# Patient Record
Sex: MALE | Race: WHITE | NOT HISPANIC OR LATINO | Employment: UNEMPLOYED | URBAN - METROPOLITAN AREA
[De-identification: names, ages, dates, MRNs, and addresses within clinical notes are randomized per-mention and may not be internally consistent; named-entity substitution may affect disease eponyms.]

---

## 2017-03-10 ENCOUNTER — OFFICE VISIT (OUTPATIENT)
Dept: INTERNAL MEDICINE | Facility: CLINIC | Age: 22
End: 2017-03-10
Attending: INTERNAL MEDICINE
Payer: COMMERCIAL

## 2017-03-10 VITALS
HEART RATE: 90 BPM | DIASTOLIC BLOOD PRESSURE: 70 MMHG | BODY MASS INDEX: 22.45 KG/M2 | WEIGHT: 143.06 LBS | SYSTOLIC BLOOD PRESSURE: 100 MMHG | HEIGHT: 67 IN | OXYGEN SATURATION: 95 %

## 2017-03-10 DIAGNOSIS — E55.9 VITAMIN D DEFICIENCY: ICD-10-CM

## 2017-03-10 DIAGNOSIS — R11.2 NON-INTRACTABLE VOMITING WITH NAUSEA, UNSPECIFIED VOMITING TYPE: ICD-10-CM

## 2017-03-10 DIAGNOSIS — Z00.00 ANNUAL PHYSICAL EXAM: Primary | ICD-10-CM

## 2017-03-10 DIAGNOSIS — Z87.2 H/O URTICARIA: ICD-10-CM

## 2017-03-10 PROCEDURE — 99999 PR PBB SHADOW E&M-EST. PATIENT-LVL III: CPT | Mod: PBBFAC,,, | Performed by: INTERNAL MEDICINE

## 2017-03-10 PROCEDURE — 99385 PREV VISIT NEW AGE 18-39: CPT | Mod: S$GLB,,, | Performed by: INTERNAL MEDICINE

## 2017-03-10 RX ORDER — LORATADINE 10 MG/1
10 TABLET ORAL DAILY PRN
COMMUNITY
End: 2018-02-09

## 2017-03-10 RX ORDER — VIT C/E/ZN/COPPR/LUTEIN/ZEAXAN 250MG-90MG
1000 CAPSULE ORAL DAILY
Refills: 0 | COMMUNITY
Start: 2017-03-10 | End: 2018-05-13

## 2017-03-10 NOTE — MR AVS SNAPSHOT
[facility]  [address]  [place] [address]  Phone: [phone]  Fax: [phone]                  Alejandro Neff   3/10/2017 3:20 PM   Office Visit    Description:  Male : 1995   Provider:  Nata Hernandez MD   Department:  [facility]           Reason for Visit     Annual Exam           Diagnoses this Visit        Comments    Annual physical exam    -  Primary     Vitamin D deficiency         H/O urticaria         Non-intractable vomiting with nausea, unspecified vomiting type                To Do List           Future Appointments        Provider Department Dept Phone    3/28/2017 2:00 PM NEHAL Calvillo III, MD [facility] - Allergy/ Immunology [phone]      Goals (5 Years of Data)     None      Follow-Up and Disposition     Return in about 1 year (around 3/10/2018), or if symptoms worsen or fail to improve.      PURCHASE these Medications (No prescription required)        Start End    cholecalciferol, vitamin D3, 1,000 unit capsule 3/10/2017     Sig - Route: Take 1 capsule (1,000 Units total) by mouth once daily. - Oral    Class: OTC      [facility]     [facility] Care [facility] -  Assistance  Registered nurses in the [facility] provide clinical advisement, health education, appointment booking, and other advisory services.  Call for this free service at [phone].             Medications           Message regarding Medications     Verify the changes and/or additions to your medication regime listed below are the same as discussed with your clinician today.  If any of these changes or additions are incorrect, please notify your healthcare provider.        START taking these NEW medications        Refills    cholecalciferol, vitamin D3, 1,000 unit capsule 0    Sig: Take 1 capsule (1,000 Units total) by mouth once daily.    Class: OTC    Route: Oral           Verify that the below list of medications is an  "accurate representation of the medications you are currently taking.  If none reported, the list may be blank. If incorrect, please contact your healthcare provider. Carry this list with you in case of emergency.           Current Medications     loratadine (CLARITIN) 10 mg tablet Take 10 mg by mouth daily as needed for Allergies.    MULTIVIT-MINERALS/FERROUS FUM (MULTI VITAMIN ORAL) Take by mouth once daily.    cholecalciferol, vitamin D3, 1,000 unit capsule Take 1 capsule (1,000 Units total) by mouth once daily.           Clinical Reference Information           Your Vitals Were     BP Pulse Height Weight SpO2 BMI    100/70 (BP Location: Left arm, Patient Position: Sitting, BP Method: Manual) 90 5' 7" (1.702 m) 64.9 kg (143 lb 1.3 oz) 95% 22.41 kg/m2      Blood Pressure          Most Recent Value    BP  100/70      Allergies as of 3/10/2017     No Known Allergies      Immunizations Administered on Date of Encounter - 3/10/2017     None      Orders Placed During Today's Visit     Future Labs/Procedures Expected by Expires    Lipid panel  3/10/2017 3/10/2018    TSH  3/10/2017 3/10/2018    Vitamin D  3/10/2017 6/8/2017      [facility] Sign-Up     Activating your [facility] account is as easy as 1-2-3!     1) Visit [email or web address], select Sign Up Now, enter this activation code and your date of birth, then select Next.  [email or web address]  Expires: 4/24/2017  4:40 PM      2) Create a username and password to use when you visit [facility] in the future and select a security question in case you lose your password and select Next.    3) Enter your e-mail address and click Sign Up!    Additional Information  If you have questions, please e-mail [email or web address] or call [phone] to talk to our [facility] staff. Remember, [facility] is NOT to be used for urgent needs. For medical emergencies, dial 911.         Instructions    Your test results will be communicated to you via: [facility], Telephone or Letter.  If you have " not received your test results within one week. Please contact the clinic at [phone].         Language Assistance Services     ATTENTION: Language assistance services are available, free of charge. Please call [phone].      ATENCIÓN: Si sanford bower, tiene a montgomery disposición servicios gratuitos de asistencia lingüística. Llame al [phone].     CHÚ Ý: N?u b?n nói Ti?ng Vi?t, có các d?ch v? h? tr? ngôn ng? mi?n phí dành cho b?n. G?i s? [phone].         [facility] complies with applicable Federal civil rights laws and does not discriminate on the basis of race, color, national origin, age, disability, or sex.

## 2017-03-10 NOTE — PROGRESS NOTES
Subjective:       Patient ID: Alejandro Neff is a 22 y.o. male.    Chief Complaint: Annual Exam (est care)    HPI   Pt here to est care and for annual exam.   Dx with Lyme's at 17yoa - was treated with IV abx and oral abx x 2 years. Is from [place] and attending [employer] currently.    Has hx of nausea and vomiting in AM - this is attributed to Lyme's. [facility] by [facility] - per pt had abd us and EGD and other studies- symptoms stable. Has joint pain that is chronic and unchanged in all joints attributed to lymes - eval by multiple specialists in past - symptoms stable    Pt trying to live off campus for medical reasons for hx of lyme's disease.   Has hx of food allergies and dust and urticaria - Pt reports difficulty with eating healthy and complicated with dx of urticaria, food allergies and nausea attributed to lymes  - eats primarily at [employer] cafeteria. Does have to buy meal plan since living on campus - has community kitchen currently in dorm that contains minimal amenities for cooking and next year has to move from Freshman dorms and new dorm will have only 1 kitchen. Does have car and is able to grocery shop - however limited facilities for cooking.    Pt had labs at [facility] recently when I saw him there a few weeks ago - Cbc, cmp, hiv, hepatitis, crp, esr, RA, SHIRA and HLA B27 unremarkable.     Have appt with allergy scheduled already    Past Medical History:   Diagnosis Date    Lyme arthritis     multiple joints    Lyme disease        History reviewed. No pertinent surgical history.    Family History   Problem Relation Age of Onset    Arthritis Mother     Other Mother      benign tumor of brain and spinal cord    Hyperlipidemia Father     Gout Father     No Known Problems Sister     Arthritis Sister      shoulder    Depression Sister        Social History     Social History    Marital status: Single     Spouse name: N/A    Number of children: N/A    Years of  education: N/A     Occupational History    student at [employer]      Social History Main Topics    Smoking status: Never Smoker    Smokeless tobacco: None    Alcohol use Yes      Comment: occ    Drug use: No    Sexual activity: Yes     Partners: Female     Birth control/ protection: Condom     Other Topics Concern    None     Social History Narrative    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 He appears well-developed and well-nourished.   HENT:   Head: Normocephalic and atraumatic.   Right Ear: External ear normal.   Left Ear: External ear normal.   Nose: Nose normal.   Mouth/Throat: Oropharynx is clear and moist.   Eyes: Conjunctivae and EOM are normal.   Neck: Neck supple. No thyromegaly present.   Cardiovascular: Normal rate, regular rhythm, normal heart sounds and intact distal pulses.    Pulmonary/Chest: Effort normal and breath sounds normal.   Abdominal: Soft. Bowel sounds are normal.   Musculoskeletal: He exhibits no edema or tenderness.   Lymphadenopathy:     He has no cervical adenopathy.   Neurological: He is alert and oriented to person, place, and time. Coordination normal.   Skin: Skin is warm and dry.   Psychiatric: He has a normal mood and affect. His behavior is normal. Judgment and thought content normal.   Vitals reviewed.      Assessment:       Alejandro was seen today for annual exam.    Diagnoses and all orders for this visit:  Alejandro was seen today for annual exam.    Diagnoses and all orders for this visit:    Annual physical exam  -     Lipid panel; Future  -     TSH; Future  -     Vitamin D; Future  Recommend daily sunscreen, cardiovascular exercise min 30 min 5 days per week. Seatbelts routinely.    Vitamin D deficiency: cont otc daily vit d, check level  -     Vitamin D; Future    H/O  urticaria: pt scheduled with allergist already    Non-intractable vomiting with nausea, unspecified vomiting type: per pt chronic and s/p extensive eval by gi - symptoms stable. Er and rtc prompts given.   Review old records    Other orders  -     cholecalciferol, vitamin D3, 1,000 unit capsule; Take 1 capsule (1,000 Units total) by mouth once daily.    Pt to complete LOKI to obtain old records to review dx and eval for lymes disease and chronic symptoms.   Will draft letter for medical necessity to live off campus after received old records from prior ped office as pt would benefit from being able to prepare own food in personal kitchen  Keep appt with allx

## 2017-03-10 NOTE — PATIENT INSTRUCTIONS
Your test results will be communicated to you via: [facility], Telephone or Letter.  If you have not received your test results within one week. Please contact the clinic at [phone].

## 2017-03-11 PROBLEM — R11.2 VOMITING WITH NAUSEA, NOT INTRACTABLE: Status: ACTIVE | Noted: 2017-03-11

## 2017-03-11 PROBLEM — R11.0 CHRONIC NAUSEA: Status: ACTIVE | Noted: 2017-03-11

## 2017-03-15 ENCOUNTER — TELEPHONE (OUTPATIENT)
Dept: INTERNAL MEDICINE | Facility: CLINIC | Age: 22
End: 2017-03-15

## 2017-03-15 NOTE — TELEPHONE ENCOUNTER
Please notify pt that we are still awaiting records from prior pediatrician. Please check faxes to see if old records were received. thnks!

## 2017-03-15 NOTE — TELEPHONE ENCOUNTER
Pt's requesting a letter stating that he has Lyme disease and it's okay for him to live off campus    LCV 03/10/17    Please auth request

## 2017-03-15 NOTE — TELEPHONE ENCOUNTER
----- Message from Isa Cruz sent at 3/9/2017  3:14 PM CST -----  Contact: pt  x_  1st Request  _  2nd Request  _  3rd Request      Who:MATTHEW VARGHESE [[id number]]    Why: pt would like to know if he can get a letter stating that he has Lyme disease and is it okay for him to live off campus     What Number to Call Back: [phone] LVM if possible     When to Expect a call back: (Before the end of the day)   -- if call after 3:00 call back will be tomorrow.

## 2017-03-15 NOTE — TELEPHONE ENCOUNTER
Pt has been informed that we have not yet received his medical records from his prior pediatrician. Pt is requesting to be notified if the office does not receive records by Friday 3/17/2017.      Pt has no further questions at this time.

## 2017-03-28 ENCOUNTER — LAB VISIT (OUTPATIENT)
Dept: LAB | Facility: HOSPITAL | Age: 22
End: 2017-03-28
Payer: COMMERCIAL

## 2017-03-28 ENCOUNTER — OFFICE VISIT (OUTPATIENT)
Dept: ALLERGY | Facility: CLINIC | Age: 22
End: 2017-03-28
Payer: COMMERCIAL

## 2017-03-28 VITALS
HEIGHT: 66 IN | WEIGHT: 141.13 LBS | SYSTOLIC BLOOD PRESSURE: 110 MMHG | TEMPERATURE: 99 F | BODY MASS INDEX: 22.68 KG/M2 | DIASTOLIC BLOOD PRESSURE: 55 MMHG

## 2017-03-28 DIAGNOSIS — L50.1 IDIOPATHIC URTICARIA: Primary | ICD-10-CM

## 2017-03-28 DIAGNOSIS — J31.0 CHRONIC RHINITIS: ICD-10-CM

## 2017-03-28 DIAGNOSIS — G89.4 CHRONIC PAIN DISORDER: ICD-10-CM

## 2017-03-28 DIAGNOSIS — L50.1 IDIOPATHIC URTICARIA: ICD-10-CM

## 2017-03-28 LAB
25(OH)D3+25(OH)D2 SERPL-MCNC: 19 NG/ML
ALBUMIN SERPL BCP-MCNC: 4.8 G/DL
ALP SERPL-CCNC: 56 U/L
ALT SERPL W/O P-5'-P-CCNC: 10 U/L
ANION GAP SERPL CALC-SCNC: 11 MMOL/L
AST SERPL-CCNC: 15 U/L
BASOPHILS # BLD AUTO: 0.01 K/UL
BASOPHILS NFR BLD: 0.1 %
BILIRUB SERPL-MCNC: 0.7 MG/DL
BUN SERPL-MCNC: 14 MG/DL
CALCIUM SERPL-MCNC: 10.2 MG/DL
CHLORIDE SERPL-SCNC: 103 MMOL/L
CO2 SERPL-SCNC: 26 MMOL/L
CREAT SERPL-MCNC: 1 MG/DL
DIFFERENTIAL METHOD: NORMAL
EOSINOPHIL # BLD AUTO: 0.1 K/UL
EOSINOPHIL NFR BLD: 0.7 %
ERYTHROCYTE [DISTWIDTH] IN BLOOD BY AUTOMATED COUNT: 13.8 %
EST. GFR  (AFRICAN AMERICAN): >60 ML/MIN/1.73 M^2
EST. GFR  (NON AFRICAN AMERICAN): >60 ML/MIN/1.73 M^2
GLUCOSE SERPL-MCNC: 85 MG/DL
HCT VFR BLD AUTO: 46.3 %
HGB BLD-MCNC: 15.9 G/DL
LYMPHOCYTES # BLD AUTO: 1.8 K/UL
LYMPHOCYTES NFR BLD: 23.6 %
MCH RBC QN AUTO: 29.1 PG
MCHC RBC AUTO-ENTMCNC: 34.3 %
MCV RBC AUTO: 85 FL
MONOCYTES # BLD AUTO: 0.7 K/UL
MONOCYTES NFR BLD: 9 %
NEUTROPHILS # BLD AUTO: 5.1 K/UL
NEUTROPHILS NFR BLD: 66.3 %
PLATELET # BLD AUTO: 235 K/UL
PMV BLD AUTO: 9.3 FL
POTASSIUM SERPL-SCNC: 4.1 MMOL/L
PROT SERPL-MCNC: 8.1 G/DL
RBC # BLD AUTO: 5.46 M/UL
SODIUM SERPL-SCNC: 140 MMOL/L
TSH SERPL DL<=0.005 MIU/L-ACNC: 1.08 UIU/ML
WBC # BLD AUTO: 7.68 K/UL

## 2017-03-28 PROCEDURE — 80053 COMPREHEN METABOLIC PANEL: CPT

## 2017-03-28 PROCEDURE — 84165 PROTEIN E-PHORESIS SERUM: CPT | Mod: 26,,, | Performed by: PATHOLOGY

## 2017-03-28 PROCEDURE — 36415 COLL VENOUS BLD VENIPUNCTURE: CPT

## 2017-03-28 PROCEDURE — 83520 IMMUNOASSAY QUANT NOS NONAB: CPT

## 2017-03-28 PROCEDURE — 82306 VITAMIN D 25 HYDROXY: CPT

## 2017-03-28 PROCEDURE — 85025 COMPLETE CBC W/AUTO DIFF WBC: CPT

## 2017-03-28 PROCEDURE — 86376 MICROSOMAL ANTIBODY EACH: CPT

## 2017-03-28 PROCEDURE — 86003 ALLG SPEC IGE CRUDE XTRC EA: CPT | Mod: 59

## 2017-03-28 PROCEDURE — 86003 ALLG SPEC IGE CRUDE XTRC EA: CPT

## 2017-03-28 PROCEDURE — 86800 THYROGLOBULIN ANTIBODY: CPT

## 2017-03-28 PROCEDURE — 82785 ASSAY OF IGE: CPT

## 2017-03-28 PROCEDURE — 84443 ASSAY THYROID STIM HORMONE: CPT

## 2017-03-28 PROCEDURE — 84165 PROTEIN E-PHORESIS SERUM: CPT

## 2017-03-28 PROCEDURE — 99999 PR PBB SHADOW E&M-EST. PATIENT-LVL III: CPT | Mod: PBBFAC,,, | Performed by: ALLERGY & IMMUNOLOGY

## 2017-03-28 PROCEDURE — 99245 OFF/OP CONSLTJ NEW/EST HI 55: CPT | Mod: S$GLB,,, | Performed by: ALLERGY & IMMUNOLOGY

## 2017-03-28 RX ORDER — DIPHENHYDRAMINE HCL 25 MG
25 CAPSULE ORAL EVERY 6 HOURS PRN
COMMUNITY
End: 2017-03-28 | Stop reason: CLARIF

## 2017-03-29 LAB
ALBUMIN SERPL ELPH-MCNC: 5.13 G/DL
ALPHA1 GLOB SERPL ELPH-MCNC: 0.32 G/DL
ALPHA2 GLOB SERPL ELPH-MCNC: 0.74 G/DL
B-GLOBULIN SERPL ELPH-MCNC: 0.79 G/DL
GAMMA GLOB SERPL ELPH-MCNC: 0.92 G/DL
IGE SERPL-ACNC: <35 IU/ML
PROT SERPL-MCNC: 7.9 G/DL
THYROGLOB AB SERPL IA-ACNC: <4 IU/ML
THYROPEROXIDASE IGG SERPL-ACNC: <6 IU/ML

## 2017-03-30 LAB
PATHOLOGIST INTERPRETATION SPE: NORMAL
TRYPTASE LEVEL: 3.5 NG/ML

## 2017-03-31 LAB
A ALTERNATA IGE QN: <0.35 KU/L
A FUMIGATUS IGE QN: <0.35 KU/L
ALLERGEN COMMON WASP (YELLOW JACKET) IGE: <0.35 KU/L
ALLERGEN WHEAT IGE: <0.35 KU/L
ALLERGEN WHITE FACED HORNET IGE: <0.35 KU/L
ALLERGEN YELLOW HORNET IGE: <0.35 KU/L
BAHIA GRASS IGE QN: <0.35 KU/L
CAT DANDER IGE QN: <0.35 KU/L
COMMON RAGWEED IGE QN: <0.35 KU/L
D FARINAE IGE QN: <0.35 KU/L
D PTERONYSS IGE QN: <0.35 KU/L
DEPRECATED A ALTERNATA IGE RAST QL: NORMAL
DEPRECATED A FUMIGATUS IGE RAST QL: NORMAL
DEPRECATED BAHIA GRASS IGE RAST QL: NORMAL
DEPRECATED CAT DANDER IGE RAST QL: NORMAL
DEPRECATED COMMON RAGWEED IGE RAST QL: NORMAL
DEPRECATED D FARINAE IGE RAST QL: NORMAL
DEPRECATED D PTERONYSS IGE RAST QL: NORMAL
DEPRECATED DOG DANDER IGE RAST QL: NORMAL
DEPRECATED HONEY BEE IGE RAST QL: NORMAL
DEPRECATED MARSH ELDER IGE RAST QL: NORMAL
DEPRECATED MUSHROOM IGE RAST QL: NORMAL
DEPRECATED PECAN/HICK TREE IGE RAST QL: NORMAL
DEPRECATED ROACH IGE RAST QL: NORMAL
DEPRECATED TIMOTHY IGE RAST QL: NORMAL
DEPRECATED WHITE OAK IGE RAST QL: NORMAL
DOG DANDER IGE QN: <0.35 KU/L
HONEY BEE IGE QN: <0.35 KU/L
MARSH ELDER IGE QN: <0.35 KU/L
MUSHROOM IGE QN: <0.35 KU/L
PECAN/HICK TREE IGE QN: <0.35 KU/L
ROACH IGE QN: <0.35 KU/L
TIMOTHY IGE QN: <0.35 KU/L
WASP CLASS: NORMAL
WASP VENOM IGE: <0.35 KU/L
WHEAT CLASS: NORMAL
WHITE FACED HORNET CLASS: NORMAL
WHITE OAK IGE QN: <0.35 KU/L
YEL FACED HORN. CLASS: NORMAL
YELLOW JACKET CLASS: NORMAL
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
## 2017-04-03 ENCOUNTER — TELEPHONE (OUTPATIENT)
Dept: INTERNAL MEDICINE | Facility: CLINIC | Age: 22
End: 2017-04-03

## 2017-04-03 NOTE — TELEPHONE ENCOUNTER
Please notify pt that letter for campus residence was drafted and can be picked up willy - please print letter for my signature and leave on my desk - i will sign this willy upon rtc. Thanks!

## 2017-04-04 ENCOUNTER — TELEPHONE (OUTPATIENT)
Dept: INTERNAL MEDICINE | Facility: CLINIC | Age: 22
End: 2017-04-04

## 2017-04-04 NOTE — TELEPHONE ENCOUNTER
----- Message from Socorro Garcia sent at 4/4/2017 11:12 AM CDT -----  Contact: Self  X  1st Request  _  2nd Request  _  3rd Request        Who: MATTHEW VARGHESE [[id number]]    Why: Pt is calling to find out if the letter can be faxed over to attention Haleigh Ashby at [phone]) and Ms. Ashby can be reached at [phone]) to confirm if fax was received.  Pt says that he may not be able to answer his phone but a voicemail can be left and or a message could be sent via my chart letting him know if there's any further questions for him.  Pt says that he don't get out of class until 5 that's the reason for request.  Please contact pt to further discuss and advise.    What Number to Call Back: [phone]    When to Expect a call back: (Before the end of the day)   -- if the call is after 12:00, the call back will be tomorrow.

## 2017-04-04 NOTE — TELEPHONE ENCOUNTER
Attempted to contact the pt to find out what form he's requesting to be faxed over. Pt's phone is currently unavailable at this time. Will try again at a later date.

## 2017-04-05 ENCOUNTER — TELEPHONE (OUTPATIENT)
Dept: ALLERGY | Facility: CLINIC | Age: 22
End: 2017-04-05

## 2017-04-05 NOTE — TELEPHONE ENCOUNTER
----- Message from Socorro Garcia sent at 4/4/2017 11:30 AM CDT -----  Contact: Self  X  1st Request  _  2nd Request  _  3rd Request        Who: MATTHEW VARGHESE [[id number]]    Why: Pt is calling to get the results of he recent labs.  Pt says that he will set up his My Chart and would like if they can be sent to his chart.  Please contact pt to further discuss and advise.    What Number to Call Back: [phone]    When to Expect a call back: (Before the end of the day)   -- if the call is after 12:00, the call back will be tomorrow.

## 2017-04-07 NOTE — TELEPHONE ENCOUNTER
"----- Message from Elaine Chang sent at 4/7/2017 11:01 AM CDT -----  Contact: Patient himself  _  1st Request  X  2nd Request  _  3rd Request    Who: Memo Neff Alejandro [mrn# [id number]]    Why: Patient called requesting a call. Says, "he would like to know the status of the paperwork that has to be faxed over to his school."  Patient is asking, "to please jo attention Haleigh Ashby and the fax# [phone].  Please do so at your earliest convenience.   THANKS!    What Number to Call Back: # [phone]    When to Expect a call back: (Before the end of the day)   -- if the call is after 12:00, the call back will be tomorrow.                        "

## 2017-04-07 NOTE — TELEPHONE ENCOUNTER
Pt has been informed that his form is ready for . Pt states that he would like to have the form faxed and will call back at a later time with the fax number.

## 2017-04-07 NOTE — TELEPHONE ENCOUNTER
----- Message from Noé Clark sent at 4/6/2017  4:54 PM CDT -----  Contact: MATTHEW VARGHESE [[id number]]  X_  1st Request  _  2nd Request  _  3rd Request        Who: MATTHEW VARGHESE [[id number]]    Why: Patient following up on letter to be sent to [employer] stating it would be better for patient to live off campus due to medical records. Patient would like letter to be faxed. Patient would like letter before 10 am.    What Number to Call Back: [phone]    When to Expect a call back: (Before the end of the day)   -- if the call is after 12:00, the call back will be tomorrow.

## 2017-04-10 LAB
ALLERGEN NAME: NORMAL
ALLERGEN RESULT: NORMAL

## 2017-04-10 NOTE — TELEPHONE ENCOUNTER
----- Message from Noé Clark sent at 4/7/2017  4:33 PM CDT -----  Contact: MATTHEW VARGHESE [[id number]]  _  1st Request  _  2nd Request  X_  3rd Request        Who: MATTHEW VARGHESE [[id number]]    Why: Patient is asking for his form to be faxed. Fax No.[phone]  Please call back to follow up.    What Number to Call Back: [phone]    When to Expect a call back: (Before the end of the day)   -- if the call is after 12:00, the call back will be tomorrow.

## 2017-04-18 ENCOUNTER — PATIENT MESSAGE (OUTPATIENT)
Dept: INTERNAL MEDICINE | Facility: CLINIC | Age: 22
End: 2017-04-18

## 2017-04-24 ENCOUNTER — PATIENT MESSAGE (OUTPATIENT)
Dept: INTERNAL MEDICINE | Facility: CLINIC | Age: 22
End: 2017-04-24

## 2017-04-25 ENCOUNTER — OFFICE VISIT (OUTPATIENT)
Dept: ALLERGY | Facility: CLINIC | Age: 22
End: 2017-04-25
Payer: COMMERCIAL

## 2017-04-25 VITALS
TEMPERATURE: 98 F | HEIGHT: 66 IN | RESPIRATION RATE: 20 BRPM | HEART RATE: 76 BPM | BODY MASS INDEX: 22.64 KG/M2 | DIASTOLIC BLOOD PRESSURE: 68 MMHG | WEIGHT: 140.88 LBS | SYSTOLIC BLOOD PRESSURE: 102 MMHG

## 2017-04-25 DIAGNOSIS — E55.9 VITAMIN D DEFICIENCY: ICD-10-CM

## 2017-04-25 DIAGNOSIS — L50.1 IDIOPATHIC URTICARIA: ICD-10-CM

## 2017-04-25 DIAGNOSIS — H10.403 CHRONIC CONJUNCTIVITIS OF BOTH EYES, UNSPECIFIED CHRONIC CONJUNCTIVITIS TYPE: ICD-10-CM

## 2017-04-25 DIAGNOSIS — J31.0 CHRONIC RHINITIS: Primary | ICD-10-CM

## 2017-04-25 PROCEDURE — 99999 PR PBB SHADOW E&M-EST. PATIENT-LVL III: CPT | Mod: PBBFAC,,, | Performed by: ALLERGY & IMMUNOLOGY

## 2017-04-25 PROCEDURE — 95004 PERQ TESTS W/ALRGNC XTRCS: CPT | Mod: S$GLB,,, | Performed by: ALLERGY & IMMUNOLOGY

## 2017-04-25 PROCEDURE — 99214 OFFICE O/P EST MOD 30 MIN: CPT | Mod: 25,S$GLB,, | Performed by: ALLERGY & IMMUNOLOGY

## 2017-04-25 PROCEDURE — 1160F RVW MEDS BY RX/DR IN RCRD: CPT | Mod: S$GLB,,, | Performed by: ALLERGY & IMMUNOLOGY

## 2017-04-25 NOTE — PROGRESS NOTES
"Alejandro Neff returns to clinic today for continued evaluation of chronic rhinitis, urticaria, and a possible food allergy. He was last seen March 28, 2017. He is here alone. He sees Dr. Nata Hernandez at the [facility].    Since his last visit, he has continued to feel about the same. He continues to have rhinitis with sneezing, clear rhinorrhea, and postnasal drip.  He does have increased symptoms associated with eating.    He denies any cough, wheezing, or shortness of breath.    He also describes frequent "hives" after eating numerous foods. He has been able to stop his antihistamines this week without any difficulty. He has not had any Zyrtec or Benadryl.    Over Easter he did get numerous fire ant bites on his left arm and a and one on his right arm. They developed erythematous areas around them. He did not have any systemic symptoms.    He denies any heartburn or indigestion.    Dr. Hernandez did start him on vitamin D 50,000 units a week.    He is about to go into exams at school. He plans to go to [employer] for the summer for 4 months.    [facility] PEQ ALLERGY QUESTIONNAIRE SHORT 4/24/2017   Are you taking any new medications since your last visit? Yes   Constitution: No symptoms   Head or facial pain: No symptoms   Eyes: No symptoms   Ears: No symptoms   Nose: No symptoms   Throat: No symptoms   Sinuses: No symptoms   Lungs: No symptoms   Skin: No symptoms   Cardiovascular: No symptoms   Gastrointestinal: No symptoms   Genital/ urinary No symptoms   Musculoskeletal: No symptoms   Neurologic: No symptoms   Endocrine: No symptoms   Hematologic: No symptoms     Physical Examination:  General: Well-developed, well-nourished, no acute distress.  Head: No sinus tenderness.  Eyes: Conjunctivae:  No bulbar or palpebral conjunctival injection.  Ears: EAC's clear.  TM's clear.  No pre-auricular nodes.  Nose: Nasal Mucosa:  Pink.  Septum: No apparent deviation.  Turbinates:  No significant edema.  " Polyps/Mass:  None visible.  Teeth/Gums:  No bleeding noted.  Oropharynx: No exudates.  Neck: Supple without thyromegaly. No cervical lymphadenopathy.    Respiratory/Chest: Effort: Good.  Auscultation:  Clear bilaterally.  Skin: Good turgor.  No urticaria or angioedema.  Neuro/Psych: Oriented x 3.    Laboratory 3/28/2017:  IgE level: Less than 35.  ImmunoCAP: Negative.  Mushroom, wheat, bamboo: Negative.  Home bee, white hornet, yellow hornet, wasp, yellow jacket: Negative.  CBC: Normal.  Chem-20: Normal.  TSH: 1.077.  Thyroid peroxidase antibody level: Less than 6.0.  Thyroglobulin antibody level: Less than 4.0.  Vitamin D level: 19.  Serum tryptase: 3.5.  SPEP: Normal.    Inhalant skin tests prick #60 4/25/2017:  3+ histamine control. All tests are negative.    Assessment:  1. Chronic rhinitis, not allergic.  2. History of shortness of breath after wasp sting with subsequent local reactions only.  3. Chronic urticaria, probably idiopathic, improved.  4. Doubt food allergy.  5. History of chronic Lyme disease and pain.  6. Memory difficulty of uncertain etiology.  7. Chronic nausea and vomiting of uncertain etiology.  8. Vitamin D insufficiency.    Recommendations:  1. Discussed additional food skin tests. He will try to do these before his exams and if not on his return.  2. Observe on vitamin D replacement.  3. Return to clinic after above.

## 2017-04-25 NOTE — TELEPHONE ENCOUNTER
Letter completed. Please notify pt that it was completed. He is requesting that it be emailed to him through my [facility] - please send it - I can also print for signature if he wishes to pick this up. he had requested  To fax the letter to christy, however There is no fax number listed on form that listed the documentation guidelines for the letter.

## 2017-04-26 ENCOUNTER — PATIENT MESSAGE (OUTPATIENT)
Dept: INTERNAL MEDICINE | Facility: CLINIC | Age: 22
End: 2017-04-26

## 2017-04-26 NOTE — LETTER
"  April 26, 2017      [facility]  [address]  [facility] [address]  Phone: [phone]  Fax: [phone]       Patient: Alejandro Neff   YOB: 1995  Date of Visit: 04/26/2017    To Whom It May Concern:    Alejandro Neff has been under my care at [facility] since 3/10/2017. Prior medical records were requested, received and reviewed. The information provided about his medical history and current conditions was gathered from review of these records. He has a history of treated Lyme's disease with intravenous antibiotics in 2012 after evaluation by a "Lyme's Disease Specialist" according to old records. He has a history of chronic nausea and emesis (non-intractable vomiting with nausea ICD R11.2, 787.01) and was evaluated by gastroenterologist Dr. Alexy Artis in 2015. Treatment with medications zofran and cyproheptadine was unsuccessful, and the etiology of these symptoms has not been determined to date. Mr. Memo Neff reports difficulty in maintaining his nutrition due to his symptoms and intermittent intolerance to various foods when symptoms acutely flare. It is in my medical opinion that he would benefit from being able to cook and prepare his own meals off campus due to the those chronic symptoms. If you have any questions or concerns, or if I can be of further assistance, please do not hesitate to contact me.     Sincerely,           Nata Hernandez MD     "

## 2017-04-26 NOTE — TELEPHONE ENCOUNTER
Letter updated only to include off campus - discussed old records and current data that did not show food allergies at this point. Pt agrees to  signed letter at [facility] willy during my clinic hours there. All questions were answered and pt verbalized understanding of plan.

## 2017-11-06 ENCOUNTER — HOSPITAL ENCOUNTER (EMERGENCY)
Facility: OTHER | Age: 22
Discharge: HOME | End: 2017-11-06
Attending: EMERGENCY MEDICINE
Payer: COMMERCIAL

## 2017-11-06 VITALS
WEIGHT: 130.94 LBS | OXYGEN SATURATION: 98 % | TEMPERATURE: 98 F | HEART RATE: 87 BPM | DIASTOLIC BLOOD PRESSURE: 61 MMHG | RESPIRATION RATE: 18 BRPM | SYSTOLIC BLOOD PRESSURE: 111 MMHG | HEIGHT: 66 IN | BODY MASS INDEX: 21.04 KG/M2

## 2017-11-06 DIAGNOSIS — M25.512 ACUTE PAIN OF LEFT SHOULDER: Primary | ICD-10-CM

## 2017-11-06 PROCEDURE — 99283 EMERGENCY DEPT VISIT LOW MDM: CPT

## 2017-11-06 RX ORDER — IBUPROFEN 600 MG/1
600 TABLET ORAL EVERY 6 HOURS PRN
Qty: 20 TABLET | Refills: 0 | Status: SHIPPED | OUTPATIENT
Start: 2017-11-06 | End: 2018-05-13

## 2017-11-06 RX ORDER — ORPHENADRINE CITRATE 100 MG/1
100 TABLET, EXTENDED RELEASE ORAL 2 TIMES DAILY
Qty: 20 TABLET | Refills: 0 | Status: SHIPPED | OUTPATIENT
Start: 2017-11-06 | End: 2017-11-16

## 2017-11-06 NOTE — ED PROVIDER NOTES
"Encounter Date: 11/6/2017    SCRIBE #1 NOTE: I, Mckenna Ang, am scribing for, and in the presence of, Dr. Crowley.       History     Chief Complaint   Patient presents with    Shoulder Pain     pt with left shoulder pain x 4 day. pt      Time seen by provider: 12:19 PM    This is a 22 y.o. male who presents to the ED on the referral of [facility] with complaint of left shoulder pain that has progressively worsened over the past 5 days.  The patient describes his discomfort as as "sharp, aching" sensation with an occasional "shooting" sensation that radiates proximally up the neck and distally up the arm.  He reports no associated symptoms, including fever, chills, open wounds, color changes, weakness, numbness, or tingling.  He denies any recent trauma, including falls or heavy lifting.  Although the patient attempte to alleviate his discomfort with muscle relaxants, tiger balm, ice packs, foam roller, and heating pads, he has found no lasting relief.  He notes that his discomfort is exacerbated with deep breathing and movement.  He reports no other identifying, alleviating, or exacerbating factors.  He reports no major medical problems or daily medications.           The history is provided by the patient.     Review of patient's allergies indicates:   Allergen Reactions    Mushroom Anaphylaxis    Bamboo     Bee pollens     Mushroom flavor      Past Medical History:   Diagnosis Date    Abdominal pain 08/12/2010    eval for abd and chest wall pain at [place], [place] - cxr wnl, EKG (RVH), troponin wnl, normal chemistries    Allergy     Chondromalacia patellae     Chronic nausea 2015    eval by GI Dr. Tushar Artis - given zofran and ciproheptadine     Chronic pain     Chronic pain     [facility] - no meds; followed by psych and pain clinic and unresponsive to behavioral/CBT/old records reports c/f conversion " "disorder     Foot pain 04/2010    4/10 + [facility], eval by Dr. ALLY Lion at [facility] -dx unclear ? gout and trial of nsaids and pdn, xrays normal 1/11, referred to podiatry, re-eval by rheum 2/12 and MRI and films wnl    Fracture of right radius 09/2009    Lyme arthritis     multiple joints    Lyme disease 2013    Memory loss 03/2012    eval for memory loss by neuro at [facility] - MRI, EEG wnl. Dr. Patricio suggested emotional factors & ref to Tushar Davies, PhD for  eval & neuropsych eval 3/12 Lupe Delgado, PhD - no evidence of formal thought disorder or psychosis - documented severe memory impairment; not related to to ADD or executive function issues. sugesstion that memory issues may be related to "emotional factors", ?conversion d/o    Stress fracture of tibia and fibula 08/2011    Urticaria      No past surgical history on file.  Family History   Problem Relation Age of Onset    Arthritis Mother     Other Mother      benign tumor of brain and spinal cord    Hyperlipidemia Father     Gout Father     No Known Problems Sister     Arthritis Sister      shoulder    Depression Sister      Social History   Substance Use Topics    Smoking status: Never Smoker    Smokeless tobacco: Not on file    Alcohol use Yes      Comment: occ     Review of Systems   Constitutional: Negative for chills and fever.   HENT: Negative for congestion and facial swelling.    Respiratory: Negative for chest tightness and shortness of breath.    Cardiovascular: Negative for chest pain.   Gastrointestinal: Negative for abdominal pain, nausea and vomiting.   Endocrine: Negative for polyuria.   Genitourinary: Negative for dysuria.   Musculoskeletal: Positive for arthralgias (left shoulder). Negative for myalgias.   Skin: Negative for color change, rash and wound.   Neurological: Negative for weakness, numbness and headaches.        Negative for tingling.        Physical Exam     Initial Vitals [11/06/17 1126]   BP Pulse Resp Temp SpO2 "   111/61 87 18 97.6 °F (36.4 °C) 98 %      MAP       77.67         Physical Exam    Nursing note and vitals reviewed.  Constitutional: Vital signs are normal. He appears well-developed and well-nourished. No distress.   HENT:   Head: Normocephalic and atraumatic.   Mouth/Throat: Oropharynx is clear and moist.   Eyes: Conjunctivae and EOM are normal. Pupils are equal, round, and reactive to light.   Neck: Normal range of motion. Neck supple.   Cardiovascular: Normal rate, regular rhythm and normal heart sounds. Exam reveals no gallop and no friction rub.    No murmur heard.  Pulses:       Radial pulses are 2+ on the right side, and 2+ on the left side.   Pulmonary/Chest: Breath sounds normal. No respiratory distress.   Abdominal: Soft. Bowel sounds are normal. There is no tenderness. There is no rebound and no guarding.   Musculoskeletal: Normal range of motion. He exhibits tenderness.   Tenderness to palpation of the rhomboid muscles on the left with noted spasm.  Full ROM of shoulders.  Good abduction and adduction.   Neurological: He is alert and oriented to person, place, and time. He has normal strength and normal reflexes. He displays normal reflexes. No cranial nerve deficit or sensory deficit. He displays a negative Romberg sign.   Strength 5/5 to bilateral upper extremities.  Neurovascularly intact distally.    Skin: Skin is warm and dry. Capillary refill takes less than 2 seconds. No rash noted. No pallor.         ED Course   Procedures  Labs Reviewed - No data to display   Imaging Results    None                 Medical Decision Making:   History:   Old Medical Records: I decided to obtain old medical records.  ED Management:  A 22-year-old male with shoulder pain.  No trauma.  On physical exam he's got spasm of the rhomboid muscles.  History range of motion a stroller with strength.  Do not suspect fracture dislocation.  Do not feel x-rays are indicated.  We'll discharge with recommendations of physical  therapy and a muscle relaxant to follow-up with primary care as an outpatient.            Scribe Attestation:   Scribe #1: I performed the above scribed service and the documentation accurately describes the services I performed. I attest to the accuracy of the note.    I, Dr. Leo Crowley, personally performed the services described in this documentation. All medical record entries made by the scribe were at my direction and in my presence.  I have reviewed the chart and agree that the record reflects my personal performance and is accurate and complete. Leo Crowley DO.  12:55 PM 11/06/2017         ED Course      Clinical Impression:     1. Acute pain of left shoulder                               Leo Crowley,   11/06/17 2066

## 2017-11-06 NOTE — ED TRIAGE NOTES
Pt reports sudden onset of L shoulder pain on Thursday while sitting in class. Reports pain worsened and has severe at rest and with movement. Denies any trauma/injury. Pt seen on campus clinic and was given toradol with no relief. No relief from mobic and flexeril.

## 2018-02-06 ENCOUNTER — PATIENT MESSAGE (OUTPATIENT)
Dept: INTERNAL MEDICINE | Facility: CLINIC | Age: 23
End: 2018-02-06

## 2018-02-06 ENCOUNTER — TELEPHONE (OUTPATIENT)
Dept: INTERNAL MEDICINE | Facility: CLINIC | Age: 23
End: 2018-02-06

## 2018-02-06 NOTE — TELEPHONE ENCOUNTER
"----- Message from Elaine Chang sent at 2/6/2018 12:34 PM CST -----  Contact: Patient himself  X  1st Request  _  2nd Request  _  3rd Request    Who: Alejandro Neff (mrn# [id number])    Why: Patient called requesting to be seen today in this office as this pertains to  "on going chest pain."  I did advise patient perhaps he should go to the ED.  Patient says, "Dr. Hernandez advised him to come into the office."   Please give patient a call back at your earliest convenience.        THANKS!    What Number to Call Back:  [phone]    When to Expect a call back: (Before the end of the day)   -- if the call is after 12:00, the call back will be tomorrow.                          "

## 2018-02-09 ENCOUNTER — PATIENT OUTREACH (OUTPATIENT)
Dept: INTERNAL MEDICINE | Facility: CLINIC | Age: 23
End: 2018-02-09

## 2018-02-09 ENCOUNTER — HOSPITAL ENCOUNTER (OUTPATIENT)
Dept: CARDIOLOGY | Facility: OTHER | Age: 23
Discharge: HOME | End: 2018-02-09
Attending: INTERNAL MEDICINE
Payer: COMMERCIAL

## 2018-02-09 ENCOUNTER — OFFICE VISIT (OUTPATIENT)
Dept: INTERNAL MEDICINE | Facility: CLINIC | Age: 23
End: 2018-02-09
Attending: INTERNAL MEDICINE
Payer: COMMERCIAL

## 2018-02-09 ENCOUNTER — HOSPITAL ENCOUNTER (OUTPATIENT)
Dept: RADIOLOGY | Facility: OTHER | Age: 23
Discharge: HOME | End: 2018-02-09
Attending: INTERNAL MEDICINE
Payer: COMMERCIAL

## 2018-02-09 VITALS
BODY MASS INDEX: 21.11 KG/M2 | DIASTOLIC BLOOD PRESSURE: 60 MMHG | OXYGEN SATURATION: 97 % | WEIGHT: 131.38 LBS | HEART RATE: 94 BPM | SYSTOLIC BLOOD PRESSURE: 124 MMHG | HEIGHT: 66 IN

## 2018-02-09 DIAGNOSIS — S46.812D STRAIN OF LEFT TRAPEZIUS MUSCLE, SUBSEQUENT ENCOUNTER: Primary | ICD-10-CM

## 2018-02-09 DIAGNOSIS — S29.012D STRAIN OF RHOMBOID MUSCLE, SUBSEQUENT ENCOUNTER: ICD-10-CM

## 2018-02-09 DIAGNOSIS — R07.9 CHEST PAIN, UNSPECIFIED TYPE: ICD-10-CM

## 2018-02-09 PROCEDURE — 99999 PR PBB SHADOW E&M-EST. PATIENT-LVL IV: CPT | Mod: PBBFAC,,, | Performed by: INTERNAL MEDICINE

## 2018-02-09 PROCEDURE — 71046 X-RAY EXAM CHEST 2 VIEWS: CPT | Mod: TC,FY

## 2018-02-09 PROCEDURE — 93010 ELECTROCARDIOGRAM REPORT: CPT | Mod: ,,, | Performed by: INTERNAL MEDICINE

## 2018-02-09 PROCEDURE — 99214 OFFICE O/P EST MOD 30 MIN: CPT | Mod: S$GLB,,, | Performed by: INTERNAL MEDICINE

## 2018-02-09 PROCEDURE — 3008F BODY MASS INDEX DOCD: CPT | Mod: S$GLB,,, | Performed by: INTERNAL MEDICINE

## 2018-02-09 PROCEDURE — 71046 X-RAY EXAM CHEST 2 VIEWS: CPT | Mod: 26,,, | Performed by: INTERNAL MEDICINE

## 2018-02-09 PROCEDURE — 93005 ELECTROCARDIOGRAM TRACING: CPT

## 2018-02-09 RX ORDER — MELOXICAM 7.5 MG/1
7.5 TABLET ORAL DAILY
Qty: 14 TABLET | Refills: 0 | Status: ON HOLD | OUTPATIENT
Start: 2018-02-09 | End: 2018-03-01

## 2018-02-09 NOTE — PATIENT INSTRUCTIONS
Shoulder Shrug Exercise    To start, sit in a chair with your feet flat on the floor. Shift your weight slightly forward to avoid rounding your back. Relax. Keep your ears, shoulders, and hips aligned:  · Raise both of your shoulders as high as you can, as if you were trying to touch them to your ears. Keep your head and neck still and relaxed.  · Hold for a count of 10. Release.  · Repeat 5 times.  For your safety, check with your healthcare provider before starting an exercise program.   Date Last Reviewed: 8/16/2015 © 2000-2017 [employer]. [address] [place] [address]. All rights reserved. This information is not intended as a substitute for professional medical care. Always follow your healthcare professional's instructions.        Exercises for Shoulder Flexibility: External Rotation    This stretch can help restore shoulder flexibility and relieve pain over time. When stretching, be sure to breathe deeply. Follow any special instructions from your doctor or physical therapist:  1.  a doorway. Grasp the doorjamb with the hand on the frozen side. Your arm should be bent.  2. With the other hand, hold the elbow on the frozen side firmly against your body.  3. Standing in the same spot, rotate your body away from the doorjamb. Stop when you feel the stretch in the shoulder. At first, try to hold the stretch for 5 seconds.  4. Work up to doing 3 sets of this stretch, 3 times a day. Work up to holding the stretch for 30 to 60 seconds.  Note: Keep your arms as still as you can. Over time, rotate your body a little more to enhance the stretch. But be careful not to twist your back.  Frozen shoulder  Frozen shoulder is another name for adhesive capsulitis, which causes restricted movement in the shoulder. If you have frozen shoulder, this stretch may cause discomfort, especially when you first get started. A few months may pass before you achieve the results you want. But once  your shoulder heals, it rarely becomes frozen again. So stick to your stretching program. If you have any questions, be sure to ask your doctor.   Date Last Reviewed: 8/16/2015  © 3038-3579 [employer]. [address] [place] [address]. All rights reserved. This information is not intended as a substitute for professional medical care. Always follow your healthcare professional's instructions.        Exercises for Shoulder Flexibility: Internal Rotation    This stretch can help restore shoulder flexibility and relieve pain over time. When stretching, be sure to breathe deeply. Follow any special instructions from your healthcare provider or physical therapist.  5. While seated, move the arm on the side you want to stretch toward the middle of your back. The palm of your hand should face out.  6. Cup your other hand under the hand thats behind your back. Gently push your cupped hand upward until you feel the stretch in the shoulder. Try to hold the stretch for 5 seconds.  7. Work up to doing 3 sets of this stretch, 3 times a day. Work up to holding the stretch for 30 to 60 seconds.  Note: Keep your back straight. Its OK if your hand cant reach the middle of your back. Instead, start the stretch with your hand as close as you can get it to the middle of your back.     Frozen shoulder  Frozen shoulder is another name for adhesive capsulitis. This causes restricted movement in the shoulder. If you have frozen shoulder, this stretch may cause discomfort, especially when you first get started. A few months may pass before you achieve the results you want. But once your shoulder heals, it rarely becomes frozen again. So stick to your stretching program. If you have any questions, be sure to ask your healthcare provider.   Date Last Reviewed: 10/14/2015  © 5596-4080 [employer]. [address] [place] [address]. All rights reserved. This information is not intended as a  substitute for professional medical care. Always follow your healthcare professional's instructions.        Exercises for Shoulder Flexibility: Adduction (Reaching Across)    This stretch can help restore shoulder flexibility and relieve pain over time. When stretching, be sure to breathe deeply. And follow any special instructions from your doctor or physical therapist:  8. Put the hand from the side you want to stretch on your opposite shoulder. Your elbow should point away from your body. Try to raise your elbow as close to shoulder height as you can.  9. With your other hand, push the raised elbow toward the opposite shoulder. Avoid turning your head. Stop when you feel the stretch. Try to hold the stretch for 5 seconds.  10. Work up to doing 3 sets of this stretch, 3 times a day. Work up to holding the stretch for 30 to 60 seconds.  Note: Be sure to push your elbow across your chest, not up toward your chin. Over time, try to push your elbow farther across your chest to enhance the stretch.  Frozen shoulder  Frozen shoulder is another name for adhesive capsulitis, which causes restricted movement in the shoulder. If you have frozen shoulder, this stretch may cause discomfort, especially when you first get started. A few months may pass before you achieve the results you want. Once your shoulder heals, it rarely becomes frozen again. So stick to your stretching program. If you have any questions, be sure to ask your doctor.   Date Last Reviewed: 8/16/2015  © 5771-1558 [employer]. [address] [place] [address]. All rights reserved. This information is not intended as a substitute for professional medical care. Always follow your healthcare professional's instructions.

## 2018-02-09 NOTE — PROGRESS NOTES
Subjective:   Patient ID: Alejandro Neff is a 22 y.o. male  Chief complaint: No chief complaint on file.      HPI     Pt here for UC appt. Was seen at [facility] for neck pain and shoulder pain and mm spasm - located at left post neck that radiates to left arm.   Has had 3-4 episodes over past year for flares of this.   Not getting reg exercise. Will walk or bike most places but no dedicated exercise. Not dropping objects no numbness or weakness. Right handed  Given mm relaxer ?Flexeril or norflex? And nsaid. Is amenable to PT    Also here today bc Having intermittent chest pain over pat 2 months - described as tightness and pressure that located at left chest wall - will radiate to left arm and left shoulder left chest wall. Constant when it occurs. Will last 5min to 2 hours. Occ sob with this.  No wheezing.  No orthopnea or LE edema. No palpitations. No nausea or vomiting with this. occ sweating.  No inciting event or trauma to chest wall.   Triggered by: exertion and stress  Alleviating factors: none  Does not stop right away with resting.   Active as above by walking or biking - not limited by sx above  Has episode of chest pain in 2010 and this is different than what had in past     Review of Systems   Constitutional: Positive for unexpected weight change. Negative for activity change.   HENT: Negative for hearing loss, rhinorrhea and trouble swallowing.    Eyes: Negative for discharge and visual disturbance.   Respiratory: Positive for chest tightness. Negative for wheezing.    Cardiovascular: Positive for chest pain and palpitations.   Gastrointestinal: Positive for vomiting. Negative for blood in stool, constipation and diarrhea.   Endocrine: Negative for polydipsia and polyuria.   Genitourinary: Negative for difficulty urinating, hematuria and urgency.   Musculoskeletal: Positive for arthralgias and neck pain. Negative for joint swelling.   Neurological: Negative for weakness and  "headaches.   Psychiatric/Behavioral: Negative for confusion and dysphoric mood.     Objective:  Vitals:    02/09/18 1451   BP: 124/60   Pulse: 94   SpO2: 97%   Weight: 59.6 kg (131 lb 6.3 oz)   Height: 5' 6" (1.676 m)     Body mass index is 21.21 kg/m².    Physical Exam   Constitutional: He is oriented to person, place, and time. He appears well-developed and well-nourished.   HENT:   Head: Normocephalic and atraumatic.   Right Ear: External ear normal.   Nose: Nose normal.   Mouth/Throat: Oropharynx is clear and moist.   Cerumen obstructing view of left TM   Eyes: Conjunctivae and EOM are normal.   Neck: Neck supple. No thyromegaly present.   Cardiovascular: Normal rate, regular rhythm, normal heart sounds and intact distal pulses.    No murmur heard.  Pulmonary/Chest: Effort normal and breath sounds normal. No respiratory distress. He has no wheezes. He has no rales. He exhibits no tenderness.   Abdominal: Soft. Bowel sounds are normal.   Musculoskeletal: Normal range of motion. He exhibits no edema or tenderness.   Lymphadenopathy:     He has no cervical adenopathy.   Neurological: He is alert and oriented to person, place, and time.   Skin: Skin is warm and dry. Capillary refill takes less than 2 seconds.   Psychiatric: His behavior is normal. Thought content normal.   Vitals reviewed.    Assessment:  1. Strain of left trapezius muscle, subsequent encounter    2. Strain of rhomboid muscle, subsequent encounter    3. Chest pain, unspecified type        Plan:  Diagnoses and all orders for this visit:    Strain of left trapezius muscle, subsequent encounter  -     Ambulatory consult to [facility]    Strain of rhomboid muscle, subsequent encounter  -     Ambulatory consult to [facility]    Chest pain, unspecified type  -     SCHEDULED EKG 12-LEAD (to Muse); Future  -     X-Ray Chest PA And Lateral; Future  -     Exercise stress echo; Future    Other orders  -     Cancel: HPV Vaccine (9-Valent) (3 " Dose) (IM)  -     Cancel: Tdap Vaccine  -     meloxicam (MOBIC) 7.5 MG tablet; Take 1 tablet (7.5 mg total) by mouth once daily.  -     ranitidine (ZANTAC) 75 MG tablet; Take 1 tablet (75 mg total) by mouth 2 (two) times daily.    Cont nsaids with zantac and mm relaxer prn - trial of PT   Et of cp unclear - start with ekg and cxr - if unrevealing will schedule stress echo vs cards eval - no known cardiac risk factors but concerning that occur with exertion  Reviewed ER prompts with pt    Health Maintenance   Topic Date Due    Lipid Panel  1997    HPV Vaccines (1 of 3 - Male 3 Dose Series) 02/20/2008    TETANUS VACCINE  02/20/2015    Influenza Vaccine  08/01/2017

## 2018-02-09 NOTE — PROGRESS NOTES
[facility] is committed to your overall health.  To help you get the most out of each of your visits, we will review your information to make sure you are up to date on all of your recommended tests and/or procedures.       Your PCP  Nata Hernandez MD   found that you may be due for:       Health Maintenance Due   Topic Date Due    Lipid Panel  1997    HPV Vaccines (1 of 3 - Male 3 Dose Series) 02/20/2008    TETANUS VACCINE  02/20/2015    Influenza Vaccine  08/01/2017           If you have had any of the above done at another facility, please bring the records or information with you so that your record at [facility] will be complete.  If you would like to schedule any of these, please contact me.     If you are currently taking medication, please bring it with you to your appointment for review.     Also, if you have any type of Advanced Directives, please bring them with you to your office visit so we may scan them into your chart.       Thank you for Choosing [facility] for your healthcare needs.        Additional Information  If you have questions, you can email [email or web address] or call [phone]  to talk to our [facility] staff. Remember, [facility] is NOT to be used for urgent needs. For medical emergencies, dial 911.

## 2018-02-12 ENCOUNTER — TELEPHONE (OUTPATIENT)
Dept: INTERNAL MEDICINE | Facility: CLINIC | Age: 23
End: 2018-02-12

## 2018-02-12 DIAGNOSIS — R06.09 DOE (DYSPNEA ON EXERTION): ICD-10-CM

## 2018-02-12 DIAGNOSIS — R06.02 SHORTNESS OF BREATH: ICD-10-CM

## 2018-02-12 DIAGNOSIS — R93.89 ABNORMAL CHEST X-RAY: Primary | ICD-10-CM

## 2018-02-12 DIAGNOSIS — R07.9 CHEST PAIN, UNSPECIFIED TYPE: ICD-10-CM

## 2018-02-12 NOTE — TELEPHONE ENCOUNTER
Called and spoke to pt - reviewed CXR results    rec hold off on stress echo  rec CT chest with contrast to f/u cxr findings.   All questions were answered and pt verbalized understanding of plan.     Please schedule CT chest for this week.   Please schedule pt with cardiology in 1-2 weeks after ct chest is completed. If Ct chest unremarkable then pt will f/u with cards for further eval of symptoms.

## 2018-02-12 NOTE — TELEPHONE ENCOUNTER
----- Message from Sachi Blanco sent at 2/12/2018  9:11 AM CST -----  Contact: MATTHEW VARGHESE [[id number]]  x_  1st Request  _  2nd Request  _  3rd Request        Who: MATTHEW VARGHESE [[id number]]    Why: Requesting a call back in regards to the stress test, he would like to know if at this point if he still need to have this test done. Please call to advise.    What Number to Call Back: [phone]    When to Expect a call back: (Within 24 hours)    Please return the call at earliest convenience. Thanks!

## 2018-02-14 ENCOUNTER — HOSPITAL ENCOUNTER (OUTPATIENT)
Dept: RADIOLOGY | Facility: OTHER | Age: 23
Discharge: HOME | End: 2018-02-14
Attending: INTERNAL MEDICINE
Payer: COMMERCIAL

## 2018-02-14 DIAGNOSIS — R06.09 DOE (DYSPNEA ON EXERTION): ICD-10-CM

## 2018-02-14 DIAGNOSIS — R07.9 CHEST PAIN, UNSPECIFIED TYPE: ICD-10-CM

## 2018-02-14 DIAGNOSIS — R93.89 ABNORMAL CHEST X-RAY: ICD-10-CM

## 2018-02-14 PROCEDURE — 71260 CT THORAX DX C+: CPT | Mod: TC

## 2018-02-14 PROCEDURE — 71260 CT THORAX DX C+: CPT | Mod: 26,,, | Performed by: RADIOLOGY

## 2018-02-14 PROCEDURE — 25500020 PHARM REV CODE 255: Performed by: INTERNAL MEDICINE

## 2018-02-14 RX ADMIN — IOHEXOL 75 ML: 350 INJECTION, SOLUTION INTRAVENOUS at 10:02

## 2018-02-16 ENCOUNTER — PATIENT MESSAGE (OUTPATIENT)
Dept: INTERNAL MEDICINE | Facility: CLINIC | Age: 23
End: 2018-02-16

## 2018-02-16 DIAGNOSIS — R22.2 CHEST MASS: Primary | ICD-10-CM

## 2018-02-16 NOTE — TELEPHONE ENCOUNTER
Called radiology and reviewed scan with them.   Will proceed with bx as rad did not think MRI would provide much more information on chest mass. rec IR bx     Called pt and reviewed CT chest findings. Will arrange bx through IR  All questions were answered and pt verbalized understanding of plan.     Biopsy ordered -    1. please call IR on Monday and inquire if procedure was ordered correctly and if they will schedule pt or if our clinic does this - please schedule for bx    2. Please cancel cardiology appt as this is not needed - suspect sx are due to chest mass - pt is aware that cardiology appt will be cancelled

## 2018-02-19 ENCOUNTER — TELEPHONE (OUTPATIENT)
Dept: INTERNAL MEDICINE | Facility: CLINIC | Age: 23
End: 2018-02-19

## 2018-02-19 NOTE — TELEPHONE ENCOUNTER
Spoke with pt about his biopsy. Informed him that the radiology dept would be calling in 1-2 days to schedule the procedure and they could answer all of his concerns. Ask pt to call if they dont call

## 2018-03-01 ENCOUNTER — HOSPITAL ENCOUNTER (OUTPATIENT)
Facility: OTHER | Age: 23
Discharge: HOME | End: 2018-03-01
Attending: RADIOLOGY | Admitting: RADIOLOGY
Payer: COMMERCIAL

## 2018-03-01 ENCOUNTER — SURGERY (OUTPATIENT)
Age: 23
End: 2018-03-01

## 2018-03-01 VITALS
RESPIRATION RATE: 18 BRPM | TEMPERATURE: 98 F | WEIGHT: 105 LBS | HEART RATE: 74 BPM | HEIGHT: 66 IN | BODY MASS INDEX: 16.88 KG/M2 | OXYGEN SATURATION: 99 % | DIASTOLIC BLOOD PRESSURE: 59 MMHG | SYSTOLIC BLOOD PRESSURE: 100 MMHG

## 2018-03-01 DIAGNOSIS — J98.59 MEDIASTINAL MASS: Primary | ICD-10-CM

## 2018-03-01 LAB
BASOPHILS # BLD AUTO: 0.02 K/UL
BASOPHILS NFR BLD: 0.3 %
DIFFERENTIAL METHOD: ABNORMAL
EOSINOPHIL # BLD AUTO: 0.1 K/UL
EOSINOPHIL NFR BLD: 1.5 %
ERYTHROCYTE [DISTWIDTH] IN BLOOD BY AUTOMATED COUNT: 14.4 %
HCT VFR BLD AUTO: 42.4 %
HGB BLD-MCNC: 14.4 G/DL
INR PPP: 1
LYMPHOCYTES # BLD AUTO: 2 K/UL
LYMPHOCYTES NFR BLD: 30.6 %
MCH RBC QN AUTO: 28.7 PG
MCHC RBC AUTO-ENTMCNC: 34 G/DL
MCV RBC AUTO: 85 FL
MONOCYTES # BLD AUTO: 0.8 K/UL
MONOCYTES NFR BLD: 12.8 %
NEUTROPHILS # BLD AUTO: 3.6 K/UL
NEUTROPHILS NFR BLD: 54.3 %
PLATELET # BLD AUTO: 237 K/UL
PMV BLD AUTO: 8.9 FL
PROTHROMBIN TIME: 11 SEC
RBC # BLD AUTO: 5.02 M/UL
WBC # BLD AUTO: 6.54 K/UL

## 2018-03-01 PROCEDURE — 88342 IMHCHEM/IMCYTCHM 1ST ANTB: CPT | Mod: 26,59,, | Performed by: PATHOLOGY

## 2018-03-01 PROCEDURE — 88305 TISSUE EXAM BY PATHOLOGIST: CPT | Mod: 26,,, | Performed by: PATHOLOGY

## 2018-03-01 PROCEDURE — 85610 PROTHROMBIN TIME: CPT

## 2018-03-01 PROCEDURE — 88341 IMHCHEM/IMCYTCHM EA ADD ANTB: CPT | Performed by: PATHOLOGY

## 2018-03-01 PROCEDURE — 88189 FLOWCYTOMETRY/READ 16 & >: CPT | Mod: ,,, | Performed by: PATHOLOGY

## 2018-03-01 PROCEDURE — 85025 COMPLETE CBC W/AUTO DIFF WBC: CPT

## 2018-03-01 PROCEDURE — 88305 TISSUE EXAM BY PATHOLOGIST: CPT | Performed by: PATHOLOGY

## 2018-03-01 PROCEDURE — 63600175 PHARM REV CODE 636 W HCPCS

## 2018-03-01 PROCEDURE — 25000003 PHARM REV CODE 250

## 2018-03-01 PROCEDURE — 88184 FLOWCYTOMETRY/ TC 1 MARKER: CPT | Performed by: PATHOLOGY

## 2018-03-01 PROCEDURE — 88173 CYTOPATH EVAL FNA REPORT: CPT | Mod: 26,,, | Performed by: PATHOLOGY

## 2018-03-01 PROCEDURE — 88185 FLOWCYTOMETRY/TC ADD-ON: CPT | Mod: 59 | Performed by: PATHOLOGY

## 2018-03-01 PROCEDURE — 88341 IMHCHEM/IMCYTCHM EA ADD ANTB: CPT | Mod: 26,59,, | Performed by: PATHOLOGY

## 2018-03-01 PROCEDURE — 27201068 HC S MONOPTY BIOPSY SET: Performed by: RADIOLOGY

## 2018-03-01 PROCEDURE — 25000003 PHARM REV CODE 250: Performed by: RADIOLOGY

## 2018-03-01 RX ORDER — HYDROCODONE BITARTRATE AND ACETAMINOPHEN 5; 325 MG/1; MG/1
1 TABLET ORAL EVERY 4 HOURS PRN
Status: DISCONTINUED | OUTPATIENT
Start: 2018-03-01 | End: 2018-03-01 | Stop reason: HOSPADM

## 2018-03-01 RX ORDER — FENTANYL CITRATE 50 UG/ML
INJECTION, SOLUTION INTRAMUSCULAR; INTRAVENOUS
Status: DISCONTINUED | OUTPATIENT
Start: 2018-03-01 | End: 2018-03-01 | Stop reason: HOSPADM

## 2018-03-01 RX ORDER — MIDAZOLAM HYDROCHLORIDE 1 MG/ML
INJECTION INTRAMUSCULAR; INTRAVENOUS
Status: DISCONTINUED | OUTPATIENT
Start: 2018-03-01 | End: 2018-03-01 | Stop reason: HOSPADM

## 2018-03-01 RX ADMIN — MIDAZOLAM HYDROCHLORIDE 1 MG: 1 INJECTION INTRAMUSCULAR; INTRAVENOUS at 01:03

## 2018-03-01 RX ADMIN — HYDROCODONE BITARTRATE AND ACETAMINOPHEN 1 TABLET: 5; 325 TABLET ORAL at 03:03

## 2018-03-01 RX ADMIN — FENTANYL CITRATE 50 MCG: 50 INJECTION, SOLUTION INTRAMUSCULAR; INTRAVENOUS at 01:03

## 2018-03-01 NOTE — PROCEDURES
Radiology Post-Procedure Note    Pre Op Diagnosis: mediastinal mass  Post Op Diagnosis: Same    Procedure: biopsy    Procedure performed by: Nikita Clayton MD    Written Informed Consent Obtained: Yes  Specimen Removed: YES 8 core biopsy specimens  Estimated Blood Loss: Minimal    Findings:   Successful mediastinal mass biopsy.    Patient tolerated procedure well.    @SIG@

## 2018-03-01 NOTE — H&P
"Consult/H&P Note  Interventional Radiology    Consult Requested By: David    Reason for Consult: mediastinal mass    SUBJECTIVE:     Chief Complaint: mediastinal mass    History of Present Illness: 22 yo M initially presented with R shoulder pain, CXR and CT showed incidental mediastinal mass.    Past Medical History:   Diagnosis Date    Abdominal pain 08/12/2010    eval for abd and chest wall pain at [facility], [place], [place] - cxr wnl, EKG ([facility]), troponin wnl, normal chemistries    Allergy     Chondromalacia patellae     Chronic nausea 2015    eval by GI Dr. Tushar Artis - given zofran and ciproheptadine     Chronic pain     Chronic pain     [facility] - no meds; followed by psych and pain clinic and unresponsive to behavioral/CBT/old records reports c/f conversion disorder     Foot pain 04/2010    4/10 + SHIRA, eval by Dr. ALLY Lion at [facility] Rheum -dx unclear ? gout and trial of nsaids and pdn, xrays normal 1/11, referred to podiatry, re-eval by rheum 2/12 and MRI and films wnl    Fracture of right radius 09/2009    Lyme arthritis     multiple joints    Lyme disease 2013    Memory loss 03/2012    eval for memory loss by neuro at [facility] - MRI, EEG wnl. Dr. Patricio suggested emotional factors & ref to Tushar Davies, PhD for  eval & neuropsych eval 3/12 Lupe Delgado, PhD - no evidence of formal thought disorder or psychosis - documented severe memory impairment; not related to to ADD or executive function issues. sugesstion that memory issues may be related to "emotional factors", ?conversion d/o    Stress fracture of tibia and fibula 08/2011    Urticaria      History reviewed. No pertinent surgical history.  Family History   Problem Relation Age of Onset    Arthritis Mother     Other Mother      benign tumor of brain and spinal cord    Hyperlipidemia Father     Gout Father     No Known Problems Sister     Arthritis Sister      " shoulder    Depression Sister      Social History   Substance Use Topics    Smoking status: Current Every Day Smoker     Packs/day: 0.25     Types: Vaping with nicotine, Vaping w/o nicotine, Cigarettes, Cigars    Smokeless tobacco: Never Used    Alcohol use Yes      Comment: occ       Review of Systems:  Constitutional/General:No fever, chills, change in appetite or weight loss.  Hematological/Immuno: no known coagulopathies  Respiratory: no shortness of breath  Cardiovascular: + chest pain  Gastrointestinal: no abdominal pain  Genito-Urinary: no dysuria  Neurological: no TIA or stroke symptoms  Psychiatric: normal mood/affect, good insight/judgement      OBJECTIVE:     Vital Signs Range (Last 24H):  Temp:  [97.5 °F (36.4 °C)]   Resp:  [16]   BP: (110)/(64)   SpO2:  [99 %]     Physical Exam:  General- Patient alert and oriented x3 in NAD  CV- Regular rate and rhythm  Resp-  No increased WOB  GI- Non tender/non-distended  Neuro-  No focal deficits noted.     Physical Exam  Body mass index is 16.95 kg/m².    Scheduled Meds:   Continuous Infusions:   PRN Meds:    Allergies:   Review of patient's allergies indicates:   Allergen Reactions    Mushroom Anaphylaxis    Bamboo     Bee pollens     Mushroom flavor        Labs:    Recent Labs  Lab 03/01/18  1150   INR 1.0       Recent Labs  Lab 03/01/18  1150   WBC 6.54   HGB 14.4   HCT 42.4   MCV 85       No results for input(s): GLU, NA, K, CL, CO2, BUN, CREATININE, CALCIUM, MG, ALT, AST, ALBUMIN, BILITOT, BILIDIR in the last 168 hours.    Vitals (Most Recent):  Temp: 97.5 °F (36.4 °C) (03/01/18 1209)  Resp: 16 (03/01/18 1209)  BP: 110/64 (03/01/18 1209)  SpO2: 99 % (03/01/18 1209)    ASA: 2  Mallampati: 2    Consent obtained    ASSESSMENT/PLAN:     Mediastinal mass biopsy.  Moderate sedation.    Active Hospital Problems    Diagnosis  POA    Mediastinal mass [J98.59]  Yes      Resolved Hospital Problems    Diagnosis Date Resolved POA   No resolved problems to  display.           Nikita Clayton MD

## 2018-03-01 NOTE — OR NURSING
Pt stable after Mediastinal Chest Biopsy.  No visible bleeding at site. Biopsy site covered with band aid, CDI. Pt escorted via stretcher per transportation team back to bed [facility].  Post biopsy education will be readdressed by [facility] RN before discharge. Pt verbalized instructions given by nurse. Report called to nurse  @ [phone] [facility].[email or web address]

## 2018-03-02 ENCOUNTER — TELEPHONE (OUTPATIENT)
Dept: INTERNAL MEDICINE | Facility: CLINIC | Age: 23
End: 2018-03-02

## 2018-03-02 ENCOUNTER — NURSE TRIAGE (OUTPATIENT)
Dept: ADMINISTRATIVE | Facility: CLINIC | Age: 23
End: 2018-03-02

## 2018-03-02 ENCOUNTER — PATIENT MESSAGE (OUTPATIENT)
Dept: INTERNAL MEDICINE | Facility: CLINIC | Age: 23
End: 2018-03-02

## 2018-03-02 LAB
FLOW CYTOMETRY ANTIBODIES ANALYZED - TISSUE: NORMAL
FLOW CYTOMETRY COMMENT - TISSUE: NORMAL
FLOW CYTOMETRY INTERPRETATION - TISSUE: NORMAL
SPECIMEN TYPE - TISSUE: NORMAL

## 2018-03-02 NOTE — PLAN OF CARE
Alejandro Neff has met all discharge criteria from Phase II. Vital Signs are stable, ambulating  without difficulty. Discharge instructions given, patient verbalized understanding. Discharged from facility via wheelchair in stable condition.

## 2018-03-02 NOTE — TELEPHONE ENCOUNTER
Please call pt now - if he is having new symptoms as written in his note that he cannot breath while lying flat since his procedure yesterday (chest mass biopsy) then he needs to be evaluated today urgently and rec he go to ER

## 2018-03-02 NOTE — TELEPHONE ENCOUNTER
Called pt and left a full detailed message stating to return the office call regarding his message about not being able to breath while laying down after his procedure. Left vm stating that PCP rec that he be evaluated today urgently and rec he goes to the ER  Left office number for pt to call and also sent pt message via my chart and will give pt another call

## 2018-03-03 NOTE — TELEPHONE ENCOUNTER
Pt called triage nurse requesting results as received a message via epic that results were in and he was unable to view them.   Received page - Had  connect me to pt  He can see cbc and inr - no leukemia/lymphoma screen  Gave prelim results that no abnormal lymphocytes were seen in sample but that need all pathology to return to determine significance of this prelim result and determine next step.   He would like to f/u in clinic next week to review results - will have office schedule him with me on Friday as hopeful bx results will all be back and can determine next step    All questions were answered and pt verbalized understanding of plan.     Please schedule pt with me for an appt on Friday, Feb 9

## 2018-03-03 NOTE — TELEPHONE ENCOUNTER
Pt called re test results. Pt received an email this evening regarding new results.     Reason for Disposition   [1] Follow-up call from patient regarding patient's clinical status AND [2] information urgent    Protocols used: ST PCP CALL - NO TRIAGE-A-    Paged dr sofia at 950pm

## 2018-03-04 ENCOUNTER — NURSE TRIAGE (OUTPATIENT)
Dept: ADMINISTRATIVE | Facility: CLINIC | Age: 23
End: 2018-03-04

## 2018-03-04 NOTE — TELEPHONE ENCOUNTER
"Pt called re cant find info re when to call after biopsy. Pt having pain after biopsy     Reason for Disposition   SEVERE chest pain    Answer Assessment - Initial Assessment Questions  1. LOCATION: "Where does it hurt?"       SOB worse when laid down. Sleeping on adjustable bed with head up   2. RADIATION: "Does the pain go anywhere else?" (e.g., into neck, jaw, arms, back)     No , feel sore as if pt worked out hard  3. ONSET: "When did the chest pain begin?" (Minutes, hours or days)     CP prior to biopsy. Ongoing since biopsy  4. PATTERN "Does the pain come and go, or has it been constant since it started?"    Constant at 1 or 2   Does it get worse with exertion?"      Yes   5. DURATION: "How long does it last" (e.g., seconds, minutes, hours)     Constant very mild   6. SEVERITY: "How bad is the pain?"  (e.g., Scale 1-10; mild, moderate, or severe)     - MILD (1-3): doesn't interfere with normal activities      - MODERATE (4-7): interferes with normal activities or awakens from sleep     - SEVERE (8-10): excruciating pain, unable to do any normal activities       1-2 constant , 8-9 if moving   7. CARDIAC RISK FACTORS: "Do you have any history of heart problems or risk factors for heart disease?" (e.g., prior heart attack, angina; high blood pressure, diabetes, being overweight, high cholesterol, smoking, or strong family history of heart disease)    Pt goes to [facility] - told to take tylenol -didn't help at all. hasnt been to clinic today   No hx   8. PULMONARY RISK FACTORS: "Do you have any history of lung disease?"  (e.g., blood clots in lung, asthma, emphysema, birth control pills)    No   9. CAUSE: "What do you think is causing the chest pain?"    Started when in CT machine   10. OTHER SYMPTOMS: "Do you have any other symptoms?" (e.g., dizziness, nausea, vomiting, sweating, fever, difficulty breathing, cough)    Coughing more than before biopsy.    Protocols used:  CHEST PAIN-A-    Pt wants to know if he " should still be in pain after biopsy.  rec ED due to ongoing CT. rec EMS for pain increasing in frequency, intensity or severity. Pt states that he might come into ED after working. Pt requests to have message sent to PCP re whether he is supposed to still have pain after biopsy. Message sent. Call back with questions.

## 2018-03-05 ENCOUNTER — TELEPHONE (OUTPATIENT)
Dept: INTERNAL MEDICINE | Facility: CLINIC | Age: 23
End: 2018-03-05

## 2018-03-05 NOTE — TELEPHONE ENCOUNTER
Pt should go to ER if having pain after biopsy of mediastinal mass and sob with lying flat - please call pt now  Agree with Nurse triage recs

## 2018-03-05 NOTE — TELEPHONE ENCOUNTER
Called pt and informed him of PCP rec.   Pt verbally understands and has no further questions or concerns.

## 2018-03-05 NOTE — TELEPHONE ENCOUNTER
Attempted to leave vm for pt to schedule appt from referral and also relate Dr head. Pt verbally understood and had no further questions.

## 2018-03-06 ENCOUNTER — TELEPHONE (OUTPATIENT)
Dept: INTERNAL MEDICINE | Facility: CLINIC | Age: 23
End: 2018-03-06

## 2018-03-06 NOTE — TELEPHONE ENCOUNTER
Pt called in returning your call regarding scheduling for cardi .   Pt called in b/c he states that he is confused on why he have to schedule w/ cardi after it was discuss that he does not have to f/u w/ them. Confirmed w/ pt that that phone call was an mix up

## 2018-03-06 NOTE — TELEPHONE ENCOUNTER
----- Message from Tony Orosco sent at 3/6/2018 11:20 AM CST -----  Contact: patient  x_ 1st Request   _ 2nd Request   _ 3rd Request     Who: MATTHEW VARGHESE [[id number]]    Why: Pt missed your call is requesting a call back    What Number to Call Back: [phone]    When to Expect a call back: (Before the end of the day)   -- if call after 3:00 call back will be tomorrow.

## 2018-03-07 ENCOUNTER — PATIENT OUTREACH (OUTPATIENT)
Dept: INTERNAL MEDICINE | Facility: CLINIC | Age: 23
End: 2018-03-07

## 2018-03-07 NOTE — PROGRESS NOTES
[facility] is committed to your overall health.  To help you get the most out of each of your visits, we will review your information to make sure you are up to date on all of your recommended tests and/or procedures.       Your PCP  Nata Hernandez MD   found that you may be due for:       Health Maintenance Due   Topic Date Due    Lipid Panel  1997    Pneumococcal PPSV23 (Medium Risk) (1) 02/20/2015    HPV Vaccines (3 of 3 - Male 3 Dose Series) 04/19/2017    Influenza Vaccine  08/01/2017             If you have had any of the above done at another facility, please bring the records or information with you so that your record at [facility] will be complete.  If you would like to schedule any of these, please contact me.     If you are currently taking medication, please bring it with you to your appointment for review.     Also, if you have any type of Advanced Directives, please bring them with you to your office visit so we may scan them into your chart.       Thank you for Choosing [facility] for your healthcare needs.        Additional Information  If you have questions, you can email [email or web address] or call [phone]  to talk to our [facility] staff. Remember, [facility] is NOT to be used for urgent needs. For medical emergencies, dial 911.

## 2018-03-08 ENCOUNTER — OFFICE VISIT (OUTPATIENT)
Dept: INTERNAL MEDICINE | Facility: CLINIC | Age: 23
End: 2018-03-08
Attending: FAMILY MEDICINE
Payer: COMMERCIAL

## 2018-03-08 VITALS
HEART RATE: 78 BPM | OXYGEN SATURATION: 98 % | DIASTOLIC BLOOD PRESSURE: 60 MMHG | BODY MASS INDEX: 20.3 KG/M2 | HEIGHT: 66 IN | WEIGHT: 126.31 LBS | SYSTOLIC BLOOD PRESSURE: 98 MMHG

## 2018-03-08 DIAGNOSIS — J98.59 MEDIASTINAL MASS: ICD-10-CM

## 2018-03-08 DIAGNOSIS — R22.2 CHEST MASS: ICD-10-CM

## 2018-03-08 DIAGNOSIS — Z13.6 ENCOUNTER FOR LIPID SCREENING FOR CARDIOVASCULAR DISEASE: ICD-10-CM

## 2018-03-08 DIAGNOSIS — R06.02 SHORTNESS OF BREATH: Primary | ICD-10-CM

## 2018-03-08 DIAGNOSIS — Z13.220 ENCOUNTER FOR LIPID SCREENING FOR CARDIOVASCULAR DISEASE: ICD-10-CM

## 2018-03-08 PROCEDURE — 99999 PR PBB SHADOW E&M-EST. PATIENT-LVL III: CPT | Mod: PBBFAC,,, | Performed by: FAMILY MEDICINE

## 2018-03-08 PROCEDURE — 99214 OFFICE O/P EST MOD 30 MIN: CPT | Mod: S$GLB,,, | Performed by: FAMILY MEDICINE

## 2018-03-08 RX ORDER — ALBUTEROL SULFATE 90 UG/1
2 AEROSOL, METERED RESPIRATORY (INHALATION) EVERY 6 HOURS PRN
Qty: 1 EACH | Refills: 11 | Status: SHIPPED | OUTPATIENT
Start: 2018-03-08 | End: 2018-03-08 | Stop reason: SDUPTHER

## 2018-03-08 RX ORDER — ALBUTEROL SULFATE 90 UG/1
2 AEROSOL, METERED RESPIRATORY (INHALATION) EVERY 6 HOURS PRN
Qty: 1 EACH | Refills: 11 | Status: SHIPPED | OUTPATIENT
Start: 2018-03-08 | End: 2019-04-16 | Stop reason: SDUPTHER

## 2018-03-19 ENCOUNTER — TELEPHONE (OUTPATIENT)
Dept: INTERNAL MEDICINE | Facility: CLINIC | Age: 23
End: 2018-03-19

## 2018-03-19 NOTE — TELEPHONE ENCOUNTER
Called pt today and discussed recent update to pathology records of mediastinal biopsy. 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   He reports Thursday of this week will work but reports ok to schedule first available - please let me know when this is scheduled - thanks!

## 2018-03-20 DIAGNOSIS — C80.1 GERM CELL TUMOR: ICD-10-CM

## 2018-03-21 ENCOUNTER — TELEPHONE (OUTPATIENT)
Dept: INTERNAL MEDICINE | Facility: CLINIC | Age: 23
End: 2018-03-21

## 2018-03-21 ENCOUNTER — TELEPHONE (OUTPATIENT)
Dept: HEMATOLOGY/ONCOLOGY | Facility: CLINIC | Age: 23
End: 2018-03-21

## 2018-03-21 ENCOUNTER — PATIENT MESSAGE (OUTPATIENT)
Dept: INTERNAL MEDICINE | Facility: CLINIC | Age: 23
End: 2018-03-21

## 2018-03-21 NOTE — TELEPHONE ENCOUNTER
Called pt and informed him that he has a lab appt on March 22, 2018 at 2 pm and a follow up appt with Dr. Rojas at 3 pm. Pt verbalized understanding.

## 2018-03-22 ENCOUNTER — LAB VISIT (OUTPATIENT)
Dept: LAB | Facility: OTHER | Age: 23
End: 2018-03-22
Attending: INTERNAL MEDICINE
Payer: COMMERCIAL

## 2018-03-22 ENCOUNTER — OFFICE VISIT (OUTPATIENT)
Dept: HEMATOLOGY/ONCOLOGY | Facility: CLINIC | Age: 23
End: 2018-03-22
Payer: COMMERCIAL

## 2018-03-22 VITALS
BODY MASS INDEX: 20.27 KG/M2 | SYSTOLIC BLOOD PRESSURE: 115 MMHG | HEART RATE: 82 BPM | TEMPERATURE: 98 F | RESPIRATION RATE: 16 BRPM | WEIGHT: 126.13 LBS | OXYGEN SATURATION: 100 % | HEIGHT: 66 IN | DIASTOLIC BLOOD PRESSURE: 64 MMHG

## 2018-03-22 DIAGNOSIS — Z72.0 TOBACCO ABUSE: ICD-10-CM

## 2018-03-22 DIAGNOSIS — F19.10 POLYSUBSTANCE ABUSE: ICD-10-CM

## 2018-03-22 DIAGNOSIS — C80.1 GERM CELL TUMOR: ICD-10-CM

## 2018-03-22 DIAGNOSIS — C80.1 GERM CELL TUMOR: Primary | ICD-10-CM

## 2018-03-22 LAB
ALBUMIN SERPL BCP-MCNC: 4.5 G/DL
ALP SERPL-CCNC: 56 U/L
ALT SERPL W/O P-5'-P-CCNC: 9 U/L
ANION GAP SERPL CALC-SCNC: 11 MMOL/L
AST SERPL-CCNC: 11 U/L
BASOPHILS # BLD AUTO: 0.01 K/UL
BASOPHILS NFR BLD: 0.2 %
BILIRUB SERPL-MCNC: 0.4 MG/DL
BUN SERPL-MCNC: 12 MG/DL
CALCIUM SERPL-MCNC: 10.1 MG/DL
CHLORIDE SERPL-SCNC: 102 MMOL/L
CO2 SERPL-SCNC: 27 MMOL/L
CREAT SERPL-MCNC: 1 MG/DL
DIFFERENTIAL METHOD: ABNORMAL
EOSINOPHIL # BLD AUTO: 0.1 K/UL
EOSINOPHIL NFR BLD: 2.8 %
ERYTHROCYTE [DISTWIDTH] IN BLOOD BY AUTOMATED COUNT: 14.1 %
EST. GFR  (AFRICAN AMERICAN): >60 ML/MIN/1.73 M^2
EST. GFR  (NON AFRICAN AMERICAN): >60 ML/MIN/1.73 M^2
GLUCOSE SERPL-MCNC: 113 MG/DL
HCG INTACT+B SERPL-ACNC: 4.7 MIU/ML
HCT VFR BLD AUTO: 45.8 %
HGB BLD-MCNC: 15.5 G/DL
LDH SERPL L TO P-CCNC: 119 U/L
LYMPHOCYTES # BLD AUTO: 1.3 K/UL
LYMPHOCYTES NFR BLD: 25.1 %
MCH RBC QN AUTO: 29 PG
MCHC RBC AUTO-ENTMCNC: 33.8 G/DL
MCV RBC AUTO: 86 FL
MONOCYTES # BLD AUTO: 0.3 K/UL
MONOCYTES NFR BLD: 6.7 %
NEUTROPHILS # BLD AUTO: 3.3 K/UL
NEUTROPHILS NFR BLD: 65 %
PLATELET # BLD AUTO: 236 K/UL
PMV BLD AUTO: 8.8 FL
POTASSIUM SERPL-SCNC: 4.3 MMOL/L
PROT SERPL-MCNC: 7.8 G/DL
RBC # BLD AUTO: 5.35 M/UL
SODIUM SERPL-SCNC: 140 MMOL/L
WBC # BLD AUTO: 5.05 K/UL

## 2018-03-22 PROCEDURE — 36415 COLL VENOUS BLD VENIPUNCTURE: CPT

## 2018-03-22 PROCEDURE — 82105 ALPHA-FETOPROTEIN SERUM: CPT

## 2018-03-22 PROCEDURE — 83615 LACTATE (LD) (LDH) ENZYME: CPT

## 2018-03-22 PROCEDURE — 85025 COMPLETE CBC W/AUTO DIFF WBC: CPT

## 2018-03-22 PROCEDURE — 80053 COMPREHEN METABOLIC PANEL: CPT

## 2018-03-22 PROCEDURE — 99999 PR PBB SHADOW E&M-EST. PATIENT-LVL IV: CPT | Mod: PBBFAC,,, | Performed by: INTERNAL MEDICINE

## 2018-03-22 PROCEDURE — 84702 CHORIONIC GONADOTROPIN TEST: CPT

## 2018-03-22 PROCEDURE — 99205 OFFICE O/P NEW HI 60 MIN: CPT | Mod: S$GLB,,, | Performed by: INTERNAL MEDICINE

## 2018-03-22 NOTE — Clinical Note
CT C/A/P, port placement, us testicles, pulmonary function test. All STAT See me after all the test done

## 2018-03-22 NOTE — PROGRESS NOTES
CC:  Germ cell tumor    HPI:  Mr. Hampton is a 22 yo man with newly diagnosed seminoma who presents today for evaluation. He presented with chest pain and underwent CT chest on 18, which showed a 6 x 3.5 cm mediastinal mass. It abuts the aorta and pulmonary artery. Biopsy on 3/1/18 showed germ cell tumor, most consistent with seminoma. He currently feels fine. He lost 40 lbs over the past few months, and was able to gain some weight back after he increased his food intake. He is a student at [employer]. He is a current smoker, has been smoking for 3 years, 3PPD in the past, now 1 PPD. Also abuses marijuana on a daily basis, crack cocaine, and No. He lives with his roommates. His family is in [place].     ECO       Family History   Problem Relation Age of Onset    Arthritis Mother     Other Mother      benign tumor of brain and spinal cord    Hyperlipidemia Father     Gout Father     No Known Problems Sister     Arthritis Sister      shoulder    Depression Sister      Past Medical History:   Diagnosis Date    Abdominal pain 2010    eval for abd and chest wall pain at [place], [place] - cxr wnl, EKG ([facility]), troponin wnl, normal chemistries    Allergy     Chondromalacia patellae     Chronic nausea     eval by GI Dr. Tushar Artis - given zofran and ciproheptadine     Chronic pain     Chronic pain     [facility] - [facility] meds; followed by psych and pain clinic and unresponsive to behavioral/CBT/old records reports c/f conversion disorder     Foot pain 2010    4/10 + [place]IRA, eval by Dr. ALLY Lion at [facility] Rheum -dx unclear ? gout and trial of nsaids and pdn, xrays normal , referred to podiatry, re-eval by rheum  and MRI and films wnl    Fracture of right radius 2009    Lyme arthritis     multiple joints    Lyme disease 2013    Memory loss 2012    eval for memory loss [facility] - [facility], EEG wnl.   "Kuban suggested emotional factors & ref to Tushar Davies, PhD for MH eval & neuropsych eval 3/12 Lupe Delgado, PhD - no evidence of formal thought disorder or psychosis - documented severe memory impairment; not related to to ADD or executive function issues. sugesstion that memory issues may be related to "emotional factors", ?conversion d/o    Stress fracture of tibia and fibula 08/2011    Urticaria          History reviewed. No pertinent surgical history.    Social History     Social History    Marital status: Single     Spouse name: N/A    Number of children: N/A    Years of education: N/A     Occupational History    student at [employer]      Social History Main Topics    Smoking status: Current Every Day Smoker     Packs/day: 0.25     Types: Vaping with nicotine, Vaping w/o nicotine, Cigarettes, Cigars    Smokeless tobacco: Never Used    Alcohol use Yes      Comment: occ    Drug use: Yes     Types: Marijuana, LSD, Cocaine, Other-see comments      Comment: Kratom (capsule or leaf form)    Sexual activity: Yes     Partners: Female     Birth control/ protection: Condom     Other Topics Concern    Not on file     Social History Narrative    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 Negative for activity change, appetite change, chills and fever.   HENT: Negative for mouth sores and nosebleeds.    Respiratory: Negative for shortness of breath, wheezing and stridor.    Cardiovascular: Positive for chest pain. Negative for palpitations and leg swelling.   Gastrointestinal: Negative for abdominal pain, blood in stool, constipation, diarrhea, nausea and vomiting.   Endocrine: Negative for polydipsia, polyphagia and polyuria.   Genitourinary: Negative for frequency and hematuria.   Musculoskeletal: Positive for arthralgias. Negative for gait " problem and joint swelling.   Skin: Negative for color change, pallor, rash and wound.   Neurological: Negative for dizziness, tremors, seizures, syncope, speech difficulty, weakness, light-headedness, numbness and headaches.   Hematological: Negative for adenopathy. Does not bruise/bleed easily.   Psychiatric/Behavioral: Negative for confusion, dysphoric mood and self-injury. The patient is not nervous/anxious.        Objective:  Physical Exam   Constitutional: He is oriented to person, place, and time. He appears well-developed and well-nourished. No distress.   HENT:   Head: Normocephalic and atraumatic.   Mouth/Throat: Oropharynx is clear and moist. No oropharyngeal exudate.   Eyes: Conjunctivae and EOM are normal. Pupils are equal, round, and reactive to light. No scleral icterus.   Neck: Normal range of motion. Neck supple. No JVD present. No thyromegaly present.   Cardiovascular: Normal rate, regular rhythm and normal heart sounds.  Exam reveals no gallop and no friction rub.    No murmur heard.  Pulmonary/Chest: Effort normal and breath sounds normal. No respiratory distress. He has no wheezes.   Abdominal: Soft. Bowel sounds are normal. He exhibits no distension and no mass. There is no tenderness.   Genitourinary: Testes normal. Right testis shows no mass, no swelling and no tenderness. Left testis shows no mass, no swelling and no tenderness.   Musculoskeletal: Normal range of motion. He exhibits no edema or tenderness.   Lymphadenopathy:     He has no cervical adenopathy.   Neurological: He is alert and oriented to person, place, and time. No cranial nerve deficit. He exhibits normal muscle tone.   Skin: Skin is warm and dry. No rash noted. No erythema. No pallor.   Psychiatric: He has a normal mood and affect.       Labs:  CBC, CMP unremarkable. LDH, hCG normal. AFP pending.     Assessment and Plan:  1. Germ cell tumor    2. Tobacco abuse    3. Polysubstance abuse      - 24 yo man with newly diagnosed  mediastinal seminoma.  - CT C/A/P with contrast   - testicular ultrasound  - pulmonary function test  - discuss the diagnosis and treatment with chemotherapy with [facility]. I asked him to stop smoking and stop using drugs today  - nicotine patch  - port placement  - return to clinic in 1 week after all the tests done to discuss results and treatment plan. He will need to go to [facility] for a second opinion after that    60 minutes with patient with at least 50% of the time on face-to-face counseling

## 2018-03-23 LAB — AFP SERPL-MCNC: 1.2 NG/ML

## 2018-03-24 ENCOUNTER — PATIENT MESSAGE (OUTPATIENT)
Dept: HEMATOLOGY/ONCOLOGY | Facility: CLINIC | Age: 23
End: 2018-03-24

## 2018-03-24 ENCOUNTER — PATIENT MESSAGE (OUTPATIENT)
Dept: INTERNAL MEDICINE | Facility: CLINIC | Age: 23
End: 2018-03-24

## 2018-03-26 ENCOUNTER — HOSPITAL ENCOUNTER (OUTPATIENT)
Dept: RADIOLOGY | Facility: OTHER | Age: 23
Discharge: HOME | End: 2018-03-26
Attending: INTERNAL MEDICINE
Payer: COMMERCIAL

## 2018-03-26 DIAGNOSIS — A69.20 LYME DISEASE: ICD-10-CM

## 2018-03-26 DIAGNOSIS — C80.1 GERM CELL TUMOR: ICD-10-CM

## 2018-03-26 DIAGNOSIS — C80.1 GERM CELL TUMOR: Primary | ICD-10-CM

## 2018-03-26 PROCEDURE — 74177 CT ABD & PELVIS W/CONTRAST: CPT | Mod: 26,,, | Performed by: RADIOLOGY

## 2018-03-26 PROCEDURE — 74177 CT ABD & PELVIS W/CONTRAST: CPT | Mod: TC

## 2018-03-26 PROCEDURE — 76870 US EXAM SCROTUM: CPT | Mod: 26,,, | Performed by: RADIOLOGY

## 2018-03-26 PROCEDURE — 76870 US EXAM SCROTUM: CPT | Mod: TC

## 2018-03-26 PROCEDURE — 25500020 PHARM REV CODE 255: Performed by: INTERNAL MEDICINE

## 2018-03-26 PROCEDURE — 71260 CT THORAX DX C+: CPT | Mod: 26,,, | Performed by: RADIOLOGY

## 2018-03-26 RX ADMIN — IOHEXOL 75 ML: 350 INJECTION, SOLUTION INTRAVENOUS at 03:03

## 2018-03-26 RX ADMIN — IOHEXOL 25 ML: 350 INJECTION, SOLUTION INTRAVENOUS at 03:03

## 2018-03-27 ENCOUNTER — TELEPHONE (OUTPATIENT)
Dept: HEMATOLOGY/ONCOLOGY | Facility: CLINIC | Age: 23
End: 2018-03-27

## 2018-03-27 ENCOUNTER — HOSPITAL ENCOUNTER (OUTPATIENT)
Dept: RADIOLOGY | Facility: OTHER | Age: 23
Discharge: HOME | End: 2018-03-27
Attending: INTERNAL MEDICINE
Payer: COMMERCIAL

## 2018-03-27 ENCOUNTER — DOCUMENTATION ONLY (OUTPATIENT)
Dept: HEMATOLOGY/ONCOLOGY | Facility: CLINIC | Age: 23
End: 2018-03-27

## 2018-03-27 DIAGNOSIS — C80.1 GERM CELL TUMOR: ICD-10-CM

## 2018-03-27 PROCEDURE — A9503 TC99M MEDRONATE: HCPCS

## 2018-03-27 PROCEDURE — 78306 BONE IMAGING WHOLE BODY: CPT | Mod: 26,,, | Performed by: RADIOLOGY

## 2018-03-27 NOTE — PROGRESS NOTES
"Met with patient and one of his caregivers at length yesterday. Patient expressed his biggest concern is about transportation. I discussed the programs that were available to assist ( [employer]) and then also potentially patient assistance to reimburse for Uber rides. Patient wanting to stay here in [place] to pursue treatment due to more support here than in his home in [place]. When trying to inquire about family he does not elaborate will only state he has better support here. That he is going to seek second opinion in [place] " because my grandparents will be so pissed if I don't." He expressed concern about not knowing what to expect from treatment or what his caregivers need to do. I talked to him about the chemo class that is offered. I feel it would provide him a good education on his therapy and help his caregivers to better understand side effects. Patient unfortunately will not be able to attend. He is scheduled to start chemo on April 16th. He will be out of town the week of April the 9th. Will attempt to gather information from navigators that would be given. Nurse in chemo infusion has agreed to provide one on one education to the patient and caregiver. Patient needs continued reinforcement about the importance of hygiene and a clean environment  as well as misconceptions he has about treatment. Currently he believes that he will essentially be bed bound and unable to leave his home. Attempted to address these misconceptions. He states that he and his caregivers have already started making arrangements to get their home clean and ready for when he starts treatment. He has taken a medical leave from school and currently not working. He discussed his struggles with shortness of breath. Explained that once he starts treatment it is expected to improve. If it does not improve then we could potentially look at the need for home 02. He was asking to have physical therapy at home. Discussed " home bound status and that currently he would not qualify as home bound. We will continue to monitor that as he starts treatment and he does show signs of becoming more debilitated and that it is taxing to leave them home then we could revaluate the need for home health. We completed the application for [facility] and faxed it to them yesterday. Provided him with my direct contact information. Encouraged him to reach out to me if he has any questions, needs or concerns.

## 2018-03-27 NOTE — TELEPHONE ENCOUNTER
Please notify pt that I am out of the office this week and will return next Tuesday - will address message upon return to clinic

## 2018-03-27 NOTE — TELEPHONE ENCOUNTER
----- Message from Suha Rojas MD sent at 3/26/2018  5:08 PM CDT -----  Please cancel PFT   Schedule bone scan (stat)  Please reschedule ENT visit to PRIOR TO  Audiogram (needs ear wax cleaned before audiogram)

## 2018-03-27 NOTE — TELEPHONE ENCOUNTER
Spoke with Mr. Neff and informed him that his PFTs were cancelled and that he has a Bone Scan Appt scheduled today March 27, 2018 at 1 pm and 3 pm in Radiology Dept on 1st floor. Pt verbalized understanding.

## 2018-03-28 ENCOUNTER — TELEPHONE (OUTPATIENT)
Dept: HEMATOLOGY/ONCOLOGY | Facility: CLINIC | Age: 23
End: 2018-03-28

## 2018-03-28 ENCOUNTER — OFFICE VISIT (OUTPATIENT)
Dept: HEMATOLOGY/ONCOLOGY | Facility: CLINIC | Age: 23
End: 2018-03-28
Payer: COMMERCIAL

## 2018-03-28 VITALS
OXYGEN SATURATION: 97 % | SYSTOLIC BLOOD PRESSURE: 118 MMHG | DIASTOLIC BLOOD PRESSURE: 59 MMHG | TEMPERATURE: 98 F | BODY MASS INDEX: 20.5 KG/M2 | RESPIRATION RATE: 18 BRPM | HEART RATE: 74 BPM | WEIGHT: 127 LBS

## 2018-03-28 DIAGNOSIS — Z72.0 TOBACCO ABUSE: ICD-10-CM

## 2018-03-28 DIAGNOSIS — R07.9 CHEST PAIN, UNSPECIFIED TYPE: ICD-10-CM

## 2018-03-28 DIAGNOSIS — C80.1 GERM CELL TUMOR: Primary | ICD-10-CM

## 2018-03-28 DIAGNOSIS — R11.2 NON-INTRACTABLE VOMITING WITH NAUSEA, UNSPECIFIED VOMITING TYPE: ICD-10-CM

## 2018-03-28 DIAGNOSIS — R06.02 SHORTNESS OF BREATH: ICD-10-CM

## 2018-03-28 PROCEDURE — 99999 PR PBB SHADOW E&M-EST. PATIENT-LVL III: CPT | Mod: PBBFAC,,, | Performed by: INTERNAL MEDICINE

## 2018-03-28 PROCEDURE — 99215 OFFICE O/P EST HI 40 MIN: CPT | Mod: S$GLB,,, | Performed by: INTERNAL MEDICINE

## 2018-03-28 RX ORDER — ONDANSETRON 8 MG/1
8 TABLET, ORALLY DISINTEGRATING ORAL EVERY 6 HOURS PRN
Qty: 60 TABLET | Refills: 1 | Status: SHIPPED | OUTPATIENT
Start: 2018-03-28 | End: 2018-04-23

## 2018-03-28 RX ORDER — OXYCODONE HYDROCHLORIDE 5 MG/1
5 TABLET ORAL EVERY 6 HOURS PRN
Qty: 90 TABLET | Refills: 0 | Status: SHIPPED | OUTPATIENT
Start: 2018-03-28 | End: 2018-04-02

## 2018-03-28 RX ORDER — ALBUTEROL SULFATE 0.83 MG/ML
2.5 SOLUTION RESPIRATORY (INHALATION) EVERY 6 HOURS PRN
Qty: 1 BOX | Refills: 0 | Status: SHIPPED | OUTPATIENT
Start: 2018-03-28 | End: 2018-05-13

## 2018-03-28 RX ORDER — LIDOCAINE AND PRILOCAINE 25; 25 MG/G; MG/G
CREAM TOPICAL
Qty: 5 G | Refills: 2 | Status: SHIPPED | OUTPATIENT
Start: 2018-03-28 | End: 2018-05-13

## 2018-03-28 RX ORDER — DEXAMETHASONE 4 MG/1
8 TABLET ORAL DAILY
Qty: 24 TABLET | Refills: 0 | Status: SHIPPED | OUTPATIENT
Start: 2018-03-28 | End: 2018-04-09

## 2018-03-28 RX ORDER — ALBUTEROL SULFATE 90 UG/1
AEROSOL, METERED RESPIRATORY (INHALATION)
Status: ON HOLD | COMMUNITY
Start: 2018-03-08 | End: 2018-05-16 | Stop reason: HOSPADM

## 2018-03-28 RX ORDER — PROMETHAZINE HYDROCHLORIDE 25 MG/1
25 TABLET ORAL EVERY 4 HOURS PRN
Qty: 120 TABLET | Refills: 3 | Status: ON HOLD | OUTPATIENT
Start: 2018-03-28 | End: 2018-05-16

## 2018-03-28 RX ORDER — EPINEPHRINE 0.3 MG/.3ML
0.3 INJECTION SUBCUTANEOUS
Status: ON HOLD | COMMUNITY
Start: 2014-01-14 | End: 2018-06-25 | Stop reason: HOSPADM

## 2018-03-28 NOTE — PROGRESS NOTES
"                                                         PROGRESS NOTE    Subjective:       Patient ID: Alejandro Neff is a 23 y.o. male.    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 Mr. Hampton is a 24 yo man who first presented with chest pain and underwent CT chest on 2/14/18, which showed a 6 x 3.5 cm mediastinal mass. It abuts the aorta and pulmonary artery. Biopsy was done on 3/1/18. Pathology (reviewed at [facility]) showed germ cell tumor, most consistent with seminoma.   2. HCG, LDH, AFP on 3/22/18 all normal.   3. Testicular US 3/26/18 showed no testicular masses. CT C/A/P on 3/26/18 showed "stable appearance of anterior mediastinal mass consistent with provided history of seminoma. Several punctate sclerotic foci are noted in the pelvis at the right pubic bone, right ischial tuberosity, and left ilium adjacent to the sacroiliac joint.  These are strongly favored to reflect benign bone islands although metastatic disease could have a similar appearance and close follow-up is recommended. Several benign Schmorl's nodes are noted in the thoracic spine." Bone scan on 3/28/18 was negative for bone mets.     INTERVAL HISTORY:   Mr. Hampton returns today for follow up of primary mediastinal seminoma. Since we last met in the office, he underwent a testicular US on 3/26/18 which showed no testicular masses. CT C/A/P on 3/26/18 showed "stable appearance of anterior mediastinal mass consistent with provided history of seminoma. Several punctate sclerotic foci are noted in the pelvis at the right pubic bone, right ischial tuberosity, and left ilium adjacent to the sacroiliac joint.  These are strongly favored to reflect benign bone islands although metastatic disease could have a similar appearance and close follow-up is recommended. Several benign Schmorl's nodes are noted in the thoracic spine." Bone scan on 3/28/18 " "was negative for bone mets. He has stopped smoking and using cocaine. He still needs to take CBD by mouth for his chronic pain from Lyme's disease and uses an E cigarette. He uses albuterol inhaler for SOB due to the mediastinal mass but does not feel that it helps enough. He is trying to eat more to gain his weight back. He is going back to [place] on  for a second opinion and will come back and see me on .    ECO    ROS:   A ten point system review is obtained and neg except for what was stated in the interval history    Physical Examination:   Vital signs reviewed.   Gen: well hydrated, well developed, in no acute distress.  HEENT: normocephalic, anicteric sclerae, PERRLA, EOMI, oropharynx clear  Neck: supple, no JVD, thyromegaly, cervical or supraclavicular LAD  Lungs: CTAB, no wheezes or rales  Heart: RRR, no M/R/G  Abdomen: soft, no tenderness, non-distended, no hepatosplenomegaly, mass, or hernia. BS present  Ext: no clubbing, cyanosis, or edema  Neuro: alert and oriented x 4, no focal neuro deficit  Skin: no rash, erythema, open wound or ulcers  Psych: pleasant and appropriate mood and affect    Objective:     Diagnostic Tests:  Testicular US 3/26/18 showed no testicular masses.   CT C/A/P on 3/26/18 showed "stable appearance of anterior mediastinal mass consistent with provided history of seminoma. Several punctate sclerotic foci are noted in the pelvis at the right pubic bone, right ischial tuberosity, and left ilium adjacent to the sacroiliac joint.  These are strongly favored to reflect benign bone islands although metastatic disease could have a similar appearance and close follow-up is recommended. Several benign Schmorl's nodes are noted in the thoracic spine."   Bone scan on 3/28/18 was negative for bone mets.       Laboratory Data:  CBC, CMP on 3/22/18 were normal. HCG, AFP, LDH on the same day were all normal.     Assessment/Plan:     1. Germ cell tumor    2. Non-intractable vomiting with " nausea, unspecified vomiting type    3. Shortness of breath    4. Chest pain, unspecified type    5. Tobacco abuse      - Mr. Hampton is a 24 yo man with primary mediastinal seminoma, good risk disease, who returns today to discuss chemotherapy  - Given his smoking status, I recommend 4 cycles of EP to avoid the pulmonary toxicity with bleomycin  - Discussed with him that marijuana use can spuriously increase HCG level. He would like to continue to take marijuana by mouth, but is willing to stop it and do further workup if his HCG increases in the future  - Discussed the regimen, schedule, and side effects of cisplatin/etoposide. Side effects include but are not limited to, fatigue, nausea, vomiting, alopecia, bone marrow suppression with increased risk of infection, sepsis, death, anemia that may require transfusion, low plt counts with increased risk of bleeding that may require plt transfusion, mucositis, constipation, diarrhea, ototoxicity, renal toxicity that may require transient or indefinite dialysis, neuropathy, secondary malignancy such as leukemia. Discussed long term side effects from chemo including infertility and increased risk of cardiovascular disease. Printed information provided to patient.   - Again offered sperm banking. Mr. Hampton said he is not interested at the moment.   - seeing infectious disease this week for chronic Lyme disease  - audiogram and port placement next week  - try albuterol nebulizer for SOB  - oxycodone 5 mg prn for pain  - He has stopped smoking cigarettes in anticipation of starting chemo. I also discussed with him that both chemo and smoking can increase risks of cardiovascular disease such as heart attach and stroke. I encouraged him to stop smoking completely.   - He is going to [place] on 4/9 for a second opinion. I will see him back on 4/16 with labs and chemo appointment.   - His multiple questions were answered in the office. He understands and agrees with the plan.      Electronically signed by Suha Rojas      I spent 40 minutes with patient with at least 50% of the time on face-to-face counseling.     Distress Screening Results: Psychosocial Distress screening score of Distress Score: 2 noted and reviewed. No intervention indicated.

## 2018-03-28 NOTE — Clinical Note
RTC at 8 am on 4/16/18, labs prior to appt (CBC, CMP, Mg, phos, hCG, AFP, LDH), followed by chemo (cisplatin/etoposide) Monday to Friday that week Please check with prior auth for cisplatin/etoposide

## 2018-03-28 NOTE — TELEPHONE ENCOUNTER
Spoke with pt and informed him that he can also call [employer] to get more information about support groups in the area and online. Pt verbalized understanding. Also informed pt that I spoke to ENT and told them the pt would like to switch appt times, he would like to see have his Consult appt prior to his Audiogram.  stated she will put the request in and see if it gets approved and contact the pt. Pt verbalized understanding.

## 2018-03-28 NOTE — TELEPHONE ENCOUNTER
----- Message from Xiomy Tellez LCSW sent at 3/28/2018  4:21 PM CDT -----  They should contact the [employer] who can guide them to caregiver support groups as well as online support groups   ----- Message -----  From: Aby Brown RN  Sent: 3/28/2018   3:09 PM  To: VON Arboleda,            This patient and his family members were wondering if there is a Chemo Caregiver Support Group? Please advise. Thanks,  Aby

## 2018-03-28 NOTE — TELEPHONE ENCOUNTER
Spoke with Todd ( in ENT) and informed her that the pt would like to have his Consult Appt prior to his Audiogram appt. Todd verbalized understanding and stated she will put in the request and see if it gets approved. I verbalized understanding.

## 2018-03-28 NOTE — PROGRESS NOTES
"CHIEF COMPLAINT: Shortness of breath    HISTORY OF PRESENT ILLNESS: The patient is normally seen by one of my partners.  He was recently diagnosed with a mediastinal mass.  Pathology is pending.  He continues to have some shortness of breath.  His pulse ox symmetry is very good today.  Pulse ox does not drop below 95.  I reassured him he does not need home O2.  He has lost weight and his mediastinal mass is presumed to be malignant.    REVIEW OF SYSTEMS:  GENERAL: No fever, chills.  SKIN: No rashes, itching or changes in color or texture of skin.  HEAD: No headaches or recent head trauma.  EYES: Visual acuity fine. No photophobia, ocular pain or diplopia.  EARS: Denies ear pain, discharge or vertigo.  NOSE: No loss of smell, no epistaxis or postnasal drip.  MOUTH & THROAT: No hoarseness or change in voice. No excessive gum bleeding.  NODES: Denies swollen glands.  CHEST: Denies cyanosis, wheezing, cough and sputum production.  CARDIOVASCULAR: Denies chest pain, PND, orthopnea or reduced exercise tolerance.  ABDOMEN: Appetite fine. No weight loss. Denies diarrhea, abdominal pain, hematemesis or blood in stool.  URINARY: No flank pain, dysuria or hematuria.  PERIPHERAL VASCULAR: No claudication or cyanosis.  MUSCULOSKELETAL: No joint stiffness or swelling. Denies back pain.  NEUROLOGIC: No history of seizures, paralysis, alteration of gait or coordination.    SOCIAL HISTORY: The patient does smoke.  The patient consumes alcohol socially.  The patient is single.    PHYSICAL EXAMINATION:   Blood pressure 98/60, pulse 78, height 5' 6" (1.676 m), weight 57.3 kg (126 lb 5.2 oz), SpO2 98 %.    APPEARANCE: Well nourished, well developed, in no acute distress.    HEAD: Normocephalic, atraumatic.  EYES: PERRL. EOMI.  Conjunctivae without injection and  anicteric  EARS: TM's intact. Light reflex normal. No retraction or perforation.    NOSE: Mucosa pink. Airway clear.  MOUTH & THROAT: No tonsillar enlargement. No pharyngeal " erythema or exudate. No stridor.  NECK: Supple.   NODES: No cervical, axillary or inguinal lymph node enlargement.  CHEST: Lungs clear to auscultation.  No retractions are noted.  No rales or rhonchi are present.  CARDIOVASCULAR: Normal S1, S2. No rubs, murmurs or gallops.  ABDOMEN: Bowel sounds normal. Not distended. Soft. No tenderness or masses.  No ascites is noted.  MUSCULOSKELETAL:  There is no clubbing, cyanosis, or edema of the extremities x4.  There is full range of motion of the lumbar spine.  There is full range of motion of the extremities x4.  There is no deformity noted.    NEUROLOGIC:       Normal speech development.      Hearing normal.      Normal gait.      Motor and sensory exams grossly normal.      DTR's normal.  PSYCHIATRIC: Patient is alert and oriented x3.  Thought processes are all normal.  There is no homicidality.  There is no suicidality.  There is no evidence of psychosis.    LABORATORY/RADIOLOGY:   Chart reviewed.  We will update blood work today.    ASSESSMENT:   Mediastinal mass  Shortness of breath    PLAN:  Follow up pathology.    Unfortunately the pathology did come back malignant as we suspected.  He has been diagnosed with a seminoma which will require aggressive treatment.    Answers for HPI/ROS submitted by the patient on 3/7/2018   activity change: No  unexpected weight change: Yes  neck pain: Yes  hearing loss: Yes  rhinorrhea: Yes  trouble swallowing: No  eye discharge: No  visual disturbance: Yes  chest tightness: Yes  wheezing: Yes  chest pain: Yes  palpitations: Yes  blood in stool: No  constipation: No  vomiting: No  diarrhea: No  polydipsia: Yes  polyuria: No  difficulty urinating: No  urgency: No  hematuria: No  joint swelling: No  arthralgias: Yes  headaches: Yes  weakness: Yes  confusion: Yes  dysphoric mood: No

## 2018-03-29 ENCOUNTER — OFFICE VISIT (OUTPATIENT)
Dept: INFECTIOUS DISEASES | Facility: CLINIC | Age: 23
End: 2018-03-29
Payer: COMMERCIAL

## 2018-03-29 VITALS
SYSTOLIC BLOOD PRESSURE: 117 MMHG | HEIGHT: 66 IN | DIASTOLIC BLOOD PRESSURE: 72 MMHG | BODY MASS INDEX: 20.69 KG/M2 | WEIGHT: 128.75 LBS | HEART RATE: 89 BPM | TEMPERATURE: 98 F

## 2018-03-29 DIAGNOSIS — Z86.19 HISTORY OF LYME DISEASE: Primary | ICD-10-CM

## 2018-03-29 DIAGNOSIS — C80.1 GERM CELL TUMOR: ICD-10-CM

## 2018-03-29 DIAGNOSIS — A69.20 LYME BORRELIOSIS: ICD-10-CM

## 2018-03-29 PROCEDURE — 99999 PR PBB SHADOW E&M-EST. PATIENT-LVL III: CPT | Mod: PBBFAC,,, | Performed by: INTERNAL MEDICINE

## 2018-03-29 PROCEDURE — 99205 OFFICE O/P NEW HI 60 MIN: CPT | Mod: S$GLB,,, | Performed by: INTERNAL MEDICINE

## 2018-03-29 NOTE — PROGRESS NOTES
"Subjective:      Patient ID: Alejandro Neff is a 23 y.o. male.    Chief Complaint:Lyme Disease      History of Present Illness    Pt referred by Dr. Rojas oncologist regarding "what to do about his chronic Lyme disease given that he is about to start chemotherapy for seminoma".    The pt says that he has had symptoms of Lyme disease since the second grade . I asked him what symptoms he had and he said I "should google Lyme disease and he has every symptom". Also refers to problem with "carbon monoxide poisoning" in past. Details are scarce. He is from [place] and his treatment as a child and adolescent was in that area. His PCP was Dr. Norm Shipman. There are 147 pages of scanned mostly illegible documents from [place] in the media tab. The pt describes 2 months of IV antibiotic treatment followed by 2 years of oral antibiotics for Lyme Disease in approximately 2012. He said he was looking for a cancer doctor who had experience treating cancer in Lyme disease patients and has not been able to find them here. Was going to [place] for a second opinion.     I told him that main stream ID science did not support a dx of chronic B burgdorferii infection that required months to years of rx and this topic had provided controversial among various specialists. I recommended we get some basics in lab now (Lyme EIA/WB and PCR) If he got traditional antibiotics for rx of Lyme disease for the duration that he describes, he should be cured of Lyme disease. We need to verify specifically what he got (drug and dosage) , when an and for how long.    A quick computer search for cancer chemotherapy and chronic lyme disease yielded no relevant scientifically valid articles.     Review of Systems   Constitution: Positive for decreased appetite, weakness, malaise/fatigue, night sweats and weight loss. Negative for chills, fever and weight gain.   HENT: Positive for hearing loss. Negative for congestion, ear pain, hoarse voice, sore " throat and tinnitus.    Eyes: Positive for blurred vision. Negative for redness and visual disturbance.   Cardiovascular: Positive for palpitations. Negative for chest pain and leg swelling.   Respiratory: Positive for cough and shortness of breath. Negative for hemoptysis and sputum production.    Hematologic/Lymphatic: Negative for adenopathy. Does not bruise/bleed easily.   Skin: Negative for dry skin, itching, rash and suspicious lesions.   Musculoskeletal: Positive for back pain, joint pain, myalgias and neck pain.   Gastrointestinal: Negative for abdominal pain, constipation, diarrhea, heartburn, nausea and vomiting.   Genitourinary: Negative for dysuria, flank pain, frequency, hematuria, hesitancy and urgency.   Neurological: Positive for dizziness, headaches and paresthesias. Negative for numbness.   Psychiatric/Behavioral: Positive for memory loss. Negative for depression. The patient is nervous/anxious. The patient does not have insomnia.      Objective:   Physical Exam   Constitutional: He is oriented to person, place, and time. He appears well-developed and well-nourished. No distress.   Neurological: He is alert and oriented to person, place, and time.   Vitals reviewed.    Assessment:       1. History of Lyme disease , possibly adequately treated and unlikely to have any bearing on plans for cancer chemotherapy which is planned   2. Germ cell tumor          Plan:        1. Verify prior Lyme therapy adequacy   2. Lyme EIA, western blot and PCR    3. Consider rheumatology consultation re symptomatic eval

## 2018-03-29 NOTE — LETTER
March 29, 2018      Suha Rojas MD  [address]  [address]  [facility] [address]           [facility]  [address]  [facility] [address]  Phone: [phone]  Fax: [phone]          Patient: Alejandro Neff   MR Number: [id number]   YOB: 1995   Date of Visit: 3/29/2018       Dear Dr. Suha Rojas:    Thank you for referring Alejandro Neff to me for evaluation. Attached you will find relevant portions of my assessment and plan of care.    If you have questions, please do not hesitate to call me. I look forward to following Alejandro Neff along with you.    Sincerely,    Pravin Roman MD    Enclosure  CC:  No Recipients    If you would like to receive this communication electronically, please contact [email or web address] or [phone] to request more information on [employer] access.    For providers and/or their staff who would like to refer a patient to [facility], please contact us through our one-stop-shop provider referral line, [facility], at [phone].    If you feel you have received this communication in error or would no longer like to receive these types of communications, please e-mail [email or web address]

## 2018-04-01 ENCOUNTER — PATIENT MESSAGE (OUTPATIENT)
Dept: HEMATOLOGY/ONCOLOGY | Facility: CLINIC | Age: 23
End: 2018-04-01

## 2018-04-02 ENCOUNTER — CLINICAL SUPPORT (OUTPATIENT)
Dept: HEMATOLOGY/ONCOLOGY | Facility: CLINIC | Age: 23
End: 2018-04-02
Payer: COMMERCIAL

## 2018-04-02 DIAGNOSIS — C80.1 GERM CELL TUMOR: Primary | ICD-10-CM

## 2018-04-02 RX ORDER — OXYCODONE HYDROCHLORIDE 10 MG/1
10 TABLET ORAL EVERY 6 HOURS PRN
Qty: 60 TABLET | Refills: 0 | Status: SHIPPED | OUTPATIENT
Start: 2018-04-02 | End: 2018-04-16

## 2018-04-03 ENCOUNTER — PATIENT MESSAGE (OUTPATIENT)
Dept: INFECTIOUS DISEASES | Facility: CLINIC | Age: 23
End: 2018-04-03

## 2018-04-03 ENCOUNTER — TELEPHONE (OUTPATIENT)
Dept: HEMATOLOGY/ONCOLOGY | Facility: CLINIC | Age: 23
End: 2018-04-03

## 2018-04-03 ENCOUNTER — CLINICAL SUPPORT (OUTPATIENT)
Dept: HEMATOLOGY/ONCOLOGY | Facility: CLINIC | Age: 23
End: 2018-04-03
Payer: COMMERCIAL

## 2018-04-03 VITALS — HEIGHT: 66 IN | WEIGHT: 127.88 LBS | BODY MASS INDEX: 20.55 KG/M2

## 2018-04-03 DIAGNOSIS — Z71.3 NUTRITIONAL COUNSELING: Primary | ICD-10-CM

## 2018-04-03 DIAGNOSIS — C80.1 GERM CELL TUMOR: ICD-10-CM

## 2018-04-03 PROCEDURE — 99999 PR PBB SHADOW E&M-EST. PATIENT-LVL II: CPT | Mod: PBBFAC,,,

## 2018-04-03 PROCEDURE — 97802 MEDICAL NUTRITION INDIV IN: CPT | Mod: S$GLB,,, | Performed by: DIETITIAN, REGISTERED

## 2018-04-03 NOTE — TELEPHONE ENCOUNTER
----- Message from Micki Feldman sent at 3/30/2018  4:31 PM CDT -----  Contact: Pt can be reached at [phone]  Pt is calling to speak with the nurse to get in the chemo therapy class.    Pt would like to go this Monday class please      Thank you!

## 2018-04-03 NOTE — PROGRESS NOTES
"Medical Nutrition Therapy Oncology Progress Note    Name: Alejandro Neff MRN: [id number]  : 1995    Age: 23 y.o.  Ethnicity: /White Language: English    Diagnosis: No diagnosis found.    Chemo Regimen: Cisplatin, etoposide    Referring MD: Dr. Rojas Frequency:  Radiation: No           Goal of Cancer treatment n/a         Nutrition Assessment     Chief Complaint:   Chief Complaint   Patient presents with    Nutrition Counseling        Anthropometric Measurements:  Height: 5' 6" (1.676 m)  Current Weight: 58 kg (127 lb 13.9 oz)  Ideal Body Weight: 142#  Percent Ideal Body Weight:: 89%  BMI: Body mass index is 20.64 kg/m².     Weight History:   Wt Readings from Last 8 Encounters:   18 58 kg (127 lb 13.9 oz)   18 58.4 kg (128 lb 12 oz)   18 57.6 kg (126 lb 15.8 oz)   18 57.2 kg (126 lb 1.7 oz)   18 57.3 kg (126 lb 5.2 oz)   18 47.6 kg (105 lb)   18 59.6 kg (131 lb 6.3 oz)   17 59.4 kg (130 lb 15.3 oz)     Medical Health History:  Feeding tube placed: No  Pre-treatment: Yes    History reviewed. No pertinent surgical history.     Medications:   Current Outpatient Prescriptions:     albuterol (PROAIR HFA) 90 mcg/actuation inhaler, , Disp: , Rfl:     albuterol (PROVENTIL) 2.5 mg /3 mL (0.083 %) nebulizer solution, Take 3 mLs (2.5 mg total) by nebulization every 6 (six) hours as needed for Wheezing. Rescue, Disp: 1 Box, Rfl: 0    albuterol 90 mcg/actuation inhaler, Inhale 2 puffs into the lungs every 6 (six) hours as needed for Wheezing., Disp: 1 each, Rfl: 11    cholecalciferol, vitamin D3, 1,000 unit capsule, Take 1 capsule (1,000 Units total) by mouth once daily., Disp: , Rfl: 0    dexamethasone (DECADRON) 4 MG Tab, Take 2 tablets (8 mg total) by mouth once daily. Take 8 mg once daily on days 6-8 of each cycle, Disp: 24 tablet, Rfl: 0    diphenhydramine-acetaminophen (TYLENOL PM)  mg Tab, Take 1 tablet by mouth once daily., Disp: , Rfl:     " diphenhydrAMINE-aluminum-magnesium hydroxide-simethicone-lidocaine HCl 2%, Swish and spit 15 mLs every 4 (four) hours as needed (mouth sore)., Disp: 1 Bottle, Rfl: 2    EPINEPHrine (EPIPEN) 0.3 mg/0.3 mL AtIn, Inject 0.3 mg/mL into the muscle., Disp: , Rfl:     ibuprofen (ADVIL,MOTRIN) 600 MG tablet, Take 1 tablet (600 mg total) by mouth every 6 (six) hours as needed for Pain., Disp: 20 tablet, Rfl: 0    lidocaine-prilocaine (EMLA) cream, Apply over port 30 minutes prior to chemo, Disp: 5 g, Rfl: 2    MULTIVIT-MINERALS/FERROUS FUM (MULTI VITAMIN ORAL), Take by mouth once daily., Disp: , Rfl:     ondansetron (ZOFRAN-ODT) 8 MG TbDL, Take 1 tablet (8 mg total) by mouth every 6 (six) hours as needed (nausea)., Disp: 60 tablet, Rfl: 1    oxyCODONE (ROXICODONE) 10 mg Tab immediate release tablet, Take 1 tablet (10 mg total) by mouth every 6 (six) hours as needed for Pain., Disp: 60 tablet, Rfl: 0    promethazine (PHENERGAN) 25 MG tablet, Take 1 tablet (25 mg total) by mouth every 4 (four) hours as needed for Nausea., Disp: 120 tablet, Rfl: 3    Last Labs:  Last Labs:  Glucose   Date Value Ref Range Status   03/22/2018 113 (H) 70 - 110 mg/dL Final   03/28/2017 85 70 - 110 mg/dL Final     BUN, Bld   Date Value Ref Range Status   03/22/2018 12 6 - 20 mg/dL Final   03/28/2017 14 6 - 20 mg/dL Final     Creatinine   Date Value Ref Range Status   03/22/2018 1.0 0.5 - 1.4 mg/dL Final   03/28/2017 1.0 0.5 - 1.4 mg/dL Final     Sodium   Date Value Ref Range Status   03/22/2018 140 136 - 145 mmol/L Final   03/28/2017 140 136 - 145 mmol/L Final     Potassium   Date Value Ref Range Status   03/22/2018 4.3 3.5 - 5.1 mmol/L Final   03/28/2017 4.1 3.5 - 5.1 mmol/L Final     No results found for: PHOS  Calcium   Date Value Ref Range Status   03/22/2018 10.1 8.7 - 10.5 mg/dL Final   03/28/2017 10.2 8.7 - 10.5 mg/dL Final     No results found for: PREALBUMIN  Total Protein   Date Value Ref Range Status   03/22/2018 7.8 6.0 - 8.4 g/dL  "Final   03/28/2017 8.1 6.0 - 8.4 g/dL Final     No results found for: CHOL  No results found for: HGBA1C  Hemoglobin   Date Value Ref Range Status   03/22/2018 15.5 14.0 - 18.0 g/dL Final   03/01/2018 14.4 14.0 - 18.0 g/dL Final     Hematocrit   Date Value Ref Range Status   03/22/2018 45.8 40.0 - 54.0 % Final   03/01/2018 42.4 40.0 - 54.0 % Final     No results found for: IRON  No components found for: FROLATE  Vit D, 25-Hydroxy   Date Value Ref Range Status   03/28/2017 19 (L) 30 - 96 ng/mL Final     Comment:     Vitamin D deficiency.........<10 ng/mL                              Vitamin D insufficiency......10-29 ng/mL       Vitamin D sufficiency........> or equal to 30 ng/mL  Vitamin D toxicity............>100 ng/mL       WBC   Date Value Ref Range Status   03/22/2018 5.05 3.90 - 12.70 K/uL Final   03/01/2018 6.54 3.90 - 12.70 K/uL Final         Client History/Food Access:    Living Situation: Lives with friend   Who: Shops for Groceries? Patient and Friend    Who : Prepares meals? Patient and Friend    Who: Fills prescriptions? Patient   Are there financial difficulties purchasing food? No   Personal History (occupation, physical activity level, exercise): Student at [employer] for [employer]  Food and Nutrition History: Pt here with friend for nutrition counseling. Pt with current weight of 128#. Pt reports # (last weighed in August 2017). Pt reports weight dropped to 100#, but has recently gotten his appetite back and has been eating much better and gaining weight. Pt skips breakfast because his "nausea from Lyme disease is worse in the AM". Pt states he typically picks up food from different restaurants for lunch and dinner. Once chemo starts, pt states he can't leave his house "except to come to the hospital for chemo" because of his Lyme disease. Pt's friend states she will cook food for him once that time comes or he will order delivery. Encouraged exercise during this time. Pt " states he will have PT comes to his house. Discussed high calorie, high protein foods and beverages to promote continued weight gain. Pt does not drink milk or eat yogurt. Pt tolerates cheese fine. , Xiomy, ordered Boost Breeze for pt. Pt noted that Boost Breeze has whey protein (milk) and is worried he will not tolerate it. Encouraged pt to try Boost Breeze as high calorie option. Pt states he wants to start drinking smoothie. Encouraged pt to drink a smoothie in addition to other meals and snacks. Discussed adding protein, carbohydrates, and fat to the smootie. Pt bough protein powder yesterday, but is unsure of the brand. Encouraged pt to eat frequently throughout the day. Pt asked about drinking herbal tea. Discussed antioxidant benefits of drinking tea. Pt asked about Kratom leaves. Encouraged pt to talk to MD. Encouraged pt to reach out to RD for side effect management if needed. Contact information for RD provided.  24-hour recall:  Breakfast: skips  Lunch: leftover Chinese or sandwich  Snack: vegetables and hummus  Dinner: pizza or steak    Nutritional Needs:  Estimated Needs Method Use    6411-9527 kcal/day [] Predictive Equation: [place]   [x]  30-35 kcal/kg   Protein 70 g 1.2 gm/kg/day   Fluid 1700 ml 30 ml/kg/day     Food/Nutrient Intake (oral, enteral or parenteral) and Patient Behaviors     Calorie intake: Adequate   Protein intake: Inadequate     Yes/No    No Uses medical food supplements   Yes Cooking techniques to minimize fatigue   N/A Currently modifying food textures   Yes Able to maintain usual physical actiivty     Nutrition Diagnosis     Nutrition Diagnosis Related to (Etiology) As Evidenced By (Signs/Symptoms)   Increased nutrient needs Increased demand for nutrients 40# weight loss (with recent gain); starting chemo                 Nutritional Intervention and Prescription        Nutrition Intervention Schedule of food/fluids   Goals/Expected Outcomes Pt to eat 4-6  small meals throughout the day for adequate calories and protein.   Progress Initial     Nutrition Intervention Medical food supplement: Commercial beverage   Goals/Expected Outcomes Pt to drink 1 Boost Breeze daily for additional 250 calories, 9 g protein.   Progress Initial     Nutrition Intervention Composition of meals/snacks   Goals/Expected Outcomes Pt to choose high calorie, high protein options like nut butter, avocado, olive oil, cheese, etc.   Progress Initial     Pt needs education? yes (see intervention)    Coordination of Nutrition Care: Comments:   Collaboration with other providers MD         Monitoring and Evaluation     Monitor: diet education needs    Next Visit: PRN    Materials Provided:  1. RD contact information 2. High calorie, high protein nutrition therapy               Total time: 30 minutes    Nutrition Score:      ©2010 [employer] Oncology Toolkit   Answers for HPI/ROS submitted by the patient on 4/1/2018   appetite change : Yes  unexpected weight change: Yes  visual disturbance: Yes  cough: Yes  shortness of breath: Yes  chest pain: Yes  abdominal pain: Yes  diarrhea: No  frequency: No  back pain: Yes  rash: No  headaches: Yes  adenopathy: No  nervous/ anxious: No

## 2018-04-03 NOTE — TELEPHONE ENCOUNTER
Alejandro   Your blood tests which we sammy recently were negative for antibodies to Lyme Disease (IgG and IgM) by EIA and Western blot testing. The PCR test for the bacteria which causes Lyme is also negative. I would interpret this as evidence that you do not have active Lyme disease at present and can proceed with your chemotherapy as planned. I am unable to find prior Lyme disease tests done by your doctors out of [place] or the treatment you received (antibiotic names, doses and duration of treatment). If you want to get these for me I am happy to review, but do not think it is relevant to your current clinical situation  HAWA Roman MD

## 2018-04-04 ENCOUNTER — SURGERY (OUTPATIENT)
Age: 23
End: 2018-04-04

## 2018-04-04 ENCOUNTER — HOSPITAL ENCOUNTER (OUTPATIENT)
Facility: HOSPITAL | Age: 23
Discharge: HOME | End: 2018-04-04
Attending: INTERNAL MEDICINE | Admitting: INTERNAL MEDICINE
Payer: COMMERCIAL

## 2018-04-04 VITALS
HEIGHT: 66 IN | WEIGHT: 130 LBS | HEART RATE: 80 BPM | BODY MASS INDEX: 20.89 KG/M2 | RESPIRATION RATE: 16 BRPM | TEMPERATURE: 98 F | SYSTOLIC BLOOD PRESSURE: 105 MMHG | OXYGEN SATURATION: 100 % | DIASTOLIC BLOOD PRESSURE: 56 MMHG

## 2018-04-04 DIAGNOSIS — C80.1 GERM CELL TUMOR: ICD-10-CM

## 2018-04-04 LAB
INR PPP: 1
PROTHROMBIN TIME: 10.9 SEC

## 2018-04-04 PROCEDURE — 25000003 PHARM REV CODE 250: Performed by: FAMILY MEDICINE

## 2018-04-04 PROCEDURE — 63600175 PHARM REV CODE 636 W HCPCS: Performed by: RADIOLOGY

## 2018-04-04 PROCEDURE — 63600175 PHARM REV CODE 636 W HCPCS: Performed by: FAMILY MEDICINE

## 2018-04-04 PROCEDURE — 85610 PROTHROMBIN TIME: CPT

## 2018-04-04 RX ORDER — FENTANYL CITRATE 50 UG/ML
50 INJECTION, SOLUTION INTRAMUSCULAR; INTRAVENOUS
Status: DISCONTINUED | OUTPATIENT
Start: 2018-04-04 | End: 2018-04-04 | Stop reason: HOSPADM

## 2018-04-04 RX ORDER — MIDAZOLAM HYDROCHLORIDE 1 MG/ML
INJECTION INTRAMUSCULAR; INTRAVENOUS CODE/TRAUMA/SEDATION MEDICATION
Status: COMPLETED | OUTPATIENT
Start: 2018-04-04 | End: 2018-04-04

## 2018-04-04 RX ORDER — HEPARIN SODIUM 200 [USP'U]/100ML
500 INJECTION, SOLUTION INTRAVENOUS CONTINUOUS
Status: DISCONTINUED | OUTPATIENT
Start: 2018-04-04 | End: 2018-04-04 | Stop reason: HOSPADM

## 2018-04-04 RX ORDER — FENTANYL CITRATE 50 UG/ML
INJECTION, SOLUTION INTRAMUSCULAR; INTRAVENOUS CODE/TRAUMA/SEDATION MEDICATION
Status: COMPLETED | OUTPATIENT
Start: 2018-04-04 | End: 2018-04-04

## 2018-04-04 RX ORDER — MIDAZOLAM HYDROCHLORIDE 1 MG/ML
1 INJECTION INTRAMUSCULAR; INTRAVENOUS
Status: DISCONTINUED | OUTPATIENT
Start: 2018-04-04 | End: 2018-04-04 | Stop reason: HOSPADM

## 2018-04-04 RX ORDER — SODIUM CHLORIDE 9 MG/ML
500 INJECTION, SOLUTION INTRAVENOUS ONCE
Status: COMPLETED | OUTPATIENT
Start: 2018-04-04 | End: 2018-04-04

## 2018-04-04 RX ORDER — CEFAZOLIN SODIUM 1 G/3ML
1 INJECTION, POWDER, FOR SOLUTION INTRAMUSCULAR; INTRAVENOUS
Status: COMPLETED | OUTPATIENT
Start: 2018-04-04 | End: 2018-04-04

## 2018-04-04 RX ADMIN — MIDAZOLAM HYDROCHLORIDE 0.5 MG: 1 INJECTION, SOLUTION INTRAMUSCULAR; INTRAVENOUS at 08:04

## 2018-04-04 RX ADMIN — CEFAZOLIN 1 G: 330 INJECTION, POWDER, FOR SOLUTION INTRAMUSCULAR; INTRAVENOUS at 08:04

## 2018-04-04 RX ADMIN — FENTANYL CITRATE 25 MCG: 50 INJECTION, SOLUTION INTRAMUSCULAR; INTRAVENOUS at 08:04

## 2018-04-04 RX ADMIN — MIDAZOLAM HYDROCHLORIDE 1 MG: 1 INJECTION, SOLUTION INTRAMUSCULAR; INTRAVENOUS at 08:04

## 2018-04-04 RX ADMIN — MIDAZOLAM HYDROCHLORIDE 0.5 MG: 1 INJECTION, SOLUTION INTRAMUSCULAR; INTRAVENOUS at 09:04

## 2018-04-04 RX ADMIN — SODIUM CHLORIDE 500 ML: 9 INJECTION, SOLUTION INTRAVENOUS at 07:04

## 2018-04-04 RX ADMIN — FENTANYL CITRATE 50 MCG: 50 INJECTION, SOLUTION INTRAMUSCULAR; INTRAVENOUS at 09:04

## 2018-04-04 NOTE — H&P
"Radiology History & Physical      SUBJECTIVE:     Chief Complaint: seminoma    History of Present Illness:  Alejandro Neff is a 23 y.o. male who presents for right chest port placement. Has seminoma.    Past Medical History:   Diagnosis Date    Abdominal pain 08/12/2010    eval for abd and chest wall pain at [facility], [place], [place] - cxr wnl, EKG (RVH), troponin wnl, normal chemistries    Allergy     Cancer     Chondromalacia patellae     Chronic nausea 2015    eval by GI Dr. Tushar Artis - given zofran and ciproheptadine     Chronic pain     Chronic pain     [facility]; followed by psych and pain clinic and unresponsive to behavioral/CBT/old records reports c/f conversion disorder     Foot pain 04/2010    4/10 + SHIRA, eval by Dr. ALLY Lion at [facility] -dx unclear ? gout and trial of nsaids and pdn, xrays normal 1/11, referred to podiatry, re-eval by rheum 2/12 and MRI and films wnl    Fracture of right radius 09/2009    Lyme arthritis     multiple joints    Lyme disease 2013    Memory loss 03/2012    eval for memory loss by neuro at [facility] - MRI, EEG wnl. Dr. Patricio suggested emotional factors & ref to Tushar Davies, PhD for  eval & neuropsych eval 3/12 Lupe Delgado, PhD - no evidence of formal thought disorder or psychosis - documented severe memory impairment; not related to to ADD or executive function issues. sugesstion that memory issues may be related to "emotional factors", ?conversion d/o    Stress fracture of tibia and fibula 08/2011    Urticaria      No past surgical history on file.    Home Meds:   Prior to Admission medications    Medication Sig Start Date End Date Taking? Authorizing Provider   albuterol (PROAIR HFA) 90 mcg/actuation inhaler  3/8/18   Historical Provider, MD   albuterol (PROVENTIL) 2.5 mg /3 mL (0.083 %) nebulizer solution Take 3 mLs (2.5 mg total) by nebulization every 6 (six) hours as " needed for Wheezing. Rescue 3/28/18 3/28/19  Suha Rojas MD   albuterol 90 mcg/actuation inhaler Inhale 2 puffs into the lungs every 6 (six) hours as needed for Wheezing. 3/8/18   Cassius Lora MD   cholecalciferol, vitamin D3, 1,000 unit capsule Take 1 capsule (1,000 Units total) by mouth once daily. 3/10/17   Nata Hernandez MD   dexamethasone (DECADRON) 4 MG Tab Take 2 tablets (8 mg total) by mouth once daily. Take 8 mg once daily on days 6-8 of each cycle 3/28/18 4/9/18  Suha Rojas MD   diphenhydramine-acetaminophen (TYLENOL PM)  mg Tab Take 1 tablet by mouth once daily.    Historical Provider, MD   diphenhydrAMINE-aluminum-magnesium hydroxide-simethicone-lidocaine HCl 2% Swish and spit 15 mLs every 4 (four) hours as needed (mouth sore). 3/28/18   Suha Rojas MD   EPINEPHrine (EPIPEN) 0.3 mg/0.3 mL AtIn Inject 0.3 mg/mL into the muscle. 1/14/14   Historical Provider, MD   ibuprofen (ADVIL,MOTRIN) 600 MG tablet Take 1 tablet (600 mg total) by mouth every 6 (six) hours as needed for Pain. 11/6/17   Leo Crowley,    lidocaine-prilocaine (EMLA) cream Apply over port 30 minutes prior to chemo 3/28/18   Suha Rojas MD   MULTIVIT-MINERALS/FERROUS FUM (MULTI VITAMIN ORAL) Take by mouth once daily.    Historical Provider, MD   ondansetron (ZOFRAN-ODT) 8 MG TbDL Take 1 tablet (8 mg total) by mouth every 6 (six) hours as needed (nausea). 3/28/18 3/28/19  Suha Rojas MD   oxyCODONE (ROXICODONE) 10 mg Tab immediate release tablet Take 1 tablet (10 mg total) by mouth every 6 (six) hours as needed for Pain. 4/2/18   Suha Rojas MD   promethazine (PHENERGAN) 25 MG tablet Take 1 tablet (25 mg total) by mouth every 4 (four) hours as needed for Nausea. 3/28/18   Suha Rojas MD     Anticoagulants/Antiplatelets: no anticoagulation    Allergies:   Review of patient's allergies indicates:   Allergen Reactions    Mushroom Anaphylaxis    Bamboo     Bee pollens     Mushroom flavor      Venom-honey bee      Sedation History:  no adverse reactions    Review of Systems:   As documented in primary provider H&P.      OBJECTIVE:     Vital Signs (Most Recent)       Physical Exam:  ASA: 3  Mallampati: 1      Laboratory  Lab Results   Component Value Date    INR 1.0 03/01/2018       Lab Results   Component Value Date    WBC 5.05 03/22/2018    HGB 15.5 03/22/2018    HCT 45.8 03/22/2018    MCV 86 03/22/2018     03/22/2018      Lab Results   Component Value Date     (H) 03/22/2018     03/22/2018    K 4.3 03/22/2018     03/22/2018    CO2 27 03/22/2018    BUN 12 03/22/2018    CREATININE 1.0 03/22/2018    CALCIUM 10.1 03/22/2018    ALT 9 (L) 03/22/2018    AST 11 03/22/2018    ALBUMIN 4.5 03/22/2018    BILITOT 0.4 03/22/2018       ASSESSMENT/PLAN:     Sedation Plan: moderate  Patient will undergo right chest port placement.    Kenan Harris MD  Department of Radiology  Pager: [phone]

## 2018-04-04 NOTE — PROGRESS NOTES
Pt arrived to ROCU bed 1 for 1 hour post Right chest port placement  recovery. Report received from ANA Huang RN. Pt denies pain/discomfort. Dressing CDI. VSS. No acute events. Friends to bedside. See flow sheets for post procedure monitoring.

## 2018-04-04 NOTE — DISCHARGE SUMMARY
Radiology Discharge Summary      Hospital Course: No complications    Admit Date: 4/4/2018  Discharge Date: 04/04/2018     Instructions Given to Patient: Yes  Diet: Resume prior diet  Activity: activity as tolerated    Description of Condition on Discharge: Stable  Vital Signs (Most Recent): Temp: 98 °F (36.7 °C) (04/04/18 0731)  Pulse: 60 (04/04/18 0914)  Resp: 20 (04/04/18 0914)  BP: 111/61 (04/04/18 0914)  SpO2: 100 % (04/04/18 0914)    Discharge Disposition: Home    Discharge Diagnosis: seminoma     Follow-up: per ordering provider    Kenan Harris MD  Department of Radiology  Pager: [phone]

## 2018-04-04 NOTE — PROGRESS NOTES
Pt given discharge instructions/handouts. Questions answered. Pt and friends all verbalize understanding. Pt resting comfortably. PIV dc'd. Pt given all personal items. No acute events. Pt to [address] entrance, via wheelchair by transporter. Friends to provide transport home.

## 2018-04-04 NOTE — DISCHARGE INSTRUCTIONS
For scheduling: Call Dia at [phone]    For questions or concerns call: SHANNAN MON-FRI 8 AM- 5PM [phone]. Radiology resident on call [phone].    For immediate concerns that are not emergent, you may call our radiology clinic at: [phone]

## 2018-04-04 NOTE — SEDATION DOCUMENTATION
Pt to Interventional Radiology room 188 via stretcher for port placement. Pt transferred to procedure table in the supine position. No complaints of pain or discomfort at this time. Procedure site (right chest) prepped by TYRESE Alarcon. Will continue to monitor.

## 2018-04-04 NOTE — PROCEDURES
Radiology Post-Procedure Note    Pre Op Diagnosis: seminoma  Post Op Diagnosis: Same    Procedure: right chest port placement    Procedure performed by: Harshal Lala MD; Kenan Harris MD (res)    Written Informed Consent Obtained: Yes  Specimen Removed: NO  Estimated Blood Loss: Minimal    Findings:   Under ultrasound and fluoroscopic guidance, a bard power port was placed in the right chest connected to an 8F RIJ catheter with tip at SVC atrial junction. Port ready for use. See imaging report for details.    Patient tolerated procedure well.    Kenan Harris MD  Department of Radiology  Pager: [phone]

## 2018-04-05 ENCOUNTER — TELEPHONE (OUTPATIENT)
Dept: HEMATOLOGY/ONCOLOGY | Facility: CLINIC | Age: 23
End: 2018-04-05

## 2018-04-05 NOTE — TELEPHONE ENCOUNTER
Informed pt that he can request his Pathology slides from the Radiology Dept located on the 1st floor. Pt verbalized understanding. I gave the pt the number to the Radiology Dept which is [phone]. Pt verbalized understanding. Pt asked if he can receive the slides on the same day. I informed pt that if he called Radiology Dept in advance they probably can have the slides ready later on in the day, pt verbalized understanding.

## 2018-04-05 NOTE — TELEPHONE ENCOUNTER
Spoke with Mary and gave her the phone number to Medical Records Dept [phone] and fax number [phone]. Mary verbalized understanding.

## 2018-04-05 NOTE — TELEPHONE ENCOUNTER
----- Message from Tony Orosco sent at 4/5/2018  4:22 PM CDT -----  Contact: patient            Name of Who is Calling: Alejandro      What is the request in detail: patient is requesting a call back in reference to getting his pathology slides to take with him to an out of town appointment.      Can the clinic reply by [facility]: no      What Number to Call Back if not in [facility]: [phone]

## 2018-04-05 NOTE — TELEPHONE ENCOUNTER
----- Message from Tony Orosoc sent at 4/5/2018  4:11 PM CDT -----  Contact: Debbie with Dr Norm Shipman's Office            Name of Who is Calling: Debbie      What is the request in detail: Debbie is requesting a call back in reference to the possibility of the patient bringing his pathology slides with him to for his appointment at [facility]      Can the clinic reply by [facility]: no      What Number to Call Back if not in [facility]: [phone]

## 2018-04-06 ENCOUNTER — OFFICE VISIT (OUTPATIENT)
Dept: OTOLARYNGOLOGY | Facility: CLINIC | Age: 23
End: 2018-04-06
Payer: COMMERCIAL

## 2018-04-06 ENCOUNTER — CLINICAL SUPPORT (OUTPATIENT)
Dept: AUDIOLOGY | Facility: CLINIC | Age: 23
End: 2018-04-06
Payer: COMMERCIAL

## 2018-04-06 VITALS — WEIGHT: 127.88 LBS | BODY MASS INDEX: 20.64 KG/M2

## 2018-04-06 DIAGNOSIS — Z86.19 HISTORY OF LYME DISEASE: ICD-10-CM

## 2018-04-06 DIAGNOSIS — H69.90 DYSFUNCTION OF EUSTACHIAN TUBE, UNSPECIFIED LATERALITY: Primary | ICD-10-CM

## 2018-04-06 DIAGNOSIS — H61.22 IMPACTED CERUMEN OF LEFT EAR: Primary | ICD-10-CM

## 2018-04-06 DIAGNOSIS — C80.1 GERM CELL TUMOR: ICD-10-CM

## 2018-04-06 PROCEDURE — 69210 REMOVE IMPACTED EAR WAX UNI: CPT | Mod: S$GLB,,, | Performed by: OTOLARYNGOLOGY

## 2018-04-06 PROCEDURE — 92552 PURE TONE AUDIOMETRY AIR: CPT | Mod: S$GLB,,, | Performed by: AUDIOLOGIST

## 2018-04-06 PROCEDURE — 99999 PR PBB SHADOW E&M-EST. PATIENT-LVL III: CPT | Mod: PBBFAC,,, | Performed by: OTOLARYNGOLOGY

## 2018-04-06 PROCEDURE — 92556 SPEECH AUDIOMETRY COMPLETE: CPT | Mod: S$GLB,,, | Performed by: AUDIOLOGIST

## 2018-04-06 PROCEDURE — 99999 PR PBB SHADOW E&M-EST. PATIENT-LVL I: CPT | Mod: PBBFAC,,,

## 2018-04-06 PROCEDURE — 99243 OFF/OP CNSLTJ NEW/EST LOW 30: CPT | Mod: 25,S$GLB,, | Performed by: OTOLARYNGOLOGY

## 2018-04-06 NOTE — LETTER
April 9, 2018      Suha Rojas MD  [address]  [address]  [place] [address]           [facility] - Otorhinolaryngology  [address]  [place] [address]  Phone: [phone]  Fax: [phone]          Patient: Alejandro Neff   MR Number: [id number]   YOB: 1995   Date of Visit: 4/6/2018       Dear Dr. Suah Rjoas:    Thank you for referring Alejandro Neff to me for evaluation. Attached you will find relevant portions of my assessment and plan of care.    If you have questions, please do not hesitate to call me. I look forward to following Alejandro Neff along with you.    Sincerely,    Jarek Campos MD    Enclosure  CC:  No Recipients    If you would like to receive this communication electronically, please contact [email or web address] or [phone] to request more information on [employer] access.    For providers and/or their staff who would like to refer a patient to [facility], please contact us through our one-stop-shop provider referral line, [facility], at [phone].    If you feel you have received this communication in error or would no longer like to receive these types of communications, please e-mail [email or web address]

## 2018-04-06 NOTE — PROGRESS NOTES
Alejandro Hampton was referred today by Dr. Campos for a baseline audiological evaluation prior to beginning chemotherapy.      Audiological testing revealed normal hearing sensitivity with excellent discrimination, bilaterally.      Recommendations:  1. Follow-up audiological evaluation, as needed  2. Hearing protection when in noise

## 2018-04-08 ENCOUNTER — NURSE TRIAGE (OUTPATIENT)
Dept: ADMINISTRATIVE | Facility: CLINIC | Age: 23
End: 2018-04-08

## 2018-04-08 ENCOUNTER — HOSPITAL ENCOUNTER (EMERGENCY)
Facility: OTHER | Age: 23
Discharge: HOME | End: 2018-04-08
Attending: EMERGENCY MEDICINE
Payer: COMMERCIAL

## 2018-04-08 VITALS
SYSTOLIC BLOOD PRESSURE: 122 MMHG | BODY MASS INDEX: 20.07 KG/M2 | RESPIRATION RATE: 18 BRPM | DIASTOLIC BLOOD PRESSURE: 72 MMHG | HEART RATE: 85 BPM | HEIGHT: 67 IN | TEMPERATURE: 98 F | OXYGEN SATURATION: 100 % | WEIGHT: 127.88 LBS

## 2018-04-08 DIAGNOSIS — R07.9 CHEST PAIN: Primary | ICD-10-CM

## 2018-04-08 PROCEDURE — 99283 EMERGENCY DEPT VISIT LOW MDM: CPT

## 2018-04-08 NOTE — ED TRIAGE NOTES
Pt has a recently placed R subclavian power port; here for tenderness to right side and pinching on left side of chest.

## 2018-04-08 NOTE — TELEPHONE ENCOUNTER
"  Reason for Disposition   Caller has URGENT question and triager unable to answer question    Answer Assessment - Initial Assessment Questions  1. SYMPTOM: "What's the main symptom you're concerned about?" (e.g., pain, fever, vomiting)      pain  2. ONSET: "When did ________  start?"      hasn't gone away  3. SURGERY: "What surgery was performed?"      port  4. DATE of SURGERY: "When was surgery performed?"       4/6  5. ANESTHESIA: " What type of anesthesia did you have?" (e.g., general, spinal, epidural, local)      general  6. PAIN: "Is there any pain?" If so, ask: "How bad is it?"  (Scale 1-10; or mild, moderate, severe)      6  7. FEVER: "Do you have a fever?" If so, ask: "What is your temperature, how was it measured, and when did it start?"      no  8. VOMITING: "Is there any vomiting?" If yes, ask: "How many times?"      no  9. BLEEDING: "Is there any bleeding?" If so, ask: "How much?" and "Where?"      no  10. OTHER SYMPTOMS: "Do you have any other symptoms?" (e.g., drainage from wound, painful urination, constipation)        no    Protocols used: ST POST-OP SYMPTOMS AND [id number]    "

## 2018-04-09 ENCOUNTER — DOCUMENTATION ONLY (OUTPATIENT)
Dept: HEMATOLOGY/ONCOLOGY | Facility: CLINIC | Age: 23
End: 2018-04-09

## 2018-04-09 ENCOUNTER — PATIENT MESSAGE (OUTPATIENT)
Dept: HEMATOLOGY/ONCOLOGY | Facility: CLINIC | Age: 23
End: 2018-04-09

## 2018-04-09 ENCOUNTER — TELEPHONE (OUTPATIENT)
Dept: HEMATOLOGY/ONCOLOGY | Facility: CLINIC | Age: 23
End: 2018-04-09

## 2018-04-09 NOTE — TELEPHONE ENCOUNTER
Spoke with Ariadne ( in Pathology Dept) at [facility] who stated the Pathology Request has been sent to Dr. Elmer Lama Office. Ariadne stated she dropped the Pathology Request off on Friday to [employer].

## 2018-04-09 NOTE — PROGRESS NOTES
Pediatric Otolaryngology- Head & Neck Surgery   New Patient Visit    Consult from Dr. Rojas      Chief Complaint: hearing concerns prior to starting chemotherapy    HPI  Alejandro Neff is a 23 y.o. old male referred to the pediatric otolaryngology clinic for hearing concerns prior to starting chemotherapy. 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 About to start chemo. There  have been concerns for hearing over the last  years. There have  Have not been otologic symptoms, including otorrhea, tinnitus, recurrent otitis media, or vertigo. Did have hx of lymes disease with no neuro deficits during episode    Balance has not been an issue.     Passed  hearing screen     There is no history of hearing loss at an early age in the family.     Medical History  Past Medical History:   Diagnosis Date    Abdominal pain 2010    eval for abd and chest wall pain at [place], [place] - cxr wnl, EKG ([facility]), troponin wnl, normal chemistries    Allergy     Cancer     testicular    Chondromalacia patellae     Chronic nausea     eval by  Dr. Tushar Artis - given zofran and ciproheptadine     Chronic pain     Chronic pain     [facility] - no meds; followed by psych and pain clinic and unresponsive to behavioral/CBT/old records reports c/f conversion disorder     Foot pain 2010    4/10 + SHIRA, eval by Dr. ALLY Lion at [facility] Rheum -dx unclear ? gout and trial of nsaids and pdn, xrays normal , referred to podiatry, re-eval by rheum  and MRI and films wnl    Fracture of right radius 2009    Lyme arthritis     multiple joints    Lyme disease 2013    Memory loss 2012    [place] [facility] [place] EEG wnl. Dr. Patricio suggested emotional factors & ref to Tushar Davies, PhD for  eval & neuropsych eval 3/12 Lupe Delgado, PhD - no evidence of formal thought disorder or psychosis - documented  "severe memory impairment; not related to to ADD or executive function issues. sugesstion that memory issues may be related to "emotional factors", ?conversion d/o    Stress fracture of tibia and fibula 08/2011    Urticaria        Patient Active Problem List   Diagnosis    Vitamin D deficiency    Vomiting with nausea, not intractable    Chest pain    Mediastinal mass    Germ cell tumor    History of Lyme disease       Surgical History  No past surgical history on file.    Medications  Current Outpatient Prescriptions on File Prior to Visit   Medication Sig Dispense Refill    albuterol (PROAIR HFA) 90 mcg/actuation inhaler       albuterol (PROVENTIL) 2.5 mg /3 mL (0.083 %) nebulizer solution Take 3 mLs (2.5 mg total) by nebulization every 6 (six) hours as needed for Wheezing. Rescue 1 Box 0    albuterol 90 mcg/actuation inhaler Inhale 2 puffs into the lungs every 6 (six) hours as needed for Wheezing. 1 each 11    cholecalciferol, vitamin D3, 1,000 unit capsule Take 1 capsule (1,000 Units total) by mouth once daily.  0    dexamethasone (DECADRON) 4 MG Tab Take 2 tablets (8 mg total) by mouth once daily. Take 8 mg once daily on days 6-8 of each cycle 24 tablet 0    diphenhydramine-acetaminophen (TYLENOL PM)  mg Tab Take 1 tablet by mouth once daily.      diphenhydrAMINE-aluminum-magnesium hydroxide-simethicone-lidocaine HCl 2% Swish and spit 15 mLs every 4 (four) hours as needed (mouth sore). 1 Bottle 2    EPINEPHrine (EPIPEN) 0.3 mg/0.3 mL AtIn Inject 0.3 mg/mL into the muscle.      ibuprofen (ADVIL,MOTRIN) 600 MG tablet Take 1 tablet (600 mg total) by mouth every 6 (six) hours as needed for Pain. 20 tablet 0    lidocaine-prilocaine (EMLA) cream Apply over port 30 minutes prior to chemo 5 g 2    MULTIVIT-MINERALS/FERROUS FUM (MULTI VITAMIN ORAL) Take by mouth once daily.      ondansetron (ZOFRAN-ODT) 8 MG TbDL Take 1 tablet (8 mg total) by mouth every 6 (six) hours as needed (nausea). 60 " tablet 1    oxyCODONE (ROXICODONE) 10 mg Tab immediate release tablet Take 1 tablet (10 mg total) by mouth every 6 (six) hours as needed for Pain. 60 tablet 0    promethazine (PHENERGAN) 25 MG tablet Take 1 tablet (25 mg total) by mouth every 4 (four) hours as needed for Nausea. 120 tablet 3     No current facility-administered medications on file prior to visit.        Allergies  Review of patient's allergies indicates:   Allergen Reactions    Mushroom Anaphylaxis    Bamboo     Bee pollens     Mushroom flavor     Venom-honey bee        Social History  There are smokers in the home    Family History  The family history is noncontributory to the current problem     Review of Systems  General: no fever, no recent weight change  Eyes: no vision changes  Pulm: no asthma  Heme: no bleeding or anemia  GI:  No GERD  Endo: No DM or thyroid problems  Musculoskeletal: no arthritis  Neuro: no seizures, speech or developmental delay  Skin: no rash  Psych: no psych history  Allergery/Immune: no allergy history or history of immunologic deficiency  Cardiac: no congenital cardiac abnormality  Neuro: CN II-XII grossly intact, moves all extremities spontaneously  Skin: no rashes      Physical Exam  General:  Alert, well developed, comfortable  Voice:  Regular for age, good volume  Respiratory:  Symmetric breathing, no stridor, no distress  Head:  Normocephalic, no lesions  Face: Symmetric, HB 1/6 bilat, no lesions, no obvious sinus tenderness, salivary glands nontender  Eyes:  Sclera white, extraocular movements intact  Nose: Dorsum straight, septum midline, normal turbinate size, normal mucosa  Right Ear: Pinna and external ear appears normal, EAC patent, TM intact, mobile, without middle ear effusion  Left Ear: see below  Hearing:  Grossly intact  Oral cavity: Healthy mucosa, no masses or lesions including lips, teeth, gums, floor of mouth, palate, or tongue.  Oropharynx: Tonsils 1+, palate intact, normal pharyngeal wall  movement  Neck: Supple, no palpable nodes, no masses, trachea midline, no thyroid masses  Cardiovascular system:  Pulses regular in both upper extremities, good skin turgor     Studies Reviewed  Audiogram today:  Normal hearing bilaterally with normal audiograms        Procedures  Microscopy:     Left Ear: Pinna and external ear appears normal, EAC occluded with cerumen, removed with binocular microscopy, TM intact, mobile, without middle ear effusion      Impression  1. Impacted cerumen of left ear     2. Germ cell tumor     3. History of Lyme disease         Normal audiogram with normal ear exam. Left cerumen impaction removed    Treatment Plan  Follow up post chemo therapy    Jarek Campos MD  Pediatric Otolaryngology Attending

## 2018-04-09 NOTE — TELEPHONE ENCOUNTER
Spoke with pt regarding his pain medication. Informed pt Dr. Rojas ordered Oxycodone 10 mg oral q 6 hours prn pain. I asked pt if he has started using this medication yet and if it's effective. Mr. Neff stated yes he has started taking the pain medication and it is helping to relieve a lot of his back pain. Pt states he suffers from Chronic pain which he usually rates as an 8 or 9/10  and causes him to sleep a couple hours every 3 days. Pt states the Oxycodone brings his pain to 6/10, it's really helping his back pain and it is allowing him to sleep for several hours at night. I verbalized understanding and informed pt that if the Oxycodone stops becoming effective and pain gets worse, Dr. Rojas stated to go to the ER. Pt verbalized understanding.

## 2018-04-16 ENCOUNTER — OFFICE VISIT (OUTPATIENT)
Dept: HEMATOLOGY/ONCOLOGY | Facility: CLINIC | Age: 23
End: 2018-04-16
Payer: COMMERCIAL

## 2018-04-16 ENCOUNTER — LAB VISIT (OUTPATIENT)
Dept: LAB | Facility: OTHER | Age: 23
End: 2018-04-16
Attending: INTERNAL MEDICINE
Payer: COMMERCIAL

## 2018-04-16 ENCOUNTER — INFUSION (OUTPATIENT)
Dept: INFUSION THERAPY | Facility: OTHER | Age: 23
End: 2018-04-16
Attending: INTERNAL MEDICINE
Payer: COMMERCIAL

## 2018-04-16 VITALS
WEIGHT: 130.5 LBS | TEMPERATURE: 99 F | HEIGHT: 67 IN | BODY MASS INDEX: 20.48 KG/M2 | HEART RATE: 75 BPM | RESPIRATION RATE: 16 BRPM | DIASTOLIC BLOOD PRESSURE: 57 MMHG | SYSTOLIC BLOOD PRESSURE: 100 MMHG | OXYGEN SATURATION: 97 %

## 2018-04-16 VITALS
TEMPERATURE: 99 F | SYSTOLIC BLOOD PRESSURE: 125 MMHG | HEART RATE: 80 BPM | OXYGEN SATURATION: 98 % | RESPIRATION RATE: 16 BRPM | DIASTOLIC BLOOD PRESSURE: 68 MMHG

## 2018-04-16 DIAGNOSIS — C80.1 GERM CELL TUMOR: ICD-10-CM

## 2018-04-16 DIAGNOSIS — Z86.19 HISTORY OF LYME DISEASE: ICD-10-CM

## 2018-04-16 DIAGNOSIS — G89.29 OTHER CHRONIC PAIN: ICD-10-CM

## 2018-04-16 DIAGNOSIS — C80.1 GERM CELL TUMOR: Primary | ICD-10-CM

## 2018-04-16 DIAGNOSIS — Z51.11 ENCOUNTER FOR ANTINEOPLASTIC CHEMOTHERAPY: ICD-10-CM

## 2018-04-16 DIAGNOSIS — R11.2 NON-INTRACTABLE VOMITING WITH NAUSEA, UNSPECIFIED VOMITING TYPE: ICD-10-CM

## 2018-04-16 LAB
AFP SERPL-MCNC: 1.2 NG/ML
ALBUMIN SERPL BCP-MCNC: 4.3 G/DL
ALP SERPL-CCNC: 51 U/L
ALT SERPL W/O P-5'-P-CCNC: 18 U/L
ANION GAP SERPL CALC-SCNC: 9 MMOL/L
AST SERPL-CCNC: 14 U/L
BILIRUB SERPL-MCNC: 0.5 MG/DL
BUN SERPL-MCNC: 16 MG/DL
CALCIUM SERPL-MCNC: 10.2 MG/DL
CHLORIDE SERPL-SCNC: 101 MMOL/L
CO2 SERPL-SCNC: 31 MMOL/L
CREAT SERPL-MCNC: 1 MG/DL
ERYTHROCYTE [DISTWIDTH] IN BLOOD BY AUTOMATED COUNT: 13.5 %
EST. GFR  (AFRICAN AMERICAN): >60 ML/MIN/1.73 M^2
EST. GFR  (NON AFRICAN AMERICAN): >60 ML/MIN/1.73 M^2
GLUCOSE SERPL-MCNC: 93 MG/DL
HCG INTACT+B SERPL-ACNC: 1.4 MIU/ML
HCT VFR BLD AUTO: 43.7 %
HGB BLD-MCNC: 14.9 G/DL
LDH SERPL L TO P-CCNC: 123 U/L
MAGNESIUM SERPL-MCNC: 2.1 MG/DL
MCH RBC QN AUTO: 28.8 PG
MCHC RBC AUTO-ENTMCNC: 34.1 G/DL
MCV RBC AUTO: 85 FL
NEUTROPHILS # BLD AUTO: 4.2 K/UL
PHOSPHATE SERPL-MCNC: 5.1 MG/DL
PLATELET # BLD AUTO: 187 K/UL
PMV BLD AUTO: 8.5 FL
POTASSIUM SERPL-SCNC: 4.2 MMOL/L
PROT SERPL-MCNC: 7 G/DL
RBC # BLD AUTO: 5.17 M/UL
SODIUM SERPL-SCNC: 141 MMOL/L
WBC # BLD AUTO: 7.36 K/UL

## 2018-04-16 PROCEDURE — 83735 ASSAY OF MAGNESIUM: CPT

## 2018-04-16 PROCEDURE — 99999 PR PBB SHADOW E&M-EST. PATIENT-LVL III: CPT | Mod: PBBFAC,,, | Performed by: INTERNAL MEDICINE

## 2018-04-16 PROCEDURE — 83615 LACTATE (LD) (LDH) ENZYME: CPT

## 2018-04-16 PROCEDURE — 96360 HYDRATION IV INFUSION INIT: CPT

## 2018-04-16 PROCEDURE — 36415 COLL VENOUS BLD VENIPUNCTURE: CPT

## 2018-04-16 PROCEDURE — 96361 HYDRATE IV INFUSION ADD-ON: CPT

## 2018-04-16 PROCEDURE — 25000003 PHARM REV CODE 250: Performed by: INTERNAL MEDICINE

## 2018-04-16 PROCEDURE — 96413 CHEMO IV INFUSION 1 HR: CPT

## 2018-04-16 PROCEDURE — 80053 COMPREHEN METABOLIC PANEL: CPT

## 2018-04-16 PROCEDURE — 96367 TX/PROPH/DG ADDL SEQ IV INF: CPT

## 2018-04-16 PROCEDURE — 63600175 PHARM REV CODE 636 W HCPCS: Mod: JG | Performed by: INTERNAL MEDICINE

## 2018-04-16 PROCEDURE — 85027 COMPLETE CBC AUTOMATED: CPT

## 2018-04-16 PROCEDURE — 82105 ALPHA-FETOPROTEIN SERUM: CPT

## 2018-04-16 PROCEDURE — 84702 CHORIONIC GONADOTROPIN TEST: CPT

## 2018-04-16 PROCEDURE — 96417 CHEMO IV INFUS EACH ADDL SEQ: CPT

## 2018-04-16 PROCEDURE — 99215 OFFICE O/P EST HI 40 MIN: CPT | Mod: S$GLB,,, | Performed by: INTERNAL MEDICINE

## 2018-04-16 PROCEDURE — 84100 ASSAY OF PHOSPHORUS: CPT

## 2018-04-16 RX ORDER — HEPARIN 100 UNIT/ML
500 SYRINGE INTRAVENOUS
Status: CANCELLED | OUTPATIENT
Start: 2018-04-18

## 2018-04-16 RX ORDER — ONDANSETRON 4 MG/1
8 TABLET, FILM COATED ORAL ONCE AS NEEDED
Status: CANCELLED | OUTPATIENT
Start: 2018-04-18 | End: 2018-04-16

## 2018-04-16 RX ORDER — MORPHINE SULFATE 15 MG/1
15 TABLET ORAL EVERY 6 HOURS PRN
Qty: 60 TABLET | Refills: 0 | Status: SHIPPED | OUTPATIENT
Start: 2018-04-16 | End: 2018-04-16 | Stop reason: SDUPTHER

## 2018-04-16 RX ORDER — HEPARIN 100 UNIT/ML
500 SYRINGE INTRAVENOUS
Status: CANCELLED | OUTPATIENT
Start: 2018-05-05

## 2018-04-16 RX ORDER — HEPARIN 100 UNIT/ML
500 SYRINGE INTRAVENOUS
Status: DISCONTINUED | OUTPATIENT
Start: 2018-04-16 | End: 2018-04-16 | Stop reason: HOSPADM

## 2018-04-16 RX ORDER — MORPHINE SULFATE 15 MG/1
15 TABLET ORAL EVERY 6 HOURS PRN
Qty: 60 TABLET | Refills: 0 | Status: SHIPPED | OUTPATIENT
Start: 2018-04-16 | End: 2018-05-07 | Stop reason: SDUPTHER

## 2018-04-16 RX ORDER — HEPARIN 100 UNIT/ML
500 SYRINGE INTRAVENOUS
Status: CANCELLED | OUTPATIENT
Start: 2018-04-17

## 2018-04-16 RX ORDER — HEPARIN 100 UNIT/ML
500 SYRINGE INTRAVENOUS
Status: CANCELLED | OUTPATIENT
Start: 2018-04-19

## 2018-04-16 RX ORDER — HEPARIN 100 UNIT/ML
500 SYRINGE INTRAVENOUS
Status: CANCELLED | OUTPATIENT
Start: 2018-04-20

## 2018-04-16 RX ORDER — SODIUM CHLORIDE 0.9 % (FLUSH) 0.9 %
10 SYRINGE (ML) INJECTION
Status: CANCELLED | OUTPATIENT
Start: 2018-04-18

## 2018-04-16 RX ORDER — HEPARIN 100 UNIT/ML
500 SYRINGE INTRAVENOUS
Status: CANCELLED | OUTPATIENT
Start: 2018-04-16

## 2018-04-16 RX ORDER — ONDANSETRON 4 MG/1
8 TABLET, FILM COATED ORAL ONCE AS NEEDED
Status: CANCELLED | OUTPATIENT
Start: 2018-04-17 | End: 2018-04-16

## 2018-04-16 RX ORDER — ONDANSETRON 4 MG/1
8 TABLET, FILM COATED ORAL ONCE AS NEEDED
Status: CANCELLED | OUTPATIENT
Start: 2018-04-20 | End: 2018-04-16

## 2018-04-16 RX ORDER — SODIUM CHLORIDE 0.9 % (FLUSH) 0.9 %
10 SYRINGE (ML) INJECTION
Status: CANCELLED | OUTPATIENT
Start: 2018-04-17

## 2018-04-16 RX ORDER — PROMETHAZINE HYDROCHLORIDE 25 MG/1
25 TABLET ORAL ONCE AS NEEDED
Status: CANCELLED | OUTPATIENT
Start: 2018-04-20 | End: 2018-04-16

## 2018-04-16 RX ORDER — PROMETHAZINE HYDROCHLORIDE 25 MG/1
25 TABLET ORAL ONCE AS NEEDED
Status: CANCELLED | OUTPATIENT
Start: 2018-04-17 | End: 2018-04-16

## 2018-04-16 RX ORDER — SODIUM CHLORIDE 0.9 % (FLUSH) 0.9 %
10 SYRINGE (ML) INJECTION
Status: CANCELLED | OUTPATIENT
Start: 2018-04-20

## 2018-04-16 RX ORDER — SODIUM CHLORIDE 0.9 % (FLUSH) 0.9 %
10 SYRINGE (ML) INJECTION
Status: CANCELLED | OUTPATIENT
Start: 2018-05-05

## 2018-04-16 RX ORDER — SODIUM CHLORIDE 0.9 % (FLUSH) 0.9 %
10 SYRINGE (ML) INJECTION
Status: CANCELLED | OUTPATIENT
Start: 2018-04-16

## 2018-04-16 RX ORDER — SODIUM CHLORIDE 0.9 % (FLUSH) 0.9 %
10 SYRINGE (ML) INJECTION
Status: DISCONTINUED | OUTPATIENT
Start: 2018-04-16 | End: 2018-04-16 | Stop reason: HOSPADM

## 2018-04-16 RX ORDER — SODIUM CHLORIDE 9 MG/ML
INJECTION, SOLUTION INTRAVENOUS CONTINUOUS
Status: CANCELLED
Start: 2018-05-05

## 2018-04-16 RX ORDER — SODIUM CHLORIDE 0.9 % (FLUSH) 0.9 %
10 SYRINGE (ML) INJECTION
Status: CANCELLED | OUTPATIENT
Start: 2018-04-19

## 2018-04-16 RX ORDER — PROMETHAZINE HYDROCHLORIDE 25 MG/1
25 TABLET ORAL ONCE AS NEEDED
Status: CANCELLED | OUTPATIENT
Start: 2018-04-18 | End: 2018-04-16

## 2018-04-16 RX ADMIN — HEPARIN 500 UNITS: 100 SYRINGE at 02:04

## 2018-04-16 RX ADMIN — PALONOSETRON HYDROCHLORIDE: 0.25 INJECTION INTRAVENOUS at 10:04

## 2018-04-16 RX ADMIN — SODIUM CHLORIDE 150 MG: 9 INJECTION, SOLUTION INTRAVENOUS at 10:04

## 2018-04-16 RX ADMIN — CISPLATIN 33 MG: 1 INJECTION, SOLUTION INTRAVENOUS at 10:04

## 2018-04-16 RX ADMIN — SODIUM CHLORIDE 1000 ML: 0.9 INJECTION, SOLUTION INTRAVENOUS at 09:04

## 2018-04-16 RX ADMIN — ETOPOSIDE 164 MG: 20 INJECTION INTRAVENOUS at 12:04

## 2018-04-16 RX ADMIN — SODIUM CHLORIDE 1000 ML: 0.9 INJECTION, SOLUTION INTRAVENOUS at 01:04

## 2018-04-16 NOTE — PROGRESS NOTES
"                                                         PROGRESS NOTE    Subjective:      Patient ID: Alejandro Neff is a 23 y.o. male.     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 Mr. Hampton is a 22 yo man who first presented with chest pain and underwent CT chest on 18, which showed a 6 x 3.5 cm mediastinal mass. It abuts the aorta and pulmonary artery. Biopsy was done on 3/1/18. Pathology (reviewed at [facility]) showed germ cell tumor, most consistent with seminoma.   2. HCG, LDH, AFP on 3/22/18 all normal.   3. Testicular US 3/26/18 showed no testicular masses. CT C/A/P on 3/26/18 showed "stable appearance of anterior mediastinal mass consistent with provided history of seminoma. Several punctate sclerotic foci are noted in the pelvis at the right pubic bone, right ischial tuberosity, and left ilium adjacent to the sacroiliac joint.  These are strongly favored to reflect benign bone islands although metastatic disease could have a similar appearance and close follow-up is recommended. Several benign Schmorl's nodes are noted in the thoracic spine." Bone scan on 3/28/18 was negative for bone mets.   4. Audiogram on 18 normal.   5. EP cycle 1 day 1 on 18     INTERVAL HISTORY:   Mr. Hampton returns today for follow up of primary mediastinal seminoma. He was in [place] last week for a second opinion. The doctors initially recommended BEP, but agreed with EP after he told them about the smoking history. He feels good. Oxycodone helps with chronic pain but induces worsening nausea. Would like to try something else.      ECO     ROS:   A ten point system review is obtained and neg except for what was stated in the interval history     Physical Examination:   Vital signs reviewed.   Gen: well hydrated, well developed, in no acute distress.  HEENT: normocephalic, anicteric sclerae, PERRLA, EOMI, " oropharynx clear  Neck: supple, no JVD, thyromegaly, cervical or supraclavicular LAD  Lungs: CTAB, no wheezes or rales. Port site on chest clean.   Heart: RRR, no M/R/G  Abdomen: soft, no tenderness, non-distended, no hepatosplenomegaly, mass, or hernia. BS present  Ext: no clubbing, cyanosis, or edema  Neuro: alert and oriented x 4, no focal neuro deficit  Skin: no rash, erythema, open wound or ulcers  Psych: pleasant and appropriate mood and affect    Objective:       Laboratory Data:  Labs from today reviewed. Unremarkable.     Assessment/Plan:     1. Germ cell tumor    2. Encounter for antineoplastic chemotherapy    3. Non-intractable vomiting with nausea, unspecified vomiting type    4. History of Lyme disease    5. Other chronic pain      1.2  - Mr. Hampton is a 22 yo man with primary mediastinal seminoma, good risk disease, here to start EP  - Chemo consent signed  - proceed with cycle 1 of Ep. Given his concern of Lyme disease and increased risk of infection, will add neulasta after day 5.  - add IVF on day 6    3. - Has baseline chronic nausea, which will be worsened by chemo. Try sancuso    4. - seen by Id. Blood test negative for current infection    5. - has worsening nausea from oxy. Will switch to morphine    RTC next Monday with CBC, BMP    Electronically signed by Suha Rojas

## 2018-04-17 ENCOUNTER — INFUSION (OUTPATIENT)
Dept: INFUSION THERAPY | Facility: OTHER | Age: 23
End: 2018-04-17
Attending: INTERNAL MEDICINE
Payer: COMMERCIAL

## 2018-04-17 VITALS
WEIGHT: 128.5 LBS | TEMPERATURE: 98 F | HEART RATE: 70 BPM | HEIGHT: 67 IN | BODY MASS INDEX: 20.17 KG/M2 | SYSTOLIC BLOOD PRESSURE: 112 MMHG | OXYGEN SATURATION: 99 % | RESPIRATION RATE: 16 BRPM | DIASTOLIC BLOOD PRESSURE: 60 MMHG

## 2018-04-17 DIAGNOSIS — C80.1 GERM CELL TUMOR: Primary | ICD-10-CM

## 2018-04-17 PROCEDURE — 63600175 PHARM REV CODE 636 W HCPCS: Performed by: INTERNAL MEDICINE

## 2018-04-17 PROCEDURE — 96417 CHEMO IV INFUS EACH ADDL SEQ: CPT

## 2018-04-17 PROCEDURE — 96360 HYDRATION IV INFUSION INIT: CPT

## 2018-04-17 PROCEDURE — 96367 TX/PROPH/DG ADDL SEQ IV INF: CPT

## 2018-04-17 PROCEDURE — 96413 CHEMO IV INFUSION 1 HR: CPT

## 2018-04-17 PROCEDURE — 96361 HYDRATE IV INFUSION ADD-ON: CPT

## 2018-04-17 PROCEDURE — 25000003 PHARM REV CODE 250: Performed by: INTERNAL MEDICINE

## 2018-04-17 RX ORDER — SODIUM CHLORIDE 0.9 % (FLUSH) 0.9 %
10 SYRINGE (ML) INJECTION
Status: DISCONTINUED | OUTPATIENT
Start: 2018-04-17 | End: 2018-04-17 | Stop reason: HOSPADM

## 2018-04-17 RX ORDER — HEPARIN 100 UNIT/ML
500 SYRINGE INTRAVENOUS
Status: DISCONTINUED | OUTPATIENT
Start: 2018-04-17 | End: 2018-04-17 | Stop reason: HOSPADM

## 2018-04-17 RX ORDER — ONDANSETRON 4 MG/1
8 TABLET, FILM COATED ORAL ONCE AS NEEDED
Status: COMPLETED | OUTPATIENT
Start: 2018-04-17 | End: 2018-04-17

## 2018-04-17 RX ORDER — PROMETHAZINE HYDROCHLORIDE 25 MG/1
25 TABLET ORAL ONCE AS NEEDED
Status: COMPLETED | OUTPATIENT
Start: 2018-04-17 | End: 2018-04-17

## 2018-04-17 RX ADMIN — CISPLATIN 33 MG: 1 INJECTION, SOLUTION INTRAVENOUS at 10:04

## 2018-04-17 RX ADMIN — SODIUM CHLORIDE 1000 ML: 0.9 INJECTION, SOLUTION INTRAVENOUS at 09:04

## 2018-04-17 RX ADMIN — PROMETHAZINE HYDROCHLORIDE 25 MG: 25 TABLET ORAL at 10:04

## 2018-04-17 RX ADMIN — ONDANSETRON 8 MG: 4 TABLET, FILM COATED ORAL at 10:04

## 2018-04-17 RX ADMIN — HEPARIN 500 UNITS: 100 SYRINGE at 02:04

## 2018-04-17 RX ADMIN — SODIUM CHLORIDE 1000 ML: 0.9 INJECTION, SOLUTION INTRAVENOUS at 01:04

## 2018-04-17 RX ADMIN — Medication 10 MG: at 10:04

## 2018-04-17 RX ADMIN — ETOPOSIDE 164 MG: 20 INJECTION INTRAVENOUS at 12:04

## 2018-04-17 NOTE — PLAN OF CARE
Problem: Patient Care Overview (Adult)  Goal: Plan of Care Review  Outcome: Ongoing (interventions implemented as appropriate)  Pt completed Day #2 of Cycle #1 Cisplatin/Etoposide, tolerated well. VS stable. Plan of care reviewed and Pt instructed to contact MD with any further concerns or questions. Continued education on side effects and self care. Pt verbalized understanding. Pt discharge , ambulated unassisted, accompanied by friend.

## 2018-04-17 NOTE — PLAN OF CARE
Problem: Chemotherapy Effects (Adult)  Goal: Signs and Symptoms of Listed Potential Problems Will be Absent, Minimized or Managed (Chemotherapy Effects)  Signs and symptoms of listed potential problems will be absent, minimized or managed by discharge/transition of care (reference Chemotherapy Effects (Adult) CPG).   Outcome: Ongoing (interventions implemented as appropriate)  Pt admitted Day #2 of Cycle #1 of Cisplantin and Etoposide. Ambulated unassisted, accompanied by friend. Side effects of chemo reviewed, and continue to educate Patient on self care tips. He verbalized understanding. Precious lim offered.

## 2018-04-18 ENCOUNTER — INFUSION (OUTPATIENT)
Dept: INFUSION THERAPY | Facility: OTHER | Age: 23
End: 2018-04-18
Attending: INTERNAL MEDICINE
Payer: COMMERCIAL

## 2018-04-18 VITALS
BODY MASS INDEX: 20.87 KG/M2 | RESPIRATION RATE: 16 BRPM | OXYGEN SATURATION: 100 % | DIASTOLIC BLOOD PRESSURE: 60 MMHG | WEIGHT: 132.94 LBS | SYSTOLIC BLOOD PRESSURE: 115 MMHG | TEMPERATURE: 99 F | HEIGHT: 67 IN | HEART RATE: 66 BPM

## 2018-04-18 DIAGNOSIS — R06.6 HICCUP: Primary | ICD-10-CM

## 2018-04-18 DIAGNOSIS — C80.1 GERM CELL TUMOR: Primary | ICD-10-CM

## 2018-04-18 PROCEDURE — 96361 HYDRATE IV INFUSION ADD-ON: CPT

## 2018-04-18 PROCEDURE — 96413 CHEMO IV INFUSION 1 HR: CPT

## 2018-04-18 PROCEDURE — 96376 TX/PRO/DX INJ SAME DRUG ADON: CPT

## 2018-04-18 PROCEDURE — 96360 HYDRATION IV INFUSION INIT: CPT

## 2018-04-18 PROCEDURE — 63600175 PHARM REV CODE 636 W HCPCS: Performed by: INTERNAL MEDICINE

## 2018-04-18 PROCEDURE — 96417 CHEMO IV INFUS EACH ADDL SEQ: CPT

## 2018-04-18 PROCEDURE — 25000003 PHARM REV CODE 250: Performed by: INTERNAL MEDICINE

## 2018-04-18 PROCEDURE — 96367 TX/PROPH/DG ADDL SEQ IV INF: CPT

## 2018-04-18 RX ORDER — BACLOFEN 10 MG/1
10 TABLET ORAL 2 TIMES DAILY PRN
Qty: 60 TABLET | Refills: 1 | Status: SHIPPED | OUTPATIENT
Start: 2018-04-18 | End: 2018-06-13 | Stop reason: SDUPTHER

## 2018-04-18 RX ORDER — PROMETHAZINE HYDROCHLORIDE 25 MG/ML
25 INJECTION, SOLUTION INTRAMUSCULAR; INTRAVENOUS
Status: CANCELLED
Start: 2018-04-19 | End: 2018-04-19

## 2018-04-18 RX ORDER — ONDANSETRON 2 MG/ML
8 INJECTION INTRAMUSCULAR; INTRAVENOUS ONCE
Status: CANCELLED
Start: 2018-04-19 | End: 2018-04-19

## 2018-04-18 RX ORDER — ONDANSETRON 2 MG/ML
8 INJECTION INTRAMUSCULAR; INTRAVENOUS ONCE
Status: CANCELLED
Start: 2018-04-18 | End: 2018-04-18

## 2018-04-18 RX ORDER — PROMETHAZINE HYDROCHLORIDE 25 MG/ML
25 INJECTION, SOLUTION INTRAMUSCULAR; INTRAVENOUS
Status: CANCELLED
Start: 2018-05-05 | End: 2018-05-05

## 2018-04-18 RX ORDER — HEPARIN 100 UNIT/ML
500 SYRINGE INTRAVENOUS
Status: DISCONTINUED | OUTPATIENT
Start: 2018-04-18 | End: 2018-04-18 | Stop reason: HOSPADM

## 2018-04-18 RX ORDER — PROMETHAZINE HYDROCHLORIDE 25 MG/ML
25 INJECTION, SOLUTION INTRAMUSCULAR; INTRAVENOUS
Status: CANCELLED
Start: 2018-04-20 | End: 2018-04-20

## 2018-04-18 RX ORDER — ONDANSETRON 2 MG/ML
8 INJECTION INTRAMUSCULAR; INTRAVENOUS
Status: CANCELLED
Start: 2018-04-18 | End: 2018-04-18

## 2018-04-18 RX ORDER — PROMETHAZINE HYDROCHLORIDE 25 MG/ML
25 INJECTION, SOLUTION INTRAMUSCULAR; INTRAVENOUS
Status: CANCELLED
Start: 2018-04-18 | End: 2018-04-18

## 2018-04-18 RX ORDER — SODIUM CHLORIDE 0.9 % (FLUSH) 0.9 %
10 SYRINGE (ML) INJECTION
Status: DISCONTINUED | OUTPATIENT
Start: 2018-04-18 | End: 2018-04-18 | Stop reason: HOSPADM

## 2018-04-18 RX ORDER — PROMETHAZINE HYDROCHLORIDE 25 MG/ML
25 INJECTION, SOLUTION INTRAMUSCULAR; INTRAVENOUS
Status: COMPLETED | OUTPATIENT
Start: 2018-04-18 | End: 2018-04-18

## 2018-04-18 RX ORDER — ONDANSETRON 2 MG/ML
8 INJECTION INTRAMUSCULAR; INTRAVENOUS ONCE
Status: CANCELLED
Start: 2018-04-20 | End: 2018-04-20

## 2018-04-18 RX ORDER — ONDANSETRON 2 MG/ML
8 INJECTION INTRAMUSCULAR; INTRAVENOUS ONCE
Status: CANCELLED
Start: 2018-05-05 | End: 2018-05-05

## 2018-04-18 RX ORDER — ONDANSETRON 2 MG/ML
8 INJECTION INTRAMUSCULAR; INTRAVENOUS ONCE
Status: COMPLETED | OUTPATIENT
Start: 2018-04-18 | End: 2018-04-18

## 2018-04-18 RX ADMIN — PROMETHAZINE HYDROCHLORIDE 25 MG: 25 INJECTION INTRAMUSCULAR; INTRAVENOUS at 11:04

## 2018-04-18 RX ADMIN — CISPLATIN 33 MG: 1 INJECTION, SOLUTION INTRAVENOUS at 11:04

## 2018-04-18 RX ADMIN — ONDANSETRON 8 MG: 2 INJECTION INTRAMUSCULAR; INTRAVENOUS at 11:04

## 2018-04-18 RX ADMIN — SODIUM CHLORIDE 1000 ML: 0.9 INJECTION, SOLUTION INTRAVENOUS at 01:04

## 2018-04-18 RX ADMIN — Medication 10 MG: at 10:04

## 2018-04-18 RX ADMIN — HEPARIN 500 UNITS: 100 SYRINGE at 02:04

## 2018-04-18 RX ADMIN — ETOPOSIDE 164 MG: 20 INJECTION INTRAVENOUS at 12:04

## 2018-04-18 RX ADMIN — SODIUM CHLORIDE 1000 ML: 0.9 INJECTION, SOLUTION INTRAVENOUS at 09:04

## 2018-04-18 NOTE — PLAN OF CARE
Problem: Chemotherapy Effects (Adult)  Goal: Signs and Symptoms of Listed Potential Problems Will be Absent, Minimized or Managed (Chemotherapy Effects)  Signs and symptoms of listed potential problems will be absent, minimized or managed by discharge/transition of care (reference Chemotherapy Effects (Adult) CPG).   Outcome: Ongoing (interventions implemented as appropriate)  Pt arrived to unit, ambulated unassisted, accompanied by friend, for Day #3 of Cycle 1. Side effects of chemo  and self care tips reviewed, Pt verbalized understanding. Pt is complaining of hiccups and mild constipation.  Will inform Dr. Rojas and follow up. [place] offered and refreshments.

## 2018-04-18 NOTE — PLAN OF CARE
Problem: Patient Care Overview (Adult)  Goal: Plan of Care Review  Outcome: Ongoing (interventions implemented as appropriate)  Patient was given prescription of Baclofen for hiccups to be taken orally  prn twice a day,  and Colace to be taken orally twice a day for constipation by Dr. Rojas. Plan of care reviewed with Pt, and pt instructed to contact  MD with any further questions or concerns, he verbalized understanding. Ambulated off unit unassisted, accompanied by a friend, given AVS

## 2018-04-19 ENCOUNTER — INFUSION (OUTPATIENT)
Dept: INFUSION THERAPY | Facility: OTHER | Age: 23
End: 2018-04-19
Attending: INTERNAL MEDICINE
Payer: COMMERCIAL

## 2018-04-19 VITALS
RESPIRATION RATE: 16 BRPM | TEMPERATURE: 98 F | OXYGEN SATURATION: 97 % | HEIGHT: 67 IN | HEART RATE: 75 BPM | BODY MASS INDEX: 20.87 KG/M2 | SYSTOLIC BLOOD PRESSURE: 137 MMHG | WEIGHT: 132.94 LBS | DIASTOLIC BLOOD PRESSURE: 59 MMHG

## 2018-04-19 DIAGNOSIS — C80.1 GERM CELL TUMOR: Primary | ICD-10-CM

## 2018-04-19 PROCEDURE — 96375 TX/PRO/DX INJ NEW DRUG ADDON: CPT

## 2018-04-19 PROCEDURE — 96361 HYDRATE IV INFUSION ADD-ON: CPT

## 2018-04-19 PROCEDURE — 25000003 PHARM REV CODE 250: Performed by: INTERNAL MEDICINE

## 2018-04-19 PROCEDURE — 63600175 PHARM REV CODE 636 W HCPCS

## 2018-04-19 PROCEDURE — 96360 HYDRATION IV INFUSION INIT: CPT

## 2018-04-19 PROCEDURE — 96413 CHEMO IV INFUSION 1 HR: CPT

## 2018-04-19 PROCEDURE — 96367 TX/PROPH/DG ADDL SEQ IV INF: CPT

## 2018-04-19 PROCEDURE — 63600175 PHARM REV CODE 636 W HCPCS: Performed by: INTERNAL MEDICINE

## 2018-04-19 PROCEDURE — 96417 CHEMO IV INFUS EACH ADDL SEQ: CPT

## 2018-04-19 RX ORDER — ONDANSETRON 2 MG/ML
8 INJECTION INTRAMUSCULAR; INTRAVENOUS ONCE
Status: COMPLETED | OUTPATIENT
Start: 2018-04-19 | End: 2018-04-19

## 2018-04-19 RX ORDER — SODIUM CHLORIDE 0.9 % (FLUSH) 0.9 %
10 SYRINGE (ML) INJECTION
Status: DISCONTINUED | OUTPATIENT
Start: 2018-04-19 | End: 2018-04-19 | Stop reason: HOSPADM

## 2018-04-19 RX ORDER — ONDANSETRON 2 MG/ML
8 INJECTION INTRAMUSCULAR; INTRAVENOUS ONCE
Status: DISCONTINUED | OUTPATIENT
Start: 2018-04-19 | End: 2018-04-19

## 2018-04-19 RX ORDER — ONDANSETRON 2 MG/ML
INJECTION INTRAMUSCULAR; INTRAVENOUS
Status: COMPLETED
Start: 2018-04-19 | End: 2018-04-19

## 2018-04-19 RX ORDER — PROMETHAZINE HYDROCHLORIDE 25 MG/ML
25 INJECTION, SOLUTION INTRAMUSCULAR; INTRAVENOUS
Status: COMPLETED | OUTPATIENT
Start: 2018-04-19 | End: 2018-04-19

## 2018-04-19 RX ORDER — HEPARIN 100 UNIT/ML
500 SYRINGE INTRAVENOUS
Status: DISCONTINUED | OUTPATIENT
Start: 2018-04-19 | End: 2018-04-19 | Stop reason: HOSPADM

## 2018-04-19 RX ADMIN — ONDANSETRON 8 MG: 2 INJECTION INTRAMUSCULAR; INTRAVENOUS at 11:04

## 2018-04-19 RX ADMIN — CISPLATIN 33 MG: 1 INJECTION, SOLUTION INTRAVENOUS at 11:04

## 2018-04-19 RX ADMIN — SODIUM CHLORIDE 1000 ML: 0.9 INJECTION, SOLUTION INTRAVENOUS at 10:04

## 2018-04-19 RX ADMIN — ETOPOSIDE 164 MG: 20 INJECTION INTRAVENOUS at 12:04

## 2018-04-19 RX ADMIN — PROMETHAZINE HYDROCHLORIDE 25 MG: 25 INJECTION INTRAMUSCULAR; INTRAVENOUS at 11:04

## 2018-04-19 RX ADMIN — PALONOSETRON HYDROCHLORIDE: 0.25 INJECTION INTRAVENOUS at 11:04

## 2018-04-19 RX ADMIN — HEPARIN 500 UNITS: 100 SYRINGE at 02:04

## 2018-04-19 NOTE — PLAN OF CARE
Problem: Patient Care Overview (Adult)  Goal: Plan of Care Review  Patient tolerated treatment well. Provided patient with AVS. Patient threw AVS in the trash can. I removed AVS from trash can and placed in shredder.

## 2018-04-20 ENCOUNTER — INFUSION (OUTPATIENT)
Dept: INFUSION THERAPY | Facility: OTHER | Age: 23
End: 2018-04-20
Attending: INTERNAL MEDICINE
Payer: COMMERCIAL

## 2018-04-20 VITALS
HEART RATE: 84 BPM | BODY MASS INDEX: 21.04 KG/M2 | WEIGHT: 134.06 LBS | DIASTOLIC BLOOD PRESSURE: 58 MMHG | RESPIRATION RATE: 16 BRPM | SYSTOLIC BLOOD PRESSURE: 125 MMHG | HEIGHT: 67 IN | OXYGEN SATURATION: 99 % | TEMPERATURE: 97 F

## 2018-04-20 DIAGNOSIS — C80.1 GERM CELL TUMOR: Primary | ICD-10-CM

## 2018-04-20 PROCEDURE — 63600175 PHARM REV CODE 636 W HCPCS

## 2018-04-20 PROCEDURE — 96361 HYDRATE IV INFUSION ADD-ON: CPT

## 2018-04-20 PROCEDURE — 25000003 PHARM REV CODE 250: Performed by: INTERNAL MEDICINE

## 2018-04-20 PROCEDURE — 96376 TX/PRO/DX INJ SAME DRUG ADON: CPT

## 2018-04-20 PROCEDURE — 96377 APPLICATON ON-BODY INJECTOR: CPT

## 2018-04-20 PROCEDURE — 96360 HYDRATION IV INFUSION INIT: CPT

## 2018-04-20 PROCEDURE — 63600175 PHARM REV CODE 636 W HCPCS: Performed by: INTERNAL MEDICINE

## 2018-04-20 PROCEDURE — 96367 TX/PROPH/DG ADDL SEQ IV INF: CPT

## 2018-04-20 PROCEDURE — 96413 CHEMO IV INFUSION 1 HR: CPT

## 2018-04-20 PROCEDURE — 96417 CHEMO IV INFUS EACH ADDL SEQ: CPT

## 2018-04-20 RX ORDER — PROMETHAZINE HYDROCHLORIDE 25 MG/ML
25 INJECTION, SOLUTION INTRAMUSCULAR; INTRAVENOUS
Status: COMPLETED | OUTPATIENT
Start: 2018-04-20 | End: 2018-04-20

## 2018-04-20 RX ORDER — ONDANSETRON 2 MG/ML
8 INJECTION INTRAMUSCULAR; INTRAVENOUS ONCE
Status: CANCELLED
Start: 2018-04-20 | End: 2018-04-20

## 2018-04-20 RX ORDER — SODIUM CHLORIDE 0.9 % (FLUSH) 0.9 %
10 SYRINGE (ML) INJECTION
Status: DISCONTINUED | OUTPATIENT
Start: 2018-04-20 | End: 2018-04-20 | Stop reason: HOSPADM

## 2018-04-20 RX ORDER — ONDANSETRON 2 MG/ML
INJECTION INTRAMUSCULAR; INTRAVENOUS
Status: COMPLETED
Start: 2018-04-20 | End: 2018-04-20

## 2018-04-20 RX ORDER — SODIUM CHLORIDE 0.9 % (FLUSH) 0.9 %
10 SYRINGE (ML) INJECTION
Status: CANCELLED | OUTPATIENT
Start: 2018-04-20

## 2018-04-20 RX ORDER — HEPARIN 100 UNIT/ML
500 SYRINGE INTRAVENOUS
Status: CANCELLED | OUTPATIENT
Start: 2018-04-20

## 2018-04-20 RX ORDER — ONDANSETRON 2 MG/ML
8 INJECTION INTRAMUSCULAR; INTRAVENOUS ONCE
Status: COMPLETED | OUTPATIENT
Start: 2018-04-20 | End: 2018-04-20

## 2018-04-20 RX ORDER — HEPARIN 100 UNIT/ML
500 SYRINGE INTRAVENOUS
Status: DISCONTINUED | OUTPATIENT
Start: 2018-04-20 | End: 2018-04-20 | Stop reason: HOSPADM

## 2018-04-20 RX ORDER — PROMETHAZINE HYDROCHLORIDE 25 MG/ML
25 INJECTION, SOLUTION INTRAMUSCULAR; INTRAVENOUS
Status: CANCELLED
Start: 2018-04-20 | End: 2018-04-20

## 2018-04-20 RX ADMIN — SODIUM CHLORIDE 1000 ML: 0.9 INJECTION, SOLUTION INTRAVENOUS at 10:04

## 2018-04-20 RX ADMIN — DEXAMETHASONE SODIUM PHOSPHATE 10 MG: 4 INJECTION, SOLUTION INTRA-ARTICULAR; INTRALESIONAL; INTRAMUSCULAR; INTRAVENOUS; SOFT TISSUE at 11:04

## 2018-04-20 RX ADMIN — ETOPOSIDE 164 MG: 20 INJECTION INTRAVENOUS at 12:04

## 2018-04-20 RX ADMIN — ONDANSETRON 8 MG: 2 INJECTION INTRAMUSCULAR; INTRAVENOUS at 10:04

## 2018-04-20 RX ADMIN — PROMETHAZINE HYDROCHLORIDE 25 MG: 25 INJECTION INTRAMUSCULAR; INTRAVENOUS at 10:04

## 2018-04-20 RX ADMIN — PEGFILGRASTIM 6 MG: KIT SUBCUTANEOUS at 02:04

## 2018-04-20 RX ADMIN — HEPARIN 500 UNITS: 100 SYRINGE at 02:04

## 2018-04-20 RX ADMIN — CISPLATIN 33 MG: 1 INJECTION, SOLUTION INTRAVENOUS at 11:04

## 2018-04-20 NOTE — PLAN OF CARE
Problem: Patient Care Overview (Adult)  Goal: Plan of Care Review  Outcome: Ongoing (interventions implemented as appropriate)  Day 5 cisplatin/etoposide administered, patient tolerated well. VSS, NAD. D/C'd home with friend.

## 2018-04-21 ENCOUNTER — INFUSION (OUTPATIENT)
Dept: INFUSION THERAPY | Facility: HOSPITAL | Age: 23
End: 2018-04-21
Attending: INTERNAL MEDICINE
Payer: COMMERCIAL

## 2018-04-21 VITALS
HEART RATE: 65 BPM | RESPIRATION RATE: 18 BRPM | TEMPERATURE: 98 F | SYSTOLIC BLOOD PRESSURE: 115 MMHG | DIASTOLIC BLOOD PRESSURE: 65 MMHG

## 2018-04-21 DIAGNOSIS — C80.1 GERM CELL TUMOR: Primary | ICD-10-CM

## 2018-04-21 PROCEDURE — 96367 TX/PROPH/DG ADDL SEQ IV INF: CPT

## 2018-04-21 PROCEDURE — 63600175 PHARM REV CODE 636 W HCPCS: Performed by: INTERNAL MEDICINE

## 2018-04-21 PROCEDURE — 96365 THER/PROPH/DIAG IV INF INIT: CPT

## 2018-04-21 PROCEDURE — 96361 HYDRATE IV INFUSION ADD-ON: CPT

## 2018-04-21 PROCEDURE — 25000003 PHARM REV CODE 250: Performed by: INTERNAL MEDICINE

## 2018-04-21 PROCEDURE — A4216 STERILE WATER/SALINE, 10 ML: HCPCS | Performed by: INTERNAL MEDICINE

## 2018-04-21 RX ORDER — ONDANSETRON 2 MG/ML
8 INJECTION INTRAMUSCULAR; INTRAVENOUS ONCE
Status: DISCONTINUED | OUTPATIENT
Start: 2018-04-21 | End: 2018-04-21

## 2018-04-21 RX ORDER — HEPARIN 100 UNIT/ML
500 SYRINGE INTRAVENOUS
Status: DISCONTINUED | OUTPATIENT
Start: 2018-04-21 | End: 2018-04-21 | Stop reason: HOSPADM

## 2018-04-21 RX ORDER — ONDANSETRON HCL IN 0.9 % NACL 8 MG/50 ML
8 INTRAVENOUS SOLUTION, PIGGYBACK (ML) INTRAVENOUS
Status: COMPLETED | OUTPATIENT
Start: 2018-04-21 | End: 2018-04-21

## 2018-04-21 RX ORDER — PROMETHAZINE HYDROCHLORIDE 25 MG/ML
25 INJECTION, SOLUTION INTRAMUSCULAR; INTRAVENOUS
Status: DISCONTINUED | OUTPATIENT
Start: 2018-04-21 | End: 2018-04-21

## 2018-04-21 RX ORDER — SODIUM CHLORIDE 9 MG/ML
INJECTION, SOLUTION INTRAVENOUS CONTINUOUS
Status: ACTIVE | OUTPATIENT
Start: 2018-04-21 | End: 2018-04-21

## 2018-04-21 RX ORDER — SODIUM CHLORIDE 0.9 % (FLUSH) 0.9 %
10 SYRINGE (ML) INJECTION
Status: DISCONTINUED | OUTPATIENT
Start: 2018-04-21 | End: 2018-04-21 | Stop reason: HOSPADM

## 2018-04-21 RX ADMIN — HEPARIN 500 UNITS: 100 SYRINGE at 11:04

## 2018-04-21 RX ADMIN — SODIUM CHLORIDE, PRESERVATIVE FREE 10 ML: 5 INJECTION INTRAVENOUS at 11:04

## 2018-04-21 RX ADMIN — ONDANSETRON 8 MG: 2 INJECTION, SOLUTION INTRAMUSCULAR; INTRAVENOUS at 09:04

## 2018-04-21 RX ADMIN — PROMETHAZINE HYDROCHLORIDE 25 MG: 25 INJECTION INTRAMUSCULAR; INTRAVENOUS at 09:04

## 2018-04-21 RX ADMIN — SODIUM CHLORIDE: 0.9 INJECTION, SOLUTION INTRAVENOUS at 08:04

## 2018-04-21 NOTE — PLAN OF CARE
Problem: Chemotherapy Effects (Adult)  Goal: Signs and Symptoms of Listed Potential Problems Will be Absent, Minimized or Managed (Chemotherapy Effects)  Signs and symptoms of listed potential problems will be absent, minimized or managed by discharge/transition of care (reference Chemotherapy Effects (Adult) CPG).   Outcome: Ongoing (interventions implemented as appropriate)  Pt here for 2 liters NS and anti-nausea meds., labs, hx, meds, allergies reviewed, pt with no complaints at this time, reclined in chair, continue to monitor

## 2018-04-22 ENCOUNTER — HOSPITAL ENCOUNTER (EMERGENCY)
Facility: OTHER | Age: 23
Discharge: HOME | End: 2018-04-22
Attending: EMERGENCY MEDICINE
Payer: COMMERCIAL

## 2018-04-22 VITALS
WEIGHT: 130 LBS | DIASTOLIC BLOOD PRESSURE: 63 MMHG | BODY MASS INDEX: 20.4 KG/M2 | SYSTOLIC BLOOD PRESSURE: 115 MMHG | HEIGHT: 67 IN | TEMPERATURE: 99 F | HEART RATE: 68 BPM | RESPIRATION RATE: 14 BRPM | OXYGEN SATURATION: 97 %

## 2018-04-22 DIAGNOSIS — R11.2 NON-INTRACTABLE VOMITING WITH NAUSEA, UNSPECIFIED VOMITING TYPE: Primary | ICD-10-CM

## 2018-04-22 LAB
ALBUMIN SERPL BCP-MCNC: 4.5 G/DL
ALP SERPL-CCNC: 60 U/L
ALT SERPL W/O P-5'-P-CCNC: 20 U/L
ANION GAP SERPL CALC-SCNC: 12 MMOL/L
AST SERPL-CCNC: 11 U/L
BASOPHILS # BLD AUTO: ABNORMAL K/UL
BASOPHILS NFR BLD: 0 %
BILIRUB SERPL-MCNC: 1.4 MG/DL
BILIRUB UR QL STRIP: NEGATIVE
BUN SERPL-MCNC: 15 MG/DL
CALCIUM SERPL-MCNC: 10.2 MG/DL
CHLORIDE SERPL-SCNC: 98 MMOL/L
CLARITY UR: CLEAR
CO2 SERPL-SCNC: 27 MMOL/L
COLOR UR: YELLOW
CREAT SERPL-MCNC: 0.8 MG/DL
DIFFERENTIAL METHOD: ABNORMAL
EOSINOPHIL # BLD AUTO: ABNORMAL K/UL
EOSINOPHIL NFR BLD: 0 %
ERYTHROCYTE [DISTWIDTH] IN BLOOD BY AUTOMATED COUNT: 12.9 %
EST. GFR  (AFRICAN AMERICAN): >60 ML/MIN/1.73 M^2
EST. GFR  (NON AFRICAN AMERICAN): >60 ML/MIN/1.73 M^2
GLUCOSE SERPL-MCNC: 90 MG/DL
GLUCOSE UR QL STRIP: NEGATIVE
HCT VFR BLD AUTO: 45.3 %
HGB BLD-MCNC: 16 G/DL
HGB UR QL STRIP: NEGATIVE
KETONES UR QL STRIP: NEGATIVE
LEUKOCYTE ESTERASE UR QL STRIP: NEGATIVE
LIPASE SERPL-CCNC: 8 U/L
LYMPHOCYTES # BLD AUTO: ABNORMAL K/UL
LYMPHOCYTES NFR BLD: 2 %
MCH RBC QN AUTO: 29 PG
MCHC RBC AUTO-ENTMCNC: 35.3 G/DL
MCV RBC AUTO: 82 FL
METAMYELOCYTES NFR BLD MANUAL: 1 %
MONOCYTES # BLD AUTO: ABNORMAL K/UL
MONOCYTES NFR BLD: 1 %
NEUTROPHILS NFR BLD: 93 %
NEUTS BAND NFR BLD MANUAL: 3 %
NITRITE UR QL STRIP: NEGATIVE
PH UR STRIP: 8 [PH] (ref 5–8)
PLATELET # BLD AUTO: 222 K/UL
PLATELET BLD QL SMEAR: ABNORMAL
PMV BLD AUTO: 8.9 FL
POTASSIUM SERPL-SCNC: 3.8 MMOL/L
PROT SERPL-MCNC: 7.6 G/DL
PROT UR QL STRIP: NEGATIVE
RBC # BLD AUTO: 5.52 M/UL
SODIUM SERPL-SCNC: 137 MMOL/L
SP GR UR STRIP: 1.01 (ref 1–1.03)
URN SPEC COLLECT METH UR: NORMAL
UROBILINOGEN UR STRIP-ACNC: NEGATIVE EU/DL
WBC # BLD AUTO: 39.95 K/UL

## 2018-04-22 PROCEDURE — 96365 THER/PROPH/DIAG IV INF INIT: CPT

## 2018-04-22 PROCEDURE — C9113 INJ PANTOPRAZOLE SODIUM, VIA: HCPCS | Performed by: EMERGENCY MEDICINE

## 2018-04-22 PROCEDURE — 63600175 PHARM REV CODE 636 W HCPCS: Performed by: EMERGENCY MEDICINE

## 2018-04-22 PROCEDURE — 96361 HYDRATE IV INFUSION ADD-ON: CPT

## 2018-04-22 PROCEDURE — 85007 BL SMEAR W/DIFF WBC COUNT: CPT

## 2018-04-22 PROCEDURE — 85027 COMPLETE CBC AUTOMATED: CPT

## 2018-04-22 PROCEDURE — 99284 EMERGENCY DEPT VISIT MOD MDM: CPT | Mod: 25

## 2018-04-22 PROCEDURE — 25000003 PHARM REV CODE 250: Performed by: EMERGENCY MEDICINE

## 2018-04-22 PROCEDURE — 83690 ASSAY OF LIPASE: CPT

## 2018-04-22 PROCEDURE — 96367 TX/PROPH/DG ADDL SEQ IV INF: CPT

## 2018-04-22 PROCEDURE — 80053 COMPREHEN METABOLIC PANEL: CPT

## 2018-04-22 PROCEDURE — 96375 TX/PRO/DX INJ NEW DRUG ADDON: CPT

## 2018-04-22 PROCEDURE — 81003 URINALYSIS AUTO W/O SCOPE: CPT

## 2018-04-22 RX ORDER — DEXTROSE MONOHYDRATE, SODIUM CHLORIDE, AND POTASSIUM CHLORIDE 50; 2.98; 4.5 G/1000ML; G/1000ML; G/1000ML
1000 INJECTION, SOLUTION INTRAVENOUS
Status: COMPLETED | OUTPATIENT
Start: 2018-04-22 | End: 2018-04-22

## 2018-04-22 RX ORDER — ONDANSETRON 2 MG/ML
4 INJECTION INTRAMUSCULAR; INTRAVENOUS ONCE
Status: COMPLETED | OUTPATIENT
Start: 2018-04-22 | End: 2018-04-22

## 2018-04-22 RX ORDER — PANTOPRAZOLE SODIUM 40 MG/10ML
40 INJECTION, POWDER, LYOPHILIZED, FOR SOLUTION INTRAVENOUS
Status: DISCONTINUED | OUTPATIENT
Start: 2018-04-22 | End: 2018-04-22

## 2018-04-22 RX ORDER — ONDANSETRON 2 MG/ML
4 INJECTION INTRAMUSCULAR; INTRAVENOUS
Status: DISCONTINUED | OUTPATIENT
Start: 2018-04-22 | End: 2018-04-22 | Stop reason: HOSPADM

## 2018-04-22 RX ADMIN — PROMETHAZINE HYDROCHLORIDE 12.5 MG: 25 INJECTION INTRAMUSCULAR; INTRAVENOUS at 01:04

## 2018-04-22 RX ADMIN — SODIUM CHLORIDE 1000 ML: 0.9 INJECTION, SOLUTION INTRAVENOUS at 11:04

## 2018-04-22 RX ADMIN — ONDANSETRON 4 MG: 2 INJECTION INTRAMUSCULAR; INTRAVENOUS at 12:04

## 2018-04-22 RX ADMIN — DEXTROSE 40 MG: 50 INJECTION, SOLUTION INTRAVENOUS at 11:04

## 2018-04-22 RX ADMIN — DEXTROSE MONOHYDRATE, SODIUM CHLORIDE, AND POTASSIUM CHLORIDE 1000 ML: 50; 4.5; 2.98 INJECTION, SOLUTION INTRAVENOUS at 01:04

## 2018-04-22 NOTE — ED PROVIDER NOTES
"Encounter Date: 4/22/2018    SCRIBE #1 NOTE: I, Maryjo Charles, am scribing for, and in the presence of, Dr. Land.       History     Chief Complaint   Patient presents with    Emesis     "complications from chemo tx", last tx x 3 days ago. Currently beign treated for stage III testicular cx. Symptoms unrelieved from patch, IV xofran given yesterday. Unable to tolerate water.      Time seen by provider: 11:04 AM    This is a 23 y.o. Male, with history of CA, who presents with complaint of vomiting that has been constant for approximately five hours. The patient reports associated nausea and pain to the chest, abdomen, and back.  He notes that he cannot keep down any of his PO nausea medications. The patient denies SOB, fever, chills, diarrhea, blood in vomitus, or urinary symptoms. Patient recently started chemo for a mediastinal seminoma, received chemo M-F this week and then IVFs and nausea medication at the infusion center yesterday. He notes that his oncologist is Dr. Rojas.      The history is provided by the patient.     Review of patient's allergies indicates:   Allergen Reactions    Mushroom Anaphylaxis    Bamboo     Bee pollens     Mushroom flavor     Venom-honey bee      Past Medical History:   Diagnosis Date    Abdominal pain 08/12/2010    eval for abd and chest wall pain at [place], [place] - cxr wnl, EKG ([facility]), troponin wnl, normal chemistries    Allergy     Cancer     testicular    Chondromalacia patellae     Chronic nausea 2015    eval by GI Dr. Tushar Artis - given zofran and ciproheptadine     Chronic pain     Chronic pain     [facility] - [facility] meds; followed by psych and pain clinic and unresponsive to behavioral/CBT/old records reports c/f conversion disorder     Foot pain 04/2010    4/10 + SHIRA, eval by Dr. ALLY Lion at [facility] Rheum -dx unclear ? gout and trial of nsaids and pdn, xrays normal 1/11, referred to " "podiatry, re-eval by rheum 2/12 and MRI and films wnl    Fracture of right radius 09/2009    Lyme arthritis     multiple joints    Lyme disease 2013    Memory loss 03/2012    [place] for [place] [facility] [place], EEG wnl. Dr. Patricio suggested emotional factors & ref to Tushar Davies, PhD for MH eval & neuropsych eval 3/12 Lupe Delgado, PhD - no evidence of formal thought disorder or psychosis - documented severe memory impairment; not related to to ADD or executive function issues. sugesstion that memory issues may be related to "emotional factors", ?conversion d/o    Stress fracture of tibia and fibula 08/2011    Urticaria      Past Surgical History:   Procedure Laterality Date    PORTACATH PLACEMENT Right      Family History   Problem Relation Age of Onset    Arthritis Mother     Other Mother      benign tumor of brain and spinal cord    Hyperlipidemia Father     Gout Father     No Known Problems Sister     Arthritis Sister      shoulder    Depression Sister      Social History   Substance Use Topics    Smoking status: Current Every Day Smoker     Packs/day: 0.25     Types: Vaping with nicotine, Vaping w/o nicotine, Cigarettes, Cigars    Smokeless tobacco: Never Used    Alcohol use Yes      Comment: occ     Review of Systems   Constitutional: Negative for chills and fever.   HENT: Negative for sore throat.    Respiratory: Negative for shortness of breath.    Cardiovascular: Positive for chest pain.   Gastrointestinal: Positive for abdominal pain, nausea and vomiting.   Genitourinary: Negative for dysuria, frequency and urgency.   Musculoskeletal: Positive for back pain and neck pain.   Skin: Negative for rash.   Neurological: Negative for dizziness, weakness and headaches.   Hematological: Does not bruise/bleed easily.       Physical Exam     Initial Vitals [04/22/18 1045]   BP Pulse Resp Temp SpO2   115/63 (!) 131 14 97.8 °F (36.6 °C) 99 %      MAP       80.33         Physical Exam    Nursing " note and vitals reviewed.  Constitutional: He appears well-developed and well-nourished. He is not diaphoretic. No distress.   HENT:   Head: Normocephalic and atraumatic.   Dry mucous membranes.   Eyes: Conjunctivae and EOM are normal.   Neck: Normal range of motion. Neck supple.   Cardiovascular: Regular rhythm and normal heart sounds. Exam reveals no gallop and no friction rub.    No murmur heard.  Tachycardic.   Pulmonary/Chest: Breath sounds normal. He has no wheezes. He has no rhonchi. He has no rales.   Port on chest wall with no overlying erythema.   Abdominal: Soft. Bowel sounds are normal. He exhibits no distension. There is tenderness.   Mild discomfort to palpation diffusely.   Musculoskeletal: Normal range of motion.   Neurological: He is alert and oriented to person, place, and time. He has normal strength.   Skin: Skin is warm and dry. Capillary refill takes less than 2 seconds.         ED Course   Procedures  Labs Reviewed   COMPREHENSIVE METABOLIC PANEL - Abnormal; Notable for the following:        Result Value    Total Bilirubin 1.4 (*)     All other components within normal limits   CBC W/ AUTO DIFFERENTIAL - Abnormal; Notable for the following:     WBC 39.95 (*)     MPV 8.9 (*)     Gran% 93.0 (*)     Lymph% 2.0 (*)     Mono% 1.0 (*)     All other components within normal limits   LIPASE   URINALYSIS             Medical Decision Making:   History:   Old Medical Records: I decided to obtain old medical records.  Old Records Summarized: other records and records from clinic visits.  Initial Assessment:   11:04AM:  Patient is a 22 y/o M who presents to ED with N/V. Pt just recently started chemo.  Pt is tachycardic and dry.  His abdomen is soft, though mildly uncomfortable with palpation. Will plan for labs, IVFs, will continue to follow and reassess.    Clinical Tests:   Lab Tests: Ordered and Reviewed    12:47 PM: Pt feeling better, would like to attempt to PO, will continue to follow.    1:16PM:   Pt did not do well with the PO challenge, started feeling nauseated with abdominal pain again, will continue to follow.      5:20 PM:  Pt feeling better now, able to PO challenge with water and crackers with no issues.  He does have an elevated WBC count though per notes, pt was on neulasta which would be appropriate.  I updated pt regarding results and I counseled pt regarding supportive care measures. Will have him f/u with his oncologist.  I have discussed with the pt ED return warnings and need for close  f/u.  Pt agreeable to plan and all questions answered.  I feel that pt is stable for discharge and management as an outpatient and no further intervention is needed at this time.  Pt is comfortable returning to the ED if needed.  Will DC home in stable condition.              Scribe Attestation:   Scribe #1: I performed the above scribed service and the documentation accurately describes the services I performed. I attest to the accuracy of the note.    Attending Attestation:           Physician Attestation for Scribe:  Physician Attestation Statement for Scribe #1: I, Dr. Land, reviewed documentation, as scribed by Maryjo Charles in my presence, and it is both accurate and complete.                    Clinical Impression:     1. Non-intractable vomiting with nausea, unspecified vomiting type                               Xiomy Land MD  04/22/18 7209

## 2018-04-22 NOTE — ED TRIAGE NOTES
Patient reports emesis since 0600 today. States that nausea is unable to be controlled with CBD oil or vape and that he cannot keep any of his nausea medicine in his stomach. Patient reports that his abdomen has been fluctuating between firm and soft.     Patient is in current treatment for testicular cancer with last chemo on Friday. Patient reports bilateral posterior flank pain, and neck pain.

## 2018-04-22 NOTE — DISCHARGE INSTRUCTIONS
Please return to the ER if you have chest pain, difficulty breathing, fevers, altered mental status, dizziness, weakness, or any other concerns.      Follow up with your primary care physician and oncologist.

## 2018-04-23 ENCOUNTER — OFFICE VISIT (OUTPATIENT)
Dept: HEMATOLOGY/ONCOLOGY | Facility: CLINIC | Age: 23
End: 2018-04-23
Payer: COMMERCIAL

## 2018-04-23 ENCOUNTER — LAB VISIT (OUTPATIENT)
Dept: LAB | Facility: OTHER | Age: 23
End: 2018-04-23
Attending: INTERNAL MEDICINE
Payer: COMMERCIAL

## 2018-04-23 VITALS
DIASTOLIC BLOOD PRESSURE: 55 MMHG | TEMPERATURE: 98 F | BODY MASS INDEX: 20.83 KG/M2 | RESPIRATION RATE: 18 BRPM | WEIGHT: 132.69 LBS | SYSTOLIC BLOOD PRESSURE: 106 MMHG | OXYGEN SATURATION: 98 % | HEART RATE: 80 BPM | HEIGHT: 67 IN

## 2018-04-23 DIAGNOSIS — G89.29 OTHER CHRONIC PAIN: ICD-10-CM

## 2018-04-23 DIAGNOSIS — R11.2 INTRACTABLE VOMITING WITH NAUSEA, UNSPECIFIED VOMITING TYPE: ICD-10-CM

## 2018-04-23 DIAGNOSIS — M54.9 MUSCULOSKELETAL BACK PAIN: ICD-10-CM

## 2018-04-23 DIAGNOSIS — C80.1 GERM CELL TUMOR: Primary | ICD-10-CM

## 2018-04-23 DIAGNOSIS — F41.9 ANXIETY: ICD-10-CM

## 2018-04-23 DIAGNOSIS — C80.1 GERM CELL TUMOR: ICD-10-CM

## 2018-04-23 DIAGNOSIS — D69.59 CHEMOTHERAPY-INDUCED THROMBOCYTOPENIA: ICD-10-CM

## 2018-04-23 DIAGNOSIS — T45.1X5A CHEMOTHERAPY-INDUCED THROMBOCYTOPENIA: ICD-10-CM

## 2018-04-23 LAB
ANION GAP SERPL CALC-SCNC: 9 MMOL/L
BUN SERPL-MCNC: 17 MG/DL
CALCIUM SERPL-MCNC: 9.2 MG/DL
CHLORIDE SERPL-SCNC: 100 MMOL/L
CO2 SERPL-SCNC: 29 MMOL/L
CREAT SERPL-MCNC: 0.8 MG/DL
ERYTHROCYTE [DISTWIDTH] IN BLOOD BY AUTOMATED COUNT: 13.1 %
EST. GFR  (AFRICAN AMERICAN): >60 ML/MIN/1.73 M^2
EST. GFR  (NON AFRICAN AMERICAN): >60 ML/MIN/1.73 M^2
GLUCOSE SERPL-MCNC: 99 MG/DL
HCT VFR BLD AUTO: 41 %
HGB BLD-MCNC: 14.1 G/DL
MAGNESIUM SERPL-MCNC: 2.2 MG/DL
MCH RBC QN AUTO: 28.7 PG
MCHC RBC AUTO-ENTMCNC: 34.4 G/DL
MCV RBC AUTO: 84 FL
NEUTROPHILS # BLD AUTO: 16.4 K/UL
PLATELET # BLD AUTO: 141 K/UL
PMV BLD AUTO: 8.6 FL
POTASSIUM SERPL-SCNC: 3.4 MMOL/L
RBC # BLD AUTO: 4.91 M/UL
SODIUM SERPL-SCNC: 138 MMOL/L
WBC # BLD AUTO: 19.81 K/UL

## 2018-04-23 PROCEDURE — 36415 COLL VENOUS BLD VENIPUNCTURE: CPT

## 2018-04-23 PROCEDURE — 85027 COMPLETE CBC AUTOMATED: CPT

## 2018-04-23 PROCEDURE — 80048 BASIC METABOLIC PNL TOTAL CA: CPT

## 2018-04-23 PROCEDURE — 99214 OFFICE O/P EST MOD 30 MIN: CPT | Mod: S$GLB,,, | Performed by: INTERNAL MEDICINE

## 2018-04-23 PROCEDURE — 83735 ASSAY OF MAGNESIUM: CPT

## 2018-04-23 PROCEDURE — 99999 PR PBB SHADOW E&M-EST. PATIENT-LVL III: CPT | Mod: PBBFAC,,, | Performed by: INTERNAL MEDICINE

## 2018-04-23 RX ORDER — ONDANSETRON 4 MG/1
4 TABLET, FILM COATED ORAL EVERY 6 HOURS PRN
Qty: 120 TABLET | Refills: 3 | Status: ON HOLD | OUTPATIENT
Start: 2018-04-23 | End: 2018-05-16

## 2018-04-23 RX ORDER — PROMETHAZINE HYDROCHLORIDE 25 MG/1
25 SUPPOSITORY RECTAL EVERY 6 HOURS PRN
Qty: 84 SUPPOSITORY | Refills: 2 | Status: ON HOLD | OUTPATIENT
Start: 2018-04-23 | End: 2018-05-16

## 2018-04-23 RX ORDER — LORAZEPAM 0.5 MG/1
0.5 TABLET ORAL EVERY 8 HOURS PRN
Qty: 60 TABLET | Refills: 0 | Status: SHIPPED | OUTPATIENT
Start: 2018-04-23 | End: 2018-06-08

## 2018-04-23 NOTE — PROGRESS NOTES
"                                                         PROGRESS NOTE    Subjective:      Patient ID: Alejandro Neff is a 23 y.o. male.     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 Mr. Hampton is a 22 yo man who first presented with chest pain and underwent CT chest on 18, which showed a 6 x 3.5 cm mediastinal mass. It abuts the aorta and pulmonary artery. Biopsy was done on 3/1/18. Pathology (reviewed at [facility]) showed germ cell tumor, most consistent with seminoma.   2. HCG, LDH, AFP on 3/22/18 all normal.   3. Testicular US 3/26/18 showed no testicular masses. CT C/A/P on 3/26/18 showed "stable appearance of anterior mediastinal mass consistent with provided history of seminoma. Several punctate sclerotic foci are noted in the pelvis at the right pubic bone, right ischial tuberosity, and left ilium adjacent to the sacroiliac joint.  These are strongly favored to reflect benign bone islands although metastatic disease could have a similar appearance and close follow-up is recommended. Several benign Schmorl's nodes are noted in the thoracic spine." Bone scan on 3/28/18 was negative for bone mets.   4. Audiogram on 18 normal.   5. Given his smoking history, I recommended 4 cycles of EP. EP cycle 1 day 1 on 18     INTERVAL HISTORY:   Mr. Hampton returns today for follow up of primary mediastinal seminoma. He received the first cycle of EP last week. Tolerated it well Monday-Friday. But went to ER last night with intractable nausea. Was given IV antiemetics with improvement of his symptoms. Is on dex po already. Wants to try suppository antiemetics as he cannot tolerate po antiemetics when nausea is bad. Has musculoskeletal back pain. Massage helps.     ECO     ROS:   A ten point system review is obtained and neg except for what was stated in the interval history     Physical Examination: " "  Vital signs reviewed.   Gen: well hydrated, well developed, in no acute distress.  HEENT: normocephalic, anicteric sclerae, PERRLA, EOMI, oropharynx clear  Neck: supple, no JVD, thyromegaly, cervical or supraclavicular LAD  Lungs: CTAB, no wheezes or rales. Port site on chest clean.   Heart: RRR, no M/R/G  Abdomen: soft, no tenderness, non-distended, no hepatosplenomegaly, mass, or hernia. BS present  Ext: no clubbing, cyanosis, or edema  Neuro: alert and oriented x 4, no focal neuro deficit  Skin: no rash, erythema, open wound or ulcers  Psych: pleasant and appropriate mood and affect       Objective:     Diagnostic Tests:   Testicular US 3/26/18 showed no testicular masses. CT C/A/P on 3/26/18 showed "stable appearance of anterior mediastinal mass consistent with provided history of seminoma. Several punctate sclerotic foci are noted in the pelvis at the right pubic bone, right ischial tuberosity, and left ilium adjacent to the sacroiliac joint.  These are strongly favored to reflect benign bone islands although metastatic disease could have a similar appearance and close follow-up is recommended. Several benign Schmorl's nodes are noted in the thoracic spine." Bone scan on 3/28/18 was negative for bone mets.     Laboratory Data:  Labs from today reviewed. Leukocytosis from neulasta and dex.    Assessment/Plan:     1. Germ cell tumor    2. Intractable vomiting with nausea, unspecified vomiting type    3. Other chronic pain    4. Anxiety    5. Musculoskeletal back pain    6. Chemotherapy-induced thrombocytopenia      1.  - 24 yo M with primary mediastinal seminoma, good risk disease, s/p 1 cycle of EP. Doing well  - RTC next Monday with labs    2.4  - Change zofran sublingual tab to regular tab as he cannot tolerate sublingual  - add phenergan suppository when he cannot tolerate po antiemetics  - add Ativan prn for nausea/anxiety    3.5.  - refer to physical therapy for musculoskeletal back pain    6. Stable. " Monitor.    Knows to call in the interval if any problems arise.    Electronically signed by Suha Rojas

## 2018-04-24 ENCOUNTER — PATIENT MESSAGE (OUTPATIENT)
Dept: HEMATOLOGY/ONCOLOGY | Facility: CLINIC | Age: 23
End: 2018-04-24

## 2018-04-26 ENCOUNTER — PATIENT MESSAGE (OUTPATIENT)
Dept: HEMATOLOGY/ONCOLOGY | Facility: CLINIC | Age: 23
End: 2018-04-26

## 2018-04-26 ENCOUNTER — HOSPITAL ENCOUNTER (INPATIENT)
Facility: OTHER | Age: 23
LOS: 2 days | Discharge: HOME | DRG: 809 | End: 2018-04-29
Attending: EMERGENCY MEDICINE | Admitting: EMERGENCY MEDICINE
Payer: COMMERCIAL

## 2018-04-26 DIAGNOSIS — D64.81 ANEMIA ASSOCIATED WITH CHEMOTHERAPY: ICD-10-CM

## 2018-04-26 DIAGNOSIS — T45.1X5A ANEMIA ASSOCIATED WITH CHEMOTHERAPY: ICD-10-CM

## 2018-04-26 DIAGNOSIS — R50.9 FEVER, UNSPECIFIED FEVER CAUSE: ICD-10-CM

## 2018-04-26 DIAGNOSIS — J98.59 MEDIASTINAL MASS: ICD-10-CM

## 2018-04-26 DIAGNOSIS — R50.81 NEUTROPENIC FEVER: Primary | ICD-10-CM

## 2018-04-26 DIAGNOSIS — C80.1 GERM CELL TUMOR: ICD-10-CM

## 2018-04-26 DIAGNOSIS — R00.0 TACHYCARDIA: ICD-10-CM

## 2018-04-26 DIAGNOSIS — D70.9 NEUTROPENIC FEVER: Primary | ICD-10-CM

## 2018-04-26 PROCEDURE — 83605 ASSAY OF LACTIC ACID: CPT

## 2018-04-26 PROCEDURE — 85007 BL SMEAR W/DIFF WBC COUNT: CPT

## 2018-04-26 PROCEDURE — 87040 BLOOD CULTURE FOR BACTERIA: CPT

## 2018-04-26 PROCEDURE — 99291 CRITICAL CARE FIRST HOUR: CPT | Mod: 25

## 2018-04-26 PROCEDURE — 80053 COMPREHEN METABOLIC PANEL: CPT

## 2018-04-26 PROCEDURE — 85027 COMPLETE CBC AUTOMATED: CPT

## 2018-04-26 RX ORDER — IBUPROFEN 400 MG/1
400 TABLET ORAL
Status: COMPLETED | OUTPATIENT
Start: 2018-04-26 | End: 2018-04-27

## 2018-04-26 RX ORDER — ACETAMINOPHEN 500 MG
1000 TABLET ORAL
Status: COMPLETED | OUTPATIENT
Start: 2018-04-26 | End: 2018-04-27

## 2018-04-27 PROBLEM — D64.81 ANEMIA ASSOCIATED WITH CHEMOTHERAPY: Status: ACTIVE | Noted: 2018-04-27

## 2018-04-27 PROBLEM — T45.1X5A ANEMIA ASSOCIATED WITH CHEMOTHERAPY: Status: ACTIVE | Noted: 2018-04-27

## 2018-04-27 PROBLEM — R65.10 SIRS (SYSTEMIC INFLAMMATORY RESPONSE SYNDROME): Status: ACTIVE | Noted: 2018-04-27

## 2018-04-27 PROBLEM — D70.9 NEUTROPENIC FEVER: Status: ACTIVE | Noted: 2018-04-27

## 2018-04-27 PROBLEM — D69.6 THROMBOCYTOPENIA: Status: ACTIVE | Noted: 2018-04-27

## 2018-04-27 PROBLEM — R50.9 FEVER: Status: ACTIVE | Noted: 2018-04-27

## 2018-04-27 PROBLEM — R50.81 NEUTROPENIC FEVER: Status: ACTIVE | Noted: 2018-04-27

## 2018-04-27 LAB
ALBUMIN SERPL BCP-MCNC: 3.9 G/DL
ALBUMIN SERPL BCP-MCNC: 3.9 G/DL
ALP SERPL-CCNC: 52 U/L
ALP SERPL-CCNC: 53 U/L
ALT SERPL W/O P-5'-P-CCNC: 26 U/L
ALT SERPL W/O P-5'-P-CCNC: 26 U/L
ANION GAP SERPL CALC-SCNC: 11 MMOL/L
ANION GAP SERPL CALC-SCNC: 11 MMOL/L
ANISOCYTOSIS BLD QL SMEAR: SLIGHT
ANISOCYTOSIS BLD QL SMEAR: SLIGHT
AST SERPL-CCNC: 10 U/L
AST SERPL-CCNC: 9 U/L
BASOPHILS # BLD AUTO: ABNORMAL K/UL
BASOPHILS NFR BLD: 0 %
BASOPHILS NFR BLD: 1 %
BILIRUB SERPL-MCNC: 0.6 MG/DL
BILIRUB SERPL-MCNC: 0.6 MG/DL
BILIRUB UR QL STRIP: NEGATIVE
BUN SERPL-MCNC: 12 MG/DL
BUN SERPL-MCNC: 12 MG/DL
CALCIUM SERPL-MCNC: 9.3 MG/DL
CALCIUM SERPL-MCNC: 9.4 MG/DL
CHLORIDE SERPL-SCNC: 100 MMOL/L
CHLORIDE SERPL-SCNC: 100 MMOL/L
CLARITY UR: CLEAR
CO2 SERPL-SCNC: 25 MMOL/L
CO2 SERPL-SCNC: 25 MMOL/L
COLOR UR: YELLOW
CREAT SERPL-MCNC: 0.8 MG/DL
CREAT SERPL-MCNC: 0.8 MG/DL
DIFFERENTIAL METHOD: ABNORMAL
DIFFERENTIAL METHOD: ABNORMAL
DOHLE BOD BLD QL SMEAR: PRESENT
DOHLE BOD BLD QL SMEAR: PRESENT
EOSINOPHIL # BLD AUTO: ABNORMAL K/UL
EOSINOPHIL NFR BLD: 0 %
EOSINOPHIL NFR BLD: 0 %
ERYTHROCYTE [DISTWIDTH] IN BLOOD BY AUTOMATED COUNT: 12.8 %
ERYTHROCYTE [DISTWIDTH] IN BLOOD BY AUTOMATED COUNT: 12.8 %
EST. GFR  (AFRICAN AMERICAN): >60 ML/MIN/1.73 M^2
EST. GFR  (AFRICAN AMERICAN): >60 ML/MIN/1.73 M^2
EST. GFR  (NON AFRICAN AMERICAN): >60 ML/MIN/1.73 M^2
EST. GFR  (NON AFRICAN AMERICAN): >60 ML/MIN/1.73 M^2
GIANT PLATELETS BLD QL SMEAR: PRESENT
GIANT PLATELETS BLD QL SMEAR: PRESENT
GLUCOSE SERPL-MCNC: 115 MG/DL
GLUCOSE SERPL-MCNC: 118 MG/DL
GLUCOSE UR QL STRIP: NEGATIVE
HCT VFR BLD AUTO: 36.9 %
HCT VFR BLD AUTO: 37 %
HGB BLD-MCNC: 12.7 G/DL
HGB BLD-MCNC: 12.8 G/DL
HGB UR QL STRIP: NEGATIVE
KETONES UR QL STRIP: NEGATIVE
LACTATE SERPL-SCNC: 2.2 MMOL/L
LACTATE SERPL-SCNC: 2.3 MMOL/L
LACTATE SERPL-SCNC: 3.3 MMOL/L
LEUKOCYTE ESTERASE UR QL STRIP: NEGATIVE
LYMPHOCYTES # BLD AUTO: ABNORMAL K/UL
LYMPHOCYTES NFR BLD: 63 %
LYMPHOCYTES NFR BLD: 66.7 %
MAGNESIUM SERPL-MCNC: 2.2 MG/DL
MCH RBC QN AUTO: 28.3 PG
MCH RBC QN AUTO: 28.5 PG
MCHC RBC AUTO-ENTMCNC: 34.3 G/DL
MCHC RBC AUTO-ENTMCNC: 34.7 G/DL
MCV RBC AUTO: 82 FL
MCV RBC AUTO: 83 FL
MONOCYTES # BLD AUTO: ABNORMAL K/UL
MONOCYTES NFR BLD: 11.7 %
MONOCYTES NFR BLD: 17 %
NEUTROPHILS NFR BLD: 18 %
NEUTROPHILS NFR BLD: 21.6 %
NEUTS BAND NFR BLD MANUAL: 1 %
NITRITE UR QL STRIP: NEGATIVE
PH UR STRIP: 6 [PH] (ref 5–8)
PHOSPHATE SERPL-MCNC: 3.7 MG/DL
PLATELET # BLD AUTO: 90 K/UL
PLATELET # BLD AUTO: 91 K/UL
PLATELET BLD QL SMEAR: ABNORMAL
PLATELET BLD QL SMEAR: ABNORMAL
PMV BLD AUTO: 9.3 FL
PMV BLD AUTO: 9.4 FL
POTASSIUM SERPL-SCNC: 4.1 MMOL/L
POTASSIUM SERPL-SCNC: 4.1 MMOL/L
PROT SERPL-MCNC: 6.4 G/DL
PROT SERPL-MCNC: 6.4 G/DL
PROT UR QL STRIP: NEGATIVE
RBC # BLD AUTO: 4.48 M/UL
RBC # BLD AUTO: 4.49 M/UL
SODIUM SERPL-SCNC: 136 MMOL/L
SODIUM SERPL-SCNC: 136 MMOL/L
SP GR UR STRIP: <=1.005 (ref 1–1.03)
URN SPEC COLLECT METH UR: ABNORMAL
UROBILINOGEN UR STRIP-ACNC: NEGATIVE EU/DL
WBC # BLD AUTO: 1.54 K/UL
WBC # BLD AUTO: 1.57 K/UL

## 2018-04-27 PROCEDURE — 63600175 PHARM REV CODE 636 W HCPCS: Performed by: EMERGENCY MEDICINE

## 2018-04-27 PROCEDURE — 96374 THER/PROPH/DIAG INJ IV PUSH: CPT

## 2018-04-27 PROCEDURE — 80053 COMPREHEN METABOLIC PANEL: CPT

## 2018-04-27 PROCEDURE — 25000003 PHARM REV CODE 250: Performed by: NURSE PRACTITIONER

## 2018-04-27 PROCEDURE — 85007 BL SMEAR W/DIFF WBC COUNT: CPT

## 2018-04-27 PROCEDURE — 87086 URINE CULTURE/COLONY COUNT: CPT

## 2018-04-27 PROCEDURE — 99223 1ST HOSP IP/OBS HIGH 75: CPT | Mod: ,,, | Performed by: INTERNAL MEDICINE

## 2018-04-27 PROCEDURE — 96361 HYDRATE IV INFUSION ADD-ON: CPT

## 2018-04-27 PROCEDURE — 96375 TX/PRO/DX INJ NEW DRUG ADDON: CPT

## 2018-04-27 PROCEDURE — 83735 ASSAY OF MAGNESIUM: CPT

## 2018-04-27 PROCEDURE — 83605 ASSAY OF LACTIC ACID: CPT

## 2018-04-27 PROCEDURE — 81003 URINALYSIS AUTO W/O SCOPE: CPT

## 2018-04-27 PROCEDURE — 63600175 PHARM REV CODE 636 W HCPCS: Performed by: NURSE PRACTITIONER

## 2018-04-27 PROCEDURE — 36415 COLL VENOUS BLD VENIPUNCTURE: CPT

## 2018-04-27 PROCEDURE — 99223 1ST HOSP IP/OBS HIGH 75: CPT | Mod: ,,, | Performed by: HOSPITALIST

## 2018-04-27 PROCEDURE — 85027 COMPLETE CBC AUTOMATED: CPT

## 2018-04-27 PROCEDURE — 83605 ASSAY OF LACTIC ACID: CPT | Mod: 91

## 2018-04-27 PROCEDURE — 25000003 PHARM REV CODE 250: Performed by: EMERGENCY MEDICINE

## 2018-04-27 PROCEDURE — 11000001 HC ACUTE MED/SURG PRIVATE ROOM

## 2018-04-27 PROCEDURE — 84100 ASSAY OF PHOSPHORUS: CPT

## 2018-04-27 PROCEDURE — 94761 N-INVAS EAR/PLS OXIMETRY MLT: CPT

## 2018-04-27 PROCEDURE — 93010 ELECTROCARDIOGRAM REPORT: CPT | Mod: ,,, | Performed by: INTERNAL MEDICINE

## 2018-04-27 PROCEDURE — 87040 BLOOD CULTURE FOR BACTERIA: CPT

## 2018-04-27 PROCEDURE — 93005 ELECTROCARDIOGRAM TRACING: CPT

## 2018-04-27 RX ORDER — ONDANSETRON 4 MG/1
4 TABLET, FILM COATED ORAL EVERY 6 HOURS PRN
Status: DISCONTINUED | OUTPATIENT
Start: 2018-04-27 | End: 2018-04-29 | Stop reason: HOSPADM

## 2018-04-27 RX ORDER — CEFEPIME HYDROCHLORIDE 1 G/1
1 INJECTION, POWDER, FOR SOLUTION INTRAMUSCULAR; INTRAVENOUS
Status: DISCONTINUED | OUTPATIENT
Start: 2018-04-27 | End: 2018-04-27

## 2018-04-27 RX ORDER — PROMETHAZINE HYDROCHLORIDE 25 MG/1
25 TABLET ORAL EVERY 4 HOURS PRN
Status: DISCONTINUED | OUTPATIENT
Start: 2018-04-27 | End: 2018-04-29 | Stop reason: HOSPADM

## 2018-04-27 RX ORDER — HEPARIN SODIUM (PORCINE) LOCK FLUSH IV SOLN 100 UNIT/ML 100 UNIT/ML
250 SOLUTION INTRAVENOUS
Status: DISCONTINUED | OUTPATIENT
Start: 2018-04-27 | End: 2018-04-27

## 2018-04-27 RX ORDER — MORPHINE SULFATE ORAL SOLUTION 10 MG/5ML
7.5 SOLUTION ORAL EVERY 6 HOURS PRN
Status: DISCONTINUED | OUTPATIENT
Start: 2018-04-27 | End: 2018-04-28

## 2018-04-27 RX ORDER — CIPROFLOXACIN 2 MG/ML
400 INJECTION, SOLUTION INTRAVENOUS
Status: DISCONTINUED | OUTPATIENT
Start: 2018-04-27 | End: 2018-04-27

## 2018-04-27 RX ORDER — CEFEPIME HYDROCHLORIDE 1 G/50ML
1 INJECTION, SOLUTION INTRAVENOUS
Status: DISCONTINUED | OUTPATIENT
Start: 2018-04-28 | End: 2018-04-29 | Stop reason: HOSPADM

## 2018-04-27 RX ORDER — IBUPROFEN 600 MG/1
600 TABLET ORAL EVERY 6 HOURS PRN
Status: DISCONTINUED | OUTPATIENT
Start: 2018-04-27 | End: 2018-04-28

## 2018-04-27 RX ORDER — CHOLECALCIFEROL (VITAMIN D3) 25 MCG
1000 TABLET ORAL DAILY
Status: DISCONTINUED | OUTPATIENT
Start: 2018-04-27 | End: 2018-04-29 | Stop reason: HOSPADM

## 2018-04-27 RX ORDER — SODIUM CHLORIDE 9 MG/ML
INJECTION, SOLUTION INTRAVENOUS CONTINUOUS
Status: DISCONTINUED | OUTPATIENT
Start: 2018-04-27 | End: 2018-04-27

## 2018-04-27 RX ORDER — VANCOMYCIN HCL IN 5 % DEXTROSE 1G/250ML
15 PLASTIC BAG, INJECTION (ML) INTRAVENOUS
Status: DISCONTINUED | OUTPATIENT
Start: 2018-04-27 | End: 2018-04-29

## 2018-04-27 RX ORDER — CEFEPIME HYDROCHLORIDE 2 G/1
2 INJECTION, POWDER, FOR SOLUTION INTRAVENOUS
Status: COMPLETED | OUTPATIENT
Start: 2018-04-27 | End: 2018-04-27

## 2018-04-27 RX ORDER — SODIUM CHLORIDE, SODIUM LACTATE, POTASSIUM CHLORIDE, CALCIUM CHLORIDE 600; 310; 30; 20 MG/100ML; MG/100ML; MG/100ML; MG/100ML
INJECTION, SOLUTION INTRAVENOUS CONTINUOUS
Status: DISCONTINUED | OUTPATIENT
Start: 2018-04-27 | End: 2018-04-28

## 2018-04-27 RX ORDER — LORAZEPAM 0.5 MG/1
0.5 TABLET ORAL EVERY 8 HOURS PRN
Status: DISCONTINUED | OUTPATIENT
Start: 2018-04-27 | End: 2018-04-29 | Stop reason: HOSPADM

## 2018-04-27 RX ORDER — PROMETHAZINE HYDROCHLORIDE 12.5 MG/1
25 SUPPOSITORY RECTAL EVERY 6 HOURS PRN
Status: DISCONTINUED | OUTPATIENT
Start: 2018-04-27 | End: 2018-04-29 | Stop reason: HOSPADM

## 2018-04-27 RX ORDER — HEPARIN SODIUM (PORCINE) LOCK FLUSH IV SOLN 100 UNIT/ML 100 UNIT/ML
500 SOLUTION INTRAVENOUS
Status: COMPLETED | OUTPATIENT
Start: 2018-04-27 | End: 2018-04-27

## 2018-04-27 RX ORDER — BACLOFEN 10 MG/1
10 TABLET ORAL 2 TIMES DAILY PRN
Status: DISCONTINUED | OUTPATIENT
Start: 2018-04-27 | End: 2018-04-29 | Stop reason: HOSPADM

## 2018-04-27 RX ORDER — MORPHINE SULFATE 15 MG/1
15 TABLET ORAL EVERY 6 HOURS PRN
Status: DISCONTINUED | OUTPATIENT
Start: 2018-04-27 | End: 2018-04-27

## 2018-04-27 RX ORDER — SODIUM CHLORIDE 0.9 % (FLUSH) 0.9 %
5 SYRINGE (ML) INJECTION
Status: DISCONTINUED | OUTPATIENT
Start: 2018-04-27 | End: 2018-04-28

## 2018-04-27 RX ADMIN — VANCOMYCIN HYDROCHLORIDE 1250 MG: 1 INJECTION, POWDER, LYOPHILIZED, FOR SOLUTION INTRAVENOUS at 01:04

## 2018-04-27 RX ADMIN — MORPHINE SULFATE 7.5 MG: 10 SOLUTION ORAL at 03:04

## 2018-04-27 RX ADMIN — SODIUM CHLORIDE 1803 ML: 0.9 INJECTION, SOLUTION INTRAVENOUS at 12:04

## 2018-04-27 RX ADMIN — VANCOMYCIN HYDROCHLORIDE 1000 MG: 1 INJECTION, POWDER, LYOPHILIZED, FOR SOLUTION INTRAVENOUS at 12:04

## 2018-04-27 RX ADMIN — ACETAMINOPHEN 1000 MG: 500 TABLET ORAL at 12:04

## 2018-04-27 RX ADMIN — IBUPROFEN 600 MG: 600 TABLET, FILM COATED ORAL at 11:04

## 2018-04-27 RX ADMIN — SODIUM CHLORIDE, SODIUM LACTATE, POTASSIUM CHLORIDE, AND CALCIUM CHLORIDE: .6; .31; .03; .02 INJECTION, SOLUTION INTRAVENOUS at 04:04

## 2018-04-27 RX ADMIN — IBUPROFEN 600 MG: 600 TABLET, FILM COATED ORAL at 02:04

## 2018-04-27 RX ADMIN — CEFEPIME 2 G: 2 INJECTION, POWDER, FOR SOLUTION INTRAVENOUS at 01:04

## 2018-04-27 RX ADMIN — IBUPROFEN 400 MG: 400 TABLET, FILM COATED ORAL at 12:04

## 2018-04-27 RX ADMIN — MORPHINE SULFATE 7.5 MG: 10 SOLUTION ORAL at 09:04

## 2018-04-27 RX ADMIN — ONDANSETRON 4 MG: 4 TABLET, FILM COATED ORAL at 09:04

## 2018-04-27 RX ADMIN — SODIUM CHLORIDE, SODIUM LACTATE, POTASSIUM CHLORIDE, AND CALCIUM CHLORIDE: .6; .31; .03; .02 INJECTION, SOLUTION INTRAVENOUS at 06:04

## 2018-04-27 RX ADMIN — CEFEPIME 1 G: 1 INJECTION, POWDER, FOR SOLUTION INTRAMUSCULAR; INTRAVENOUS at 02:04

## 2018-04-27 RX ADMIN — CIPROFLOXACIN 400 MG: 2 INJECTION, SOLUTION INTRAVENOUS at 06:04

## 2018-04-27 RX ADMIN — SODIUM CHLORIDE, SODIUM LACTATE, POTASSIUM CHLORIDE, AND CALCIUM CHLORIDE: .6; .31; .03; .02 INJECTION, SOLUTION INTRAVENOUS at 11:04

## 2018-04-27 RX ADMIN — VITAMIN D, TAB 1000IU (100/BT) 1000 UNITS: 25 TAB at 09:04

## 2018-04-27 RX ADMIN — HEPARIN SODIUM (PORCINE) LOCK FLUSH IV SOLN 100 UNIT/ML 500 UNITS: 100 SOLUTION at 01:04

## 2018-04-27 NOTE — SUBJECTIVE & OBJECTIVE
"Past Medical History:   Diagnosis Date    Abdominal pain 08/12/2010    eval for abd and chest wall pain at [facility], [place], [place] - cxr wnl, EKG ([facility]), troponin wnl, normal chemistries    Allergy     Cancer     testicular    Chondromalacia patellae     Chronic nausea 2015    eval by GI Dr. Tushar Artis - given zofran and ciproheptadine     Chronic pain     Chronic pain     [facility] - [facility] meds; followed by psych and pain clinic and unresponsive to behavioral/CBT/old records reports c/f conversion disorder     Foot pain 04/2010    4/10 + SHIRA, eval by Dr. ALLY Lion at [facility] Rheum -dx unclear ? gout and trial of nsaids and pdn, xrays normal 1/11, referred to podiatry, re-eval by rheum 2/12 and MRI and films wnl    Fracture of right radius 09/2009    Lyme arthritis     multiple joints    Lyme disease 2013    Memory loss 03/2012    eval for memory loss [place] neuro [place] [facility] [place], EEG wnl. Dr. Patricio suggested emotional factors & ref to Tushar Davies, PhD for  eval & neuropsych eval 3/12 Lupe Delgado, PhD - no evidence of formal thought disorder or psychosis - documented severe memory impairment; not related to to ADD or executive function issues. sugesstion that memory issues may be related to "emotional factors", ?conversion d/o    Stress fracture of tibia and fibula 08/2011    Urticaria        Past Surgical History:   Procedure Laterality Date    PORTACATH PLACEMENT Right        Review of patient's allergies indicates:   Allergen Reactions    Mushroom Anaphylaxis    Bamboo     Bee pollens     Mushroom flavor     Venom-honey bee        No current facility-administered medications on file prior to encounter.      Current Outpatient Prescriptions on File Prior to Encounter   Medication Sig    baclofen (LIORESAL) 10 MG tablet Take 1 tablet (10 mg total) by mouth 2 (two) times daily as needed (hiccups).    morphine (MSIR) 15 MG tablet " Take 1 tablet (15 mg total) by mouth every 6 (six) hours as needed for Pain.    ondansetron (ZOFRAN) 4 MG tablet Take 1 tablet (4 mg total) by mouth every 6 (six) hours as needed for Nausea.    albuterol (PROAIR HFA) 90 mcg/actuation inhaler     albuterol (PROVENTIL) 2.5 mg /3 mL (0.083 %) nebulizer solution Take 3 mLs (2.5 mg total) by nebulization every 6 (six) hours as needed for Wheezing. Rescue    albuterol 90 mcg/actuation inhaler Inhale 2 puffs into the lungs every 6 (six) hours as needed for Wheezing.    cholecalciferol, vitamin D3, 1,000 unit capsule Take 1 capsule (1,000 Units total) by mouth once daily.    diphenhydramine-acetaminophen (TYLENOL PM)  mg Tab Take 1 tablet by mouth once daily.    diphenhydrAMINE-aluminum-magnesium hydroxide-simethicone-lidocaine HCl 2% Swish and spit 15 mLs every 4 (four) hours as needed (mouth sore).    EPINEPHrine (EPIPEN) 0.3 mg/0.3 mL AtIn Inject 0.3 mg/mL into the muscle.    ibuprofen (ADVIL,MOTRIN) 600 MG tablet Take 1 tablet (600 mg total) by mouth every 6 (six) hours as needed for Pain.    lidocaine-prilocaine (EMLA) cream Apply over port 30 minutes prior to chemo    LORazepam (ATIVAN) 0.5 MG tablet Take 1 tablet (0.5 mg total) by mouth every 8 (eight) hours as needed for Anxiety.    MULTIVIT-MINERALS/FERROUS FUM (MULTI VITAMIN ORAL) Take by mouth once daily.    promethazine (PHENERGAN) 25 MG suppository Place 1 suppository (25 mg total) rectally every 6 (six) hours as needed for Nausea.    promethazine (PHENERGAN) 25 MG tablet Take 1 tablet (25 mg total) by mouth every 4 (four) hours as needed for Nausea.     Family History     Problem Relation (Age of Onset)    Arthritis Mother, Sister    Depression Sister    Gout Father    Hyperlipidemia Father    No Known Problems Sister    Other Mother        Social History Main Topics    Smoking status: Former Smoker     Packs/day: 0.25     Types: Vaping with nicotine, Vaping w/o nicotine, Cigarettes,  Cigars     Quit date: 3/15/2018    Smokeless tobacco: Never Used    Alcohol use Yes      Comment: occ    Drug use: Yes     Types: Marijuana, LSD, Cocaine, Other-see comments      Comment: Kratom (capsule or leaf form)    Sexual activity: Yes     Partners: Female     Birth control/ protection: Condom     Review of Systems   Constitutional: Positive for fever. Negative for activity change, appetite change and fatigue.   HENT: Negative for congestion, ear pain and postnasal drip.    Eyes: Negative for discharge.   Respiratory: Negative for apnea, shortness of breath and wheezing.    Cardiovascular: Negative for chest pain and leg swelling.   Gastrointestinal: Negative for abdominal distention, abdominal pain, nausea and vomiting.   Endocrine: Negative for polydipsia, polyphagia and polyuria.   Genitourinary: Negative for difficulty urinating, flank pain, frequency, hematuria and urgency.   Musculoskeletal: Positive for myalgias. Negative for arthralgias and joint swelling.   Skin: Negative for pallor and rash.   Allergic/Immunologic: Negative for environmental allergies and food allergies.   Neurological: Negative for dizziness, speech difficulty, weakness, light-headedness and headaches.   Hematological: Does not bruise/bleed easily.   Psychiatric/Behavioral: Negative for agitation.     Objective:     Vital Signs (Most Recent):  Temp: (!) 101.1 °F (38.4 °C) (04/27/18 0022)  Pulse: (!) 120 (04/26/18 2155)  Resp: 20 (04/26/18 2155)  BP: (!) 118/57 (04/26/18 2155)  SpO2: 98 % (04/26/18 2155) Vital Signs (24h Range):  Temp:  [98.8 °F (37.1 °C)-101.1 °F (38.4 °C)] 101.1 °F (38.4 °C)  Pulse:  [120] 120  Resp:  [20] 20  SpO2:  [98 %] 98 %  BP: (118)/(57) 118/57     Weight: 60.1 kg (132 lb 7.9 oz)  Body mass index is 20.75 kg/m².    Physical Exam   Constitutional: He is oriented to person, place, and time. He appears well-developed and well-nourished.   HENT:   Head: Normocephalic.   Eyes: Conjunctivae are normal.   Neck:  Normal range of motion. Neck supple.   Cardiovascular: Regular rhythm, normal heart sounds and intact distal pulses.  Tachycardia present.    Pulses:       Radial pulses are 2+ on the right side, and 2+ on the left side.        Dorsalis pedis pulses are 1+ on the right side, and 1+ on the left side.   Pulmonary/Chest: Effort normal and breath sounds normal. Tachypnea noted.   Abdominal: Soft. Bowel sounds are normal. He exhibits no distension. There is no tenderness.   Musculoskeletal: Normal range of motion.   Neurological: He is alert and oriented to person, place, and time. He has normal strength. GCS eye subscore is 4. GCS verbal subscore is 5. GCS motor subscore is 6.   Skin: Skin is warm and dry. There is pallor.   Psychiatric: He has a normal mood and affect. His speech is normal and behavior is normal.           Significant Labs:   CBC:   Recent Labs  Lab 04/26/18  2335   WBC 1.57*   HGB 12.8*   HCT 36.9*   PLT 91*     CMP:   Recent Labs  Lab 04/26/18  2335      K 4.1      CO2 25   *   BUN 12   CREATININE 0.8   CALCIUM 9.3   PROT 6.4   ALBUMIN 3.9   BILITOT 0.6   ALKPHOS 52*   AST 9*   ALT 26   ANIONGAP 11   EGFRNONAA >60       Significant Imaging: I have reviewed all pertinent imaging results/findings within the past 24 hours.

## 2018-04-27 NOTE — PLAN OF CARE
SW attempted to met with patient at the bedside. Patient is asleep and unarousable.     SW contacted patients father [phone]. No answer. No option for voicemail.     SW contacted patients grand mother Radha . Per Radha patients address is [address] [place] [address] in [place]. And he lives with room mates. All other information must be verified with patient.     SW will continue to follow patient.

## 2018-04-27 NOTE — H&P
"[facility] Hospital Medicine  History & Physical    Patient Name: Alejandro Neff  MRN: [id number]  Admission Date: 4/26/2018  Attending Physician: Иван Ferreira MD   Primary Care Provider: Nata Hernandez MD         Patient information was obtained from patient, past medical records and ER records.     Subjective:     Principal Problem:SIRS (systemic inflammatory response syndrome)    Chief Complaint:   Chief Complaint   Patient presents with    Fever     of 102 in the past hr, been going up since 1900, has body ache, has StageIII testicular CA        HPI: This is a 23 y.o. male, with history of stage III testicular CA and Lyme disease, who presents with complaint of fever which started about one hour ago. Patient reports gradual onset of fever with associated myalgias and nausea. He additionally reports onset of bilateral tinnitus and "occasional pressure" which started last night. Patient reports that he last underwent chemotherapy six days ago. He says he was informed by hemo/onc Dr. Rojas that this is a side effect of chemo and has a f/u appointment with ENT specialist. He reports history of frequent childhood ear infections which were "super painful," but denies ear pain currently. In review of systems, patient also notes pain with urination at baseline attributed to chemo. He denies vomiting, cough, sore throat, change in urinary frequency/urgency, or hematuria. He denies recent sick contacts. Patient has no further complaints at this time.    Past Medical History:   Diagnosis Date    Abdominal pain 08/12/2010    eval for abd and chest wall pain at [facility], [place], [place] - cxr wnl, EKG ([facility]), troponin wnl, normal chemistries    Allergy     Cancer     testicular    Chondromalacia patellae     Chronic nausea 2015    eval by GI Dr. Tushar Artis - given zofran and ciproheptadine     Chronic pain     Chronic pain     [facility] [place] - no meds; followed by psych and pain clinic and unresponsive to behavioral/CBT/old records reports c/f conversion disorder     Foot pain 04/2010    4/10 + SHIRA, eval by Dr. ALLY Lion at [facility] Rheum -dx unclear ? gout and trial of nsaids and pdn, xrays normal 1/11, referred to podiatry, re-eval by rheum 2/12 and MRI and films wnl    Fracture of right radius 09/2009    Lyme arthritis     multiple joints    Lyme disease 2013    Memory loss 03/2012    [place] [facility] [place] wnl. Dr. Patricio suggested emotional factors & ref to Tushar Davies, PhD for  eval & neuropsych eval 3/12 Lupe Delgado, PhD - no evidence of formal thought disorder or psychosis - documented severe memory impairment; not related to to ADD or executive function issues. sugesstion that memory issues may be related to "emotional factors", ?conversion d/o    Stress fracture of tibia and fibula 08/2011    Urticaria        Past Surgical History:   Procedure Laterality Date    PORTACATH PLACEMENT Right        Review of patient's allergies indicates:   Allergen Reactions    Mushroom Anaphylaxis    Bamboo     Bee pollens     Mushroom flavor     Venom-honey bee        No current facility-administered medications on file prior to encounter.      Current Outpatient Prescriptions on File Prior to Encounter   Medication Sig    baclofen (LIORESAL) 10 MG tablet Take 1 tablet (10 mg total) by mouth 2 (two) times daily as needed (hiccups).    morphine (MSIR) 15 MG tablet Take 1 tablet (15 mg total) by mouth every 6 (six) hours as needed for Pain.    ondansetron (ZOFRAN) 4 MG tablet Take 1 tablet (4 mg total) by mouth every 6 (six) hours as needed for Nausea.    albuterol (PROAIR HFA) 90 mcg/actuation inhaler     albuterol (PROVENTIL) 2.5 mg /3 mL (0.083 %) nebulizer solution Take 3 mLs (2.5 mg total) by nebulization every 6 (six) hours as needed for Wheezing. Rescue    albuterol 90 mcg/actuation inhaler " Inhale 2 puffs into the lungs every 6 (six) hours as needed for Wheezing.    cholecalciferol, vitamin D3, 1,000 unit capsule Take 1 capsule (1,000 Units total) by mouth once daily.    diphenhydramine-acetaminophen (TYLENOL PM)  mg Tab Take 1 tablet by mouth once daily.    diphenhydrAMINE-aluminum-magnesium hydroxide-simethicone-lidocaine HCl 2% Swish and spit 15 mLs every 4 (four) hours as needed (mouth sore).    EPINEPHrine (EPIPEN) 0.3 mg/0.3 mL AtIn Inject 0.3 mg/mL into the muscle.    ibuprofen (ADVIL,MOTRIN) 600 MG tablet Take 1 tablet (600 mg total) by mouth every 6 (six) hours as needed for Pain.    lidocaine-prilocaine (EMLA) cream Apply over port 30 minutes prior to chemo    LORazepam (ATIVAN) 0.5 MG tablet Take 1 tablet (0.5 mg total) by mouth every 8 (eight) hours as needed for Anxiety.    MULTIVIT-MINERALS/FERROUS FUM (MULTI VITAMIN ORAL) Take by mouth once daily.    promethazine (PHENERGAN) 25 MG suppository Place 1 suppository (25 mg total) rectally every 6 (six) hours as needed for Nausea.    promethazine (PHENERGAN) 25 MG tablet Take 1 tablet (25 mg total) by mouth every 4 (four) hours as needed for Nausea.     Family History     Problem Relation (Age of Onset)    Arthritis Mother, Sister    Depression Sister    Gout Father    Hyperlipidemia Father    No Known Problems Sister    Other Mother        Social History Main Topics    Smoking status: Former Smoker     Packs/day: 0.25     Types: Vaping with nicotine, Vaping w/o nicotine, Cigarettes, Cigars     Quit date: 3/15/2018    Smokeless tobacco: Never Used    Alcohol use Yes      Comment: occ    Drug use: Yes     Types: Marijuana, LSD, Cocaine, Other-see comments      Comment: Kratom (capsule or leaf form)    Sexual activity: Yes     Partners: Female     Birth control/ protection: Condom     Review of Systems   Constitutional: Positive for fever. Negative for activity change, appetite change and fatigue.   HENT: Negative for  congestion, ear pain and postnasal drip.    Eyes: Negative for discharge.   Respiratory: Negative for apnea, shortness of breath and wheezing.    Cardiovascular: Negative for chest pain and leg swelling.   Gastrointestinal: Negative for abdominal distention, abdominal pain, nausea and vomiting.   Endocrine: Negative for polydipsia, polyphagia and polyuria.   Genitourinary: Negative for difficulty urinating, flank pain, frequency, hematuria and urgency.   Musculoskeletal: Positive for myalgias. Negative for arthralgias and joint swelling.   Skin: Negative for pallor and rash.   Allergic/Immunologic: Negative for environmental allergies and food allergies.   Neurological: Negative for dizziness, speech difficulty, weakness, light-headedness and headaches.   Hematological: Does not bruise/bleed easily.   Psychiatric/Behavioral: Negative for agitation.     Objective:     Vital Signs (Most Recent):  Temp: (!) 101.1 °F (38.4 °C) (04/27/18 0022)  Pulse: (!) 120 (04/26/18 2155)  Resp: 20 (04/26/18 2155)  BP: (!) 118/57 (04/26/18 2155)  SpO2: 98 % (04/26/18 2155) Vital Signs (24h Range):  Temp:  [98.8 °F (37.1 °C)-101.1 °F (38.4 °C)] 101.1 °F (38.4 °C)  Pulse:  [120] 120  Resp:  [20] 20  SpO2:  [98 %] 98 %  BP: (118)/(57) 118/57     Weight: 60.1 kg (132 lb 7.9 oz)  Body mass index is 20.75 kg/m².    Physical Exam   Constitutional: He is oriented to person, place, and time. He appears well-developed and well-nourished.   HENT:   Head: Normocephalic.   Eyes: Conjunctivae are normal.   Neck: Normal range of motion. Neck supple.   Cardiovascular: Regular rhythm, normal heart sounds and intact distal pulses.  Tachycardia present.    Pulses:       Radial pulses are 2+ on the right side, and 2+ on the left side.        Dorsalis pedis pulses are 1+ on the right side, and 1+ on the left side.   Pulmonary/Chest: Effort normal and breath sounds normal. Tachypnea noted.   Abdominal: Soft. Bowel sounds are normal. He exhibits no  distension. There is no tenderness.   Musculoskeletal: Normal range of motion.   Neurological: He is alert and oriented to person, place, and time. He has normal strength. GCS eye subscore is 4. GCS verbal subscore is 5. GCS motor subscore is 6.   Skin: Skin is warm and dry. There is pallor.   Psychiatric: He has a normal mood and affect. His speech is normal and behavior is normal.           Significant Labs:   CBC:   Recent Labs  Lab 04/26/18  2335   WBC 1.57*   HGB 12.8*   HCT 36.9*   PLT 91*     CMP:   Recent Labs  Lab 04/26/18  2335      K 4.1      CO2 25   *   BUN 12   CREATININE 0.8   CALCIUM 9.3   PROT 6.4   ALBUMIN 3.9   BILITOT 0.6   ALKPHOS 52*   AST 9*   ALT 26   ANIONGAP 11   EGFRNONAA >60       Significant Imaging: I have reviewed all pertinent imaging results/findings within the past 24 hours.    Assessment/Plan:     * SIRS (systemic inflammatory response syndrome)    Febrile to 102, tachycardic to 120, lactic acid high WNL(2.2), leukopenia (1.57)    Vancomycin/Cefepime per Dr. Rojas's recommendations  IVF at 125  Lactic acid in 4 hours pending          Mediastinal mass    Consult Hem/Onc for continued management            VTE Risk Mitigation     None             Jonathan Luong NP  Department of Hospital Medicine   [facility]

## 2018-04-27 NOTE — ED TRIAGE NOTES
Pt presents with fever, onset approx 1900 today. Pt had chemo treatment Friday, port noted to right neck. Pt expierenced tinnitus and a non painful pressure yesterday. Pt denies and cough or cold symptoms, denies sore throat, denies contact with anyone ill. Pt denies burning with urination. Pt reports increased urinary frequency due to increased oral intake as directed by his oncologist and reports discomfort with urination, pt denies any change from baseline noticed recently. Pt did not take any tylenol or ibuprofen PTA. pt reports taking morphine 4 mg, anti emetic today. Female caretaker at bedside. Pt in NAD. Will continue to monitor.

## 2018-04-27 NOTE — PROGRESS NOTES
VSS. Afebrile. Aaox4. Comfortable and denies need for pain medication at this time. Hem/onc consult attempted to call in, but no answer- will pass on to oncoming RN. Neutropenic precautions initiated. POC reviewed and ongoing. Safety maintained. Will continue to monitor.

## 2018-04-27 NOTE — HPI
24 yo man with primary mediastinal seminoma, 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 Started to feel bad yesterday afternoon. Had a fever of 101 in the ED last night. . Was started on vanc, cefepime, cipro. De-escalated to cefepime this morning. He currently feels fine. Continues to have chronic pain. Nausea not bad, phenergan suppository helps.

## 2018-04-27 NOTE — CONSULTS
[facility]  Hematology/Oncology  Consult Note    Patient Name: Alejandro Neff  MRN: [id number]  Admission Date: 4/26/2018  Hospital Length of Stay: 0 days  Code Status: Full Code   Attending Provider: Itz Hebert MD  Consulting Provider: Suha Cantor MD  Primary Care Physician: Nata Hernandez MD  Principal Problem:Neutropenic fever    Inpatient consult to Hematology/Oncology  Consult performed by: SUHA CANTOR  Consult ordered by: COOKIE BOGGS  Reason for consult: neutropenic fever  Assessment/Recommendations: See consult note        Subjective:     HPI:  24 yo man with primary mediastinal seminoma, 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 Started to feel bad yesterday afternoon. Had a fever of 101 in the ED last night. . Was started on vanc, cefepime, cipro. De-escalated to cefepime this morning. He currently feels fine. Continues to have chronic pain. Nausea not bad, phenergan suppository helps.     Oncology Treatment Plan:   OP TESTICULAR EP    Medications:  Continuous Infusions:   lactated ringers 125 mL/hr at 04/27/18 0400     Scheduled Meds:   ceFEPime (MAXIPIME) IVPB  1 g Intravenous Q12H    vancomycin (VANCOCIN) IVPB  15 mg/kg Intravenous Q12H    vitamin D  1,000 Units Oral Daily     PRN Meds:baclofen, ibuprofen, LORazepam, magic mouthwash (diphenhydrAMINE 12.5 mg/5 mL 20 mL, aluminum & magnesium hydroxide-simethicone (MYLANTA) 20 mL, lidocaine HCl 2% (XYLOCAINE) 20 mL) solution, morphine, ondansetron, promethazine, promethazine, sodium chloride 0.9%     Review of patient's allergies indicates:   Allergen Reactions    Mushroom Anaphylaxis    Bamboo     Bee pollens     Mushroom flavor     Venom-honey bee         Past Medical History:   Diagnosis Date    Abdominal pain 08/12/2010    eval for abd and chest wall pain at [place] - cxr wnl, EKG (RVH), troponin  "wnl, normal chemistries    Allergy     Cancer     testicular    Chondromalacia patellae     Chronic nausea 2015    eval by GI Dr. Tushar Artis - given zofran and ciproheptadine     Chronic pain     Chronic pain     [facility] meds; followed by psych and pain clinic and unresponsive to behavioral/CBT/old records reports c/f conversion disorder     Foot pain 04/2010    4/10 + SHIRA, eval by Dr. ALLY Lion at [facility] -dx unclear ? gout and trial of nsaids and pdn, xrays normal 1/11, referred to podiatry, re-eval by rheum 2/12 and MRI and films wnl    Fracture of right radius 09/2009    Lyme arthritis     multiple joints    Lyme disease 2013    Memory loss 03/2012    eval for memory loss by neuro [place] [facility] [place], EEG wnl. Dr. Patricio suggested emotional factors & ref to Tushar Davies, PhD for  eval & neuropsych eval 3/12 Lupe Delgado, PhD - no evidence of formal thought disorder or psychosis - documented severe memory impairment; not related to to ADD or executive function issues. sugesstion that memory issues may be related to "emotional factors", ?conversion d/o    Stress fracture of tibia and fibula 08/2011    Urticaria      Past Surgical History:   Procedure Laterality Date    PORTACATH PLACEMENT Right      Family History     Problem Relation (Age of Onset)    Arthritis Mother, Sister    Depression Sister    Gout Father    Hyperlipidemia Father    No Known Problems Sister    Other Mother        Social History Main Topics    Smoking status: Former Smoker     Packs/day: 0.25     Types: Vaping with nicotine, Vaping w/o nicotine, Cigarettes, Cigars     Quit date: 3/15/2018    Smokeless tobacco: Never Used    Alcohol use Yes      Comment: occ    Drug use: Yes     Types: Marijuana, LSD, Cocaine, Other-see comments      Comment: Kratom (capsule or leaf form)    Sexual activity: Yes     Partners: Female     Birth control/ protection: Condom "       Review of Systems   Constitutional: Positive for fatigue and fever. Negative for appetite change and unexpected weight change.   HENT: Positive for tinnitus. Negative for mouth sores, nosebleeds, trouble swallowing and voice change.    Eyes: Negative for visual disturbance.   Respiratory: Negative for cough, shortness of breath and wheezing.    Cardiovascular: Negative for chest pain, palpitations and leg swelling.   Gastrointestinal: Negative for abdominal pain, blood in stool, diarrhea, nausea and vomiting.   Genitourinary: Negative for frequency and hematuria.   Musculoskeletal: Positive for arthralgias and myalgias. Negative for back pain, neck pain and neck stiffness.   Skin: Negative for rash.   Neurological: Negative for dizziness, tremors, seizures, syncope, speech difficulty, weakness, numbness and headaches.   Hematological: Negative for adenopathy. Does not bruise/bleed easily.   Psychiatric/Behavioral: Negative for confusion, dysphoric mood and self-injury. The patient is not nervous/anxious.      Objective:     Vital Signs (Most Recent):  Temp: 98 °F (36.7 °C) (04/27/18 1100)  Pulse: 94 (04/27/18 1200)  Resp: (!) 35 (04/27/18 1100)  BP: 119/61 (04/27/18 1200)  SpO2: 98 % (04/27/18 1200) Vital Signs (24h Range):  Temp:  [97.7 °F (36.5 °C)-101.1 °F (38.4 °C)] 98 °F (36.7 °C)  Pulse:  [] 94  Resp:  [14-35] 35  SpO2:  [95 %-98 %] 98 %  BP: ()/(54-71) 119/61     Weight: 60.2 kg (132 lb 11.5 oz)  Body mass index is 20.79 kg/m².  Body surface area is 1.69 meters squared.      Intake/Output Summary (Last 24 hours) at 04/27/18 1348  Last data filed at 04/27/18 1255   Gross per 24 hour   Intake             2653 ml   Output              800 ml   Net             1853 ml       Physical Exam   Constitutional: He is oriented to person, place, and time. He appears well-developed and well-nourished. He is cooperative. No distress.   HENT:   Head: Normocephalic and atraumatic.   Eyes: Conjunctivae and  EOM are normal. Pupils are equal, round, and reactive to light. No scleral icterus.   Neck: Normal range of motion. Neck supple.   Cardiovascular: Normal rate and regular rhythm.  Exam reveals no gallop and no friction rub.    No murmur heard.  Pulmonary/Chest: Breath sounds normal. He has no wheezes. He has no rales.   Abdominal: Soft. Bowel sounds are normal. He exhibits no distension and no mass. There is no hepatosplenomegaly. There is no tenderness.   Musculoskeletal: Normal range of motion. He exhibits no edema.   Lymphadenopathy:     He has no cervical adenopathy.   Neurological: He is alert and oriented to person, place, and time. He has normal strength. No cranial nerve deficit.   Skin: Skin is warm and dry. No rash noted. No erythema.   Vitals reviewed.      Significant Labs:   CBC:   Recent Labs  Lab 04/26/18  2335 04/27/18  0450   WBC 1.57* 1.54*   HGB 12.8* 12.7*   HCT 36.9* 37.0*   PLT 91* 90*    and CMP:   Recent Labs  Lab 04/26/18 2335 04/27/18  0450    136   K 4.1 4.1    100   CO2 25 25   * 115*   BUN 12 12   CREATININE 0.8 0.8   CALCIUM 9.3 9.4   PROT 6.4 6.4   ALBUMIN 3.9 3.9   BILITOT 0.6 0.6   ALKPHOS 52* 53*   AST 9* 10   ALT 26 26   ANIONGAP 11 11   EGFRNONAA >60 >60       Diagnostic Results:  I have reviewed all pertinent imaging results/findings within the past 24 hours.    Assessment/Plan:     * Neutropenic fever    - CXR clear, UA neg for UTI  - c/w cefepime  - follow up on cultures  - received neulasta on 4/21, no need for further G-CSF          Anemia associated with chemotherapy    Mild. monitor        Thrombocytopenia    2/2 chemo  monitor        Germ cell tumor    - s/p cycle 1 of EP 4/16-4/20  - next cycle due on 5/7  - follow up with me as outpatient            Thank you for your consult. I will follow-up with patient. Please contact us if you have any additional questions.    Suha Rojas MD  Hematology/Oncology  [facility]

## 2018-04-27 NOTE — PLAN OF CARE
LMSW met with patient at the bedside.     Patient is alert and oriented.     Patient's PCP is Dr. Nata Hernandez.     Prior to admission patient was independent with self help ADLs but reports being weak at times from treatment so he does not drive, cook, or clean. Patient has two friends whom are his paid caregivers Yovana Guzman and Nata Fritz.     Patients billing address is the address on the facesheet, Patients home addres is [address]     Patient reports no CM needs.     Patient will be transported home by caregivers pending discharge.        04/27/18 1205   Discharge Assessment   Assessment Type Discharge Planning Assessment   Confirmed/corrected address and phone number on facesheet? Yes   Assessment information obtained from? Patient   Communicated expected length of stay with patient/caregiver yes   Prior to hospitilization cognitive status: Alert/Oriented   Prior to hospitalization functional status: Partially Dependent;Independent   Current cognitive status: Alert/Oriented   Current Functional Status: Partially Dependent;Independent   Lives With friend(s)   Able to Return to Prior Arrangements yes   Is patient able to care for self after discharge? Yes   Patient's perception of discharge disposition home or selfcare   Readmission Within The Last 30 Days no previous admission in last 30 days   Patient currently being followed by outpatient case management? No   Patient currently receives any other outside agency services? No   Equipment Currently Used at Home none   Do you have any problems affording any of your prescribed medications? No   Is the patient taking medications as prescribed? yes   Does the patient have transportation home? Yes   Transportation Available family or friend will provide   Does the patient receive services at the Coumadin Clinic? No   Discharge Plan A Home;Home with family   Patient/Family In Agreement With Plan yes

## 2018-04-27 NOTE — ASSESSMENT & PLAN NOTE
- CXR clear, UA neg for UTI  - c/w cefepime  - follow up on cultures  - received neulasta on 4/21, no need for further G-CSF

## 2018-04-27 NOTE — ASSESSMENT & PLAN NOTE
Febrile to 102, tachycardic to 120, lactic acid high WNL(2.2), leukopenia (1.57)    Vancomycin/Cefepime per Dr. Rojas's recommendations  IVF at 125  Lactic acid in 4 hours pending

## 2018-04-27 NOTE — ED PROVIDER NOTES
"Encounter Date: 4/26/2018    SCRIBE #1 NOTE: I, Yeimi Rucker, am scribing for, and in the presence of, Dr. Ferreira.       History     Chief Complaint   Patient presents with    Fever     of 102 in the past hr, been going up since 1900, has body ache, has StageIII testicular CA     Time seen by provider: 11:02 PM    This is a 23 y.o. male, with history of stage III testicular CA and Lyme disease, who presents with complaint of fever which started about one hour ago. Patient reports gradual onset of fever with associated myalgias and nausea. He additionally reports onset of bilateral tinnitus and "occasional pressure" which started last night. Patient reports that he last underwent chemotherapy six days ago. He says he was informed by hemo/onc Dr. Rojas that this is a side effect of chemo and has a f/u appointment with ENT specialist. He reports history of frequent childhood ear infections which were "super painful," but denies ear pain currently. In review of systems, patient also notes pain with urination at baseline attributed to chemo. He denies vomiting, cough, sore throat, change in urinary frequency/urgency, or hematuria. He denies recent sick contacts. Patient has no further complaints at this time.      The history is provided by the patient.     Review of patient's allergies indicates:   Allergen Reactions    Mushroom Anaphylaxis    Bamboo     Bee pollens     Mushroom flavor     Venom-honey bee      Past Medical History:   Diagnosis Date    Abdominal pain 08/12/2010    eval for abd and chest wall pain at [place] - cxr wnl, EKG (RVH), troponin wnl, normal chemistries    Allergy     Cancer     testicular    Chondromalacia patellae     Chronic nausea 2015    eval by GI Dr. Tushar Artis - given zofran and ciproheptadine     Chronic pain     Chronic pain     [facility] - [facility] meds; followed by psych and pain clinic and " "unresponsive to behavioral/CBT/old records reports c/f conversion disorder     Foot pain 04/2010    4/10 + [facility]IRA, eval by Dr. ALLY Lion at [facility] -dx unclear ? gout and trial of nsaids and pdn, xrays normal 1/11, referred to podiatry, re-eval by rheum 2/12 and MRI and films wnl    Fracture of right radius 09/2009    Lyme arthritis     multiple joints    Lyme disease 2013    Memory loss 03/2012    eval for memory loss by neuro at [facility] - MRI, EEG wnl. Dr. Ptaricio suggested emotional factors & ref to Tushar Davies, PhD for  eval & neuropsych eval 3/12 Lupe Delgado, PhD - no evidence of formal thought disorder or psychosis - documented severe memory impairment; not related to to ADD or executive function issues. sugesstion that memory issues may be related to "emotional factors", ?conversion d/o    Stress fracture of tibia and fibula 08/2011    Urticaria      Past Surgical History:   Procedure Laterality Date    PORTACATH PLACEMENT Right      Family History   Problem Relation Age of Onset    Arthritis Mother     Other Mother      benign tumor of brain and spinal cord    Hyperlipidemia Father     Gout Father     No Known Problems Sister     Arthritis Sister      shoulder    Depression Sister      Social History   Substance Use Topics    Smoking status: Former Smoker     Packs/day: 0.25     Types: Vaping with nicotine, Vaping w/o nicotine, Cigarettes, Cigars     Quit date: 3/15/2018    Smokeless tobacco: Never Used    Alcohol use Yes      Comment: occ     Review of Systems   Constitutional: Positive for fever.   HENT: Negative for ear pain and sore throat.         Positive for bilateral tinnitus.   Respiratory: Negative for cough and shortness of breath.    Cardiovascular: Negative for chest pain.   Gastrointestinal: Positive for nausea. Negative for abdominal pain and vomiting.   Genitourinary: Positive for dysuria (chronic, attributed to chemo). Negative for frequency, hematuria and urgency. "   Musculoskeletal: Positive for myalgias.   Skin: Negative for rash.   Neurological: Negative for weakness.   Hematological: Does not bruise/bleed easily.       Physical Exam     Initial Vitals [04/26/18 2155]   BP Pulse Resp Temp SpO2   (!) 118/57 (!) 120 20 98.8 °F (37.1 °C) 98 %      MAP       77.33         Physical Exam    Nursing note and vitals reviewed.  Constitutional: He appears well-developed and well-nourished. He is not diaphoretic. No distress.   HENT:   Head: Normocephalic and atraumatic.   Mouth/Throat: Oropharynx is clear and moist.   Left TM full without erythema or bulging. No pain on manipulation of external ears. No tonsillar edema, erythema, or exudate.   Eyes: EOM are normal. Pupils are equal, round, and reactive to light. No scleral icterus.   Neck: Normal range of motion. Neck supple.   Bilateral cervical lymphadenopathy.   Cardiovascular: Regular rhythm and normal heart sounds. Tachycardia present.  Exam reveals no gallop and no friction rub.    No murmur heard.  Pulmonary/Chest: Breath sounds normal. No respiratory distress. He has no wheezes. He has no rhonchi. He has no rales.   Abdominal: Soft. He exhibits no distension. There is no tenderness. There is no rebound and no guarding.   Musculoskeletal: Normal range of motion. He exhibits no edema or tenderness.   Lymphadenopathy:     He has cervical adenopathy.   Neurological: He is alert and oriented to person, place, and time.   Skin: Skin is warm and dry. Capillary refill takes less than 2 seconds. No rash and no abscess noted. No erythema. No pallor.   No skin rashes or lesions.   Psychiatric: He has a normal mood and affect. His behavior is normal. Judgment and thought content normal.         ED Course   Critical Care  Date/Time: 4/26/2018 11:00 PM  Performed by: JUSTIN FIGUEROA  Authorized by: JUSTIN FIGUEROA   Direct patient critical care time: 20 minutes  Additional history critical care time: 5 minutes  Ordering / reviewing  critical care time: 5 minutes  Documentation critical care time: 5 minutes  Consulting other physicians critical care time: 5 minutes  Other critical care time: 5 minutes  Total critical care time (exclusive of procedural time) : 45 minutes  Critical care time was exclusive of separately billable procedures and treating other patients and teaching time.  Critical care was necessary to treat or prevent imminent or life-threatening deterioration of the following conditions: sepsis.        Labs Reviewed   CBC W/ AUTO DIFFERENTIAL - Abnormal; Notable for the following:        Result Value    WBC 1.57 (*)     RBC 4.49 (*)     Hemoglobin 12.8 (*)     Hematocrit 36.9 (*)     Platelets 91 (*)     Gran% 18.0 (*)     Lymph% 63.0 (*)     Mono% 17.0 (*)     Platelet Estimate Decreased (*)     All other components within normal limits    Narrative:     WBC critical result(s) called and verbal readback obtained from Moises Arzola RN ED, 04/27/2018 00:15   COMPREHENSIVE METABOLIC PANEL - Abnormal; Notable for the following:     Glucose 118 (*)     Alkaline Phosphatase 52 (*)     AST 9 (*)     All other components within normal limits   URINALYSIS - Abnormal; Notable for the following:     Specific Gravity, UA <=1.005 (*)     All other components within normal limits   CULTURE, BLOOD   CULTURE, BLOOD   CULTURE, URINE   CULTURE, BLOOD   LACTIC ACID, PLASMA   LACTIC ACID, PLASMA     X-Ray Chest AP Portable   Final Result      No acute cardiopulmonary process identified.         Electronically signed by: Lizzy Harding MD   Date:    04/26/2018   Time:    23:07        Imaging Results          X-Ray Chest AP Portable (Final result)  Result time 04/26/18 23:07:04    Final result by Lizzy Harding MD (04/26/18 23:07:04)                 Impression:      No acute cardiopulmonary process identified.      Electronically signed by: Lizzy Harding MD  Date:    04/26/2018  Time:    23:07             Narrative:    EXAMINATION:  XR CHEST AP  PORTABLE    CLINICAL HISTORY:  Sepsis;    TECHNIQUE:  Single frontal view of the chest was performed.    COMPARISON:  04/08/2018.    FINDINGS:  Right-sided chest port is visualized in stable position.  Heart is normal in size.  Lungs are clear and symmetrically expanded.  No evidence of consolidation, pneumothorax, or significant effusion.                                EKG Readings: (Independently Interpreted)   Initial Reading: No STEMI.   Sinus tachycardia at a rate of 101 bpm. No STEMI. No ischemic changes.       X-Rays:   Independently Interpreted Readings:   Chest X-Ray: No effusion. No opacities.     Medical Decision Making:   Independently Interpreted Test(s):   I have ordered and independently interpreted X-rays - see prior notes.  I have ordered and independently interpreted EKG Reading(s) - see prior notes  Clinical Tests:   Lab Tests: Ordered and Reviewed  Radiological Study: Ordered and Reviewed  Medical Tests: Ordered and Reviewed  ED Management:  23-year-old man with history of testicular cancer and mediastinal seminoma currently on chemotherapy presents with fever and tachycardia and body aches.  Obvious concern for neutropenic fever.  Given septic vital signs, full septic workup with fluid bolus initiated once roomed.  No source identified, though he does have indwelling Port-A-Cath.  This is nontender without any surrounding erythema.  He does report tinnitus bilaterally, reports this is a side effect of chemotherapy.  His left tympanic membrane is dull, but no other signs of infection.  As he has no ear pain in either ear, I would think this is an unlikely source.  Heart rate improved with fluid bolus.  White blood cell count returns at 1500, concerning for neutropenia.  Patient be placed on neutropenic precautions.  Discussed with his oncologist who agrees with plan for antibiotics and hospitalization.  Recommends vancomycin and cefepime.  I have had nursing drawn additional culture through the  Port-A-Cath to evaluate for this as a possible source as well. Initial lactate negative.  Patient was placed in the ICU for concern of his vital signs, potential neutropenia.    I did have an extensive talk regarding signs to return for and need for follow up. Patient expressed understanding and will monitor symptoms closely and follow-up as needed.    SUNNY Ferreira M.D.  04/27/2018  3:05 AM    12:58AM- Discussed case with Dr. Rojas. Recommends antibiotics and admission.     1:21AM- Discussed case with Harry ([facility]). Will admit the patient to Dr. Hebert.            Scribe Attestation:   Scribe #1: I performed the above scribed service and the documentation accurately describes the services I performed. I attest to the accuracy of the note.    Attending Attestation:           Physician Attestation for Scribe:  Physician Attestation Statement for Scribe #1: I, Dr. Ferreira, reviewed documentation, as scribed by Yeimi Rucker in my presence, and it is both accurate and complete.                    Clinical Impression:     1. Fever, unspecified fever cause    2. Tachycardia                                 Иван Ferreira MD  04/27/18 2840

## 2018-04-27 NOTE — SUBJECTIVE & OBJECTIVE
Oncology Treatment Plan:   OP TESTICULAR EP    Medications:  Continuous Infusions:   lactated ringers 125 mL/hr at 04/27/18 0400     Scheduled Meds:   ceFEPime (MAXIPIME) IVPB  1 g Intravenous Q12H    vancomycin (VANCOCIN) IVPB  15 mg/kg Intravenous Q12H    vitamin D  1,000 Units Oral Daily     PRN Meds:baclofen, ibuprofen, LORazepam, magic mouthwash (diphenhydrAMINE 12.5 mg/5 mL 20 mL, aluminum & magnesium hydroxide-simethicone (MYLANTA) 20 mL, lidocaine HCl 2% (XYLOCAINE) 20 mL) solution, morphine, ondansetron, promethazine, promethazine, sodium chloride 0.9%     Review of patient's allergies indicates:   Allergen Reactions    Mushroom Anaphylaxis    Bamboo     Bee pollens     Mushroom flavor     Venom-honey bee         Past Medical History:   Diagnosis Date    Abdominal pain 08/12/2010    eval for abd and chest wall pain at [place], [place] - cxr wnl, EKG (RV), troponin wnl, normal chemistries    Allergy     Cancer     testicular    Chondromalacia patellae     Chronic nausea 2015    eval by GI Dr. Tushar Artis - given zofran and ciproheptadine     Chronic pain     Chronic pain     [facility] - no meds; followed by psych and pain clinic and unresponsive to behavioral/CBT/old records reports c/f conversion disorder     Foot pain 04/2010    4/10 + SHIRA, eval by Dr. ALLY Lion at [facility] -dx unclear ? gout and trial of nsaids and pdn, xrays normal 1/11, referred to podiatry, re-eval by rheum 2/12 and MRI and films wnl    Fracture of right radius 09/2009    Lyme arthritis     multiple joints    Lyme disease 2013    Memory loss 03/2012    eval for memory loss by neuro [place] [facility] [place], EEG wnl. Dr. Patricio suggested emotional factors & ref to Tushar Davies, PhD for  eval & neuropsych eval 3/12 Lupe Delgado, PhD - no evidence of formal thought disorder or psychosis - documented severe memory impairment; not related to to ADD  "or executive function issues. sugesstion that memory issues may be related to "emotional factors", ?conversion d/o    Stress fracture of tibia and fibula 08/2011    Urticaria      Past Surgical History:   Procedure Laterality Date    PORTACATH PLACEMENT Right      Family History     Problem Relation (Age of Onset)    Arthritis Mother, Sister    Depression Sister    Gout Father    Hyperlipidemia Father    No Known Problems Sister    Other Mother        Social History Main Topics    Smoking status: Former Smoker     Packs/day: 0.25     Types: Vaping with nicotine, Vaping w/o nicotine, Cigarettes, Cigars     Quit date: 3/15/2018    Smokeless tobacco: Never Used    Alcohol use Yes      Comment: occ    Drug use: Yes     Types: Marijuana, LSD, Cocaine, Other-see comments      Comment: Kratom (capsule or leaf form)    Sexual activity: Yes     Partners: Female     Birth control/ protection: Condom       Review of Systems   Constitutional: Positive for fatigue and fever. Negative for appetite change and unexpected weight change.   HENT: Positive for tinnitus. Negative for mouth sores, nosebleeds, trouble swallowing and voice change.    Eyes: Negative for visual disturbance.   Respiratory: Negative for cough, shortness of breath and wheezing.    Cardiovascular: Negative for chest pain, palpitations and leg swelling.   Gastrointestinal: Negative for abdominal pain, blood in stool, diarrhea, nausea and vomiting.   Genitourinary: Negative for frequency and hematuria.   Musculoskeletal: Positive for arthralgias and myalgias. Negative for back pain, neck pain and neck stiffness.   Skin: Negative for rash.   Neurological: Negative for dizziness, tremors, seizures, syncope, speech difficulty, weakness, numbness and headaches.   Hematological: Negative for adenopathy. Does not bruise/bleed easily.   Psychiatric/Behavioral: Negative for confusion, dysphoric mood and self-injury. The patient is not nervous/anxious.  "     Objective:     Vital Signs (Most Recent):  Temp: 98 °F (36.7 °C) (04/27/18 1100)  Pulse: 94 (04/27/18 1200)  Resp: (!) 35 (04/27/18 1100)  BP: 119/61 (04/27/18 1200)  SpO2: 98 % (04/27/18 1200) Vital Signs (24h Range):  Temp:  [97.7 °F (36.5 °C)-101.1 °F (38.4 °C)] 98 °F (36.7 °C)  Pulse:  [] 94  Resp:  [14-35] 35  SpO2:  [95 %-98 %] 98 %  BP: ()/(54-71) 119/61     Weight: 60.2 kg (132 lb 11.5 oz)  Body mass index is 20.79 kg/m².  Body surface area is 1.69 meters squared.      Intake/Output Summary (Last 24 hours) at 04/27/18 1348  Last data filed at 04/27/18 1255   Gross per 24 hour   Intake             2653 ml   Output              800 ml   Net             1853 ml       Physical Exam   Constitutional: He is oriented to person, place, and time. He appears well-developed and well-nourished. He is cooperative. No distress.   HENT:   Head: Normocephalic and atraumatic.   Eyes: Conjunctivae and EOM are normal. Pupils are equal, round, and reactive to light. No scleral icterus.   Neck: Normal range of motion. Neck supple.   Cardiovascular: Normal rate and regular rhythm.  Exam reveals no gallop and no friction rub.    No murmur heard.  Pulmonary/Chest: Breath sounds normal. He has no wheezes. He has no rales.   Abdominal: Soft. Bowel sounds are normal. He exhibits no distension and no mass. There is no hepatosplenomegaly. There is no tenderness.   Musculoskeletal: Normal range of motion. He exhibits no edema.   Lymphadenopathy:     He has no cervical adenopathy.   Neurological: He is alert and oriented to person, place, and time. He has normal strength. No cranial nerve deficit.   Skin: Skin is warm and dry. No rash noted. No erythema.   Vitals reviewed.      Significant Labs:   CBC:   Recent Labs  Lab 04/26/18  2335 04/27/18  0450   WBC 1.57* 1.54*   HGB 12.8* 12.7*   HCT 36.9* 37.0*   PLT 91* 90*    and CMP:   Recent Labs  Lab 04/26/18  2335 04/27/18  0450    136   K 4.1 4.1    100   CO2 25  25   * 115*   BUN 12 12   CREATININE 0.8 0.8   CALCIUM 9.3 9.4   PROT 6.4 6.4   ALBUMIN 3.9 3.9   BILITOT 0.6 0.6   ALKPHOS 52* 53*   AST 9* 10   ALT 26 26   ANIONGAP 11 11   EGFRNONAA >60 >60       Diagnostic Results:  I have reviewed all pertinent imaging results/findings within the past 24 hours.

## 2018-04-27 NOTE — PLAN OF CARE
Problem: Patient Care Overview  Goal: Plan of Care Review  Outcome: Ongoing (interventions implemented as appropriate)  Patient on RA. Sats 98%. Pt. in no distress, will continue to monitor.

## 2018-04-27 NOTE — ED NOTES
Pt requesting port access. Pt actively in chemo, last chemo admin 4/20/18. Discussed with MD, attempt peripheral IV.

## 2018-04-27 NOTE — HPI
"Per Jonathan Luong, "This is a 23 y.o. male, with history of stage III testicular CA and Lyme disease, who presents with complaint of fever which started about one hour ago. Patient reports gradual onset of fever with associated myalgias and nausea. He additionally reports onset of bilateral tinnitus and "occasional pressure" which started last night. Patient reports that he last underwent chemotherapy six days ago. He says he was informed by hemo/onc Dr. Rojas that this is a side effect of chemo and has a f/u appointment with ENT specialist. He reports history of frequent childhood ear infections which were "super painful," but denies ear pain currently. In review of systems, patient also notes pain with urination at baseline attributed to chemo. He denies vomiting, cough, sore throat, change in urinary frequency/urgency, or hematuria. He denies recent sick contacts. Patient has no further complaints at this time."  "

## 2018-04-28 PROBLEM — C38.3: Status: ACTIVE | Noted: 2018-03-01

## 2018-04-28 LAB
ANION GAP SERPL CALC-SCNC: 9 MMOL/L
ANISOCYTOSIS BLD QL SMEAR: SLIGHT
BACTERIA UR CULT: NORMAL
BASOPHILS NFR BLD: 0 %
BUN SERPL-MCNC: 9 MG/DL
CALCIUM SERPL-MCNC: 9.2 MG/DL
CHLORIDE SERPL-SCNC: 104 MMOL/L
CO2 SERPL-SCNC: 25 MMOL/L
CREAT SERPL-MCNC: 0.7 MG/DL
DIFFERENTIAL METHOD: ABNORMAL
EOSINOPHIL NFR BLD: 0 %
ERYTHROCYTE [DISTWIDTH] IN BLOOD BY AUTOMATED COUNT: 13.2 %
EST. GFR  (AFRICAN AMERICAN): >60 ML/MIN/1.73 M^2
EST. GFR  (NON AFRICAN AMERICAN): >60 ML/MIN/1.73 M^2
GIANT PLATELETS BLD QL SMEAR: PRESENT
GLUCOSE SERPL-MCNC: 89 MG/DL
HCT VFR BLD AUTO: 34.4 %
HGB BLD-MCNC: 11.9 G/DL
LACTATE SERPL-SCNC: 1.2 MMOL/L
LYMPHOCYTES NFR BLD: 41 %
MAGNESIUM SERPL-MCNC: 1.8 MG/DL
MCH RBC QN AUTO: 28.7 PG
MCHC RBC AUTO-ENTMCNC: 34.6 G/DL
MCV RBC AUTO: 83 FL
METAMYELOCYTES NFR BLD MANUAL: 2 %
MONOCYTES NFR BLD: 18 %
MYELOCYTES NFR BLD MANUAL: 2 %
NEUTROPHILS NFR BLD: 30 %
NEUTS BAND NFR BLD MANUAL: 7 %
PHOSPHATE SERPL-MCNC: 5.7 MG/DL
PLATELET # BLD AUTO: 77 K/UL
PLATELET BLD QL SMEAR: ABNORMAL
PMV BLD AUTO: 9.1 FL
POLYCHROMASIA BLD QL SMEAR: ABNORMAL
POTASSIUM SERPL-SCNC: 4.3 MMOL/L
RBC # BLD AUTO: 4.14 M/UL
SODIUM SERPL-SCNC: 138 MMOL/L
VANCOMYCIN TROUGH SERPL-MCNC: 5.5 UG/ML
WBC # BLD AUTO: 4.51 K/UL

## 2018-04-28 PROCEDURE — 99233 SBSQ HOSP IP/OBS HIGH 50: CPT | Mod: ,,, | Performed by: HOSPITALIST

## 2018-04-28 PROCEDURE — 63600175 PHARM REV CODE 636 W HCPCS: Performed by: NURSE PRACTITIONER

## 2018-04-28 PROCEDURE — 11000001 HC ACUTE MED/SURG PRIVATE ROOM

## 2018-04-28 PROCEDURE — 85027 COMPLETE CBC AUTOMATED: CPT

## 2018-04-28 PROCEDURE — 25000003 PHARM REV CODE 250: Performed by: EMERGENCY MEDICINE

## 2018-04-28 PROCEDURE — 83735 ASSAY OF MAGNESIUM: CPT

## 2018-04-28 PROCEDURE — 94761 N-INVAS EAR/PLS OXIMETRY MLT: CPT

## 2018-04-28 PROCEDURE — 84100 ASSAY OF PHOSPHORUS: CPT

## 2018-04-28 PROCEDURE — 25000003 PHARM REV CODE 250: Performed by: NURSE PRACTITIONER

## 2018-04-28 PROCEDURE — 80048 BASIC METABOLIC PNL TOTAL CA: CPT

## 2018-04-28 PROCEDURE — 80202 ASSAY OF VANCOMYCIN: CPT

## 2018-04-28 PROCEDURE — 36415 COLL VENOUS BLD VENIPUNCTURE: CPT

## 2018-04-28 PROCEDURE — 85007 BL SMEAR W/DIFF WBC COUNT: CPT

## 2018-04-28 PROCEDURE — 25000003 PHARM REV CODE 250: Performed by: HOSPITALIST

## 2018-04-28 PROCEDURE — 83605 ASSAY OF LACTIC ACID: CPT

## 2018-04-28 RX ORDER — MORPHINE SULFATE 2 MG/ML
2 INJECTION, SOLUTION INTRAMUSCULAR; INTRAVENOUS EVERY 6 HOURS PRN
Status: DISCONTINUED | OUTPATIENT
Start: 2018-04-28 | End: 2018-04-28

## 2018-04-28 RX ORDER — MORPHINE SULFATE 15 MG/1
15 TABLET ORAL ONCE
Status: DISCONTINUED | OUTPATIENT
Start: 2018-04-28 | End: 2018-04-29 | Stop reason: HOSPADM

## 2018-04-28 RX ORDER — FAMOTIDINE 20 MG/1
20 TABLET, FILM COATED ORAL 2 TIMES DAILY
Status: DISCONTINUED | OUTPATIENT
Start: 2018-04-28 | End: 2018-04-29 | Stop reason: HOSPADM

## 2018-04-28 RX ORDER — MORPHINE SULFATE 15 MG/1
15 TABLET ORAL EVERY 6 HOURS PRN
Status: DISCONTINUED | OUTPATIENT
Start: 2018-04-28 | End: 2018-04-29 | Stop reason: HOSPADM

## 2018-04-28 RX ADMIN — CEFEPIME HYDROCHLORIDE 1 G: 1 INJECTION, POWDER, FOR SOLUTION INTRAMUSCULAR; INTRAVENOUS at 02:04

## 2018-04-28 RX ADMIN — FAMOTIDINE 20 MG: 20 TABLET ORAL at 09:04

## 2018-04-28 RX ADMIN — MORPHINE SULFATE 15 MG: 15 TABLET ORAL at 12:04

## 2018-04-28 RX ADMIN — FAMOTIDINE 20 MG: 20 TABLET ORAL at 08:04

## 2018-04-28 RX ADMIN — ONDANSETRON 4 MG: 4 TABLET, FILM COATED ORAL at 07:04

## 2018-04-28 RX ADMIN — MORPHINE SULFATE 15 MG: 15 TABLET ORAL at 09:04

## 2018-04-28 RX ADMIN — VANCOMYCIN HYDROCHLORIDE 1000 MG: 1 INJECTION, POWDER, LYOPHILIZED, FOR SOLUTION INTRAVENOUS at 01:04

## 2018-04-28 RX ADMIN — LORAZEPAM 0.5 MG: 0.5 TABLET ORAL at 12:04

## 2018-04-28 RX ADMIN — VITAMIN D, TAB 1000IU (100/BT) 1000 UNITS: 25 TAB at 08:04

## 2018-04-28 RX ADMIN — VANCOMYCIN HYDROCHLORIDE 1000 MG: 1 INJECTION, POWDER, LYOPHILIZED, FOR SOLUTION INTRAVENOUS at 02:04

## 2018-04-28 RX ADMIN — SODIUM CHLORIDE, SODIUM LACTATE, POTASSIUM CHLORIDE, AND CALCIUM CHLORIDE: .6; .31; .03; .02 INJECTION, SOLUTION INTRAVENOUS at 07:04

## 2018-04-28 RX ADMIN — CEFEPIME HYDROCHLORIDE 1 G: 1 INJECTION, POWDER, FOR SOLUTION INTRAMUSCULAR; INTRAVENOUS at 01:04

## 2018-04-28 NOTE — SUBJECTIVE & OBJECTIVE
Interval History: Less nausea and tolerating oral intake well.  Patient reports chronic lower back pain not well controlled.    Review of Systems   Constitutional: Negative for chills and fever.   Respiratory: Negative for shortness of breath.    Cardiovascular: Negative for chest pain.   Gastrointestinal: Negative for abdominal pain, constipation, diarrhea, nausea and vomiting.   Musculoskeletal: Positive for back pain.     Objective:     Vital Signs (Most Recent):  Temp: 97.7 °F (36.5 °C) (04/28/18 0747)  Pulse: 91 (04/28/18 0747)  Resp: 18 (04/28/18 0747)  BP: (!) 116/58 (04/28/18 0747)  SpO2: 99 % (04/28/18 0747) Vital Signs (24h Range):  Temp:  [97 °F (36.1 °C)-99.1 °F (37.3 °C)] 97.7 °F (36.5 °C)  Pulse:  [] 91  Resp:  [15-35] 18  SpO2:  [95 %-100 %] 99 %  BP: (102-132)/(58-80) 116/58     Weight: 60.2 kg (132 lb 11.5 oz)  Body mass index is 20.79 kg/m².    Intake/Output Summary (Last 24 hours) at 04/28/18 0931  Last data filed at 04/28/18 0500   Gross per 24 hour   Intake          2665.12 ml   Output             4025 ml   Net         -1359.88 ml      Physical Exam   Constitutional: He is oriented to person, place, and time. He appears well-developed. No distress.   Cardiovascular: Normal rate, regular rhythm, normal heart sounds and intact distal pulses.  Exam reveals no gallop and no friction rub.    No murmur heard.  Pulmonary/Chest: Effort normal and breath sounds normal. No respiratory distress. He has no wheezes. He has no rales.   Abdominal: Soft. Bowel sounds are normal. He exhibits no distension. There is no tenderness.   Musculoskeletal: Normal range of motion. He exhibits no edema or tenderness.   Neurological: He is alert and oriented to person, place, and time.   Skin: Skin is warm and dry. No rash noted. No erythema. No pallor.   PORT on right chest without evidence of infection.       Significant Labs: All pertinent labs within the past 24 hours have been reviewed.    Significant Imaging: I  have reviewed all pertinent imaging results/findings within the past 24 hours.

## 2018-04-28 NOTE — PROGRESS NOTES
"[facility] Hospital Medicine  Progress Note    Patient Name: Alejandro Neff  MRN: [id number]  Patient Class: IP- Inpatient   Admission Date: 4/26/2018  Length of Stay: 1 days  Attending Physician: Itz Hebert MD  Primary Care Provider: Nata Hernandez MD        Subjective:     Principal Problem:Neutropenic fever    HPI:  Per Jonathan Luong, "This is a 23 y.o. male, with history of stage III testicular CA and Lyme disease, who presents with complaint of fever which started about one hour ago. Patient reports gradual onset of fever with associated myalgias and nausea. He additionally reports onset of bilateral tinnitus and "occasional pressure" which started last night. Patient reports that he last underwent chemotherapy six days ago. He says he was informed by hemo/onc Dr. Rojas that this is a side effect of chemo and has a f/u appointment with ENT specialist. He reports history of frequent childhood ear infections which were "super painful," but denies ear pain currently. In review of systems, patient also notes pain with urination at baseline attributed to chemo. He denies vomiting, cough, sore throat, change in urinary frequency/urgency, or hematuria. He denies recent sick contacts. Patient has no further complaints at this time."    Hospital Course:  Patient is a 23 year-old man with mediastinal seminoma undergoing chemotherapy by Dr. Suha Rojas via PORT on right chest wall who received pegfilgrastim who presents with neutropenic fevers and lactic acidosis.  Clinically improving with intravenous fluid resuscitation and empiric broad-spectrum antibiotics.     Interval History: Less nausea and tolerating oral intake well.  Patient reports chronic lower back pain not well controlled.    Review of Systems   Constitutional: Negative for chills and fever.   Respiratory: Negative for shortness of breath.    Cardiovascular: Negative for chest pain.   Gastrointestinal: Negative for " abdominal pain, constipation, diarrhea, nausea and vomiting.   Musculoskeletal: Positive for back pain.     Objective:     Vital Signs (Most Recent):  Temp: 97.7 °F (36.5 °C) (04/28/18 0747)  Pulse: 91 (04/28/18 0747)  Resp: 18 (04/28/18 0747)  BP: (!) 116/58 (04/28/18 0747)  SpO2: 99 % (04/28/18 0747) Vital Signs (24h Range):  Temp:  [97 °F (36.1 °C)-99.1 °F (37.3 °C)] 97.7 °F (36.5 °C)  Pulse:  [] 91  Resp:  [15-35] 18  SpO2:  [95 %-100 %] 99 %  BP: (102-132)/(58-80) 116/58     Weight: 60.2 kg (132 lb 11.5 oz)  Body mass index is 20.79 kg/m².    Intake/Output Summary (Last 24 hours) at 04/28/18 0931  Last data filed at 04/28/18 0500   Gross per 24 hour   Intake          2665.12 ml   Output             4025 ml   Net         -1359.88 ml      Physical Exam   Constitutional: He is oriented to person, place, and time. He appears well-developed. No distress.   Cardiovascular: Normal rate, regular rhythm, normal heart sounds and intact distal pulses.  Exam reveals no gallop and no friction rub.    No murmur heard.  Pulmonary/Chest: Effort normal and breath sounds normal. No respiratory distress. He has no wheezes. He has no rales.   Abdominal: Soft. Bowel sounds are normal. He exhibits no distension. There is no tenderness.   Musculoskeletal: Normal range of motion. He exhibits no edema or tenderness.   Neurological: He is alert and oriented to person, place, and time.   Skin: Skin is warm and dry. No rash noted. No erythema. No pallor.   PORT on right chest without evidence of infection.       Significant Labs: All pertinent labs within the past 24 hours have been reviewed.    Significant Imaging: I have reviewed all pertinent imaging results/findings within the past 24 hours.    Assessment/Plan:      * Neutropenic fever    Improving in terms of fevers, neutropenia, and now repeat lactic acid normal.  No clear source of infection.  Discontinue intravenous fluids.  Continue with empiric antibiotics pending cultures  results.        Primary mediastinal seminoma    Pain control.  Antiemetics as needed.  Plan for outpatient follow-up with his oncologist Dr. Suha Rojas for further treatment.        Anemia associated with chemotherapy    Stable.  Continue to monitor.        Thrombocytopenia    Mild but trending down.  Continue to monitor.        Germ cell tumor                VTE Risk Mitigation         Ordered     IP VTE HIGH RISK PATIENT  Once      04/27/18 0332     Reason for No Pharmacological VTE Prophylaxis  Once      04/27/18 0332     Place sequential compression device  Until discontinued      04/27/18 0332     Place JAUN hose  Until discontinued      04/27/18 0332              Itz Hebert MD  Department of Hospital Medicine   [facility]

## 2018-04-28 NOTE — PLAN OF CARE
Problem: Neutropenia (Adult)  Intervention: Monitor/Manage Early Signs of Sepsis   04/28/18 0405   Prevent/Manage Colorectal Surgical Infection   Fever Reduction/Comfort Measures fluid intake increased   Safety Interventions   Infection Prevention personal protective equipment utilized;rest/sleep promoted         Problem: Pain, Chronic (Adult)  Intervention: Manage Persistent Pain Analgesia   04/28/18 0405   Safety Interventions   Medication Review/Management medications reviewed     Intervention: Support Psychosocial Response to Persistent Pain   04/28/18 0405   Psychosocial Support   Diversional Activities television   Coping/Psychosocial Interventions   Supportive Measures relaxation techniques promoted;verbalization of feelings encouraged       Goal: Identify Related Risk Factors and Signs and Symptoms  Related risk factors and signs and symptoms are identified upon initiation of Human Response Clinical Practice Guideline (CPG)  Outcome: Ongoing (interventions implemented as appropriate)   04/28/18 0405   Pain, Chronic   Related Risk Factors (Chronic Pain) disease process   Signs and Symptoms (Chronic Pain) verbalization of pain/discomfort for a prolonged time period     Goal: Acceptable Pain Control/Comfort Level  Patient will demonstrate the desired outcomes by discharge/transition of care.  Outcome: Ongoing (interventions implemented as appropriate)   04/28/18 0405   Pain, Chronic (Adult)   Acceptable Pain Control/Comfort Level making progress toward outcome       Comments: Patient free from fall and injuries. Sleeping comfortably in bed. Neutropenic precautions initiated.

## 2018-04-28 NOTE — HOSPITAL COURSE
Patient is a 23 year-old man with mediastinal seminoma undergoing chemotherapy by Dr. Suha Rojas via PORT on right chest wall who received pegfilgrastim who presents with neutropenic fevers and lactic acidosis.  Clinically improving with intravenous fluid resuscitation and empiric broad-spectrum antibiotics.  Infectious work-up including blood cultures and urine culture did not reveal specific causative organism.  Patient's neutropenia resolved and has been afebrile for 48 hours and otherwise clinically doing well.  Patient stable to be discharged home to complete a course of empiric oral antibiotics and advised to follow-up with Dr. Suha Rojas in clinic for further cancer care this upcoming week.

## 2018-04-28 NOTE — PLAN OF CARE
Problem: Patient Care Overview  Goal: Plan of Care Review  Outcome: Ongoing (interventions implemented as appropriate)  Patient on room air in no distress saturations 99%.

## 2018-04-28 NOTE — ASSESSMENT & PLAN NOTE
Improving in terms of fevers, neutropenia, and now repeat lactic acid normal.  No clear source of infection.  Discontinue intravenous fluids.  Continue with empiric antibiotics pending cultures results.

## 2018-04-28 NOTE — PLAN OF CARE
Problem: Patient Care Overview  Goal: Individualization & Mutuality  Outcome: Ongoing (interventions implemented as appropriate)  Bed in low and locked position and able to reposition and amb per self.  Remains free of injury during shift.  Verbalizes adequate pain control during shift.

## 2018-04-28 NOTE — ASSESSMENT & PLAN NOTE
Pain control.  Antiemetics as needed.  Plan for outpatient follow-up with his oncologist Dr. Suha Rojas for further treatment.

## 2018-04-29 VITALS
HEIGHT: 67 IN | TEMPERATURE: 98 F | BODY MASS INDEX: 20.83 KG/M2 | DIASTOLIC BLOOD PRESSURE: 56 MMHG | WEIGHT: 132.69 LBS | HEART RATE: 75 BPM | RESPIRATION RATE: 18 BRPM | SYSTOLIC BLOOD PRESSURE: 107 MMHG | OXYGEN SATURATION: 99 %

## 2018-04-29 LAB
ANION GAP SERPL CALC-SCNC: 11 MMOL/L
ANISOCYTOSIS BLD QL SMEAR: SLIGHT
BASOPHILS NFR BLD: 0 %
BUN SERPL-MCNC: 9 MG/DL
CALCIUM SERPL-MCNC: 9.6 MG/DL
CHLORIDE SERPL-SCNC: 101 MMOL/L
CO2 SERPL-SCNC: 26 MMOL/L
CREAT SERPL-MCNC: 0.8 MG/DL
DIFFERENTIAL METHOD: ABNORMAL
DOHLE BOD BLD QL SMEAR: PRESENT
EOSINOPHIL NFR BLD: 0 %
ERYTHROCYTE [DISTWIDTH] IN BLOOD BY AUTOMATED COUNT: 13.3 %
EST. GFR  (AFRICAN AMERICAN): >60 ML/MIN/1.73 M^2
EST. GFR  (NON AFRICAN AMERICAN): >60 ML/MIN/1.73 M^2
GIANT PLATELETS BLD QL SMEAR: PRESENT
GLUCOSE SERPL-MCNC: 121 MG/DL
HCT VFR BLD AUTO: 36.8 %
HGB BLD-MCNC: 12.5 G/DL
LYMPHOCYTES NFR BLD: 39 %
MAGNESIUM SERPL-MCNC: 2.1 MG/DL
MCH RBC QN AUTO: 28.4 PG
MCHC RBC AUTO-ENTMCNC: 34 G/DL
MCV RBC AUTO: 84 FL
METAMYELOCYTES NFR BLD MANUAL: 2 %
MONOCYTES NFR BLD: 21 %
MYELOCYTES NFR BLD MANUAL: 1 %
NEUTROPHILS NFR BLD: 31 %
NEUTS BAND NFR BLD MANUAL: 6 %
PHOSPHATE SERPL-MCNC: 5.3 MG/DL
PLATELET # BLD AUTO: 85 K/UL
PLATELET BLD QL SMEAR: ABNORMAL
PMV BLD AUTO: 9.2 FL
POLYCHROMASIA BLD QL SMEAR: ABNORMAL
POTASSIUM SERPL-SCNC: 4.4 MMOL/L
RBC # BLD AUTO: 4.4 M/UL
SODIUM SERPL-SCNC: 138 MMOL/L
WBC # BLD AUTO: 8.56 K/UL

## 2018-04-29 PROCEDURE — 63600175 PHARM REV CODE 636 W HCPCS: Performed by: NURSE PRACTITIONER

## 2018-04-29 PROCEDURE — 85027 COMPLETE CBC AUTOMATED: CPT

## 2018-04-29 PROCEDURE — 36415 COLL VENOUS BLD VENIPUNCTURE: CPT

## 2018-04-29 PROCEDURE — 84100 ASSAY OF PHOSPHORUS: CPT

## 2018-04-29 PROCEDURE — 80048 BASIC METABOLIC PNL TOTAL CA: CPT

## 2018-04-29 PROCEDURE — 94761 N-INVAS EAR/PLS OXIMETRY MLT: CPT

## 2018-04-29 PROCEDURE — 25000003 PHARM REV CODE 250: Performed by: HOSPITALIST

## 2018-04-29 PROCEDURE — 63600175 PHARM REV CODE 636 W HCPCS: Performed by: HOSPITALIST

## 2018-04-29 PROCEDURE — 85007 BL SMEAR W/DIFF WBC COUNT: CPT

## 2018-04-29 PROCEDURE — 25000003 PHARM REV CODE 250: Performed by: NURSE PRACTITIONER

## 2018-04-29 PROCEDURE — 99239 HOSP IP/OBS DSCHRG MGMT >30: CPT | Mod: ,,, | Performed by: HOSPITALIST

## 2018-04-29 PROCEDURE — 83735 ASSAY OF MAGNESIUM: CPT

## 2018-04-29 RX ORDER — CIPROFLOXACIN 750 MG/1
750 TABLET, FILM COATED ORAL EVERY 12 HOURS
Qty: 8 TABLET | Refills: 0 | Status: SHIPPED | OUTPATIENT
Start: 2018-04-29 | End: 2018-05-03

## 2018-04-29 RX ADMIN — FAMOTIDINE 20 MG: 20 TABLET ORAL at 10:04

## 2018-04-29 RX ADMIN — LORAZEPAM 0.5 MG: 0.5 TABLET ORAL at 01:04

## 2018-04-29 RX ADMIN — VITAMIN D, TAB 1000IU (100/BT) 1000 UNITS: 25 TAB at 10:04

## 2018-04-29 RX ADMIN — VANCOMYCIN HYDROCHLORIDE 1000 MG: 1 INJECTION, POWDER, LYOPHILIZED, FOR SOLUTION INTRAVENOUS at 02:04

## 2018-04-29 RX ADMIN — ONDANSETRON 4 MG: 4 TABLET, FILM COATED ORAL at 01:04

## 2018-04-29 RX ADMIN — CEFEPIME HYDROCHLORIDE 1 G: 1 INJECTION, POWDER, FOR SOLUTION INTRAMUSCULAR; INTRAVENOUS at 01:04

## 2018-04-29 NOTE — NURSING
Discharge instructions and px given and verbalizes understanding.  Verbalizes understanding of follow up appts.  No iv present.  No distress noted and ready for discharge.

## 2018-04-29 NOTE — PLAN OF CARE
Problem: Infection, Risk/Actual (Adult)  Goal: Identify Related Risk Factors and Signs and Symptoms  Related risk factors and signs and symptoms are identified upon initiation of Human Response Clinical Practice Guideline (CPG)   Outcome: Ongoing (interventions implemented as appropriate)   04/29/18 0054   Infection, Risk/Actual   Related Risk Factors (Infection, Risk/Actual) chronic illness/condition;immunosuppressed   Signs and Symptoms (Infection, Risk/Actual) lab value changes     Goal: Infection Prevention/Resolution  Patient will demonstrate the desired outcomes by discharge/transition of care.   Outcome: Ongoing (interventions implemented as appropriate)   04/29/18 0054   Infection, Risk/Actual (Adult)   Infection Prevention/Resolution making progress toward outcome       Problem: Pain, Chronic (Adult)  Intervention: Manage Persistent Pain Analgesia   04/29/18 0054   Safety Interventions   Medication Review/Management medications reviewed         Comments: Patient free from fall and injuries. Slept comfortably.

## 2018-04-29 NOTE — NURSING
IV access flushing well but redness and pain noted. IV access removed, attempted to insert new one, patient requested to re-insert later in the morning near the time of his next IV dose. Will inform incoming nurse.

## 2018-04-29 NOTE — PLAN OF CARE
Pt discharging home today. His private caretakers to transport home.  Pt states no CM needs for discharge.     04/29/18 1338   Final Note   Assessment Type Final Discharge Note   Discharge Disposition Home   What phone number can be called within the next 1-3 days to see how you are doing after discharge? [phone]   Hospital Follow Up  Appt(s) scheduled? Yes   Discharge plans and expectations educations in teach back method with documentation complete? Yes   Right Care Referral Info   Post Acute Recommendation No Care

## 2018-04-30 ENCOUNTER — LAB VISIT (OUTPATIENT)
Dept: LAB | Facility: OTHER | Age: 23
End: 2018-04-30
Attending: INTERNAL MEDICINE
Payer: COMMERCIAL

## 2018-04-30 ENCOUNTER — OFFICE VISIT (OUTPATIENT)
Dept: HEMATOLOGY/ONCOLOGY | Facility: CLINIC | Age: 23
End: 2018-04-30
Payer: COMMERCIAL

## 2018-04-30 ENCOUNTER — TELEPHONE (OUTPATIENT)
Dept: OTOLARYNGOLOGY | Facility: CLINIC | Age: 23
End: 2018-04-30

## 2018-04-30 VITALS
RESPIRATION RATE: 18 BRPM | SYSTOLIC BLOOD PRESSURE: 100 MMHG | DIASTOLIC BLOOD PRESSURE: 59 MMHG | WEIGHT: 134.69 LBS | TEMPERATURE: 99 F | HEART RATE: 101 BPM | HEIGHT: 67 IN | OXYGEN SATURATION: 97 % | BODY MASS INDEX: 21.14 KG/M2

## 2018-04-30 DIAGNOSIS — R11.2 NON-INTRACTABLE VOMITING WITH NAUSEA, UNSPECIFIED VOMITING TYPE: ICD-10-CM

## 2018-04-30 DIAGNOSIS — T45.1X5A ANEMIA ASSOCIATED WITH CHEMOTHERAPY: ICD-10-CM

## 2018-04-30 DIAGNOSIS — C38.3 PRIMARY MEDIASTINAL SEMINOMA: Primary | ICD-10-CM

## 2018-04-30 DIAGNOSIS — C80.1 GERM CELL TUMOR: ICD-10-CM

## 2018-04-30 DIAGNOSIS — D64.81 ANEMIA ASSOCIATED WITH CHEMOTHERAPY: ICD-10-CM

## 2018-04-30 DIAGNOSIS — D69.6 THROMBOCYTOPENIA: ICD-10-CM

## 2018-04-30 DIAGNOSIS — R50.81 NEUTROPENIC FEVER: ICD-10-CM

## 2018-04-30 DIAGNOSIS — G89.29 OTHER CHRONIC PAIN: ICD-10-CM

## 2018-04-30 DIAGNOSIS — D70.9 NEUTROPENIC FEVER: ICD-10-CM

## 2018-04-30 LAB
ANION GAP SERPL CALC-SCNC: 11 MMOL/L
BUN SERPL-MCNC: 11 MG/DL
CALCIUM SERPL-MCNC: 9.6 MG/DL
CHLORIDE SERPL-SCNC: 100 MMOL/L
CO2 SERPL-SCNC: 29 MMOL/L
CREAT SERPL-MCNC: 0.8 MG/DL
ERYTHROCYTE [DISTWIDTH] IN BLOOD BY AUTOMATED COUNT: 13.6 %
EST. GFR  (AFRICAN AMERICAN): >60 ML/MIN/1.73 M^2
EST. GFR  (NON AFRICAN AMERICAN): >60 ML/MIN/1.73 M^2
GLUCOSE SERPL-MCNC: 90 MG/DL
HCT VFR BLD AUTO: 38.2 %
HGB BLD-MCNC: 13.1 G/DL
MAGNESIUM SERPL-MCNC: 2.2 MG/DL
MCH RBC QN AUTO: 28.7 PG
MCHC RBC AUTO-ENTMCNC: 34.3 G/DL
MCV RBC AUTO: 84 FL
NEUTROPHILS # BLD AUTO: 4 K/UL
PLATELET # BLD AUTO: 83 K/UL
PMV BLD AUTO: 8.6 FL
POTASSIUM SERPL-SCNC: 3.7 MMOL/L
RBC # BLD AUTO: 4.57 M/UL
SODIUM SERPL-SCNC: 140 MMOL/L
WBC # BLD AUTO: 7.72 K/UL

## 2018-04-30 PROCEDURE — 80048 BASIC METABOLIC PNL TOTAL CA: CPT

## 2018-04-30 PROCEDURE — 99999 PR PBB SHADOW E&M-EST. PATIENT-LVL III: CPT | Mod: PBBFAC,,, | Performed by: INTERNAL MEDICINE

## 2018-04-30 PROCEDURE — 99214 OFFICE O/P EST MOD 30 MIN: CPT | Mod: S$GLB,,, | Performed by: INTERNAL MEDICINE

## 2018-04-30 PROCEDURE — 83735 ASSAY OF MAGNESIUM: CPT

## 2018-04-30 PROCEDURE — 36415 COLL VENOUS BLD VENIPUNCTURE: CPT

## 2018-04-30 PROCEDURE — 85027 COMPLETE CBC AUTOMATED: CPT

## 2018-04-30 NOTE — Clinical Note
RTC next Monday, CBC, CMP, Mg, HCG, AFP, LDH, see me, chemo Chemo next Monday-Friday, IVF at main campus on Saturday

## 2018-04-30 NOTE — TELEPHONE ENCOUNTER
Left message on voicemail for pt to call back when received message in regards to scheduling an appointment.

## 2018-04-30 NOTE — DISCHARGE SUMMARY
"[facility] Hospital Medicine  Discharge Summary      Patient Name: Alejandro Neff  MRN: [id number]  Admission Date: 4/26/2018  Hospital Length of Stay: 2 days  Discharge Date and Time:  04/29/2018  Attending Physician: Itz Hebert MD   Discharging Provider: Itz Hebert MD  Primary Care Provider: Nata Hernandez MD      HPI:   Per Jonathan Luong, "This is a 23 y.o. male, with history of stage III testicular CA and Lyme disease, who presents with complaint of fever which started about one hour ago. Patient reports gradual onset of fever with associated myalgias and nausea. He additionally reports onset of bilateral tinnitus and "occasional pressure" which started last night. Patient reports that he last underwent chemotherapy six days ago. He says he was informed by hemo/onc Dr. Rojas that this is a side effect of chemo and has a f/u appointment with ENT specialist. He reports history of frequent childhood ear infections which were "super painful," but denies ear pain currently. In review of systems, patient also notes pain with urination at baseline attributed to chemo. He denies vomiting, cough, sore throat, change in urinary frequency/urgency, or hematuria. He denies recent sick contacts. 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 Suha Rojas via PORT on right chest wall who received pegfilgrastim who presents with neutropenic fevers and lactic acidosis.  Clinically improving with intravenous fluid resuscitation and empiric broad-spectrum antibiotics.  Infectious work-up including blood cultures and urine culture did not reveal specific causative organism.  Patient's neutropenia resolved and has been afebrile for 48 hours and otherwise clinically doing well.  Patient stable to be discharged home to complete a course of empiric oral antibiotics and advised to " follow-up with Dr. Suha Rojas in clinic for further cancer care this upcoming week.     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 Schedule an appointment as soon as possible for a visit in 1 week.    Specialty:  Hematology and Oncology  Why:  Resume outpatient cancer care  Contact information:  [address] [address]  [phone]                 Patient Instructions:     Diet Adult Regular     Activity as tolerated     Notify your health care provider if you experience any of the following:  temperature >100.4     Notify your health care provider if you experience any of the following:  persistent nausea and vomiting or diarrhea     Notify your health care provider if you experience any of the following:  severe uncontrolled pain     Notify your health care provider if you experience any of the following:  difficulty breathing or increased cough     Notify your health care provider if you experience any of the following:  severe persistent headache     Notify your health care provider if you experience any of the following:  worsening rash     Notify your health care provider if you experience any of the following:  persistent dizziness, light-headedness, or visual disturbances     Notify your health care  provider if you experience any of the following:  redness, tenderness, or signs of infection (pain, swelling, redness, odor or green/yellow discharge around incision site)       Medications:  Reconciled Home Medications:      Medication List      START taking these medications    ciprofloxacin HCl 750 MG tablet  Commonly known as:  CIPRO  Take 1 tablet (750 mg total) by mouth every 12 (twelve) hours.        CONTINUE taking these medications    * albuterol 90 mcg/actuation inhaler  Inhale 2 puffs into the lungs every 6 (six) hours as needed for Wheezing.     * PROAIR HFA 90 mcg/actuation inhaler  Generic drug:  albuterol     * albuterol 2.5 mg /3 mL (0.083 %) nebulizer solution  Commonly known as:  PROVENTIL  Take 3 mLs (2.5 mg total) by nebulization every 6 (six) hours as needed for Wheezing. Rescue     baclofen 10 MG tablet  Commonly known as:  LIORESAL  Take 1 tablet (10 mg total) by mouth 2 (two) times daily as needed (hiccups).     cholecalciferol (vitamin D3) 1,000 unit capsule  Take 1 capsule (1,000 Units total) by mouth once daily.     diphenhydramine-acetaminophen  mg Tab  Commonly known as:  TYLENOL PM  Take 1 tablet by mouth once daily.     diphenhydrAMINE-aluminum-magnesium hydroxide-simethicone-lidocaine HCl 2%  Swish and spit 15 mLs every 4 (four) hours as needed (mouth sore).     EPINEPHrine 0.3 mg/0.3 mL Atin  Commonly known as:  EPIPEN  Inject 0.3 mg/mL into the muscle.     ibuprofen 600 MG tablet  Commonly known as:  ADVIL,MOTRIN  Take 1 tablet (600 mg total) by mouth every 6 (six) hours as needed for Pain.     lidocaine-prilocaine cream  Commonly known as:  EMLA  Apply over port 30 minutes prior to chemo     LORazepam 0.5 MG tablet  Commonly known as:  ATIVAN  Take 1 tablet (0.5 mg total) by mouth every 8 (eight) hours as needed for Anxiety.     morphine 15 MG tablet  Commonly known as:  MSIR  Take 1 tablet (15 mg total) by mouth every 6 (six) hours as needed for Pain.     MULTI VITAMIN  ORAL  Take by mouth once daily.     ondansetron 4 MG tablet  Commonly known as:  ZOFRAN  Take 1 tablet (4 mg total) by mouth every 6 (six) hours as needed for Nausea.     * promethazine 25 MG tablet  Commonly known as:  PHENERGAN  Take 1 tablet (25 mg total) by mouth every 4 (four) hours as needed for Nausea.     * promethazine 25 MG suppository  Commonly known as:  PHENERGAN  Place 1 suppository (25 mg total) rectally every 6 (six) hours as needed for Nausea.        * This list has 5 medication(s) that are the same as other medications prescribed for you. Read the directions carefully, and ask your doctor or other care provider to review them with you.                Indwelling Lines/Drains at time of discharge:   Lines/Drains/Airways     Central Venous Catheter Line                 Port A Cath Single Lumen 04/04/18 0911 right internal jugular 25 days                Time spent on the discharge of patient: 35 minutes  Patient was seen and examined on the date of discharge and determined to be suitable for discharge.         Itz Hebert MD  Department of Hospital Medicine  [facility]

## 2018-04-30 NOTE — PROGRESS NOTES
"                                                          PROGRESS NOTE    Subjective:      Patient ID: Alejandro Neff is a 23 y.o. male.     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 Mr. Hampton is a 24 yo man who first presented with chest pain and underwent CT chest on 18, which showed a 6 x 3.5 cm mediastinal mass. It abuts the aorta and pulmonary artery. Biopsy was done on 3/1/18. Pathology (reviewed at [facility]) showed germ cell tumor, most consistent with seminoma.   2. HCG, LDH, AFP on 3/22/18 all normal.   3. Testicular US 3/26/18 showed no testicular masses. CT C/A/P on 3/26/18 showed "stable appearance of anterior mediastinal mass consistent with provided history of seminoma. Several punctate sclerotic foci are noted in the pelvis at the right pubic bone, right ischial tuberosity, and left ilium adjacent to the sacroiliac joint.  These are strongly favored to reflect benign bone islands although metastatic disease could have a similar appearance and close follow-up is recommended. Several benign Schmorl's nodes are noted in the thoracic spine." Bone scan on 3/28/18 was negative for bone mets.   4. Audiogram on 18 normal. Followed by ENT.   5. Given his smoking history, I recommended 4 cycles of EP. EP cycle 1 day 1 on 18. Complicated by hospitalization for neutropenic fever -. No source of infections identified. Treated with antibiotics empirically and improved.      INTERVAL HISTORY:   Mr. Hampton returns today for follow up of primary mediastinal seminoma. He was hospitalized from - for neutropenic fever. Was treated with broad spectrum antibiotics empirically. Fever resolved. Cultures negative to date. No source of infection identified. He feels good today. No fever.      ECO     ROS:   A ten point system review is obtained and neg except for what was stated in the " interval history     Physical Examination:   Vital signs reviewed.   Gen: well hydrated, well developed, in no acute distress.  HEENT: normocephalic, anicteric sclerae, PERRLA, EOMI, oropharynx clear  Neck: supple, no JVD, thyromegaly, cervical or supraclavicular LAD  Lungs: CTAB, no wheezes or rales. Port site on chest clean.   Heart: RRR, no M/R/G  Abdomen: soft, no tenderness, non-distended, no hepatosplenomegaly, mass, or hernia. BS present  Ext: no clubbing, cyanosis, or edema  Neuro: alert and oriented x 4, no focal neuro deficit  Skin: no rash, erythema, open wound or ulcers  Psych: pleasant and appropriate mood and affect       Objective:     Laboratory Data:  Labs from today reviewed. TCP stable. Neutropenia resolved.     Assessment/Plan:     1. Primary mediastinal seminoma    2. Germ cell tumor    3. Thrombocytopenia    4. Neutropenic fever    5. Anemia associated with chemotherapy    6. Non-intractable vomiting with nausea, unspecified vomiting type    7. Other chronic pain      1.2  - doing well. Cycle 2 of EP next Monday    3.   - from chemotherapy  - stable. No bleeding. Monitor    4.   - resolved  - c/w cipro  - already getting neulasta with chemo    5.   Stable. Monitor    6.  - c/w zofran and phenergan suppository prn    7.  - c/w morphine prn    RTC next Monday with CBC, CMP, Mg, LDH, AFP, HCG    Electronically signed by Suha Rojas

## 2018-04-30 NOTE — TELEPHONE ENCOUNTER
----- Message from Jarek Campos MD sent at 4/30/2018  8:57 AM CDT -----  Regarding: FW:  Apryl - can we get into clinic this week with melva - primarily for an audiogram    ----- Message -----  From: Suha Rojas MD  Sent: 4/30/2018   8:06 AM  To: Jarek Bernal MD  Subject: RE:                                              Thank you very much.   ----- Message -----  From: Jarek Campos MD  Sent: 4/28/2018   9:42 AM  To: Suha Rojas MD  Subject: RE:                                              Will do  My nurses will call Monday  ----- Message -----  From: Suha Rojas MD  Sent: 4/27/2018   4:36 PM  To: Jarek Bernal MD  Subject: RE:                                              Devaughn Campos,     Looks like the first available appointment you have is on 5/15. Can your office help us schedule the appointment with you next week? Thanks.     Suha  ----- Message -----  From: Alessia Morgan  Sent: 4/27/2018   4:04 PM  To: Suha Rojas MD  Subject: RE:                                              The first available appointment with Dr. Campos is on 5/15.     ----- Message -----  From: Suha Rojas MD  Sent: 4/27/2018   3:29 PM  To: Lance Bernal-West MA, #  Subject: RE:                                              Thank you very much.     Alessia, can you schedule Mr Hampton to see Dr. Campos later next week? Thanks.     Suha  ----- Message -----  From: Jarek Campos MD  Sent: 4/27/2018   3:23 PM  To: Suha Rojas MD  Subject: RE:                                              Happy to see him back in clinic. We can see him next week with a hearing test  Best  Jarek Campos  ----- Message -----  From: Suha Rojas MD  Sent: 4/27/2018   8:43 AM  To: MD Devaughn Mcfarland Dr.,     Thank you very much for seeing Mr. Hampton as he is getting cisplatin for mediastinal seminoma. He just had one cycle of cisplatin/etoposide, is currently in the hospital  for neutropenic fever. He told me he developed a transient high-pitched ringing in his ears yesterday. Usually is in one ear and lasts for about a minute. Has not noticed decreased hearing yet. He has 3 more cycles of cisplatin/etoposide. Unfortunately we have to use cisplatin in this curative setting regardless what regimen I give. I know half of the patients treated for testicular cancer with have partial hearing loss that may or may not be reversible. I asked him to avoid loud music or loud anything. Do you have any other suggestions? Will you be able to see him back in the clinic? He is understandably very anxious about this. Thanks.     Best regards,  Lingling

## 2018-05-02 ENCOUNTER — PATIENT MESSAGE (OUTPATIENT)
Dept: HEMATOLOGY/ONCOLOGY | Facility: CLINIC | Age: 23
End: 2018-05-02

## 2018-05-02 LAB
BACTERIA BLD CULT: NORMAL

## 2018-05-07 ENCOUNTER — LAB VISIT (OUTPATIENT)
Dept: LAB | Facility: OTHER | Age: 23
End: 2018-05-07
Attending: INTERNAL MEDICINE
Payer: COMMERCIAL

## 2018-05-07 ENCOUNTER — INFUSION (OUTPATIENT)
Dept: INFUSION THERAPY | Facility: HOSPITAL | Age: 23
End: 2018-05-07
Attending: INTERNAL MEDICINE
Payer: COMMERCIAL

## 2018-05-07 ENCOUNTER — OFFICE VISIT (OUTPATIENT)
Dept: HEMATOLOGY/ONCOLOGY | Facility: CLINIC | Age: 23
End: 2018-05-07
Payer: COMMERCIAL

## 2018-05-07 ENCOUNTER — PATIENT MESSAGE (OUTPATIENT)
Dept: HEMATOLOGY/ONCOLOGY | Facility: CLINIC | Age: 23
End: 2018-05-07

## 2018-05-07 VITALS
BODY MASS INDEX: 21.03 KG/M2 | WEIGHT: 134.25 LBS | OXYGEN SATURATION: 99 % | TEMPERATURE: 98 F | SYSTOLIC BLOOD PRESSURE: 123 MMHG | HEART RATE: 115 BPM | DIASTOLIC BLOOD PRESSURE: 60 MMHG | RESPIRATION RATE: 18 BRPM

## 2018-05-07 VITALS
BODY MASS INDEX: 21.07 KG/M2 | DIASTOLIC BLOOD PRESSURE: 55 MMHG | HEART RATE: 85 BPM | SYSTOLIC BLOOD PRESSURE: 114 MMHG | TEMPERATURE: 98 F | RESPIRATION RATE: 18 BRPM | WEIGHT: 134.25 LBS | HEIGHT: 67 IN

## 2018-05-07 DIAGNOSIS — C80.1 GERM CELL TUMOR: Primary | ICD-10-CM

## 2018-05-07 DIAGNOSIS — Z51.11 ENCOUNTER FOR ANTINEOPLASTIC CHEMOTHERAPY: ICD-10-CM

## 2018-05-07 DIAGNOSIS — D69.6 THROMBOCYTOPENIA: ICD-10-CM

## 2018-05-07 DIAGNOSIS — R50.81 NEUTROPENIC FEVER: ICD-10-CM

## 2018-05-07 DIAGNOSIS — C38.3 PRIMARY MEDIASTINAL SEMINOMA: ICD-10-CM

## 2018-05-07 DIAGNOSIS — R74.8 ELEVATED LIVER ENZYMES: ICD-10-CM

## 2018-05-07 DIAGNOSIS — D64.81 ANEMIA ASSOCIATED WITH CHEMOTHERAPY: ICD-10-CM

## 2018-05-07 DIAGNOSIS — D70.9 NEUTROPENIC FEVER: ICD-10-CM

## 2018-05-07 DIAGNOSIS — T45.1X5A ANEMIA ASSOCIATED WITH CHEMOTHERAPY: ICD-10-CM

## 2018-05-07 DIAGNOSIS — R11.2 NON-INTRACTABLE VOMITING WITH NAUSEA, UNSPECIFIED VOMITING TYPE: ICD-10-CM

## 2018-05-07 DIAGNOSIS — G89.3 CANCER RELATED PAIN: ICD-10-CM

## 2018-05-07 LAB
AFP SERPL-MCNC: 2.9 NG/ML
ALBUMIN SERPL BCP-MCNC: 4.4 G/DL
ALP SERPL-CCNC: 55 U/L
ALT SERPL W/O P-5'-P-CCNC: 183 U/L
ANION GAP SERPL CALC-SCNC: 10 MMOL/L
AST SERPL-CCNC: 80 U/L
BILIRUB SERPL-MCNC: 0.4 MG/DL
BUN SERPL-MCNC: 12 MG/DL
CALCIUM SERPL-MCNC: 10.4 MG/DL
CHLORIDE SERPL-SCNC: 102 MMOL/L
CO2 SERPL-SCNC: 28 MMOL/L
CREAT SERPL-MCNC: 1 MG/DL
ERYTHROCYTE [DISTWIDTH] IN BLOOD BY AUTOMATED COUNT: 13.9 %
EST. GFR  (AFRICAN AMERICAN): >60 ML/MIN/1.73 M^2
EST. GFR  (NON AFRICAN AMERICAN): >60 ML/MIN/1.73 M^2
GLUCOSE SERPL-MCNC: 101 MG/DL
HCG INTACT+B SERPL-ACNC: <1.2 MIU/ML
HCT VFR BLD AUTO: 40.4 %
HGB BLD-MCNC: 14 G/DL
LDH SERPL L TO P-CCNC: 323 U/L
MAGNESIUM SERPL-MCNC: 2.3 MG/DL
MCH RBC QN AUTO: 28.6 PG
MCHC RBC AUTO-ENTMCNC: 34.7 G/DL
MCV RBC AUTO: 83 FL
NEUTROPHILS # BLD AUTO: 6.1 K/UL
PLATELET # BLD AUTO: 205 K/UL
PMV BLD AUTO: 7.9 FL
POTASSIUM SERPL-SCNC: 4.4 MMOL/L
PROT SERPL-MCNC: 7.5 G/DL
RBC # BLD AUTO: 4.89 M/UL
SODIUM SERPL-SCNC: 140 MMOL/L
WBC # BLD AUTO: 10.11 K/UL

## 2018-05-07 PROCEDURE — 83735 ASSAY OF MAGNESIUM: CPT

## 2018-05-07 PROCEDURE — A4216 STERILE WATER/SALINE, 10 ML: HCPCS | Performed by: INTERNAL MEDICINE

## 2018-05-07 PROCEDURE — 85027 COMPLETE CBC AUTOMATED: CPT

## 2018-05-07 PROCEDURE — 96367 TX/PROPH/DG ADDL SEQ IV INF: CPT

## 2018-05-07 PROCEDURE — 99999 PR PBB SHADOW E&M-EST. PATIENT-LVL III: CPT | Mod: PBBFAC,,, | Performed by: INTERNAL MEDICINE

## 2018-05-07 PROCEDURE — 63600175 PHARM REV CODE 636 W HCPCS: Performed by: INTERNAL MEDICINE

## 2018-05-07 PROCEDURE — 25000003 PHARM REV CODE 250: Performed by: INTERNAL MEDICINE

## 2018-05-07 PROCEDURE — 96417 CHEMO IV INFUS EACH ADDL SEQ: CPT

## 2018-05-07 PROCEDURE — 80053 COMPREHEN METABOLIC PANEL: CPT

## 2018-05-07 PROCEDURE — 82105 ALPHA-FETOPROTEIN SERUM: CPT

## 2018-05-07 PROCEDURE — 36415 COLL VENOUS BLD VENIPUNCTURE: CPT

## 2018-05-07 PROCEDURE — 84702 CHORIONIC GONADOTROPIN TEST: CPT

## 2018-05-07 PROCEDURE — 96413 CHEMO IV INFUSION 1 HR: CPT

## 2018-05-07 PROCEDURE — 83615 LACTATE (LD) (LDH) ENZYME: CPT

## 2018-05-07 PROCEDURE — 3008F BODY MASS INDEX DOCD: CPT | Mod: CPTII,S$GLB,, | Performed by: INTERNAL MEDICINE

## 2018-05-07 PROCEDURE — 99215 OFFICE O/P EST HI 40 MIN: CPT | Mod: S$GLB,,, | Performed by: INTERNAL MEDICINE

## 2018-05-07 RX ORDER — PROMETHAZINE HYDROCHLORIDE 25 MG/ML
25 INJECTION, SOLUTION INTRAMUSCULAR; INTRAVENOUS
Status: DISCONTINUED | OUTPATIENT
Start: 2018-05-07 | End: 2018-05-07

## 2018-05-07 RX ORDER — HEPARIN 100 UNIT/ML
500 SYRINGE INTRAVENOUS
Status: CANCELLED | OUTPATIENT
Start: 2018-05-09

## 2018-05-07 RX ORDER — ONDANSETRON 2 MG/ML
8 INJECTION INTRAMUSCULAR; INTRAVENOUS ONCE
Status: CANCELLED
Start: 2018-05-07 | End: 2018-05-07

## 2018-05-07 RX ORDER — SODIUM CHLORIDE 0.9 % (FLUSH) 0.9 %
10 SYRINGE (ML) INJECTION
Status: CANCELLED | OUTPATIENT
Start: 2018-05-12

## 2018-05-07 RX ORDER — ONDANSETRON 2 MG/ML
8 INJECTION INTRAMUSCULAR; INTRAVENOUS ONCE
Status: CANCELLED
Start: 2018-05-08 | End: 2018-05-08

## 2018-05-07 RX ORDER — ONDANSETRON 2 MG/ML
8 INJECTION INTRAMUSCULAR; INTRAVENOUS ONCE
Status: CANCELLED
Start: 2018-05-09 | End: 2018-05-09

## 2018-05-07 RX ORDER — SODIUM CHLORIDE 9 MG/ML
INJECTION, SOLUTION INTRAVENOUS CONTINUOUS
Status: CANCELLED
Start: 2018-05-12

## 2018-05-07 RX ORDER — PROMETHAZINE HYDROCHLORIDE 25 MG/ML
25 INJECTION, SOLUTION INTRAMUSCULAR; INTRAVENOUS
Status: CANCELLED
Start: 2018-05-09 | End: 2018-05-09

## 2018-05-07 RX ORDER — HEPARIN 100 UNIT/ML
500 SYRINGE INTRAVENOUS
Status: CANCELLED | OUTPATIENT
Start: 2018-05-10

## 2018-05-07 RX ORDER — SODIUM CHLORIDE 0.9 % (FLUSH) 0.9 %
10 SYRINGE (ML) INJECTION
Status: DISCONTINUED | OUTPATIENT
Start: 2018-05-07 | End: 2018-05-07 | Stop reason: HOSPADM

## 2018-05-07 RX ORDER — PROMETHAZINE HYDROCHLORIDE 25 MG/ML
25 INJECTION, SOLUTION INTRAMUSCULAR; INTRAVENOUS
Status: CANCELLED
Start: 2018-05-08 | End: 2018-05-08

## 2018-05-07 RX ORDER — HEPARIN 100 UNIT/ML
500 SYRINGE INTRAVENOUS
Status: DISCONTINUED | OUTPATIENT
Start: 2018-05-07 | End: 2018-05-07 | Stop reason: HOSPADM

## 2018-05-07 RX ORDER — HEPARIN 100 UNIT/ML
500 SYRINGE INTRAVENOUS
Status: CANCELLED | OUTPATIENT
Start: 2018-05-12

## 2018-05-07 RX ORDER — SODIUM CHLORIDE 0.9 % (FLUSH) 0.9 %
10 SYRINGE (ML) INJECTION
Status: CANCELLED | OUTPATIENT
Start: 2018-05-11

## 2018-05-07 RX ORDER — ONDANSETRON 2 MG/ML
8 INJECTION INTRAMUSCULAR; INTRAVENOUS ONCE
Status: DISCONTINUED | OUTPATIENT
Start: 2018-05-07 | End: 2018-05-07 | Stop reason: SDUPTHER

## 2018-05-07 RX ORDER — HEPARIN 100 UNIT/ML
500 SYRINGE INTRAVENOUS
Status: CANCELLED | OUTPATIENT
Start: 2018-05-08

## 2018-05-07 RX ORDER — SODIUM CHLORIDE 0.9 % (FLUSH) 0.9 %
10 SYRINGE (ML) INJECTION
Status: CANCELLED | OUTPATIENT
Start: 2018-05-10

## 2018-05-07 RX ORDER — PROMETHAZINE HYDROCHLORIDE 25 MG/ML
25 INJECTION, SOLUTION INTRAMUSCULAR; INTRAVENOUS
Status: CANCELLED
Start: 2018-05-07 | End: 2018-05-07

## 2018-05-07 RX ORDER — ONDANSETRON 2 MG/ML
8 INJECTION INTRAMUSCULAR; INTRAVENOUS ONCE
Status: CANCELLED
Start: 2018-05-12 | End: 2018-05-12

## 2018-05-07 RX ORDER — SODIUM CHLORIDE 0.9 % (FLUSH) 0.9 %
10 SYRINGE (ML) INJECTION
Status: CANCELLED | OUTPATIENT
Start: 2018-05-08

## 2018-05-07 RX ORDER — ONDANSETRON 2 MG/ML
8 INJECTION INTRAMUSCULAR; INTRAVENOUS ONCE
Status: CANCELLED
Start: 2018-05-10 | End: 2018-05-10

## 2018-05-07 RX ORDER — MORPHINE SULFATE 15 MG/1
15 TABLET ORAL EVERY 6 HOURS PRN
Qty: 60 TABLET | Refills: 0 | Status: SHIPPED | OUTPATIENT
Start: 2018-05-07 | End: 2018-06-08

## 2018-05-07 RX ORDER — SODIUM CHLORIDE 0.9 % (FLUSH) 0.9 %
10 SYRINGE (ML) INJECTION
Status: CANCELLED | OUTPATIENT
Start: 2018-05-07

## 2018-05-07 RX ORDER — HEPARIN 100 UNIT/ML
500 SYRINGE INTRAVENOUS
Status: CANCELLED | OUTPATIENT
Start: 2018-05-11

## 2018-05-07 RX ORDER — ONDANSETRON 2 MG/ML
8 INJECTION INTRAMUSCULAR; INTRAVENOUS ONCE
Status: CANCELLED
Start: 2018-05-11 | End: 2018-05-11

## 2018-05-07 RX ORDER — PROMETHAZINE HYDROCHLORIDE 25 MG/ML
25 INJECTION, SOLUTION INTRAMUSCULAR; INTRAVENOUS
Status: CANCELLED
Start: 2018-05-11 | End: 2018-05-11

## 2018-05-07 RX ORDER — SODIUM CHLORIDE 0.9 % (FLUSH) 0.9 %
10 SYRINGE (ML) INJECTION
Status: CANCELLED | OUTPATIENT
Start: 2018-05-09

## 2018-05-07 RX ORDER — PROMETHAZINE HYDROCHLORIDE 25 MG/ML
25 INJECTION, SOLUTION INTRAMUSCULAR; INTRAVENOUS
Status: CANCELLED
Start: 2018-05-12 | End: 2018-05-12

## 2018-05-07 RX ORDER — HEPARIN 100 UNIT/ML
500 SYRINGE INTRAVENOUS
Status: CANCELLED | OUTPATIENT
Start: 2018-05-07

## 2018-05-07 RX ORDER — ONDANSETRON HCL IN 0.9 % NACL 8 MG/50 ML
8 INTRAVENOUS SOLUTION, PIGGYBACK (ML) INTRAVENOUS
Status: COMPLETED | OUTPATIENT
Start: 2018-05-07 | End: 2018-05-07

## 2018-05-07 RX ORDER — PROMETHAZINE HYDROCHLORIDE 25 MG/ML
25 INJECTION, SOLUTION INTRAMUSCULAR; INTRAVENOUS
Status: CANCELLED
Start: 2018-05-10 | End: 2018-05-10

## 2018-05-07 RX ADMIN — SODIUM CHLORIDE 150 MG: 0.9 INJECTION, SOLUTION INTRAVENOUS at 02:05

## 2018-05-07 RX ADMIN — SODIUM CHLORIDE 8 MG: 9 INJECTION, SOLUTION INTRAVENOUS at 02:05

## 2018-05-07 RX ADMIN — SODIUM CHLORIDE 1000 ML: 0.9 INJECTION, SOLUTION INTRAVENOUS at 04:05

## 2018-05-07 RX ADMIN — SODIUM CHLORIDE, PRESERVATIVE FREE 10 ML: 5 INJECTION INTRAVENOUS at 05:05

## 2018-05-07 RX ADMIN — HEPARIN 500 UNITS: 100 SYRINGE at 05:05

## 2018-05-07 RX ADMIN — ETOPOSIDE 164 MG: 20 INJECTION INTRAVENOUS at 04:05

## 2018-05-07 RX ADMIN — CISPLATIN 33 MG: 100 INJECTION, SOLUTION INTRAVENOUS at 03:05

## 2018-05-07 RX ADMIN — PALONOSETRON: 0.25 INJECTION, SOLUTION INTRAVENOUS at 01:05

## 2018-05-07 RX ADMIN — PROMETHAZINE HYDROCHLORIDE 25 MG: 25 INJECTION INTRAMUSCULAR; INTRAVENOUS at 02:05

## 2018-05-07 RX ADMIN — SODIUM CHLORIDE 1000 ML: 0.9 INJECTION, SOLUTION INTRAVENOUS at 01:05

## 2018-05-07 NOTE — PROGRESS NOTES
"                                                         PROGRESS NOTE    Subjective:     Patient ID: Alejandro Neff is a 23 y.o. male.     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 Mr. Hampton is a 22 yo man who first presented with chest pain and underwent CT chest on 18, which showed a 6 x 3.5 cm mediastinal mass. It abuts the aorta and pulmonary artery. Biopsy was done on 3/1/18. Pathology (reviewed at [facility]) showed germ cell tumor, most consistent with seminoma.   2. HCG, LDH, AFP on 3/22/18 all normal.   3. Testicular US 3/26/18 showed no testicular masses. CT C/A/P on 3/26/18 showed "stable appearance of anterior mediastinal mass consistent with provided history of seminoma. Several punctate sclerotic foci are noted in the pelvis at the right pubic bone, right ischial tuberosity, and left ilium adjacent to the sacroiliac joint.  These are strongly favored to reflect benign bone islands although metastatic disease could have a similar appearance and close follow-up is recommended. Several benign Schmorl's nodes are noted in the thoracic spine." Bone scan on 3/28/18 was negative for bone mets.   4. Audiogram on 18 normal. Followed by ENT.   5. Given his smoking history, I recommended 4 cycles of EP. EP cycle 1 day 1 on 18. Complicated by hospitalization for neutropenic fever -. No source of infections identified. Treated with antibiotics empirically and improved.   6. Cycle 2 of EP to be started on 18.      INTERVAL HISTORY:   Mr. Hampton returns today for follow up of primary mediastinal seminoma. He is to start cycle 2 of EP today. No fever, chills. Has nausea today. Can only get 9 zofran tablets each time due to insurance coverage. Phenergan suppository helps.     ECO     ROS:   A ten point system review is obtained and neg except for what was stated in the interval " history     Physical Examination:   Vital signs reviewed.   Gen: well hydrated, well developed, in no acute distress.  HEENT: normocephalic, anicteric sclerae, PERRLA, EOMI, oropharynx clear  Neck: supple, no JVD, thyromegaly, cervical or supraclavicular LAD  Lungs: CTAB, no wheezes or rales. Port site on chest clean.   Heart: RRR, no M/R/G  Abdomen: soft, no tenderness, non-distended, no hepatosplenomegaly, mass, or hernia. BS present  Ext: no clubbing, cyanosis, or edema  Neuro: alert and oriented x 4, no focal neuro deficit  Skin: no rash, erythema, open wound or ulcers  Psych: pleasant and appropriate mood and affect       Objective:       Laboratory Data:  Labs from today reviewed. Adequate for chemo.     Assessment/Plan:     1. Germ cell tumor    2. Primary mediastinal seminoma    3. Encounter for antineoplastic chemotherapy    4. Cancer related pain    5. Non-intractable vomiting with nausea, unspecified vomiting type    6. Elevated liver enzymes    7. Neutropenic fever    8. Thrombocytopenia    9. Anemia associated with chemotherapy      1.2.3  - Primary mediastinal seminoma, good risk disease. S/p 1 cycle of EP. Had neutropenic fever despite neulasta.   - Has fully recovered. Afebrile.   - cycle 2 of EP today.   - c/w neulasta each cycle    4. Refilled morphine    5. C/w zofran and phenergan    6. Likely 2/2 chemo. Monitor    7. Resolved. Neulasta with each cycle    8.9.  2/2 chemo. Resolved. Monitor    RTC next Monday with CBC, CMP, Mg.        Electronically signed by Suha Rojas

## 2018-05-07 NOTE — PLAN OF CARE
Problem: Patient Care Overview  Goal: Plan of Care Review  4404-Patient tolerated treatment well. Discharged without complaints or S/S of adverse event.  Instructed to call provider for any questions or concerns.  Patient to return tomorrow for next treatment.

## 2018-05-07 NOTE — PLAN OF CARE
Problem: Patient Care Overview  Goal: Individualization & Mutuality  Outcome: Ongoing (interventions implemented as appropriate)  1315-Labs , hx, and medications reviewed, patient was seen by MD prior to arrival. Assessment completed. Discussed plan of care with patient. Patient in agreement. Chair reclined and warm blanket and snack offered.

## 2018-05-08 ENCOUNTER — INFUSION (OUTPATIENT)
Dept: INFUSION THERAPY | Facility: HOSPITAL | Age: 23
End: 2018-05-08
Attending: INTERNAL MEDICINE
Payer: COMMERCIAL

## 2018-05-08 VITALS
SYSTOLIC BLOOD PRESSURE: 116 MMHG | TEMPERATURE: 98 F | RESPIRATION RATE: 18 BRPM | HEART RATE: 108 BPM | DIASTOLIC BLOOD PRESSURE: 58 MMHG

## 2018-05-08 DIAGNOSIS — C80.1 GERM CELL TUMOR: Primary | ICD-10-CM

## 2018-05-08 PROCEDURE — 96417 CHEMO IV INFUS EACH ADDL SEQ: CPT

## 2018-05-08 PROCEDURE — 96367 TX/PROPH/DG ADDL SEQ IV INF: CPT

## 2018-05-08 PROCEDURE — 63600175 PHARM REV CODE 636 W HCPCS: Performed by: INTERNAL MEDICINE

## 2018-05-08 PROCEDURE — 96361 HYDRATE IV INFUSION ADD-ON: CPT

## 2018-05-08 PROCEDURE — 25000003 PHARM REV CODE 250: Performed by: INTERNAL MEDICINE

## 2018-05-08 PROCEDURE — 96413 CHEMO IV INFUSION 1 HR: CPT

## 2018-05-08 RX ORDER — HEPARIN 100 UNIT/ML
500 SYRINGE INTRAVENOUS
Status: DISCONTINUED | OUTPATIENT
Start: 2018-05-08 | End: 2018-05-08 | Stop reason: HOSPADM

## 2018-05-08 RX ORDER — SODIUM CHLORIDE 0.9 % (FLUSH) 0.9 %
10 SYRINGE (ML) INJECTION
Status: DISCONTINUED | OUTPATIENT
Start: 2018-05-08 | End: 2018-05-08 | Stop reason: HOSPADM

## 2018-05-08 RX ORDER — PROMETHAZINE HYDROCHLORIDE 25 MG/ML
25 INJECTION, SOLUTION INTRAMUSCULAR; INTRAVENOUS
Status: DISCONTINUED | OUTPATIENT
Start: 2018-05-08 | End: 2018-05-08

## 2018-05-08 RX ORDER — ONDANSETRON HCL IN 0.9 % NACL 8 MG/50 ML
8 INTRAVENOUS SOLUTION, PIGGYBACK (ML) INTRAVENOUS ONCE
Status: COMPLETED | OUTPATIENT
Start: 2018-05-08 | End: 2018-05-08

## 2018-05-08 RX ADMIN — PROMETHAZINE HYDROCHLORIDE 25 MG: 25 INJECTION INTRAMUSCULAR; INTRAVENOUS at 03:05

## 2018-05-08 RX ADMIN — SODIUM CHLORIDE 1000 ML: 9 INJECTION, SOLUTION INTRAVENOUS at 05:05

## 2018-05-08 RX ADMIN — CISPLATIN 33 MG: 100 INJECTION, SOLUTION INTRAVENOUS at 04:05

## 2018-05-08 RX ADMIN — ETOPOSIDE 164 MG: 20 INJECTION INTRAVENOUS at 05:05

## 2018-05-08 RX ADMIN — SODIUM CHLORIDE 1000 ML: 9 INJECTION, SOLUTION INTRAVENOUS at 03:05

## 2018-05-08 RX ADMIN — SODIUM CHLORIDE 8 MG: 9 INJECTION, SOLUTION INTRAVENOUS at 03:05

## 2018-05-08 RX ADMIN — HEPARIN 500 UNITS: 100 SYRINGE at 06:05

## 2018-05-08 RX ADMIN — DEXAMETHASONE SODIUM PHOSPHATE 10 MG: 4 INJECTION, SOLUTION INTRAMUSCULAR; INTRAVENOUS at 03:05

## 2018-05-08 NOTE — PLAN OF CARE
Problem: Patient Care Overview  Goal: Discharge Needs Assessment  Outcome: Ongoing (interventions implemented as appropriate)  Pt tolerated IVF and treatment well no infusion reaction noted vss leaves clinic ambulatory, PAC flushed hep locked, no questions or concerns at this time instructed to contact MD office with any future concerns, avs given future appt's reviewed.

## 2018-05-09 ENCOUNTER — INFUSION (OUTPATIENT)
Dept: INFUSION THERAPY | Facility: HOSPITAL | Age: 23
End: 2018-05-09
Attending: INTERNAL MEDICINE
Payer: COMMERCIAL

## 2018-05-09 VITALS
SYSTOLIC BLOOD PRESSURE: 109 MMHG | TEMPERATURE: 98 F | DIASTOLIC BLOOD PRESSURE: 53 MMHG | HEART RATE: 90 BPM | RESPIRATION RATE: 16 BRPM

## 2018-05-09 DIAGNOSIS — C80.1 GERM CELL TUMOR: Primary | ICD-10-CM

## 2018-05-09 PROCEDURE — 96417 CHEMO IV INFUS EACH ADDL SEQ: CPT

## 2018-05-09 PROCEDURE — 96367 TX/PROPH/DG ADDL SEQ IV INF: CPT

## 2018-05-09 PROCEDURE — 96413 CHEMO IV INFUSION 1 HR: CPT

## 2018-05-09 PROCEDURE — 25000003 PHARM REV CODE 250: Performed by: INTERNAL MEDICINE

## 2018-05-09 PROCEDURE — 96361 HYDRATE IV INFUSION ADD-ON: CPT

## 2018-05-09 PROCEDURE — 63600175 PHARM REV CODE 636 W HCPCS: Performed by: INTERNAL MEDICINE

## 2018-05-09 RX ORDER — ONDANSETRON HCL IN 0.9 % NACL 8 MG/50 ML
8 INTRAVENOUS SOLUTION, PIGGYBACK (ML) INTRAVENOUS ONCE
Status: COMPLETED | OUTPATIENT
Start: 2018-05-09 | End: 2018-05-09

## 2018-05-09 RX ORDER — HEPARIN 100 UNIT/ML
500 SYRINGE INTRAVENOUS
Status: DISCONTINUED | OUTPATIENT
Start: 2018-05-09 | End: 2018-05-09 | Stop reason: HOSPADM

## 2018-05-09 RX ORDER — PROMETHAZINE HYDROCHLORIDE 25 MG/ML
25 INJECTION, SOLUTION INTRAMUSCULAR; INTRAVENOUS
Status: DISCONTINUED | OUTPATIENT
Start: 2018-05-09 | End: 2018-05-09

## 2018-05-09 RX ORDER — SODIUM CHLORIDE 0.9 % (FLUSH) 0.9 %
10 SYRINGE (ML) INJECTION
Status: DISCONTINUED | OUTPATIENT
Start: 2018-05-09 | End: 2018-05-09 | Stop reason: HOSPADM

## 2018-05-09 RX ADMIN — PROMETHAZINE HYDROCHLORIDE 25 MG: 25 INJECTION INTRAMUSCULAR; INTRAVENOUS at 02:05

## 2018-05-09 RX ADMIN — CISPLATIN 33 MG: 100 INJECTION, SOLUTION INTRAVENOUS at 04:05

## 2018-05-09 RX ADMIN — ETOPOSIDE 164 MG: 20 INJECTION INTRAVENOUS at 03:05

## 2018-05-09 RX ADMIN — HEPARIN 500 UNITS: 100 SYRINGE at 06:05

## 2018-05-09 RX ADMIN — SODIUM CHLORIDE 8 MG: 9 INJECTION, SOLUTION INTRAVENOUS at 02:05

## 2018-05-09 RX ADMIN — DEXAMETHASONE SODIUM PHOSPHATE 10 MG: 4 INJECTION, SOLUTION INTRAMUSCULAR; INTRAVENOUS at 03:05

## 2018-05-09 RX ADMIN — SODIUM CHLORIDE 1000 ML: 0.9 INJECTION, SOLUTION INTRAVENOUS at 02:05

## 2018-05-09 RX ADMIN — SODIUM CHLORIDE 1000 ML: 0.9 INJECTION, SOLUTION INTRAVENOUS at 04:05

## 2018-05-09 NOTE — NURSING
1415:  [facility], VSS, assessment completed.  RCW PAC accessed, sterile procedure. Flushes easily, brisk blood return noted.  Pre-hydration initiated.

## 2018-05-09 NOTE — PLAN OF CARE
Problem: Patient Care Overview  Goal: Plan of Care Review  Outcome: Ongoing (interventions implemented as appropriate)  Pt tolerated treatment well, VSS, NAD noted.  Portacath flushed per protocol and de-accessed.  AVS declined.  Pt released via wheelchair, due to sleepiness, but stable condition, accompanied by a friend.

## 2018-05-10 ENCOUNTER — INFUSION (OUTPATIENT)
Dept: INFUSION THERAPY | Facility: HOSPITAL | Age: 23
End: 2018-05-10
Attending: INTERNAL MEDICINE
Payer: COMMERCIAL

## 2018-05-10 ENCOUNTER — PATIENT MESSAGE (OUTPATIENT)
Dept: HEMATOLOGY/ONCOLOGY | Facility: CLINIC | Age: 23
End: 2018-05-10

## 2018-05-10 VITALS
SYSTOLIC BLOOD PRESSURE: 122 MMHG | RESPIRATION RATE: 16 BRPM | DIASTOLIC BLOOD PRESSURE: 59 MMHG | TEMPERATURE: 99 F | HEART RATE: 97 BPM

## 2018-05-10 DIAGNOSIS — C80.1 GERM CELL TUMOR: Primary | ICD-10-CM

## 2018-05-10 PROCEDURE — 96367 TX/PROPH/DG ADDL SEQ IV INF: CPT

## 2018-05-10 PROCEDURE — 25000003 PHARM REV CODE 250: Performed by: INTERNAL MEDICINE

## 2018-05-10 PROCEDURE — 96413 CHEMO IV INFUSION 1 HR: CPT

## 2018-05-10 PROCEDURE — 96417 CHEMO IV INFUS EACH ADDL SEQ: CPT

## 2018-05-10 PROCEDURE — 96375 TX/PRO/DX INJ NEW DRUG ADDON: CPT

## 2018-05-10 PROCEDURE — 96361 HYDRATE IV INFUSION ADD-ON: CPT

## 2018-05-10 PROCEDURE — 63600175 PHARM REV CODE 636 W HCPCS: Mod: JG | Performed by: INTERNAL MEDICINE

## 2018-05-10 RX ORDER — HEPARIN 100 UNIT/ML
500 SYRINGE INTRAVENOUS
Status: DISCONTINUED | OUTPATIENT
Start: 2018-05-10 | End: 2018-05-10 | Stop reason: HOSPADM

## 2018-05-10 RX ORDER — ONDANSETRON HCL IN 0.9 % NACL 8 MG/50 ML
8 INTRAVENOUS SOLUTION, PIGGYBACK (ML) INTRAVENOUS
Status: COMPLETED | OUTPATIENT
Start: 2018-05-10 | End: 2018-05-10

## 2018-05-10 RX ORDER — ONDANSETRON 2 MG/ML
8 INJECTION INTRAMUSCULAR; INTRAVENOUS ONCE
Status: DISCONTINUED | OUTPATIENT
Start: 2018-05-10 | End: 2018-05-10 | Stop reason: SDUPTHER

## 2018-05-10 RX ORDER — SODIUM CHLORIDE 0.9 % (FLUSH) 0.9 %
10 SYRINGE (ML) INJECTION
Status: DISCONTINUED | OUTPATIENT
Start: 2018-05-10 | End: 2018-05-10 | Stop reason: HOSPADM

## 2018-05-10 RX ORDER — PROMETHAZINE HYDROCHLORIDE 25 MG/ML
25 INJECTION, SOLUTION INTRAMUSCULAR; INTRAVENOUS
Status: DISCONTINUED | OUTPATIENT
Start: 2018-05-10 | End: 2018-05-10 | Stop reason: SDUPTHER

## 2018-05-10 RX ADMIN — CISPLATIN 33 MG: 1 INJECTION, SOLUTION INTRAVENOUS at 03:05

## 2018-05-10 RX ADMIN — PALONOSETRON: 0.25 INJECTION, SOLUTION INTRAVENOUS at 02:05

## 2018-05-10 RX ADMIN — ETOPOSIDE 164 MG: 20 INJECTION INTRAVENOUS at 04:05

## 2018-05-10 RX ADMIN — SODIUM CHLORIDE 1000 ML: 0.9 INJECTION, SOLUTION INTRAVENOUS at 05:05

## 2018-05-10 RX ADMIN — PROMETHAZINE HYDROCHLORIDE 25 MG: 25 INJECTION INTRAMUSCULAR; INTRAVENOUS at 02:05

## 2018-05-10 RX ADMIN — HEPARIN 500 UNITS: 100 SYRINGE at 06:05

## 2018-05-10 RX ADMIN — SODIUM CHLORIDE 8 MG: 9 INJECTION, SOLUTION INTRAVENOUS at 02:05

## 2018-05-10 RX ADMIN — SODIUM CHLORIDE 1000 ML: 0.9 INJECTION, SOLUTION INTRAVENOUS at 02:05

## 2018-05-10 NOTE — NURSING
1350:  atient arrived at [facility], VSS, assessment completed.  NAD noted.  RCW PAC accessed, flushes easily, brisk blood return noted.  IVF's initiated.  Snacks offered, chair reclined.

## 2018-05-10 NOTE — PLAN OF CARE
Problem: Patient Care Overview  Goal: Plan of Care Review  Outcome: Ongoing (interventions implemented as appropriate)  1825:  Patient tolerated treatment well, NAD noted, no c/o voiced.  Portacath flushed per protocol and de-accessed.  Released to home in stable condition, accompanied by a friend.

## 2018-05-11 ENCOUNTER — INFUSION (OUTPATIENT)
Dept: INFUSION THERAPY | Facility: HOSPITAL | Age: 23
End: 2018-05-11
Attending: INTERNAL MEDICINE
Payer: COMMERCIAL

## 2018-05-11 VITALS
HEART RATE: 106 BPM | DIASTOLIC BLOOD PRESSURE: 56 MMHG | TEMPERATURE: 98 F | SYSTOLIC BLOOD PRESSURE: 112 MMHG | RESPIRATION RATE: 16 BRPM

## 2018-05-11 DIAGNOSIS — C80.1 GERM CELL TUMOR: Primary | ICD-10-CM

## 2018-05-11 PROCEDURE — 96367 TX/PROPH/DG ADDL SEQ IV INF: CPT

## 2018-05-11 PROCEDURE — A4216 STERILE WATER/SALINE, 10 ML: HCPCS | Performed by: INTERNAL MEDICINE

## 2018-05-11 PROCEDURE — 96417 CHEMO IV INFUS EACH ADDL SEQ: CPT

## 2018-05-11 PROCEDURE — 96413 CHEMO IV INFUSION 1 HR: CPT

## 2018-05-11 PROCEDURE — 25000003 PHARM REV CODE 250: Performed by: INTERNAL MEDICINE

## 2018-05-11 PROCEDURE — 63600175 PHARM REV CODE 636 W HCPCS: Performed by: INTERNAL MEDICINE

## 2018-05-11 RX ORDER — HEPARIN 100 UNIT/ML
500 SYRINGE INTRAVENOUS
Status: DISCONTINUED | OUTPATIENT
Start: 2018-05-11 | End: 2018-05-11 | Stop reason: HOSPADM

## 2018-05-11 RX ORDER — SODIUM CHLORIDE 0.9 % (FLUSH) 0.9 %
10 SYRINGE (ML) INJECTION
Status: DISCONTINUED | OUTPATIENT
Start: 2018-05-11 | End: 2018-05-11 | Stop reason: HOSPADM

## 2018-05-11 RX ORDER — ONDANSETRON 2 MG/ML
8 INJECTION INTRAMUSCULAR; INTRAVENOUS ONCE
Status: DISCONTINUED | OUTPATIENT
Start: 2018-05-11 | End: 2018-05-11 | Stop reason: SDUPTHER

## 2018-05-11 RX ORDER — ONDANSETRON HCL IN 0.9 % NACL 8 MG/50 ML
8 INTRAVENOUS SOLUTION, PIGGYBACK (ML) INTRAVENOUS
Status: COMPLETED | OUTPATIENT
Start: 2018-05-11 | End: 2018-05-11

## 2018-05-11 RX ORDER — PROMETHAZINE HYDROCHLORIDE 25 MG/ML
25 INJECTION, SOLUTION INTRAMUSCULAR; INTRAVENOUS
Status: DISCONTINUED | OUTPATIENT
Start: 2018-05-11 | End: 2018-05-11 | Stop reason: SDUPTHER

## 2018-05-11 RX ADMIN — SODIUM CHLORIDE 1000 ML: 0.9 INJECTION, SOLUTION INTRAVENOUS at 03:05

## 2018-05-11 RX ADMIN — CISPLATIN 33 MG: 1 INJECTION, SOLUTION INTRAVENOUS at 02:05

## 2018-05-11 RX ADMIN — SODIUM CHLORIDE 8 MG: 9 INJECTION, SOLUTION INTRAVENOUS at 01:05

## 2018-05-11 RX ADMIN — SODIUM CHLORIDE 1000 ML: 0.9 INJECTION, SOLUTION INTRAVENOUS at 01:05

## 2018-05-11 RX ADMIN — PROMETHAZINE HYDROCHLORIDE 25 MG: 25 INJECTION INTRAMUSCULAR; INTRAVENOUS at 02:05

## 2018-05-11 RX ADMIN — PEGFILGRASTIM 6 MG: KIT SUBCUTANEOUS at 05:05

## 2018-05-11 RX ADMIN — DEXAMETHASONE SODIUM PHOSPHATE 10 MG: 4 INJECTION, SOLUTION INTRA-ARTICULAR; INTRALESIONAL; INTRAMUSCULAR; INTRAVENOUS; SOFT TISSUE at 01:05

## 2018-05-11 RX ADMIN — SODIUM CHLORIDE, PRESERVATIVE FREE 10 ML: 5 INJECTION INTRAVENOUS at 05:05

## 2018-05-11 RX ADMIN — ETOPOSIDE 164 MG: 20 INJECTION INTRAVENOUS at 03:05

## 2018-05-11 RX ADMIN — HEPARIN 500 UNITS: 100 SYRINGE at 05:05

## 2018-05-11 NOTE — PLAN OF CARE
Problem: Patient Care Overview  Goal: Individualization & Mutuality  Outcome: Ongoing (interventions implemented as appropriate)  1348 -- Patient's labs, history, allergies, and medication reviewed.  Assessment complete.  Vital signs stable.  Patient to receive Cisplatin/Etoposide/IVF.  Discussed plan of care with patient.  Patient in agreement. Chair reclined.  Warm blanket and snack offered.  Will monitor.

## 2018-05-11 NOTE — DISCHARGE SUMMARY
Radiology Discharge Summary      Hospital Course: No complications    Admit Date: 3/1/2018  Discharge Date: 05/11/2018     Instructions Given to Patient: Yes  Diet: Resume prior diet  Activity: activity as tolerated    Description of Condition on Discharge: Stable  Vital Signs (Most Recent): Temp: 97.5 °F (36.4 °C) (03/01/18 1209)  Pulse: 74 (03/01/18 1740)  Resp: 18 (03/01/18 1740)  BP: (!) 100/59 (03/01/18 1740)  SpO2: 99 % (03/01/18 1740)    Discharge Disposition: Home    Discharge Diagnosis: mediastinal mass     Follow-up: per oncology    @SIG@

## 2018-05-12 ENCOUNTER — INFUSION (OUTPATIENT)
Dept: INFUSION THERAPY | Facility: HOSPITAL | Age: 23
End: 2018-05-12
Attending: INTERNAL MEDICINE
Payer: COMMERCIAL

## 2018-05-12 VITALS
TEMPERATURE: 99 F | SYSTOLIC BLOOD PRESSURE: 125 MMHG | HEART RATE: 104 BPM | DIASTOLIC BLOOD PRESSURE: 77 MMHG | RESPIRATION RATE: 18 BRPM

## 2018-05-12 DIAGNOSIS — C80.1 GERM CELL TUMOR: Primary | ICD-10-CM

## 2018-05-12 PROCEDURE — 63600175 PHARM REV CODE 636 W HCPCS: Performed by: INTERNAL MEDICINE

## 2018-05-12 PROCEDURE — 96367 TX/PROPH/DG ADDL SEQ IV INF: CPT

## 2018-05-12 PROCEDURE — 96361 HYDRATE IV INFUSION ADD-ON: CPT

## 2018-05-12 PROCEDURE — 96365 THER/PROPH/DIAG IV INF INIT: CPT

## 2018-05-12 PROCEDURE — 25000003 PHARM REV CODE 250: Performed by: INTERNAL MEDICINE

## 2018-05-12 RX ORDER — ONDANSETRON HCL IN 0.9 % NACL 8 MG/50 ML
8 INTRAVENOUS SOLUTION, PIGGYBACK (ML) INTRAVENOUS
Status: COMPLETED | OUTPATIENT
Start: 2018-05-12 | End: 2018-05-12

## 2018-05-12 RX ORDER — SODIUM CHLORIDE 0.9 % (FLUSH) 0.9 %
10 SYRINGE (ML) INJECTION
Status: DISCONTINUED | OUTPATIENT
Start: 2018-05-12 | End: 2018-05-12 | Stop reason: HOSPADM

## 2018-05-12 RX ORDER — ONDANSETRON 2 MG/ML
8 INJECTION INTRAMUSCULAR; INTRAVENOUS ONCE
Status: DISCONTINUED | OUTPATIENT
Start: 2018-05-12 | End: 2018-05-12 | Stop reason: SDUPTHER

## 2018-05-12 RX ORDER — PROMETHAZINE HYDROCHLORIDE 25 MG/ML
25 INJECTION, SOLUTION INTRAMUSCULAR; INTRAVENOUS
Status: DISCONTINUED | OUTPATIENT
Start: 2018-05-12 | End: 2018-05-12 | Stop reason: SDUPTHER

## 2018-05-12 RX ORDER — SODIUM CHLORIDE 9 MG/ML
INJECTION, SOLUTION INTRAVENOUS CONTINUOUS
Status: ACTIVE | OUTPATIENT
Start: 2018-05-12 | End: 2018-05-12

## 2018-05-12 RX ORDER — HEPARIN 100 UNIT/ML
500 SYRINGE INTRAVENOUS
Status: DISCONTINUED | OUTPATIENT
Start: 2018-05-12 | End: 2018-05-12 | Stop reason: HOSPADM

## 2018-05-12 RX ADMIN — SODIUM CHLORIDE 8 MG: 9 INJECTION, SOLUTION INTRAVENOUS at 10:05

## 2018-05-12 RX ADMIN — PROMETHAZINE HYDROCHLORIDE 25 MG: 25 INJECTION INTRAMUSCULAR; INTRAVENOUS at 10:05

## 2018-05-12 RX ADMIN — HEPARIN 500 UNITS: 100 SYRINGE at 01:05

## 2018-05-12 RX ADMIN — SODIUM CHLORIDE: 0.9 INJECTION, SOLUTION INTRAVENOUS at 10:05

## 2018-05-12 NOTE — PLAN OF CARE
Problem: Chemotherapy Effects (Adult)  Goal: Signs and Symptoms of Listed Potential Problems Will be Absent, Minimized or Managed (Chemotherapy Effects)  Signs and symptoms of listed potential problems will be absent, minimized or managed by discharge/transition of care (reference Chemotherapy Effects (Adult) CPG).   Outcome: Ongoing (interventions implemented as appropriate)  Here for antiemetics and ivf's.

## 2018-05-13 ENCOUNTER — HOSPITAL ENCOUNTER (OUTPATIENT)
Facility: OTHER | Age: 23
Discharge: HOME | End: 2018-05-16
Attending: EMERGENCY MEDICINE | Admitting: EMERGENCY MEDICINE
Payer: COMMERCIAL

## 2018-05-13 DIAGNOSIS — R11.2 NON-INTRACTABLE VOMITING WITH NAUSEA, UNSPECIFIED VOMITING TYPE: Primary | ICD-10-CM

## 2018-05-13 DIAGNOSIS — C38.3 PRIMARY MEDIASTINAL SEMINOMA: ICD-10-CM

## 2018-05-13 DIAGNOSIS — R11.2 VOMITING WITH NAUSEA, NOT INTRACTABLE: ICD-10-CM

## 2018-05-13 DIAGNOSIS — R11.2 INTRACTABLE VOMITING WITH NAUSEA, UNSPECIFIED VOMITING TYPE: ICD-10-CM

## 2018-05-13 DIAGNOSIS — C80.1 GERM CELL TUMOR: ICD-10-CM

## 2018-05-13 LAB
ALBUMIN SERPL BCP-MCNC: 4.1 G/DL
ALP SERPL-CCNC: 66 U/L
ALT SERPL W/O P-5'-P-CCNC: 49 U/L
ANION GAP SERPL CALC-SCNC: 12 MMOL/L
ANISOCYTOSIS BLD QL SMEAR: SLIGHT
AST SERPL-CCNC: 11 U/L
BASOPHILS # BLD AUTO: ABNORMAL K/UL
BASOPHILS NFR BLD: 0 %
BILIRUB SERPL-MCNC: 1.1 MG/DL
BILIRUB UR QL STRIP: NEGATIVE
BUN SERPL-MCNC: 17 MG/DL
CALCIUM SERPL-MCNC: 10 MG/DL
CHLORIDE SERPL-SCNC: 99 MMOL/L
CLARITY UR: CLEAR
CO2 SERPL-SCNC: 25 MMOL/L
COLOR UR: YELLOW
CREAT SERPL-MCNC: 0.8 MG/DL
DIFFERENTIAL METHOD: ABNORMAL
EOSINOPHIL # BLD AUTO: ABNORMAL K/UL
EOSINOPHIL NFR BLD: 0 %
ERYTHROCYTE [DISTWIDTH] IN BLOOD BY AUTOMATED COUNT: 13.4 %
EST. GFR  (AFRICAN AMERICAN): >60 ML/MIN/1.73 M^2
EST. GFR  (NON AFRICAN AMERICAN): >60 ML/MIN/1.73 M^2
GLUCOSE SERPL-MCNC: 83 MG/DL
GLUCOSE UR QL STRIP: NEGATIVE
HCT VFR BLD AUTO: 35 %
HGB BLD-MCNC: 12.5 G/DL
HGB UR QL STRIP: NEGATIVE
KETONES UR QL STRIP: NEGATIVE
LEUKOCYTE ESTERASE UR QL STRIP: NEGATIVE
LYMPHOCYTES # BLD AUTO: ABNORMAL K/UL
LYMPHOCYTES NFR BLD: 2 %
MAGNESIUM SERPL-MCNC: 2.1 MG/DL
MCH RBC QN AUTO: 29 PG
MCHC RBC AUTO-ENTMCNC: 35.7 G/DL
MCV RBC AUTO: 81 FL
MONOCYTES # BLD AUTO: ABNORMAL K/UL
MONOCYTES NFR BLD: 0 %
NEUTROPHILS NFR BLD: 92 %
NEUTS BAND NFR BLD MANUAL: 6 %
NITRITE UR QL STRIP: NEGATIVE
PH UR STRIP: 7 [PH] (ref 5–8)
PLATELET # BLD AUTO: 258 K/UL
PLATELET BLD QL SMEAR: ABNORMAL
PMV BLD AUTO: 8.1 FL
POTASSIUM SERPL-SCNC: 3.8 MMOL/L
PROT SERPL-MCNC: 6.9 G/DL
PROT UR QL STRIP: NEGATIVE
RBC # BLD AUTO: 4.31 M/UL
SODIUM SERPL-SCNC: 136 MMOL/L
SP GR UR STRIP: 1.01 (ref 1–1.03)
URN SPEC COLLECT METH UR: NORMAL
UROBILINOGEN UR STRIP-ACNC: NEGATIVE EU/DL
WBC # BLD AUTO: 65.32 K/UL

## 2018-05-13 PROCEDURE — 25000003 PHARM REV CODE 250: Performed by: NURSE PRACTITIONER

## 2018-05-13 PROCEDURE — 81003 URINALYSIS AUTO W/O SCOPE: CPT

## 2018-05-13 PROCEDURE — 80053 COMPREHEN METABOLIC PANEL: CPT

## 2018-05-13 PROCEDURE — 25000003 PHARM REV CODE 250: Performed by: EMERGENCY MEDICINE

## 2018-05-13 PROCEDURE — 85007 BL SMEAR W/DIFF WBC COUNT: CPT

## 2018-05-13 PROCEDURE — G0378 HOSPITAL OBSERVATION PER HR: HCPCS

## 2018-05-13 PROCEDURE — 96361 HYDRATE IV INFUSION ADD-ON: CPT

## 2018-05-13 PROCEDURE — 99285 EMERGENCY DEPT VISIT HI MDM: CPT | Mod: 25

## 2018-05-13 PROCEDURE — 99220 PR INITIAL OBSERVATION CARE,LEVL III: CPT | Mod: ,,, | Performed by: NURSE PRACTITIONER

## 2018-05-13 PROCEDURE — 96375 TX/PRO/DX INJ NEW DRUG ADDON: CPT

## 2018-05-13 PROCEDURE — 83735 ASSAY OF MAGNESIUM: CPT

## 2018-05-13 PROCEDURE — 94761 N-INVAS EAR/PLS OXIMETRY MLT: CPT

## 2018-05-13 PROCEDURE — 63600175 PHARM REV CODE 636 W HCPCS: Performed by: EMERGENCY MEDICINE

## 2018-05-13 PROCEDURE — 85027 COMPLETE CBC AUTOMATED: CPT

## 2018-05-13 PROCEDURE — 96365 THER/PROPH/DIAG IV INF INIT: CPT

## 2018-05-13 RX ORDER — LORAZEPAM 0.5 MG/1
0.5 TABLET ORAL EVERY 8 HOURS PRN
Status: DISCONTINUED | OUTPATIENT
Start: 2018-05-13 | End: 2018-05-16 | Stop reason: HOSPADM

## 2018-05-13 RX ORDER — DIPHENHYDRAMINE HYDROCHLORIDE 50 MG/ML
25 INJECTION INTRAMUSCULAR; INTRAVENOUS
Status: COMPLETED | OUTPATIENT
Start: 2018-05-13 | End: 2018-05-13

## 2018-05-13 RX ORDER — DEXAMETHASONE SODIUM PHOSPHATE 4 MG/ML
4 INJECTION, SOLUTION INTRA-ARTICULAR; INTRALESIONAL; INTRAMUSCULAR; INTRAVENOUS; SOFT TISSUE
Status: COMPLETED | OUTPATIENT
Start: 2018-05-13 | End: 2018-05-13

## 2018-05-13 RX ORDER — DEXTROSE MONOHYDRATE, SODIUM CHLORIDE, AND POTASSIUM CHLORIDE 50; 1.49; 9 G/1000ML; G/1000ML; G/1000ML
INJECTION, SOLUTION INTRAVENOUS CONTINUOUS
Status: CANCELLED | OUTPATIENT
Start: 2018-05-13

## 2018-05-13 RX ORDER — MORPHINE SULFATE 15 MG/1
15 TABLET ORAL EVERY 6 HOURS PRN
Status: DISCONTINUED | OUTPATIENT
Start: 2018-05-13 | End: 2018-05-16 | Stop reason: HOSPADM

## 2018-05-13 RX ORDER — SODIUM CHLORIDE 9 MG/ML
INJECTION, SOLUTION INTRAVENOUS CONTINUOUS
Status: DISCONTINUED | OUTPATIENT
Start: 2018-05-13 | End: 2018-05-16 | Stop reason: HOSPADM

## 2018-05-13 RX ORDER — PROCHLORPERAZINE EDISYLATE 5 MG/ML
5 INJECTION INTRAMUSCULAR; INTRAVENOUS
Status: COMPLETED | OUTPATIENT
Start: 2018-05-13 | End: 2018-05-13

## 2018-05-13 RX ORDER — ONDANSETRON 2 MG/ML
8 INJECTION INTRAMUSCULAR; INTRAVENOUS
Status: COMPLETED | OUTPATIENT
Start: 2018-05-13 | End: 2018-05-13

## 2018-05-13 RX ORDER — ACETAMINOPHEN 325 MG/1
650 TABLET ORAL EVERY 4 HOURS PRN
Status: DISCONTINUED | OUTPATIENT
Start: 2018-05-13 | End: 2018-05-16 | Stop reason: HOSPADM

## 2018-05-13 RX ORDER — ONDANSETRON 8 MG/1
8 TABLET, ORALLY DISINTEGRATING ORAL EVERY 8 HOURS PRN
Status: DISCONTINUED | OUTPATIENT
Start: 2018-05-13 | End: 2018-05-16 | Stop reason: HOSPADM

## 2018-05-13 RX ORDER — BACLOFEN 10 MG/1
10 TABLET ORAL 2 TIMES DAILY PRN
Status: DISCONTINUED | OUTPATIENT
Start: 2018-05-13 | End: 2018-05-16 | Stop reason: HOSPADM

## 2018-05-13 RX ORDER — ENOXAPARIN SODIUM 100 MG/ML
40 INJECTION SUBCUTANEOUS EVERY 24 HOURS
Status: DISCONTINUED | OUTPATIENT
Start: 2018-05-14 | End: 2018-05-16 | Stop reason: HOSPADM

## 2018-05-13 RX ORDER — SODIUM CHLORIDE 0.9 % (FLUSH) 0.9 %
5 SYRINGE (ML) INJECTION
Status: DISCONTINUED | OUTPATIENT
Start: 2018-05-13 | End: 2018-05-16 | Stop reason: HOSPADM

## 2018-05-13 RX ORDER — MORPHINE SULFATE 2 MG/ML
6 INJECTION, SOLUTION INTRAMUSCULAR; INTRAVENOUS
Status: COMPLETED | OUTPATIENT
Start: 2018-05-13 | End: 2018-05-13

## 2018-05-13 RX ADMIN — MORPHINE SULFATE 15 MG: 15 TABLET ORAL at 10:05

## 2018-05-13 RX ADMIN — LORAZEPAM 0.5 MG: 0.5 TABLET ORAL at 10:05

## 2018-05-13 RX ADMIN — BACLOFEN 10 MG: 10 TABLET ORAL at 10:05

## 2018-05-13 RX ADMIN — MORPHINE SULFATE 6 MG: 2 INJECTION, SOLUTION INTRAMUSCULAR; INTRAVENOUS at 05:05

## 2018-05-13 RX ADMIN — SODIUM CHLORIDE 1000 ML: 0.9 INJECTION, SOLUTION INTRAVENOUS at 03:05

## 2018-05-13 RX ADMIN — SODIUM CHLORIDE: 0.9 INJECTION, SOLUTION INTRAVENOUS at 08:05

## 2018-05-13 RX ADMIN — ONDANSETRON HYDROCHLORIDE 8 MG: 2 INJECTION, SOLUTION INTRAMUSCULAR; INTRAVENOUS at 02:05

## 2018-05-13 RX ADMIN — PROCHLORPERAZINE EDISYLATE 5 MG: 5 INJECTION INTRAMUSCULAR; INTRAVENOUS at 03:05

## 2018-05-13 RX ADMIN — SODIUM CHLORIDE 1000 ML: 0.9 INJECTION, SOLUTION INTRAVENOUS at 01:05

## 2018-05-13 RX ADMIN — PROMETHAZINE HYDROCHLORIDE 12.5 MG: 25 INJECTION INTRAMUSCULAR; INTRAVENOUS at 05:05

## 2018-05-13 RX ADMIN — PROMETHAZINE HYDROCHLORIDE 12.5 MG: 25 INJECTION INTRAMUSCULAR; INTRAVENOUS at 01:05

## 2018-05-13 RX ADMIN — DEXAMETHASONE SODIUM PHOSPHATE 4 MG: 4 INJECTION, SOLUTION INTRAMUSCULAR; INTRAVENOUS at 05:05

## 2018-05-13 RX ADMIN — DIPHENHYDRAMINE HYDROCHLORIDE 25 MG: 50 INJECTION, SOLUTION INTRAMUSCULAR; INTRAVENOUS at 05:05

## 2018-05-13 NOTE — ED TRIAGE NOTES
"Pt Presents to ED with C/O nausea with dry heaving and weakness starting 5/12/18 . Pt states " I also always have pain in my kidneys which is a 9/10". Pt denies SOB, diarrhea, vommiting. Pt stated "I was diagnosed with stage 3 testicular cancer that spread to my chest", " my last chemo treatment was Friday the 11 th".Pt AAO x 4, Pt appears calm. Allergy band placed on PT. Pt hooked up to monitor, BP cycling. Pt left with bed in lowest position, Side rails x 2, and call bell in reach. . Pt educated on plan of care and asked to call if any questions or with any change in status.   "

## 2018-05-13 NOTE — ED NOTES
Patient sitting up in bed AAOx3, in no acute distress. Patient states no relief in nausea after zofran. MD notified awaiting orders.

## 2018-05-13 NOTE — HPI
This is a 23 y.o. 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 Patient reports that gradual onset occurred after he underwent his second round of chemotherapy on Friday. He reports that he typically receives five days of chemo then one day (Friday) of fluids and anti-emetics. He reports associated dry heaving but denies vomiting. Patient also reports generalized weakness, mild cough, one episode of diarrhea yesterday, and diaphoresis while sleeping last night. He notes abdominal pain at baseline and states he typically takes morphine but was unable to retain medication PO due to nausea. He denies fever, rash, ecchymosis, chest pain, or shortness of breath. He states that symptoms are consistent with undergoing his first round of chemo after which he experienced nausea and vomiting. He reports that he has two remaining rounds of chemo and has f/u appt with oncologist Dr. Rojas tomorrow. He has no further complaints at this time.

## 2018-05-13 NOTE — ED PROVIDER NOTES
Encounter Date: 5/13/2018    SCRIBE #1 NOTE: I, Yeimi Rucker, am scribing for, and in the presence of, Dr. Correa.       History     Chief Complaint   Patient presents with    Nausea     pt states he has been dry heaving and had nausea since staurday 5/12/18, denies vomiting    Weakness     pt currently being tx for stage III testicular cx. Last chemo tx friday 5/11/18     Time seen by provider: 12:45 PM    This is a 23 y.o. 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 Patient reports that gradual onset of nausea occurred after he started his second round of chemotherapy 6d ago. He was treated at outpatient infusion center yesterday for IVF and anti-emetics with transient improvement, symptoms worsened last night again. He reports associated dry heaving but denies vomiting. Patient also reports generalized weakness, mild cough, one episode of diarrhea yesterday, and diaphoresis while sleeping last night. He notes abdominal pain at baseline and states he typically takes morphine but was unable to retain medication PO due to nausea. No improvement with Zofran or Phenergan at home. He denies fever, rash, ecchymosis, chest pain, or shortness of breath. He states that symptoms are consistent with undergoing his first round of chemo after which he experienced nausea and vomiting after he finished 5 days of chemo like today. He reports that he has two remaining rounds of chemo and has f/u appt with oncologist Dr. Rojas tomorrow. He has no further complaints at this time.      The history is provided by the patient.     Review of patient's allergies indicates:   Allergen Reactions    Mushroom Anaphylaxis    Bamboo     Bee pollens     Mushroom flavor     Venom-honey bee      Past Medical History:   Diagnosis Date    Abdominal pain 08/12/2010    eval for abd and chest wall pain at [facility]  "[facility], [place], [place] - cxr wnl, EKG ([facility]), troponin wnl, normal chemistries    Allergy     Cancer     testicular    Chondromalacia patellae     Chronic nausea 2015    eval by GI Dr. Tushar Artis - given zofran and ciproheptadine     Chronic pain     Chronic pain     [facility] - no meds; followed by psych and pain clinic and unresponsive to behavioral/CBT/old records reports c/f conversion disorder     Foot pain 04/2010    4/10 + SHIRA, eval by Dr. ALLY Lion at [facility] Rheum -dx unclear ? gout and trial of nsaids and pdn, xrays normal 1/11, referred to podiatry, re-eval by rheum 2/12 and MRI and films wnl    Fracture of right radius 09/2009    Lyme arthritis     multiple joints    Lyme disease 2013    Memory loss 03/2012    eval for memory loss by neuro at [facility] - MRI, EEG wnl. Dr. Patricio suggested emotional factors & ref to Tushar Davies, PhD for  eval & neuropsych eval 3/12 Lupe Delgado, PhD - no evidence of formal thought disorder or psychosis - documented severe memory impairment; not related to to ADD or executive function issues. sugesstion that memory issues may be related to "emotional factors", ?conversion d/o    Stress fracture of tibia and fibula 08/2011    Urticaria      Past Surgical History:   Procedure Laterality Date    CHEST WALL BIOPSY      PORTACATH PLACEMENT Right      Family History   Problem Relation Age of Onset    Arthritis Mother     Other Mother         benign tumor of brain and spinal cord    Hyperlipidemia Father     Gout Father     No Known Problems Sister     Arthritis Sister         shoulder    Depression Sister      Social History   Substance Use Topics    Smoking status: Former Smoker     Packs/day: 0.25     Types: Vaping with nicotine, Vaping w/o nicotine, Cigarettes, Cigars     Quit date: 3/15/2018    Smokeless tobacco: Former User    Alcohol use Yes      Comment: occ     Review of Systems "   Constitutional: Positive for diaphoresis and fatigue. Negative for fever.   HENT: Negative for congestion.    Eyes: Negative for redness.   Respiratory: Positive for cough. Negative for shortness of breath.    Cardiovascular: Negative for chest pain.   Gastrointestinal: Positive for abdominal pain, diarrhea and nausea (and dry heaving). Negative for vomiting.   Genitourinary: Negative for dysuria.   Skin: Negative for rash and wound.   Neurological: Negative for headaches.   Psychiatric/Behavioral: Negative for confusion.       Physical Exam     Initial Vitals [05/13/18 1202]   BP Pulse Resp Temp SpO2   121/70 (!) 116 20 97.5 °F (36.4 °C) 100 %      MAP       87         Physical Exam    Constitutional: He appears well-developed and well-nourished.   HENT:   Head: Normocephalic and atraumatic.   Mouth/Throat: Mucous membranes are dry.   Dry mucous membranes.   Eyes: Conjunctivae and EOM are normal. Pupils are equal, round, and reactive to light.   Neck: Normal range of motion. Neck supple.   Cardiovascular: Regular rhythm, normal heart sounds and normal pulses. Tachycardia present.    No murmur heard.  Pulmonary/Chest: Breath sounds normal. No respiratory distress. He has no wheezes. He has no rhonchi. He has no rales.   Abdominal: Soft. There is no tenderness. There is no rebound and no guarding.   No focal abdominal tenderness.   Neurological: He is alert and oriented to person, place, and time. He has normal strength. No cranial nerve deficit.   Skin: Skin is warm and dry.   Psychiatric: He has a normal mood and affect. His behavior is normal. Thought content normal.         ED Course   Procedures  Labs Reviewed   CBC W/ AUTO DIFFERENTIAL - Abnormal; Notable for the following:        Result Value    WBC 65.32 (*)     RBC 4.31 (*)     Hemoglobin 12.5 (*)     Hematocrit 35.0 (*)     MCV 81 (*)     MPV 8.1 (*)     Gran% 92.0 (*)     Lymph% 2.0 (*)     Mono% 0.0 (*)     All other components within normal limits     Narrative:     wbc   critical result(s) called and verbal readback obtained from   bertin franksrn er @1341, 05/13/2018 14:01   COMPREHENSIVE METABOLIC PANEL - Abnormal; Notable for the following:     Total Bilirubin 1.1 (*)     ALT 49 (*)     All other components within normal limits   MAGNESIUM   URINALYSIS             Medical Decision Making:   Initial Assessment:       23M with primary mediastinal seminoma on chemo, presents with persistent nausea/emesis, after he finished 2nd 5 day chemo session and had outpatient IVF and anti-emetics at infusion center yesterday. Appears dehydrated on exam with mild tachycardia, afebrile on arrival. No abdominal tenderness or other sign of infectious cause, likely side effect of aggressive chemo regiment.      Workup with WBC 65 (likely due to Neulasta yesterday), with no sign UTI, and no ABIGAIL. He was treated with IV Phenergan and then Zofran, with no improvement in nausea, still dry heaving. Tachycardia resolved with IVF. He was given Compazine with persistent nausea, then additional Phenergan/Bendaryl along with steroids and pain control. He still c/o nausea and was able to eat a few crackers, but nothing else and no liquids. He has Oncology f/u tomorrow but is concerned about vomiting all night. Discussed with Oncology on call, Dr. Lawrence, who states that WBC 65 can be from getting Neulasta injection yesterday and agrees with admission. CXR with no PNA. Discussed with hospitalist, will admit for IVF and further anti-emetics, with Hem/Onc consult tomorrow.      Clinical Tests:   Lab Tests: Reviewed and Ordered            Scribe Attestation:   Scribe #1: I performed the above scribed service and the documentation accurately describes the services I performed. I attest to the accuracy of the note.    Attending Attestation:           Physician Attestation for Scribe:  Physician Attestation Statement for Scribe #1: I, Dr. Correa, reviewed documentation, as scribed by Yeimi  Alka in my presence, and it is both accurate and complete.                    Clinical Impression:     1. Non-intractable vomiting with nausea, unspecified vomiting type    2. Primary mediastinal seminoma                                 Pierce Correa MD  05/13/18 7342

## 2018-05-14 DIAGNOSIS — C38.3 PRIMARY MEDIASTINAL SEMINOMA: Primary | ICD-10-CM

## 2018-05-14 PROBLEM — H93.13 TINNITUS OF BOTH EARS: Status: ACTIVE | Noted: 2018-05-14

## 2018-05-14 PROBLEM — D72.829 LEUKOCYTOSIS: Status: ACTIVE | Noted: 2018-05-14

## 2018-05-14 LAB
ALBUMIN SERPL BCP-MCNC: 3.7 G/DL
ALP SERPL-CCNC: 80 U/L
ALT SERPL W/O P-5'-P-CCNC: 34 U/L
ANION GAP SERPL CALC-SCNC: 13 MMOL/L
AST SERPL-CCNC: 8 U/L
BASOPHILS NFR BLD: 0 %
BILIRUB SERPL-MCNC: 0.9 MG/DL
BUN SERPL-MCNC: 16 MG/DL
CALCIUM SERPL-MCNC: 9.2 MG/DL
CHLORIDE SERPL-SCNC: 103 MMOL/L
CO2 SERPL-SCNC: 23 MMOL/L
CREAT SERPL-MCNC: 0.7 MG/DL
DIFFERENTIAL METHOD: ABNORMAL
EOSINOPHIL NFR BLD: 0 %
ERYTHROCYTE [DISTWIDTH] IN BLOOD BY AUTOMATED COUNT: 13.8 %
EST. GFR  (AFRICAN AMERICAN): >60 ML/MIN/1.73 M^2
EST. GFR  (NON AFRICAN AMERICAN): >60 ML/MIN/1.73 M^2
GIANT PLATELETS BLD QL SMEAR: PRESENT
GLUCOSE SERPL-MCNC: 127 MG/DL
HCT VFR BLD AUTO: 31.5 %
HGB BLD-MCNC: 11 G/DL
LYMPHOCYTES NFR BLD: 5 %
MAGNESIUM SERPL-MCNC: 2.1 MG/DL
MCH RBC QN AUTO: 28.7 PG
MCHC RBC AUTO-ENTMCNC: 34.9 G/DL
MCV RBC AUTO: 82 FL
MONOCYTES NFR BLD: 2 %
NEUTROPHILS NFR BLD: 85 %
NEUTS BAND NFR BLD MANUAL: 8 %
PHOSPHATE SERPL-MCNC: 4.2 MG/DL
PLATELET # BLD AUTO: 225 K/UL
PLATELET BLD QL SMEAR: ABNORMAL
PMV BLD AUTO: 8.3 FL
POTASSIUM SERPL-SCNC: 3.7 MMOL/L
PROT SERPL-MCNC: 6 G/DL
RBC # BLD AUTO: 3.83 M/UL
SODIUM SERPL-SCNC: 139 MMOL/L
WBC # BLD AUTO: 45.61 K/UL

## 2018-05-14 PROCEDURE — 25000003 PHARM REV CODE 250: Performed by: NURSE PRACTITIONER

## 2018-05-14 PROCEDURE — 63600175 PHARM REV CODE 636 W HCPCS: Performed by: EMERGENCY MEDICINE

## 2018-05-14 PROCEDURE — 25000003 PHARM REV CODE 250: Performed by: EMERGENCY MEDICINE

## 2018-05-14 PROCEDURE — 85007 BL SMEAR W/DIFF WBC COUNT: CPT

## 2018-05-14 PROCEDURE — G0378 HOSPITAL OBSERVATION PER HR: HCPCS

## 2018-05-14 PROCEDURE — 99225 PR SUBSEQUENT OBSERVATION CARE,LEVEL II: CPT | Mod: ,,, | Performed by: HOSPITALIST

## 2018-05-14 PROCEDURE — 25000003 PHARM REV CODE 250: Performed by: HOSPITALIST

## 2018-05-14 PROCEDURE — 36415 COLL VENOUS BLD VENIPUNCTURE: CPT

## 2018-05-14 PROCEDURE — 99214 OFFICE O/P EST MOD 30 MIN: CPT | Mod: ,,, | Performed by: INTERNAL MEDICINE

## 2018-05-14 PROCEDURE — 84100 ASSAY OF PHOSPHORUS: CPT

## 2018-05-14 PROCEDURE — 63600175 PHARM REV CODE 636 W HCPCS: Performed by: NURSE PRACTITIONER

## 2018-05-14 PROCEDURE — 99900035 HC TECH TIME PER 15 MIN (STAT)

## 2018-05-14 PROCEDURE — 83735 ASSAY OF MAGNESIUM: CPT

## 2018-05-14 PROCEDURE — 85027 COMPLETE CBC AUTOMATED: CPT

## 2018-05-14 PROCEDURE — 80053 COMPREHEN METABOLIC PANEL: CPT

## 2018-05-14 RX ORDER — PANTOPRAZOLE SODIUM 40 MG/1
40 TABLET, DELAYED RELEASE ORAL DAILY
Status: DISCONTINUED | OUTPATIENT
Start: 2018-05-14 | End: 2018-05-16 | Stop reason: HOSPADM

## 2018-05-14 RX ORDER — MAG HYDROX/ALUMINUM HYD/SIMETH 200-200-20
15 SUSPENSION, ORAL (FINAL DOSE FORM) ORAL ONCE
Status: DISCONTINUED | OUTPATIENT
Start: 2018-05-14 | End: 2018-05-16 | Stop reason: HOSPADM

## 2018-05-14 RX ADMIN — SODIUM CHLORIDE: 0.9 INJECTION, SOLUTION INTRAVENOUS at 04:05

## 2018-05-14 RX ADMIN — PANTOPRAZOLE SODIUM 40 MG: 40 TABLET, DELAYED RELEASE ORAL at 12:05

## 2018-05-14 RX ADMIN — MORPHINE SULFATE 15 MG: 15 TABLET ORAL at 10:05

## 2018-05-14 RX ADMIN — SODIUM CHLORIDE: 0.9 INJECTION, SOLUTION INTRAVENOUS at 08:05

## 2018-05-14 RX ADMIN — PROMETHAZINE HYDROCHLORIDE 6.25 MG: 25 INJECTION INTRAMUSCULAR; INTRAVENOUS at 10:05

## 2018-05-14 RX ADMIN — LORAZEPAM 0.5 MG: 0.5 TABLET ORAL at 10:05

## 2018-05-14 RX ADMIN — MORPHINE SULFATE 15 MG: 15 TABLET ORAL at 12:05

## 2018-05-14 RX ADMIN — PROMETHAZINE HYDROCHLORIDE 6.25 MG: 25 INJECTION INTRAMUSCULAR; INTRAVENOUS at 01:05

## 2018-05-14 RX ADMIN — MORPHINE SULFATE 15 MG: 15 TABLET ORAL at 04:05

## 2018-05-14 RX ADMIN — BACLOFEN 10 MG: 10 TABLET ORAL at 10:05

## 2018-05-14 RX ADMIN — ENOXAPARIN SODIUM 40 MG: 100 INJECTION SUBCUTANEOUS at 04:05

## 2018-05-14 RX ADMIN — SODIUM CHLORIDE: 0.9 INJECTION, SOLUTION INTRAVENOUS at 01:05

## 2018-05-14 NOTE — ASSESSMENT & PLAN NOTE
Patient having post chemo N/V  Continue hydration with NS at 125 ml/hr  Zofran and Phenergan for N/V and specialty nausea patch  Advance diet as tolerated

## 2018-05-14 NOTE — PLAN OF CARE
SW met with pt at bedside to complete discharge assessment, verified PCP and uses [facility] when doing chemo has meds delivered to infusion dept or any pharmacy in the area.  Pt lives at [address] [place] [address] and student at [employer].  Pt has caregivers who are his roommates/friends provide care and will provide transportation home.     05/14/18 1045   Discharge Assessment   Assessment Type Discharge Planning Assessment   Confirmed/corrected address and phone number on facesheet? Yes   Assessment information obtained from? Patient   Communicated expected length of stay with patient/caregiver no   Prior to hospitilization cognitive status: Alert/Oriented   Prior to hospitalization functional status: Needs Assistance   Current cognitive status: Alert/Oriented   Current Functional Status: Needs Assistance   Lives With friend(s)   Able to Return to Prior Arrangements yes   Is patient able to care for self after discharge? Unable to determine at this time (comments)   Who are your caregiver(s) and their phone number(s)? (Zaldivar, friend, [phone] and Yovana, friend)   Patient's perception of discharge disposition home or selfcare   Readmission Within The Last 30 Days no previous admission in last 30 days   Patient currently being followed by outpatient case management? No   Patient currently receives any other outside agency services? No   Equipment Currently Used at Home none   Do you have any problems affording any of your prescribed medications? No   Is the patient taking medications as prescribed? yes   Does the patient have transportation home? Yes   Transportation Available ([employer])   Does the patient receive services at the Coumadin Clinic? No   Discharge Plan A Home   Patient/Family In Agreement With Plan yes

## 2018-05-14 NOTE — CONSULTS
[facility]  Hematology/Oncology  Consult Note    Patient Name: Alejandro Neff  MRN: [id number]  Admission Date: 5/13/2018  Hospital Length of Stay: 0 days  Code Status: Full Code   Attending Provider: Sima Talley MD  Consulting Provider: Suha Cantor MD  Primary Care Physician: Nata Hernandez MD  Principal Problem:Vomiting with nausea, not intractable    Inpatient consult to Hematology/Oncology  Consult performed by: SUHA CANTOR  Consult ordered by: JULIO CESAR SALTER  Reason for consult: N/V  Assessment/Recommendations: See consult note        Subjective:     HPI:  24 yo man with primary mediastinal seminoma, s/p 2 cycles of EP, who was admitted for intractable N/V. Patient well know to me. He feels that his nausea is better compared to yesterday. Received sancuso patch today. Also got IV phenergan.     Oncology Treatment Plan:   OP TESTICULAR EP    Medications:  Continuous Infusions:   sodium chloride 0.9% 125 mL/hr at 05/14/18 1303     Scheduled Meds:   aluminum-magnesium hydroxide-simethicone  15 mL Oral Once    enoxaparin  40 mg Subcutaneous Daily    granisetron  1 patch Transdermal Once    pantoprazole  40 mg Oral Daily     PRN Meds:acetaminophen, baclofen, LORazepam, magic mouthwash (diphenhydrAMINE 12.5 mg/5 mL 20 mL, aluminum & magnesium hydroxide-simethicone (MYLANTA) 20 mL, lidocaine HCl 2% (XYLOCAINE) 20 mL) solution, morphine, ondansetron, promethazine (PHENERGAN) IVPB, sodium chloride 0.9%     Review of patient's allergies indicates:   Allergen Reactions    Mushroom Anaphylaxis    Bamboo     Bee pollens     Mushroom flavor     Venom-honey bee         Past Medical History:   Diagnosis Date    Abdominal pain 08/12/2010    eval for abd and chest wall pain at [facility], [place], [place] - cxr wnl, EKG (RV), troponin wnl, normal chemistries    Allergy     Cancer     testicular    Chondromalacia patellae     Chronic nausea 2015    eval by GI  "Dr. Tushar Artis - given zofran and ciproheptadine     Chronic pain     Chronic pain     [facility] - no meds; followed by psych and pain clinic and unresponsive to behavioral/CBT/old records reports c/f conversion disorder     Foot pain 04/2010    4/10 + SHIRA, eval by Dr. ALLY Lion at [facility] Rheum -dx unclear ? gout and trial of nsaids and pdn, xrays normal 1/11, referred to podiatry, re-eval by rheum 2/12 and MRI and films wnl    Fracture of right radius 09/2009    Lyme arthritis     multiple joints    Lyme disease 2013    Memory loss 03/2012    eval for memory loss by neuro [place] [facility] [place], EEG wnl. Dr. Patricio suggested emotional factors & ref to Tushar Davies, PhD for  eval & neuropsych eval 3/12 Lupe Delgado, PhD - no evidence of formal thought disorder or psychosis - documented severe memory impairment; not related to to ADD or executive function issues. sugesstion that memory issues may be related to "emotional factors", ?conversion d/o    Stress fracture of tibia and fibula 08/2011    Urticaria      Past Surgical History:   Procedure Laterality Date    CHEST WALL BIOPSY      PORTACATH PLACEMENT Right      Family History     Problem Relation (Age of Onset)    Arthritis Mother, Sister    Depression Sister    Gout Father    Hyperlipidemia Father    No Known Problems Sister    Other Mother        Social History Main Topics    Smoking status: Former Smoker     Packs/day: 0.25     Types: Vaping with nicotine, Vaping w/o nicotine, Cigarettes, Cigars     Quit date: 3/15/2018    Smokeless tobacco: Former User    Alcohol use Yes      Comment: occ    Drug use: Yes     Types: Marijuana, LSD, Cocaine, Other-see comments      Comment: Kratom (capsule or leaf form)    Sexual activity: Yes     Partners: Female     Birth control/ protection: Condom       Review of Systems   Constitutional: Positive for activity change and fatigue. Negative for appetite change, " chills, fever and unexpected weight change.   HENT: Positive for tinnitus. Negative for mouth sores, nosebleeds, trouble swallowing and voice change.    Eyes: Negative for pain, redness and visual disturbance.   Respiratory: Negative for cough, shortness of breath and wheezing.    Cardiovascular: Negative for chest pain, palpitations and leg swelling.   Gastrointestinal: Positive for nausea and vomiting. Negative for abdominal distention, abdominal pain, blood in stool, constipation and diarrhea.   Genitourinary: Negative for frequency and hematuria.   Musculoskeletal: Positive for arthralgias. Negative for back pain, neck pain and neck stiffness.   Skin: Negative for rash.   Neurological: Positive for weakness and light-headedness. Negative for tremors, seizures, syncope, speech difficulty, numbness and headaches.   Hematological: Negative for adenopathy. Does not bruise/bleed easily.   Psychiatric/Behavioral: Positive for decreased concentration. Negative for confusion and self-injury. The patient is not nervous/anxious.      Objective:     Vital Signs (Most Recent):  Temp: 98 °F (36.7 °C) (05/14/18 1600)  Pulse: 85 (05/14/18 1600)  Resp: 16 (05/14/18 1600)  BP: (!) 96/54 (05/14/18 1600)  SpO2: 98 % (05/14/18 1600) Vital Signs (24h Range):  Temp:  [97.9 °F (36.6 °C)-98.4 °F (36.9 °C)] 98 °F (36.7 °C)  Pulse:  [] 85  Resp:  [16-20] 16  SpO2:  [96 %-100 %] 98 %  BP: ()/(54-74) 96/54     Weight: 60.8 kg (134 lb 0.6 oz)  Body mass index is 20.99 kg/m².  Body surface area is 1.7 meters squared.      Intake/Output Summary (Last 24 hours) at 05/14/18 1820  Last data filed at 05/14/18 0421   Gross per 24 hour   Intake             1080 ml   Output              100 ml   Net              980 ml       Physical Exam   Constitutional: He is oriented to person, place, and time. He appears well-developed and well-nourished. He is cooperative. No distress.   HENT:   Head: Normocephalic and atraumatic.   Eyes:  Conjunctivae and EOM are normal. Pupils are equal, round, and reactive to light. No scleral icterus.   Neck: Normal range of motion. Neck supple.   Cardiovascular: Normal rate and regular rhythm.  Exam reveals no gallop and no friction rub.    No murmur heard.  Pulmonary/Chest: Breath sounds normal. He has no wheezes. He has no rales.   Abdominal: Soft. Bowel sounds are normal. He exhibits no distension and no mass. There is no hepatosplenomegaly. There is no tenderness.   Musculoskeletal: Normal range of motion. He exhibits no edema.   Lymphadenopathy:     He has no cervical adenopathy.   Neurological: He is alert and oriented to person, place, and time. He has normal strength. No cranial nerve deficit.   Skin: Skin is warm and dry. No rash noted. No erythema.   Vitals reviewed.      Significant Labs:   BMP:   Recent Labs  Lab 05/13/18  1312 05/14/18  0525   GLU 83 127*    139   K 3.8 3.7   CL 99 103   CO2 25 23   BUN 17 16   CREATININE 0.8 0.7   CALCIUM 10.0 9.2   MG 2.1 2.1    and CBC:   Recent Labs  Lab 05/13/18  1312 05/14/18  0525   WBC 65.32* 45.61*   HGB 12.5* 11.0*   HCT 35.0* 31.5*    225       Diagnostic Results:  I have reviewed all pertinent imaging results/findings within the past 24 hours.    Assessment/Plan:     * Vomiting with nausea, not intractable    - c/w sancuso  - can do phenergan po or suppository prn   - ativan at night  - may add zyprexa for next cycle        Primary mediastinal seminoma    S/p 2 cycles of EP  See me in clinic next Monday        Leukocytosis    - from dex and Neulasta  - monitor        Tinnitus of both ears    - has appt with Dr. Campos (ENT) tomorrow  - will message Dr. Campos for rescheduling the appointment            Thank you for your consult. I will follow-up with patient. Please contact us if you have any additional questions.    Suha Rojas MD  Hematology/Oncology  [facility]

## 2018-05-14 NOTE — SUBJECTIVE & OBJECTIVE
Interval History: The patient still complains of severe nausea    Review of Systems   Gastrointestinal: Positive for nausea. Negative for vomiting.     Objective:     Vital Signs (Most Recent):  Temp: 98 °F (36.7 °C) (05/14/18 1600)  Pulse: 85 (05/14/18 1600)  Resp: 16 (05/14/18 1600)  BP: (!) 96/54 (05/14/18 1600)  SpO2: 98 % (05/14/18 1600) Vital Signs (24h Range):  Temp:  [97.9 °F (36.6 °C)-98.4 °F (36.9 °C)] 98 °F (36.7 °C)  Pulse:  [] 85  Resp:  [8-20] 16  SpO2:  [96 %-100 %] 98 %  BP: ()/(54-74) 96/54     Weight: 60.8 kg (134 lb 0.6 oz)  Body mass index is 20.99 kg/m².    Intake/Output Summary (Last 24 hours) at 05/14/18 1628  Last data filed at 05/14/18 0421   Gross per 24 hour   Intake             1080 ml   Output              100 ml   Net              980 ml      Physical Exam   Constitutional: He is oriented to person, place, and time. He appears well-developed and well-nourished.   HENT:   Head: Normocephalic.   Eyes: Conjunctivae are normal.   Neck: Normal range of motion. Neck supple.   Cardiovascular: Normal rate, regular rhythm, normal heart sounds and intact distal pulses.    Pulmonary/Chest: Effort normal and breath sounds normal.   Abdominal: Soft. Bowel sounds are normal. He exhibits no distension. There is generalized tenderness.   Musculoskeletal: Normal range of motion.   Neurological: He is alert and oriented to person, place, and time. GCS eye subscore is 4. GCS verbal subscore is 5. GCS motor subscore is 6.   Skin: Skin is warm and dry.   Psychiatric: He has a normal mood and affect. His speech is normal and behavior is normal.       Significant Labs:   CBC:   Recent Labs  Lab 05/13/18  1312 05/14/18  0525   WBC 65.32* 45.61*   HGB 12.5* 11.0*   HCT 35.0* 31.5*    225     CMP:   Recent Labs  Lab 05/13/18  1312 05/14/18  0525    139   K 3.8 3.7   CL 99 103   CO2 25 23   GLU 83 127*   BUN 17 16   CREATININE 0.8 0.7   CALCIUM 10.0 9.2   PROT 6.9 6.0   ALBUMIN 4.1 3.7    BILITOT 1.1* 0.9   ALKPHOS 66 80   AST 11 8*   ALT 49* 34   ANIONGAP 12 13   EGFRNONAA >60 >60     Magnesium:   Recent Labs  Lab 05/13/18  1312 05/14/18  0525   MG 2.1 2.1       Significant Imaging: I have reviewed and interpreted all pertinent imaging results/findings within the past 24 hours.

## 2018-05-14 NOTE — PROGRESS NOTES
[facility] Hospital Medicine  Progress Note    Patient Name: Alejandro Neff  MRN: [id number]  Patient Class: OP- Observation   Admission Date: 5/13/2018  Length of Stay: 0 days  Attending Physician: Sima Talley MD  Primary Care Provider: Nata Hernandez MD        Subjective:     Principal Problem:Vomiting with nausea, not intractable    HPI:  This is a 23 y.o. 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 Patient reports that gradual onset occurred after he underwent his second round of chemotherapy on Friday. He reports that he typically receives five days of chemo then one day (Friday) of fluids and anti-emetics. He reports associated dry heaving but denies vomiting. Patient also reports generalized weakness, mild cough, one episode of diarrhea yesterday, and diaphoresis while sleeping last night. He notes abdominal pain at baseline and states he typically takes morphine but was unable to retain medication PO due to nausea. He denies fever, rash, ecchymosis, chest pain, or shortness of breath. He states that symptoms are consistent with undergoing his first round of chemo after which he experienced nausea and vomiting. He reports that he has two remaining rounds of chemo and has f/u appt with oncologist Dr. Rojas tomorrow. He has no further complaints at this time.    Hospital Course:  The patient requires anti-emetic medications to tolerate po intake.  Maintenance IV fluids in progress.  Heme/Onc consulted as they gave the patient his second round of chemo 5/11/2018.      Interval History: The patient still complains of severe nausea    Review of Systems   Gastrointestinal: Positive for nausea. Negative for vomiting.     Objective:     Vital Signs (Most Recent):  Temp: 98 °F (36.7 °C) (05/14/18 1600)  Pulse: 85 (05/14/18 1600)  Resp: 16 (05/14/18 1600)  BP: (!) 96/54  (05/14/18 1600)  SpO2: 98 % (05/14/18 1600) Vital Signs (24h Range):  Temp:  [97.9 °F (36.6 °C)-98.4 °F (36.9 °C)] 98 °F (36.7 °C)  Pulse:  [] 85  Resp:  [8-20] 16  SpO2:  [96 %-100 %] 98 %  BP: ()/(54-74) 96/54     Weight: 60.8 kg (134 lb 0.6 oz)  Body mass index is 20.99 kg/m².    Intake/Output Summary (Last 24 hours) at 05/14/18 1628  Last data filed at 05/14/18 0421   Gross per 24 hour   Intake             1080 ml   Output              100 ml   Net              980 ml      Physical Exam   Constitutional: He is oriented to person, place, and time. He appears well-developed and well-nourished.   HENT:   Head: Normocephalic.   Eyes: Conjunctivae are normal.   Neck: Normal range of motion. Neck supple.   Cardiovascular: Normal rate, regular rhythm, normal heart sounds and intact distal pulses.    Pulmonary/Chest: Effort normal and breath sounds normal.   Abdominal: Soft. Bowel sounds are normal. He exhibits no distension. There is generalized tenderness.   Musculoskeletal: Normal range of motion.   Neurological: He is alert and oriented to person, place, and time. GCS eye subscore is 4. GCS verbal subscore is 5. GCS motor subscore is 6.   Skin: Skin is warm and dry.   Psychiatric: He has a normal mood and affect. His speech is normal and behavior is normal.       Significant Labs:   CBC:   Recent Labs  Lab 05/13/18  1312 05/14/18  0525   WBC 65.32* 45.61*   HGB 12.5* 11.0*   HCT 35.0* 31.5*    225     CMP:   Recent Labs  Lab 05/13/18  1312 05/14/18  0525    139   K 3.8 3.7   CL 99 103   CO2 25 23   GLU 83 127*   BUN 17 16   CREATININE 0.8 0.7   CALCIUM 10.0 9.2   PROT 6.9 6.0   ALBUMIN 4.1 3.7   BILITOT 1.1* 0.9   ALKPHOS 66 80   AST 11 8*   ALT 49* 34   ANIONGAP 12 13   EGFRNONAA >60 >60     Magnesium:   Recent Labs  Lab 05/13/18  1312 05/14/18  0525   MG 2.1 2.1       Significant Imaging: I have reviewed and interpreted all pertinent imaging results/findings within the past 24  hours.    Assessment/Plan:      * Vomiting with nausea, not intractable    Patient having post chemo N/V  Continue hydration with NS at 125 ml/hr  Zofran and Phenergan for N/V and specialty nausea patch  Advance diet as tolerated          Primary mediastinal seminoma    Consult Hem/Onc            VTE Risk Mitigation         Ordered     enoxaparin injection 40 mg  Daily      05/13/18 2013     Place sequential compression device  Until discontinued      05/13/18 2013     IP VTE HIGH RISK PATIENT  Once      05/13/18 2013              Sima Talley MD  Department of Hospital Medicine   [facility]

## 2018-05-14 NOTE — HOSPITAL COURSE
The patient requires anti-emetic medications to tolerate po intake.  Maintenance IV fluids in progress.  Heme/Onc consulted as they gave the patient his second round of chemo 5/11/2018.

## 2018-05-14 NOTE — PLAN OF CARE
Problem: Patient Care Overview  Goal: Plan of Care Review  Outcome: Ongoing (interventions implemented as appropriate)  Patient free from fall and injuries. Ambulates freely to the bathroom. Diet advanced as tolerated. Able to consume food without vomiting. PRN meds given. Vitals WNL. Assessment done, R port-a-cath unaccessed POA, site with bruising, no redness or drainage observed. Hair loss also noted. IV access patency mainatained. Seen from time to time. No complaints.

## 2018-05-14 NOTE — SUBJECTIVE & OBJECTIVE
"Past Medical History:   Diagnosis Date    Abdominal pain 08/12/2010    eval for abd and chest wall pain at [facility], [place], [place] - cxr wnl, EKG ([facility]), troponin wnl, normal chemistries    Allergy     Cancer     testicular    Chondromalacia patellae     Chronic nausea 2015    eval by GI Dr. Tushar Artis - given zofran and ciproheptadine     Chronic pain     Chronic pain     [facility] meds; followed by psych and pain clinic and unresponsive to behavioral/CBT/old records reports c/f conversion disorder     Foot pain 04/2010    4/10 + [facility]IRA, eval by Dr. ALLY Lion at [facility] -dx unclear ? gout and trial of nsaids and pdn, xrays normal 1/11, referred to podiatry, re-eval by rheum 2/12 and MRI and films wnl    Fracture of right radius 09/2009    Lyme arthritis     multiple joints    Lyme disease 2013    Memory loss 03/2012    [place] [facility] [place] wnl. Dr. Patricio suggested emotional factors & ref to Tushar Davies, PhD for  eval & neuropsych eval 3/12 Lupe Delgado, PhD - no evidence of formal thought disorder or psychosis - documented severe memory impairment; not related to to ADD or executive function issues. sugesstion that memory issues may be related to "emotional factors", ?conversion d/o    Stress fracture of tibia and fibula 08/2011    Urticaria        Past Surgical History:   Procedure Laterality Date    CHEST WALL BIOPSY      PORTACATH PLACEMENT Right        Review of patient's allergies indicates:   Allergen Reactions    Mushroom Anaphylaxis    Bamboo     Bee pollens     Mushroom flavor     Venom-honey bee        No current facility-administered medications on file prior to encounter.      Current Outpatient Prescriptions on File Prior to Encounter   Medication Sig    albuterol (PROAIR HFA) 90 mcg/actuation inhaler     albuterol 90 mcg/actuation inhaler Inhale 2 puffs into the lungs every " 6 (six) hours as needed for Wheezing.    baclofen (LIORESAL) 10 MG tablet Take 1 tablet (10 mg total) by mouth 2 (two) times daily as needed (hiccups).    diphenhydrAMINE-aluminum-magnesium hydroxide-simethicone-lidocaine HCl 2% Swish and spit 15 mLs every 4 (four) hours as needed (mouth sore).    EPINEPHrine (EPIPEN) 0.3 mg/0.3 mL AtIn Inject 0.3 mg/mL into the muscle.    granisetron (SANCUSO) 3.1 mg/24 hour place 1 patch onto the skin 24 hours before chemo, each patch can be worn up to 7 days    LORazepam (ATIVAN) 0.5 MG tablet Take 1 tablet (0.5 mg total) by mouth every 8 (eight) hours as needed for Anxiety.    morphine (MSIR) 15 MG tablet Take 1 tablet (15 mg total) by mouth every 6 (six) hours as needed for Pain.    ondansetron (ZOFRAN) 4 MG tablet Take 1 tablet (4 mg total) by mouth every 6 (six) hours as needed for Nausea.    promethazine (PHENERGAN) 25 MG suppository Place 1 suppository (25 mg total) rectally every 6 (six) hours as needed for Nausea.    MULTIVIT-MINERALS/FERROUS FUM (MULTI VITAMIN ORAL) Take by mouth once daily.    promethazine (PHENERGAN) 25 MG tablet Take 1 tablet (25 mg total) by mouth every 4 (four) hours as needed for Nausea.    [DISCONTINUED] albuterol (PROVENTIL) 2.5 mg /3 mL (0.083 %) nebulizer solution Take 3 mLs (2.5 mg total) by nebulization every 6 (six) hours as needed for Wheezing. Rescue    [DISCONTINUED] cholecalciferol, vitamin D3, 1,000 unit capsule Take 1 capsule (1,000 Units total) by mouth once daily.    [DISCONTINUED] diphenhydramine-acetaminophen (TYLENOL PM)  mg Tab Take 1 tablet by mouth once daily.    [DISCONTINUED] ibuprofen (ADVIL,MOTRIN) 600 MG tablet Take 1 tablet (600 mg total) by mouth every 6 (six) hours as needed for Pain.    [DISCONTINUED] lidocaine-prilocaine (EMLA) cream Apply over port 30 minutes prior to chemo     Family History     Problem Relation (Age of Onset)    Arthritis Mother, Sister    Depression Sister    Gout Father     Hyperlipidemia Father    No Known Problems Sister    Other Mother        Social History Main Topics    Smoking status: Former Smoker     Packs/day: 0.25     Types: Vaping with nicotine, Vaping w/o nicotine, Cigarettes, Cigars     Quit date: 3/15/2018    Smokeless tobacco: Former User    Alcohol use Yes      Comment: occ    Drug use: Yes     Types: Marijuana, LSD, Cocaine, Other-see comments      Comment: Kratom (capsule or leaf form)    Sexual activity: Yes     Partners: Female     Birth control/ protection: Condom     Review of Systems   Constitutional: Positive for activity change and fatigue. Negative for appetite change and fever.   HENT: Negative for congestion, ear pain and postnasal drip.    Eyes: Negative for discharge.   Respiratory: Negative for apnea, cough, shortness of breath and wheezing.    Cardiovascular: Negative for chest pain and leg swelling.   Gastrointestinal: Positive for nausea and vomiting. Negative for abdominal distention and abdominal pain.   Endocrine: Negative for polydipsia, polyphagia and polyuria.   Genitourinary: Negative for difficulty urinating, flank pain, frequency, hematuria and urgency.   Musculoskeletal: Negative for arthralgias and joint swelling.   Skin: Positive for pallor. Negative for rash.   Allergic/Immunologic: Negative for environmental allergies and food allergies.   Neurological: Negative for dizziness, speech difficulty, weakness, light-headedness and headaches.   Hematological: Does not bruise/bleed easily.   Psychiatric/Behavioral: Negative for agitation.     Objective:     Vital Signs (Most Recent):  Temp: 98.1 °F (36.7 °C) (05/13/18 2036)  Pulse: 96 (05/13/18 2036)  Resp: 16 (05/13/18 2036)  BP: (!) 119/59 (05/13/18 2036)  SpO2: 97 % (05/13/18 2036) Vital Signs (24h Range):  Temp:  [97.5 °F (36.4 °C)-98.1 °F (36.7 °C)] 98.1 °F (36.7 °C)  Pulse:  [] 96  Resp:  [8-20] 16  SpO2:  [97 %-100 %] 97 %  BP: (104-129)/(55-71) 119/59     Weight: 60.8 kg (134  lb 0.6 oz)  Body mass index is 20.99 kg/m².    Physical Exam   Constitutional: He is oriented to person, place, and time. He appears well-developed and well-nourished.   HENT:   Head: Normocephalic.   Eyes: Conjunctivae are normal.   Neck: Normal range of motion. Neck supple.   Cardiovascular: Normal rate, regular rhythm, normal heart sounds and intact distal pulses.    Pulmonary/Chest: Effort normal and breath sounds normal.   Abdominal: Soft. Bowel sounds are normal. He exhibits no distension. There is generalized tenderness.   Musculoskeletal: Normal range of motion.   Neurological: He is alert and oriented to person, place, and time. GCS eye subscore is 4. GCS verbal subscore is 5. GCS motor subscore is 6.   Skin: Skin is warm and dry.   Psychiatric: He has a normal mood and affect. His speech is normal and behavior is normal.           Significant Labs:   CBC:   Recent Labs  Lab 05/13/18  1312   WBC 65.32*   HGB 12.5*   HCT 35.0*        CMP:   Recent Labs  Lab 05/13/18  1312      K 3.8   CL 99   CO2 25   GLU 83   BUN 17   CREATININE 0.8   CALCIUM 10.0   PROT 6.9   ALBUMIN 4.1   BILITOT 1.1*   ALKPHOS 66   AST 11   ALT 49*   ANIONGAP 12   EGFRNONAA >60       Significant Imaging: I have reviewed all pertinent imaging results/findings within the past 24 hours.

## 2018-05-14 NOTE — HPI
24 yo man with primary mediastinal seminoma, s/p 2 cycles of EP, who was admitted for intractable N/V. Patient well know to me. He feels that his nausea is better compared to yesterday. Received sancuso patch today. Also got IV phenergan.

## 2018-05-14 NOTE — SUBJECTIVE & OBJECTIVE
Oncology Treatment Plan:   OP TESTICULAR EP    Medications:  Continuous Infusions:   sodium chloride 0.9% 125 mL/hr at 05/14/18 1303     Scheduled Meds:   aluminum-magnesium hydroxide-simethicone  15 mL Oral Once    enoxaparin  40 mg Subcutaneous Daily    granisetron  1 patch Transdermal Once    pantoprazole  40 mg Oral Daily     PRN Meds:acetaminophen, baclofen, LORazepam, magic mouthwash (diphenhydrAMINE 12.5 mg/5 mL 20 mL, aluminum & magnesium hydroxide-simethicone (MYLANTA) 20 mL, lidocaine HCl 2% (XYLOCAINE) 20 mL) solution, morphine, ondansetron, promethazine (PHENERGAN) IVPB, sodium chloride 0.9%     Review of patient's allergies indicates:   Allergen Reactions    Mushroom Anaphylaxis    Bamboo     Bee pollens     Mushroom flavor     Venom-honey bee         Past Medical History:   Diagnosis Date    Abdominal pain 08/12/2010    eval for abd and chest wall pain at [place], [place] - cxr wnl, EKG ([facility]), troponin wnl, normal chemistries    Allergy     Cancer     testicular    Chondromalacia patellae     Chronic nausea 2015    eval by GI Dr. Tushar Artis - given zofran and ciproheptadine     Chronic pain     Chronic pain     [facility] meds; followed by psych and pain clinic and unresponsive to behavioral/CBT/old records reports c/f conversion disorder     Foot pain 04/2010    4/10 + [facility]IR[facility], eval by Dr. ALLY Lion at [facility] -dx unclear ? gout and trial of nsaids and pdn, xrays normal 1/11, referred to podiatry, re-eval by rheum 2/12 and MRI and films wnl    Fracture of right radius 09/2009    Lyme arthritis     multiple joints    Lyme disease 2013    Memory loss 03/2012    eval for memory loss by neuro at [facility] [place] - MRI, EEG wnl. Dr. Patricio suggested emotional factors & ref to Tushar Davies, PhD for  eval & neuropsych eval 3/12 Lupe Delgado, PhD - no evidence of formal thought disorder or psychosis - documented  "severe memory impairment; not related to to ADD or executive function issues. sugesstion that memory issues may be related to "emotional factors", ?conversion d/o    Stress fracture of tibia and fibula 08/2011    Urticaria      Past Surgical History:   Procedure Laterality Date    CHEST WALL BIOPSY      PORTACATH PLACEMENT Right      Family History     Problem Relation (Age of Onset)    Arthritis Mother, Sister    Depression Sister    Gout Father    Hyperlipidemia Father    No Known Problems Sister    Other Mother        Social History Main Topics    Smoking status: Former Smoker     Packs/day: 0.25     Types: Vaping with nicotine, Vaping w/o nicotine, Cigarettes, Cigars     Quit date: 3/15/2018    Smokeless tobacco: Former User    Alcohol use Yes      Comment: occ    Drug use: Yes     Types: Marijuana, LSD, Cocaine, Other-see comments      Comment: Kratom (capsule or leaf form)    Sexual activity: Yes     Partners: Female     Birth control/ protection: Condom       Review of Systems   Constitutional: Positive for activity change and fatigue. Negative for appetite change, chills, fever and unexpected weight change.   HENT: Positive for tinnitus. Negative for mouth sores, nosebleeds, trouble swallowing and voice change.    Eyes: Negative for pain, redness and visual disturbance.   Respiratory: Negative for cough, shortness of breath and wheezing.    Cardiovascular: Negative for chest pain, palpitations and leg swelling.   Gastrointestinal: Positive for nausea and vomiting. Negative for abdominal distention, abdominal pain, blood in stool, constipation and diarrhea.   Genitourinary: Negative for frequency and hematuria.   Musculoskeletal: Positive for arthralgias. Negative for back pain, neck pain and neck stiffness.   Skin: Negative for rash.   Neurological: Positive for weakness and light-headedness. Negative for tremors, seizures, syncope, speech difficulty, numbness and headaches.   Hematological: " Negative for adenopathy. Does not bruise/bleed easily.   Psychiatric/Behavioral: Positive for decreased concentration. Negative for confusion and self-injury. The patient is not nervous/anxious.      Objective:     Vital Signs (Most Recent):  Temp: 98 °F (36.7 °C) (05/14/18 1600)  Pulse: 85 (05/14/18 1600)  Resp: 16 (05/14/18 1600)  BP: (!) 96/54 (05/14/18 1600)  SpO2: 98 % (05/14/18 1600) Vital Signs (24h Range):  Temp:  [97.9 °F (36.6 °C)-98.4 °F (36.9 °C)] 98 °F (36.7 °C)  Pulse:  [] 85  Resp:  [16-20] 16  SpO2:  [96 %-100 %] 98 %  BP: ()/(54-74) 96/54     Weight: 60.8 kg (134 lb 0.6 oz)  Body mass index is 20.99 kg/m².  Body surface area is 1.7 meters squared.      Intake/Output Summary (Last 24 hours) at 05/14/18 1820  Last data filed at 05/14/18 0421   Gross per 24 hour   Intake             1080 ml   Output              100 ml   Net              980 ml       Physical Exam   Constitutional: He is oriented to person, place, and time. He appears well-developed and well-nourished. He is cooperative. No distress.   HENT:   Head: Normocephalic and atraumatic.   Eyes: Conjunctivae and EOM are normal. Pupils are equal, round, and reactive to light. No scleral icterus.   Neck: Normal range of motion. Neck supple.   Cardiovascular: Normal rate and regular rhythm.  Exam reveals no gallop and no friction rub.    No murmur heard.  Pulmonary/Chest: Breath sounds normal. He has no wheezes. He has no rales.   Abdominal: Soft. Bowel sounds are normal. He exhibits no distension and no mass. There is no hepatosplenomegaly. There is no tenderness.   Musculoskeletal: Normal range of motion. He exhibits no edema.   Lymphadenopathy:     He has no cervical adenopathy.   Neurological: He is alert and oriented to person, place, and time. He has normal strength. No cranial nerve deficit.   Skin: Skin is warm and dry. No rash noted. No erythema.   Vitals reviewed.      Significant Labs:   BMP:   Recent Labs  Lab  05/13/18  1312 05/14/18  0525   GLU 83 127*    139   K 3.8 3.7   CL 99 103   CO2 25 23   BUN 17 16   CREATININE 0.8 0.7   CALCIUM 10.0 9.2   MG 2.1 2.1    and CBC:   Recent Labs  Lab 05/13/18  1312 05/14/18  0525   WBC 65.32* 45.61*   HGB 12.5* 11.0*   HCT 35.0* 31.5*    225       Diagnostic Results:  I have reviewed all pertinent imaging results/findings within the past 24 hours.

## 2018-05-14 NOTE — H&P
[facility] Hospital Medicine  History & Physical    Patient Name: Alejandro Neff  MRN: [id number]  Admission Date: 5/13/2018  Attending Physician: Sima Talley MD   Primary Care Provider: Nata Hernandez MD         Patient information was obtained from patient, past medical records and ER records.     Subjective:     Principal Problem:Vomiting with nausea, not intractable    Chief Complaint:   Chief Complaint   Patient presents with    Nausea     pt states he has been dry heaving and had nausea since staurday 5/12/18, denies vomiting    Weakness     pt currently being tx for stage III testicular cx. Last chemo tx friday 5/11/18        HPI: This is a 23 y.o. 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 Patient reports that gradual onset occurred after he underwent his second round of chemotherapy on Friday. He reports that he typically receives five days of chemo then one day (Friday) of fluids and anti-emetics. He reports associated dry heaving but denies vomiting. Patient also reports generalized weakness, mild cough, one episode of diarrhea yesterday, and diaphoresis while sleeping last night. He notes abdominal pain at baseline and states he typically takes morphine but was unable to retain medication PO due to nausea. He denies fever, rash, ecchymosis, chest pain, or shortness of breath. He states that symptoms are consistent with undergoing his first round of chemo after which he experienced nausea and vomiting. He reports that he has two remaining rounds of chemo and has f/u appt with oncologist Dr. Rojas tomorrow. He has no further complaints at this time.    Past Medical History:   Diagnosis Date    Abdominal pain 08/12/2010    eval for abd and chest wall pain at [facility], [place], [place] - cxr wnl, EKG (RV), troponin wnl, normal chemistries    Allergy   "   Cancer     testicular    Chondromalacia patellae     Chronic nausea 2015    eval by GI Dr. Tushar Artis - given zofran and ciproheptadine     Chronic pain     Chronic pain     [facility] - [facility] meds; followed by psych and pain clinic and unresponsive to behavioral/CBT/old records reports c/f conversion disorder     Foot pain 04/2010    4/10 + [facility]IRA, eval by Dr. ALLY Lion at [facility] -dx unclear ? gout and trial of nsaids and pdn, xrays normal 1/11, referred to podiatry, re-eval by rheum 2/12 and MRI and films wnl    Fracture of right radius 09/2009    Lyme arthritis     multiple joints    Lyme disease 2013    Memory loss 03/2012    eval for memory loss by neuro at [facility] - MRI, EEG wnl. Dr. Patricio suggested emotional factors & ref to Tushar Davies, PhD for  eval & neuropsych eval 3/12 Lupe Delgado, PhD - no evidence of formal thought disorder or psychosis - documented severe memory impairment; not related to to ADD or executive function issues. sugesstion that memory issues may be related to "emotional factors", ?conversion d/o    Stress fracture of tibia and fibula 08/2011    Urticaria        Past Surgical History:   Procedure Laterality Date    CHEST WALL BIOPSY      PORTACATH PLACEMENT Right        Review of patient's allergies indicates:   Allergen Reactions    Mushroom Anaphylaxis    Bamboo     Bee pollens     Mushroom flavor     Venom-honey bee        No current facility-administered medications on file prior to encounter.      Current Outpatient Prescriptions on File Prior to Encounter   Medication Sig    albuterol (PROAIR HFA) 90 mcg/actuation inhaler     albuterol 90 mcg/actuation inhaler Inhale 2 puffs into the lungs every 6 (six) hours as needed for Wheezing.    baclofen (LIORESAL) 10 MG tablet Take 1 tablet (10 mg total) by mouth 2 (two) times daily as needed (hiccups).    diphenhydrAMINE-aluminum-magnesium " hydroxide-simethicone-lidocaine HCl 2% Swish and spit 15 mLs every 4 (four) hours as needed (mouth sore).    EPINEPHrine (EPIPEN) 0.3 mg/0.3 mL AtIn Inject 0.3 mg/mL into the muscle.    granisetron (SANCUSO) 3.1 mg/24 hour place 1 patch onto the skin 24 hours before chemo, each patch can be worn up to 7 days    LORazepam (ATIVAN) 0.5 MG tablet Take 1 tablet (0.5 mg total) by mouth every 8 (eight) hours as needed for Anxiety.    morphine (MSIR) 15 MG tablet Take 1 tablet (15 mg total) by mouth every 6 (six) hours as needed for Pain.    ondansetron (ZOFRAN) 4 MG tablet Take 1 tablet (4 mg total) by mouth every 6 (six) hours as needed for Nausea.    promethazine (PHENERGAN) 25 MG suppository Place 1 suppository (25 mg total) rectally every 6 (six) hours as needed for Nausea.    MULTIVIT-MINERALS/FERROUS FUM (MULTI VITAMIN ORAL) Take by mouth once daily.    promethazine (PHENERGAN) 25 MG tablet Take 1 tablet (25 mg total) by mouth every 4 (four) hours as needed for Nausea.    [DISCONTINUED] albuterol (PROVENTIL) 2.5 mg /3 mL (0.083 %) nebulizer solution Take 3 mLs (2.5 mg total) by nebulization every 6 (six) hours as needed for Wheezing. Rescue    [DISCONTINUED] cholecalciferol, vitamin D3, 1,000 unit capsule Take 1 capsule (1,000 Units total) by mouth once daily.    [DISCONTINUED] diphenhydramine-acetaminophen (TYLENOL PM)  mg Tab Take 1 tablet by mouth once daily.    [DISCONTINUED] ibuprofen (ADVIL,MOTRIN) 600 MG tablet Take 1 tablet (600 mg total) by mouth every 6 (six) hours as needed for Pain.    [DISCONTINUED] lidocaine-prilocaine (EMLA) cream Apply over port 30 minutes prior to chemo     Family History     Problem Relation (Age of Onset)    Arthritis Mother, Sister    Depression Sister    Gout Father    Hyperlipidemia Father    No Known Problems Sister    Other Mother        Social History Main Topics    Smoking status: Former Smoker     Packs/day: 0.25     Types: Vaping with nicotine, Vaping  w/o nicotine, Cigarettes, Cigars     Quit date: 3/15/2018    Smokeless tobacco: Former User    Alcohol use Yes      Comment: occ    Drug use: Yes     Types: Marijuana, LSD, Cocaine, Other-see comments      Comment: Kratom (capsule or leaf form)    Sexual activity: Yes     Partners: Female     Birth control/ protection: Condom     Review of Systems   Constitutional: Positive for activity change and fatigue. Negative for appetite change and fever.   HENT: Negative for congestion, ear pain and postnasal drip.    Eyes: Negative for discharge.   Respiratory: Negative for apnea, cough, shortness of breath and wheezing.    Cardiovascular: Negative for chest pain and leg swelling.   Gastrointestinal: Positive for nausea and vomiting. Negative for abdominal distention and abdominal pain.   Endocrine: Negative for polydipsia, polyphagia and polyuria.   Genitourinary: Negative for difficulty urinating, flank pain, frequency, hematuria and urgency.   Musculoskeletal: Negative for arthralgias and joint swelling.   Skin: Positive for pallor. Negative for rash.   Allergic/Immunologic: Negative for environmental allergies and food allergies.   Neurological: Negative for dizziness, speech difficulty, weakness, light-headedness and headaches.   Hematological: Does not bruise/bleed easily.   Psychiatric/Behavioral: Negative for agitation.     Objective:     Vital Signs (Most Recent):  Temp: 98.1 °F (36.7 °C) (05/13/18 2036)  Pulse: 96 (05/13/18 2036)  Resp: 16 (05/13/18 2036)  BP: (!) 119/59 (05/13/18 2036)  SpO2: 97 % (05/13/18 2036) Vital Signs (24h Range):  Temp:  [97.5 °F (36.4 °C)-98.1 °F (36.7 °C)] 98.1 °F (36.7 °C)  Pulse:  [] 96  Resp:  [8-20] 16  SpO2:  [97 %-100 %] 97 %  BP: (104-129)/(55-71) 119/59     Weight: 60.8 kg (134 lb 0.6 oz)  Body mass index is 20.99 kg/m².    Physical Exam   Constitutional: He is oriented to person, place, and time. He appears well-developed and well-nourished.   HENT:   Head:  Normocephalic.   Eyes: Conjunctivae are normal.   Neck: Normal range of motion. Neck supple.   Cardiovascular: Normal rate, regular rhythm, normal heart sounds and intact distal pulses.    Pulmonary/Chest: Effort normal and breath sounds normal.   Abdominal: Soft. Bowel sounds are normal. He exhibits no distension. There is generalized tenderness.   Musculoskeletal: Normal range of motion.   Neurological: He is alert and oriented to person, place, and time. GCS eye subscore is 4. GCS verbal subscore is 5. GCS motor subscore is 6.   Skin: Skin is warm and dry.   Psychiatric: He has a normal mood and affect. His speech is normal and behavior is normal.           Significant Labs:   CBC:   Recent Labs  Lab 05/13/18  1312   WBC 65.32*   HGB 12.5*   HCT 35.0*        CMP:   Recent Labs  Lab 05/13/18  1312      K 3.8   CL 99   CO2 25   GLU 83   BUN 17   CREATININE 0.8   CALCIUM 10.0   PROT 6.9   ALBUMIN 4.1   BILITOT 1.1*   ALKPHOS 66   AST 11   ALT 49*   ANIONGAP 12   EGFRNONAA >60       Significant Imaging: I have reviewed all pertinent imaging results/findings within the past 24 hours.    Assessment/Plan:     * Vomiting with nausea, not intractable    Patient having post chemo N/V    NS at 125 ml/hr  Zofran and Phenergan for N/V          Primary mediastinal seminoma    Consult Hem/Onc            VTE Risk Mitigation         Ordered     enoxaparin injection 40 mg  Daily      05/13/18 2013     Place sequential compression device  Until discontinued      05/13/18 2013     IP VTE HIGH RISK PATIENT  Once      05/13/18 2013             Jonathan Luong NP  Department of Hospital Medicine   [facility]

## 2018-05-15 PROBLEM — D72.829 LEUKOCYTOSIS: Status: ACTIVE | Noted: 2018-05-15

## 2018-05-15 PROBLEM — H93.13 TINNITUS OF BOTH EARS: Status: ACTIVE | Noted: 2018-05-15

## 2018-05-15 LAB
ALBUMIN SERPL BCP-MCNC: 3.1 G/DL
ALP SERPL-CCNC: 77 U/L
ALT SERPL W/O P-5'-P-CCNC: 23 U/L
ANION GAP SERPL CALC-SCNC: 8 MMOL/L
AST SERPL-CCNC: 6 U/L
BASOPHILS NFR BLD: 1 %
BILIRUB SERPL-MCNC: 0.3 MG/DL
BUN SERPL-MCNC: 18 MG/DL
CALCIUM SERPL-MCNC: 8.5 MG/DL
CHLORIDE SERPL-SCNC: 106 MMOL/L
CO2 SERPL-SCNC: 26 MMOL/L
CREAT SERPL-MCNC: 0.8 MG/DL
DIFFERENTIAL METHOD: ABNORMAL
EOSINOPHIL NFR BLD: 0 %
ERYTHROCYTE [DISTWIDTH] IN BLOOD BY AUTOMATED COUNT: 14 %
EST. GFR  (AFRICAN AMERICAN): >60 ML/MIN/1.73 M^2
EST. GFR  (NON AFRICAN AMERICAN): >60 ML/MIN/1.73 M^2
GIANT PLATELETS BLD QL SMEAR: PRESENT
GLUCOSE SERPL-MCNC: 101 MG/DL
HCT VFR BLD AUTO: 30.6 %
HGB BLD-MCNC: 10.3 G/DL
LYMPHOCYTES NFR BLD: 22 %
MAGNESIUM SERPL-MCNC: 1.9 MG/DL
MCH RBC QN AUTO: 28.3 PG
MCHC RBC AUTO-ENTMCNC: 33.7 G/DL
MCV RBC AUTO: 84 FL
METAMYELOCYTES NFR BLD MANUAL: 1 %
MONOCYTES NFR BLD: 2 %
NEUTROPHILS NFR BLD: 68 %
NEUTS BAND NFR BLD MANUAL: 6 %
PHOSPHATE SERPL-MCNC: 5.3 MG/DL
PLATELET # BLD AUTO: 179 K/UL
PLATELET BLD QL SMEAR: ABNORMAL
PMV BLD AUTO: 8.4 FL
POLYCHROMASIA BLD QL SMEAR: ABNORMAL
POTASSIUM SERPL-SCNC: 4.2 MMOL/L
PROT SERPL-MCNC: 5.1 G/DL
RBC # BLD AUTO: 3.64 M/UL
SODIUM SERPL-SCNC: 140 MMOL/L
WBC # BLD AUTO: 21.83 K/UL

## 2018-05-15 PROCEDURE — 63600175 PHARM REV CODE 636 W HCPCS: Performed by: NURSE PRACTITIONER

## 2018-05-15 PROCEDURE — 80053 COMPREHEN METABOLIC PANEL: CPT

## 2018-05-15 PROCEDURE — 84100 ASSAY OF PHOSPHORUS: CPT

## 2018-05-15 PROCEDURE — 94761 N-INVAS EAR/PLS OXIMETRY MLT: CPT

## 2018-05-15 PROCEDURE — 25000003 PHARM REV CODE 250: Performed by: HOSPITALIST

## 2018-05-15 PROCEDURE — 25000003 PHARM REV CODE 250: Performed by: NURSE PRACTITIONER

## 2018-05-15 PROCEDURE — 85007 BL SMEAR W/DIFF WBC COUNT: CPT

## 2018-05-15 PROCEDURE — 36415 COLL VENOUS BLD VENIPUNCTURE: CPT

## 2018-05-15 PROCEDURE — 83735 ASSAY OF MAGNESIUM: CPT

## 2018-05-15 PROCEDURE — 25000003 PHARM REV CODE 250: Performed by: EMERGENCY MEDICINE

## 2018-05-15 PROCEDURE — 99225 PR SUBSEQUENT OBSERVATION CARE,LEVEL II: CPT | Mod: ,,, | Performed by: HOSPITALIST

## 2018-05-15 PROCEDURE — 63600175 PHARM REV CODE 636 W HCPCS: Performed by: EMERGENCY MEDICINE

## 2018-05-15 PROCEDURE — 85027 COMPLETE CBC AUTOMATED: CPT

## 2018-05-15 PROCEDURE — G0378 HOSPITAL OBSERVATION PER HR: HCPCS

## 2018-05-15 RX ADMIN — BACLOFEN 10 MG: 10 TABLET ORAL at 10:05

## 2018-05-15 RX ADMIN — LORAZEPAM 0.5 MG: 0.5 TABLET ORAL at 10:05

## 2018-05-15 RX ADMIN — SODIUM CHLORIDE: 0.9 INJECTION, SOLUTION INTRAVENOUS at 10:05

## 2018-05-15 RX ADMIN — ENOXAPARIN SODIUM 40 MG: 100 INJECTION SUBCUTANEOUS at 05:05

## 2018-05-15 RX ADMIN — MORPHINE SULFATE 15 MG: 15 TABLET ORAL at 10:05

## 2018-05-15 RX ADMIN — SODIUM CHLORIDE: 0.9 INJECTION, SOLUTION INTRAVENOUS at 06:05

## 2018-05-15 RX ADMIN — PANTOPRAZOLE SODIUM 40 MG: 40 TABLET, DELAYED RELEASE ORAL at 08:05

## 2018-05-15 RX ADMIN — PROMETHAZINE HYDROCHLORIDE 6.25 MG: 25 INJECTION INTRAMUSCULAR; INTRAVENOUS at 10:05

## 2018-05-15 NOTE — PROGRESS NOTES
[facility] Medicine  Progress Note    Patient Name: Alejandro Neff  MRN: [id number]  Patient Class: OP- Observation   Admission Date: 5/13/2018  Length of Stay: 0 days  Attending Physician: Aliya Fuller*  Primary Care Provider: Nata Hernandez MD        Subjective:     Principal Problem:Vomiting with nausea, not intractable    HPI:  This is a 23 y.o. 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 Patient reports that gradual onset occurred after he underwent his second round of chemotherapy on Friday. He reports that he typically receives five days of chemo then one day (Friday) of fluids and anti-emetics. He reports associated dry heaving but denies vomiting. Patient also reports generalized weakness, mild cough, one episode of diarrhea yesterday, and diaphoresis while sleeping last night. He notes abdominal pain at baseline and states he typically takes morphine but was unable to retain medication PO due to nausea. He denies fever, rash, ecchymosis, chest pain, or shortness of breath. He states that symptoms are consistent with undergoing his first round of chemo after which he experienced nausea and vomiting. He reports that he has two remaining rounds of chemo and has f/u appt with oncologist Dr. Rojas tomorrow. He has no further complaints at this time.    Hospital Course:  The patient requires anti-emetic medications to tolerate po intake.  Maintenance IV fluids in progress.  Heme/Onc consulted as they gave the patient his second round of chemo 5/11/2018.     Interval History: Patient states his nausea is better and he is now tolerating PO. He is having worsening abdominal pain, which he describes as sharp, in the lower L and R quadrants, constant, and acutely worsening over the past 3 days. He requests more pain medicine.     Review of Systems    Constitutional: Negative for chills and fever.   HENT: Negative for sore throat and trouble swallowing.    Respiratory: Negative for shortness of breath.    Cardiovascular: Negative for chest pain and palpitations.   Gastrointestinal: Positive for abdominal pain and nausea (improving). Negative for blood in stool, constipation, diarrhea and vomiting.     Objective:     Vital Signs (Most Recent):  Temp: 98.5 °F (36.9 °C) (05/15/18 1219)  Pulse: 104 (05/15/18 1219)  Resp: 18 (05/15/18 0738)  BP: (!) 115/56 (05/15/18 1219)  SpO2: 100 % (05/15/18 1219) Vital Signs (24h Range):  Temp:  [98 °F (36.7 °C)-98.8 °F (37.1 °C)] 98.5 °F (36.9 °C)  Pulse:  [] 104  Resp:  [16-20] 18  SpO2:  [97 %-100 %] 100 %  BP: ()/(52-59) 115/56     Weight: 60.8 kg (134 lb 0.6 oz)  Body mass index is 20.99 kg/m².    Intake/Output Summary (Last 24 hours) at 05/15/18 1408  Last data filed at 05/15/18 0500   Gross per 24 hour   Intake             1100 ml   Output                0 ml   Net             1100 ml      Physical Exam   Constitutional: He is oriented to person, place, and time. He appears well-developed and well-nourished.   HENT:   Head: Normocephalic.   Eyes: Conjunctivae are normal.   Neck: Normal range of motion. Neck supple.   Cardiovascular: Normal rate, regular rhythm, normal heart sounds and intact distal pulses.    Pulmonary/Chest: Effort normal and breath sounds normal.   Abdominal: Soft. Bowel sounds are normal. He exhibits no distension. There is generalized tenderness (RLQ, LLQ). There is no rebound.   Musculoskeletal: Normal range of motion.   Neurological: He is alert and oriented to person, place, and time. GCS eye subscore is 4. GCS verbal subscore is 5. GCS motor subscore is 6.   Skin: Skin is warm and dry.   Psychiatric: He has a normal mood and affect. His speech is normal and behavior is normal.       Significant Labs:   CBC:     Recent Labs  Lab 05/14/18  0525 05/15/18  0522   WBC 45.61* 21.83*   HGB  11.0* 10.3*   HCT 31.5* 30.6*    179     CMP:     Recent Labs  Lab 05/14/18  0525 05/15/18  0522    140   K 3.7 4.2    106   CO2 23 26   * 101   BUN 16 18   CREATININE 0.7 0.8   CALCIUM 9.2 8.5*   PROT 6.0 5.1*   ALBUMIN 3.7 3.1*   BILITOT 0.9 0.3   ALKPHOS 80 77   AST 8* 6*   ALT 34 23   ANIONGAP 13 8   EGFRNONAA >60 >60     Magnesium:     Recent Labs  Lab 05/14/18  0525 05/15/18  0522   MG 2.1 1.9       Significant Imaging: I have reviewed and interpreted all pertinent imaging results/findings within the past 24 hours.    Assessment/Plan:      * Vomiting with nausea, not intractable    Patient having post chemo N/V  Continue hydration with NS at 125 ml/hr  Improved with addition of Granisetron patch. IV phenergan also helping. Will transition to PO if remains well tomorrow.   Advance diet as tolerated          Tinnitus of both ears    F/u appt w/ ENT 5/22.           Leukocytosis    2/2 Neulasta, steroids. Improving; trending down 65-->21.   Do not suspect infectious process but will remain cautious given immunocompromised status and new onset abdominal pain. Check CT.           Primary mediastinal seminoma    Consult Hem/Onc.   S/p 2 cycles of EP  Sees Dr Rojas in clinic next Monday          VTE Risk Mitigation         Ordered     enoxaparin injection 40 mg  Daily      05/13/18 2013     Place sequential compression device  Until discontinued      05/13/18 2013     IP VTE HIGH RISK PATIENT  Once      05/13/18 2013              Aliya Fuller MD  Department of Hospital Medicine   [facility]

## 2018-05-15 NOTE — ASSESSMENT & PLAN NOTE
2/2 Neulasta, steroids. Improving; trending down 65-->21.   Do not suspect infectious process but will remain cautious given immunocompromised status and new onset abdominal pain. Check CT.

## 2018-05-15 NOTE — SUBJECTIVE & OBJECTIVE
Interval History: Patient states his nausea is better and he is now tolerating PO. He is having worsening abdominal pain, which he describes as sharp, in the lower L and R quadrants, constant, and acutely worsening over the past 3 days. He requests more pain medicine.     Review of Systems   Constitutional: Negative for chills and fever.   HENT: Negative for sore throat and trouble swallowing.    Respiratory: Negative for shortness of breath.    Cardiovascular: Negative for chest pain and palpitations.   Gastrointestinal: Positive for abdominal pain and nausea (improving). Negative for blood in stool, constipation, diarrhea and vomiting.     Objective:     Vital Signs (Most Recent):  Temp: 98.5 °F (36.9 °C) (05/15/18 1219)  Pulse: 104 (05/15/18 1219)  Resp: 18 (05/15/18 0738)  BP: (!) 115/56 (05/15/18 1219)  SpO2: 100 % (05/15/18 1219) Vital Signs (24h Range):  Temp:  [98 °F (36.7 °C)-98.8 °F (37.1 °C)] 98.5 °F (36.9 °C)  Pulse:  [] 104  Resp:  [16-20] 18  SpO2:  [97 %-100 %] 100 %  BP: ()/(52-59) 115/56     Weight: 60.8 kg (134 lb 0.6 oz)  Body mass index is 20.99 kg/m².    Intake/Output Summary (Last 24 hours) at 05/15/18 1408  Last data filed at 05/15/18 0500   Gross per 24 hour   Intake             1100 ml   Output                0 ml   Net             1100 ml      Physical Exam   Constitutional: He is oriented to person, place, and time. He appears well-developed and well-nourished.   HENT:   Head: Normocephalic.   Eyes: Conjunctivae are normal.   Neck: Normal range of motion. Neck supple.   Cardiovascular: Normal rate, regular rhythm, normal heart sounds and intact distal pulses.    Pulmonary/Chest: Effort normal and breath sounds normal.   Abdominal: Soft. Bowel sounds are normal. He exhibits no distension. There is generalized tenderness (RLQ, LLQ). There is no rebound.   Musculoskeletal: Normal range of motion.   Neurological: He is alert and oriented to person, place, and time. GCS eye subscore  is 4. GCS verbal subscore is 5. GCS motor subscore is 6.   Skin: Skin is warm and dry.   Psychiatric: He has a normal mood and affect. His speech is normal and behavior is normal.       Significant Labs:   CBC:     Recent Labs  Lab 05/14/18  0525 05/15/18  0522   WBC 45.61* 21.83*   HGB 11.0* 10.3*   HCT 31.5* 30.6*    179     CMP:     Recent Labs  Lab 05/14/18  0525 05/15/18  0522    140   K 3.7 4.2    106   CO2 23 26   * 101   BUN 16 18   CREATININE 0.7 0.8   CALCIUM 9.2 8.5*   PROT 6.0 5.1*   ALBUMIN 3.7 3.1*   BILITOT 0.9 0.3   ALKPHOS 80 77   AST 8* 6*   ALT 34 23   ANIONGAP 13 8   EGFRNONAA >60 >60     Magnesium:     Recent Labs  Lab 05/14/18  0525 05/15/18  0522   MG 2.1 1.9       Significant Imaging: I have reviewed and interpreted all pertinent imaging results/findings within the past 24 hours.

## 2018-05-15 NOTE — ASSESSMENT & PLAN NOTE
Patient having post chemo N/V  Continue hydration with NS at 125 ml/hr  Improved with addition of Granisetron patch. IV phenergan also helping. Will transition to PO if remains well tomorrow.   Advance diet as tolerated

## 2018-05-15 NOTE — PLAN OF CARE
Problem: Patient Care Overview  Goal: Plan of Care Review  Outcome: Ongoing (interventions implemented as appropriate)  Patient resting in bed at this time, all important items and call light within reach, instructed to call as needed, no injures reported or seen, bed in lowest and locked position, night light on, nonskid footwear applied, walkway free of clutter, purposeful rounding noted, plan of care reviewed and verbalized understanding, bed alarmed used as needed.    NS at 125, pt tolerating   prn meds given with moderate for pain/anxiety/nausea relief  No vomiting this shift   Amb   RA, tolerating  Reg diet tolerating per patient   Right arm nausea patch in place    No further complaints or concerns at this time  Will cont to monitor

## 2018-05-16 ENCOUNTER — TELEPHONE (OUTPATIENT)
Dept: PHARMACY | Facility: CLINIC | Age: 23
End: 2018-05-16

## 2018-05-16 VITALS
RESPIRATION RATE: 18 BRPM | TEMPERATURE: 98 F | DIASTOLIC BLOOD PRESSURE: 62 MMHG | HEART RATE: 102 BPM | SYSTOLIC BLOOD PRESSURE: 114 MMHG | BODY MASS INDEX: 21.04 KG/M2 | WEIGHT: 134.06 LBS | OXYGEN SATURATION: 99 % | HEIGHT: 67 IN

## 2018-05-16 LAB
ALBUMIN SERPL BCP-MCNC: 3.2 G/DL
ALP SERPL-CCNC: 76 U/L
ALT SERPL W/O P-5'-P-CCNC: 20 U/L
ANION GAP SERPL CALC-SCNC: 8 MMOL/L
AST SERPL-CCNC: 9 U/L
BASOPHILS NFR BLD: 0 %
BILIRUB SERPL-MCNC: 0.5 MG/DL
BUN SERPL-MCNC: 12 MG/DL
CALCIUM SERPL-MCNC: 8.8 MG/DL
CHLORIDE SERPL-SCNC: 104 MMOL/L
CO2 SERPL-SCNC: 28 MMOL/L
CREAT SERPL-MCNC: 0.7 MG/DL
DIFFERENTIAL METHOD: ABNORMAL
EOSINOPHIL NFR BLD: 0 %
ERYTHROCYTE [DISTWIDTH] IN BLOOD BY AUTOMATED COUNT: 13.9 %
EST. GFR  (AFRICAN AMERICAN): >60 ML/MIN/1.73 M^2
EST. GFR  (NON AFRICAN AMERICAN): >60 ML/MIN/1.73 M^2
GIANT PLATELETS BLD QL SMEAR: PRESENT
GLUCOSE SERPL-MCNC: 96 MG/DL
HCT VFR BLD AUTO: 30.2 %
HGB BLD-MCNC: 10.2 G/DL
LYMPHOCYTES NFR BLD: 24 %
MAGNESIUM SERPL-MCNC: 2 MG/DL
MCH RBC QN AUTO: 28 PG
MCHC RBC AUTO-ENTMCNC: 33.8 G/DL
MCV RBC AUTO: 83 FL
METAMYELOCYTES NFR BLD MANUAL: 1 %
MONOCYTES NFR BLD: 5 %
NEUTROPHILS NFR BLD: 64 %
NEUTS BAND NFR BLD MANUAL: 6 %
PHOSPHATE SERPL-MCNC: 4.3 MG/DL
PLATELET # BLD AUTO: 140 K/UL
PLATELET BLD QL SMEAR: ABNORMAL
PMV BLD AUTO: 8.5 FL
POTASSIUM SERPL-SCNC: 4 MMOL/L
PROT SERPL-MCNC: 5.3 G/DL
RBC # BLD AUTO: 3.64 M/UL
SODIUM SERPL-SCNC: 140 MMOL/L
WBC # BLD AUTO: 8.89 K/UL

## 2018-05-16 PROCEDURE — 84100 ASSAY OF PHOSPHORUS: CPT

## 2018-05-16 PROCEDURE — 99217 PR OBSERVATION CARE DISCHARGE: CPT | Mod: ,,, | Performed by: HOSPITALIST

## 2018-05-16 PROCEDURE — 85027 COMPLETE CBC AUTOMATED: CPT

## 2018-05-16 PROCEDURE — G0378 HOSPITAL OBSERVATION PER HR: HCPCS

## 2018-05-16 PROCEDURE — 83735 ASSAY OF MAGNESIUM: CPT

## 2018-05-16 PROCEDURE — 80053 COMPREHEN METABOLIC PANEL: CPT

## 2018-05-16 PROCEDURE — 36415 COLL VENOUS BLD VENIPUNCTURE: CPT

## 2018-05-16 PROCEDURE — 25000003 PHARM REV CODE 250: Performed by: HOSPITALIST

## 2018-05-16 PROCEDURE — 85007 BL SMEAR W/DIFF WBC COUNT: CPT

## 2018-05-16 PROCEDURE — 63600175 PHARM REV CODE 636 W HCPCS: Performed by: HOSPITALIST

## 2018-05-16 RX ORDER — ONDANSETRON 4 MG/1
4 TABLET, FILM COATED ORAL EVERY 6 HOURS PRN
Qty: 120 TABLET | Refills: 3 | Status: ON HOLD | OUTPATIENT
Start: 2018-05-16 | End: 2018-07-25 | Stop reason: HOSPADM

## 2018-05-16 RX ORDER — PROMETHAZINE HYDROCHLORIDE 25 MG/1
25 TABLET ORAL EVERY 4 HOURS PRN
Qty: 120 TABLET | Refills: 3 | Status: ON HOLD | OUTPATIENT
Start: 2018-05-16 | End: 2018-07-25 | Stop reason: HOSPADM

## 2018-05-16 RX ORDER — MORPHINE SULFATE 15 MG/1
15 TABLET ORAL EVERY 6 HOURS PRN
Qty: 30 TABLET | Refills: 0 | Status: SHIPPED | OUTPATIENT
Start: 2018-05-16 | End: 2018-06-08 | Stop reason: SDUPTHER

## 2018-05-16 RX ORDER — PANTOPRAZOLE SODIUM 40 MG/1
40 TABLET, DELAYED RELEASE ORAL DAILY
Qty: 30 TABLET | Refills: 11 | Status: SHIPPED | OUTPATIENT
Start: 2018-05-16 | End: 2018-06-18 | Stop reason: SDUPTHER

## 2018-05-16 RX ORDER — MAG HYDROX/ALUMINUM HYD/SIMETH 200-200-20
15 SUSPENSION, ORAL (FINAL DOSE FORM) ORAL ONCE
Status: ON HOLD | COMMUNITY
Start: 2018-05-16 | End: 2018-06-25 | Stop reason: HOSPADM

## 2018-05-16 RX ORDER — PROMETHAZINE HYDROCHLORIDE 25 MG/1
25 SUPPOSITORY RECTAL EVERY 6 HOURS PRN
Qty: 84 SUPPOSITORY | Refills: 2 | Status: ON HOLD | OUTPATIENT
Start: 2018-05-16 | End: 2018-07-09 | Stop reason: HOSPADM

## 2018-05-16 RX ADMIN — PROMETHAZINE HYDROCHLORIDE 6.25 MG: 25 INJECTION INTRAMUSCULAR; INTRAVENOUS at 01:05

## 2018-05-16 RX ADMIN — PANTOPRAZOLE SODIUM 40 MG: 40 TABLET, DELAYED RELEASE ORAL at 08:05

## 2018-05-16 NOTE — PLAN OF CARE
Patient plans to utilize [employer] for transportation home      05/16/18 1207   Final Note   Assessment Type Final Discharge Note   Discharge Disposition Home   What phone number can be called within the next 1-3 days to see how you are doing after discharge? [phone]   Discharge plans and expectations educations in teach back method with documentation complete? Yes   Right Care Referral Info   Post Acute Recommendation No Care

## 2018-05-16 NOTE — TELEPHONE ENCOUNTER
Good Afternoon.    The prior authorization for Mr. Memo Neff's Ondansetron prescription has been approved through 11/12/2018.    The pharmacy has been notified and will proceed with bedside delivering the medication to the patient.    Thanks.    Aurora Rosales CpT.  Patient Care Advocate  [facility]  [email or web address]

## 2018-05-16 NOTE — PLAN OF CARE
Problem: Patient Care Overview  Goal: Plan of Care Review  Outcome: Ongoing (interventions implemented as appropriate)  Patient resting in bed at this time, all important items and call light within reach, instructed to call as needed, no injures reported or seen, bed in lowest and locked position, night light on, nonskid footwear applied, walkway free of clutter, purposeful rounding noted, plan of care reviewed and verbalized understanding, bed alarmed used as needed.     NS at 125, pt tolerating   prn meds given with moderate relief for pain/anxiety/nausea   No vomiting this shift   Amb   RA, tolerating  Reg diet tolerating per patient   Right arm nausea patch in place  BP on low end, last 99-54, pt asymptomatic   No further complaints or concerns at this time  Will cont to monitor

## 2018-05-16 NOTE — DISCHARGE SUMMARY
[facility] Hospital Medicine  Discharge Summary      Patient Name: Alejandro Neff  MRN: [id number]  Admission Date: 5/13/2018  Hospital Length of Stay: 0 days  Discharge Date and Time:  05/16/2018 11:44 AM  Attending Physician: Aliya Fuller*   Discharging Provider: Aliya Fuller MD  Primary Care Provider: Nata Hernandez MD      HPI:   This is a 23 y.o. 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 Patient reports that gradual onset occurred after he underwent his second round of chemotherapy on Friday. He reports that he typically receives five days of chemo then one day (Friday) of fluids and anti-emetics. He reports associated dry heaving but denies vomiting. Patient also reports generalized weakness, mild cough, one episode of diarrhea yesterday, and diaphoresis while sleeping last night. He notes abdominal pain at baseline and states he typically takes morphine but was unable to retain medication PO due to nausea. He denies fever, rash, ecchymosis, chest pain, or shortness of breath. He states that symptoms are consistent with undergoing his first round of chemo after which he experienced nausea and vomiting. He reports that he has two remaining rounds of chemo and has f/u appt with oncologist Dr. Rojas tomorrow. He has no further complaints at this time.    * No surgery found *      Hospital Course:   The patient requires anti-emetic medications to tolerate po intake.  Maintenance IV fluids in progress.  Heme/Onc consulted as they gave the patient his second round of chemo 5/11/2018.      Consults:   Consults         Status Ordering Provider     Inpatient consult to Hematology/Oncology  Once     Provider:  Suha Rojas MD    Completed JULIO CESAR SALTER          * Vomiting with nausea, not intractable    Patient having post chemo N/V  Continue hydration  with NS at 125 ml/hr  Improved with addition of Granisetron patch. IV phenergan also helping. Will transition to PO.  Advanced diet tolerated  Discharge home today.        Tinnitus of both ears    F/u appt w/ ENT 5/22.           Leukocytosis    2/2 Neulasta, steroids. Improving; trending down 65-->21. Normal today.  Do not suspect infectious process but will remain cautious given immunocompromised status and new onset abdominal pain.  Better 5/16, tolerating diet, no vomiting.           Primary mediastinal seminoma    Consult Hem/Onc.   S/p 2 cycles of EP  Sees Dr Rojas in clinic next Monday          Final Active Diagnoses:    Diagnosis Date Noted POA    PRINCIPAL PROBLEM:  Vomiting with nausea, not intractable [R11.2] 03/11/2017 Yes    Leukocytosis [D72.829] 05/15/2018 Yes    Tinnitus of both ears [H93.13] 05/15/2018 Yes    Primary mediastinal seminoma [C38.3] 03/01/2018 Yes      Problems Resolved During this Admission:    Diagnosis Date Noted Date Resolved POA       Discharged Condition: good    Disposition: Home or Self Care    Follow Up:  Follow-up Information     Nata Hernandez MD In 1 week.    Specialty:  Internal Medicine  Why:  hospital stay f/u  Contact information:  [address] [place] [id number] [phone]                 Patient Instructions:   No discharge procedures on file.    Significant Diagnostic Studies: Labs:   CMP   Recent Labs  Lab 05/15/18  0522 05/16/18  0526    140   K 4.2 4.0    104   CO2 26 28    96   BUN 18 12   CREATININE 0.8 0.7   CALCIUM 8.5* 8.8   PROT 5.1* 5.3*   ALBUMIN 3.1* 3.2*   BILITOT 0.3 0.5   ALKPHOS 77 76   AST 6* 9*   ALT 23 20   ANIONGAP 8 8   ESTGFRAFRICA >60 >60   EGFRNONAA >60 >60    and CBC   Recent Labs  Lab 05/15/18  0522 05/16/18  0526   WBC 21.83* 8.89   HGB 10.3* 10.2*   HCT 30.6* 30.2*    140*       Pending Diagnostic Studies:     None         Medications:  Reconciled Home Medications:      Medication List      START  taking these medications    aluminum-magnesium hydroxide-simethicone 200-200-20 mg/5 mL Susp  Commonly known as:  MAALOX  Take 15 mLs by mouth once.     pantoprazole 40 MG tablet  Commonly known as:  PROTONIX  Take 1 tablet (40 mg total) by mouth once daily.        CHANGE how you take these medications    albuterol 90 mcg/actuation inhaler  Inhale 2 puffs into the lungs every 6 (six) hours as needed for Wheezing.  What changed:  Another medication with the same name was removed. Continue taking this medication, and follow the directions you see here.     * morphine 15 MG tablet  Commonly known as:  MSIR  Take 1 tablet (15 mg total) by mouth every 6 (six) hours as needed for Pain.  What changed:  Another medication with the same name was added. Make sure you understand how and when to take each.     * morphine 15 MG tablet  Commonly known as:  MSIR  Take 1 tablet (15 mg total) by mouth every 6 (six) hours as needed.  What changed:  You were already taking a medication with the same name, and this prescription was added. Make sure you understand how and when to take each.        * This list has 2 medication(s) that are the same as other medications prescribed for you. Read the directions carefully, and ask your doctor or other care provider to review them with you.            CONTINUE taking these medications    baclofen 10 MG tablet  Commonly known as:  LIORESAL  Take 1 tablet (10 mg total) by mouth 2 (two) times daily as needed (hiccups).     diphenhydrAMINE-aluminum-magnesium hydroxide-simethicone-lidocaine HCl 2%  Swish and spit 15 mLs every 4 (four) hours as needed (mouth sore).     EPINEPHrine 0.3 mg/0.3 mL Atin  Commonly known as:  EPIPEN  Inject 0.3 mg/mL into the muscle.     LORazepam 0.5 MG tablet  Commonly known as:  ATIVAN  Take 1 tablet (0.5 mg total) by mouth every 8 (eight) hours as needed for Anxiety.     MULTI VITAMIN ORAL  Take by mouth once daily.     ondansetron 4 MG tablet  Commonly known as:   ZOFRAN  Take 1 tablet (4 mg total) by mouth every 6 (six) hours as needed for Nausea.     * promethazine 25 MG suppository  Commonly known as:  PHENERGAN  Place 1 suppository (25 mg total) rectally every 6 (six) hours as needed for Nausea.     * promethazine 25 MG tablet  Commonly known as:  PHENERGAN  Take 1 tablet (25 mg total) by mouth every 4 (four) hours as needed for Nausea.     SANCUSO 3.1 mg/24 hour  Generic drug:  granisetron  place 1 patch onto the skin 24 hours before chemo, each patch can be worn up to 7 days        * This list has 2 medication(s) that are the same as other medications prescribed for you. Read the directions carefully, and ask your doctor or other care provider to review them with you.                Indwelling Lines/Drains at time of discharge:   Lines/Drains/Airways          No matching active lines, drains, or airways          Time spent on the discharge of patient: 35 minutes  Patient was seen and examined on the date of discharge and determined to be suitable for discharge.         Aliya Fuller MD  Department of Hospital Medicine  [facility]

## 2018-05-16 NOTE — PLAN OF CARE
Patient reports trouble in the past obtaining more than 2 days of nausea med at a time. Spoke with Charlotte at [facility] who reports med requires prior authorization & she will have one of the pharmacy techs work on obtaining auth - Patient updated

## 2018-05-16 NOTE — ASSESSMENT & PLAN NOTE
Patient having post chemo N/V  Continue hydration with NS at 125 ml/hr  Improved with addition of Granisetron patch. IV phenergan also helping. Will transition to PO.  Advanced diet tolerated  Discharge home today.

## 2018-05-16 NOTE — ASSESSMENT & PLAN NOTE
2/2 Neulasta, steroids. Improving; trending down 65-->21. Normal today.  Do not suspect infectious process but will remain cautious given immunocompromised status and new onset abdominal pain.  Better 5/16, tolerating diet, no vomiting.

## 2018-05-17 DIAGNOSIS — C38.3 PRIMARY MEDIASTINAL SEMINOMA: Primary | ICD-10-CM

## 2018-05-18 ENCOUNTER — OFFICE VISIT (OUTPATIENT)
Dept: HEMATOLOGY/ONCOLOGY | Facility: CLINIC | Age: 23
End: 2018-05-18
Payer: COMMERCIAL

## 2018-05-18 ENCOUNTER — LAB VISIT (OUTPATIENT)
Dept: LAB | Facility: OTHER | Age: 23
End: 2018-05-18
Attending: INTERNAL MEDICINE
Payer: COMMERCIAL

## 2018-05-18 VITALS
DIASTOLIC BLOOD PRESSURE: 79 MMHG | HEIGHT: 67 IN | RESPIRATION RATE: 18 BRPM | BODY MASS INDEX: 21.14 KG/M2 | TEMPERATURE: 96 F | OXYGEN SATURATION: 98 % | SYSTOLIC BLOOD PRESSURE: 126 MMHG | HEART RATE: 115 BPM | WEIGHT: 134.69 LBS

## 2018-05-18 DIAGNOSIS — T45.1X5A ANEMIA ASSOCIATED WITH CHEMOTHERAPY: ICD-10-CM

## 2018-05-18 DIAGNOSIS — C38.3 PRIMARY MEDIASTINAL SEMINOMA: ICD-10-CM

## 2018-05-18 DIAGNOSIS — T45.1X5A CHEMOTHERAPY-INDUCED NEUTROPENIA: ICD-10-CM

## 2018-05-18 DIAGNOSIS — R05.9 COUGH: ICD-10-CM

## 2018-05-18 DIAGNOSIS — C38.3 PRIMARY MEDIASTINAL SEMINOMA: Primary | ICD-10-CM

## 2018-05-18 DIAGNOSIS — T45.1X5A CHEMOTHERAPY-INDUCED THROMBOCYTOPENIA: ICD-10-CM

## 2018-05-18 DIAGNOSIS — D64.81 ANEMIA ASSOCIATED WITH CHEMOTHERAPY: ICD-10-CM

## 2018-05-18 DIAGNOSIS — G47.00 INSOMNIA, UNSPECIFIED TYPE: ICD-10-CM

## 2018-05-18 DIAGNOSIS — D69.59 CHEMOTHERAPY-INDUCED THROMBOCYTOPENIA: ICD-10-CM

## 2018-05-18 DIAGNOSIS — D70.1 CHEMOTHERAPY-INDUCED NEUTROPENIA: ICD-10-CM

## 2018-05-18 LAB
ALBUMIN SERPL BCP-MCNC: 4 G/DL
ALP SERPL-CCNC: 77 U/L
ALT SERPL W/O P-5'-P-CCNC: 18 U/L
ANION GAP SERPL CALC-SCNC: 9 MMOL/L
AST SERPL-CCNC: 10 U/L
BILIRUB SERPL-MCNC: 0.5 MG/DL
BUN SERPL-MCNC: 14 MG/DL
CALCIUM SERPL-MCNC: 9.7 MG/DL
CHLORIDE SERPL-SCNC: 102 MMOL/L
CO2 SERPL-SCNC: 27 MMOL/L
CREAT SERPL-MCNC: 0.8 MG/DL
ERYTHROCYTE [DISTWIDTH] IN BLOOD BY AUTOMATED COUNT: 13.8 %
EST. GFR  (AFRICAN AMERICAN): >60 ML/MIN/1.73 M^2
EST. GFR  (NON AFRICAN AMERICAN): >60 ML/MIN/1.73 M^2
GLUCOSE SERPL-MCNC: 103 MG/DL
HCT VFR BLD AUTO: 35.1 %
HGB BLD-MCNC: 12.1 G/DL
MCH RBC QN AUTO: 28.4 PG
MCHC RBC AUTO-ENTMCNC: 34.5 G/DL
MCV RBC AUTO: 82 FL
NEUTROPHILS # BLD AUTO: 1.3 K/UL
PLATELET # BLD AUTO: 121 K/UL
PMV BLD AUTO: 8.9 FL
POTASSIUM SERPL-SCNC: 4.3 MMOL/L
PROT SERPL-MCNC: 6.9 G/DL
RBC # BLD AUTO: 4.26 M/UL
SODIUM SERPL-SCNC: 138 MMOL/L
WBC # BLD AUTO: 3.51 K/UL

## 2018-05-18 PROCEDURE — 36415 COLL VENOUS BLD VENIPUNCTURE: CPT

## 2018-05-18 PROCEDURE — 99214 OFFICE O/P EST MOD 30 MIN: CPT | Mod: S$GLB,,, | Performed by: INTERNAL MEDICINE

## 2018-05-18 PROCEDURE — 85027 COMPLETE CBC AUTOMATED: CPT

## 2018-05-18 PROCEDURE — 3008F BODY MASS INDEX DOCD: CPT | Mod: CPTII,S$GLB,, | Performed by: INTERNAL MEDICINE

## 2018-05-18 PROCEDURE — 99999 PR PBB SHADOW E&M-EST. PATIENT-LVL III: CPT | Mod: PBBFAC,,, | Performed by: INTERNAL MEDICINE

## 2018-05-18 PROCEDURE — 80053 COMPREHEN METABOLIC PANEL: CPT

## 2018-05-18 RX ORDER — AMOXICILLIN AND CLAVULANATE POTASSIUM 875; 125 MG/1; MG/1
1 TABLET, FILM COATED ORAL 2 TIMES DAILY
Qty: 14 TABLET | Refills: 0 | Status: SHIPPED | OUTPATIENT
Start: 2018-05-18 | End: 2018-05-18 | Stop reason: SDUPTHER

## 2018-05-18 RX ORDER — ZOLPIDEM TARTRATE 5 MG/1
5 TABLET ORAL NIGHTLY PRN
Qty: 30 TABLET | Refills: 0 | Status: SHIPPED | OUTPATIENT
Start: 2018-05-18 | End: 2018-07-03

## 2018-05-18 RX ORDER — AMOXICILLIN AND CLAVULANATE POTASSIUM 875; 125 MG/1; MG/1
1 TABLET, FILM COATED ORAL 2 TIMES DAILY
Qty: 14 TABLET | Refills: 0 | Status: ON HOLD | OUTPATIENT
Start: 2018-05-18 | End: 2018-05-29

## 2018-05-18 NOTE — Clinical Note
CT C/A/P next Thursday, CBC, CMP, then see me RTC again on 5/28 with CBC, CMP, Magnesium, see me, then chemo Needs chemo appointment (cis/etop) daily 5/28-6/1, IVF on 6/2. He wants early chemo appointment every day

## 2018-05-18 NOTE — PROGRESS NOTES
"    PROGRESS NOTE     Subjective:      Patient ID: Alejandro Neff is a 23 y.o. male.     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 Mr. Hampton is a 24 yo man who first presented with chest pain and underwent CT chest on 18, which showed a 6 x 3.5 cm mediastinal mass. It abuts the aorta and pulmonary artery. Biopsy was done on 3/1/18. Pathology (reviewed at [facility]) showed germ cell tumor, most consistent with seminoma.   2. HCG, LDH, AFP on 3/22/18 all normal.   3. Testicular US 3/26/18 showed no testicular masses. CT C/A/P on 3/26/18 showed "stable appearance of anterior mediastinal mass consistent with provided history of seminoma. Several punctate sclerotic foci are noted in the pelvis at the right pubic bone, right ischial tuberosity, and left ilium adjacent to the sacroiliac joint.  These are strongly favored to reflect benign bone islands although metastatic disease could have a similar appearance and close follow-up is recommended. Several benign Schmorl's nodes are noted in the thoracic spine." Bone scan on 3/28/18 was negative for bone mets.   4. Audiogram on 18 normal. Followed by ENT.   5. Given his smoking history, I recommended 4 cycles of EP. EP cycle 1 day 1 on 18. Complicated by hospitalization for neutropenic fever -. No source of infections identified. Treated with antibiotics empirically and improved.   6. Cycle 2 of EP on 18. Hospitalized from - for intractable nausea.      INTERVAL HISTORY:   Mr. Hampton returns today for after-discharge follow up. Was hospitalized from  to  for intractable nausea. Feeling better now. Feels tired and bored. No fever. +cough and insomnia.      ECO     ROS:   A ten point system review is obtained and neg except for what was stated in the interval history     Physical Examination:   Vital signs reviewed.   Gen: well " hydrated, well developed, in no acute distress.  HEENT: normocephalic, anicteric sclerae, PERRLA, EOMI, oropharynx clear  Neck: supple, no JVD, thyromegaly, cervical or supraclavicular LAD  Lungs: CTAB, no wheezes or rales. Port site on chest clean.   Heart: RRR, no M/R/G  Abdomen: soft, no tenderness, non-distended, no hepatosplenomegaly, mass, or hernia. BS present  Ext: no clubbing, cyanosis, or edema  Neuro: alert and oriented x 4, no focal neuro deficit  Skin: no rash, erythema, open wound or ulcers  Psych: pleasant and appropriate mood and affect        Objective:         Laboratory Data:  ANC 1.3     Assessment/Plan:     1. Primary mediastinal seminoma    2. Chemotherapy-induced neutropenia    3. Anemia associated with chemotherapy    4. Chemotherapy-induced thrombocytopenia    5. Cough    6. Insomnia, unspecified type        1.  - due for cycle 3 on 5/28  - will do restaging CT scan next Thursday    2. 5  - ANC 1.3 today. +cough  - will give a course of Augmentin  - knows to go to ER if he has fever    3.   - stable.  - monitor    4.   - mild  - monitor    6.  - ambien 5 mg prn    RTC next Thursday with restaging CT scan, labs, then see me.            Electronically signed by Suha Rojas

## 2018-05-19 ENCOUNTER — PATIENT MESSAGE (OUTPATIENT)
Dept: HEMATOLOGY/ONCOLOGY | Facility: CLINIC | Age: 23
End: 2018-05-19

## 2018-05-22 ENCOUNTER — OFFICE VISIT (OUTPATIENT)
Dept: OTOLARYNGOLOGY | Facility: CLINIC | Age: 23
DRG: 343 | End: 2018-05-22
Payer: COMMERCIAL

## 2018-05-22 ENCOUNTER — CLINICAL SUPPORT (OUTPATIENT)
Dept: AUDIOLOGY | Facility: CLINIC | Age: 23
DRG: 343 | End: 2018-05-22
Payer: COMMERCIAL

## 2018-05-22 VITALS — BODY MASS INDEX: 21.3 KG/M2 | WEIGHT: 136 LBS

## 2018-05-22 DIAGNOSIS — H93.13 TINNITUS OF BOTH EARS: Primary | ICD-10-CM

## 2018-05-22 DIAGNOSIS — H93.13 TINNITUS OF BOTH EARS: ICD-10-CM

## 2018-05-22 DIAGNOSIS — H69.93 DYSFUNCTION OF BOTH EUSTACHIAN TUBES: Primary | ICD-10-CM

## 2018-05-22 DIAGNOSIS — C80.1 GERM CELL TUMOR: ICD-10-CM

## 2018-05-22 PROCEDURE — 99213 OFFICE O/P EST LOW 20 MIN: CPT | Mod: S$GLB,,, | Performed by: OTOLARYNGOLOGY

## 2018-05-22 PROCEDURE — 99999 PR PBB SHADOW E&M-EST. PATIENT-LVL I: CPT | Mod: PBBFAC,,,

## 2018-05-22 PROCEDURE — 92550 TYMPANOMETRY & REFLEX THRESH: CPT | Mod: S$GLB,,, | Performed by: AUDIOLOGIST-HEARING AID FITTER

## 2018-05-22 PROCEDURE — 99999 PR PBB SHADOW E&M-EST. PATIENT-LVL III: CPT | Mod: PBBFAC,,, | Performed by: OTOLARYNGOLOGY

## 2018-05-22 PROCEDURE — 92557 COMPREHENSIVE HEARING TEST: CPT | Mod: S$GLB,,, | Performed by: AUDIOLOGIST-HEARING AID FITTER

## 2018-05-22 PROCEDURE — 3008F BODY MASS INDEX DOCD: CPT | Mod: CPTII,S$GLB,, | Performed by: OTOLARYNGOLOGY

## 2018-05-22 NOTE — PROGRESS NOTES
Alejandro Neff was seen in the clinic today for a hearing evaluation. Mr. Memo Neff reported bilateral tinnitus over the past month. He stated the tinnitus began shortly after his first round of chemotherapy.     Audiological testing revealed hearing within normal limits, bilaterally. A speech reception threshold was obtained at 5 dBHL in both ears. Speech discrimination was 100%, bilaterally.    Tympanometry revealed hyper-compliant Type A tympanograms in both ears.  Ipsilateral acoustic reflexed were present in the right ear and present-elevated in the left ear    Recommendations:  1. Otologic evaluation  2. Annual hearing evaluation-sooner if any sudden decrease in hearing occurs

## 2018-05-23 NOTE — PROGRESS NOTES
Pediatric Otolaryngology- Head & Neck Surgery   Established Patient Visi      Chief Complaint: continued aural fullness and tinnitus during chemotherapy    HPI  Alejandro Neff is a 23 y.o. old male  Here for continued aural fullness and tinnitus during chemotherapy. 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 Has continued intermittent 30 second episodes of bilateral tinnitus and aural fullness that was present prior to starting chemo. No new symptoms.      There  have been concerns for hearing over the last  years. There have  Have not been otologic symptoms, including otorrhea, recurrent otitis media, or vertigo. Did have hx of lymes disease with no neuro deficits during episode    Balance has not been an issue.     Passed  hearing screen     There is no history of hearing loss at an early age in the family.     Medical History  Past Medical History:   Diagnosis Date    Abdominal pain 2010    eval for abd and chest wall pain at [place], [place] - cxr wnl, EKG (RV), troponin wnl, normal chemistries    Allergy     Cancer     testicular    Chondromalacia patellae     Chronic nausea     eval by GI Dr. Tushar Artis - given zofran and ciproheptadine     Chronic pain     Chronic pain     [facility] - [facility] meds; followed by psych and pain clinic and unresponsive to behavioral/CBT/old records reports c/f conversion disorder     Foot pain 2010    4/10 + SHIRA, eval by Dr. ALLY Lion at [facility] -dx unclear ? gout and trial of nsaids and pdn, xrays normal , referred to podiatry, re-eval by rheum  and MRI and films wnl    Fracture of right radius 2009    Lyme arthritis     multiple joints    Lyme disease 2013    Memory loss 2012    eval for memory loss by neuro at [facility] - MRI, EEG wnl. Dr. Patricio suggested emotional factors & ref to Tushar Davies, PhD for  eval & neuropsych eval 3/12 [place]  "Danny, PhD - no evidence of formal thought disorder or psychosis - documented severe memory impairment; not related to to ADD or executive function issues. sugesstion that memory issues may be related to "emotional factors", ?conversion d/o    Stress fracture of tibia and fibula 08/2011    Urticaria        Patient Active Problem List   Diagnosis    Vitamin D deficiency    Vomiting with nausea, not intractable    Chest pain    Primary mediastinal seminoma    Germ cell tumor    History of Lyme disease    Neutropenic fever    Anemia associated with chemotherapy    Thrombocytopenia    Leukocytosis    Tinnitus of both ears       Surgical History  Past Surgical History:   Procedure Laterality Date    CHEST WALL BIOPSY      PORTACATH PLACEMENT Right        Medications  Current Outpatient Prescriptions on File Prior to Visit   Medication Sig Dispense Refill    albuterol 90 mcg/actuation inhaler Inhale 2 puffs into the lungs every 6 (six) hours as needed for Wheezing. 1 each 11    aluminum-magnesium hydroxide-simethicone (MAALOX) 200-200-20 mg/5 mL Susp Take 15 mLs by mouth once.      amoxicillin-clavulanate 875-125mg (AUGMENTIN) 875-125 mg per tablet Take 1 tablet by mouth 2 (two) times daily. 14 tablet 0    baclofen (LIORESAL) 10 MG tablet Take 1 tablet (10 mg total) by mouth 2 (two) times daily as needed (hiccups). 60 tablet 1    diphenhydrAMINE-aluminum-magnesium hydroxide-simethicone-lidocaine HCl 2% Swish and spit 15 mLs every 4 (four) hours as needed (mouth sore). 1 Bottle 2    EPINEPHrine (EPIPEN) 0.3 mg/0.3 mL AtIn Inject 0.3 mg/mL into the muscle.      granisetron (SANCUSO) 3.1 mg/24 hour place 1 patch onto the skin 24 hours before chemo, each patch can be worn up to 7 days 1 patch 0    LORazepam (ATIVAN) 0.5 MG tablet Take 1 tablet (0.5 mg total) by mouth every 8 (eight) hours as needed for Anxiety. 60 tablet 0    morphine (MSIR) 15 MG tablet Take 1 tablet (15 mg total) by mouth every 6 " (six) hours as needed for Pain. 60 tablet 0    morphine (MSIR) 15 MG tablet Take 1 tablet (15 mg total) by mouth every 6 (six) hours as needed. 30 tablet 0    MULTIVIT-MINERALS/FERROUS FUM (MULTI VITAMIN ORAL) Take by mouth once daily.      ondansetron (ZOFRAN) 4 MG tablet Take 1 tablet (4 mg total) by mouth every 6 (six) hours as needed for Nausea. 120 tablet 3    pantoprazole (PROTONIX) 40 MG tablet Take 1 tablet (40 mg total) by mouth once daily. 30 tablet 11    promethazine (PHENERGAN) 25 MG suppository Place 1 suppository (25 mg total) rectally every 6 (six) hours as needed for Nausea. 84 suppository 2    promethazine (PHENERGAN) 25 MG tablet Take 1 tablet (25 mg total) by mouth every 4 (four) hours as needed for Nausea. 120 tablet 3    zolpidem (AMBIEN) 5 MG Tab Take 1 tablet (5 mg total) by mouth nightly as needed (insomnia). 30 tablet 0     No current facility-administered medications on file prior to visit.        Allergies  Review of patient's allergies indicates:   Allergen Reactions    Mushroom Anaphylaxis    Bamboo     Bee pollens     Mushroom flavor     Venom-honey bee        Social History  There are smokers in the home    Family History  The family history is noncontributory to the current problem     Review of Systems  General: no fever, no recent weight change  Eyes: no vision changes  Pulm: no asthma  Heme: no bleeding or anemia  GI:  No GERD  Endo: No DM or thyroid problems  Musculoskeletal: no arthritis  Neuro: no seizures, speech or developmental delay  Skin: no rash  Psych: no psych history  Allergery/Immune: no allergy history or history of immunologic deficiency  Cardiac: no congenital cardiac abnormality  Neuro: CN II-XII grossly intact, moves all extremities spontaneously  Skin: no rashes      Physical Exam  General:  Alert, well developed, comfortable  Voice:  Regular for age, good volume  Respiratory:  Symmetric breathing, no stridor, no distress  Head:  Normocephalic, no  lesions  Face: Symmetric, HB 1/6 bilat, no lesions, no obvious sinus tenderness, salivary glands nontender  Eyes:  Sclera white, extraocular movements intact  Nose: Dorsum straight, septum midline, normal turbinate size, normal mucosa  Right Ear: Pinna and external ear appears normal, EAC patent, TM intact, mobile, without middle ear effusion  Left Ear: Pinna and external ear appears normal, EAC patent, TM intact, mobile, without middle ear effusion  Hearing:  Grossly intact  Oral cavity: Healthy mucosa, no masses or lesions including lips, teeth, gums, floor of mouth, palate, or tongue.  Oropharynx: Tonsils 1+, palate intact, normal pharyngeal wall movement  Neck: Supple, no palpable nodes, no masses, trachea midline, no thyroid masses  Cardiovascular system:  Pulses regular in both upper extremities, good skin turgor     Studies Reviewed  Audiogram today:  Normal hearing bilaterally with normal audiograms            Procedures  NA      Impression  1. Dysfunction of both eustachian tubes     2. Germ cell tumor     3. Tinnitus of both ears         Normal audiogram with normal ear exam, no significant change from preop. Tinnitus that is very short lived and fluctuates with aural fullness may be related to eustachian tube dysfunction as this problem happened pre-chemo    Treatment Plan  Follow up post chemo therapy  Recommend autoinsufflation and decongestants during aural fullness periods    Jarek Campos MD  Pediatric Otolaryngology Attending

## 2018-05-24 ENCOUNTER — ANESTHESIA (OUTPATIENT)
Dept: SURGERY | Facility: HOSPITAL | Age: 23
DRG: 343 | End: 2018-05-24
Payer: COMMERCIAL

## 2018-05-24 ENCOUNTER — LAB VISIT (OUTPATIENT)
Dept: LAB | Facility: OTHER | Age: 23
End: 2018-05-24
Attending: INTERNAL MEDICINE
Payer: COMMERCIAL

## 2018-05-24 ENCOUNTER — HOSPITAL ENCOUNTER (INPATIENT)
Facility: HOSPITAL | Age: 23
LOS: 5 days | Discharge: HOME | DRG: 343 | End: 2018-05-29
Attending: EMERGENCY MEDICINE | Admitting: SURGERY
Payer: COMMERCIAL

## 2018-05-24 ENCOUNTER — TELEPHONE (OUTPATIENT)
Dept: HEMATOLOGY/ONCOLOGY | Facility: CLINIC | Age: 23
End: 2018-05-24

## 2018-05-24 ENCOUNTER — SURGERY (OUTPATIENT)
Age: 23
End: 2018-05-24

## 2018-05-24 ENCOUNTER — ANESTHESIA EVENT (OUTPATIENT)
Dept: SURGERY | Facility: HOSPITAL | Age: 23
DRG: 343 | End: 2018-05-24
Payer: COMMERCIAL

## 2018-05-24 ENCOUNTER — OFFICE VISIT (OUTPATIENT)
Dept: HEMATOLOGY/ONCOLOGY | Facility: CLINIC | Age: 23
DRG: 343 | End: 2018-05-24
Payer: COMMERCIAL

## 2018-05-24 ENCOUNTER — HOSPITAL ENCOUNTER (OUTPATIENT)
Dept: RADIOLOGY | Facility: OTHER | Age: 23
Discharge: HOME | DRG: 343 | End: 2018-05-24
Attending: INTERNAL MEDICINE
Payer: COMMERCIAL

## 2018-05-24 VITALS
SYSTOLIC BLOOD PRESSURE: 101 MMHG | TEMPERATURE: 99 F | HEART RATE: 102 BPM | RESPIRATION RATE: 18 BRPM | WEIGHT: 139.75 LBS | DIASTOLIC BLOOD PRESSURE: 57 MMHG | OXYGEN SATURATION: 98 % | BODY MASS INDEX: 21.89 KG/M2

## 2018-05-24 DIAGNOSIS — D72.829 LEUKOCYTOSIS, UNSPECIFIED TYPE: ICD-10-CM

## 2018-05-24 DIAGNOSIS — C38.3 PRIMARY MEDIASTINAL SEMINOMA: ICD-10-CM

## 2018-05-24 DIAGNOSIS — D70.1 CHEMOTHERAPY-INDUCED NEUTROPENIA: ICD-10-CM

## 2018-05-24 DIAGNOSIS — R05.9 COUGH: ICD-10-CM

## 2018-05-24 DIAGNOSIS — K35.30 ACUTE APPENDICITIS WITH LOCALIZED PERITONITIS: ICD-10-CM

## 2018-05-24 DIAGNOSIS — T45.1X5A CHEMOTHERAPY-INDUCED NEUTROPENIA: ICD-10-CM

## 2018-05-24 DIAGNOSIS — K37 APPENDICITIS, UNSPECIFIED APPENDICITIS TYPE: ICD-10-CM

## 2018-05-24 DIAGNOSIS — C80.1 GERM CELL TUMOR: ICD-10-CM

## 2018-05-24 DIAGNOSIS — Z90.49 S/P LAPAROSCOPIC APPENDECTOMY: Primary | ICD-10-CM

## 2018-05-24 DIAGNOSIS — D64.9 ANEMIA, UNSPECIFIED TYPE: ICD-10-CM

## 2018-05-24 DIAGNOSIS — C38.3 PRIMARY MEDIASTINAL SEMINOMA: Primary | ICD-10-CM

## 2018-05-24 DIAGNOSIS — D69.6 THROMBOCYTOPENIA: ICD-10-CM

## 2018-05-24 LAB
ABO + RH BLD: NORMAL
ALBUMIN SERPL BCP-MCNC: 3.7 G/DL
ALBUMIN SERPL BCP-MCNC: 3.9 G/DL
ALP SERPL-CCNC: 75 U/L
ALP SERPL-CCNC: 77 U/L
ALT SERPL W/O P-5'-P-CCNC: 16 U/L
ALT SERPL W/O P-5'-P-CCNC: 17 U/L
ANION GAP SERPL CALC-SCNC: 11 MMOL/L
ANION GAP SERPL CALC-SCNC: 8 MMOL/L
ANISOCYTOSIS BLD QL SMEAR: SLIGHT
AST SERPL-CCNC: 15 U/L
AST SERPL-CCNC: 18 U/L
BASOPHILS # BLD AUTO: ABNORMAL K/UL
BASOPHILS NFR BLD: 0 %
BILIRUB SERPL-MCNC: 0.3 MG/DL
BILIRUB SERPL-MCNC: 0.3 MG/DL
BLD GP AB SCN CELLS X3 SERPL QL: NORMAL
BUN SERPL-MCNC: 10 MG/DL
BUN SERPL-MCNC: 11 MG/DL
CALCIUM SERPL-MCNC: 9.4 MG/DL
CALCIUM SERPL-MCNC: 9.5 MG/DL
CHLORIDE SERPL-SCNC: 102 MMOL/L
CHLORIDE SERPL-SCNC: 105 MMOL/L
CO2 SERPL-SCNC: 25 MMOL/L
CO2 SERPL-SCNC: 28 MMOL/L
CREAT SERPL-MCNC: 0.8 MG/DL
CREAT SERPL-MCNC: 0.8 MG/DL
DIFFERENTIAL METHOD: ABNORMAL
EOSINOPHIL # BLD AUTO: ABNORMAL K/UL
EOSINOPHIL NFR BLD: 0 %
ERYTHROCYTE [DISTWIDTH] IN BLOOD BY AUTOMATED COUNT: 14.8 %
ERYTHROCYTE [DISTWIDTH] IN BLOOD BY AUTOMATED COUNT: 15.1 %
EST. GFR  (AFRICAN AMERICAN): >60 ML/MIN/1.73 M^2
EST. GFR  (AFRICAN AMERICAN): >60 ML/MIN/1.73 M^2
EST. GFR  (NON AFRICAN AMERICAN): >60 ML/MIN/1.73 M^2
EST. GFR  (NON AFRICAN AMERICAN): >60 ML/MIN/1.73 M^2
GLUCOSE SERPL-MCNC: 88 MG/DL
GLUCOSE SERPL-MCNC: 93 MG/DL
HCT VFR BLD AUTO: 31.7 %
HCT VFR BLD AUTO: 34.2 %
HGB BLD-MCNC: 10.8 G/DL
HGB BLD-MCNC: 11.5 G/DL
HYPOCHROMIA BLD QL SMEAR: ABNORMAL
IMM GRANULOCYTES # BLD AUTO: ABNORMAL K/UL
IMM GRANULOCYTES NFR BLD AUTO: ABNORMAL %
LYMPHOCYTES # BLD AUTO: ABNORMAL K/UL
LYMPHOCYTES NFR BLD: 15 %
MCH RBC QN AUTO: 28.5 PG
MCH RBC QN AUTO: 28.7 PG
MCHC RBC AUTO-ENTMCNC: 33.6 G/DL
MCHC RBC AUTO-ENTMCNC: 34.1 G/DL
MCV RBC AUTO: 84 FL
MCV RBC AUTO: 85 FL
METAMYELOCYTES NFR BLD MANUAL: 1 %
MONOCYTES # BLD AUTO: ABNORMAL K/UL
MONOCYTES NFR BLD: 4 %
MYELOCYTES NFR BLD MANUAL: 1 %
NEUTROPHILS # BLD AUTO: 12.8 K/UL
NEUTROPHILS NFR BLD: 75 %
NEUTS BAND NFR BLD MANUAL: 4 %
NRBC BLD-RTO: 1 /100 WBC
PLATELET # BLD AUTO: 151 K/UL
PLATELET # BLD AUTO: 174 K/UL
PLATELET BLD QL SMEAR: ABNORMAL
PMV BLD AUTO: 8.4 FL
PMV BLD AUTO: 8.7 FL
POLYCHROMASIA BLD QL SMEAR: ABNORMAL
POTASSIUM SERPL-SCNC: 4.2 MMOL/L
POTASSIUM SERPL-SCNC: 4.3 MMOL/L
PROT SERPL-MCNC: 6.4 G/DL
PROT SERPL-MCNC: 6.7 G/DL
RBC # BLD AUTO: 3.76 M/UL
RBC # BLD AUTO: 4.03 M/UL
SODIUM SERPL-SCNC: 138 MMOL/L
SODIUM SERPL-SCNC: 141 MMOL/L
WBC # BLD AUTO: 19.54 K/UL
WBC # BLD AUTO: 19.75 K/UL

## 2018-05-24 PROCEDURE — 99215 OFFICE O/P EST HI 40 MIN: CPT | Mod: S$GLB,,, | Performed by: INTERNAL MEDICINE

## 2018-05-24 PROCEDURE — 36000708 HC OR TIME LEV III 1ST 15 MIN: Performed by: SURGERY

## 2018-05-24 PROCEDURE — 99284 EMERGENCY DEPT VISIT MOD MDM: CPT | Mod: ,,, | Performed by: EMERGENCY MEDICINE

## 2018-05-24 PROCEDURE — 85027 COMPLETE CBC AUTOMATED: CPT | Mod: 91

## 2018-05-24 PROCEDURE — 74177 CT ABD & PELVIS W/CONTRAST: CPT | Mod: TC

## 2018-05-24 PROCEDURE — 37000009 HC ANESTHESIA EA ADD 15 MINS: Performed by: SURGERY

## 2018-05-24 PROCEDURE — S0028 INJECTION, FAMOTIDINE, 20 MG: HCPCS | Performed by: NURSE ANESTHETIST, CERTIFIED REGISTERED

## 2018-05-24 PROCEDURE — 96374 THER/PROPH/DIAG INJ IV PUSH: CPT

## 2018-05-24 PROCEDURE — 80053 COMPREHEN METABOLIC PANEL: CPT

## 2018-05-24 PROCEDURE — 71260 CT THORAX DX C+: CPT | Mod: 26,,, | Performed by: RADIOLOGY

## 2018-05-24 PROCEDURE — 63600175 PHARM REV CODE 636 W HCPCS: Performed by: EMERGENCY MEDICINE

## 2018-05-24 PROCEDURE — 63600175 PHARM REV CODE 636 W HCPCS

## 2018-05-24 PROCEDURE — 99285 EMERGENCY DEPT VISIT HI MDM: CPT | Mod: 25

## 2018-05-24 PROCEDURE — 25000003 PHARM REV CODE 250: Performed by: EMERGENCY MEDICINE

## 2018-05-24 PROCEDURE — 3008F BODY MASS INDEX DOCD: CPT | Mod: CPTII,S$GLB,, | Performed by: INTERNAL MEDICINE

## 2018-05-24 PROCEDURE — 25500020 PHARM REV CODE 255: Performed by: INTERNAL MEDICINE

## 2018-05-24 PROCEDURE — 86901 BLOOD TYPING SEROLOGIC RH(D): CPT

## 2018-05-24 PROCEDURE — 74177 CT ABD & PELVIS W/CONTRAST: CPT | Mod: 26,,, | Performed by: RADIOLOGY

## 2018-05-24 PROCEDURE — 36000709 HC OR TIME LEV III EA ADD 15 MIN: Performed by: SURGERY

## 2018-05-24 PROCEDURE — 99999 PR PBB SHADOW E&M-EST. PATIENT-LVL III: CPT | Mod: PBBFAC,,, | Performed by: INTERNAL MEDICINE

## 2018-05-24 PROCEDURE — 37000008 HC ANESTHESIA 1ST 15 MINUTES: Performed by: SURGERY

## 2018-05-24 PROCEDURE — 25000003 PHARM REV CODE 250: Performed by: NURSE ANESTHETIST, CERTIFIED REGISTERED

## 2018-05-24 PROCEDURE — 0DTJ4ZZ RESECTION OF APPENDIX, PERCUTANEOUS ENDOSCOPIC APPROACH: ICD-10-PCS | Performed by: SURGERY

## 2018-05-24 PROCEDURE — D9220A PRA ANESTHESIA: Mod: ANES,,, | Performed by: ANESTHESIOLOGY

## 2018-05-24 PROCEDURE — 36415 COLL VENOUS BLD VENIPUNCTURE: CPT

## 2018-05-24 PROCEDURE — 63600175 PHARM REV CODE 636 W HCPCS: Performed by: NURSE ANESTHETIST, CERTIFIED REGISTERED

## 2018-05-24 PROCEDURE — 88304 TISSUE EXAM BY PATHOLOGIST: CPT | Performed by: PATHOLOGY

## 2018-05-24 PROCEDURE — 88304 TISSUE EXAM BY PATHOLOGIST: CPT | Mod: 26,,, | Performed by: PATHOLOGY

## 2018-05-24 PROCEDURE — 25000003 PHARM REV CODE 250: Performed by: STUDENT IN AN ORGANIZED HEALTH CARE EDUCATION/TRAINING PROGRAM

## 2018-05-24 PROCEDURE — 12000002 HC ACUTE/MED SURGE SEMI-PRIVATE ROOM

## 2018-05-24 PROCEDURE — 63600175 PHARM REV CODE 636 W HCPCS: Performed by: STUDENT IN AN ORGANIZED HEALTH CARE EDUCATION/TRAINING PROGRAM

## 2018-05-24 PROCEDURE — 71000033 HC RECOVERY, INTIAL HOUR: Performed by: SURGERY

## 2018-05-24 PROCEDURE — 63600175 PHARM REV CODE 636 W HCPCS: Performed by: ANESTHESIOLOGY

## 2018-05-24 PROCEDURE — D9220A PRA ANESTHESIA: Mod: CRNA,,, | Performed by: NURSE ANESTHETIST, CERTIFIED REGISTERED

## 2018-05-24 PROCEDURE — 85007 BL SMEAR W/DIFF WBC COUNT: CPT

## 2018-05-24 PROCEDURE — 27201423 OPTIME MED/SURG SUP & DEVICES STERILE SUPPLY: Performed by: SURGERY

## 2018-05-24 PROCEDURE — 85027 COMPLETE CBC AUTOMATED: CPT

## 2018-05-24 PROCEDURE — 44970 LAPAROSCOPY APPENDECTOMY: CPT | Mod: ,,, | Performed by: SURGERY

## 2018-05-24 PROCEDURE — 80053 COMPREHEN METABOLIC PANEL: CPT | Mod: 91

## 2018-05-24 PROCEDURE — 71000039 HC RECOVERY, EACH ADD'L HOUR: Performed by: SURGERY

## 2018-05-24 PROCEDURE — 99223 1ST HOSP IP/OBS HIGH 75: CPT | Mod: 25,,, | Performed by: SURGERY

## 2018-05-24 RX ORDER — KETOROLAC TROMETHAMINE 30 MG/ML
30 INJECTION, SOLUTION INTRAMUSCULAR; INTRAVENOUS ONCE
Status: COMPLETED | OUTPATIENT
Start: 2018-05-24 | End: 2018-05-24

## 2018-05-24 RX ORDER — PROPOFOL 10 MG/ML
VIAL (ML) INTRAVENOUS
Status: DISCONTINUED | OUTPATIENT
Start: 2018-05-24 | End: 2018-05-24

## 2018-05-24 RX ORDER — SODIUM CHLORIDE 0.9 % (FLUSH) 0.9 %
3 SYRINGE (ML) INJECTION
Status: DISCONTINUED | OUTPATIENT
Start: 2018-05-24 | End: 2018-05-25 | Stop reason: HOSPADM

## 2018-05-24 RX ORDER — KETOROLAC TROMETHAMINE 30 MG/ML
INJECTION, SOLUTION INTRAMUSCULAR; INTRAVENOUS
Status: COMPLETED
Start: 2018-05-24 | End: 2018-05-24

## 2018-05-24 RX ORDER — HYDROMORPHONE HYDROCHLORIDE 1 MG/ML
0.2 INJECTION, SOLUTION INTRAMUSCULAR; INTRAVENOUS; SUBCUTANEOUS EVERY 5 MIN PRN
Status: COMPLETED | OUTPATIENT
Start: 2018-05-24 | End: 2018-05-25

## 2018-05-24 RX ORDER — HYDROMORPHONE HYDROCHLORIDE 1 MG/ML
INJECTION, SOLUTION INTRAMUSCULAR; INTRAVENOUS; SUBCUTANEOUS
Status: COMPLETED
Start: 2018-05-24 | End: 2018-05-24

## 2018-05-24 RX ORDER — ROCURONIUM BROMIDE 10 MG/ML
INJECTION, SOLUTION INTRAVENOUS
Status: DISCONTINUED | OUTPATIENT
Start: 2018-05-24 | End: 2018-05-24

## 2018-05-24 RX ORDER — SODIUM CHLORIDE 0.9 % (FLUSH) 0.9 %
3 SYRINGE (ML) INJECTION
Status: DISCONTINUED | OUTPATIENT
Start: 2018-05-24 | End: 2018-05-29 | Stop reason: HOSPADM

## 2018-05-24 RX ORDER — SODIUM CHLORIDE 9 MG/ML
INJECTION, SOLUTION INTRAVENOUS CONTINUOUS
Status: DISCONTINUED | OUTPATIENT
Start: 2018-05-24 | End: 2018-05-25

## 2018-05-24 RX ORDER — SODIUM CHLORIDE 9 MG/ML
1000 INJECTION, SOLUTION INTRAVENOUS
Status: COMPLETED | OUTPATIENT
Start: 2018-05-24 | End: 2018-05-24

## 2018-05-24 RX ORDER — FAMOTIDINE 10 MG/ML
INJECTION INTRAVENOUS
Status: DISCONTINUED | OUTPATIENT
Start: 2018-05-24 | End: 2018-05-24

## 2018-05-24 RX ORDER — ONDANSETRON 2 MG/ML
INJECTION INTRAMUSCULAR; INTRAVENOUS
Status: DISCONTINUED | OUTPATIENT
Start: 2018-05-24 | End: 2018-05-24

## 2018-05-24 RX ORDER — DIPHENHYDRAMINE HYDROCHLORIDE 50 MG/ML
25 INJECTION INTRAMUSCULAR; INTRAVENOUS EVERY 4 HOURS PRN
Status: DISCONTINUED | OUTPATIENT
Start: 2018-05-24 | End: 2018-05-29 | Stop reason: HOSPADM

## 2018-05-24 RX ORDER — PANTOPRAZOLE SODIUM 40 MG/1
40 TABLET, DELAYED RELEASE ORAL DAILY
Status: DISCONTINUED | OUTPATIENT
Start: 2018-05-25 | End: 2018-05-29 | Stop reason: HOSPADM

## 2018-05-24 RX ORDER — MORPHINE SULFATE 15 MG/1
30 TABLET ORAL EVERY 4 HOURS PRN
Status: DISCONTINUED | OUTPATIENT
Start: 2018-05-25 | End: 2018-05-26

## 2018-05-24 RX ORDER — MORPHINE SULFATE 15 MG/1
15 TABLET ORAL EVERY 4 HOURS PRN
Status: DISCONTINUED | OUTPATIENT
Start: 2018-05-25 | End: 2018-05-26

## 2018-05-24 RX ORDER — ACETAMINOPHEN 10 MG/ML
1000 INJECTION, SOLUTION INTRAVENOUS EVERY 8 HOURS
Status: COMPLETED | OUTPATIENT
Start: 2018-05-25 | End: 2018-05-25

## 2018-05-24 RX ORDER — GLYCOPYRROLATE 0.2 MG/ML
INJECTION INTRAMUSCULAR; INTRAVENOUS
Status: DISCONTINUED | OUTPATIENT
Start: 2018-05-24 | End: 2018-05-24

## 2018-05-24 RX ORDER — ENOXAPARIN SODIUM 100 MG/ML
40 INJECTION SUBCUTANEOUS EVERY 24 HOURS
Status: DISCONTINUED | OUTPATIENT
Start: 2018-05-24 | End: 2018-05-24

## 2018-05-24 RX ORDER — FENTANYL CITRATE 50 UG/ML
25 INJECTION, SOLUTION INTRAMUSCULAR; INTRAVENOUS EVERY 5 MIN PRN
Status: COMPLETED | OUTPATIENT
Start: 2018-05-24 | End: 2018-05-24

## 2018-05-24 RX ORDER — FENTANYL CITRATE 50 UG/ML
INJECTION, SOLUTION INTRAMUSCULAR; INTRAVENOUS
Status: DISCONTINUED | OUTPATIENT
Start: 2018-05-24 | End: 2018-05-24

## 2018-05-24 RX ORDER — FENTANYL CITRATE 50 UG/ML
INJECTION, SOLUTION INTRAMUSCULAR; INTRAVENOUS
Status: COMPLETED
Start: 2018-05-24 | End: 2018-05-24

## 2018-05-24 RX ORDER — LIDOCAINE HCL/PF 100 MG/5ML
SYRINGE (ML) INTRAVENOUS
Status: DISCONTINUED | OUTPATIENT
Start: 2018-05-24 | End: 2018-05-24

## 2018-05-24 RX ORDER — SUCCINYLCHOLINE CHLORIDE 20 MG/ML
INJECTION INTRAMUSCULAR; INTRAVENOUS
Status: DISCONTINUED | OUTPATIENT
Start: 2018-05-24 | End: 2018-05-24

## 2018-05-24 RX ORDER — DEXAMETHASONE SODIUM PHOSPHATE 4 MG/ML
INJECTION, SOLUTION INTRA-ARTICULAR; INTRALESIONAL; INTRAMUSCULAR; INTRAVENOUS; SOFT TISSUE
Status: DISCONTINUED | OUTPATIENT
Start: 2018-05-24 | End: 2018-05-24

## 2018-05-24 RX ORDER — ACETAMINOPHEN 10 MG/ML
INJECTION, SOLUTION INTRAVENOUS
Status: DISCONTINUED | OUTPATIENT
Start: 2018-05-24 | End: 2018-05-24

## 2018-05-24 RX ORDER — NEOSTIGMINE METHYLSULFATE 1 MG/ML
INJECTION, SOLUTION INTRAVENOUS
Status: DISCONTINUED | OUTPATIENT
Start: 2018-05-24 | End: 2018-05-24

## 2018-05-24 RX ORDER — MIDAZOLAM HYDROCHLORIDE 1 MG/ML
INJECTION, SOLUTION INTRAMUSCULAR; INTRAVENOUS
Status: DISCONTINUED | OUTPATIENT
Start: 2018-05-24 | End: 2018-05-24

## 2018-05-24 RX ADMIN — HYDROMORPHONE HYDROCHLORIDE 0.2 MG: 1 INJECTION, SOLUTION INTRAMUSCULAR; INTRAVENOUS; SUBCUTANEOUS at 11:05

## 2018-05-24 RX ADMIN — IOHEXOL 30 ML: 350 INJECTION, SOLUTION INTRAVENOUS at 11:05

## 2018-05-24 RX ADMIN — HYDROMORPHONE HYDROCHLORIDE 0.2 MG: 1 INJECTION, SOLUTION INTRAMUSCULAR; INTRAVENOUS; SUBCUTANEOUS at 10:05

## 2018-05-24 RX ADMIN — FENTANYL CITRATE 25 MCG: 50 INJECTION INTRAMUSCULAR; INTRAVENOUS at 10:05

## 2018-05-24 RX ADMIN — SODIUM CHLORIDE 1000 ML: 0.9 INJECTION, SOLUTION INTRAVENOUS at 06:05

## 2018-05-24 RX ADMIN — GLYCOPYRROLATE 0.4 MG: 0.2 INJECTION, SOLUTION INTRAMUSCULAR; INTRAVENOUS at 09:05

## 2018-05-24 RX ADMIN — KETOROLAC TROMETHAMINE 30 MG: 30 INJECTION, SOLUTION INTRAMUSCULAR at 10:05

## 2018-05-24 RX ADMIN — ONDANSETRON 4 MG: 2 INJECTION INTRAMUSCULAR; INTRAVENOUS at 09:05

## 2018-05-24 RX ADMIN — PROPOFOL 150 MG: 10 INJECTION, EMULSION INTRAVENOUS at 08:05

## 2018-05-24 RX ADMIN — LIDOCAINE HYDROCHLORIDE 100 MG: 20 INJECTION, SOLUTION INTRAVENOUS at 08:05

## 2018-05-24 RX ADMIN — NEOSTIGMINE METHYLSULFATE 4 MG: 1 INJECTION INTRAVENOUS at 09:05

## 2018-05-24 RX ADMIN — PROMETHAZINE HYDROCHLORIDE 6.25 MG: 25 INJECTION INTRAMUSCULAR; INTRAVENOUS at 10:05

## 2018-05-24 RX ADMIN — MIDAZOLAM HYDROCHLORIDE 2 MG: 1 INJECTION, SOLUTION INTRAMUSCULAR; INTRAVENOUS at 08:05

## 2018-05-24 RX ADMIN — FENTANYL CITRATE 50 MCG: 50 INJECTION, SOLUTION INTRAMUSCULAR; INTRAVENOUS at 08:05

## 2018-05-24 RX ADMIN — ACETAMINOPHEN 1000 MG: 10 INJECTION, SOLUTION INTRAVENOUS at 09:05

## 2018-05-24 RX ADMIN — SUCCINYLCHOLINE CHLORIDE 120 MG: 20 INJECTION, SOLUTION INTRAMUSCULAR; INTRAVENOUS at 08:05

## 2018-05-24 RX ADMIN — DEXAMETHASONE SODIUM PHOSPHATE 8 MG: 4 INJECTION, SOLUTION INTRAMUSCULAR; INTRAVENOUS at 08:05

## 2018-05-24 RX ADMIN — IOHEXOL 75 ML: 350 INJECTION, SOLUTION INTRAVENOUS at 11:05

## 2018-05-24 RX ADMIN — SODIUM CHLORIDE, SODIUM GLUCONATE, SODIUM ACETATE, POTASSIUM CHLORIDE, MAGNESIUM CHLORIDE, SODIUM PHOSPHATE, DIBASIC, AND POTASSIUM PHOSPHATE: .53; .5; .37; .037; .03; .012; .00082 INJECTION, SOLUTION INTRAVENOUS at 08:05

## 2018-05-24 RX ADMIN — ROCURONIUM BROMIDE 5 MG: 10 INJECTION, SOLUTION INTRAVENOUS at 08:05

## 2018-05-24 RX ADMIN — PIPERACILLIN AND TAZOBACTAM 4.5 G: 4; .5 INJECTION, POWDER, LYOPHILIZED, FOR SOLUTION INTRAVENOUS; PARENTERAL at 08:05

## 2018-05-24 RX ADMIN — ROCURONIUM BROMIDE 45 MG: 10 INJECTION, SOLUTION INTRAVENOUS at 08:05

## 2018-05-24 RX ADMIN — FENTANYL CITRATE 50 MCG: 50 INJECTION, SOLUTION INTRAMUSCULAR; INTRAVENOUS at 09:05

## 2018-05-24 RX ADMIN — KETOROLAC TROMETHAMINE 30 MG: 30 INJECTION, SOLUTION INTRAMUSCULAR; INTRAVENOUS at 10:05

## 2018-05-24 RX ADMIN — SODIUM CHLORIDE: 0.9 INJECTION, SOLUTION INTRAVENOUS at 10:05

## 2018-05-24 RX ADMIN — FAMOTIDINE 20 MG: 10 INJECTION, SOLUTION INTRAVENOUS at 08:05

## 2018-05-24 NOTE — ED TRIAGE NOTES
Patient received with complaint of abnormal ct scan that pt. Reports was confirmed to be appendicitis at [facility].      No LDA's in place on arrival to department.    No present.    Pain:  Rated 8/10.    Psychosocial:  Patient is calm and cooperative.  Patients insight and judgement are appropriate to situation.  Appears clean, well maintained, with clothing appropriate to environment.  No evidence of hallucinations, delusions, or psychosis.    Neuro:  Eyes open spontaneously.  Awake, alert.  Oriented x 4.  Speech clear and appropriate.  Tolerating saliva secretions well.  Able to follow commands, demonstrating ability to actively and appropriately communicate within context of current conversation.  Symmetrical facial muscles.     Airway:  Bilateral chest rise and fall.  RR regular and non labored.  Air entry patent and lung sounds clear.    Circulatory:  Skin warm, dry, and pink.  Apical and radial pulses strong and regular.      Abdomen:  RLQ pain.

## 2018-05-24 NOTE — Clinical Note
Cancel his appt next Monday.  See Alma or Aravind or Bianca at [facility] on 6/4/18, labs first (CBC, CMP, Mg, LDH, AFP, HCG), see Dr. Alvarenga chemo (EP) that day and every day till that Friday, fluids that Saturday

## 2018-05-24 NOTE — Clinical Note
Please move his f/u appt to 6/6 to see me at [facility] instead. Labs before Schedule chemo (5 days) after he sees me.

## 2018-05-24 NOTE — ED PROVIDER NOTES
Encounter Date: 5/24/2018    SCRIBE #1 NOTE: I, Rebekah Alejandro, am scribing for, and in the presence of, Dr. Davis .       History     Chief Complaint   Patient presents with    Abnormal Ct Scan     appendicitis- suppose to be direct admit but no bed ready. Has testicular ca, last chemo 2 weeks ago.      This is a 23 y.o. male with co-morbidities including testicular cancer and Lyme disease who presents to the ED with a chief complaint of appendicitis shown on CT scan today. Patient is on chemotherapy, states his last dose was 2 weeks ago on Friday, 5/11/18. He was seen on Sunday, 5/13/19, for abdominal pain, got blood work done and was discharged. Pain has been continuous since then. He states that pain is stronger on the right side, but occasional radiates to entire abdomen. He states he got a CT scan done today as a precaution because he has a rare form of cancer. Additionally endorses chills secondary to chemotherapy. He last ate at lunch today.      The history is provided by the patient and medical records.     Review of patient's allergies indicates:   Allergen Reactions    Mushroom Anaphylaxis    Bamboo     Bee pollens     Mushroom flavor     Venom-honey bee      Past Medical History:   Diagnosis Date    Abdominal pain 08/12/2010    eval for abd and chest wall pain at [facility], [place], [place] - cxr wnl, EKG ([facility]), troponin wnl, normal chemistries    Allergy     Cancer     testicular    Chondromalacia patellae     Chronic nausea 2015    eval by GI Dr. Tushar Artis - given zofran and ciproheptadine     Chronic pain     Chronic pain     [facility] - no meds; followed by psych and pain clinic and unresponsive to behavioral/CBT/old records reports c/f conversion disorder     Foot pain 04/2010    4/10 + [facility], eval by Dr. ALLY Lion at [facility] -dx unclear ? gout and trial of nsaids and pdn, xrays normal 1/11, referred to  "podiatry, re-eval by rheum 2/12 and MRI and films wnl    Fracture of right radius 09/2009    Lyme arthritis     multiple joints    Lyme disease 2013    Memory loss 03/2012    [place] [facility] [place] wnl. Dr. Patricio suggested emotional factors & ref to Tushar Davies, PhD for MH eval & neuropsych eval 3/12 Lupe Delgado, PhD - no evidence of formal thought disorder or psychosis - documented severe memory impairment; not related to to ADD or executive function issues. sugesstion that memory issues may be related to "emotional factors", ?conversion d/o    Stress fracture of tibia and fibula 08/2011    Urticaria      Past Surgical History:   Procedure Laterality Date    CHEST WALL BIOPSY      PORTACATH PLACEMENT Right      Family History   Problem Relation Age of Onset    Arthritis Mother     Other Mother         benign tumor of brain and spinal cord    Hyperlipidemia Father     Gout Father     No Known Problems Sister     Arthritis Sister         shoulder    Depression Sister      Social History   Substance Use Topics    Smoking status: Former Smoker     Packs/day: 0.25     Types: Vaping with nicotine, Vaping w/o nicotine, Cigarettes, Cigars     Quit date: 3/15/2018    Smokeless tobacco: Former User    Alcohol use Yes      Comment: occ     Review of Systems   Constitutional: Positive for chills (secondary to chemo). Negative for diaphoresis and fever.   HENT: Negative for congestion.    Eyes: Negative for photophobia and visual disturbance.   Respiratory: Negative for cough and shortness of breath.    Cardiovascular: Negative for chest pain.   Gastrointestinal: Positive for abdominal pain. Negative for nausea and vomiting.   Musculoskeletal: Negative for arthralgias and myalgias.   Skin: Negative for rash and wound.   Neurological: Negative for headaches.   Psychiatric/Behavioral: Negative for behavioral problems.       Physical Exam     Initial Vitals [05/24/18 1701]   BP Pulse Resp " Temp SpO2   (!) 154/73 102 18 98.8 °F (37.1 °C) 99 %      MAP       100         Physical Exam    Nursing note and vitals reviewed.  Constitutional: He appears well-developed and well-nourished. No distress.   HENT:   Head: Normocephalic and atraumatic.   Mouth/Throat: Oropharynx is clear and moist.   Eyes: Conjunctivae are normal.   Neck: Normal range of motion.   Cardiovascular: Normal rate, regular rhythm and normal heart sounds.   Pulmonary/Chest: Breath sounds normal. No respiratory distress.   Port in the right chest wall.    Abdominal: Soft. He exhibits no distension. There is tenderness in the right lower quadrant. There is guarding. There is no rebound.   Musculoskeletal: Normal range of motion.   Neurological: He is alert and oriented to person, place, and time.   Skin: Skin is warm and dry.         ED Course   Procedures  Labs Reviewed   CBC W/ AUTO DIFFERENTIAL   COMPREHENSIVE METABOLIC PANEL   TYPE & SCREEN             Medical Decision Making:   History:   Old Medical Records: I decided to obtain old medical records.  Initial Assessment:   Emergent evaluation of acute appendicitis found on outpatient scans earlier today. Patient had white blood cell count of 19. Unknown if this is infection or if he was given Neupogen after his last therapy.   Clinical Tests:   Lab Tests: Ordered and Reviewed  ED Management:  General Surgery consulted. Will keep patient NPO and give antibiotics. Patient will be admitted.   Other:   I have discussed this case with another health care provider.       <> Summary of the Discussion: General Surgery            Scribe Attestation:   Scribe #1: I performed the above scribed service and the documentation accurately describes the services I performed. I attest to the accuracy of the note.               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 A diagnosis of Appendicitis, unspecified appendicitis type was also pertinent to this  visit.    Disposition:   Disposition: Admitted  Condition: Fair                        Francisco Davis MD  05/24/18 7773

## 2018-05-24 NOTE — TELEPHONE ENCOUNTER
Spoke with Dr. Kumar from Radiology who stated looks like pt has smoldering appendicitis. Pt was recently admitted for abdominal pain, pt's WBCs have been all over the place. Pt's appendix looks abnormal and his scan looks different than his scan from 2 months ago. Dr. Kumar also stated pt's report is available in the chart. Informed Dr. Kumar that I will let Dr. Rojas know, Dr. Kumar voiced understanding.

## 2018-05-25 ENCOUNTER — PATIENT MESSAGE (OUTPATIENT)
Dept: HEMATOLOGY/ONCOLOGY | Facility: CLINIC | Age: 23
End: 2018-05-25

## 2018-05-25 LAB
ANION GAP SERPL CALC-SCNC: 6 MMOL/L
ANISOCYTOSIS BLD QL SMEAR: SLIGHT
BASOPHILS # BLD AUTO: ABNORMAL K/UL
BASOPHILS NFR BLD: 0 %
BUN SERPL-MCNC: 12 MG/DL
CALCIUM SERPL-MCNC: 9.5 MG/DL
CHLORIDE SERPL-SCNC: 105 MMOL/L
CO2 SERPL-SCNC: 27 MMOL/L
CREAT SERPL-MCNC: 0.8 MG/DL
DIFFERENTIAL METHOD: ABNORMAL
EOSINOPHIL # BLD AUTO: ABNORMAL K/UL
EOSINOPHIL NFR BLD: 0 %
ERYTHROCYTE [DISTWIDTH] IN BLOOD BY AUTOMATED COUNT: 15.2 %
EST. GFR  (AFRICAN AMERICAN): >60 ML/MIN/1.73 M^2
EST. GFR  (NON AFRICAN AMERICAN): >60 ML/MIN/1.73 M^2
GLUCOSE SERPL-MCNC: 140 MG/DL
HCT VFR BLD AUTO: 30.7 %
HGB BLD-MCNC: 10.5 G/DL
HYPOCHROMIA BLD QL SMEAR: ABNORMAL
IMM GRANULOCYTES # BLD AUTO: ABNORMAL K/UL
IMM GRANULOCYTES NFR BLD AUTO: ABNORMAL %
LYMPHOCYTES # BLD AUTO: ABNORMAL K/UL
LYMPHOCYTES NFR BLD: 6 %
MAGNESIUM SERPL-MCNC: 2.4 MG/DL
MCH RBC QN AUTO: 28.8 PG
MCHC RBC AUTO-ENTMCNC: 34.2 G/DL
MCV RBC AUTO: 84 FL
METAMYELOCYTES NFR BLD MANUAL: 1 %
MONOCYTES # BLD AUTO: ABNORMAL K/UL
MONOCYTES NFR BLD: 0 %
MYELOCYTES NFR BLD MANUAL: 0 %
NEUTROPHILS NFR BLD: 93 %
NRBC BLD-RTO: 0 /100 WBC
OVALOCYTES BLD QL SMEAR: ABNORMAL
PHOSPHATE SERPL-MCNC: 5 MG/DL
PLATELET # BLD AUTO: 162 K/UL
PLATELET BLD QL SMEAR: ABNORMAL
PMV BLD AUTO: 8.6 FL
POIKILOCYTOSIS BLD QL SMEAR: SLIGHT
POLYCHROMASIA BLD QL SMEAR: ABNORMAL
POTASSIUM SERPL-SCNC: 5 MMOL/L
RBC # BLD AUTO: 3.65 M/UL
SODIUM SERPL-SCNC: 138 MMOL/L
WBC # BLD AUTO: 24.49 K/UL

## 2018-05-25 PROCEDURE — 83735 ASSAY OF MAGNESIUM: CPT

## 2018-05-25 PROCEDURE — 84100 ASSAY OF PHOSPHORUS: CPT

## 2018-05-25 PROCEDURE — 25000003 PHARM REV CODE 250: Performed by: STUDENT IN AN ORGANIZED HEALTH CARE EDUCATION/TRAINING PROGRAM

## 2018-05-25 PROCEDURE — 20600001 HC STEP DOWN PRIVATE ROOM

## 2018-05-25 PROCEDURE — 80048 BASIC METABOLIC PNL TOTAL CA: CPT

## 2018-05-25 PROCEDURE — 85007 BL SMEAR W/DIFF WBC COUNT: CPT

## 2018-05-25 PROCEDURE — 63600175 PHARM REV CODE 636 W HCPCS: Performed by: STUDENT IN AN ORGANIZED HEALTH CARE EDUCATION/TRAINING PROGRAM

## 2018-05-25 PROCEDURE — 63600175 PHARM REV CODE 636 W HCPCS: Performed by: ANESTHESIOLOGY

## 2018-05-25 PROCEDURE — 63600175 PHARM REV CODE 636 W HCPCS: Performed by: SURGERY

## 2018-05-25 PROCEDURE — 85027 COMPLETE CBC AUTOMATED: CPT

## 2018-05-25 PROCEDURE — 25000003 PHARM REV CODE 250: Performed by: SURGERY

## 2018-05-25 RX ORDER — KETOROLAC TROMETHAMINE 30 MG/ML
30 INJECTION, SOLUTION INTRAMUSCULAR; INTRAVENOUS EVERY 8 HOURS
Status: DISCONTINUED | OUTPATIENT
Start: 2018-05-25 | End: 2018-05-26

## 2018-05-25 RX ORDER — LORAZEPAM 0.5 MG/1
0.5 TABLET ORAL EVERY 8 HOURS PRN
Status: DISCONTINUED | OUTPATIENT
Start: 2018-05-25 | End: 2018-05-29 | Stop reason: HOSPADM

## 2018-05-25 RX ORDER — RAMELTEON 8 MG/1
8 TABLET ORAL NIGHTLY PRN
Status: DISCONTINUED | OUTPATIENT
Start: 2018-05-25 | End: 2018-05-25

## 2018-05-25 RX ORDER — MORPHINE SULFATE 2 MG/ML
5 INJECTION, SOLUTION INTRAMUSCULAR; INTRAVENOUS EVERY 4 HOURS PRN
Status: DISCONTINUED | OUTPATIENT
Start: 2018-05-25 | End: 2018-05-26

## 2018-05-25 RX ORDER — ZOLPIDEM TARTRATE 5 MG/1
5 TABLET ORAL NIGHTLY PRN
Status: DISCONTINUED | OUTPATIENT
Start: 2018-05-25 | End: 2018-05-29 | Stop reason: HOSPADM

## 2018-05-25 RX ORDER — LIDOCAINE 50 MG/G
1 PATCH TOPICAL
Status: DISCONTINUED | OUTPATIENT
Start: 2018-05-25 | End: 2018-05-29 | Stop reason: HOSPADM

## 2018-05-25 RX ADMIN — PIPERACILLIN AND TAZOBACTAM 4.5 G: 4; .5 INJECTION, POWDER, LYOPHILIZED, FOR SOLUTION INTRAVENOUS; PARENTERAL at 09:05

## 2018-05-25 RX ADMIN — ACETAMINOPHEN 1000 MG: 10 INJECTION, SOLUTION INTRAVENOUS at 09:05

## 2018-05-25 RX ADMIN — LIDOCAINE 1 PATCH: 50 PATCH CUTANEOUS at 08:05

## 2018-05-25 RX ADMIN — DIPHENHYDRAMINE HYDROCHLORIDE 25 MG: 50 INJECTION INTRAMUSCULAR; INTRAVENOUS at 06:05

## 2018-05-25 RX ADMIN — PROMETHAZINE HYDROCHLORIDE 6.25 MG: 25 INJECTION INTRAMUSCULAR; INTRAVENOUS at 11:05

## 2018-05-25 RX ADMIN — ACETAMINOPHEN 1000 MG: 10 INJECTION, SOLUTION INTRAVENOUS at 02:05

## 2018-05-25 RX ADMIN — SODIUM CHLORIDE: 0.9 INJECTION, SOLUTION INTRAVENOUS at 06:05

## 2018-05-25 RX ADMIN — PIPERACILLIN AND TAZOBACTAM 4.5 G: 4; .5 INJECTION, POWDER, LYOPHILIZED, FOR SOLUTION INTRAVENOUS; PARENTERAL at 06:05

## 2018-05-25 RX ADMIN — RAMELTEON 8 MG: 8 TABLET, FILM COATED ORAL at 10:05

## 2018-05-25 RX ADMIN — LORAZEPAM 0.5 MG: 0.5 TABLET ORAL at 10:05

## 2018-05-25 RX ADMIN — PIPERACILLIN AND TAZOBACTAM 4.5 G: 4; .5 INJECTION, POWDER, LYOPHILIZED, FOR SOLUTION INTRAVENOUS; PARENTERAL at 02:05

## 2018-05-25 RX ADMIN — BACLOFEN 5 MG: 10 TABLET ORAL at 09:05

## 2018-05-25 RX ADMIN — MORPHINE SULFATE 5 MG: 2 INJECTION, SOLUTION INTRAMUSCULAR; INTRAVENOUS at 12:05

## 2018-05-25 RX ADMIN — BACLOFEN 5 MG: 10 TABLET ORAL at 02:05

## 2018-05-25 RX ADMIN — KETOROLAC TROMETHAMINE 30 MG: 30 INJECTION, SOLUTION INTRAMUSCULAR at 02:05

## 2018-05-25 RX ADMIN — MORPHINE SULFATE 30 MG: 15 TABLET ORAL at 01:05

## 2018-05-25 RX ADMIN — HYDROMORPHONE HYDROCHLORIDE 0.2 MG: 1 INJECTION, SOLUTION INTRAMUSCULAR; INTRAVENOUS; SUBCUTANEOUS at 12:05

## 2018-05-25 RX ADMIN — MORPHINE SULFATE 30 MG: 15 TABLET ORAL at 10:05

## 2018-05-25 RX ADMIN — KETOROLAC TROMETHAMINE 30 MG: 30 INJECTION, SOLUTION INTRAMUSCULAR at 09:05

## 2018-05-25 RX ADMIN — MORPHINE SULFATE 30 MG: 15 TABLET ORAL at 05:05

## 2018-05-25 RX ADMIN — ACETAMINOPHEN 1000 MG: 10 INJECTION, SOLUTION INTRAVENOUS at 04:05

## 2018-05-25 RX ADMIN — MORPHINE SULFATE 30 MG: 15 TABLET ORAL at 06:05

## 2018-05-25 RX ADMIN — PROMETHAZINE HYDROCHLORIDE 6.25 MG: 25 INJECTION INTRAMUSCULAR; INTRAVENOUS at 04:05

## 2018-05-25 RX ADMIN — MORPHINE SULFATE 30 MG: 15 TABLET ORAL at 02:05

## 2018-05-25 RX ADMIN — PANTOPRAZOLE SODIUM 40 MG: 40 TABLET, DELAYED RELEASE ORAL at 09:05

## 2018-05-25 NOTE — TRANSFER OF CARE
"Anesthesia Transfer of Care Note    Patient: Alejandro Neff    Procedure(s) Performed: Procedure(s) (LRB):  APPENDECTOMY, LAPAROSCOPIC (N/A)    Patient location: PACU    Anesthesia Type: general    Transport from OR: Transported from OR on 6-10 L/min O2 by face mask with adequate spontaneous ventilation    Post pain: adequate analgesia    Post assessment: no apparent anesthetic complications    Post vital signs: stable    Level of consciousness: awake    Nausea/Vomiting: no nausea/vomiting    Complications: none    Transfer of care protocol was followed      Last vitals:   Visit Vitals  /72   Pulse 96   Temp 37.1 °C (98.8 °F) (Oral)   Resp 18   Ht 5' 6.93" (1.7 m)   Wt 62.6 kg (138 lb)   SpO2 99%   BMI 21.66 kg/m²     "

## 2018-05-25 NOTE — PROGRESS NOTES
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     Patient states he has been having RLQ abdominal pain for the past 2 weeks, associated with increased baseline nausea, no emesis and no fevers. For the past week he has been on prophylactic antibiotics for neutropenia. Otherwise has been having some loose bowel movements but denies any blood.     He is currently on chemotherapy and has had at least 3 cycles, he has 5 days of chemotherapy and 2 weeks without it, he is due on Monday. He had Neutropenic fever on 4/26-29 requiring hospitalization and treatment with antibiotics. Last treatment about 2 weeks ago.         Post-Op Info:  Procedure(s) (LRB):  APPENDECTOMY, LAPAROSCOPIC (N/A)   1 Day Post-Op     Interval History:   S/p lap appendectomy yesterday  Discomfort at umbilical port site this morning but feeling better, very hungry  No nausea or emesis    Medications:  Continuous Infusions:   sodium chloride 0.9% 125 mL/hr at 05/25/18 0625     Scheduled Meds:   acetaminophen  1,000 mg Intravenous Q8H    pantoprazole  40 mg Oral Daily    piperacillin-tazobactam (ZOSYN) IVPB  4.5 g Intravenous Q8H     PRN Meds:diphenhydrAMINE, morphine, morphine, promethazine (PHENERGAN) IVPB, sodium chloride 0.9%     Review of patient's allergies indicates:   Allergen Reactions    Mushroom Anaphylaxis    Bamboo     Bee pollens     Mushroom flavor     Venom-honey bee      Objective:     Vital Signs (Most Recent):  Temp: 98 °F (36.7 °C) (05/25/18 0425)  Pulse: 67 (05/25/18 0425)  Resp: 16 (05/25/18 0425)  BP: (!) 104/58 (05/25/18 0425)  SpO2: 100 % (05/25/18 0425) Vital Signs (24h Range):  Temp:  [98 °F (36.7 °C)-98.8 °F (37.1 °C)] 98 °F (36.7 °C)  Pulse:  [] 67  Resp:  [6-52] 16  SpO2:  [75 %-100 %] 100 %  BP:  ()/() 104/58     Weight: 62.6 kg (138 lb)  Body mass index is 21.66 kg/m².    Intake/Output - Last 3 Shifts       05/23 0700 - 05/24 0659 05/24 0700 - 05/25 0659 05/25 0700 - 05/26 0659    I.V. (mL/kg)  800 (12.8)     Total Intake(mL/kg)  800 (12.8)     Urine (mL/kg/hr)  600     Total Output   600      Net   +200             Urine Occurrence  1 x           Physical Exam   Constitutional: He appears well-developed and well-nourished. No distress.   Cardiovascular: Normal rate, regular rhythm and intact distal pulses.    Pulmonary/Chest: Effort normal. No respiratory distress.   Abdominal: Soft. He exhibits no distension. There is tenderness (appropriate incisional ttp). There is no guarding.   steristrips intact, c/d/i.   Musculoskeletal: Normal range of motion. He exhibits no edema.       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 1 Day Post-Op s/p lap appendectomy.  - Diet as tolerated  - HLIV  - Given WBC 24 (likely post op) in setting of chemotherapy and some element of cecal inflammation, will continue on iv Zosyn until WBC trends down and then transition to augmentin for a total duration of 7d  - Consult Hem/Onc to discuss the above and likely delay in resuming chemotherapy planned for next week.   - OOB, ambulate  - DVT ppx                Jordyn Goodman MD  General Surgery  [facility]

## 2018-05-25 NOTE — BRIEF OP NOTE
[facility]  Brief Operative Note    SUMMARY     Surgery Date: 5/24/2018     Surgeon(s) and Role:     * Kenan Huang Jr., MD - Primary    Assisting Surgeon: None    Pre-op Diagnosis:  Appendicitis, unspecified appendicitis type [K37]    Post-op Diagnosis:  Post-Op Diagnosis Codes:     * Appendicitis, unspecified appendicitis type [K37]    Procedure(s) (LRB):  APPENDECTOMY, LAPAROSCOPIC (N/A)    Anesthesia: Choice    Description of Procedure: Laparoscopic appendectomy. Appendix slightly inflamed and hyperemic.     Description of the findings of the procedure: See Op note    Estimated Blood Loss: * No values recorded between 5/24/2018  8:47 PM and 5/24/2018  9:53 PM *         Specimens:   Specimen (12h ago through future)    Start     Ordered    05/24/18 2127  Specimen to Pathology - Surgery  Once     Comments:  1. Appendix - PERMANENT, No formalin, to fridge.      05/24/18 2127

## 2018-05-25 NOTE — ASSESSMENT & PLAN NOTE
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     - Admit to General Surgery under Dr. Huang.   - Plan to the operating room for laparoscopic possible open appendectomy.   - No prior abdominal surgeries.   - There is concern of possible cecitis as the cause of appendiceal inflammation, and this was discussed with the patient. It is very unlikely as the patient has not been neutropenic for the past week and Neutropenic Typhlitis usually happens in patients with hematologic malignancies or on immunotherapy, which is not his case. There is a small chance that this is not appendicitis and patient verbalized understanding.   - Consent was obtained.   - Patient is DNR and DNI he has no documentation of his advanced directives at this time, but he discussed with me and in front of Dr. Davis his wishes for no compressions, no defibrillation or cardioversion, no internal or external pacemaker and no drug protocol during arrest. He does not want to be intubated either but this has been waived for the procedure.   - Zosyn for antibiotic treatment.   - Morning consult with Hem/Onc to discuss likely delay in Chemotherapy and best date to restart it after recovery from surgery. Date likely depend on OR findings.   - Please call if any questions, thank you.

## 2018-05-25 NOTE — CODE DOCUMENTATION
Advance Directives Discussion    After discussion with patient, he is DNR and DNI, but during surgery DNI will be waived for intubation.   Dr. Davis was also witness of the advance directives Discussion.   States he has no documentation available at this time and that is in [place].     Shirley Ramsey MD  General Surgery PGY IV  Beeper: [phone]

## 2018-05-25 NOTE — NURSING TRANSFER
Nursing Transfer Note      5/25/2018     Transfer To: 656    Transfer via stretcher    Transfer with IV fluids on dial flow    Transported by transport    Medicines sent: none    Chart send with patient: Yes    Notified: receiving nurse    Patient reassessed at: 5/25/2018 see flowsheet (date, time)

## 2018-05-25 NOTE — ANESTHESIA PREPROCEDURE EVALUATION
05/24/2018  Alejandro Neff is a 23 y.o., male with co-morbidities including testicular cancer and Lyme disease who presents to the ED with a chief complaint of appendicitis shown on CT scan today. Patient is on chemotherapy, states his last dose was 2 weeks ago on Friday, 5/11/18. who presents for:    Pre-operative evaluation for Procedure(s) (LRB):  APPENDECTOMY, LAPAROSCOPIC (N/A)     NPO at noon    Diagnostic Studies:  5/24/18 CT Chest Ab  Decreasing size of the anterior mediastinal mass in this patient with history of seminoma.    Interval development of dilated appearance of the appendix with stranding of the periappendiceal fat.  Findings are worrisome for appendicitis and correlation with clinical findings is recommended.    5/13/18 CXR  Cardiac silhouette is normal in size.  Right-sided chest port is visualized in stable position..  Lungs are symmetrically expanded.  No evidence of focal consolidative process, pneumothorax, or significant effusion.  No acute osseous abnormality identified.    4/27/18 EKG:  Sinus tachycardia with short WA    Access:        Peripheral IV - Single Lumen 05/13/18 1312 Left Antecubital (Active)   Number of days: 11       Patient Active Problem List   Diagnosis    Vitamin D deficiency    Vomiting with nausea, not intractable    Chest pain    Primary mediastinal seminoma    Germ cell tumor    History of Lyme disease    Neutropenic fever    Anemia associated with chemotherapy    Thrombocytopenia    Leukocytosis    Tinnitus of both ears       Review of patient's allergies indicates:   Allergen Reactions    Mushroom Anaphylaxis    Bamboo     Bee pollens     Mushroom flavor     Venom-honey bee         No current facility-administered medications on file prior to encounter.      Current Outpatient Prescriptions on File Prior to Encounter   Medication Sig  Dispense Refill    amoxicillin-clavulanate 875-125mg (AUGMENTIN) 875-125 mg per tablet Take 1 tablet by mouth 2 (two) times daily. 14 tablet 0    baclofen (LIORESAL) 10 MG tablet Take 1 tablet (10 mg total) by mouth 2 (two) times daily as needed (hiccups). 60 tablet 1    granisetron (SANCUSO) 3.1 mg/24 hour place 1 patch onto the skin 24 hours before chemo, each patch can be worn up to 7 days 3 patch 0    LORazepam (ATIVAN) 0.5 MG tablet Take 1 tablet (0.5 mg total) by mouth every 8 (eight) hours as needed for Anxiety. 60 tablet 0    morphine (MSIR) 15 MG tablet Take 1 tablet (15 mg total) by mouth every 6 (six) hours as needed for Pain. 60 tablet 0    ondansetron (ZOFRAN) 4 MG tablet Take 1 tablet (4 mg total) by mouth every 6 (six) hours as needed for Nausea. 120 tablet 3    pantoprazole (PROTONIX) 40 MG tablet Take 1 tablet (40 mg total) by mouth once daily. 30 tablet 11    promethazine (PHENERGAN) 25 MG tablet Take 1 tablet (25 mg total) by mouth every 4 (four) hours as needed for Nausea. 120 tablet 3    zolpidem (AMBIEN) 5 MG Tab Take 1 tablet (5 mg total) by mouth nightly as needed (insomnia). 30 tablet 0    albuterol 90 mcg/actuation inhaler Inhale 2 puffs into the lungs every 6 (six) hours as needed for Wheezing. 1 each 11    aluminum-magnesium hydroxide-simethicone (MAALOX) 200-200-20 mg/5 mL Susp Take 15 mLs by mouth once.      diphenhydrAMINE-aluminum-magnesium hydroxide-simethicone-lidocaine HCl 2% Swish and spit 15 mLs every 4 (four) hours as needed (mouth sore). 1 Bottle 2    EPINEPHrine (EPIPEN) 0.3 mg/0.3 mL AtIn Inject 0.3 mg/mL into the muscle.      morphine (MSIR) 15 MG tablet Take 1 tablet (15 mg total) by mouth every 6 (six) hours as needed. 30 tablet 0    MULTIVIT-MINERALS/FERROUS FUM (MULTI VITAMIN ORAL) Take by mouth once daily.      promethazine (PHENERGAN) 25 MG suppository Place 1 suppository (25 mg total) rectally every 6 (six) hours as needed for Nausea. 84 suppository 2        Past Surgical History:   Procedure Laterality Date    CHEST WALL BIOPSY      PORTACATH PLACEMENT Right        Social History     Social History    Marital status: Single     Spouse name: N/A    Number of children: N/A    Years of education: N/A     Occupational History    student at [employer]      Social History Main Topics    Smoking status: Former Smoker     Packs/day: 0.25     Types: Vaping with nicotine, Vaping w/o nicotine, Cigarettes, Cigars     Quit date: 3/15/2018    Smokeless tobacco: Former User    Alcohol use Yes      Comment: occ    Drug use: Yes     Types: Marijuana, LSD, Cocaine, Other-see comments      Comment: Kratom (capsule or leaf form)    Sexual activity: Yes     Partners: Female     Birth control/ protection: Condom     Other Topics Concern    Not on file     Social History Narrative    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     From [place] - lived 1 year ago and in high school x 1 semester                     Vital Signs Range (Last 24H):  Temp:  [36.9 °C (98.5 °F)-37.1 °C (98.8 °F)]   Pulse:  [102]   Resp:  [18]   BP: (101-154)/(57-73)   SpO2:  [98 %-99 %]       CBC:   Recent Labs      05/24/18   1145   WBC  19.54*   RBC  4.03*   HGB  11.5*   HCT  34.2*   PLT  151   MCV  85   MCH  28.5   MCHC  33.6       CMP:   Recent Labs      05/24/18   1145   NA  138   K  4.2   CL  102   CO2  25   BUN  11   CREATININE  0.8   GLU  93   CALCIUM  9.5   ALBUMIN  3.9   PROT  6.7   ALKPHOS  75   ALT  16   AST  15   BILITOT  0.3       INR  No results for input(s): PT, INR, PROTIME, APTT in the last 72 hours.          Anesthesia Evaluation    I have reviewed the Patient Summary Reports.    I have reviewed the Nursing Notes.   I have reviewed the Medications.     Review of Systems  Anesthesia Hx:  No problems with previous Anesthesia Denies Hx of Anesthetic complications  History of prior surgery of interest to airway management or planning: Personal Hx of Anesthesia complications Slow To  Awaken/Delayed Emergence   Social:  Social Alcohol Use, Former Smoker    Hematology/Oncology:  Hematology Normal   Oncology Normal    -- Anemia:  Oncology Comments: Testicular cancer     EENT/Dental:EENT/Dental Normal   Cardiovascular:  Cardiovascular Normal Exercise tolerance: good  ECG has been reviewed.    Pulmonary:  Pulmonary Normal    Renal/:  Renal/ Normal     Hepatic/GI:  Hepatic/GI Normal Appendicitis    Musculoskeletal:  Musculoskeletal Normal    Neurological:  Neurology Normal Lyme disease   Endocrine:  Endocrine Normal    Psych:  Psychiatric Normal           Physical Exam  General:  Well nourished    Airway/Jaw/Neck:  Airway Findings: Mouth Opening: Normal Tongue: Normal  General Airway Assessment: Adult  Oropharynx Findings: Normal Mallampati: I  TM Distance: Normal, at least 6 cm  Jaw/Neck Findings:  Neck ROM: Normal ROM  Neck Findings: Normal    Eyes/Ears/Nose:  EYES/EARS/NOSE FINDINGS: Normal   Dental:  Dental Findings: In tact   Chest/Lungs:  Chest/Lungs Findings: Normal Respiratory Rate     Heart/Vascular:  Heart Findings: Rate: Normal        Mental Status:  Mental Status Findings:  Cooperative, Alert and Oriented         Anesthesia Plan  Type of Anesthesia, risks & benefits discussed:  Anesthesia Type:  general  Patient's Preference:   Intra-op Monitoring Plan: standard ASA monitors  Intra-op Monitoring Plan Comments:   Post Op Pain Control Plan: per primary service following discharge from PACU, multimodal analgesia and IV/PO Opioids PRN  Post Op Pain Control Plan Comments:   Induction:   IV  Beta Blocker:         Informed Consent: Patient understands risks and agrees with Anesthesia plan.  Questions answered. Anesthesia consent signed with patient.  ASA Score: 3  emergent   Day of Surgery Review of History & Physical:    H&P update referred to the surgeon.     Anesthesia Plan Notes: DNI/DNR waived for surgical procedure after discussion with patient.     Patient can lay flat without  desaturation but feels chest pressure and cannot take a deep breath.         Ready For Surgery From Anesthesia Perspective.

## 2018-05-25 NOTE — OP NOTE
OPERATIVE NOTE    Preoperative Diagnosis: Acute appendicitis     Procedure: Laparoscopic Appendectomy    Postoperative Diagnosis: See Op note    Surgery Date: 5/24/2018    Surgeon(s) and Role:     * Kenan Huang Jr., MD - Primary  Shirley Skaggs    Anesthesia: General    EBL: 10 cc    Specimen: Appendectomy    Findings: Appendix slightly inflamed and hyperemic. Also some mild inflammation of the cecum.     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 After discussion of treatment options, risks and benefits of the current procedure, the patient elected to proceed for laparoscopic appendectomy and he signed informed consent.    Description of Procedure:    The patient was identified by name and date of birth and marked in the preoperative holding area following which he was brought to the operating room and placed on the OR table in supine position. After a time out, anesthesia was induced without incident. The patient's abdomen was prepped with chlorhexidine and dressed in standard sterile fashion. A borrego catheter was placed given lower abdominal laparoscopic surgery; this was removed at the end of the case.    Following a time out procedure, local anesthesia was injected infraumbilically where a 2cm verticle incision was made in the skin with a scalpel and carried through the dermis with bovie electrocautery. Using S-retractors, subcutaneous tissues were bluntly dissected down to fascia. The umbilical stalk was grasped and elevated with penetrating towel clamps. Fascia was sharply incised caudally starting from the base of the umbilical stump and grasped with kocher forceps. Using caty forceps, the peritoneum was entered bluntly. An 0-vicryl figure of 8 stitch was placed in the fascia and left untied.     A 12mm Opti view trocar was placed  and pneumoperitoneum established without difficulty. A laparoscope was then placed and the abdomen explored; no signs of trauma were seen. Following this, two 5mm ports were placed in the left lower quadrant and suprapubic region under direct vision in standard fashion. Blunt bowel graspers were inserted and the patient positioned in trendelenburg. Small bowel loops were swept cephalad exposing the cecum. We then found the appendix inflamed and hyperemic but no perforation or purulence, there was also mild inflammation of the cecum.     The mesoappendix was grasped and elevated. Using blunt dissection with a combination of maryland dissectors, fat and peritoneum was stripped from the base of the appendix and mesoappendix and these were freed from adhesions to the pelvic side wall. Using forceps a window was created between the base of the appendix at the cecum and the mesoappendix. Using a white load on the EndoGIA stapler, the mesoappendix was then divided. Following this, a blue load was used to amputate the appendix from the cecum.     Suture lines were inspected and was noted to have some bleeding at the suture line, this was bovied and bleeding stopped. The right lower quadrant and pelvis were then copiously irrigated and suctioned dry. Following this our 5mm ports were removed under direct visualization followed by our 12mm balloon port. The previously placed fascial suture was used to close fascia in the midline. The closure was inspected and noted to be satisfactory. Following this all three incisions were closed with 4-0 monocryl in a subcuticular fashion. Incisions were covered with steri strips.    Dr. Huang was present for the entire procedure.    Disposition:     Extubated. To PACU then to floor.    Shirley Ramsey MD  General Surgery PGY IV  Beeper: [phone]

## 2018-05-25 NOTE — SUBJECTIVE & OBJECTIVE
Interval History:   S/p lap appendectomy yesterday  Discomfort at umbilical port site this morning but feeling better, very hungry  No nausea or emesis    Medications:  Continuous Infusions:   sodium chloride 0.9% 125 mL/hr at 05/25/18 0625     Scheduled Meds:   acetaminophen  1,000 mg Intravenous Q8H    pantoprazole  40 mg Oral Daily    piperacillin-tazobactam (ZOSYN) IVPB  4.5 g Intravenous Q8H     PRN Meds:diphenhydrAMINE, morphine, morphine, promethazine (PHENERGAN) IVPB, sodium chloride 0.9%     Review of patient's allergies indicates:   Allergen Reactions    Mushroom Anaphylaxis    Bamboo     Bee pollens     Mushroom flavor     Venom-honey bee      Objective:     Vital Signs (Most Recent):  Temp: 98 °F (36.7 °C) (05/25/18 0425)  Pulse: 67 (05/25/18 0425)  Resp: 16 (05/25/18 0425)  BP: (!) 104/58 (05/25/18 0425)  SpO2: 100 % (05/25/18 0425) Vital Signs (24h Range):  Temp:  [98 °F (36.7 °C)-98.8 °F (37.1 °C)] 98 °F (36.7 °C)  Pulse:  [] 67  Resp:  [6-52] 16  SpO2:  [75 %-100 %] 100 %  BP: ()/() 104/58     Weight: 62.6 kg (138 lb)  Body mass index is 21.66 kg/m².    Intake/Output - Last 3 Shifts       05/23 0700 - 05/24 0659 05/24 0700 - 05/25 0659 05/25 0700 - 05/26 0659    I.V. (mL/kg)  800 (12.8)     Total Intake(mL/kg)  800 (12.8)     Urine (mL/kg/hr)  600     Total Output   600      Net   +200             Urine Occurrence  1 x           Physical Exam   Constitutional: He appears well-developed and well-nourished. No distress.   Cardiovascular: Normal rate, regular rhythm and intact distal pulses.    Pulmonary/Chest: Effort normal. No respiratory distress.   Abdominal: Soft. He exhibits no distension. There is tenderness (appropriate incisional ttp). There is no guarding.   steristrips intact, c/d/i.   Musculoskeletal: Normal range of motion. He exhibits no edema.       Significant Labs:  CBC:   Recent Labs  Lab 05/25/18  0515   WBC 24.49*   RBC 3.65*   HGB 10.5*   HCT 30.7*   PLT  162   MCV 84   MCH 28.8   MCHC 34.2     BMP:   Recent Labs  Lab 05/25/18  0515   *      K 5.0      CO2 27   BUN 12   CREATININE 0.8   CALCIUM 9.5   MG 2.4     LFTs:   Recent Labs  Lab 05/24/18  1920   ALT 17   AST 18   ALKPHOS 77   BILITOT 0.3   PROT 6.4   ALBUMIN 3.7

## 2018-05-25 NOTE — SUBJECTIVE & OBJECTIVE
"Oncology Treatment Plan:   OP TESTICULAR EP    Medications:  Continuous Infusions:  Scheduled Meds:   sodium chloride 0.9%  1,000 mL Intravenous ED 1 Time    piperacillin-tazobactam (ZOSYN) IVPB  4.5 g Intravenous ED 1 Time     PRN Meds:     Review of patient's allergies indicates:   Allergen Reactions    Mushroom Anaphylaxis    Bamboo     Bee pollens     Mushroom flavor     Venom-honey bee         Past Medical History:   Diagnosis Date    Abdominal pain 08/12/2010    eval for abd and chest wall pain at [place], [place] - cxr wnl, EKG ([facility]), troponin wnl, normal chemistries    Allergy     Cancer     testicular    Chondromalacia patellae     Chronic nausea 2015    eval by GI Dr. Tushar Artis - given zofran and ciproheptadine     Chronic pain     Chronic pain     [facility] - no meds; followed by psych and pain clinic and unresponsive to behavioral/CBT/old records reports c/f conversion disorder     Foot pain 04/2010    4/10 + SHIRA, eval by Dr. ALLY Lion at [facility] -dx unclear ? gout and trial of nsaids and pdn, xrays normal 1/11, referred to podiatry, re-eval by rheum 2/12 and MRI and films wnl    Fracture of right radius 09/2009    Lyme arthritis     multiple joints    Lyme disease 2013    Memory loss 03/2012    eval for memory loss by neuro at [facility] - MRI, EEG wnl. Dr. Patricio suggested emotional factors & ref to Tushar Davies, PhD for  eval & neuropsych eval 3/12 Lupe Delgado, PhD - no evidence of formal thought disorder or psychosis - documented severe memory impairment; not related to to ADD or executive function issues. sugesstion that memory issues may be related to "emotional factors", ?conversion d/o    Stress fracture of tibia and fibula 08/2011    Urticaria      Past Surgical History:   Procedure Laterality Date    CHEST WALL BIOPSY      PORTACATH PLACEMENT Right      Family History     Problem Relation (Age " of Onset)    Arthritis Mother, Sister    Depression Sister    Gout Father    Hyperlipidemia Father    No Known Problems Sister    Other Mother        Social History Main Topics    Smoking status: Former Smoker     Packs/day: 0.25     Types: Vaping with nicotine, Vaping w/o nicotine, Cigarettes, Cigars     Quit date: 3/15/2018    Smokeless tobacco: Former User    Alcohol use Yes      Comment: occ    Drug use: Yes     Types: Marijuana, LSD, Cocaine, Other-see comments      Comment: Kratom (capsule or leaf form)    Sexual activity: Yes     Partners: Female     Birth control/ protection: Condom       Review of Systems   Constitutional: Positive for chills (secondary to chemo). Negative for diaphoresis and fever.   HENT: Negative for congestion.    Eyes: Negative for photophobia and visual disturbance.   Respiratory: Negative for cough and shortness of breath.    Cardiovascular: Negative for chest pain.   Gastrointestinal: Positive for abdominal pain. Negative for nausea and vomiting.   Musculoskeletal: Negative for arthralgias and myalgias.   Skin: Negative for rash and wound.   Neurological: Negative for headaches.   Psychiatric/Behavioral: Negative for behavioral problems.     Objective:     Vital Signs (Most Recent):  Temp: 98.8 °F (37.1 °C) (05/24/18 1701)  Pulse: 96 (05/24/18 1901)  Resp: 18 (05/24/18 1701)  BP: 115/61 (05/24/18 1901)  SpO2: 99 % (05/24/18 1901) Vital Signs (24h Range):  Temp:  [98.5 °F (36.9 °C)-98.8 °F (37.1 °C)] 98.8 °F (37.1 °C)  Pulse:  [] 96  Resp:  [18] 18  SpO2:  [98 %-100 %] 99 %  BP: (101-154)/(57-74) 115/61     Weight: 62.6 kg (138 lb)  Body mass index is 21.61 kg/m².  Body surface area is 1.72 meters squared.    No intake or output data in the 24 hours ending 05/24/18 1943    Physical Exam  Constitutional: He appears well-developed and well-nourished. No distress.   HENT:   Head: Normocephalic and atraumatic.   Mouth/Throat: Oropharynx is clear and moist.   Eyes: Conjunctivae  are normal.   Neck: Normal range of motion.   Cardiovascular: Normal rate, regular rhythm and normal heart sounds.   Pulmonary/Chest: Breath sounds normal. No respiratory distress.   Port in the right chest wall.    Abdominal: Soft. He exhibits no distension. There is tenderness in the right lower quadrant.   Musculoskeletal: Normal range of motion.   Neurological: He is alert and oriented to person, place, and time.   Skin: Skin is warm and dry.     Significant Labs:   CBC:   Recent Labs  Lab 05/24/18  1145 05/24/18  1920   WBC 19.54* 19.75*   HGB 11.5* 10.8*   HCT 34.2* 31.7*    174    and CMP:   Recent Labs  Lab 05/24/18  1145      K 4.2      CO2 25   GLU 93   BUN 11   CREATININE 0.8   CALCIUM 9.5   PROT 6.7   ALBUMIN 3.9   BILITOT 0.3   ALKPHOS 75   AST 15   ALT 16   ANIONGAP 11   EGFRNONAA >60       Diagnostic Results:  5/24/18 CT Chest Ab  Decreasing size of the anterior mediastinal mass in this patient with history of seminoma.    Interval development of dilated appearance of the appendix with stranding of the periappendiceal fat.  Findings are worrisome for appendicitis and correlation with clinical findings is recommended.

## 2018-05-25 NOTE — H&P
Please see Consult note for full H&P    Shirley Ramsey MD  General Surgery PGY IV  Beeper: [phone]

## 2018-05-25 NOTE — PROGRESS NOTES
Patient arrived to room 656 from the PACU. VSS, AAOx4. NPO. Spoke with Dr. Turner who allows the patient to take sips with medications. Abdominal lap sites x 3 are covered with steri strips. Up ad ernst and is free from fall/injuries. No acute distress noted. Patient is up in bed with call light within reach. Will continue to monitor.

## 2018-05-25 NOTE — PLAN OF CARE
Patient lives on the 1st floor of a 2 story house w/2 roommates. Not medically stable for discharge;POD # 1: monitoring of WBC. No needs determined.     Patient declined [facility] Packet.       05/25/18 1232   Discharge Assessment   Assessment Type Discharge Planning Assessment   Confirmed/corrected address and phone number on facesheet? Yes   Assessment information obtained from? Patient;Medical Record   Expected Length of Stay (days) (2-4)   Communicated expected length of stay with patient/caregiver no  (Per MD)   Prior to hospitilization cognitive status: Alert/Oriented;No Deficits   Prior to hospitalization functional status: Independent   Current cognitive status: Alert/Oriented;No Deficits   Current Functional Status: Independent;Needs Assistance   Facility Arrived From: (N/A)   Lives With other (see comments)  (2 roommates that are his caretakers-Brigitte Ortiz. )   Able to Return to Prior Arrangements yes   Is patient able to care for self after discharge? Yes   Who are your caregiver(s) and their phone number(s)? (2 roommates that are his caretakers-Brigitte Ortiz. No #'s given. Emergency contacts: Alfred Saldana Father [phone]. TawandaRadha Grandparent     [phone]. )   Patient's perception of discharge disposition home or selfcare   Readmission Within The Last 30 Days unable to assess   Patient currently being followed by outpatient case management? No   Patient currently receives any other outside agency services? No   Equipment Currently Used at Home other (see comments)  (PAC (Portacatheter), adjustable bed.)   Do you have any problems affording any of your prescribed medications? No   Is the patient taking medications as prescribed? yes   Does the patient have transportation home? Yes   Transportation Available other (see comments)  ([employer] or if CM dept can give him a taxi voucher. )   Dialysis Name and Scheduled days (N/A)   Does the patient receive services at the  [facility]? No   Discharge Plan A Home  (w/roomates who will care for him. )   Discharge Plan B Home  (as above. )   Patient/Family In Agreement With Plan yes

## 2018-05-25 NOTE — PROGRESS NOTES
Patient's dinner contained mushrooms, which patient is allergic to (Reaction: GI distress; cramps, N/V). Per patient request, patient given Benadryl. MD notified. ANGIE

## 2018-05-25 NOTE — ASSESSMENT & PLAN NOTE
- CT chest on 2/14/18, which showed a 6 x 3.5 cm mediastinal mass. It abuts the aorta and pulmonary artery. Biopsy was done on 3/1/18. Pathology (reviewed at [facility]) showed germ cell tumor, most consistent with seminoma.   -s/p Cycle #2 of Cisplatin/Etoposide  - anticipate to continue chemotherapy as outpatient

## 2018-05-25 NOTE — ASSESSMENT & PLAN NOTE
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 1 Day Post-Op s/p lap appendectomy.  - Diet as tolerated  - HLIV  - Given WBC 24 (likely post op) in setting of chemotherapy and some element of cecal inflammation, will continue on iv Zosyn until WBC trends down and then transition to augmentin for a total duration of 7d  - Consult Hem/Onc to discuss the above and likely delay in resuming chemotherapy planned for next week.   - OOB, ambulate  - DVT ppx

## 2018-05-25 NOTE — SUBJECTIVE & OBJECTIVE
Current Facility-Administered Medications on File Prior to Encounter   Medication    [COMPLETED] omnipaque 350 iohexol 75 mL    [COMPLETED] omnipaque oral solution 30 mL     Current Outpatient Prescriptions on File Prior to Encounter   Medication Sig    amoxicillin-clavulanate 875-125mg (AUGMENTIN) 875-125 mg per tablet Take 1 tablet by mouth 2 (two) times daily.    baclofen (LIORESAL) 10 MG tablet Take 1 tablet (10 mg total) by mouth 2 (two) times daily as needed (hiccups).    granisetron (SANCUSO) 3.1 mg/24 hour place 1 patch onto the skin 24 hours before chemo, each patch can be worn up to 7 days    LORazepam (ATIVAN) 0.5 MG tablet Take 1 tablet (0.5 mg total) by mouth every 8 (eight) hours as needed for Anxiety.    morphine (MSIR) 15 MG tablet Take 1 tablet (15 mg total) by mouth every 6 (six) hours as needed for Pain.    ondansetron (ZOFRAN) 4 MG tablet Take 1 tablet (4 mg total) by mouth every 6 (six) hours as needed for Nausea.    pantoprazole (PROTONIX) 40 MG tablet Take 1 tablet (40 mg total) by mouth once daily.    promethazine (PHENERGAN) 25 MG tablet Take 1 tablet (25 mg total) by mouth every 4 (four) hours as needed for Nausea.    zolpidem (AMBIEN) 5 MG Tab Take 1 tablet (5 mg total) by mouth nightly as needed (insomnia).    albuterol 90 mcg/actuation inhaler Inhale 2 puffs into the lungs every 6 (six) hours as needed for Wheezing.    aluminum-magnesium hydroxide-simethicone (MAALOX) 200-200-20 mg/5 mL Susp Take 15 mLs by mouth once.    diphenhydrAMINE-aluminum-magnesium hydroxide-simethicone-lidocaine HCl 2% Swish and spit 15 mLs every 4 (four) hours as needed (mouth sore).    EPINEPHrine (EPIPEN) 0.3 mg/0.3 mL AtIn Inject 0.3 mg/mL into the muscle.    morphine (MSIR) 15 MG tablet Take 1 tablet (15 mg total) by mouth every 6 (six) hours as needed.    MULTIVIT-MINERALS/FERROUS FUM (MULTI VITAMIN ORAL) Take by mouth once daily.    promethazine (PHENERGAN) 25 MG suppository Place 1  "suppository (25 mg total) rectally every 6 (six) hours as needed for Nausea.    [DISCONTINUED] granisetron (SANCUSO) 3.1 mg/24 hour place 1 patch onto the skin 24 hours before chemo, each patch can be worn up to 7 days       Review of patient's allergies indicates:   Allergen Reactions    Mushroom Anaphylaxis    Bamboo     Bee pollens     Mushroom flavor     Venom-honey bee        Past Medical History:   Diagnosis Date    Abdominal pain 08/12/2010    eval for abd and chest wall pain at [place], [place] - cxr wnl, EKG ([facility]), troponin wnl, normal chemistries    Allergy     Cancer     testicular    Chondromalacia patellae     Chronic nausea 2015    eval by GI Dr. Tushar Artis - given zofran and ciproheptadine     Chronic pain     Chronic pain     [facility] - no meds; followed by psych and pain clinic and unresponsive to behavioral/CBT/old records reports c/f conversion disorder     Foot pain 04/2010    4/10 + SHIRA, eval by Dr. ALLY Lion at [facility] -dx unclear ? gout and trial of nsaids and pdn, xrays normal 1/11, referred to podiatry, re-eval by rheum 2/12 and MRI and films wnl    Fracture of right radius 09/2009    Lyme arthritis     multiple joints    Lyme disease 2013    Memory loss 03/2012    eval for memory loss by neuro at [facility] - MRI, EEG wnl. Dr. Patricio suggested emotional factors & ref to Tushar Davies, PhD for  eval & neuropsych eval 3/12 Lupe Delgado, PhD - no evidence of formal thought disorder or psychosis - documented severe memory impairment; not related to to ADD or executive function issues. sugesstion that memory issues may be related to "emotional factors", ?conversion d/o    Stress fracture of tibia and fibula 08/2011    Urticaria      Past Surgical History:   Procedure Laterality Date    CHEST WALL BIOPSY      PORTACATH PLACEMENT Right      Family History     Problem Relation (Age of Onset)    " Arthritis Mother, Sister    Depression Sister    Gout Father    Hyperlipidemia Father    No Known Problems Sister    Other Mother        Social History Main Topics    Smoking status: Former Smoker     Packs/day: 0.25     Types: Vaping with nicotine, Vaping w/o nicotine, Cigarettes, Cigars     Quit date: 3/15/2018    Smokeless tobacco: Former User    Alcohol use Yes      Comment: occ    Drug use: Yes     Types: Marijuana, LSD, Cocaine, Other-see comments      Comment: Kratom (capsule or leaf form)    Sexual activity: Yes     Partners: Female     Birth control/ protection: Condom     Review of Systems   Constitutional: Positive for appetite change and fatigue. Negative for fever.   HENT: Negative.    Respiratory: Negative.  Negative for chest tightness, shortness of breath, wheezing and stridor.    Cardiovascular: Negative.  Negative for chest pain, palpitations and leg swelling.   Gastrointestinal: Positive for abdominal pain, diarrhea and nausea. Negative for abdominal distention, blood in stool, constipation and vomiting.   Endocrine: Negative.    Genitourinary: Negative.    Musculoskeletal: Negative.    Neurological: Negative.    Psychiatric/Behavioral: Negative.      Objective:     Vital Signs (Most Recent):  Temp: 98.8 °F (37.1 °C) (05/24/18 1701)  Pulse: 96 (05/24/18 1931)  Resp: 18 (05/24/18 1701)  BP: 118/72 (05/24/18 1931)  SpO2: 99 % (05/24/18 1931) Vital Signs (24h Range):  Temp:  [98.5 °F (36.9 °C)-98.8 °F (37.1 °C)] 98.8 °F (37.1 °C)  Pulse:  [] 96  Resp:  [18] 18  SpO2:  [98 %-100 %] 99 %  BP: (101-154)/(57-74) 118/72     Weight: 62.6 kg (138 lb)  Body mass index is 21.66 kg/m².    Physical Exam     General: In no acute distress, well appearance.   CV: RRR, S1, S2 no murmur or gallops   Pulm: non labored breathing, b/l clear breath sounds.   Abd: soft, non distended, RLQ tenderness. + Rovsing's sign.   Ext: wwp      Significant Labs:  CBC:   Recent Labs  Lab 05/24/18 1920   WBC 19.75*   RBC  3.76*   HGB 10.8*   HCT 31.7*      MCV 84   MCH 28.7   MCHC 34.1     CMP:   Recent Labs  Lab 05/24/18  1920   GLU 88   CALCIUM 9.4   ALBUMIN 3.7   PROT 6.4      K 4.3   CO2 28      BUN 10   CREATININE 0.8   ALKPHOS 77   ALT 17   AST 18   BILITOT 0.3       Significant Diagnostics:    CT scan reviewed here with radiology attending, likely appendicitis, cecum is also slightly inflamed raising possibility of cecitis.

## 2018-05-25 NOTE — HPI
"Alejandro Neff is a 23 y.o., male with co-morbidities including  primary mediastinal seminoma currently on chemo (s/p cycle 2 of EP) who presents to the ED with a chief complaint of appendicitis shown on CT scan today 05/24/18. Patient is on chemotherapy, states his last dose was 2 weeks ago on Friday, 5/11/18. He was seen on Sunday, 5/13/19, for abdominal pain, got blood work done and was discharged. Pain has been continuous since then. He states that pain is stronger on the right side, but occasional radiates to entire abdomen.Pt. Will be admitted to General surgery service for possible appendectomy.       Oncologic History:  1. Mr. Hampton is a 24 yo man who first presented with chest pain and underwent CT chest on 2/14/18, which showed a 6 x 3.5 cm mediastinal mass. It abuts the aorta and pulmonary artery. Biopsy was done on 3/1/18. Pathology (reviewed at [facility]) showed germ cell tumor, most consistent with seminoma.   2. HCG, LDH, AFP on 3/22/18 all normal.   3. Testicular US 3/26/18 showed no testicular masses. CT C/A/P on 3/26/18 showed "stable appearance of anterior mediastinal mass consistent with provided history of seminoma. Several punctate sclerotic foci are noted in the pelvis at the right pubic bone, right ischial tuberosity, and left ilium adjacent to the sacroiliac joint.  These are strongly favored to reflect benign bone islands although metastatic disease could have a similar appearance and close follow-up is recommended. Several benign Schmorl's nodes are noted in the thoracic spine." Bone scan on 3/28/18 was negative for bone mets.   4. Audiogram on 4/6/18 normal. Followed by ENT.   5. Given his smoking history, I recommended 4 cycles of EP. EP cycle 1 day 1 on 4/16/18. Complicated by hospitalization for neutropenic fever 4/26-4/29. No source of infections identified. Treated with antibiotics empirically and improved.   6. Cycle 2 of EP started on on 5/7/18. Hospitalized during the " first weekend for intractable nausea.

## 2018-05-25 NOTE — CONSULTS
"[facility]  Hematology/Oncology  Consult Note    Patient Name: Alejandro Neff  MRN: [id number]  Admission Date: 5/24/2018  Hospital Length of Stay: 1 days  Code Status: Partial Code   Attending Provider: Kenan Huang Jr.,*  Consulting Provider: Sean Haimlton MD  Primary Care Physician: Nata Hernandez MD  Principal Problem:Primary mediastinal seminoma    Consults  Subjective:     HPI:  Alejandro Neff is a 23 y.o., male with co-morbidities including  primary mediastinal seminoma currently on chemo (s/p cycle 2 of EP) who presents to the ED with a chief complaint of appendicitis shown on CT scan today 05/24/18. Patient is on chemotherapy, states his last dose was 2 weeks ago on Friday, 5/11/18. He was seen on Sunday, 5/13/19, for abdominal pain, got blood work done and was discharged. Pain has been continuous since then. He states that pain is stronger on the right side, but occasional radiates to entire abdomen.Pt. Will be admitted to General surgery service for possible appendectomy.       Oncologic History:  1. Mr. Hampton is a 24 yo man who first presented with chest pain and underwent CT chest on 2/14/18, which showed a 6 x 3.5 cm mediastinal mass. It abuts the aorta and pulmonary artery. Biopsy was done on 3/1/18. Pathology (reviewed at [facility]) showed germ cell tumor, most consistent with seminoma.   2. HCG, LDH, AFP on 3/22/18 all normal.   3. Testicular US 3/26/18 showed no testicular masses. CT C/A/P on 3/26/18 showed "stable appearance of anterior mediastinal mass consistent with provided history of seminoma. Several punctate sclerotic foci are noted in the pelvis at the right pubic bone, right ischial tuberosity, and left ilium adjacent to the sacroiliac joint.  These are strongly favored to reflect benign bone islands although metastatic disease could have a similar appearance and close follow-up is recommended. Several benign Schmorl's nodes are noted in " "the thoracic spine." Bone scan on 3/28/18 was negative for bone mets.   4. Audiogram on 4/6/18 normal. Followed by ENT.   5. Given his smoking history, I recommended 4 cycles of EP. EP cycle 1 day 1 on 4/16/18. Complicated by hospitalization for neutropenic fever 4/26-4/29. No source of infections identified. Treated with antibiotics empirically and improved.   6. Cycle 2 of EP started on on 5/7/18. Hospitalized during the first weekend for intractable nausea.     Oncology Treatment Plan:   OP TESTICULAR EP    Medications:  Continuous Infusions:  Scheduled Meds:   sodium chloride 0.9%  1,000 mL Intravenous ED 1 Time    piperacillin-tazobactam (ZOSYN) IVPB  4.5 g Intravenous ED 1 Time     PRN Meds:     Review of patient's allergies indicates:   Allergen Reactions    Mushroom Anaphylaxis    Bamboo     Bee pollens     Mushroom flavor     Venom-honey bee         Past Medical History:   Diagnosis Date    Abdominal pain 08/12/2010    eval for abd and chest wall pain at [place], [place] - cxr wnl, EKG ([facility]), troponin wnl, normal chemistries    Allergy     Cancer     testicular    Chondromalacia patellae     Chronic nausea 2015    eval by GI Dr. Tushar Artis - given zofran and ciproheptadine     Chronic pain     Chronic pain     [facility] - no meds; followed by psych and pain clinic and unresponsive to behavioral/CBT/old records reports c/f conversion disorder     Foot pain 04/2010    4/10 + SHIRA, eval by Dr. ALLY Lion at [facility] Rheum -dx unclear ? gout and trial of nsaids and pdn, xrays normal 1/11, referred to podiatry, re-eval by rheum 2/12 and MRI and films wnl    Fracture of right radius 09/2009    Lyme arthritis     multiple joints    Lyme disease 2013    Memory loss 03/2012    eval for memory loss by neuro at [facility] - MRI, EEG wnl. Dr. Patricio suggested emotional factors & ref to Tushar Davies, PhD for  eval & neuropsych eval 3/12 " "Lupe Delgado, PhD - no evidence of formal thought disorder or psychosis - documented severe memory impairment; not related to to ADD or executive function issues. sugesstion that memory issues may be related to "emotional factors", ?conversion d/o    Stress fracture of tibia and fibula 08/2011    Urticaria      Past Surgical History:   Procedure Laterality Date    CHEST WALL BIOPSY      PORTACATH PLACEMENT Right      Family History     Problem Relation (Age of Onset)    Arthritis Mother, Sister    Depression Sister    Gout Father    Hyperlipidemia Father    No Known Problems Sister    Other Mother        Social History Main Topics    Smoking status: Former Smoker     Packs/day: 0.25     Types: Vaping with nicotine, Vaping w/o nicotine, Cigarettes, Cigars     Quit date: 3/15/2018    Smokeless tobacco: Former User    Alcohol use Yes      Comment: occ    Drug use: Yes     Types: Marijuana, LSD, Cocaine, Other-see comments      Comment: Kratom (capsule or leaf form)    Sexual activity: Yes     Partners: Female     Birth control/ protection: Condom       Review of Systems   Constitutional: Positive for chills (secondary to chemo). Negative for diaphoresis and fever.   HENT: Negative for congestion.    Eyes: Negative for photophobia and visual disturbance.   Respiratory: Negative for cough and shortness of breath.    Cardiovascular: Negative for chest pain.   Gastrointestinal: Positive for abdominal pain. Negative for nausea and vomiting.   Musculoskeletal: Negative for arthralgias and myalgias.   Skin: Negative for rash and wound.   Neurological: Negative for headaches.   Psychiatric/Behavioral: Negative for behavioral problems.     Objective:     Vital Signs (Most Recent):  Temp: 98.8 °F (37.1 °C) (05/24/18 1701)  Pulse: 96 (05/24/18 1901)  Resp: 18 (05/24/18 1701)  BP: 115/61 (05/24/18 1901)  SpO2: 99 % (05/24/18 1901) Vital Signs (24h Range):  Temp:  [98.5 °F (36.9 °C)-98.8 °F (37.1 °C)] 98.8 °F (37.1 " °C)  Pulse:  [] 96  Resp:  [18] 18  SpO2:  [98 %-100 %] 99 %  BP: (101-154)/(57-74) 115/61     Weight: 62.6 kg (138 lb)  Body mass index is 21.61 kg/m².  Body surface area is 1.72 meters squared.    No intake or output data in the 24 hours ending 05/24/18 1943    Physical Exam  Constitutional: He appears well-developed and well-nourished. No distress.   HENT:   Head: Normocephalic and atraumatic.   Mouth/Throat: Oropharynx is clear and moist.   Eyes: Conjunctivae are normal.   Neck: Normal range of motion.   Cardiovascular: Normal rate, regular rhythm and normal heart sounds.   Pulmonary/Chest: Breath sounds normal. No respiratory distress.   Port in the right chest wall.    Abdominal: Soft. He exhibits no distension. There is tenderness in the right lower quadrant.   Musculoskeletal: Normal range of motion.   Neurological: He is alert and oriented to person, place, and time.   Skin: Skin is warm and dry.     Significant Labs:   CBC:   Recent Labs  Lab 05/24/18  1145 05/24/18  1920   WBC 19.54* 19.75*   HGB 11.5* 10.8*   HCT 34.2* 31.7*    174    and CMP:   Recent Labs  Lab 05/24/18  1145      K 4.2      CO2 25   GLU 93   BUN 11   CREATININE 0.8   CALCIUM 9.5   PROT 6.7   ALBUMIN 3.9   BILITOT 0.3   ALKPHOS 75   AST 15   ALT 16   ANIONGAP 11   EGFRNONAA >60       Diagnostic Results:  5/24/18 CT Chest Ab  Decreasing size of the anterior mediastinal mass in this patient with history of seminoma.    Interval development of dilated appearance of the appendix with stranding of the periappendiceal fat.  Findings are worrisome for appendicitis and correlation with clinical findings is recommended.    Assessment/Plan:     * Primary mediastinal seminoma    - CT chest on 2/14/18, which showed a 6 x 3.5 cm mediastinal mass. It abuts the aorta and pulmonary artery. Biopsy was done on 3/1/18. Pathology (reviewed at [facility]) showed germ cell tumor, most consistent with seminoma.   -s/p Cycle #2 of  Cisplatin/Etoposide  - anticipate to continue chemotherapy as outpatient        Appendicitis    - CT abdomen 05/24/18 showed appendicitis   - treatment per General Surgery             Case discussed with Med/Onc Fellow On-call Dr. Chetan Hamilton MD  Hematology/Oncology  [facility]

## 2018-05-25 NOTE — CARE UPDATE
Patient scheduled for follow up visit to see Dr. Rojas 6/7/18 Expect chemo then. Recommend surgery follow up prior to this visit for clearance. Patient has a potentially curable cancer. His chances of cure decrease with significant treatment delays. ANC has persisted above 1000 while on chemo. Nadirs typically ~ 10 days after chemo which will be delayed till 2 weeks after surgery.   Please call with questions    Antonina Davila MD   Pager [phone]

## 2018-05-25 NOTE — PLAN OF CARE
Problem: Patient Care Overview  Goal: Plan of Care Review  Outcome: Ongoing (interventions implemented as appropriate)  Patient A/Ox4. Care plan reviewed with patient-verbalizes understanding. VSS. Tolerating diet well. Pain not well managed with medication-MD aware. Lap sites clean, dry, and intact. Patient on Tele-Sinus tach throughout shift. Patient ambulates independently-up to bathroom throughout shift.Adequate urine output.  Patient remains free of falls. Spouse at bedside. Patient states no other needs at this time. WCTM.

## 2018-05-25 NOTE — CONSULTS
[facility]  General Surgery  Consult Note    Patient Name: Alejandro Neff  MRN: [id number]  Code Status: Partial Code  Admission Date: 5/24/2018  Hospital Length of Stay: 0 days  Attending Physician: No att. providers found  Primary Care Provider: Nata Hernandez MD    Patient information was obtained from patient, past medical records and ER records.     Inpatient consult to General surgery  Consult performed by: DEMARIO ZHOU  Consult ordered by: DEMARIO ZHOU        Subjective:     Principal Problem: <principal problem not specified>    History of Present Illness: Mr Hampton is a 22 yo M with PMH of Primary mediastinal seminoma at the chest currently on chemotherapy who presents with likely acute appendicitis.     Patient states he has been having RLQ abdominal pain for the past 2 weeks, associated with increased baseline nausea, no emesis and no fevers. For the past week he has been on prophylactic antibiotics for neutropenia. Otherwise has been having some loose bowel movements but denies any blood.     He is currently on chemotherapy and has had at least 3 cycles, he has 5 days of chemotherapy and 2 weeks without it, he is due on Monday. He had Neutropenic fever on 4/26-29 requiring hospitalization and treatment with antibiotics. Last treatment about 2 weeks ago.         Current Facility-Administered Medications on File Prior to Encounter   Medication    [COMPLETED] omnipaque 350 iohexol 75 mL    [COMPLETED] omnipaque oral solution 30 mL     Current Outpatient Prescriptions on File Prior to Encounter   Medication Sig    amoxicillin-clavulanate 875-125mg (AUGMENTIN) 875-125 mg per tablet Take 1 tablet by mouth 2 (two) times daily.    baclofen (LIORESAL) 10 MG tablet Take 1 tablet (10 mg total) by mouth 2 (two) times daily as needed (hiccups).    granisetron (SANCUSO) 3.1 mg/24 hour place 1 patch onto the skin 24 hours before chemo, each patch can be worn up  to 7 days    LORazepam (ATIVAN) 0.5 MG tablet Take 1 tablet (0.5 mg total) by mouth every 8 (eight) hours as needed for Anxiety.    morphine (MSIR) 15 MG tablet Take 1 tablet (15 mg total) by mouth every 6 (six) hours as needed for Pain.    ondansetron (ZOFRAN) 4 MG tablet Take 1 tablet (4 mg total) by mouth every 6 (six) hours as needed for Nausea.    pantoprazole (PROTONIX) 40 MG tablet Take 1 tablet (40 mg total) by mouth once daily.    promethazine (PHENERGAN) 25 MG tablet Take 1 tablet (25 mg total) by mouth every 4 (four) hours as needed for Nausea.    zolpidem (AMBIEN) 5 MG Tab Take 1 tablet (5 mg total) by mouth nightly as needed (insomnia).    albuterol 90 mcg/actuation inhaler Inhale 2 puffs into the lungs every 6 (six) hours as needed for Wheezing.    aluminum-magnesium hydroxide-simethicone (MAALOX) 200-200-20 mg/5 mL Susp Take 15 mLs by mouth once.    diphenhydrAMINE-aluminum-magnesium hydroxide-simethicone-lidocaine HCl 2% Swish and spit 15 mLs every 4 (four) hours as needed (mouth sore).    EPINEPHrine (EPIPEN) 0.3 mg/0.3 mL AtIn Inject 0.3 mg/mL into the muscle.    morphine (MSIR) 15 MG tablet Take 1 tablet (15 mg total) by mouth every 6 (six) hours as needed.    MULTIVIT-MINERALS/FERROUS FUM (MULTI VITAMIN ORAL) Take by mouth once daily.    promethazine (PHENERGAN) 25 MG suppository Place 1 suppository (25 mg total) rectally every 6 (six) hours as needed for Nausea.    [DISCONTINUED] granisetron (SANCUSO) 3.1 mg/24 hour place 1 patch onto the skin 24 hours before chemo, each patch can be worn up to 7 days       Review of patient's allergies indicates:   Allergen Reactions    Mushroom Anaphylaxis    Bamboo     Bee pollens     Mushroom flavor     Venom-honey bee        Past Medical History:   Diagnosis Date    Abdominal pain 08/12/2010    eval for abd and chest wall pain at [facility], [place], [place] - cxr wnl, EKG ([facility]), troponin wnl, normal chemistries    Allergy   "   Cancer     testicular    Chondromalacia patellae     Chronic nausea 2015    eval by GI Dr. Tushar Artis - given zofran and ciproheptadine     Chronic pain     Chronic pain     inpatient Rx [facility] - no meds; followed by psych and pain clinic and unresponsive to behavioral/CBT/old records reports c/f conversion disorder     Foot pain 04/2010    4/10 + SHIRA, eval by Dr. ALLY Lion at [facility] Rheum -dx unclear ? gout and trial of nsaids and pdn, xrays normal 1/11, referred to podiatry, re-eval by rheum 2/12 and MRI and films wnl    Fracture of right radius 09/2009    Lyme arthritis     multiple joints    Lyme disease 2013    Memory loss 03/2012    eval for memory loss by neuro at [facility] - MRI, EEG wnl. Dr. Patricio suggested emotional factors & ref to Tushar Davies, PhD for  eval & neuropsych eval 3/12 Lupe Delgado, PhD - no evidence of formal thought disorder or psychosis - documented severe memory impairment; not related to to ADD or executive function issues. sugesstion that memory issues may be related to "emotional factors", ?conversion d/o    Stress fracture of tibia and fibula 08/2011    Urticaria      Past Surgical History:   Procedure Laterality Date    CHEST WALL BIOPSY      PORTACATH PLACEMENT Right      Family History     Problem Relation (Age of Onset)    Arthritis Mother, Sister    Depression Sister    Gout Father    Hyperlipidemia Father    No Known Problems Sister    Other Mother        Social History Main Topics    Smoking status: Former Smoker     Packs/day: 0.25     Types: Vaping with nicotine, Vaping w/o nicotine, Cigarettes, Cigars     Quit date: 3/15/2018    Smokeless tobacco: Former User    Alcohol use Yes      Comment: occ    Drug use: Yes     Types: Marijuana, LSD, Cocaine, Other-see comments      Comment: Kratom (capsule or leaf form)    Sexual activity: Yes     Partners: Female     Birth control/ protection: Condom     Review of Systems "   Constitutional: Positive for appetite change and fatigue. Negative for fever.   HENT: Negative.    Respiratory: Negative.  Negative for chest tightness, shortness of breath, wheezing and stridor.    Cardiovascular: Negative.  Negative for chest pain, palpitations and leg swelling.   Gastrointestinal: Positive for abdominal pain, diarrhea and nausea. Negative for abdominal distention, blood in stool, constipation and vomiting.   Endocrine: Negative.    Genitourinary: Negative.    Musculoskeletal: Negative.    Neurological: Negative.    Psychiatric/Behavioral: Negative.      Objective:     Vital Signs (Most Recent):  Temp: 98.8 °F (37.1 °C) (05/24/18 1701)  Pulse: 96 (05/24/18 1931)  Resp: 18 (05/24/18 1701)  BP: 118/72 (05/24/18 1931)  SpO2: 99 % (05/24/18 1931) Vital Signs (24h Range):  Temp:  [98.5 °F (36.9 °C)-98.8 °F (37.1 °C)] 98.8 °F (37.1 °C)  Pulse:  [] 96  Resp:  [18] 18  SpO2:  [98 %-100 %] 99 %  BP: (101-154)/(57-74) 118/72     Weight: 62.6 kg (138 lb)  Body mass index is 21.66 kg/m².    Physical Exam     General: In no acute distress, well appearance.   CV: RRR, S1, S2 no murmur or gallops   Pulm: non labored breathing, b/l clear breath sounds.   Abd: soft, non distended, RLQ tenderness. + Rovsing's sign.   Ext: wwp      Significant Labs:  CBC:   Recent Labs  Lab 05/24/18 1920   WBC 19.75*   RBC 3.76*   HGB 10.8*   HCT 31.7*      MCV 84   MCH 28.7   MCHC 34.1     CMP:   Recent Labs  Lab 05/24/18 1920   GLU 88   CALCIUM 9.4   ALBUMIN 3.7   PROT 6.4      K 4.3   CO2 28      BUN 10   CREATININE 0.8   ALKPHOS 77   ALT 17   AST 18   BILITOT 0.3       Significant Diagnostics:    CT scan reviewed here with radiology attending, likely appendicitis, cecum is also slightly inflamed raising possibility of cecitis.     Assessment/Plan:     Appendicitis    Mr Hampton is a 24 yo M with PMH of Primary mediastinal seminoma at the chest currently on chemotherapy who presents with likely acute  appendicitis.     - Admit to General Surgery under Dr. Huang.   - Plan to the operating room for laparoscopic possible open appendectomy.   - No prior abdominal surgeries.   - There is concern of possible cecitis as the cause of appendiceal inflammation, and this was discussed with the patient. It is very unlikely as the patient has not been neutropenic for the past week and Neutropenic Typhlitis usually happens in patients with hematologic malignancies or on immunotherapy, which is not his case. There is a small chance that this is not appendicitis and patient verbalized understanding.   - Consent was obtained.   - Patient is DNR and DNI he has no documentation of his advanced directives at this time, but he discussed with me and in front of Dr. Davis his wishes for no compressions, no defibrillation or cardioversion, no internal or external pacemaker and no drug protocol during arrest. He does not want to be intubated either but this has been waived for the procedure.   - Zosyn for antibiotic treatment.   - Morning consult with Hem/Onc to discuss likely delay in Chemotherapy and best date to restart it after recovery from surgery. Date likely depend on OR findings.   - Please call if any questions, thank you.               Shirley Ramsey MD  General Surgery PGY IV  Beeper: [phone]

## 2018-05-25 NOTE — ANESTHESIA POSTPROCEDURE EVALUATION
"Anesthesia Post Evaluation    Patient: Alejandro Neff    Procedure(s) Performed: Procedure(s) (LRB):  APPENDECTOMY, LAPAROSCOPIC (N/A)    Final Anesthesia Type: general  Patient location during evaluation: PACU  Patient participation: Yes- Able to Participate  Level of consciousness: sedated and responds to stimulation  Post-procedure vital signs: reviewed and stable  Pain management: adequate  Airway patency: patent  PONV status at discharge: nausea (controlled)  Anesthetic complications: no      Cardiovascular status: blood pressure returned to baseline  Respiratory status: unassisted  Hydration status: euvolemic  Follow-up not needed.        Visit Vitals  BP (!) 103/52   Pulse 75   Temp 37.1 °C (98.7 °F) (Oral)   Resp 10   Ht 5' 6.93" (1.7 m)   Wt 62.6 kg (138 lb)   SpO2 99%   BMI 21.66 kg/m²       Pain/Bea Score: Pain Assessment Performed: Yes (5/24/2018 11:00 PM)  Presence of Pain: complains of pain/discomfort (5/24/2018 11:00 PM)  Pain Rating Prior to Med Admin: 8 (5/24/2018 11:09 PM)  Bea Score: 9 (5/24/2018 11:00 PM)      "

## 2018-05-26 PROBLEM — Z90.49 S/P LAPAROSCOPIC APPENDECTOMY: Status: ACTIVE | Noted: 2018-05-26

## 2018-05-26 PROBLEM — K37 APPENDICITIS: Status: RESOLVED | Noted: 2018-05-24 | Resolved: 2018-05-26

## 2018-05-26 LAB
ALBUMIN SERPL BCP-MCNC: 3 G/DL
ALP SERPL-CCNC: 58 U/L
ALT SERPL W/O P-5'-P-CCNC: 12 U/L
ANION GAP SERPL CALC-SCNC: 8 MMOL/L
ANISOCYTOSIS BLD QL SMEAR: SLIGHT
AST SERPL-CCNC: 10 U/L
BASOPHILS # BLD AUTO: ABNORMAL K/UL
BASOPHILS NFR BLD: 0 %
BILIRUB SERPL-MCNC: 0.2 MG/DL
BUN SERPL-MCNC: 19 MG/DL
CALCIUM SERPL-MCNC: 9 MG/DL
CHLORIDE SERPL-SCNC: 104 MMOL/L
CO2 SERPL-SCNC: 29 MMOL/L
CREAT SERPL-MCNC: 0.8 MG/DL
DIFFERENTIAL METHOD: ABNORMAL
EOSINOPHIL # BLD AUTO: ABNORMAL K/UL
EOSINOPHIL NFR BLD: 0 %
ERYTHROCYTE [DISTWIDTH] IN BLOOD BY AUTOMATED COUNT: 15.6 %
EST. GFR  (AFRICAN AMERICAN): >60 ML/MIN/1.73 M^2
EST. GFR  (NON AFRICAN AMERICAN): >60 ML/MIN/1.73 M^2
GIANT PLATELETS BLD QL SMEAR: PRESENT
GLUCOSE SERPL-MCNC: 95 MG/DL
HCT VFR BLD AUTO: 28 %
HGB BLD-MCNC: 9.2 G/DL
HYPOCHROMIA BLD QL SMEAR: ABNORMAL
IMM GRANULOCYTES # BLD AUTO: ABNORMAL K/UL
IMM GRANULOCYTES NFR BLD AUTO: ABNORMAL %
LYMPHOCYTES # BLD AUTO: ABNORMAL K/UL
LYMPHOCYTES NFR BLD: 19 %
MCH RBC QN AUTO: 28.1 PG
MCHC RBC AUTO-ENTMCNC: 32.9 G/DL
MCV RBC AUTO: 86 FL
METAMYELOCYTES NFR BLD MANUAL: 4 %
MONOCYTES # BLD AUTO: ABNORMAL K/UL
MONOCYTES NFR BLD: 9 %
MYELOCYTES NFR BLD MANUAL: 4 %
NEUTROPHILS NFR BLD: 60 %
NEUTS BAND NFR BLD MANUAL: 4 %
NRBC BLD-RTO: 1 /100 WBC
OVALOCYTES BLD QL SMEAR: ABNORMAL
PLATELET # BLD AUTO: 173 K/UL
PLATELET BLD QL SMEAR: ABNORMAL
PMV BLD AUTO: 8.6 FL
POIKILOCYTOSIS BLD QL SMEAR: SLIGHT
POLYCHROMASIA BLD QL SMEAR: ABNORMAL
POTASSIUM SERPL-SCNC: 4.2 MMOL/L
PROT SERPL-MCNC: 5.3 G/DL
RBC # BLD AUTO: 3.27 M/UL
SODIUM SERPL-SCNC: 141 MMOL/L
TOXIC GRANULES BLD QL SMEAR: PRESENT
WBC # BLD AUTO: 10.78 K/UL

## 2018-05-26 PROCEDURE — 80053 COMPREHEN METABOLIC PANEL: CPT

## 2018-05-26 PROCEDURE — 25000003 PHARM REV CODE 250: Performed by: SURGERY

## 2018-05-26 PROCEDURE — 63600175 PHARM REV CODE 636 W HCPCS: Performed by: STUDENT IN AN ORGANIZED HEALTH CARE EDUCATION/TRAINING PROGRAM

## 2018-05-26 PROCEDURE — 20600001 HC STEP DOWN PRIVATE ROOM

## 2018-05-26 PROCEDURE — 85027 COMPLETE CBC AUTOMATED: CPT

## 2018-05-26 PROCEDURE — 25000003 PHARM REV CODE 250: Performed by: STUDENT IN AN ORGANIZED HEALTH CARE EDUCATION/TRAINING PROGRAM

## 2018-05-26 PROCEDURE — 85007 BL SMEAR W/DIFF WBC COUNT: CPT

## 2018-05-26 PROCEDURE — 63600175 PHARM REV CODE 636 W HCPCS: Performed by: SURGERY

## 2018-05-26 RX ORDER — ACETAMINOPHEN 500 MG
1000 TABLET ORAL EVERY 8 HOURS
Status: DISCONTINUED | OUTPATIENT
Start: 2018-05-26 | End: 2018-05-28

## 2018-05-26 RX ORDER — CELECOXIB 100 MG/1
100 CAPSULE ORAL 2 TIMES DAILY
Status: DISCONTINUED | OUTPATIENT
Start: 2018-05-26 | End: 2018-05-28

## 2018-05-26 RX ORDER — MORPHINE SULFATE 1 MG/ML
INJECTION INTRAVENOUS CONTINUOUS
Status: DISCONTINUED | OUTPATIENT
Start: 2018-05-26 | End: 2018-05-27

## 2018-05-26 RX ORDER — ONDANSETRON 4 MG/1
4 TABLET, ORALLY DISINTEGRATING ORAL ONCE
Status: DISCONTINUED | OUTPATIENT
Start: 2018-05-26 | End: 2018-05-28

## 2018-05-26 RX ORDER — GABAPENTIN 100 MG/1
100 CAPSULE ORAL 3 TIMES DAILY
Status: DISCONTINUED | OUTPATIENT
Start: 2018-05-26 | End: 2018-05-28

## 2018-05-26 RX ORDER — NALOXONE HCL 0.4 MG/ML
0.02 VIAL (ML) INJECTION
Status: DISCONTINUED | OUTPATIENT
Start: 2018-05-26 | End: 2018-05-27

## 2018-05-26 RX ORDER — ONDANSETRON 4 MG/1
4 TABLET, FILM COATED ORAL EVERY 6 HOURS PRN
Status: DISCONTINUED | OUTPATIENT
Start: 2018-05-26 | End: 2018-05-29 | Stop reason: HOSPADM

## 2018-05-26 RX ORDER — ONDANSETRON 2 MG/ML
4 INJECTION INTRAMUSCULAR; INTRAVENOUS ONCE
Status: DISCONTINUED | OUTPATIENT
Start: 2018-05-26 | End: 2018-05-26

## 2018-05-26 RX ORDER — POLYETHYLENE GLYCOL 3350 17 G/17G
17 POWDER, FOR SOLUTION ORAL DAILY
Status: DISCONTINUED | OUTPATIENT
Start: 2018-05-26 | End: 2018-05-29 | Stop reason: HOSPADM

## 2018-05-26 RX ORDER — CYCLOBENZAPRINE HCL 5 MG
5 TABLET ORAL 3 TIMES DAILY PRN
Status: DISCONTINUED | OUTPATIENT
Start: 2018-05-26 | End: 2018-05-29 | Stop reason: HOSPADM

## 2018-05-26 RX ORDER — ACETAMINOPHEN 10 MG/ML
1000 INJECTION, SOLUTION INTRAVENOUS EVERY 8 HOURS
Status: DISCONTINUED | OUTPATIENT
Start: 2018-05-26 | End: 2018-05-26

## 2018-05-26 RX ADMIN — PIPERACILLIN AND TAZOBACTAM 4.5 G: 4; .5 INJECTION, POWDER, LYOPHILIZED, FOR SOLUTION INTRAVENOUS; PARENTERAL at 11:05

## 2018-05-26 RX ADMIN — ACETAMINOPHEN 1000 MG: 500 TABLET, FILM COATED ORAL at 09:05

## 2018-05-26 RX ADMIN — ONDANSETRON 4 MG: 4 TABLET, FILM COATED ORAL at 01:05

## 2018-05-26 RX ADMIN — GABAPENTIN 100 MG: 100 CAPSULE ORAL at 04:05

## 2018-05-26 RX ADMIN — PIPERACILLIN AND TAZOBACTAM 4.5 G: 4; .5 INJECTION, POWDER, LYOPHILIZED, FOR SOLUTION INTRAVENOUS; PARENTERAL at 01:05

## 2018-05-26 RX ADMIN — BACLOFEN 5 MG: 10 TABLET ORAL at 10:05

## 2018-05-26 RX ADMIN — POLYETHYLENE GLYCOL 3350 17 G: 17 POWDER, FOR SOLUTION ORAL at 10:05

## 2018-05-26 RX ADMIN — KETOROLAC TROMETHAMINE 30 MG: 30 INJECTION, SOLUTION INTRAMUSCULAR at 05:05

## 2018-05-26 RX ADMIN — PROMETHAZINE HYDROCHLORIDE 6.25 MG: 25 INJECTION INTRAMUSCULAR; INTRAVENOUS at 04:05

## 2018-05-26 RX ADMIN — PANTOPRAZOLE SODIUM 40 MG: 40 TABLET, DELAYED RELEASE ORAL at 10:05

## 2018-05-26 RX ADMIN — PIPERACILLIN AND TAZOBACTAM 4.5 G: 4; .5 INJECTION, POWDER, LYOPHILIZED, FOR SOLUTION INTRAVENOUS; PARENTERAL at 05:05

## 2018-05-26 RX ADMIN — GABAPENTIN 100 MG: 100 CAPSULE ORAL at 09:05

## 2018-05-26 RX ADMIN — ACETAMINOPHEN 1000 MG: 500 TABLET, FILM COATED ORAL at 10:05

## 2018-05-26 RX ADMIN — SODIUM CHLORIDE, SODIUM LACTATE, POTASSIUM CHLORIDE, AND CALCIUM CHLORIDE 1000 ML: .6; .31; .03; .02 INJECTION, SOLUTION INTRAVENOUS at 11:05

## 2018-05-26 RX ADMIN — BACLOFEN 5 MG: 10 TABLET ORAL at 04:05

## 2018-05-26 RX ADMIN — CELECOXIB 100 MG: 100 CAPSULE ORAL at 10:05

## 2018-05-26 RX ADMIN — GABAPENTIN 100 MG: 100 CAPSULE ORAL at 10:05

## 2018-05-26 RX ADMIN — Medication: at 11:05

## 2018-05-26 RX ADMIN — CELECOXIB 100 MG: 100 CAPSULE ORAL at 09:05

## 2018-05-26 RX ADMIN — BACLOFEN 5 MG: 10 TABLET ORAL at 09:05

## 2018-05-26 NOTE — PLAN OF CARE
Problem: Patient Care Overview  Goal: Plan of Care Review  Outcome: Ongoing (interventions implemented as appropriate)  Plan of Care reviewed w/ pt. AVSS on RA. x3 lap sites w/ dermabond MARK. R chest port accessed w/ +blood return. Tolerating regular diet, some nausea reported, PRN PO Zofran and IV Phenergan given w/ some effect. Voiding CYU in bathroom, x1 BM this shift. Ambulating in halls w/ standby assist and RW. Morphine PCA started this AM, settings reduced to 1mg/6/10mg for c/o nausea/drowsiness.

## 2018-05-26 NOTE — PLAN OF CARE
Problem: Patient Care Overview  Goal: Plan of Care Review  Outcome: Ongoing (interventions implemented as appropriate)  PT is AAOX3, no acute changes noted during shift, pt is up ad ernst and repositions self in bed q2, no skin breakdown noted, VSS. No falls noted. Fall precautions remain Pain assessed. Pain controlled with meds. Pt resting comfortable in bed. Call light in reach. Will continue to monitor.

## 2018-05-26 NOTE — PROGRESS NOTES
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     Patient states he has been having RLQ abdominal pain for the past 2 weeks, associated with increased baseline nausea, no emesis and no fevers. For the past week he has been on prophylactic antibiotics for neutropenia. Otherwise has been having some loose bowel movements but denies any blood.     He is currently on chemotherapy and has had at least 3 cycles, he has 5 days of chemotherapy and 2 weeks without it, he is due on Monday. He had Neutropenic fever on 4/26-29 requiring hospitalization and treatment with antibiotics. Last treatment about 2 weeks ago.         Post-Op Info:  Procedure(s) (LRB):  APPENDECTOMY, LAPAROSCOPIC (N/A)   2 Days Post-Op     Interval History: No acute events overnight. Pt seen and examined at the bedside. Vital signs stable. Having trouble with pain control. No nausea/vomiting. Tolerating PO. No other complaints or concerns. On rounds pt sleeping and hard to arouse, once awake pt immediately began talking about how much pain he was in.     Medications:  Continuous Infusions:   morphine       Scheduled Meds:   acetaminophen  1,000 mg Oral Q8H    baclofen  5 mg Oral TID    celecoxib  100 mg Oral BID    gabapentin  100 mg Oral TID    lactated ringers  1,000 mL Intravenous Once    lidocaine  1 patch Transdermal Q24H    pantoprazole  40 mg Oral Daily    piperacillin-tazobactam (ZOSYN) IVPB  4.5 g Intravenous Q8H    polyethylene glycol  17 g Oral Daily     PRN Meds:cyclobenzaprine, diphenhydrAMINE, LORazepam, naloxone, promethazine (PHENERGAN) IVPB, sodium chloride 0.9%, zolpidem     Review of patient's allergies indicates:   Allergen Reactions    Mushroom Anaphylaxis    Bamboo     Bee pollens     Mushroom flavor      Venom-honey bee      Objective:     Vital Signs (Most Recent):  Temp: 96.3 °F (35.7 °C) (05/26/18 0901)  Pulse: 86 (05/26/18 0901)  Resp: 18 (05/26/18 0901)  BP: 110/61 (05/26/18 0901)  SpO2: 97 % (05/26/18 0901) Vital Signs (24h Range):  Temp:  [96.3 °F (35.7 °C)-99 °F (37.2 °C)] 96.3 °F (35.7 °C)  Pulse:  [] 86  Resp:  [16-18] 18  SpO2:  [97 %-100 %] 97 %  BP: ()/(51-61) 110/61     Weight: 62.6 kg (137 lb 15.8 oz)  Body mass index is 22.27 kg/m².    Intake/Output - Last 3 Shifts       05/24 0700 - 05/25 0659 05/25 0700 - 05/26 0659 05/26 0700 - 05/27 0659    P.O.  1400 300    I.V. (mL/kg) 800 (12.8)      IV Piggyback  300     Total Intake(mL/kg) 800 (12.8) 1700 (27.2) 300 (4.8)    Urine (mL/kg/hr) 600      Total Output 600        Net +200 +1700 +300           Urine Occurrence 1 x 2 x 1 x    Stool Occurrence   0 x          Physical Exam   Constitutional: He is oriented to person, place, and time. He appears well-developed and well-nourished. He is sleeping. No distress.   HENT:   Head: Normocephalic and atraumatic.   Eyes: EOM are normal. No scleral icterus.   Neck: No tracheal deviation present.   Cardiovascular: Normal rate.    Pulmonary/Chest: Effort normal.   Abdominal: Soft. He exhibits no distension. There is tenderness (attp at incision sites).   Neurological: He is alert and oriented to person, place, and time. No cranial nerve deficit.   Psychiatric: He has a normal mood and affect.       Significant Labs:  CBC:   Recent Labs  Lab 05/25/18  0515   WBC 24.49*   RBC 3.65*   HGB 10.5*   HCT 30.7*      MCV 84   MCH 28.8   MCHC 34.2     CMP:   Recent Labs  Lab 05/24/18  1920 05/25/18  0515   GLU 88 140*   CALCIUM 9.4 9.5   ALBUMIN 3.7  --    PROT 6.4  --     138   K 4.3 5.0   CO2 28 27    105   BUN 10 12   CREATININE 0.8 0.8   ALKPHOS 77  --    ALT 17  --    AST 18  --    BILITOT 0.3  --        Significant Diagnostics:  I have reviewed all pertinent imaging results/findings within  the past 24 hours.    Assessment/Plan:     S/P laparoscopic appendectomy    Alejandro Neff is a 23 y.o. male s/p lap appendectomy, of note pt on chemo for seminoma in mediastinum    Regular diet  Prn pain with PCA  Scheduled tylenol, celebrex, gabapentin  Prn flexeril  Incentive spirometry and acapella  Ambulate in halls tid  Up and out of bed to chair  DVT and GI ppx  Low threshold to re-scan if clinical picture deteriorates  Continue IV abx  Once WBC down trending transition to PO abx and possible discharge            Alfred Menard MD  General Surgery  [facility]

## 2018-05-26 NOTE — ASSESSMENT & PLAN NOTE
Alejandro Geronimosuraj Bossjoshuabyron is a 23 y.o. male s/p lap appendectomy, of note pt on chemo for seminoma in mediastinum    Regular diet  Prn pain with PCA  Scheduled tylenol, celebrex, gabapentin  Prn flexeril  Incentive spirometry and acapella  Ambulate in halls tid  Up and out of bed to chair  DVT and GI ppx  Low threshold to re-scan if clinical picture deteriorates  Continue IV abx  Once WBC down trending transition to PO abx and possible discharge

## 2018-05-26 NOTE — SUBJECTIVE & OBJECTIVE
Interval History: No acute events overnight. Pt seen and examined at the bedside. Vital signs stable. Having trouble with pain control. No nausea/vomiting. Tolerating PO. No other complaints or concerns. On rounds pt sleeping and hard to arouse, once awake pt immediately began talking about how much pain he was in.     Medications:  Continuous Infusions:   morphine       Scheduled Meds:   acetaminophen  1,000 mg Oral Q8H    baclofen  5 mg Oral TID    celecoxib  100 mg Oral BID    gabapentin  100 mg Oral TID    lactated ringers  1,000 mL Intravenous Once    lidocaine  1 patch Transdermal Q24H    pantoprazole  40 mg Oral Daily    piperacillin-tazobactam (ZOSYN) IVPB  4.5 g Intravenous Q8H    polyethylene glycol  17 g Oral Daily     PRN Meds:cyclobenzaprine, diphenhydrAMINE, LORazepam, naloxone, promethazine (PHENERGAN) IVPB, sodium chloride 0.9%, zolpidem     Review of patient's allergies indicates:   Allergen Reactions    Mushroom Anaphylaxis    Bamboo     Bee pollens     Mushroom flavor     Venom-honey bee      Objective:     Vital Signs (Most Recent):  Temp: 96.3 °F (35.7 °C) (05/26/18 0901)  Pulse: 86 (05/26/18 0901)  Resp: 18 (05/26/18 0901)  BP: 110/61 (05/26/18 0901)  SpO2: 97 % (05/26/18 0901) Vital Signs (24h Range):  Temp:  [96.3 °F (35.7 °C)-99 °F (37.2 °C)] 96.3 °F (35.7 °C)  Pulse:  [] 86  Resp:  [16-18] 18  SpO2:  [97 %-100 %] 97 %  BP: ()/(51-61) 110/61     Weight: 62.6 kg (137 lb 15.8 oz)  Body mass index is 22.27 kg/m².    Intake/Output - Last 3 Shifts       05/24 0700 - 05/25 0659 05/25 0700 - 05/26 0659 05/26 0700 - 05/27 0659    P.O.  1400 300    I.V. (mL/kg) 800 (12.8)      IV Piggyback  300     Total Intake(mL/kg) 800 (12.8) 1700 (27.2) 300 (4.8)    Urine (mL/kg/hr) 600      Total Output 600        Net +200 +1700 +300           Urine Occurrence 1 x 2 x 1 x    Stool Occurrence   0 x          Physical Exam   Constitutional: He is oriented to person, place, and time. He  appears well-developed and well-nourished. He is sleeping. No distress.   HENT:   Head: Normocephalic and atraumatic.   Eyes: EOM are normal. No scleral icterus.   Neck: No tracheal deviation present.   Cardiovascular: Normal rate.    Pulmonary/Chest: Effort normal.   Abdominal: Soft. He exhibits no distension. There is tenderness (attp at incision sites).   Neurological: He is alert and oriented to person, place, and time. No cranial nerve deficit.   Psychiatric: He has a normal mood and affect.       Significant Labs:  CBC:   Recent Labs  Lab 05/25/18  0515   WBC 24.49*   RBC 3.65*   HGB 10.5*   HCT 30.7*      MCV 84   MCH 28.8   MCHC 34.2     CMP:   Recent Labs  Lab 05/24/18  1920 05/25/18  0515   GLU 88 140*   CALCIUM 9.4 9.5   ALBUMIN 3.7  --    PROT 6.4  --     138   K 4.3 5.0   CO2 28 27    105   BUN 10 12   CREATININE 0.8 0.8   ALKPHOS 77  --    ALT 17  --    AST 18  --    BILITOT 0.3  --        Significant Diagnostics:  I have reviewed all pertinent imaging results/findings within the past 24 hours.

## 2018-05-27 LAB
ALBUMIN SERPL BCP-MCNC: 3.1 G/DL
ALP SERPL-CCNC: 53 U/L
ALT SERPL W/O P-5'-P-CCNC: 15 U/L
ANION GAP SERPL CALC-SCNC: 7 MMOL/L
ANISOCYTOSIS BLD QL SMEAR: SLIGHT
AST SERPL-CCNC: 12 U/L
BASOPHILS # BLD AUTO: ABNORMAL K/UL
BASOPHILS NFR BLD: 1 %
BILIRUB SERPL-MCNC: 0.2 MG/DL
BUN SERPL-MCNC: 13 MG/DL
CALCIUM SERPL-MCNC: 9.4 MG/DL
CHLORIDE SERPL-SCNC: 104 MMOL/L
CO2 SERPL-SCNC: 29 MMOL/L
CREAT SERPL-MCNC: 0.7 MG/DL
DIFFERENTIAL METHOD: ABNORMAL
EOSINOPHIL # BLD AUTO: ABNORMAL K/UL
EOSINOPHIL NFR BLD: 0 %
ERYTHROCYTE [DISTWIDTH] IN BLOOD BY AUTOMATED COUNT: 15.4 %
EST. GFR  (AFRICAN AMERICAN): >60 ML/MIN/1.73 M^2
EST. GFR  (NON AFRICAN AMERICAN): >60 ML/MIN/1.73 M^2
GLUCOSE SERPL-MCNC: 88 MG/DL
HCT VFR BLD AUTO: 29.2 %
HGB BLD-MCNC: 9.7 G/DL
HYPOCHROMIA BLD QL SMEAR: ABNORMAL
IMM GRANULOCYTES # BLD AUTO: ABNORMAL K/UL
IMM GRANULOCYTES NFR BLD AUTO: ABNORMAL %
LYMPHOCYTES # BLD AUTO: ABNORMAL K/UL
LYMPHOCYTES NFR BLD: 13 %
MCH RBC QN AUTO: 28.4 PG
MCHC RBC AUTO-ENTMCNC: 33.2 G/DL
MCV RBC AUTO: 85 FL
METAMYELOCYTES NFR BLD MANUAL: 1 %
MONOCYTES # BLD AUTO: ABNORMAL K/UL
MONOCYTES NFR BLD: 5 %
MYELOCYTES NFR BLD MANUAL: 1 %
NEUTROPHILS NFR BLD: 76 %
NEUTS BAND NFR BLD MANUAL: 3 %
NRBC BLD-RTO: 0 /100 WBC
PLATELET # BLD AUTO: 198 K/UL
PLATELET BLD QL SMEAR: ABNORMAL
PMV BLD AUTO: 8.4 FL
POTASSIUM SERPL-SCNC: 4.2 MMOL/L
PROT SERPL-MCNC: 5.5 G/DL
RBC # BLD AUTO: 3.42 M/UL
SODIUM SERPL-SCNC: 140 MMOL/L
WBC # BLD AUTO: 11.7 K/UL

## 2018-05-27 PROCEDURE — 25000003 PHARM REV CODE 250: Performed by: SURGERY

## 2018-05-27 PROCEDURE — 27000646 HC AEROBIKA DEVICE

## 2018-05-27 PROCEDURE — 94664 DEMO&/EVAL PT USE INHALER: CPT

## 2018-05-27 PROCEDURE — 99900035 HC TECH TIME PER 15 MIN (STAT)

## 2018-05-27 PROCEDURE — 25000003 PHARM REV CODE 250: Performed by: STUDENT IN AN ORGANIZED HEALTH CARE EDUCATION/TRAINING PROGRAM

## 2018-05-27 PROCEDURE — 85007 BL SMEAR W/DIFF WBC COUNT: CPT

## 2018-05-27 PROCEDURE — 80053 COMPREHEN METABOLIC PANEL: CPT

## 2018-05-27 PROCEDURE — 94761 N-INVAS EAR/PLS OXIMETRY MLT: CPT

## 2018-05-27 PROCEDURE — 20600001 HC STEP DOWN PRIVATE ROOM

## 2018-05-27 PROCEDURE — 94799 UNLISTED PULMONARY SVC/PX: CPT

## 2018-05-27 PROCEDURE — 85027 COMPLETE CBC AUTOMATED: CPT

## 2018-05-27 PROCEDURE — 63600175 PHARM REV CODE 636 W HCPCS: Performed by: STUDENT IN AN ORGANIZED HEALTH CARE EDUCATION/TRAINING PROGRAM

## 2018-05-27 RX ORDER — MORPHINE SULFATE 15 MG/1
15 TABLET ORAL EVERY 4 HOURS PRN
Status: DISCONTINUED | OUTPATIENT
Start: 2018-05-27 | End: 2018-05-28

## 2018-05-27 RX ADMIN — PROMETHAZINE HYDROCHLORIDE 12.5 MG: 25 INJECTION INTRAMUSCULAR; INTRAVENOUS at 10:05

## 2018-05-27 RX ADMIN — MORPHINE SULFATE 15 MG: 15 TABLET ORAL at 10:05

## 2018-05-27 RX ADMIN — ACETAMINOPHEN 1000 MG: 500 TABLET, FILM COATED ORAL at 01:05

## 2018-05-27 RX ADMIN — LORAZEPAM 0.5 MG: 0.5 TABLET ORAL at 10:05

## 2018-05-27 RX ADMIN — MORPHINE SULFATE 15 MG: 15 TABLET ORAL at 06:05

## 2018-05-27 RX ADMIN — CYCLOBENZAPRINE HYDROCHLORIDE 5 MG: 5 TABLET, FILM COATED ORAL at 04:05

## 2018-05-27 RX ADMIN — PIPERACILLIN AND TAZOBACTAM 4.5 G: 4; .5 INJECTION, POWDER, LYOPHILIZED, FOR SOLUTION INTRAVENOUS; PARENTERAL at 01:05

## 2018-05-27 RX ADMIN — BACLOFEN 5 MG: 10 TABLET ORAL at 03:05

## 2018-05-27 RX ADMIN — PIPERACILLIN AND TAZOBACTAM 4.5 G: 4; .5 INJECTION, POWDER, LYOPHILIZED, FOR SOLUTION INTRAVENOUS; PARENTERAL at 10:05

## 2018-05-27 RX ADMIN — GABAPENTIN 100 MG: 100 CAPSULE ORAL at 08:05

## 2018-05-27 RX ADMIN — CELECOXIB 100 MG: 100 CAPSULE ORAL at 08:05

## 2018-05-27 RX ADMIN — PANTOPRAZOLE SODIUM 40 MG: 40 TABLET, DELAYED RELEASE ORAL at 08:05

## 2018-05-27 RX ADMIN — BACLOFEN 5 MG: 10 TABLET ORAL at 08:05

## 2018-05-27 RX ADMIN — ACETAMINOPHEN 1000 MG: 500 TABLET, FILM COATED ORAL at 05:05

## 2018-05-27 RX ADMIN — PIPERACILLIN AND TAZOBACTAM 4.5 G: 4; .5 INJECTION, POWDER, LYOPHILIZED, FOR SOLUTION INTRAVENOUS; PARENTERAL at 05:05

## 2018-05-27 RX ADMIN — ACETAMINOPHEN 1000 MG: 500 TABLET, FILM COATED ORAL at 10:05

## 2018-05-27 RX ADMIN — MORPHINE SULFATE 15 MG: 15 TABLET ORAL at 01:05

## 2018-05-27 RX ADMIN — GABAPENTIN 100 MG: 100 CAPSULE ORAL at 03:05

## 2018-05-27 RX ADMIN — ZOLPIDEM TARTRATE 5 MG: 5 TABLET ORAL at 10:05

## 2018-05-27 NOTE — PROGRESS NOTES
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     Patient states he has been having RLQ abdominal pain for the past 2 weeks, associated with increased baseline nausea, no emesis and no fevers. For the past week he has been on prophylactic antibiotics for neutropenia. Otherwise has been having some loose bowel movements but denies any blood.     He is currently on chemotherapy and has had at least 3 cycles, he has 5 days of chemotherapy and 2 weeks without it, he is due on Monday. He had Neutropenic fever on 4/26-29 requiring hospitalization and treatment with antibiotics. Last treatment about 2 weeks ago.         Post-Op Info:  Procedure(s) (LRB):  APPENDECTOMY, LAPAROSCOPIC (N/A)   3 Days Post-Op     Interval History: No acute events overnight. Pt seen and examined at the bedside. Vital signs stable. Reporting issues with pain control. Not using pca but says he needs pain medications. Much more awake this morning without high doses of prn pain meds by mouth. Appears comfortable. No new complaints or concerns.     Medications:  Continuous Infusions:    Scheduled Meds:   acetaminophen  1,000 mg Oral Q8H    baclofen  5 mg Oral TID    celecoxib  100 mg Oral BID    gabapentin  100 mg Oral TID    lidocaine  1 patch Transdermal Q24H    ondansetron  4 mg Oral Once    pantoprazole  40 mg Oral Daily    piperacillin-tazobactam (ZOSYN) IVPB  4.5 g Intravenous Q8H    polyethylene glycol  17 g Oral Daily    promethazine (PHENERGAN) IVPB  12.5 mg Intravenous Once     PRN Meds:cyclobenzaprine, diphenhydrAMINE, LORazepam, morphine, ondansetron, promethazine (PHENERGAN) IVPB, sodium chloride 0.9%, zolpidem     Review of patient's allergies indicates:   Allergen Reactions    Mushroom Anaphylaxis    Bamboo     Bee  pollens     Mushroom flavor     Venom-honey bee      Objective:     Vital Signs (Most Recent):  Temp: 97.3 °F (36.3 °C) (05/27/18 0748)  Pulse: 83 (05/27/18 0748)  Resp: 16 (05/27/18 0748)  BP: 116/72 (05/27/18 0748)  SpO2: 99 % (05/27/18 0748) Vital Signs (24h Range):  Temp:  [96.9 °F (36.1 °C)-98.1 °F (36.7 °C)] 97.3 °F (36.3 °C)  Pulse:  [58-93] 83  Resp:  [12-18] 16  SpO2:  [96 %-99 %] 99 %  BP: ()/(52-72) 116/72     Weight: 62.6 kg (137 lb 15.8 oz)  Body mass index is 22.27 kg/m².    Intake/Output - Last 3 Shifts       05/25 0700 - 05/26 0659 05/26 0700 - 05/27 0659 05/27 0700 - 05/28 0659    P.O. 1400 1300     IV Piggyback 300 650     Total Intake(mL/kg) 1700 (27.2) 1950 (31.2)     Net +1700 +1950             Urine Occurrence 2 x 3 x     Stool Occurrence  2 x           Physical Exam   Constitutional: He is oriented to person, place, and time. He appears well-developed and well-nourished. He is sleeping. No distress.   HENT:   Head: Normocephalic and atraumatic.   Eyes: EOM are normal. No scleral icterus.   Neck: No tracheal deviation present.   Cardiovascular: Normal rate.    Pulmonary/Chest: Effort normal.   Abdominal: Soft. He exhibits no distension. There is tenderness (attp at incision sites).   Neurological: He is alert and oriented to person, place, and time. No cranial nerve deficit.   Psychiatric: He has a normal mood and affect.       Significant Labs:  CBC:     Recent Labs  Lab 05/27/18  0909   WBC 11.70   RBC 3.42*   HGB 9.7*   HCT 29.2*      MCV 85   MCH 28.4   MCHC 33.2     CMP:     Recent Labs  Lab 05/27/18 0909   GLU 88   CALCIUM 9.4   ALBUMIN 3.1*   PROT 5.5*      K 4.2   CO2 29      BUN 13   CREATININE 0.7   ALKPHOS 53*   ALT 15   AST 12   BILITOT 0.2       Significant Diagnostics:  I have reviewed all pertinent imaging results/findings within the past 24 hours.    Assessment/Plan:     S/P laparoscopic appendectomy    Alejandro Neff is a 23 y.o. male s/p lap  appendectomy, of note pt on chemo for seminoma in mediastinum    Regular diet  Prn pain with PO meds  Scheduled tylenol, celebrex, gabapentin  Prn flexeril  Incentive spirometry and acapella  Ambulate in halls tid  Up and out of bed to chair  DVT and GI ppx  Low threshold to re-scan if clinical picture deteriorates  Continue IV abx until tonight, then DC and start PO abx  Then likely discharge tomorrow            Alfred Menard MD  General Surgery  [facility]

## 2018-05-27 NOTE — SUBJECTIVE & OBJECTIVE
Interval History: No acute events overnight. Pt seen and examined at the bedside. Vital signs stable. Reporting issues with pain control. Not using pca but says he needs pain medications. Much more awake this morning without high doses of prn pain meds by mouth. Appears comfortable. No new complaints or concerns.     Medications:  Continuous Infusions:    Scheduled Meds:   acetaminophen  1,000 mg Oral Q8H    baclofen  5 mg Oral TID    celecoxib  100 mg Oral BID    gabapentin  100 mg Oral TID    lidocaine  1 patch Transdermal Q24H    ondansetron  4 mg Oral Once    pantoprazole  40 mg Oral Daily    piperacillin-tazobactam (ZOSYN) IVPB  4.5 g Intravenous Q8H    polyethylene glycol  17 g Oral Daily    promethazine (PHENERGAN) IVPB  12.5 mg Intravenous Once     PRN Meds:cyclobenzaprine, diphenhydrAMINE, LORazepam, morphine, ondansetron, promethazine (PHENERGAN) IVPB, sodium chloride 0.9%, zolpidem     Review of patient's allergies indicates:   Allergen Reactions    Mushroom Anaphylaxis    Bamboo     Bee pollens     Mushroom flavor     Venom-honey bee      Objective:     Vital Signs (Most Recent):  Temp: 97.3 °F (36.3 °C) (05/27/18 0748)  Pulse: 83 (05/27/18 0748)  Resp: 16 (05/27/18 0748)  BP: 116/72 (05/27/18 0748)  SpO2: 99 % (05/27/18 0748) Vital Signs (24h Range):  Temp:  [96.9 °F (36.1 °C)-98.1 °F (36.7 °C)] 97.3 °F (36.3 °C)  Pulse:  [58-93] 83  Resp:  [12-18] 16  SpO2:  [96 %-99 %] 99 %  BP: ()/(52-72) 116/72     Weight: 62.6 kg (137 lb 15.8 oz)  Body mass index is 22.27 kg/m².    Intake/Output - Last 3 Shifts       05/25 0700 - 05/26 0659 05/26 0700 - 05/27 0659 05/27 0700 - 05/28 0659    P.O. 1400 1300     IV Piggyback 300 650     Total Intake(mL/kg) 1700 (27.2) 1950 (31.2)     Net +1700 +1950             Urine Occurrence 2 x 3 x     Stool Occurrence  2 x           Physical Exam   Constitutional: He is oriented to person, place, and time. He appears well-developed and well-nourished. He is  sleeping. No distress.   HENT:   Head: Normocephalic and atraumatic.   Eyes: EOM are normal. No scleral icterus.   Neck: No tracheal deviation present.   Cardiovascular: Normal rate.    Pulmonary/Chest: Effort normal.   Abdominal: Soft. He exhibits no distension. There is tenderness (attp at incision sites).   Neurological: He is alert and oriented to person, place, and time. No cranial nerve deficit.   Psychiatric: He has a normal mood and affect.       Significant Labs:  CBC:     Recent Labs  Lab 05/27/18  0909   WBC 11.70   RBC 3.42*   HGB 9.7*   HCT 29.2*      MCV 85   MCH 28.4   MCHC 33.2     CMP:     Recent Labs  Lab 05/27/18  0909   GLU 88   CALCIUM 9.4   ALBUMIN 3.1*   PROT 5.5*      K 4.2   CO2 29      BUN 13   CREATININE 0.7   ALKPHOS 53*   ALT 15   AST 12   BILITOT 0.2       Significant Diagnostics:  I have reviewed all pertinent imaging results/findings within the past 24 hours.

## 2018-05-27 NOTE — PROGRESS NOTES
Pt. States that Morphine PCA and Morphine PO do not relieve his pain and would like to switch pain medications. Pt. Also concerned about having 3 loose BMs since beginning of shift. Notified Dr. Agarwal. [facility].

## 2018-05-27 NOTE — PLAN OF CARE
Problem: Patient Care Overview  Goal: Plan of Care Review  Outcome: Ongoing (interventions implemented as appropriate)  POC reviewed with patient. VSS. Pain treated with PRN medications. Ambulating to the bathroom with walker. Tolerating diet, denied nausea. Port accessed, CDI, blood return noted. Patient remained free from falls and trauma. Call light in reach. TM.

## 2018-05-27 NOTE — ASSESSMENT & PLAN NOTE
Alejandro Geronimosuraj Neff is a 23 y.o. male s/p lap appendectomy, of note pt on chemo for seminoma in mediastinum    Regular diet  Prn pain with PO meds  Scheduled tylenol, celebrex, gabapentin  Prn flexeril  Incentive spirometry and acapella  Ambulate in halls tid  Up and out of bed to chair  DVT and GI ppx  Low threshold to re-scan if clinical picture deteriorates  Continue IV abx until tonight, then DC and start PO abx  Then likely discharge tomorrow

## 2018-05-28 ENCOUNTER — PATIENT MESSAGE (OUTPATIENT)
Dept: HEMATOLOGY/ONCOLOGY | Facility: CLINIC | Age: 23
End: 2018-05-28

## 2018-05-28 PROBLEM — K35.80 ACUTE APPENDICITIS: Status: ACTIVE | Noted: 2018-05-28

## 2018-05-28 LAB
ALBUMIN SERPL BCP-MCNC: 3.3 G/DL
ALP SERPL-CCNC: 47 U/L
ALT SERPL W/O P-5'-P-CCNC: 37 U/L
ANION GAP SERPL CALC-SCNC: 7 MMOL/L
ANISOCYTOSIS BLD QL SMEAR: SLIGHT
AST SERPL-CCNC: 34 U/L
BASOPHILS # BLD AUTO: ABNORMAL K/UL
BASOPHILS NFR BLD: 0 %
BILIRUB SERPL-MCNC: 0.2 MG/DL
BUN SERPL-MCNC: 11 MG/DL
CALCIUM SERPL-MCNC: 8.6 MG/DL
CHLORIDE SERPL-SCNC: 105 MMOL/L
CO2 SERPL-SCNC: 29 MMOL/L
CREAT SERPL-MCNC: 0.8 MG/DL
DIFFERENTIAL METHOD: ABNORMAL
EOSINOPHIL # BLD AUTO: ABNORMAL K/UL
EOSINOPHIL NFR BLD: 0 %
ERYTHROCYTE [DISTWIDTH] IN BLOOD BY AUTOMATED COUNT: 15.7 %
EST. GFR  (AFRICAN AMERICAN): >60 ML/MIN/1.73 M^2
EST. GFR  (NON AFRICAN AMERICAN): >60 ML/MIN/1.73 M^2
GLUCOSE SERPL-MCNC: 91 MG/DL
HCT VFR BLD AUTO: 30 %
HGB BLD-MCNC: 9.9 G/DL
IMM GRANULOCYTES # BLD AUTO: ABNORMAL K/UL
IMM GRANULOCYTES NFR BLD AUTO: ABNORMAL %
LYMPHOCYTES # BLD AUTO: ABNORMAL K/UL
LYMPHOCYTES NFR BLD: 19 %
MCH RBC QN AUTO: 28.3 PG
MCHC RBC AUTO-ENTMCNC: 33 G/DL
MCV RBC AUTO: 86 FL
METAMYELOCYTES NFR BLD MANUAL: 2 %
MONOCYTES # BLD AUTO: ABNORMAL K/UL
MONOCYTES NFR BLD: 2 %
NEUTROPHILS NFR BLD: 75 %
NEUTS BAND NFR BLD MANUAL: 2 %
NRBC BLD-RTO: 0 /100 WBC
OVALOCYTES BLD QL SMEAR: ABNORMAL
PLATELET # BLD AUTO: 239 K/UL
PMV BLD AUTO: 8 FL
POIKILOCYTOSIS BLD QL SMEAR: SLIGHT
POLYCHROMASIA BLD QL SMEAR: ABNORMAL
POTASSIUM SERPL-SCNC: 4.2 MMOL/L
PROT SERPL-MCNC: 6 G/DL
RBC # BLD AUTO: 3.5 M/UL
SODIUM SERPL-SCNC: 141 MMOL/L
WBC # BLD AUTO: 10.45 K/UL

## 2018-05-28 PROCEDURE — 25000003 PHARM REV CODE 250: Performed by: STUDENT IN AN ORGANIZED HEALTH CARE EDUCATION/TRAINING PROGRAM

## 2018-05-28 PROCEDURE — 63600175 PHARM REV CODE 636 W HCPCS: Performed by: STUDENT IN AN ORGANIZED HEALTH CARE EDUCATION/TRAINING PROGRAM

## 2018-05-28 PROCEDURE — 25000003 PHARM REV CODE 250: Performed by: SURGERY

## 2018-05-28 PROCEDURE — 80053 COMPREHEN METABOLIC PANEL: CPT

## 2018-05-28 PROCEDURE — 99900035 HC TECH TIME PER 15 MIN (STAT)

## 2018-05-28 PROCEDURE — 25500020 PHARM REV CODE 255: Performed by: SURGERY

## 2018-05-28 PROCEDURE — 20600001 HC STEP DOWN PRIVATE ROOM

## 2018-05-28 PROCEDURE — 85007 BL SMEAR W/DIFF WBC COUNT: CPT

## 2018-05-28 PROCEDURE — 99231 SBSQ HOSP IP/OBS SF/LOW 25: CPT | Mod: ,,, | Performed by: ANESTHESIOLOGY

## 2018-05-28 PROCEDURE — 94664 DEMO&/EVAL PT USE INHALER: CPT

## 2018-05-28 PROCEDURE — 25500020 PHARM REV CODE 255: Performed by: STUDENT IN AN ORGANIZED HEALTH CARE EDUCATION/TRAINING PROGRAM

## 2018-05-28 PROCEDURE — 85027 COMPLETE CBC AUTOMATED: CPT

## 2018-05-28 PROCEDURE — 25000003 PHARM REV CODE 250: Performed by: ANESTHESIOLOGY

## 2018-05-28 PROCEDURE — 99233 SBSQ HOSP IP/OBS HIGH 50: CPT | Mod: ,,, | Performed by: INTERNAL MEDICINE

## 2018-05-28 RX ORDER — HYDROMORPHONE HYDROCHLORIDE 2 MG/1
2 TABLET ORAL EVERY 4 HOURS PRN
Status: DISCONTINUED | OUTPATIENT
Start: 2018-05-28 | End: 2018-05-28

## 2018-05-28 RX ORDER — HYDROMORPHONE HYDROCHLORIDE 2 MG/1
6 TABLET ORAL EVERY 4 HOURS PRN
Status: DISCONTINUED | OUTPATIENT
Start: 2018-05-28 | End: 2018-05-29

## 2018-05-28 RX ORDER — HYDROMORPHONE HYDROCHLORIDE 2 MG/1
2 TABLET ORAL EVERY 4 HOURS PRN
Status: DISCONTINUED | OUTPATIENT
Start: 2018-05-28 | End: 2018-05-29

## 2018-05-28 RX ORDER — HYDROMORPHONE HYDROCHLORIDE 2 MG/1
4 TABLET ORAL EVERY 4 HOURS PRN
Status: DISCONTINUED | OUTPATIENT
Start: 2018-05-28 | End: 2018-05-29

## 2018-05-28 RX ORDER — CIPROFLOXACIN 500 MG/1
500 TABLET ORAL EVERY 12 HOURS
Status: DISCONTINUED | OUTPATIENT
Start: 2018-05-28 | End: 2018-05-29

## 2018-05-28 RX ORDER — ACETAMINOPHEN 500 MG
1000 TABLET ORAL EVERY 6 HOURS
Status: DISCONTINUED | OUTPATIENT
Start: 2018-05-28 | End: 2018-05-29 | Stop reason: HOSPADM

## 2018-05-28 RX ORDER — METRONIDAZOLE 500 MG/1
500 TABLET ORAL EVERY 8 HOURS
Status: DISCONTINUED | OUTPATIENT
Start: 2018-05-28 | End: 2018-05-29

## 2018-05-28 RX ORDER — GABAPENTIN 300 MG/1
300 CAPSULE ORAL 3 TIMES DAILY
Status: DISCONTINUED | OUTPATIENT
Start: 2018-05-28 | End: 2018-05-29 | Stop reason: HOSPADM

## 2018-05-28 RX ADMIN — BACLOFEN 5 MG: 10 TABLET ORAL at 03:05

## 2018-05-28 RX ADMIN — LORAZEPAM 0.5 MG: 0.5 TABLET ORAL at 09:05

## 2018-05-28 RX ADMIN — ONDANSETRON 4 MG: 4 TABLET, FILM COATED ORAL at 06:05

## 2018-05-28 RX ADMIN — ACETAMINOPHEN 1000 MG: 500 TABLET, FILM COATED ORAL at 05:05

## 2018-05-28 RX ADMIN — BACLOFEN 5 MG: 10 TABLET ORAL at 09:05

## 2018-05-28 RX ADMIN — CIPROFLOXACIN HYDROCHLORIDE 500 MG: 500 TABLET, FILM COATED ORAL at 09:05

## 2018-05-28 RX ADMIN — PANTOPRAZOLE SODIUM 40 MG: 40 TABLET, DELAYED RELEASE ORAL at 09:05

## 2018-05-28 RX ADMIN — METRONIDAZOLE 500 MG: 500 TABLET ORAL at 09:05

## 2018-05-28 RX ADMIN — METRONIDAZOLE 500 MG: 500 TABLET ORAL at 01:05

## 2018-05-28 RX ADMIN — IOHEXOL 15 ML: 350 INJECTION, SOLUTION INTRAVENOUS at 03:05

## 2018-05-28 RX ADMIN — HYDROMORPHONE HYDROCHLORIDE 6 MG: 2 TABLET ORAL at 01:05

## 2018-05-28 RX ADMIN — GABAPENTIN 300 MG: 300 CAPSULE ORAL at 09:05

## 2018-05-28 RX ADMIN — PROMETHAZINE HYDROCHLORIDE 12.5 MG: 25 INJECTION INTRAMUSCULAR; INTRAVENOUS at 11:05

## 2018-05-28 RX ADMIN — HYDROMORPHONE HYDROCHLORIDE 2 MG: 2 TABLET ORAL at 09:05

## 2018-05-28 RX ADMIN — GABAPENTIN 300 MG: 300 CAPSULE ORAL at 03:05

## 2018-05-28 RX ADMIN — GABAPENTIN 100 MG: 100 CAPSULE ORAL at 09:05

## 2018-05-28 RX ADMIN — CELECOXIB 100 MG: 100 CAPSULE ORAL at 09:05

## 2018-05-28 RX ADMIN — HYDROMORPHONE HYDROCHLORIDE 6 MG: 2 TABLET ORAL at 06:05

## 2018-05-28 RX ADMIN — HYDROMORPHONE HYDROCHLORIDE 6 MG: 2 TABLET ORAL at 09:05

## 2018-05-28 RX ADMIN — ACETAMINOPHEN 1000 MG: 500 TABLET, FILM COATED ORAL at 11:05

## 2018-05-28 RX ADMIN — ZOLPIDEM TARTRATE 5 MG: 5 TABLET ORAL at 09:05

## 2018-05-28 RX ADMIN — IOHEXOL 75 ML: 350 INJECTION, SOLUTION INTRAVENOUS at 05:05

## 2018-05-28 RX ADMIN — PIPERACILLIN AND TAZOBACTAM 4.5 G: 4; .5 INJECTION, POWDER, LYOPHILIZED, FOR SOLUTION INTRAVENOUS; PARENTERAL at 06:05

## 2018-05-28 NOTE — CONSULTS
Consult received. Full note to follow.    Please call for questions.    Thanks,  Armen Velasco MD  Infectious Disease Fellow, PGY-4  Pager: [phone]  [facility]

## 2018-05-28 NOTE — ASSESSMENT & PLAN NOTE
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 S/p lap appendectomy on 5/24, appendix was intact. ID consulted for duration of abx therapy. Zosyn was de escalated to PO cipro and flagyl on 5/28. Blood cx ngtd.   - f/u CT scan of the abdomen  - if no acute process found on repeat CT abd, will likely recommend stopping abx therapy, as pt is s/p appendectomy and appendix as intact  - will follow

## 2018-05-28 NOTE — PROGRESS NOTES
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     Patient states he has been having RLQ abdominal pain for the past 2 weeks, associated with increased baseline nausea, no emesis and no fevers. For the past week he has been on prophylactic antibiotics for neutropenia. Otherwise has been having some loose bowel movements but denies any blood.     He is currently on chemotherapy and has had at least 3 cycles, he has 5 days of chemotherapy and 2 weeks without it, he is due on Monday. He had Neutropenic fever on 4/26-29 requiring hospitalization and treatment with antibiotics. Last treatment about 2 weeks ago.         Post-Op Info:  Procedure(s) (LRB):  APPENDECTOMY, LAPAROSCOPIC (N/A)   4 Days Post-Op     Interval History: VSS. Continues to complain of uncontrolled pain - sleeping in med this morning, very difficult to arouse. Having BMs. Tolerating a diet. No nausea/vomiting.     Medications:  Continuous Infusions:  Scheduled Meds:   acetaminophen  1,000 mg Oral Q8H    baclofen  5 mg Oral TID    celecoxib  100 mg Oral BID    gabapentin  100 mg Oral TID    lidocaine  1 patch Transdermal Q24H    pantoprazole  40 mg Oral Daily    piperacillin-tazobactam (ZOSYN) IVPB  4.5 g Intravenous Q8H    polyethylene glycol  17 g Oral Daily     PRN Meds:cyclobenzaprine, diphenhydrAMINE, HYDROmorphone, LORazepam, ondansetron, promethazine (PHENERGAN) IVPB, sodium chloride 0.9%, zolpidem     Review of patient's allergies indicates:   Allergen Reactions    Mushroom Anaphylaxis    Bamboo     Bee pollens     Mushroom flavor     Venom-honey bee      Objective:     Vital Signs (Most Recent):  Temp: 97.5 °F (36.4 °C) (05/28/18 0503)  Pulse: 65 (05/28/18 0503)  Resp: 16 (05/28/18 0503)  BP: (!) 94/51 (05/28/18 0503)  SpO2:  97 % (05/28/18 0503) Vital Signs (24h Range):  Temp:  [97.2 °F (36.2 °C)-98.2 °F (36.8 °C)] 97.5 °F (36.4 °C)  Pulse:  [64-96] 65  Resp:  [16-18] 16  SpO2:  [96 %-99 %] 97 %  BP: ()/(51-72) 94/51     Weight: 62.6 kg (137 lb 15.8 oz)  Body mass index is 22.27 kg/m².    Intake/Output - Last 3 Shifts       05/26 0700 - 05/27 0659 05/27 0700 - 05/28 0659    P.O. 1300 550    IV Piggyback 650     Total Intake(mL/kg) 1950 (31.2) 550 (8.8)    Net +1950 +550          Urine Occurrence 3 x 3 x    Stool Occurrence 2 x 4 x          Physical Exam   Constitutional: He is oriented to person, place, and time. He appears well-developed and well-nourished. He is sleeping. No distress.   HENT:   Head: Normocephalic and atraumatic.   Eyes: EOM are normal. No scleral icterus.   Neck: No tracheal deviation present.   Cardiovascular: Normal rate.    Pulmonary/Chest: Effort normal.   Abdominal: Soft. He exhibits no distension. There is tenderness (attp at incision sites).   Neurological: He is alert and oriented to person, place, and time. No cranial nerve deficit.   Psychiatric: He has a normal mood and affect.          Significant Labs:  CBC:   Recent Labs  Lab 05/27/18  0909   WBC 11.70   RBC 3.42*   HGB 9.7*   HCT 29.2*      MCV 85   MCH 28.4   MCHC 33.2     CMP:   Recent Labs  Lab 05/27/18  0909   GLU 88   CALCIUM 9.4   ALBUMIN 3.1*   PROT 5.5*      K 4.2   CO2 29      BUN 13   CREATININE 0.7   ALKPHOS 53*   ALT 15   AST 12   BILITOT 0.2       Significant Diagnostics:  None    Assessment/Plan:     S/P laparoscopic appendectomy    Alejandro Neff is a 23 y.o. male s/p lap appendectomy, of note pt on chemo for seminoma in mediastinum    Regular diet  Prn pain with PO meds - switch PO morphine to dilaudid. Pain management consult   Scheduled tylenol, celebrex, gabapentin  Prn flexeril  Incentive spirometry and acapella  Ambulate in halls tid  Up and out of bed to chair  DVT and GI ppx  Will dc IV zosyn. And  start PO medications. ID consult for length of abx  Dispo: CT abdomen/pelvis with oral and IV contrast today            Spenser Whitehead MD  General Surgery  [facility]

## 2018-05-28 NOTE — ADDENDUM NOTE
Addendum  created 05/28/18 1023 by Elaina Gibson MD    Anesthesia Event edited, Pend clinical note

## 2018-05-28 NOTE — ASSESSMENT & PLAN NOTE
Alejandro GALICIA Memo Neff is a 23 y.o. male s/p lap appendectomy, of note pt on chemo for seminoma in mediastinum    Regular diet  Prn pain with PO meds - switch PO morphine to dilaudid   Scheduled tylenol, celebrex, gabapentin  Prn flexeril  Incentive spirometry and acapella  Ambulate in halls tid  Up and out of bed to chair  DVT and GI ppx  Will dc IV zosyn. And start PO medications   Dispo: possible d/c home today

## 2018-05-28 NOTE — HPI
"23 y.o M with hx of primary mediastinal seminoma currently on chemo (s/p cycle 2 of cisplatin and etoposide) who is admitted with acute appendicitis. Patient as admitted on 5/24, CT abd showed " Interval development of dilated appearance of the appendix with stranding of the periappendiceal fat,  Findings are worrisome for appendicitis ". Underwent laparoscopic appendectomy on 5/24, appendix was intact. Started on zosyn 5/24. Continues to have abdominal pain, although not febrile, improved leukocytosis with WBC of 10, tolerating PO. CT abd pending per surgery team. Notes some diarrhea that was watery on 5/27 but is resolving on 5/28, now more of loose stools    ID consulted for duration of abx therapy  "

## 2018-05-28 NOTE — PLAN OF CARE
Problem: Patient Care Overview  Goal: Plan of Care Review  Outcome: Ongoing (interventions implemented as appropriate)  Pt is AOx4 and injury free during night shift.  The pt only received pain medication once during night shift and slept during the night with no complaints. Breathing is regular equal and unlabored bilaterally on room air.

## 2018-05-28 NOTE — ANESTHESIA POST-OP PAIN MANAGEMENT
"Consult  Anesthesiology Pain Service      SUBJECTIVE:     Chief Complaint/Reason for Consult: Pain control POD 4 s/p lap appy in setting of chronic pain     Surgical Procedure: Procedure(s) (LRB):  APPENDECTOMY, LAPAROSCOPIC (N/A)    Post Op Day: 4 Days Post-Op    History of Present Illness:   23 y.o. 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     Acute pain service has been consulted for increased pain post operatively.     On interview, patient states his baseline pain rating is a 8/10, but he classifies this pain as a dull, different type of pain he is currently feeling. He states since surgery he has had 9-10/10 sharp pain in his RLQ and umbilicus primarily, with some radiation to his LLQ. He says the pain feels "like he is being stabbed with a serrated knife." Touch and pressure on abdomen make the pain worse- no relieving factors. States his pain has not been relieved since surgery (PCA did not help) and that this pain is very different from his chronic pain.    Current pain regiment includes:  Acetaminophen 1 g q 8  celebrex 100 mg BID  Lidocaine patch  Gabapentin 100 TID     PRNs:  Hydromorphone 2mg PO q 4    cyclobenzaprine, diphenhydrAMINE, HYDROmorphone, LORazepam, ondansetron, promethazine (PHENERGAN) IVPB, sodium chloride 0.9%, zolpidem      Review of patient's allergies indicates:   Allergen Reactions    Mushroom Anaphylaxis    Bamboo     Bee pollens     Mushroom flavor     Venom-honey bee         Past Medical History:   Diagnosis Date    Abdominal pain 08/12/2010    eval for abd and chest wall pain at [facility], [place], [place] - cxr wnl, EKG ([facility]), troponin wnl, normal chemistries    Allergy     Cancer     testicular    Chondromalacia patellae  " "   Chronic nausea 2015    eval by GI Dr. Tushar Artis - given zofran and ciproheptadine     Chronic pain     Chronic pain     [facility] - no meds; followed by psych and pain clinic and unresponsive to behavioral/CBT/old records reports c/f conversion disorder     Foot pain 04/2010    4/10 + SHIRA, eval by Dr. ALLY Lion at [facility] Rheum -dx unclear ? gout and trial of nsaids and pdn, xrays normal 1/11, referred to podiatry, re-eval by rheum 2/12 and MRI and films wnl    Fracture of right radius 09/2009    Lyme arthritis     multiple joints    Lyme disease 2013    Memory loss 03/2012    [place] for [place] [facility] [place] wnl. Dr. Patricio suggested emotional factors & ref to Tushar Davies, PhD for  eval & neuropsych eval 3/12 Lupe Delgado, PhD - no evidence of formal thought disorder or psychosis - documented severe memory impairment; not related to to ADD or executive function issues. sugesstion that memory issues may be related to "emotional factors", ?conversion d/o    Stress fracture of tibia and fibula 08/2011    Urticaria      Past Surgical History:   Procedure Laterality Date    CHEST WALL BIOPSY      LAPAROSCOPIC APPENDECTOMY N/A 5/24/2018    Procedure: APPENDECTOMY, LAPAROSCOPIC;  Surgeon: Kenan Huang Jr., MD;  Location: [facility] OR [address] [place] [address];  Service: General;  Laterality: N/A;    PORTACATH PLACEMENT Right      Family History   Problem Relation Age of Onset    Arthritis Mother     Other Mother         benign tumor of brain and spinal cord    Hyperlipidemia Father     Gout Father     No Known Problems Sister     Arthritis Sister         shoulder    Depression Sister      Social History   Substance Use Topics    Smoking status: Former Smoker     Packs/day: 0.25     Types: Vaping with nicotine, Vaping w/o nicotine, Cigarettes, Cigars     Quit date: 3/15/2018    Smokeless tobacco: Former User    Alcohol use Yes      Comment: " occ       Review of Systems:  Constitutional: Denies weight loss, chills, or weakness.  Eyes: denies vision changes, loss or blurry vision.   ENT: Denies dysphagia, nasal discharge, ear pain or discharge.  Cardiovascular: Denies chest pain, palpitations, orthopnea, or claudication.  Respiratory: Denies cough, hemoptysis and wheezing.  GI: + abd pain, -n/v  : Denies dysuria, incontinence, or hematuria.  Musculoskeletal: Denies joint pain or myalgias.  Skin/breast: Denies rashes, lumps, lesions, or discharge.  Neurologic: Denies headache, dizziness, vertigo, or paresthesias.  Psychiatric: denies suicidal ideation and changes in mood. Denies depression   Endocrine: Denies polyuria, polydipsia, heat/cold intolerance.  Hematologic/Lymph: Denies lymphadenopathy, easy bruising or easy bleeding.  Extremities: denies claudication; denies edema   Allergic/Immunologic: Denies rash, rhinitis.            OBJECTIVE:     Current Vital Signs  Temp: 36.2 °C (97.2 °F) (05/28/18 0808)  Pulse: 64 (05/28/18 0808)  Resp: 16 (05/28/18 0808)  BP: 109/60 (05/28/18 0808)  SpO2: 98 % (05/28/18 0808)    Vital Signs Range (Last 24H):  Temp:  [36.2 °C (97.2 °F)-36.8 °C (98.2 °F)]   Pulse:  [64-96]   Resp:  [16-18]   BP: ()/(51-63)   SpO2:  [96 %-99 %]     I & O (Last 24H):  Intake/Output Summary (Last 24 hours) at 05/28/18 0949  Last data filed at 05/27/18 1353   Gross per 24 hour   Intake              300 ml   Output                0 ml   Net              300 ml     Physical Exam:  General: alert, oriented and appears stated age  HENT: NC/AT.Conjunctivae/corneas clear. PERRL, EOMI. Nares normal. Septum midline. Mucosa normal. No drainage or sinus tenderness.  Neck: no adenopathy, no carotid bruit, no JVD, supple, symmetrical, trachea midline and thyroid not enlarged, symmetric, no tenderness/mass/nodules  Back: symmetric, no curvature. ROM normal. No CVA tenderness.  Lungs: clear to auscultation bilaterally, respiratory effort  normal  CV: RRR, S1 and S2 Normal. No chest wall tenderness  Abdomen:  tenderness, No distension. Bowel sounds normal. Surgical incisions c/d/i  Extremities: WWP, intact distal pulses. no cyanosis or edema  Skin: Skin color, texture, turgor normal. No rashes or lesions  Lymph nodes: Cervical, supraclavicular, and axillary nodes normal.  Neurologic: Grossly normal      Laboratory:  CBC: Recent Labs      05/26/18   0828  05/27/18   0909   WBC  10.78  11.70   RBC  3.27*  3.42*   HGB  9.2*  9.7*   HCT  28.0*  29.2*   PLT  173  198   MCV  86  85   MCH  28.1  28.4   MCHC  32.9  33.2       CMP: Recent Labs      05/26/18   0828  05/27/18   0909   NA  141  140   K  4.2  4.2   CO2  29  29   CL  104  104   BUN  19  13   CREATININE  0.8  0.7   GLU  95  88   CALCIUM  9.0  9.4   ALBUMIN  3.0*  3.1*   PROT  5.3*  5.5*   ALKPHOS  58  53*   ALT  12  15   AST  10  12   BILITOT  0.2  0.2       Diagnostic Radiology:      ASSESSMENT/PLAN:     Active Hospital Problems    Diagnosis  POA    *Primary mediastinal seminoma [C38.3]  Yes    S/P laparoscopic appendectomy [Z90.49]  Not Applicable      Resolved Hospital Problems    Diagnosis Date Resolved POA    Appendicitis [K37] 05/26/2018 Yes         Plan:  1) Acetaminophen changes to 1000 mg q 6    2) Gabapentin increased to 300 TID from 100 TID    3) Celebrex discontinued; sometimes can cause worsening of GI discomfort; if no change can resume    4) PO Hydromorphone regiment changed as follows  2 mg PO q4 prn mild   4 mg PO q 4 prn moderate  6 mg PO q 4 prn severe     5) Will re-evaluate pain control this afternoon; if no improvement, will consider performing TAP blocks tomorrow to reset possible nerve mediated pain. Pt will need to be NPO at MN. If no improvement in pain will make NPO after MN in preparation.     Discussed with Staff, Dr. Oconnor.    Elaina Gibson MD  11:11 AM  05/28/2018

## 2018-05-28 NOTE — SUBJECTIVE & OBJECTIVE
Interval History: VSS. Continues to complain of uncontrolled pain - sleeping in med this morning, very difficult to arouse. Having BMs. Tolerating a diet. No nausea/vomiting.     Medications:  Continuous Infusions:  Scheduled Meds:   acetaminophen  1,000 mg Oral Q8H    baclofen  5 mg Oral TID    celecoxib  100 mg Oral BID    gabapentin  100 mg Oral TID    lidocaine  1 patch Transdermal Q24H    pantoprazole  40 mg Oral Daily    piperacillin-tazobactam (ZOSYN) IVPB  4.5 g Intravenous Q8H    polyethylene glycol  17 g Oral Daily     PRN Meds:cyclobenzaprine, diphenhydrAMINE, HYDROmorphone, LORazepam, ondansetron, promethazine (PHENERGAN) IVPB, sodium chloride 0.9%, zolpidem     Review of patient's allergies indicates:   Allergen Reactions    Mushroom Anaphylaxis    Bamboo     Bee pollens     Mushroom flavor     Venom-honey bee      Objective:     Vital Signs (Most Recent):  Temp: 97.5 °F (36.4 °C) (05/28/18 0503)  Pulse: 65 (05/28/18 0503)  Resp: 16 (05/28/18 0503)  BP: (!) 94/51 (05/28/18 0503)  SpO2: 97 % (05/28/18 0503) Vital Signs (24h Range):  Temp:  [97.2 °F (36.2 °C)-98.2 °F (36.8 °C)] 97.5 °F (36.4 °C)  Pulse:  [64-96] 65  Resp:  [16-18] 16  SpO2:  [96 %-99 %] 97 %  BP: ()/(51-72) 94/51     Weight: 62.6 kg (137 lb 15.8 oz)  Body mass index is 22.27 kg/m².    Intake/Output - Last 3 Shifts       05/26 0700 - 05/27 0659 05/27 0700 - 05/28 0659    P.O. 1300 550    IV Piggyback 650     Total Intake(mL/kg) 1950 (31.2) 550 (8.8)    Net +1950 +550          Urine Occurrence 3 x 3 x    Stool Occurrence 2 x 4 x          Physical Exam   Constitutional: He is oriented to person, place, and time. He appears well-developed and well-nourished. He is sleeping. No distress.   HENT:   Head: Normocephalic and atraumatic.   Eyes: EOM are normal. No scleral icterus.   Neck: No tracheal deviation present.   Cardiovascular: Normal rate.    Pulmonary/Chest: Effort normal.   Abdominal: Soft. He exhibits no distension.  There is tenderness (attp at incision sites).   Neurological: He is alert and oriented to person, place, and time. No cranial nerve deficit.   Psychiatric: He has a normal mood and affect.          Significant Labs:  CBC:   Recent Labs  Lab 05/27/18  0909   WBC 11.70   RBC 3.42*   HGB 9.7*   HCT 29.2*      MCV 85   MCH 28.4   MCHC 33.2     CMP:   Recent Labs  Lab 05/27/18  0909   GLU 88   CALCIUM 9.4   ALBUMIN 3.1*   PROT 5.5*      K 4.2   CO2 29      BUN 13   CREATININE 0.7   ALKPHOS 53*   ALT 15   AST 12   BILITOT 0.2       Significant Diagnostics:  None

## 2018-05-28 NOTE — CONSULTS
"[facility]  Infectious Disease  Consult Note    Patient Name: Alejandro Neff  MRN: [id number]  Admission Date: 5/24/2018  Hospital Length of Stay: 4 days  Attending Physician: Kenan Huang Jr.,*  Primary Care Provider: Nata Hernandez MD     Isolation Status: No active isolations    Patient information was obtained from patient, past medical records and ER records.      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 S/p lap appendectomy on 5/24, appendix was intact. ID consulted for duration of abx therapy. Zosyn was de escalated to PO cipro and flagyl on 5/28. Blood cx ngtd.   - f/u CT scan of the abdomen pelvis   - if no acute process found on repeat CT abd, will likely recommend stopping abx therapy, as pt is s/p appendectomy and appendix as intact  - monitor diarrhea/ loose stools. Deferring C diff testing at this time  - will follow             Thank you for your consult. I will follow-up with patient. Please contact us if you have any additional questions.    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 Patient as admitted on 5/24, CT abd showed " Interval development of dilated appearance of the appendix with stranding of the periappendiceal fat,  Findings are worrisome for appendicitis ". Underwent laparoscopic appendectomy on 5/24, appendix was intact. Started on zosyn 5/24. Continues to have abdominal pain, although not febrile, improved leukocytosis with WBC of 10, tolerating PO. CT abd pending per surgery team. Notes some diarrhea that was watery on 5/27 but is resolving " "on 5/28, now more of loose stools    ID consulted for duration of abx therapy    Past Medical History:   Diagnosis Date    Abdominal pain 08/12/2010    eval for abd and chest wall pain at [facility] [place], [place] - cxr wnl, EKG ([facility]), troponin wnl, normal chemistries    Allergy     Cancer     testicular    Chondromalacia patellae     Chronic nausea 2015    eval by GI Dr. Tushar Artis - given zofran and ciproheptadine     Chronic pain     Chronic pain     [facility] - [facility] meds; followed by psych and pain clinic and unresponsive to behavioral/CBT/old records reports c/f conversion disorder     Foot pain 04/2010    4/10 + SHIRA, eval by Dr. ALLY Lion at [facility] Rheum -dx unclear ? gout and trial of nsaids and pdn, xrays normal 1/11, referred to podiatry, re-eval by rheum 2/12 and MRI and films wnl    Fracture of right radius 09/2009    Lyme arthritis     multiple joints    Lyme disease 2013    Memory loss 03/2012    eval for memory loss by neuro at [facility] [place], EEG wnl. Dr. Patricio suggested emotional factors & ref to Tushar Davies, PhD for  eval & neuropsych eval 3/12 Lupe Delgado, PhD - no evidence of formal thought disorder or psychosis - documented severe memory impairment; not related to to ADD or executive function issues. sugesstion that memory issues may be related to "emotional factors", ?conversion d/o    Stress fracture of tibia and fibula 08/2011    Urticaria        Past Surgical History:   Procedure Laterality Date    CHEST WALL BIOPSY      LAPAROSCOPIC APPENDECTOMY N/A 5/24/2018    Procedure: APPENDECTOMY, LAPAROSCOPIC;  Surgeon: Kenan Huang Jr., MD;  Location: [facility] OR [address] [place] [address];  Service: General;  Laterality: N/A;    PORTACATH PLACEMENT Right        Review of patient's allergies indicates:   Allergen Reactions    Mushroom Anaphylaxis    Bamboo     Bee pollens     Mushroom flavor     Venom-honey bee  "       Medications:  Prescriptions Prior to Admission   Medication Sig    [] amoxicillin-clavulanate 875-125mg (AUGMENTIN) 875-125 mg per tablet Take 1 tablet by mouth 2 (two) times daily.    baclofen (LIORESAL) 10 MG tablet Take 1 tablet (10 mg total) by mouth 2 (two) times daily as needed (hiccups).    granisetron (SANCUSO) 3.1 mg/24 hour place 1 patch onto the skin 24 hours before chemo, each patch can be worn up to 7 days    LORazepam (ATIVAN) 0.5 MG tablet Take 1 tablet (0.5 mg total) by mouth every 8 (eight) hours as needed for Anxiety.    morphine (MSIR) 15 MG tablet Take 1 tablet (15 mg total) by mouth every 6 (six) hours as needed for Pain.    ondansetron (ZOFRAN) 4 MG tablet Take 1 tablet (4 mg total) by mouth every 6 (six) hours as needed for Nausea.    pantoprazole (PROTONIX) 40 MG tablet Take 1 tablet (40 mg total) by mouth once daily.    promethazine (PHENERGAN) 25 MG tablet Take 1 tablet (25 mg total) by mouth every 4 (four) hours as needed for Nausea.    zolpidem (AMBIEN) 5 MG Tab Take 1 tablet (5 mg total) by mouth nightly as needed (insomnia).    albuterol 90 mcg/actuation inhaler Inhale 2 puffs into the lungs every 6 (six) hours as needed for Wheezing.    aluminum-magnesium hydroxide-simethicone (MAALOX) 200-200-20 mg/5 mL Susp Take 15 mLs by mouth once.    diphenhydrAMINE-aluminum-magnesium hydroxide-simethicone-lidocaine HCl 2% Swish and spit 15 mLs every 4 (four) hours as needed (mouth sore).    EPINEPHrine (EPIPEN) 0.3 mg/0.3 mL AtIn Inject 0.3 mg/mL into the muscle.    morphine (MSIR) 15 MG tablet Take 1 tablet (15 mg total) by mouth every 6 (six) hours as needed.    MULTIVIT-MINERALS/FERROUS FUM (MULTI VITAMIN ORAL) Take by mouth once daily.    promethazine (PHENERGAN) 25 MG suppository Place 1 suppository (25 mg total) rectally every 6 (six) hours as needed for Nausea.     Antibiotics     Start     Stop Route Frequency Ordered    18  ciprofloxacin HCl tablet  500 mg      -- Oral Every 12 hours 05/28/18 1221    05/28/18 1400  metroNIDAZOLE tablet 500 mg      -- Oral Every 8 hours 05/28/18 1221        Antifungals     None        Antivirals     None           Immunization History   Administered Date(s) Administered    DTaP 1997, 1997, 1997, 08/27/1998, 02/12/2002    HPV 9-Valent 08/11/2016, 12/19/2016    Hepatitis A, Pediatric/Adolescent, 2 Dose 05/04/2012, 06/13/2013    Hepatitis B, Pediatric/Adolescent 1997, 1997, 1997    Hib-HbOC 1997, 1997, 1997, 05/21/1998    IPV 1997, 1997, 08/27/1998, 02/12/2002    Influenza - Intranasal - Trivalent 11/04/2011    Influenza - Trivalent (ADULT) 11/04/2010, 01/14/2014    MMR 05/21/1998, 02/12/2002    Meningococcal Conjugate (MCV4P) 01/14/2014    PPD Test 03/05/1998    Tdap 05/07/2007, 05/04/2012    Varicella 08/03/1999, 05/07/2007    influenza - Quadrivalent 12/19/2016       Family History     Problem Relation (Age of Onset)    Arthritis Mother, Sister    Depression Sister    Gout Father    Hyperlipidemia Father    No Known Problems Sister    Other Mother        Social History     Social History    Marital status: Single     Spouse name: N/A    Number of children: N/A    Years of education: N/A     Occupational History    student at [employer]      Social History Main Topics    Smoking status: Former Smoker     Packs/day: 0.25     Types: Vaping with nicotine, Vaping w/o nicotine, Cigarettes, Cigars     Quit date: 3/15/2018    Smokeless tobacco: Former User    Alcohol use Yes      Comment: occ    Drug use: Yes     Types: Marijuana, LSD, Cocaine, Other-see comments      Comment: Kratom (capsule or leaf form)    Sexual activity: Yes     Partners: Female     Birth control/ protection: Condom     Other Topics Concern    None     Social History Narrative    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   HENT: Negative for congestion, postnasal drip, sinus pressure and sore throat.    Eyes: Negative for visual disturbance.   Respiratory: Negative for cough and wheezing.    Cardiovascular: Negative for chest pain, palpitations and leg swelling.   Gastrointestinal: Positive for abdominal pain, diarrhea and nausea. Negative for abdominal distention and vomiting.   Genitourinary: Negative for decreased urine volume, difficulty urinating and frequency.   Musculoskeletal: Negative for arthralgias and myalgias.   Allergic/Immunologic: Positive for immunocompromised state.   Neurological: Negative for dizziness, syncope, weakness and light-headedness.   Psychiatric/Behavioral: Negative for decreased concentration and dysphoric mood.     Objective:     Vital Signs (Most Recent):  Temp: 98.1 °F (36.7 °C) (05/28/18 1620)  Pulse: 90 (05/28/18 1620)  Resp: 18 (05/28/18 1620)  BP: 119/63 (05/28/18 1620)  SpO2: 100 % (05/28/18 1620) Vital Signs (24h Range):  Temp:  [97.2 °F (36.2 °C)-98.7 °F (37.1 °C)] 98.1 °F (36.7 °C)  Pulse:  [64-93] 90  Resp:  [16-18] 18  SpO2:  [96 %-100 %] 100 %  BP: ()/(51-63) 119/63     Weight: 62.6 kg (137 lb 15.8 oz)  Body mass index is 22.27 kg/m².    Estimated Creatinine Clearance: 129.3 mL/min (based on SCr of 0.8 mg/dL).    Physical Exam   Constitutional: He is oriented to person, place, and time. No distress.   chronically ill appearing younger gentleman    HENT:   Head: Normocephalic and atraumatic.   Right Ear: External ear normal.   Left Ear: External ear normal.   Eyes: EOM are normal. Pupils are equal, round, and reactive to light.   Neck: Normal range of motion. Neck supple.   Cardiovascular: Normal rate, regular rhythm, normal heart sounds and intact distal pulses.    No murmur heard.  Pulmonary/Chest: Effort normal and breath sounds  normal. No respiratory distress. He has no wheezes.   Abdominal: There is tenderness. There is guarding.   Well healing post surgical scares. Extreme tenderness to palpation , pt asked not to palpate abd   Musculoskeletal: Normal range of motion. He exhibits no edema, tenderness or deformity.   Neurological: He is alert and oriented to person, place, and time. No cranial nerve deficit.   Skin: Skin is warm and dry. No rash noted. He is not diaphoretic.   Psychiatric: He has a normal mood and affect.       Significant Labs:   Blood Culture:   Recent Labs  Lab 04/26/18  2329 04/26/18  2335 04/27/18  0155   LABBLOO No growth after 5 days. No growth after 5 days. No growth after 5 days.     CBC:   Recent Labs  Lab 05/27/18  0909 05/28/18  0929   WBC 11.70 10.45   HGB 9.7* 9.9*   HCT 29.2* 30.0*    239     CMP:   Recent Labs  Lab 05/27/18  0909 05/28/18  0929    141   K 4.2 4.2    105   CO2 29 29   GLU 88 91   BUN 13 11   CREATININE 0.7 0.8   CALCIUM 9.4 8.6*   PROT 5.5* 6.0   ALBUMIN 3.1* 3.3*   BILITOT 0.2 0.2   ALKPHOS 53* 47*   AST 12 34   ALT 15 37   ANIONGAP 7* 7*   EGFRNONAA >60.0 >60.0     Lipase: No results for input(s): LIPASE in the last 48 hours.  Procalcitonin: No results for input(s): PROCAL in the last 48 hours.  Urine Culture:   Recent Labs  Lab 04/27/18  0138   LABURIN No significant growth     Urine Studies:   Recent Labs  Lab 05/13/18  1716   COLORU Yellow   APPEARANCEUA Clear   PHUR 7.0   SPECGRAV 1.010   PROTEINUA Negative   GLUCUA Negative   KETONESU Negative   BILIRUBINUA Negative   OCCULTUA Negative   NITRITE Negative   UROBILINOGEN Negative   LEUKOCYTESUR Negative     Wound Culture: No results for input(s): LABAERO in the last 4320 hours.    Significant Imaging:    CT abd pending

## 2018-05-28 NOTE — SUBJECTIVE & OBJECTIVE
"Past Medical History:   Diagnosis Date    Abdominal pain 2010    eval for abd and chest wall pain at [facility], [place], [place] - cxr wnl, EKG ([facility]), troponin wnl, normal chemistries    Allergy     Cancer     testicular    Chondromalacia patellae     Chronic nausea     eval by GI Dr. Tushar Artis - given zofran and ciproheptadine     Chronic pain     Chronic pain     [facility] - no meds; followed by psych and pain clinic and unresponsive to behavioral/CBT/old records reports c/f conversion disorder     Foot pain 2010    4/10 + SHIRA, eval by Dr. ALLY Lion at [facility] -dx unclear ? gout and trial of nsaids and pdn, xrays normal , referred to podiatry, re-eval by rheum  and MRI and films wnl    Fracture of right radius 2009    Lyme arthritis     multiple joints    Lyme disease 2013    Memory loss 2012    eval for memory loss by neuro at [facility] - MRI, EEG wnl. Dr. Patricio suggested emotional factors & ref to Tushar Davies, PhD for  eval & neuropsych eval 3/12 Lupe Delgado, PhD - no evidence of formal thought disorder or psychosis - documented severe memory impairment; not related to to ADD or executive function issues. sugesstion that memory issues may be related to "emotional factors", ?conversion d/o    Stress fracture of tibia and fibula 2011    Urticaria        Past Surgical History:   Procedure Laterality Date    CHEST WALL BIOPSY      LAPAROSCOPIC APPENDECTOMY N/A 2018    Procedure: APPENDECTOMY, LAPAROSCOPIC;  Surgeon: Kenan Huang Jr., MD;  Location: [facility] OR [address] [place] [address];  Service: General;  Laterality: N/A;    PORTACATH PLACEMENT Right        Review of patient's allergies indicates:   Allergen Reactions    Mushroom Anaphylaxis    Bamboo     Bee pollens     Mushroom flavor     Venom-honey bee        Medications:  Prescriptions Prior to Admission   Medication Sig    [] " amoxicillin-clavulanate 875-125mg (AUGMENTIN) 875-125 mg per tablet Take 1 tablet by mouth 2 (two) times daily.    baclofen (LIORESAL) 10 MG tablet Take 1 tablet (10 mg total) by mouth 2 (two) times daily as needed (hiccups).    granisetron (SANCUSO) 3.1 mg/24 hour place 1 patch onto the skin 24 hours before chemo, each patch can be worn up to 7 days    LORazepam (ATIVAN) 0.5 MG tablet Take 1 tablet (0.5 mg total) by mouth every 8 (eight) hours as needed for Anxiety.    morphine (MSIR) 15 MG tablet Take 1 tablet (15 mg total) by mouth every 6 (six) hours as needed for Pain.    ondansetron (ZOFRAN) 4 MG tablet Take 1 tablet (4 mg total) by mouth every 6 (six) hours as needed for Nausea.    pantoprazole (PROTONIX) 40 MG tablet Take 1 tablet (40 mg total) by mouth once daily.    promethazine (PHENERGAN) 25 MG tablet Take 1 tablet (25 mg total) by mouth every 4 (four) hours as needed for Nausea.    zolpidem (AMBIEN) 5 MG Tab Take 1 tablet (5 mg total) by mouth nightly as needed (insomnia).    albuterol 90 mcg/actuation inhaler Inhale 2 puffs into the lungs every 6 (six) hours as needed for Wheezing.    aluminum-magnesium hydroxide-simethicone (MAALOX) 200-200-20 mg/5 mL Susp Take 15 mLs by mouth once.    diphenhydrAMINE-aluminum-magnesium hydroxide-simethicone-lidocaine HCl 2% Swish and spit 15 mLs every 4 (four) hours as needed (mouth sore).    EPINEPHrine (EPIPEN) 0.3 mg/0.3 mL AtIn Inject 0.3 mg/mL into the muscle.    morphine (MSIR) 15 MG tablet Take 1 tablet (15 mg total) by mouth every 6 (six) hours as needed.    MULTIVIT-MINERALS/FERROUS FUM (MULTI VITAMIN ORAL) Take by mouth once daily.    promethazine (PHENERGAN) 25 MG suppository Place 1 suppository (25 mg total) rectally every 6 (six) hours as needed for Nausea.     Antibiotics     Start     Stop Route Frequency Ordered    05/28/18 2100  ciprofloxacin HCl tablet 500 mg      -- Oral Every 12 hours 05/28/18 1221    05/28/18 1400  metroNIDAZOLE  tablet 500 mg      -- Oral Every 8 hours 05/28/18 1221        Antifungals     None        Antivirals     None           Immunization History   Administered Date(s) Administered    DTaP 1997, 1997, 1997, 08/27/1998, 02/12/2002    HPV 9-Valent 08/11/2016, 12/19/2016    Hepatitis A, Pediatric/Adolescent, 2 Dose 05/04/2012, 06/13/2013    Hepatitis B, Pediatric/Adolescent 1997, 1997, 1997    Hib-HbOC 1997, 1997, 1997, 05/21/1998    IPV 1997, 1997, 08/27/1998, 02/12/2002    Influenza - Intranasal - Trivalent 11/04/2011    Influenza - Trivalent (ADULT) 11/04/2010, 01/14/2014    MMR 05/21/1998, 02/12/2002    Meningococcal Conjugate (MCV4P) 01/14/2014    PPD Test 03/05/1998    Tdap 05/07/2007, 05/04/2012    Varicella 08/03/1999, 05/07/2007    influenza - Quadrivalent 12/19/2016       Family History     Problem Relation (Age of Onset)    Arthritis Mother, Sister    Depression Sister    Gout Father    Hyperlipidemia Father    No Known Problems Sister    Other Mother        Social History     Social History    Marital status: Single     Spouse name: N/A    Number of children: N/A    Years of education: N/A     Occupational History    student at [employer]      Social History Main Topics    Smoking status: Former Smoker     Packs/day: 0.25     Types: Vaping with nicotine, Vaping w/o nicotine, Cigarettes, Cigars     Quit date: 3/15/2018    Smokeless tobacco: Former User    Alcohol use Yes      Comment: occ    Drug use: Yes     Types: Marijuana, LSD, Cocaine, Other-see comments      Comment: Kratom (capsule or leaf form)    Sexual activity: Yes     Partners: Female     Birth control/ protection: Condom     Other Topics Concern    None     Social History Narrative    Majoring in whoactually/marketing     From [place] and [place] - lived 1 year ago and in high school x 1 semester                 Review of Systems   Constitutional: Negative for  activity change, appetite change, fatigue, fever and unexpected weight change.   HENT: Negative for congestion, postnasal drip, sinus pressure and sore throat.    Eyes: Negative for visual disturbance.   Respiratory: Negative for cough and wheezing.    Cardiovascular: Negative for chest pain, palpitations and leg swelling.   Gastrointestinal: Positive for abdominal pain, diarrhea and nausea. Negative for abdominal distention and vomiting.   Genitourinary: Negative for decreased urine volume, difficulty urinating and frequency.   Musculoskeletal: Negative for arthralgias and myalgias.   Allergic/Immunologic: Positive for immunocompromised state.   Neurological: Negative for dizziness, syncope, weakness and light-headedness.   Psychiatric/Behavioral: Negative for decreased concentration and dysphoric mood.     Objective:     Vital Signs (Most Recent):  Temp: 98.1 °F (36.7 °C) (05/28/18 1620)  Pulse: 90 (05/28/18 1620)  Resp: 18 (05/28/18 1620)  BP: 119/63 (05/28/18 1620)  SpO2: 100 % (05/28/18 1620) Vital Signs (24h Range):  Temp:  [97.2 °F (36.2 °C)-98.7 °F (37.1 °C)] 98.1 °F (36.7 °C)  Pulse:  [64-93] 90  Resp:  [16-18] 18  SpO2:  [96 %-100 %] 100 %  BP: ()/(51-63) 119/63     Weight: 62.6 kg (137 lb 15.8 oz)  Body mass index is 22.27 kg/m².    Estimated Creatinine Clearance: 129.3 mL/min (based on SCr of 0.8 mg/dL).    Physical Exam   Constitutional: He is oriented to person, place, and time. No distress.   chronically ill appearing younger gentleman    HENT:   Head: Normocephalic and atraumatic.   Right Ear: External ear normal.   Left Ear: External ear normal.   Eyes: EOM are normal. Pupils are equal, round, and reactive to light.   Neck: Normal range of motion. Neck supple.   Cardiovascular: Normal rate, regular rhythm, normal heart sounds and intact distal pulses.    No murmur heard.  Pulmonary/Chest: Effort normal and breath sounds normal. No respiratory distress. He has no wheezes.   Abdominal: There is  tenderness. There is guarding.   Well healing post surgical scares. Extreme tenderness to palpation , pt asked not to palpate abd   Musculoskeletal: Normal range of motion. He exhibits no edema, tenderness or deformity.   Neurological: He is alert and oriented to person, place, and time. No cranial nerve deficit.   Skin: Skin is warm and dry. No rash noted. He is not diaphoretic.   Psychiatric: He has a normal mood and affect.       Significant Labs:   Blood Culture:   Recent Labs  Lab 04/26/18  2329 04/26/18  2335 04/27/18  0155   LABBLOO No growth after 5 days. No growth after 5 days. No growth after 5 days.     CBC:   Recent Labs  Lab 05/27/18  0909 05/28/18  0929   WBC 11.70 10.45   HGB 9.7* 9.9*   HCT 29.2* 30.0*    239     CMP:   Recent Labs  Lab 05/27/18  0909 05/28/18  0929    141   K 4.2 4.2    105   CO2 29 29   GLU 88 91   BUN 13 11   CREATININE 0.7 0.8   CALCIUM 9.4 8.6*   PROT 5.5* 6.0   ALBUMIN 3.1* 3.3*   BILITOT 0.2 0.2   ALKPHOS 53* 47*   AST 12 34   ALT 15 37   ANIONGAP 7* 7*   EGFRNONAA >60.0 >60.0     Lipase: No results for input(s): LIPASE in the last 48 hours.  Procalcitonin: No results for input(s): PROCAL in the last 48 hours.  Urine Culture:   Recent Labs  Lab 04/27/18  0138   LABURIN No significant growth     Urine Studies:   Recent Labs  Lab 05/13/18  1716   COLORU Yellow   APPEARANCEUA Clear   PHUR 7.0   SPECGRAV 1.010   PROTEINUA Negative   GLUCUA Negative   KETONESU Negative   BILIRUBINUA Negative   OCCULTUA Negative   NITRITE Negative   UROBILINOGEN Negative   LEUKOCYTESUR Negative     Wound Culture: No results for input(s): LABAERO in the last 4320 hours.    Significant Imaging:    CT abd pending

## 2018-05-28 NOTE — PLAN OF CARE
Problem: Patient Care Overview  Goal: Plan of Care Review  Outcome: Ongoing (interventions implemented as appropriate)  POC reviewed with patient. VSS. Pain treated with PRN meds per orders. Tolerating diet. PRN meds given for nausea. Ambulating with walker. Port CDI, blood return noted. Patient remained free from falls and trauma. Call light in reach. TM.

## 2018-05-29 ENCOUNTER — SURGERY (OUTPATIENT)
Age: 23
End: 2018-05-29

## 2018-05-29 ENCOUNTER — ANESTHESIA (OUTPATIENT)
Dept: SURGERY | Facility: HOSPITAL | Age: 23
DRG: 343 | End: 2018-05-29
Payer: COMMERCIAL

## 2018-05-29 ENCOUNTER — TELEPHONE (OUTPATIENT)
Dept: BARIATRICS | Facility: CLINIC | Age: 23
End: 2018-05-29

## 2018-05-29 ENCOUNTER — ANESTHESIA EVENT (OUTPATIENT)
Dept: SURGERY | Facility: HOSPITAL | Age: 23
DRG: 343 | End: 2018-05-29
Payer: COMMERCIAL

## 2018-05-29 ENCOUNTER — TELEPHONE (OUTPATIENT)
Dept: HEMATOLOGY/ONCOLOGY | Facility: CLINIC | Age: 23
End: 2018-05-29

## 2018-05-29 VITALS
TEMPERATURE: 97 F | BODY MASS INDEX: 22.18 KG/M2 | OXYGEN SATURATION: 100 % | RESPIRATION RATE: 18 BRPM | DIASTOLIC BLOOD PRESSURE: 65 MMHG | HEART RATE: 86 BPM | SYSTOLIC BLOOD PRESSURE: 122 MMHG | HEIGHT: 66 IN | WEIGHT: 138 LBS

## 2018-05-29 LAB
ALBUMIN SERPL BCP-MCNC: 3.4 G/DL
ALP SERPL-CCNC: 51 U/L
ALT SERPL W/O P-5'-P-CCNC: 121 U/L
ANION GAP SERPL CALC-SCNC: 7 MMOL/L
ANISOCYTOSIS BLD QL SMEAR: SLIGHT
AST SERPL-CCNC: 77 U/L
BASOPHILS # BLD AUTO: ABNORMAL K/UL
BASOPHILS NFR BLD: 0 %
BILIRUB SERPL-MCNC: 0.3 MG/DL
BUN SERPL-MCNC: 10 MG/DL
CALCIUM SERPL-MCNC: 9.4 MG/DL
CHLORIDE SERPL-SCNC: 105 MMOL/L
CO2 SERPL-SCNC: 29 MMOL/L
CREAT SERPL-MCNC: 0.7 MG/DL
DIFFERENTIAL METHOD: ABNORMAL
EOSINOPHIL # BLD AUTO: ABNORMAL K/UL
EOSINOPHIL NFR BLD: 0 %
ERYTHROCYTE [DISTWIDTH] IN BLOOD BY AUTOMATED COUNT: 15.6 %
EST. GFR  (AFRICAN AMERICAN): >60 ML/MIN/1.73 M^2
EST. GFR  (NON AFRICAN AMERICAN): >60 ML/MIN/1.73 M^2
GLUCOSE SERPL-MCNC: 127 MG/DL
HCT VFR BLD AUTO: 31.3 %
HGB BLD-MCNC: 10.5 G/DL
HYPOCHROMIA BLD QL SMEAR: ABNORMAL
IMM GRANULOCYTES # BLD AUTO: ABNORMAL K/UL
IMM GRANULOCYTES NFR BLD AUTO: ABNORMAL %
LYMPHOCYTES # BLD AUTO: ABNORMAL K/UL
LYMPHOCYTES NFR BLD: 10 %
MAGNESIUM SERPL-MCNC: 1.9 MG/DL
MCH RBC QN AUTO: 28.5 PG
MCHC RBC AUTO-ENTMCNC: 33.5 G/DL
MCV RBC AUTO: 85 FL
METAMYELOCYTES NFR BLD MANUAL: 2 %
MONOCYTES # BLD AUTO: ABNORMAL K/UL
MONOCYTES NFR BLD: 4 %
MYELOCYTES NFR BLD MANUAL: 2 %
NEUTROPHILS NFR BLD: 82 %
NRBC BLD-RTO: 0 /100 WBC
OVALOCYTES BLD QL SMEAR: ABNORMAL
PHOSPHATE SERPL-MCNC: 3.7 MG/DL
PLATELET # BLD AUTO: 253 K/UL
PMV BLD AUTO: 7.9 FL
POIKILOCYTOSIS BLD QL SMEAR: SLIGHT
POLYCHROMASIA BLD QL SMEAR: ABNORMAL
POTASSIUM SERPL-SCNC: 3.8 MMOL/L
PROT SERPL-MCNC: 6.1 G/DL
RBC # BLD AUTO: 3.68 M/UL
SODIUM SERPL-SCNC: 141 MMOL/L
WBC # BLD AUTO: 11.61 K/UL

## 2018-05-29 PROCEDURE — 83735 ASSAY OF MAGNESIUM: CPT

## 2018-05-29 PROCEDURE — 64461 PVB THORACIC SINGLE INJ SITE: CPT | Mod: 50,59,, | Performed by: ANESTHESIOLOGY

## 2018-05-29 PROCEDURE — 84100 ASSAY OF PHOSPHORUS: CPT

## 2018-05-29 PROCEDURE — 25000003 PHARM REV CODE 250: Performed by: STUDENT IN AN ORGANIZED HEALTH CARE EDUCATION/TRAINING PROGRAM

## 2018-05-29 PROCEDURE — 25000003 PHARM REV CODE 250: Performed by: SURGERY

## 2018-05-29 PROCEDURE — 25000003 PHARM REV CODE 250: Performed by: ANESTHESIOLOGY

## 2018-05-29 PROCEDURE — 27200750 HC INSULATED NEEDLE/ STIMUPLEX: Performed by: ANESTHESIOLOGY

## 2018-05-29 PROCEDURE — 63600175 PHARM REV CODE 636 W HCPCS: Performed by: STUDENT IN AN ORGANIZED HEALTH CARE EDUCATION/TRAINING PROGRAM

## 2018-05-29 PROCEDURE — 85027 COMPLETE CBC AUTOMATED: CPT

## 2018-05-29 PROCEDURE — 63600175 PHARM REV CODE 636 W HCPCS: Performed by: ANESTHESIOLOGY

## 2018-05-29 PROCEDURE — 80053 COMPREHEN METABOLIC PANEL: CPT

## 2018-05-29 PROCEDURE — 85007 BL SMEAR W/DIFF WBC COUNT: CPT

## 2018-05-29 PROCEDURE — 76942 ECHO GUIDE FOR BIOPSY: CPT | Performed by: ANESTHESIOLOGY

## 2018-05-29 PROCEDURE — 99231 SBSQ HOSP IP/OBS SF/LOW 25: CPT | Mod: ,,, | Performed by: ANESTHESIOLOGY

## 2018-05-29 PROCEDURE — 99233 SBSQ HOSP IP/OBS HIGH 50: CPT | Mod: ,,, | Performed by: INTERNAL MEDICINE

## 2018-05-29 RX ORDER — POLYETHYLENE GLYCOL 3350 17 G/17G
17 POWDER, FOR SOLUTION ORAL DAILY
Qty: 255 G | Refills: 0 | Status: SHIPPED | OUTPATIENT
Start: 2018-05-30 | End: 2018-05-29

## 2018-05-29 RX ORDER — HEPARIN 100 UNIT/ML
5 SYRINGE INTRAVENOUS ONCE
Status: COMPLETED | OUTPATIENT
Start: 2018-05-29 | End: 2018-05-29

## 2018-05-29 RX ORDER — GABAPENTIN 300 MG/1
300 CAPSULE ORAL 3 TIMES DAILY
Qty: 90 CAPSULE | Refills: 11 | Status: SHIPPED | OUTPATIENT
Start: 2018-05-29 | End: 2018-07-03

## 2018-05-29 RX ORDER — BISACODYL 10 MG
10 SUPPOSITORY, RECTAL RECTAL ONCE
Status: DISCONTINUED | OUTPATIENT
Start: 2018-05-29 | End: 2018-05-29 | Stop reason: HOSPADM

## 2018-05-29 RX ORDER — HYDROMORPHONE HYDROCHLORIDE 2 MG/1
2 TABLET ORAL EVERY 4 HOURS PRN
Status: DISCONTINUED | OUTPATIENT
Start: 2018-05-29 | End: 2018-05-29 | Stop reason: HOSPADM

## 2018-05-29 RX ORDER — FENTANYL CITRATE 50 UG/ML
100 INJECTION, SOLUTION INTRAMUSCULAR; INTRAVENOUS SEE ADMIN INSTRUCTIONS
Status: DISCONTINUED | OUTPATIENT
Start: 2018-05-29 | End: 2018-05-29 | Stop reason: HOSPADM

## 2018-05-29 RX ORDER — AMOXICILLIN AND CLAVULANATE POTASSIUM 875; 125 MG/1; MG/1
1 TABLET, FILM COATED ORAL 2 TIMES DAILY
Qty: 14 TABLET | Refills: 0 | Status: SHIPPED | OUTPATIENT
Start: 2018-05-29 | End: 2018-05-29

## 2018-05-29 RX ORDER — MIDAZOLAM HYDROCHLORIDE 1 MG/ML
2 INJECTION INTRAMUSCULAR; INTRAVENOUS ONCE
Status: COMPLETED | OUTPATIENT
Start: 2018-05-29 | End: 2018-05-29

## 2018-05-29 RX ORDER — SODIUM,POTASSIUM PHOSPHATES 280-250MG
1 POWDER IN PACKET (EA) ORAL ONCE
Status: COMPLETED | OUTPATIENT
Start: 2018-05-29 | End: 2018-05-29

## 2018-05-29 RX ORDER — HYDROMORPHONE HYDROCHLORIDE 2 MG/1
4 TABLET ORAL EVERY 4 HOURS PRN
Status: DISCONTINUED | OUTPATIENT
Start: 2018-05-29 | End: 2018-05-29 | Stop reason: HOSPADM

## 2018-05-29 RX ORDER — POLYETHYLENE GLYCOL 3350 17 G/17G
17 POWDER, FOR SOLUTION ORAL DAILY
Qty: 255 G | Refills: 0 | Status: SHIPPED | OUTPATIENT
Start: 2018-05-30 | End: 2018-10-01

## 2018-05-29 RX ORDER — HYDROMORPHONE HYDROCHLORIDE 2 MG/1
2 TABLET ORAL EVERY 4 HOURS PRN
Qty: 20 TABLET | Refills: 0 | Status: SHIPPED | OUTPATIENT
Start: 2018-05-29 | End: 2018-06-08

## 2018-05-29 RX ORDER — GABAPENTIN 300 MG/1
300 CAPSULE ORAL 3 TIMES DAILY
Qty: 90 CAPSULE | Refills: 11 | Status: SHIPPED | OUTPATIENT
Start: 2018-05-29 | End: 2018-05-29

## 2018-05-29 RX ORDER — AMOXICILLIN AND CLAVULANATE POTASSIUM 875; 125 MG/1; MG/1
1 TABLET, FILM COATED ORAL 2 TIMES DAILY
Qty: 14 TABLET | Refills: 0 | Status: SHIPPED | OUTPATIENT
Start: 2018-05-29 | End: 2018-05-29 | Stop reason: HOSPADM

## 2018-05-29 RX ADMIN — MIDAZOLAM HYDROCHLORIDE 1 MG: 1 INJECTION, SOLUTION INTRAMUSCULAR; INTRAVENOUS at 09:05

## 2018-05-29 RX ADMIN — ACETAMINOPHEN 1000 MG: 500 TABLET, FILM COATED ORAL at 12:05

## 2018-05-29 RX ADMIN — HYDROMORPHONE HYDROCHLORIDE 4 MG: 2 TABLET ORAL at 02:05

## 2018-05-29 RX ADMIN — CIPROFLOXACIN HYDROCHLORIDE 500 MG: 500 TABLET, FILM COATED ORAL at 10:05

## 2018-05-29 RX ADMIN — MIDAZOLAM HYDROCHLORIDE 2 MG: 1 INJECTION, SOLUTION INTRAMUSCULAR; INTRAVENOUS at 08:05

## 2018-05-29 RX ADMIN — BACLOFEN 5 MG: 10 TABLET ORAL at 10:05

## 2018-05-29 RX ADMIN — BACLOFEN 5 MG: 10 TABLET ORAL at 02:05

## 2018-05-29 RX ADMIN — HEPARIN 500 UNITS: 100 SYRINGE at 04:05

## 2018-05-29 RX ADMIN — GABAPENTIN 300 MG: 300 CAPSULE ORAL at 10:05

## 2018-05-29 RX ADMIN — HYDROMORPHONE HYDROCHLORIDE 4 MG: 2 TABLET ORAL at 10:05

## 2018-05-29 RX ADMIN — FENTANYL CITRATE 50 MCG: 50 INJECTION INTRAMUSCULAR; INTRAVENOUS at 08:05

## 2018-05-29 RX ADMIN — ONDANSETRON 4 MG: 4 TABLET, FILM COATED ORAL at 10:05

## 2018-05-29 RX ADMIN — PANTOPRAZOLE SODIUM 40 MG: 40 TABLET, DELAYED RELEASE ORAL at 10:05

## 2018-05-29 RX ADMIN — METRONIDAZOLE 500 MG: 500 TABLET ORAL at 06:05

## 2018-05-29 RX ADMIN — GABAPENTIN 300 MG: 300 CAPSULE ORAL at 02:05

## 2018-05-29 RX ADMIN — POTASSIUM & SODIUM PHOSPHATES POWDER PACK 280-160-250 MG 1 PACKET: 280-160-250 PACK at 02:05

## 2018-05-29 RX ADMIN — PROMETHAZINE HYDROCHLORIDE 12.5 MG: 25 INJECTION INTRAMUSCULAR; INTRAVENOUS at 12:05

## 2018-05-29 RX ADMIN — ACETAMINOPHEN 1000 MG: 500 TABLET, FILM COATED ORAL at 06:05

## 2018-05-29 NOTE — ASSESSMENT & PLAN NOTE
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 S/p lap appendectomy on 5/24, appendix was intact. ID consulted for duration of abx therapy. Zosyn was de escalated to PO cipro and flagyl on 5/28. Blood cx ngtd. CT scan of the abdomen on 5/28 with anticipated post surgical changes.     - would recommend stopping PO cipro and flagyl for acute appendicitis  - would likely need to be discharged with ppx abx, amoxicillin-clavulanate 875-125mg which pt was on prior to admission

## 2018-05-29 NOTE — PLAN OF CARE
SW arranged wheelchair transportation with [employer] transport ([id number]) for within the hour and informed nurse.    Shelby Hinds, [facility]

## 2018-05-29 NOTE — ADDENDUM NOTE
Addendum  created 05/29/18 0628 by Asher Oconnor MD    Charge Capture section accepted, Cosign clinical note with attestation

## 2018-05-29 NOTE — ADDENDUM NOTE
Addendum  created 05/29/18 1013 by Asher Oconnor MD    Charge Capture section accepted, Cosign clinical note with attestation

## 2018-05-29 NOTE — TELEPHONE ENCOUNTER
PAULINOM informing pt that we are currently working on trying to get his Chemotherapy Schedule set up at [facility] for next week. Stated Chemotherapy Infusion at [facility] will be closed next Tuesday and Friday.  Informed pt that if we are unable to get it set up next week at [facility] we will discuss the regimen with Dr. Rojas on Wednesday and if pt has any other questions or concerns he can call Dr. Rojas's office at [phone].

## 2018-05-29 NOTE — PROGRESS NOTES
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     Patient states he has been having RLQ abdominal pain for the past 2 weeks, associated with increased baseline nausea, no emesis and no fevers. For the past week he has been on prophylactic antibiotics for neutropenia. Otherwise has been having some loose bowel movements but denies any blood.     He is currently on chemotherapy and has had at least 3 cycles, he has 5 days of chemotherapy and 2 weeks without it, he is due on Monday. He had Neutropenic fever on 4/26-29 requiring hospitalization and treatment with antibiotics. Last treatment about 2 weeks ago.         Post-Op Info:  Procedure(s) (LRB):  APPENDECTOMY, LAPAROSCOPIC (N/A)   5 Days Post-Op     Interval History: Continues to complain of pain. NPO for block today by anesthesia. VSS. Afebrile. CT a/p without fluid collection or intra-abdominal process. Patient seems distractable.     Medications:  Continuous Infusions:  Scheduled Meds:   acetaminophen  1,000 mg Oral Q6H    baclofen  5 mg Oral TID    bisacodyl  10 mg Rectal Once    ciprofloxacin HCl  500 mg Oral Q12H    gabapentin  300 mg Oral TID    lidocaine  1 patch Transdermal Q24H    metroNIDAZOLE  500 mg Oral Q8H    pantoprazole  40 mg Oral Daily    polyethylene glycol  17 g Oral Daily     PRN Meds:cyclobenzaprine, diphenhydrAMINE, HYDROmorphone, HYDROmorphone, LORazepam, omnipaque, ondansetron, promethazine (PHENERGAN) IVPB, sodium chloride 0.9%, zolpidem     Review of patient's allergies indicates:   Allergen Reactions    Mushroom Anaphylaxis    Bamboo     Bee pollens     Mushroom flavor     Venom-honey bee      Objective:     Vital Signs (Most Recent):  Temp: 97.5 °F (36.4 °C) (05/29/18 0337)  Pulse: 72 (05/29/18 0337)  Resp: 18  (05/29/18 0337)  BP: 106/60 (05/29/18 0337)  SpO2: 98 % (05/29/18 0337) Vital Signs (24h Range):  Temp:  [96.7 °F (35.9 °C)-98.7 °F (37.1 °C)] 97.5 °F (36.4 °C)  Pulse:  [64-97] 72  Resp:  [16-18] 18  SpO2:  [97 %-100 %] 98 %  BP: (106-119)/(57-70) 106/60     Weight: 62.6 kg (137 lb 15.8 oz)  Body mass index is 22.27 kg/m².    Intake/Output - Last 3 Shifts       05/27 0700 - 05/28 0659 05/28 0700 - 05/29 0659 05/29 0700 - 05/30 0659    P.O. 550      Total Intake(mL/kg) 550 (8.8)      Net +550               Urine Occurrence 3 x 2 x     Stool Occurrence 4 x 1 x           Physical Exam   Constitutional: He is oriented to person, place, and time. He appears well-developed and well-nourished. He is sleeping. No distress.   HENT:   Head: Normocephalic and atraumatic.   Eyes: EOM are normal. No scleral icterus.   Neck: No tracheal deviation present.   Cardiovascular: Normal rate.    Pulmonary/Chest: Effort normal.   Abdominal: Soft. He exhibits no distension. There is tenderness (attp at incision sites).   Neurological: He is alert and oriented to person, place, and time. No cranial nerve deficit.   Psychiatric: He has a normal mood and affect.       Significant Labs:  CBC:   Recent Labs  Lab 05/28/18  0929   WBC 10.45   RBC 3.50*   HGB 9.9*   HCT 30.0*      MCV 86   MCH 28.3   MCHC 33.0     CMP:   Recent Labs  Lab 05/28/18  0929   GLU 91   CALCIUM 8.6*   ALBUMIN 3.3*   PROT 6.0      K 4.2   CO2 29      BUN 11   CREATININE 0.8   ALKPHOS 47*   ALT 37   AST 34   BILITOT 0.2       Significant Diagnostics:  None    Assessment/Plan:     S/P laparoscopic appendectomy    Alejandro Neff is a 23 y.o. male s/p lap appendectomy, of note pt on chemo for seminoma in mediastinum    NPO this morning for block. Can resume regular diet   Prn dilaudid. Pain management following. Plan for block today.   Scheduled tylenol, gabapentin  Prn flexeril  Incentive spirometry and acapella  Ambulate in halls tid  Up and out  of bed to chair  DVT and GI ppx  On cipro/flagyl. ID consulted. Most likely d/c antibiotics today  Dispo: possible d/c home today after block             Spenser Whitehead MD  General Surgery  [facility]

## 2018-05-29 NOTE — PLAN OF CARE
Floor nurse came into CM office asking for SW to arrange ride home per patients' request & she asked about prescription be ing filled here before patient is discharged. SW, PORFIRIO Hinds, will make ride arrangements. CM informed her CM would bring prescription to OP pharmacy for BS delivery. CM went to unit to  prescription but there is no prescription on chart. Floor nurse paging         05/29/18 9094   Final Note   Assessment Type Final Discharge Note   Discharge Disposition Home   What phone number can be called within the next 1-3 days to see how you are doing after discharge? ([phone])   Hospital Follow Up  Appt(s) scheduled? Yes   Discharge plans and expectations educations in teach back method with documentation complete? Yes   Right Care Referral Info   Post Acute Recommendation (Incomplete)

## 2018-05-29 NOTE — NURSING
Discharge plans reviewed w/ pt. AVSS on RA. All Rx delivered to bedside. R chest port flushed and deaccessed. Awaiting transport.

## 2018-05-29 NOTE — TELEPHONE ENCOUNTER
Attempted to schedule an appointment as the pt requested, but was unable to because Maria Guadalupe Memo Neff stated that he was exhausted and needed to take the call at a later time.

## 2018-05-29 NOTE — PROGRESS NOTES
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 S/p lap appendectomy on 5/24, appendix was intact. ID consulted for duration of abx therapy. Zosyn was de escalated to PO cipro and flagyl on 5/28. Blood cx ngtd. CT scan of the abdomen on 5/28 with anticipated post surgical changes.     - would recommend stopping PO cipro and flagyl for acute appendicitis  - likely does not need ppx abx upon discharge as pt is not longer neutropenic , f/u with heme onc              Anticipated Disposition: will sign off at this time    Thank you for your consult. I will sign off. Please contact us if you have any additional questions.       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 Patient as admitted on 5/24, CT abd showed " Interval development of dilated appearance of the appendix with stranding of the periappendiceal fat,  Findings are worrisome for appendicitis ". Underwent laparoscopic appendectomy on 5/24, appendix was intact. Started on zosyn 5/24. Continues to have abdominal pain, although not febrile, improved leukocytosis with WBC of 10, tolerating PO. CT abd pending per surgery team. Notes some diarrhea that was watery on 5/27 but is resolving on 5/28, now more of loose stools    ID consulted for duration of abx therapy  Interval " History:     Doing better, post nerve block on 5/29 AM./ afebrile. WBC of 11 stable. CT abd on 5/28 was non revealing with expected post surgical changes. Was de escalated to PO cipro and flagyl from zosyn on 5/28, per surgery team    Review of Systems   Constitutional: Negative for appetite change and fever.   HENT: Negative for congestion and sinus pressure.    Respiratory: Negative for cough and wheezing.    Cardiovascular: Negative for chest pain, palpitations and leg swelling.   Gastrointestinal: Positive for abdominal pain. Negative for abdominal distention, nausea and vomiting.   Genitourinary: Negative for decreased urine volume, difficulty urinating and frequency.   Musculoskeletal: Positive for arthralgias and myalgias.        Improved pain post nerve block   Skin: Positive for pallor and wound.   Allergic/Immunologic: Positive for immunocompromised state.   Neurological: Negative for dizziness, syncope and weakness.   Psychiatric/Behavioral: Negative for confusion.     Objective:     Vital Signs (Most Recent):  Temp: 96.6 °F (35.9 °C) (05/29/18 1010)  Pulse: 81 (05/29/18 1131)  Resp: 18 (05/29/18 1010)  BP: 118/74 (05/29/18 1010)  SpO2: 98 % (05/29/18 1010) Vital Signs (24h Range):  Temp:  [96.6 °F (35.9 °C)-98.7 °F (37.1 °C)] 96.6 °F (35.9 °C)  Pulse:  [67-97] 81  Resp:  [10-18] 18  SpO2:  [97 %-100 %] 98 %  BP: (106-136)/(57-84) 118/74     Weight: 62.6 kg (137 lb 15.8 oz)  Body mass index is 22.27 kg/m².    Estimated Creatinine Clearance: 129.3 mL/min (based on SCr of 0.8 mg/dL).    Physical Exam   Constitutional: He is oriented to person, place, and time. No distress.   chronically ill appearing younger gentleman     HENT:   Head: Normocephalic and atraumatic.   Right Ear: External ear normal.   Left Ear: External ear normal.   Eyes: EOM are normal. Pupils are equal, round, and reactive to light.   Neck: Normal range of motion. Neck supple.   Cardiovascular: Normal rate, regular rhythm, normal heart  sounds and intact distal pulses.    No murmur heard.  Pulmonary/Chest: Effort normal and breath sounds normal. No respiratory distress. He has no wheezes.   R sided chest port in place with no erythema, purulence  Or tenderness   Abdominal: Soft. Bowel sounds are normal. He exhibits no distension. There is tenderness.   Musculoskeletal: Normal range of motion. He exhibits no edema, tenderness or deformity.   Neurological: He is alert and oriented to person, place, and time. No cranial nerve deficit.   Skin: Skin is warm and dry. No rash noted. He is not diaphoretic.   Psychiatric: He has a normal mood and affect.       Significant Labs:   Blood Culture:   Recent Labs  Lab 04/26/18  2329 04/26/18  2335 04/27/18  0155   LABBLOO No growth after 5 days. No growth after 5 days. No growth after 5 days.     CBC:   Recent Labs  Lab 05/28/18  0929 05/29/18  1048   WBC 10.45 11.61   HGB 9.9* 10.5*   HCT 30.0* 31.3*    253     CMP:   Recent Labs  Lab 05/28/18  0929      K 4.2      CO2 29   GLU 91   BUN 11   CREATININE 0.8   CALCIUM 8.6*   PROT 6.0   ALBUMIN 3.3*   BILITOT 0.2   ALKPHOS 47*   AST 34   ALT 37   ANIONGAP 7*   EGFRNONAA >60.0     Wound Culture: No results for input(s): LABAERO in the last 4320 hours.    Significant Imaging:  CT abd 5/28  Postoperative change of interval appendectomy with small volume of ill-defined fluid along the inferior margin of the cecum with mild adjacent inflammatory change.  Additional scattered foci of pneumoperitoneum are present within the lower pelvis, presumably postoperative in etiology.

## 2018-05-29 NOTE — TELEPHONE ENCOUNTER
----- Message from Suha Rojas MD sent at 5/28/2018  5:48 PM CDT -----  Please schedule patient for labs (CBC, CMP, Mg, LDH, AFP, HCG) on 6/7, see me, then chemo (EP) on 6/7, 6/8, 6/9, 6/11, 6/12. IVF on 6/13

## 2018-05-29 NOTE — TELEPHONE ENCOUNTER
----- Message from Nico Davis MA sent at 5/29/2018 10:49 AM CDT -----  Contact: Pt  Pt called and would like to schedule  A post-op appt.      Pt can be reached at 630 .      Thanks

## 2018-05-29 NOTE — ANESTHESIA PROCEDURE NOTES
Erector Spinae Plane Single Injection Block    Patient location during procedure: pre-op   Block not for primary anesthetic.  Reason for block: at surgeon's request and post-op pain management   Post-op Pain Location: abdominal pain  Start time: 5/29/2018 8:53 AM  Timeout: 5/29/2018 8:50 AM   End time: 5/29/2018 9:15 AM  Staffing  Anesthesiologist: LONDON HANSEN  Resident/CRNA: LESTER KHAN  Other anesthesia staff: ANN WASSERMAN  Performed: anesthesiologist and other anesthesia staff   Preanesthetic Checklist  Completed: patient identified, site marked, surgical consent, pre-op evaluation, timeout performed, IV checked, risks and benefits discussed and monitors and equipment checked  Peripheral Block  Patient position: sitting  Prep: ChloraPrep  Patient monitoring: heart rate, cardiac monitor, continuous pulse ox, continuous capnometry and frequent blood pressure checks  Anesthesia block type: Erector Spinae Plane Block.  Laterality: bilateral  Injection technique: single shot  Needle  Needle type: Stimuplex   Needle gauge: 21 G  Needle length: 4 in  Needle localization: anatomical landmarks and ultrasound guidance   -ultrasound image captured on disc.  Assessment  Injection assessment: negative aspiration, negative parasthesia and local visualized surrounding nerve  Paresthesia pain: none  Heart rate change: no  Slow fractionated injection: yes  Medications:  Bolus administered: 50 mL of 0.375 bupivacaine  Epinephrine added: 3.75 mcg/mL (1/300,000)  Additional Notes  Patient tolerated well.  See [facility] RN record for vitals.

## 2018-05-29 NOTE — ANESTHESIA POST-OP PAIN MANAGEMENT
Acute Pain Service Progress Note    Alejandro Neff is a 23 y.o., male, [id number].    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 Pruritis No    2. Nausea No    3. Motor Blockade No, 0=Ability to raise lower extremities off bed    4. Sedation Yes, 3=frequently drowsy, arouseable, drifts off to sleep during conversation    Objective:          Vitals   Vitals:    05/29/18 0337   BP: 106/60   Pulse: 72   Resp: 18   Temp: 36.4 °C (97.5 °F)        Labs    Admission on 05/24/2018   No results displayed because visit has over 200 results.           Meds   Current Facility-Administered Medications   Medication Dose Route Frequency Provider Last Rate Last Dose    acetaminophen tablet 1,000 mg  1,000 mg Oral Q6H Elaina Gibson MD   1,000 mg at 05/29/18 0609    baclofen split tablet 5 mg  5 mg Oral TID Kenan Huang Jr., MD   5 mg at 05/28/18 2147    bisacodyl suppository 10 mg  10 mg Rectal Once Jordyn Goodman MD        ciprofloxacin HCl tablet 500 mg  500 mg Oral Q12H Spenser Whitehead MD   500 mg at 05/28/18 2157    cyclobenzaprine tablet 5 mg  5 mg Oral TID PRN Alfred Webb MD   5 mg at 05/27/18 1644    diphenhydrAMINE injection 25 mg  25 mg Intravenous Q4H PRN Shirley Skaggs MD   25 mg at 05/25/18 1825    gabapentin capsule 300 mg  300 mg Oral TID Ealina Gibson MD   300 mg at 05/28/18 2148    HYDROmorphone tablet 2 mg  2 mg Oral Q4H PRN Jordyn  MD Maxine        HYDROmorphone tablet 4 mg  4 mg Oral Q4H PRN Jordyn Goodman MD        lidocaine 5 % patch 1 patch  1 patch Transdermal Q24H Kenan Huang Jr., MD   1 patch at 05/25/18 2015    LORazepam tablet 0.5 mg  0.5 mg Oral Q8H PRN Charu Ramos MD   0.5 mg at 05/28/18 2147    metroNIDAZOLE tablet 500 mg  500 mg Oral Q8H Spenser Whitehead MD   500 mg at 05/29/18 0609    omnipaque oral solution 15 mL  15 mL Oral PRN Pravin Faulkner MD   15 mL at 05/28/18 1516    ondansetron tablet 4 mg  4 mg Oral Q6H PRN ANA Ndiaye Jr., MD   4 mg at 05/28/18 1805    pantoprazole EC tablet 40 mg  40 mg Oral Daily Shirley Skaggs MD   40 mg at 05/28/18 0916    polyethylene glycol packet 17 g  17 g Oral Daily Alfred Webb MD   17 g at 05/26/18 1036    promethazine (PHENERGAN) 12.5 mg in dextrose 5 % 50 mL IVPB  12.5 mg Intravenous Q6H PRN Gisela Ac  mL/hr at 05/28/18 1148 12.5 mg at 05/28/18 1148    sodium chloride 0.9% flush 3 mL  3 mL Intravenous PRN Shirley Skaggs MD        zolpidem tablet 5 mg  5 mg Oral Nightly PRN Charu Ramos MD   5 mg at 05/28/18 2148       Assessment:     Pain control inadequate    Plan:   CT scan unremarkable    Modify present therapy to decrease side effects Will decrease dilaudid to 2 and 4 as patient was very drousy this AM and required hard pinch to awaken. Once awake states his pain is a 10/10.   Due to chronic opoid use likely hyperalgesic. Will bring patient down to block area for tap block v erector spinae to hopefully reset nerve pain.      Evaluator Elaina Gibson

## 2018-05-29 NOTE — PLAN OF CARE
"Visited patient. AAOX4. Asked patient if he still needed a ride home for Dr. Huang's team stated they would be discharging him home later today? He verbalized his understanding & states,"Yes, I do need a ride home. It is needed because of money is tight." CM assured him  dept.  would pay &  will arrange ride home in a w/c van. PORFIRIO PERRY, updated.     "

## 2018-05-29 NOTE — ASSESSMENT & PLAN NOTE
Alejandro GALICIA Memo Neff is a 23 y.o. male s/p lap appendectomy, of note pt on chemo for seminoma in mediastinum    NPO this morning for block. Can resume regular diet   Prn dilaudid. Pain management following. Plan for block today.   Scheduled tylenol, gabapentin  Prn flexeril  Incentive spirometry and acapella  Ambulate in halls tid  Up and out of bed to chair  DVT and GI ppx  On cipro/flagyl. ID consulted. Most likely d/c antibiotics today  Dispo: possible d/c home today after block

## 2018-05-29 NOTE — DISCHARGE SUMMARY
[facility]  General Surgery  Discharge Summary      Patient Name: Alejandro Neff  MRN: [id number]  Admission Date: 5/24/2018  Hospital Length of Stay: 5 days  Discharge Date and Time:  05/29/2018 3:26 PM  Attending Physician: Kenan Huang Jr.,*   Discharging Provider: Spenser Whitehead MD  Primary Care Provider: Nata Hernandez MD     HPI:  Mr Hampton is a 24 yo M with PMH of Primary mediastinal seminoma at the chest currently on chemotherapy who presents with likely acute appendicitis.      Patient states he has been having RLQ abdominal pain for the past 2 weeks, associated with increased baseline nausea, no emesis and no fevers. For the past week he has been on prophylactic antibiotics for neutropenia. Otherwise has been having some loose bowel movements but denies any blood.      He is currently on chemotherapy and has had at least 3 cycles, he has 5 days of chemotherapy and 2 weeks without it, he is due on Monday. He had Neutropenic fever on 4/26-29 requiring hospitalization and treatment with antibiotics. Last treatment about 2 weeks ago.     Procedure(s) (LRB):  APPENDECTOMY, LAPAROSCOPIC (N/A)     Hospital Course: Underwent laparoscopic appendectomy without complications on 05/25/18. He tolerated the procedure well but had some pain issues. Pain management was consulted and performed erector spinae block which helped relieved his pain. He was given 4 days of IV zosyn and then transitioned to oral cipro/flagyl. ID was consulted due to his immunosuppression and deemed him able to d/c his antibiotics. He was discharged on POD5 hemodynamically stable, without leukocytosis and pain controlled. He will need to follow up with oncologist for chronic pain.   Consults:   Consults         Status Ordering Provider     Inpatient consult to General surgery  Once     Provider:  (Not yet assigned)    АЛЕКСАНДР Robledo     Inpatient consult to Hematology/Oncology  Once      Provider:  (Not yet assigned)    Completed LEYDA MONIQUE     Inpatient consult to Infectious Diseases  Once     Provider:  (Not yet assigned)    Completed MONO ARMSTRONG     Inpatient consult to Pain Management  Once     Provider:  (Not yet assigned)    Acknowledged MONO ARMSTRONG          Significant Diagnostic Studies: Labs:   CMP   Recent Labs  Lab 05/28/18  0929 05/29/18  1048    141   K 4.2 3.8    105   CO2 29 29   GLU 91 127*   BUN 11 10   CREATININE 0.8 0.7   CALCIUM 8.6* 9.4   PROT 6.0 6.1   ALBUMIN 3.3* 3.4*   BILITOT 0.2 0.3   ALKPHOS 47* 51*   AST 34 77*   ALT 37 121*   ANIONGAP 7* 7*   ESTGFRAFRICA >60.0 >60.0   EGFRNONAA >60.0 >60.0    and CBC   Recent Labs  Lab 05/28/18  0929 05/29/18  1048   WBC 10.45 11.61   HGB 9.9* 10.5*   HCT 30.0* 31.3*    253       Pending Diagnostic Studies:     Procedure Component Value Units Date/Time    CBC auto differential [[id number]] Collected:  05/29/18 0551    Order Status:  Sent Lab Status:  In process Updated:  05/29/18 0551    Specimen:  Blood from Blood     Comprehensive metabolic panel [[id number]] Collected:  05/29/18 0551    Order Status:  Sent Lab Status:  In process Updated:  05/29/18 0551    Specimen:  Blood from Blood     Magnesium [[id number]] Collected:  05/29/18 0551    Order Status:  Sent Lab Status:  In process Updated:  05/29/18 0551    Specimen:  Blood from Blood     Phosphorus [[id number]] Collected:  05/29/18 0551    Order Status:  Sent Lab Status:  In process Updated:  05/29/18 0551    Specimen:  Blood from Blood         Final Active Diagnoses:    Diagnosis Date Noted POA    PRINCIPAL PROBLEM:  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    Specialties:  General Surgery, Bariatrics  Why:  Post-operative Appt/Office to arrange & call you.   Contact information:  [address]  [facility] [address]  [phone]                 Patient Instructions:     Diet Adult Regular     Lifting restrictions   Order Comments: No lifting greater than 10 lbs for 6 weeks.     Notify your health care provider if you experience any of the following:  increased confusion or weakness     Notify your health care provider if you experience any of the following:  persistent dizziness, light-headedness, or visual disturbances     Notify your health care provider if you experience any of the following:  worsening rash     Notify your health care provider if you experience any of the following:  severe persistent headache     Notify your health care provider if you experience any of the following:  difficulty breathing or increased cough     Notify your health care provider if you experience any of the following:  redness, tenderness, or signs of infection (pain, swelling, redness, odor or green/yellow discharge around incision site)     Notify your health care provider if you experience any of the following:  severe uncontrolled pain     Notify your health care provider if you experience any of the following:  persistent nausea and vomiting or diarrhea     Notify your health care provider if you experience any of the following:  temperature >100.4     No dressing needed       Medications:  Reconciled Home Medications:      Medication List      START taking these medications    gabapentin 300 MG capsule  Commonly known as:  NEURONTIN  Take 1 capsule (300 mg total) by mouth 3 (three) times daily.     HYDROmorphone 2 MG tablet  Commonly known as:  DILAUDID  Take 1 tablet (2 mg total) by mouth every 4 (four) hours as needed.     polyethylene  glycol 17 gram/dose powder  Commonly known as:  GLYCOLAX  Take 17g of powder mixed in liquid by mouth once daily.  Start taking on:  5/30/2018        CONTINUE taking these medications    albuterol 90 mcg/actuation inhaler  Inhale 2 puffs into the lungs every 6 (six) hours as needed for Wheezing.     aluminum-magnesium hydroxide-simethicone 200-200-20 mg/5 mL Susp  Commonly known as:  MAALOX  Take 15 mLs by mouth once.     baclofen 10 MG tablet  Commonly known as:  LIORESAL  Take 1 tablet (10 mg total) by mouth 2 (two) times daily as needed (hiccups).     diphenhydrAMINE-aluminum-magnesium hydroxide-simethicone-lidocaine HCl 2%  Swish and spit 15 mLs every 4 (four) hours as needed (mouth sore).     EPINEPHrine 0.3 mg/0.3 mL Atin  Commonly known as:  EPIPEN  Inject 0.3 mg/mL into the muscle.     granisetron 3.1 mg/24 hour  Commonly known as:  SANCUSO  place 1 patch onto the skin 24 hours before chemo, each patch can be worn up to 7 days     LORazepam 0.5 MG tablet  Commonly known as:  ATIVAN  Take 1 tablet (0.5 mg total) by mouth every 8 (eight) hours as needed for Anxiety.     * morphine 15 MG tablet  Commonly known as:  MSIR  Take 1 tablet (15 mg total) by mouth every 6 (six) hours as needed for Pain.     * morphine 15 MG tablet  Commonly known as:  MSIR  Take 1 tablet (15 mg total) by mouth every 6 (six) hours as needed.     MULTI VITAMIN ORAL  Take by mouth once daily.     ondansetron 4 MG tablet  Commonly known as:  ZOFRAN  Take 1 tablet (4 mg total) by mouth every 6 (six) hours as needed for Nausea.     pantoprazole 40 MG tablet  Commonly known as:  PROTONIX  Take 1 tablet (40 mg total) by mouth once daily.     * PROMETHEGAN 25 MG suppository  Generic drug:  promethazine  Place 1 suppository (25 mg total) rectally every 6 (six) hours as needed for Nausea.     * promethazine 25 MG tablet  Commonly known as:  PHENERGAN  Take 1 tablet (25 mg total) by mouth every 4 (four) hours as needed for Nausea.     zolpidem 5  MG Tab  Commonly known as:  AMBIEN  Take 1 tablet (5 mg total) by mouth nightly as needed (insomnia).        * This list has 4 medication(s) that are the same as other medications prescribed for you. Read the directions carefully, and ask your doctor or other care provider to review them with you.            STOP taking these medications    amoxicillin-clavulanate 875-125mg 875-125 mg per tablet  Commonly known as:  ANANTH Whitehead MD  General Surgery  [facility]

## 2018-05-29 NOTE — SUBJECTIVE & OBJECTIVE
Interval History: Continues to complain of pain. NPO for block today by anesthesia. VSS. Afebrile. CT a/p without fluid collection or intra-abdominal process. Patient seems distractable.     Medications:  Continuous Infusions:  Scheduled Meds:   acetaminophen  1,000 mg Oral Q6H    baclofen  5 mg Oral TID    bisacodyl  10 mg Rectal Once    ciprofloxacin HCl  500 mg Oral Q12H    gabapentin  300 mg Oral TID    lidocaine  1 patch Transdermal Q24H    metroNIDAZOLE  500 mg Oral Q8H    pantoprazole  40 mg Oral Daily    polyethylene glycol  17 g Oral Daily     PRN Meds:cyclobenzaprine, diphenhydrAMINE, HYDROmorphone, HYDROmorphone, LORazepam, omnipaque, ondansetron, promethazine (PHENERGAN) IVPB, sodium chloride 0.9%, zolpidem     Review of patient's allergies indicates:   Allergen Reactions    Mushroom Anaphylaxis    Bamboo     Bee pollens     Mushroom flavor     Venom-honey bee      Objective:     Vital Signs (Most Recent):  Temp: 97.5 °F (36.4 °C) (05/29/18 0337)  Pulse: 72 (05/29/18 0337)  Resp: 18 (05/29/18 0337)  BP: 106/60 (05/29/18 0337)  SpO2: 98 % (05/29/18 0337) Vital Signs (24h Range):  Temp:  [96.7 °F (35.9 °C)-98.7 °F (37.1 °C)] 97.5 °F (36.4 °C)  Pulse:  [64-97] 72  Resp:  [16-18] 18  SpO2:  [97 %-100 %] 98 %  BP: (106-119)/(57-70) 106/60     Weight: 62.6 kg (137 lb 15.8 oz)  Body mass index is 22.27 kg/m².    Intake/Output - Last 3 Shifts       05/27 0700 - 05/28 0659 05/28 0700 - 05/29 0659 05/29 0700 - 05/30 0659    P.O. 550      Total Intake(mL/kg) 550 (8.8)      Net +550               Urine Occurrence 3 x 2 x     Stool Occurrence 4 x 1 x           Physical Exam   Constitutional: He is oriented to person, place, and time. He appears well-developed and well-nourished. He is sleeping. No distress.   HENT:   Head: Normocephalic and atraumatic.   Eyes: EOM are normal. No scleral icterus.   Neck: No tracheal deviation present.   Cardiovascular: Normal rate.    Pulmonary/Chest: Effort normal.    Abdominal: Soft. He exhibits no distension. There is tenderness (attp at incision sites).   Neurological: He is alert and oriented to person, place, and time. No cranial nerve deficit.   Psychiatric: He has a normal mood and affect.       Significant Labs:  CBC:   Recent Labs  Lab 05/28/18  0929   WBC 10.45   RBC 3.50*   HGB 9.9*   HCT 30.0*      MCV 86   MCH 28.3   MCHC 33.0     CMP:   Recent Labs  Lab 05/28/18  0929   GLU 91   CALCIUM 8.6*   ALBUMIN 3.3*   PROT 6.0      K 4.2   CO2 29      BUN 11   CREATININE 0.8   ALKPHOS 47*   ALT 37   AST 34   BILITOT 0.2       Significant Diagnostics:  None

## 2018-05-29 NOTE — SUBJECTIVE & OBJECTIVE
Interval History:     Doing better, post nerve block on 5/29 AM./ afebrile. WBC of 11 stable. CT abd on 5/28 was non revealing with expected post surgical changes. Was de escalated to PO cipro and flagyl from zosyn on 5/28, per surgery team    Review of Systems   Constitutional: Negative for appetite change and fever.   HENT: Negative for congestion and sinus pressure.    Respiratory: Negative for cough and wheezing.    Cardiovascular: Negative for chest pain, palpitations and leg swelling.   Gastrointestinal: Positive for abdominal pain. Negative for abdominal distention, nausea and vomiting.   Genitourinary: Negative for decreased urine volume, difficulty urinating and frequency.   Musculoskeletal: Positive for arthralgias and myalgias.        Improved pain post nerve block   Skin: Positive for pallor and wound.   Allergic/Immunologic: Positive for immunocompromised state.   Neurological: Negative for dizziness, syncope and weakness.   Psychiatric/Behavioral: Negative for confusion.     Objective:     Vital Signs (Most Recent):  Temp: 96.6 °F (35.9 °C) (05/29/18 1010)  Pulse: 81 (05/29/18 1131)  Resp: 18 (05/29/18 1010)  BP: 118/74 (05/29/18 1010)  SpO2: 98 % (05/29/18 1010) Vital Signs (24h Range):  Temp:  [96.6 °F (35.9 °C)-98.7 °F (37.1 °C)] 96.6 °F (35.9 °C)  Pulse:  [67-97] 81  Resp:  [10-18] 18  SpO2:  [97 %-100 %] 98 %  BP: (106-136)/(57-84) 118/74     Weight: 62.6 kg (137 lb 15.8 oz)  Body mass index is 22.27 kg/m².    Estimated Creatinine Clearance: 129.3 mL/min (based on SCr of 0.8 mg/dL).    Physical Exam   Constitutional: He is oriented to person, place, and time. No distress.   chronically ill appearing younger gentleman     HENT:   Head: Normocephalic and atraumatic.   Right Ear: External ear normal.   Left Ear: External ear normal.   Eyes: EOM are normal. Pupils are equal, round, and reactive to light.   Neck: Normal range of motion. Neck supple.   Cardiovascular: Normal rate, regular rhythm, normal  heart sounds and intact distal pulses.    No murmur heard.  Pulmonary/Chest: Effort normal and breath sounds normal. No respiratory distress. He has no wheezes.   R sided chest port in place with no erythema, purulence  Or tenderness   Abdominal: Soft. Bowel sounds are normal. He exhibits no distension. There is tenderness.   Musculoskeletal: Normal range of motion. He exhibits no edema, tenderness or deformity.   Neurological: He is alert and oriented to person, place, and time. No cranial nerve deficit.   Skin: Skin is warm and dry. No rash noted. He is not diaphoretic.   Psychiatric: He has a normal mood and affect.       Significant Labs:   Blood Culture:   Recent Labs  Lab 04/26/18  2329 04/26/18  2335 04/27/18  0155   LABBLOO No growth after 5 days. No growth after 5 days. No growth after 5 days.     CBC:   Recent Labs  Lab 05/28/18  0929 05/29/18  1048   WBC 10.45 11.61   HGB 9.9* 10.5*   HCT 30.0* 31.3*    253     CMP:   Recent Labs  Lab 05/28/18  0929      K 4.2      CO2 29   GLU 91   BUN 11   CREATININE 0.8   CALCIUM 8.6*   PROT 6.0   ALBUMIN 3.3*   BILITOT 0.2   ALKPHOS 47*   AST 34   ALT 37   ANIONGAP 7*   EGFRNONAA >60.0     Wound Culture: No results for input(s): LABAERO in the last 4320 hours.    Significant Imaging:  CT abd 5/28  Postoperative change of interval appendectomy with small volume of ill-defined fluid along the inferior margin of the cecum with mild adjacent inflammatory change.  Additional scattered foci of pneumoperitoneum are present within the lower pelvis, presumably postoperative in etiology.

## 2018-05-30 ENCOUNTER — PATIENT MESSAGE (OUTPATIENT)
Dept: HEMATOLOGY/ONCOLOGY | Facility: CLINIC | Age: 23
End: 2018-05-30

## 2018-05-31 ENCOUNTER — INFUSION (OUTPATIENT)
Dept: INFUSION THERAPY | Facility: HOSPITAL | Age: 23
End: 2018-05-31
Attending: INTERNAL MEDICINE
Payer: COMMERCIAL

## 2018-05-31 ENCOUNTER — OFFICE VISIT (OUTPATIENT)
Dept: HEMATOLOGY/ONCOLOGY | Facility: CLINIC | Age: 23
End: 2018-05-31
Payer: COMMERCIAL

## 2018-05-31 VITALS
DIASTOLIC BLOOD PRESSURE: 71 MMHG | HEART RATE: 120 BPM | WEIGHT: 136.69 LBS | TEMPERATURE: 98 F | SYSTOLIC BLOOD PRESSURE: 106 MMHG | BODY MASS INDEX: 21.45 KG/M2 | OXYGEN SATURATION: 99 % | RESPIRATION RATE: 18 BRPM | HEIGHT: 67 IN

## 2018-05-31 DIAGNOSIS — R19.7 DIARRHEA, UNSPECIFIED TYPE: ICD-10-CM

## 2018-05-31 DIAGNOSIS — R11.0 NAUSEA: ICD-10-CM

## 2018-05-31 DIAGNOSIS — C38.3 PRIMARY MEDIASTINAL SEMINOMA: ICD-10-CM

## 2018-05-31 DIAGNOSIS — R19.7 DIARRHEA OF PRESUMED INFECTIOUS ORIGIN: ICD-10-CM

## 2018-05-31 DIAGNOSIS — R74.01 TRANSAMINITIS: ICD-10-CM

## 2018-05-31 DIAGNOSIS — E86.0 DEHYDRATION: Primary | ICD-10-CM

## 2018-05-31 PROCEDURE — 96361 HYDRATE IV INFUSION ADD-ON: CPT

## 2018-05-31 PROCEDURE — 25000003 PHARM REV CODE 250: Performed by: NURSE PRACTITIONER

## 2018-05-31 PROCEDURE — 96365 THER/PROPH/DIAG IV INF INIT: CPT

## 2018-05-31 PROCEDURE — 63600175 PHARM REV CODE 636 W HCPCS: Performed by: NURSE PRACTITIONER

## 2018-05-31 PROCEDURE — 99999 PR PBB SHADOW E&M-EST. PATIENT-LVL V: CPT | Mod: PBBFAC,,,

## 2018-05-31 PROCEDURE — 3008F BODY MASS INDEX DOCD: CPT | Mod: CPTII,S$GLB,, | Performed by: INTERNAL MEDICINE

## 2018-05-31 PROCEDURE — 99214 OFFICE O/P EST MOD 30 MIN: CPT | Mod: S$GLB,,, | Performed by: NURSE PRACTITIONER

## 2018-05-31 RX ORDER — HEPARIN 100 UNIT/ML
500 SYRINGE INTRAVENOUS
Status: COMPLETED | OUTPATIENT
Start: 2018-05-31 | End: 2018-05-31

## 2018-05-31 RX ADMIN — SODIUM CHLORIDE: 0.9 INJECTION, SOLUTION INTRAVENOUS at 12:05

## 2018-05-31 RX ADMIN — PROMETHAZINE HYDROCHLORIDE 25 MG: 25 INJECTION INTRAMUSCULAR; INTRAVENOUS at 01:05

## 2018-05-31 RX ADMIN — HEPARIN SODIUM (PORCINE) LOCK FLUSH IV SOLN 100 UNIT/ML 500 UNITS: 100 SOLUTION at 02:05

## 2018-05-31 NOTE — PLAN OF CARE
Problem: Patient Care Overview  Goal: Plan of Care Review  Outcome: Ongoing (interventions implemented as appropriate)  Patient tolerated treatment well.  No questions or concerns.  AVS given to patient.  Patient ambulated off unit unassisted.

## 2018-05-31 NOTE — PLAN OF CARE
Problem: Nausea/Vomiting (Adult)  Goal: Identify Related Risk Factors and Signs and Symptoms  Related risk factors and signs and symptoms are identified upon initiation of Human Response Clinical Practice Guideline (CPG)  Outcome: Ongoing (interventions implemented as appropriate)  Patient here for 1 L NS and phenergan.  Assessment complete and labs reviewed.  VSS. Chair reclined and blanket offered.  No needs expressed at this time.  Will continue to monitor.

## 2018-05-31 NOTE — PROGRESS NOTES
"   Subjective:      Patient ID: Alejandro Neff is a 23 y.o. male.     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 Rojas's note:  1. Mr. aHmpton is a 22 yo man who first presented with chest pain and underwent CT chest on 2/14/18, which showed a 6 x 3.5 cm mediastinal mass. It abuts the aorta and pulmonary artery. Biopsy was done on 3/1/18. Pathology (reviewed at [facility]) showed germ cell tumor, most consistent with seminoma.   2. HCG, LDH, AFP on 3/22/18 all normal.   3. Testicular US 3/26/18 showed no testicular masses. CT C/A/P on 3/26/18 showed "stable appearance of anterior mediastinal mass consistent with provided history of seminoma. Several punctate sclerotic foci are noted in the pelvis at the right pubic bone, right ischial tuberosity, and left ilium adjacent to the sacroiliac joint.  These are strongly favored to reflect benign bone islands although metastatic disease could have a similar appearance and close follow-up is recommended. Several benign Schmorl's nodes are noted in the thoracic spine." Bone scan on 3/28/18 was negative for bone mets.   4. Audiogram on 4/6/18 normal. Followed by ENT.   5. Given his smoking history, I recommended 4 cycles of EP. EP cycle 1 day 1 on 4/16/18. Complicated by hospitalization for neutropenic fever 4/26-4/29. No source of infections identified. Treated with antibiotics empirically and improved.   6. Cycle 2 of EP started on on 5/7/18. Hospitalized during the first weekend for intractable nausea.     Hospital admission 5/24-5/29  Underwent laparoscopic appendectomy without complications on 05/25/18. He tolerated the procedure well but had some pain issues. Pain management was consulted and performed erector spinae block which helped (patient reports for only 3 hours). He was given 4 days of IV zosyn and then transitioned to oral cipro/flagyl. ID was consulted due to his " immunosuppression and deemed him able to d/c his antibiotics. He was discharged on POD5 hemodynamically stable, without leukocytosis and pain controlled. He will need to follow up with oncologist for chronic pain.      INTERVAL HISTORY:   Mr. Hampton returns today for an urgent care visit for evaluation of nausea and pain. Pain was mild before appendectomy, states he was managing the symptoms of lower abdominal pain and diarrhea at home prior to surgery. Today he reports nausea at home, po Zofran and Phenergan help but not as much as IV meds. He is eating well and denies vomiting. Denies fevers or chills. Reports diarrhea about 3-6 times a day. Dark stool, not bloody. He feels like he is dehydrated due to diarrhea. He has abdominal pain on lower quadrants, reports pain 9/10 today. Hasn't taken po Dilaudid since last night. He has chronic abdominal pain and has been discussing medication changes with Dr. Rojas per patient.      Physical Examination:   Vital signs reviewed.   Gen: well hydrated, well developed, in no acute distress.  HEENT: normocephalic, anicteric sclerae, PERRLA, EOMI, oropharynx clear  Neck: supple, no JVD, thyromegaly, cervical or supraclavicular LAD  Lungs: CTAB, no wheezes or rales. Port site on chest clean.   Heart: RRR, no M/R/G  Abdomen: soft, non-distended,no hepatosplenomegaly, mass, or hernia. BS present. No tenderness to palpation  Ext: no clubbing, cyanosis, or edema  Neuro: alert and oriented x 4, no focal neuro deficit  Skin: no rash, erythema, open wound or ulcers. steristrips intact to 3 lap sites on abdomen, no redness or drainage  Psych: pleasant and appropriate mood and affect        Objective:         Laboratory Data:  Labs from today reviewed. Mild Leukocytosis. Calcium elevated at 10.7 and AST/ALT elevated at 189/313     Assessment/Plan:      1. Primary mediastinal seminoma    2. Acute appendicitis with localized peritonitis    3. Leukocytosis, unspecified type    4. Anemia,  unspecified type    5. 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 S/p 2 cycle of EP. Recent hospital admission for appendicitis, s/p appendectomy on 5/25 with nausea and abdominal pain post surgery. He is not in any distress today, very talkative and joking/laughing during clinic visit. He will continue po Dilaudid for pain and will address chronic pain with Dr[facility]. He will also schedule his post-op follow-up appointment with Dr. Huang.  Will obtain stool for Cdiff, culture and WBC today, may start Imodium pending results. Will give IVF and IV phenergan at infusion today per patient request. Will need to monitor elevated liver enzymes, bilirubin wnl    F/U with Bob in 1 week as scheduled    Dayana Chawla NP  Hematology/Oncology/BMT

## 2018-06-01 ENCOUNTER — TELEPHONE (OUTPATIENT)
Dept: HEMATOLOGY/ONCOLOGY | Facility: CLINIC | Age: 23
End: 2018-06-01

## 2018-06-03 ENCOUNTER — PATIENT MESSAGE (OUTPATIENT)
Dept: SURGERY | Facility: CLINIC | Age: 23
End: 2018-06-03

## 2018-06-04 ENCOUNTER — PATIENT MESSAGE (OUTPATIENT)
Dept: INTERNAL MEDICINE | Facility: CLINIC | Age: 23
End: 2018-06-04

## 2018-06-04 ENCOUNTER — PATIENT MESSAGE (OUTPATIENT)
Dept: HEMATOLOGY/ONCOLOGY | Facility: CLINIC | Age: 23
End: 2018-06-04

## 2018-06-04 ENCOUNTER — TELEPHONE (OUTPATIENT)
Dept: HEMATOLOGY/ONCOLOGY | Facility: CLINIC | Age: 23
End: 2018-06-04

## 2018-06-04 NOTE — TELEPHONE ENCOUNTER
Spoke with pt regarding his concerns about the pain in his abdomen. Pt states that he is still having abdominal pain but does not feel like he needs to go to the ER or  Urgent care. Pt states he's still using his current pain medication to address the pain.  Pt states he just wanted to talk to Dr. Rojas and see what she thinks is causing the pain in his abdomen. Pt wondering if there is a lab test or scan that can help to identify the cause of pain in his abdomen and if Dr. Rojas can order it. Pt also wants to talk to Dr. Rojas about changing his pain medications and checking his iron levels. Pt was informed that his Hemoglobin and Hematocrit were WNL but was wondering if Dr. Rojas can order an iron level because pt states he's been told he was anemic in the past. Pt states he would like to speak with Dr. Rojas about these concerns on Friday but she can contact him on Thursday via [facility]. I informed pt I will let Dr. Rojas know, pt voiced understanding.

## 2018-06-06 ENCOUNTER — OFFICE VISIT (OUTPATIENT)
Dept: SURGERY | Facility: CLINIC | Age: 23
End: 2018-06-06
Payer: COMMERCIAL

## 2018-06-06 ENCOUNTER — PATIENT MESSAGE (OUTPATIENT)
Dept: HEMATOLOGY/ONCOLOGY | Facility: CLINIC | Age: 23
End: 2018-06-06

## 2018-06-06 ENCOUNTER — PATIENT MESSAGE (OUTPATIENT)
Dept: SURGERY | Facility: CLINIC | Age: 23
End: 2018-06-06

## 2018-06-06 VITALS
BODY MASS INDEX: 21.19 KG/M2 | HEIGHT: 67 IN | WEIGHT: 135 LBS | TEMPERATURE: 98 F | DIASTOLIC BLOOD PRESSURE: 72 MMHG | SYSTOLIC BLOOD PRESSURE: 121 MMHG | HEART RATE: 105 BPM

## 2018-06-06 DIAGNOSIS — G89.18 POST-OP PAIN: Primary | ICD-10-CM

## 2018-06-06 DIAGNOSIS — R10.9 ABDOMINAL PAIN, UNSPECIFIED ABDOMINAL LOCATION: Primary | ICD-10-CM

## 2018-06-06 DIAGNOSIS — R74.8 ELEVATED LIVER ENZYMES: ICD-10-CM

## 2018-06-06 PROCEDURE — 99999 PR PBB SHADOW E&M-EST. PATIENT-LVL III: CPT | Mod: PBBFAC,,, | Performed by: SURGERY

## 2018-06-06 PROCEDURE — 99024 POSTOP FOLLOW-UP VISIT: CPT | Mod: S$GLB,,, | Performed by: SURGERY

## 2018-06-06 NOTE — PROGRESS NOTES
HPI:  The patient is status post-lap appy.  States he is still having pain.  8/10 pain per patient.  Ariel diet.  States he is having frequent bowel movements but that they are not watery.    PHYSICAL EXAM:  Physical Exam     FINAL PATHOLOGIC DIAGNOSIS  Appendix:  Appendix without significant histologic alteration.    ASSESSMENT:    The patient is still havin gpain after surgery     PLAN:    Follow up in 3 weeks..  Activity: light duty for 4 weeks  Will obtain labs today.        Kenan Huang MD

## 2018-06-07 ENCOUNTER — HOSPITAL ENCOUNTER (OUTPATIENT)
Dept: RADIOLOGY | Facility: OTHER | Age: 23
Discharge: HOME | End: 2018-06-07
Attending: INTERNAL MEDICINE
Payer: COMMERCIAL

## 2018-06-07 ENCOUNTER — PATIENT MESSAGE (OUTPATIENT)
Dept: HEMATOLOGY/ONCOLOGY | Facility: CLINIC | Age: 23
End: 2018-06-07

## 2018-06-07 ENCOUNTER — LAB VISIT (OUTPATIENT)
Dept: LAB | Facility: OTHER | Age: 23
End: 2018-06-07
Attending: INTERNAL MEDICINE
Payer: COMMERCIAL

## 2018-06-07 ENCOUNTER — TELEPHONE (OUTPATIENT)
Dept: HEMATOLOGY/ONCOLOGY | Facility: CLINIC | Age: 23
End: 2018-06-07

## 2018-06-07 DIAGNOSIS — R10.9 ABDOMINAL PAIN, UNSPECIFIED ABDOMINAL LOCATION: ICD-10-CM

## 2018-06-07 DIAGNOSIS — C38.3 PRIMARY MEDIASTINAL SEMINOMA: ICD-10-CM

## 2018-06-07 DIAGNOSIS — R74.8 ELEVATED LIVER ENZYMES: ICD-10-CM

## 2018-06-07 LAB
AFP SERPL-MCNC: 1.4 NG/ML
ALBUMIN SERPL BCP-MCNC: 4 G/DL
ALP SERPL-CCNC: 54 U/L
ALT SERPL W/O P-5'-P-CCNC: 55 U/L
ANION GAP SERPL CALC-SCNC: 9 MMOL/L
AST SERPL-CCNC: 20 U/L
BILIRUB SERPL-MCNC: 0.5 MG/DL
BUN SERPL-MCNC: 8 MG/DL
CALCIUM SERPL-MCNC: 9.9 MG/DL
CHLORIDE SERPL-SCNC: 104 MMOL/L
CO2 SERPL-SCNC: 28 MMOL/L
CREAT SERPL-MCNC: 0.8 MG/DL
ERYTHROCYTE [DISTWIDTH] IN BLOOD BY AUTOMATED COUNT: 16.5 %
EST. GFR  (AFRICAN AMERICAN): >60 ML/MIN/1.73 M^2
EST. GFR  (NON AFRICAN AMERICAN): >60 ML/MIN/1.73 M^2
GLUCOSE SERPL-MCNC: 101 MG/DL
HCG INTACT+B SERPL-ACNC: <1.2 MIU/ML
HCT VFR BLD AUTO: 35.2 %
HGB BLD-MCNC: 11.7 G/DL
LDH SERPL L TO P-CCNC: 189 U/L
MAGNESIUM SERPL-MCNC: 2.1 MG/DL
MCH RBC QN AUTO: 28.3 PG
MCHC RBC AUTO-ENTMCNC: 33.2 G/DL
MCV RBC AUTO: 85 FL
NEUTROPHILS # BLD AUTO: 3.6 K/UL
PLATELET # BLD AUTO: 184 K/UL
PMV BLD AUTO: 8.1 FL
POTASSIUM SERPL-SCNC: 4.4 MMOL/L
PROT SERPL-MCNC: 6.6 G/DL
RBC # BLD AUTO: 4.13 M/UL
SODIUM SERPL-SCNC: 141 MMOL/L
WBC # BLD AUTO: 5.44 K/UL

## 2018-06-07 PROCEDURE — 83735 ASSAY OF MAGNESIUM: CPT

## 2018-06-07 PROCEDURE — 80053 COMPREHEN METABOLIC PANEL: CPT

## 2018-06-07 PROCEDURE — 84702 CHORIONIC GONADOTROPIN TEST: CPT

## 2018-06-07 PROCEDURE — 76700 US EXAM ABDOM COMPLETE: CPT | Mod: TC

## 2018-06-07 PROCEDURE — 83615 LACTATE (LD) (LDH) ENZYME: CPT

## 2018-06-07 PROCEDURE — 82105 ALPHA-FETOPROTEIN SERUM: CPT

## 2018-06-07 PROCEDURE — 76700 US EXAM ABDOM COMPLETE: CPT | Mod: 26,,, | Performed by: RADIOLOGY

## 2018-06-07 PROCEDURE — 36415 COLL VENOUS BLD VENIPUNCTURE: CPT

## 2018-06-07 PROCEDURE — 85027 COMPLETE CBC AUTOMATED: CPT

## 2018-06-07 NOTE — TELEPHONE ENCOUNTER
I sent a pt an e-mail and LVM informing pt he is scheduled for an abdominal ultrasound at 3 pm at [facility]. Stated as preparation for the test, pt needs to remain NPO for at least 8 hours. I spoke with the Ultrasound Tech and informed her that pt will only have 7 hour until the test and she stated that should be okay. Stated please remain NPO (nothing to eat or drink by mouth) from now until the Ultrasound test at 3 pm. Also stated  Dr. Rojas would like pt to get their lab work completed today as well so pt's lab appt is scheduled for 2 pm. To call Dr. Rojas' office at [phone] if he has any questions.

## 2018-06-08 ENCOUNTER — OFFICE VISIT (OUTPATIENT)
Dept: HEMATOLOGY/ONCOLOGY | Facility: CLINIC | Age: 23
End: 2018-06-08
Payer: COMMERCIAL

## 2018-06-08 VITALS
WEIGHT: 142.63 LBS | TEMPERATURE: 99 F | SYSTOLIC BLOOD PRESSURE: 102 MMHG | DIASTOLIC BLOOD PRESSURE: 56 MMHG | BODY MASS INDEX: 22.39 KG/M2 | HEIGHT: 67 IN | RESPIRATION RATE: 12 BRPM | OXYGEN SATURATION: 99 % | HEART RATE: 81 BPM

## 2018-06-08 DIAGNOSIS — G89.3 CANCER RELATED PAIN: ICD-10-CM

## 2018-06-08 DIAGNOSIS — C80.1 GERM CELL TUMOR: ICD-10-CM

## 2018-06-08 DIAGNOSIS — R11.2 REFRACTORY NAUSEA AND VOMITING: ICD-10-CM

## 2018-06-08 DIAGNOSIS — D64.9 ANEMIA, UNSPECIFIED TYPE: ICD-10-CM

## 2018-06-08 DIAGNOSIS — F41.9 ANXIETY: ICD-10-CM

## 2018-06-08 DIAGNOSIS — K35.30 ACUTE APPENDICITIS WITH LOCALIZED PERITONITIS: ICD-10-CM

## 2018-06-08 DIAGNOSIS — C38.3 PRIMARY MEDIASTINAL SEMINOMA: Primary | ICD-10-CM

## 2018-06-08 DIAGNOSIS — Z51.11 ENCOUNTER FOR CHEMOTHERAPY MANAGEMENT: ICD-10-CM

## 2018-06-08 PROCEDURE — 3008F BODY MASS INDEX DOCD: CPT | Mod: CPTII,S$GLB,, | Performed by: INTERNAL MEDICINE

## 2018-06-08 PROCEDURE — 99999 PR PBB SHADOW E&M-EST. PATIENT-LVL III: CPT | Mod: PBBFAC,,, | Performed by: INTERNAL MEDICINE

## 2018-06-08 PROCEDURE — 99215 OFFICE O/P EST HI 40 MIN: CPT | Mod: S$GLB,,, | Performed by: INTERNAL MEDICINE

## 2018-06-08 RX ORDER — OLANZAPINE 10 MG/1
10 TABLET ORAL NIGHTLY
Qty: 16 TABLET | Refills: 2 | Status: SHIPPED | OUTPATIENT
Start: 2018-06-08 | End: 2018-10-01

## 2018-06-08 RX ORDER — MORPHINE SULFATE 15 MG/1
30 TABLET, FILM COATED, EXTENDED RELEASE ORAL EVERY 12 HOURS
Qty: 40 TABLET | Refills: 0 | Status: SHIPPED | OUTPATIENT
Start: 2018-06-08 | End: 2018-06-08 | Stop reason: SDUPTHER

## 2018-06-08 RX ORDER — MORPHINE SULFATE 15 MG/1
30 TABLET, FILM COATED, EXTENDED RELEASE ORAL EVERY 12 HOURS
Qty: 40 TABLET | Refills: 0 | Status: SHIPPED | OUTPATIENT
Start: 2018-06-08 | End: 2018-06-18

## 2018-06-08 RX ORDER — SODIUM CHLORIDE 9 MG/ML
INJECTION, SOLUTION INTRAVENOUS CONTINUOUS
Status: CANCELLED
Start: 2018-06-16

## 2018-06-08 RX ORDER — PROMETHAZINE HYDROCHLORIDE 25 MG/ML
25 INJECTION, SOLUTION INTRAMUSCULAR; INTRAVENOUS
Status: CANCELLED
Start: 2018-06-14 | End: 2018-06-08

## 2018-06-08 RX ORDER — HEPARIN 100 UNIT/ML
500 SYRINGE INTRAVENOUS
Status: CANCELLED | OUTPATIENT
Start: 2018-06-13

## 2018-06-08 RX ORDER — MORPHINE SULFATE 15 MG/1
30 TABLET ORAL EVERY 6 HOURS PRN
Qty: 90 TABLET | Refills: 0 | Status: SHIPPED | OUTPATIENT
Start: 2018-06-08 | End: 2018-06-08 | Stop reason: SDUPTHER

## 2018-06-08 RX ORDER — SODIUM CHLORIDE 0.9 % (FLUSH) 0.9 %
10 SYRINGE (ML) INJECTION
Status: CANCELLED | OUTPATIENT
Start: 2018-06-12

## 2018-06-08 RX ORDER — ALPRAZOLAM 0.5 MG/1
0.5 TABLET ORAL 3 TIMES DAILY PRN
Qty: 30 TABLET | Refills: 0 | Status: SHIPPED | OUTPATIENT
Start: 2018-06-08 | End: 2018-06-08 | Stop reason: SDUPTHER

## 2018-06-08 RX ORDER — SODIUM CHLORIDE 0.9 % (FLUSH) 0.9 %
10 SYRINGE (ML) INJECTION
Status: CANCELLED | OUTPATIENT
Start: 2018-06-14

## 2018-06-08 RX ORDER — HEPARIN 100 UNIT/ML
500 SYRINGE INTRAVENOUS
Status: CANCELLED | OUTPATIENT
Start: 2018-06-15

## 2018-06-08 RX ORDER — ONDANSETRON 2 MG/ML
8 INJECTION INTRAMUSCULAR; INTRAVENOUS ONCE
Status: CANCELLED
Start: 2018-06-08 | End: 2018-06-08

## 2018-06-08 RX ORDER — PROMETHAZINE HYDROCHLORIDE 25 MG/ML
25 INJECTION, SOLUTION INTRAMUSCULAR; INTRAVENOUS
Status: CANCELLED
Start: 2018-06-16 | End: 2018-06-08

## 2018-06-08 RX ORDER — ONDANSETRON 2 MG/ML
8 INJECTION INTRAMUSCULAR; INTRAVENOUS ONCE
Status: CANCELLED
Start: 2018-06-14 | End: 2018-06-08

## 2018-06-08 RX ORDER — ONDANSETRON 2 MG/ML
8 INJECTION INTRAMUSCULAR; INTRAVENOUS ONCE
Status: CANCELLED
Start: 2018-06-16 | End: 2018-06-08

## 2018-06-08 RX ORDER — SODIUM CHLORIDE 0.9 % (FLUSH) 0.9 %
10 SYRINGE (ML) INJECTION
Status: CANCELLED | OUTPATIENT
Start: 2018-06-08

## 2018-06-08 RX ORDER — ONDANSETRON 2 MG/ML
8 INJECTION INTRAMUSCULAR; INTRAVENOUS ONCE
Status: CANCELLED
Start: 2018-06-12 | End: 2018-06-08

## 2018-06-08 RX ORDER — OLANZAPINE 10 MG/1
10 TABLET ORAL NIGHTLY
Qty: 16 TABLET | Refills: 2 | Status: SHIPPED | OUTPATIENT
Start: 2018-06-08 | End: 2018-06-08 | Stop reason: SDUPTHER

## 2018-06-08 RX ORDER — MORPHINE SULFATE 15 MG/1
30 TABLET ORAL EVERY 6 HOURS PRN
Qty: 90 TABLET | Refills: 0 | Status: SHIPPED | OUTPATIENT
Start: 2018-06-08 | End: 2018-07-02 | Stop reason: SDUPTHER

## 2018-06-08 RX ORDER — HEPARIN 100 UNIT/ML
500 SYRINGE INTRAVENOUS
Status: CANCELLED | OUTPATIENT
Start: 2018-06-08

## 2018-06-08 RX ORDER — HEPARIN 100 UNIT/ML
500 SYRINGE INTRAVENOUS
Status: CANCELLED | OUTPATIENT
Start: 2018-06-12

## 2018-06-08 RX ORDER — PROMETHAZINE HYDROCHLORIDE 25 MG/ML
25 INJECTION, SOLUTION INTRAMUSCULAR; INTRAVENOUS
Status: CANCELLED
Start: 2018-06-15 | End: 2018-06-08

## 2018-06-08 RX ORDER — ALPRAZOLAM 0.5 MG/1
0.5 TABLET ORAL 3 TIMES DAILY PRN
Qty: 30 TABLET | Refills: 0 | Status: SHIPPED | OUTPATIENT
Start: 2018-06-08 | End: 2018-06-18 | Stop reason: SDUPTHER

## 2018-06-08 RX ORDER — PROMETHAZINE HYDROCHLORIDE 25 MG/ML
25 INJECTION, SOLUTION INTRAMUSCULAR; INTRAVENOUS
Status: CANCELLED
Start: 2018-06-13 | End: 2018-06-08

## 2018-06-08 RX ORDER — SODIUM CHLORIDE 0.9 % (FLUSH) 0.9 %
10 SYRINGE (ML) INJECTION
Status: CANCELLED | OUTPATIENT
Start: 2018-06-13

## 2018-06-08 RX ORDER — SODIUM CHLORIDE 0.9 % (FLUSH) 0.9 %
10 SYRINGE (ML) INJECTION
Status: CANCELLED | OUTPATIENT
Start: 2018-06-15

## 2018-06-08 RX ORDER — PROMETHAZINE HYDROCHLORIDE 25 MG/ML
25 INJECTION, SOLUTION INTRAMUSCULAR; INTRAVENOUS
Status: CANCELLED
Start: 2018-06-08 | End: 2018-06-08

## 2018-06-08 RX ORDER — ONDANSETRON 2 MG/ML
8 INJECTION INTRAMUSCULAR; INTRAVENOUS ONCE
Status: CANCELLED
Start: 2018-06-13 | End: 2018-06-08

## 2018-06-08 RX ORDER — SODIUM CHLORIDE 0.9 % (FLUSH) 0.9 %
10 SYRINGE (ML) INJECTION
Status: CANCELLED | OUTPATIENT
Start: 2018-06-16

## 2018-06-08 RX ORDER — HEPARIN 100 UNIT/ML
500 SYRINGE INTRAVENOUS
Status: CANCELLED | OUTPATIENT
Start: 2018-06-16

## 2018-06-08 RX ORDER — ONDANSETRON 2 MG/ML
8 INJECTION INTRAMUSCULAR; INTRAVENOUS ONCE
Status: CANCELLED
Start: 2018-06-15 | End: 2018-06-08

## 2018-06-08 RX ORDER — HEPARIN 100 UNIT/ML
500 SYRINGE INTRAVENOUS
Status: CANCELLED | OUTPATIENT
Start: 2018-06-14

## 2018-06-08 RX ORDER — PROMETHAZINE HYDROCHLORIDE 25 MG/ML
25 INJECTION, SOLUTION INTRAMUSCULAR; INTRAVENOUS
Status: CANCELLED
Start: 2018-06-12 | End: 2018-06-08

## 2018-06-08 NOTE — PROGRESS NOTES
"Subjective:      Patient ID: Alejandro Neff is a 23 y.o. male.     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 Mr. Hampton is a 22 yo man who first presented with chest pain and underwent CT chest on 2/14/18, which showed a 6 x 3.5 cm mediastinal mass. It abuts the aorta and pulmonary artery. Biopsy was done on 3/1/18. Pathology (reviewed at [facility]) showed germ cell tumor, most consistent with seminoma.   2. HCG, LDH, AFP on 3/22/18 all normal.   3. Testicular US 3/26/18 showed no testicular masses. CT C/A/P on 3/26/18 showed "stable appearance of anterior mediastinal mass consistent with provided history of seminoma. Several punctate sclerotic foci are noted in the pelvis at the right pubic bone, right ischial tuberosity, and left ilium adjacent to the sacroiliac joint.  These are strongly favored to reflect benign bone islands although metastatic disease could have a similar appearance and close follow-up is recommended. Several benign Schmorl's nodes are noted in the thoracic spine." Bone scan on 3/28/18 was negative for bone mets.   4. Audiogram on 4/6/18 normal. Followed by ENT.   5. Given his smoking history, I recommended 4 cycles of EP. EP cycle 1 day 1 on 4/16/18. Complicated by hospitalization for neutropenic fever 4/26-4/29. No source of infections identified. Treated with antibiotics empirically and improved.   6. Cycle 2 of EP started on on 5/7/18. Hospitalized during the first weekend for intractable nausea.   7. Restaging CT scan on 5/24/18 showed "Interval development of dilated appearance of the appendix with stranding of the periappendiceal fat.  Findings are worrisome for appendicitis". Patient did have abdominal pain and tenderness. Admitted and got appendectomy on 5/24/18.     INTERVAL HISTORY:   Mr. Hampton returns today for follow up of primary mediastinal seminoma. CT scan on 5/24/18 showed " ""Interval development of dilated appearance of the appendix with stranding of the periappendiceal fat.  Findings are worrisome for appendicitis". Patient did have abdominal pain and tenderness. Admitted and got appendectomy on 18. He has had a lot of pain since surgery. Currently takes 2-4 tab of morphine 15 mg every 4 hours for pain, dilaudid once a day as needed.      ECO     ROS:   A ten point system review is obtained and neg except for what was stated in the interval history     Physical Examination:   Vital signs reviewed.   Gen: well hydrated, well developed, in no acute distress.  HEENT: normocephalic, anicteric sclerae, PERRLA, EOMI, oropharynx clear  Neck: supple, no JVD, thyromegaly, cervical or supraclavicular LAD  Lungs: CTAB, no wheezes or rales. Port site on chest clean.   Heart: RRR, no M/R/G  Abdomen: soft, non-distended, nontender, no hepatosplenomegaly, mass, or hernia. BS present  Ext: no clubbing, cyanosis, or edema  Neuro: alert and oriented x 4, no focal neuro deficit  Skin: no rash, erythema, open wound or ulcers  Psych: pleasant and appropriate mood and affect        Objective:      Laboratory Data:  Labs from yesterday reviewed. Mild anemia.      Assessment/Plan:      1. Primary mediastinal seminoma    2. Germ cell tumor    3. Encounter for chemotherapy management    4. Anemia, unspecified type    5. Anxiety    6. Cancer related pain    7. Refractory nausea and vomiting    8. Acute appendicitis with localized peritonitis      1-3  - cycle 3 EP starting next Monday    4.   - after surgery. Mild. Monitor    5.  - xanax prn    6.   - start MS Contin 30 mg bid  - increase morphine to 30 mg q6h prn for pain    7.  - on phenergan, zofran, dex  - still required hospitalization for intractable nausea/vomiting after each round of chemo  - add zyprexa 10 mg daily day 1 to 3 days after chemo for refractory N/V.     8.  - surgery on . Wound healed well    RTC on  for follow up  "

## 2018-06-11 ENCOUNTER — INFUSION (OUTPATIENT)
Dept: INFUSION THERAPY | Facility: HOSPITAL | Age: 23
End: 2018-06-11
Attending: INTERNAL MEDICINE
Payer: COMMERCIAL

## 2018-06-11 ENCOUNTER — PATIENT MESSAGE (OUTPATIENT)
Dept: HEMATOLOGY/ONCOLOGY | Facility: CLINIC | Age: 23
End: 2018-06-11

## 2018-06-11 VITALS
BODY MASS INDEX: 23.01 KG/M2 | DIASTOLIC BLOOD PRESSURE: 58 MMHG | WEIGHT: 146.63 LBS | RESPIRATION RATE: 18 BRPM | SYSTOLIC BLOOD PRESSURE: 102 MMHG | HEART RATE: 78 BPM | TEMPERATURE: 98 F | HEIGHT: 67 IN

## 2018-06-11 DIAGNOSIS — C80.1 GERM CELL TUMOR: Primary | ICD-10-CM

## 2018-06-11 PROCEDURE — A4216 STERILE WATER/SALINE, 10 ML: HCPCS | Performed by: INTERNAL MEDICINE

## 2018-06-11 PROCEDURE — 96417 CHEMO IV INFUS EACH ADDL SEQ: CPT

## 2018-06-11 PROCEDURE — 96413 CHEMO IV INFUSION 1 HR: CPT

## 2018-06-11 PROCEDURE — 63600175 PHARM REV CODE 636 W HCPCS: Performed by: INTERNAL MEDICINE

## 2018-06-11 PROCEDURE — 96361 HYDRATE IV INFUSION ADD-ON: CPT

## 2018-06-11 PROCEDURE — 96367 TX/PROPH/DG ADDL SEQ IV INF: CPT

## 2018-06-11 PROCEDURE — 25000003 PHARM REV CODE 250: Performed by: INTERNAL MEDICINE

## 2018-06-11 RX ORDER — PROMETHAZINE HYDROCHLORIDE 25 MG/ML
25 INJECTION, SOLUTION INTRAMUSCULAR; INTRAVENOUS
Status: DISCONTINUED | OUTPATIENT
Start: 2018-06-11 | End: 2018-06-11 | Stop reason: SDUPTHER

## 2018-06-11 RX ORDER — ONDANSETRON 2 MG/ML
8 INJECTION INTRAMUSCULAR; INTRAVENOUS ONCE
Status: DISCONTINUED | OUTPATIENT
Start: 2018-06-11 | End: 2018-06-11 | Stop reason: SDUPTHER

## 2018-06-11 RX ORDER — SODIUM CHLORIDE 0.9 % (FLUSH) 0.9 %
10 SYRINGE (ML) INJECTION
Status: DISCONTINUED | OUTPATIENT
Start: 2018-06-11 | End: 2018-06-11 | Stop reason: HOSPADM

## 2018-06-11 RX ORDER — ONDANSETRON HCL IN 0.9 % NACL 8 MG/50 ML
8 INTRAVENOUS SOLUTION, PIGGYBACK (ML) INTRAVENOUS
Status: COMPLETED | OUTPATIENT
Start: 2018-06-11 | End: 2018-06-11

## 2018-06-11 RX ORDER — HEPARIN 100 UNIT/ML
500 SYRINGE INTRAVENOUS
Status: DISCONTINUED | OUTPATIENT
Start: 2018-06-11 | End: 2018-06-11 | Stop reason: HOSPADM

## 2018-06-11 RX ADMIN — CISPLATIN 35 MG: 1 INJECTION INTRAVENOUS at 12:06

## 2018-06-11 RX ADMIN — PROMETHAZINE HYDROCHLORIDE 25 MG: 25 INJECTION INTRAMUSCULAR; INTRAVENOUS at 12:06

## 2018-06-11 RX ADMIN — ONDANSETRON 8 MG: 2 INJECTION, SOLUTION INTRAMUSCULAR; INTRAVENOUS at 11:06

## 2018-06-11 RX ADMIN — SODIUM CHLORIDE 150 MG: 9 INJECTION, SOLUTION INTRAVENOUS at 12:06

## 2018-06-11 RX ADMIN — SODIUM CHLORIDE 1000 ML: 0.9 INJECTION, SOLUTION INTRAVENOUS at 10:06

## 2018-06-11 RX ADMIN — SODIUM CHLORIDE, PRESERVATIVE FREE 10 ML: 5 INJECTION INTRAVENOUS at 04:06

## 2018-06-11 RX ADMIN — HEPARIN 500 UNITS: 100 SYRINGE at 04:06

## 2018-06-11 RX ADMIN — ETOPOSIDE 178 MG: 20 INJECTION INTRAVENOUS at 02:06

## 2018-06-11 RX ADMIN — SODIUM CHLORIDE 1000 ML: 0.9 INJECTION, SOLUTION INTRAVENOUS at 03:06

## 2018-06-11 RX ADMIN — DEXAMETHASONE SODIUM PHOSPHATE: 4 INJECTION, SOLUTION INTRA-ARTICULAR; INTRALESIONAL; INTRAMUSCULAR; INTRAVENOUS; SOFT TISSUE at 11:06

## 2018-06-11 NOTE — PLAN OF CARE
Problem: Patient Care Overview  Goal: Plan of Care Review  Outcome: Ongoing (interventions implemented as appropriate)  1615-Patient tolerated treatment well. Discharged without complaints or S/S of adverse event. Patient elected to keep port accessed overnight, he was instructed to keep it clean and dry, he verbalized understanding.  Instructed to call provider for any questions or concerns.

## 2018-06-11 NOTE — PLAN OF CARE
Problem: Patient Care Overview  Goal: Individualization & Mutuality  Outcome: Ongoing (interventions implemented as appropriate)  1030-Labs , hx, and medications reviewed, patient was seen by MD last week. Assessment completed. Discussed plan of care with patient. Patient in agreement. Chair reclined and warm blanket and snack offered.

## 2018-06-12 ENCOUNTER — INFUSION (OUTPATIENT)
Dept: INFUSION THERAPY | Facility: HOSPITAL | Age: 23
End: 2018-06-12
Attending: INTERNAL MEDICINE
Payer: COMMERCIAL

## 2018-06-12 VITALS
RESPIRATION RATE: 18 BRPM | SYSTOLIC BLOOD PRESSURE: 109 MMHG | HEART RATE: 95 BPM | TEMPERATURE: 98 F | DIASTOLIC BLOOD PRESSURE: 53 MMHG

## 2018-06-12 DIAGNOSIS — C80.1 GERM CELL TUMOR: Primary | ICD-10-CM

## 2018-06-12 PROCEDURE — 96417 CHEMO IV INFUS EACH ADDL SEQ: CPT

## 2018-06-12 PROCEDURE — 25000003 PHARM REV CODE 250: Performed by: INTERNAL MEDICINE

## 2018-06-12 PROCEDURE — 96367 TX/PROPH/DG ADDL SEQ IV INF: CPT

## 2018-06-12 PROCEDURE — 63600175 PHARM REV CODE 636 W HCPCS: Performed by: INTERNAL MEDICINE

## 2018-06-12 PROCEDURE — A4216 STERILE WATER/SALINE, 10 ML: HCPCS | Performed by: INTERNAL MEDICINE

## 2018-06-12 PROCEDURE — 96361 HYDRATE IV INFUSION ADD-ON: CPT

## 2018-06-12 PROCEDURE — 96413 CHEMO IV INFUSION 1 HR: CPT

## 2018-06-12 PROCEDURE — 96415 CHEMO IV INFUSION ADDL HR: CPT

## 2018-06-12 RX ORDER — ONDANSETRON 2 MG/ML
8 INJECTION INTRAMUSCULAR; INTRAVENOUS ONCE
Status: DISCONTINUED | OUTPATIENT
Start: 2018-06-12 | End: 2018-06-12 | Stop reason: SDUPTHER

## 2018-06-12 RX ORDER — ONDANSETRON HCL IN 0.9 % NACL 8 MG/50 ML
8 INTRAVENOUS SOLUTION, PIGGYBACK (ML) INTRAVENOUS
Status: COMPLETED | OUTPATIENT
Start: 2018-06-12 | End: 2018-06-12

## 2018-06-12 RX ORDER — SODIUM CHLORIDE 0.9 % (FLUSH) 0.9 %
10 SYRINGE (ML) INJECTION
Status: DISCONTINUED | OUTPATIENT
Start: 2018-06-12 | End: 2018-06-12 | Stop reason: HOSPADM

## 2018-06-12 RX ORDER — HEPARIN 100 UNIT/ML
500 SYRINGE INTRAVENOUS
Status: DISCONTINUED | OUTPATIENT
Start: 2018-06-12 | End: 2018-06-12 | Stop reason: HOSPADM

## 2018-06-12 RX ORDER — PROMETHAZINE HYDROCHLORIDE 25 MG/ML
25 INJECTION, SOLUTION INTRAMUSCULAR; INTRAVENOUS
Status: DISCONTINUED | OUTPATIENT
Start: 2018-06-12 | End: 2018-06-12 | Stop reason: SDUPTHER

## 2018-06-12 RX ADMIN — SODIUM CHLORIDE 1000 ML: 0.9 INJECTION, SOLUTION INTRAVENOUS at 11:06

## 2018-06-12 RX ADMIN — CISPLATIN 35 MG: 1 INJECTION INTRAVENOUS at 01:06

## 2018-06-12 RX ADMIN — ETOPOSIDE 178 MG: 20 INJECTION INTRAVENOUS at 02:06

## 2018-06-12 RX ADMIN — SODIUM CHLORIDE, PRESERVATIVE FREE 10 ML: 5 INJECTION INTRAVENOUS at 04:06

## 2018-06-12 RX ADMIN — SODIUM CHLORIDE 1000 ML: 0.9 INJECTION, SOLUTION INTRAVENOUS at 03:06

## 2018-06-12 RX ADMIN — DEXAMETHASONE SODIUM PHOSPHATE 10 MG: 4 INJECTION, SOLUTION INTRA-ARTICULAR; INTRALESIONAL; INTRAMUSCULAR; INTRAVENOUS; SOFT TISSUE at 12:06

## 2018-06-12 RX ADMIN — ONDANSETRON 8 MG: 2 INJECTION, SOLUTION INTRAMUSCULAR; INTRAVENOUS at 12:06

## 2018-06-12 RX ADMIN — HEPARIN 500 UNITS: 100 SYRINGE at 04:06

## 2018-06-12 RX ADMIN — PROMETHAZINE HYDROCHLORIDE 25 MG: 25 INJECTION INTRAMUSCULAR; INTRAVENOUS at 01:06

## 2018-06-12 NOTE — PLAN OF CARE
Problem: Patient Care Overview  Goal: Individualization & Mutuality  Outcome: Ongoing (interventions implemented as appropriate)  1115-Labs , hx, and medications reviewed, patient arrived with port left accessed from yesterday and no new issues overnight. Assessment completed. Discussed plan of care with patient. Patient in agreement. Chair reclined and warm blanket and snack offered.

## 2018-06-12 NOTE — PLAN OF CARE
Problem: Patient Care Overview  Goal: Plan of Care Review  Outcome: Ongoing (interventions implemented as appropriate)  1628-Patient tolerated treatment well. Discharged without complaints or S/S of adverse event.   Instructed to call provider for any questions or concerns.

## 2018-06-13 ENCOUNTER — PATIENT MESSAGE (OUTPATIENT)
Dept: HEMATOLOGY/ONCOLOGY | Facility: CLINIC | Age: 23
End: 2018-06-13

## 2018-06-13 ENCOUNTER — INFUSION (OUTPATIENT)
Dept: INFUSION THERAPY | Facility: HOSPITAL | Age: 23
End: 2018-06-13
Attending: INTERNAL MEDICINE
Payer: COMMERCIAL

## 2018-06-13 VITALS
DIASTOLIC BLOOD PRESSURE: 55 MMHG | HEART RATE: 106 BPM | TEMPERATURE: 98 F | RESPIRATION RATE: 18 BRPM | SYSTOLIC BLOOD PRESSURE: 120 MMHG

## 2018-06-13 DIAGNOSIS — C80.1 GERM CELL TUMOR: Primary | ICD-10-CM

## 2018-06-13 DIAGNOSIS — R06.6 HICCUP: ICD-10-CM

## 2018-06-13 PROCEDURE — 96361 HYDRATE IV INFUSION ADD-ON: CPT

## 2018-06-13 PROCEDURE — 25000003 PHARM REV CODE 250: Performed by: INTERNAL MEDICINE

## 2018-06-13 PROCEDURE — 63600175 PHARM REV CODE 636 W HCPCS: Performed by: INTERNAL MEDICINE

## 2018-06-13 PROCEDURE — 96367 TX/PROPH/DG ADDL SEQ IV INF: CPT

## 2018-06-13 PROCEDURE — 96413 CHEMO IV INFUSION 1 HR: CPT

## 2018-06-13 PROCEDURE — 96417 CHEMO IV INFUS EACH ADDL SEQ: CPT

## 2018-06-13 RX ORDER — ONDANSETRON 2 MG/ML
8 INJECTION INTRAMUSCULAR; INTRAVENOUS ONCE
Status: COMPLETED | OUTPATIENT
Start: 2018-06-13 | End: 2018-06-13

## 2018-06-13 RX ORDER — PROMETHAZINE HYDROCHLORIDE 25 MG/ML
25 INJECTION, SOLUTION INTRAMUSCULAR; INTRAVENOUS
Status: DISCONTINUED | OUTPATIENT
Start: 2018-06-13 | End: 2018-06-13

## 2018-06-13 RX ORDER — SODIUM CHLORIDE 0.9 % (FLUSH) 0.9 %
10 SYRINGE (ML) INJECTION
Status: DISCONTINUED | OUTPATIENT
Start: 2018-06-13 | End: 2018-06-13 | Stop reason: HOSPADM

## 2018-06-13 RX ORDER — BACLOFEN 10 MG/1
10 TABLET ORAL 2 TIMES DAILY PRN
Qty: 60 TABLET | Refills: 1 | Status: SHIPPED | OUTPATIENT
Start: 2018-06-13 | End: 2019-08-12

## 2018-06-13 RX ORDER — HEPARIN 100 UNIT/ML
500 SYRINGE INTRAVENOUS
Status: DISCONTINUED | OUTPATIENT
Start: 2018-06-13 | End: 2018-06-13 | Stop reason: HOSPADM

## 2018-06-13 RX ADMIN — ETOPOSIDE 178 MG: 20 INJECTION INTRAVENOUS at 02:06

## 2018-06-13 RX ADMIN — DEXAMETHASONE SODIUM PHOSPHATE 10 MG: 4 INJECTION, SOLUTION INTRAMUSCULAR; INTRAVENOUS at 12:06

## 2018-06-13 RX ADMIN — SODIUM CHLORIDE 1000 ML: 0.9 INJECTION, SOLUTION INTRAVENOUS at 04:06

## 2018-06-13 RX ADMIN — CISPLATIN 35 MG: 100 INJECTION, SOLUTION INTRAVENOUS at 01:06

## 2018-06-13 RX ADMIN — HEPARIN 500 UNITS: 100 SYRINGE at 05:06

## 2018-06-13 RX ADMIN — PROMETHAZINE HYDROCHLORIDE 25 MG: 25 INJECTION INTRAMUSCULAR; INTRAVENOUS at 01:06

## 2018-06-13 RX ADMIN — ONDANSETRON 8 MG: 2 INJECTION INTRAMUSCULAR; INTRAVENOUS at 12:06

## 2018-06-13 NOTE — NURSING
1110:  Patient arrived at [facility]fusion [facility], VSS, assessment completed.  PAC still accessed from yesterday.  Patent and intact, flushes easily, brisk blood return noted.  IVF's initiated.  Mode offered, chair reclined.

## 2018-06-13 NOTE — PLAN OF CARE
Problem: Patient Care Overview  Goal: Plan of Care Review  Outcome: Ongoing (interventions implemented as appropriate)  Patient tolerated treatment well, NAD noted.  PAC flushed per protocol and left in place, clamped and capped, for D4 tomorrow.  Released in stable condition, awaiting ride.

## 2018-06-13 NOTE — TELEPHONE ENCOUNTER
Spoke with Reina Chemotherapy Charge Nurse and informed her that pt's Baclofen has been approved and called into the Pharmacy. Also stated that the Pharmacist was informed pt wants bedside delivery in Chemotherapy Infusion Unit and the prescription will be delivered in 30 minutes. Reina voiced understanding and stated she will let pt know.     I also LVM for pt and informed him the Baclofen prescription will be delivered in 30 minutes.

## 2018-06-14 ENCOUNTER — INFUSION (OUTPATIENT)
Dept: INFUSION THERAPY | Facility: HOSPITAL | Age: 23
End: 2018-06-14
Attending: INTERNAL MEDICINE
Payer: COMMERCIAL

## 2018-06-14 VITALS
DIASTOLIC BLOOD PRESSURE: 63 MMHG | TEMPERATURE: 98 F | SYSTOLIC BLOOD PRESSURE: 130 MMHG | HEART RATE: 102 BPM | RESPIRATION RATE: 18 BRPM

## 2018-06-14 DIAGNOSIS — C80.1 GERM CELL TUMOR: Primary | ICD-10-CM

## 2018-06-14 PROCEDURE — 96367 TX/PROPH/DG ADDL SEQ IV INF: CPT

## 2018-06-14 PROCEDURE — 96361 HYDRATE IV INFUSION ADD-ON: CPT

## 2018-06-14 PROCEDURE — 96413 CHEMO IV INFUSION 1 HR: CPT

## 2018-06-14 PROCEDURE — 25000003 PHARM REV CODE 250: Performed by: INTERNAL MEDICINE

## 2018-06-14 PROCEDURE — 63600175 PHARM REV CODE 636 W HCPCS: Performed by: INTERNAL MEDICINE

## 2018-06-14 PROCEDURE — 96417 CHEMO IV INFUS EACH ADDL SEQ: CPT

## 2018-06-14 RX ORDER — HEPARIN 100 UNIT/ML
500 SYRINGE INTRAVENOUS
Status: DISCONTINUED | OUTPATIENT
Start: 2018-06-14 | End: 2018-06-14 | Stop reason: HOSPADM

## 2018-06-14 RX ORDER — PROMETHAZINE HYDROCHLORIDE 25 MG/ML
25 INJECTION, SOLUTION INTRAMUSCULAR; INTRAVENOUS
Status: DISCONTINUED | OUTPATIENT
Start: 2018-06-14 | End: 2018-06-14 | Stop reason: SDUPTHER

## 2018-06-14 RX ORDER — ONDANSETRON 2 MG/ML
8 INJECTION INTRAMUSCULAR; INTRAVENOUS ONCE
Status: DISCONTINUED | OUTPATIENT
Start: 2018-06-14 | End: 2018-06-14 | Stop reason: SDUPTHER

## 2018-06-14 RX ORDER — ONDANSETRON HCL IN 0.9 % NACL 8 MG/50 ML
8 INTRAVENOUS SOLUTION, PIGGYBACK (ML) INTRAVENOUS
Status: COMPLETED | OUTPATIENT
Start: 2018-06-14 | End: 2018-06-14

## 2018-06-14 RX ORDER — SODIUM CHLORIDE 0.9 % (FLUSH) 0.9 %
10 SYRINGE (ML) INJECTION
Status: DISCONTINUED | OUTPATIENT
Start: 2018-06-14 | End: 2018-06-14 | Stop reason: HOSPADM

## 2018-06-14 RX ADMIN — SODIUM CHLORIDE 1000 ML: 0.9 INJECTION, SOLUTION INTRAVENOUS at 11:06

## 2018-06-14 RX ADMIN — SODIUM CHLORIDE 8 MG: 9 INJECTION, SOLUTION INTRAVENOUS at 01:06

## 2018-06-14 RX ADMIN — SODIUM CHLORIDE 1000 ML: 0.9 INJECTION, SOLUTION INTRAVENOUS at 04:06

## 2018-06-14 RX ADMIN — SODIUM CHLORIDE 35 MG: 9 INJECTION, SOLUTION INTRAVENOUS at 01:06

## 2018-06-14 RX ADMIN — PROMETHAZINE HYDROCHLORIDE 25 MG: 25 INJECTION INTRAMUSCULAR; INTRAVENOUS at 12:06

## 2018-06-14 RX ADMIN — DEXAMETHASONE SODIUM PHOSPHATE: 4 INJECTION, SOLUTION INTRA-ARTICULAR; INTRALESIONAL; INTRAMUSCULAR; INTRAVENOUS; SOFT TISSUE at 12:06

## 2018-06-14 RX ADMIN — ETOPOSIDE 178 MG: 20 INJECTION INTRAVENOUS at 02:06

## 2018-06-14 RX ADMIN — HEPARIN 500 UNITS: 100 SYRINGE at 05:06

## 2018-06-14 NOTE — PLAN OF CARE
Problem: Patient Care Overview  Goal: Plan of Care Review  Outcome: Ongoing (interventions implemented as appropriate)  Patient tolerated treatment, NAD noted.  Port flushed, clamped and capped, secured for tomorrow.  Released to home in stable condition.

## 2018-06-15 ENCOUNTER — INFUSION (OUTPATIENT)
Dept: INFUSION THERAPY | Facility: HOSPITAL | Age: 23
End: 2018-06-15
Attending: INTERNAL MEDICINE
Payer: COMMERCIAL

## 2018-06-15 VITALS
DIASTOLIC BLOOD PRESSURE: 58 MMHG | HEART RATE: 101 BPM | RESPIRATION RATE: 16 BRPM | TEMPERATURE: 99 F | SYSTOLIC BLOOD PRESSURE: 123 MMHG

## 2018-06-15 DIAGNOSIS — C80.1 GERM CELL TUMOR: Primary | ICD-10-CM

## 2018-06-15 PROCEDURE — 96377 APPLICATON ON-BODY INJECTOR: CPT

## 2018-06-15 PROCEDURE — 63600175 PHARM REV CODE 636 W HCPCS: Performed by: INTERNAL MEDICINE

## 2018-06-15 PROCEDURE — 96413 CHEMO IV INFUSION 1 HR: CPT

## 2018-06-15 PROCEDURE — 96375 TX/PRO/DX INJ NEW DRUG ADDON: CPT

## 2018-06-15 PROCEDURE — 96367 TX/PROPH/DG ADDL SEQ IV INF: CPT

## 2018-06-15 PROCEDURE — 96417 CHEMO IV INFUS EACH ADDL SEQ: CPT

## 2018-06-15 PROCEDURE — 25000003 PHARM REV CODE 250: Performed by: INTERNAL MEDICINE

## 2018-06-15 PROCEDURE — 96361 HYDRATE IV INFUSION ADD-ON: CPT

## 2018-06-15 RX ORDER — HEPARIN 100 UNIT/ML
500 SYRINGE INTRAVENOUS
Status: DISCONTINUED | OUTPATIENT
Start: 2018-06-15 | End: 2018-06-15 | Stop reason: HOSPADM

## 2018-06-15 RX ORDER — SODIUM CHLORIDE 0.9 % (FLUSH) 0.9 %
10 SYRINGE (ML) INJECTION
Status: DISCONTINUED | OUTPATIENT
Start: 2018-06-15 | End: 2018-06-15 | Stop reason: HOSPADM

## 2018-06-15 RX ORDER — ONDANSETRON 2 MG/ML
8 INJECTION INTRAMUSCULAR; INTRAVENOUS ONCE
Status: COMPLETED | OUTPATIENT
Start: 2018-06-15 | End: 2018-06-15

## 2018-06-15 RX ORDER — PROMETHAZINE HYDROCHLORIDE 25 MG/ML
25 INJECTION, SOLUTION INTRAMUSCULAR; INTRAVENOUS
Status: DISCONTINUED | OUTPATIENT
Start: 2018-06-15 | End: 2018-06-15

## 2018-06-15 RX ADMIN — HEPARIN 500 UNITS: 100 SYRINGE at 04:06

## 2018-06-15 RX ADMIN — PEGFILGRASTIM 6 MG: KIT SUBCUTANEOUS at 03:06

## 2018-06-15 RX ADMIN — SODIUM CHLORIDE 1000 ML: 0.9 INJECTION, SOLUTION INTRAVENOUS at 11:06

## 2018-06-15 RX ADMIN — SODIUM CHLORIDE 1000 ML: 0.9 INJECTION, SOLUTION INTRAVENOUS at 03:06

## 2018-06-15 RX ADMIN — PROMETHAZINE HYDROCHLORIDE 25 MG: 25 INJECTION INTRAMUSCULAR; INTRAVENOUS at 12:06

## 2018-06-15 RX ADMIN — CISPLATIN 35 MG: 100 INJECTION, SOLUTION INTRAVENOUS at 01:06

## 2018-06-15 RX ADMIN — ETOPOSIDE 178 MG: 20 INJECTION INTRAVENOUS at 02:06

## 2018-06-15 RX ADMIN — ONDANSETRON 8 MG: 2 INJECTION, SOLUTION INTRAMUSCULAR; INTRAVENOUS at 12:06

## 2018-06-15 RX ADMIN — DEXAMETHASONE SODIUM PHOSPHATE 10 MG: 4 INJECTION, SOLUTION INTRA-ARTICULAR; INTRALESIONAL; INTRAMUSCULAR; INTRAVENOUS; SOFT TISSUE at 12:06

## 2018-06-15 NOTE — PLAN OF CARE
Problem: Patient Care Overview  Goal: Plan of Care Review  Outcome: Ongoing (interventions implemented as appropriate)  Patient tolerated infusion well.  No reaction suspected.  VSS.  Placed OBI to back of left arm; green light blinking upon discharge.  No questions or concerns.  Will RTC tomorrow for IVF.  AVS given to patient.  Patient ambulated off unit unassisted.

## 2018-06-15 NOTE — PLAN OF CARE
Problem: Chemotherapy Effects (Adult)  Goal: Signs and Symptoms of Listed Potential Problems Will be Absent, Minimized or Managed (Chemotherapy Effects)  Signs and symptoms of listed potential problems will be absent, minimized or managed by discharge/transition of care (reference Chemotherapy Effects (Adult) CPG).   Outcome: Ongoing (interventions implemented as appropriate)  Patient here for D5 Cisplatin/Etoposide.  Assessment complete and labs reviewed.  No new events overnight.  VSS.  Chair reclined and blanket offered.  No needs expressed at this time.  Will continue to monitor.

## 2018-06-16 ENCOUNTER — INFUSION (OUTPATIENT)
Dept: INFUSION THERAPY | Facility: HOSPITAL | Age: 23
End: 2018-06-16
Attending: INTERNAL MEDICINE
Payer: COMMERCIAL

## 2018-06-16 VITALS
DIASTOLIC BLOOD PRESSURE: 78 MMHG | HEART RATE: 76 BPM | RESPIRATION RATE: 18 BRPM | TEMPERATURE: 98 F | SYSTOLIC BLOOD PRESSURE: 142 MMHG

## 2018-06-16 DIAGNOSIS — C80.1 GERM CELL TUMOR: Primary | ICD-10-CM

## 2018-06-16 PROCEDURE — 96365 THER/PROPH/DIAG IV INF INIT: CPT

## 2018-06-16 PROCEDURE — 96375 TX/PRO/DX INJ NEW DRUG ADDON: CPT

## 2018-06-16 PROCEDURE — A4216 STERILE WATER/SALINE, 10 ML: HCPCS | Performed by: INTERNAL MEDICINE

## 2018-06-16 PROCEDURE — 25000003 PHARM REV CODE 250: Performed by: INTERNAL MEDICINE

## 2018-06-16 PROCEDURE — 96361 HYDRATE IV INFUSION ADD-ON: CPT

## 2018-06-16 PROCEDURE — 63600175 PHARM REV CODE 636 W HCPCS: Performed by: INTERNAL MEDICINE

## 2018-06-16 RX ORDER — ONDANSETRON 2 MG/ML
8 INJECTION INTRAMUSCULAR; INTRAVENOUS ONCE
Status: COMPLETED | OUTPATIENT
Start: 2018-06-16 | End: 2018-06-16

## 2018-06-16 RX ORDER — PROMETHAZINE HYDROCHLORIDE 25 MG/ML
25 INJECTION, SOLUTION INTRAMUSCULAR; INTRAVENOUS
Status: DISCONTINUED | OUTPATIENT
Start: 2018-06-16 | End: 2018-06-16

## 2018-06-16 RX ORDER — SODIUM CHLORIDE 0.9 % (FLUSH) 0.9 %
10 SYRINGE (ML) INJECTION
Status: DISCONTINUED | OUTPATIENT
Start: 2018-06-16 | End: 2018-06-16 | Stop reason: HOSPADM

## 2018-06-16 RX ORDER — SODIUM CHLORIDE 9 MG/ML
INJECTION, SOLUTION INTRAVENOUS CONTINUOUS
Status: ACTIVE | OUTPATIENT
Start: 2018-06-16 | End: 2018-06-16

## 2018-06-16 RX ORDER — HEPARIN 100 UNIT/ML
500 SYRINGE INTRAVENOUS
Status: DISCONTINUED | OUTPATIENT
Start: 2018-06-16 | End: 2018-06-16 | Stop reason: HOSPADM

## 2018-06-16 RX ADMIN — ONDANSETRON 8 MG: 2 INJECTION, SOLUTION INTRAMUSCULAR; INTRAVENOUS at 11:06

## 2018-06-16 RX ADMIN — SODIUM CHLORIDE: 0.9 INJECTION, SOLUTION INTRAVENOUS at 11:06

## 2018-06-16 RX ADMIN — HEPARIN 500 UNITS: 100 SYRINGE at 01:06

## 2018-06-16 RX ADMIN — SODIUM CHLORIDE, PRESERVATIVE FREE 10 ML: 5 INJECTION INTRAVENOUS at 01:06

## 2018-06-16 RX ADMIN — PROMETHAZINE HYDROCHLORIDE 25 MG: 25 INJECTION INTRAMUSCULAR; INTRAVENOUS at 11:06

## 2018-06-16 NOTE — NURSING
Pt received 2L NS, zofran and phenergan. VSS. C/o nausea and fatigue. Instructed to call for any questions.

## 2018-06-17 ENCOUNTER — HOSPITAL ENCOUNTER (EMERGENCY)
Facility: HOSPITAL | Age: 23
Discharge: HOME | End: 2018-06-17
Attending: PEDIATRICS
Payer: COMMERCIAL

## 2018-06-17 VITALS
HEART RATE: 88 BPM | TEMPERATURE: 99 F | DIASTOLIC BLOOD PRESSURE: 70 MMHG | SYSTOLIC BLOOD PRESSURE: 120 MMHG | WEIGHT: 146.63 LBS | RESPIRATION RATE: 18 BRPM | BODY MASS INDEX: 22.96 KG/M2 | OXYGEN SATURATION: 97 %

## 2018-06-17 DIAGNOSIS — R10.84 ABDOMINAL PAIN, CHRONIC, GENERALIZED: ICD-10-CM

## 2018-06-17 DIAGNOSIS — C80.1 GERM CELL TUMOR: ICD-10-CM

## 2018-06-17 DIAGNOSIS — G89.29 ABDOMINAL PAIN, CHRONIC, GENERALIZED: ICD-10-CM

## 2018-06-17 DIAGNOSIS — R11.2 CHEMOTHERAPY INDUCED NAUSEA AND VOMITING: Primary | ICD-10-CM

## 2018-06-17 DIAGNOSIS — T45.1X5A CHEMOTHERAPY INDUCED NAUSEA AND VOMITING: Primary | ICD-10-CM

## 2018-06-17 LAB
ALBUMIN SERPL BCP-MCNC: 3.7 G/DL
ALP SERPL-CCNC: 49 U/L
ALT SERPL W/O P-5'-P-CCNC: 24 U/L
AMYLASE SERPL-CCNC: 54 U/L
ANION GAP SERPL CALC-SCNC: 12 MMOL/L
AST SERPL-CCNC: 8 U/L
BASOPHILS # BLD AUTO: ABNORMAL K/UL
BASOPHILS NFR BLD: 0 %
BILIRUB SERPL-MCNC: 0.9 MG/DL
BUN SERPL-MCNC: 21 MG/DL
CALCIUM SERPL-MCNC: 9.7 MG/DL
CHLORIDE SERPL-SCNC: 96 MMOL/L
CO2 SERPL-SCNC: 26 MMOL/L
CREAT SERPL-MCNC: 0.7 MG/DL
DIFFERENTIAL METHOD: ABNORMAL
EOSINOPHIL # BLD AUTO: ABNORMAL K/UL
EOSINOPHIL NFR BLD: 0 %
ERYTHROCYTE [DISTWIDTH] IN BLOOD BY AUTOMATED COUNT: 15.8 %
EST. GFR  (AFRICAN AMERICAN): >60 ML/MIN/1.73 M^2
EST. GFR  (NON AFRICAN AMERICAN): >60 ML/MIN/1.73 M^2
GLUCOSE SERPL-MCNC: 97 MG/DL
HCT VFR BLD AUTO: 30.3 %
HGB BLD-MCNC: 10.5 G/DL
IMM GRANULOCYTES # BLD AUTO: ABNORMAL K/UL
IMM GRANULOCYTES NFR BLD AUTO: ABNORMAL %
LIPASE SERPL-CCNC: <3 U/L
LYMPHOCYTES # BLD AUTO: ABNORMAL K/UL
LYMPHOCYTES NFR BLD: 6 %
MCH RBC QN AUTO: 29.1 PG
MCHC RBC AUTO-ENTMCNC: 34.7 G/DL
MCV RBC AUTO: 84 FL
MONOCYTES # BLD AUTO: ABNORMAL K/UL
MONOCYTES NFR BLD: 1 %
NEUTROPHILS NFR BLD: 87 %
NEUTS BAND NFR BLD MANUAL: 6 %
NRBC BLD-RTO: 0 /100 WBC
PLATELET # BLD AUTO: 142 K/UL
PLATELET BLD QL SMEAR: ABNORMAL
PMV BLD AUTO: 8.9 FL
POTASSIUM SERPL-SCNC: 3.9 MMOL/L
PROT SERPL-MCNC: 6.2 G/DL
RBC # BLD AUTO: 3.61 M/UL
SODIUM SERPL-SCNC: 134 MMOL/L
WBC # BLD AUTO: 29.34 K/UL

## 2018-06-17 PROCEDURE — 63600175 PHARM REV CODE 636 W HCPCS: Performed by: PEDIATRICS

## 2018-06-17 PROCEDURE — 99284 EMERGENCY DEPT VISIT MOD MDM: CPT | Mod: ,,, | Performed by: PEDIATRICS

## 2018-06-17 PROCEDURE — 63600175 PHARM REV CODE 636 W HCPCS: Performed by: EMERGENCY MEDICINE

## 2018-06-17 PROCEDURE — 96374 THER/PROPH/DIAG INJ IV PUSH: CPT

## 2018-06-17 PROCEDURE — 25000003 PHARM REV CODE 250: Performed by: EMERGENCY MEDICINE

## 2018-06-17 PROCEDURE — 85027 COMPLETE CBC AUTOMATED: CPT

## 2018-06-17 PROCEDURE — 25000003 PHARM REV CODE 250: Performed by: PEDIATRICS

## 2018-06-17 PROCEDURE — 83690 ASSAY OF LIPASE: CPT

## 2018-06-17 PROCEDURE — 85007 BL SMEAR W/DIFF WBC COUNT: CPT

## 2018-06-17 PROCEDURE — 82150 ASSAY OF AMYLASE: CPT

## 2018-06-17 PROCEDURE — 80053 COMPREHEN METABOLIC PANEL: CPT

## 2018-06-17 PROCEDURE — 96361 HYDRATE IV INFUSION ADD-ON: CPT

## 2018-06-17 PROCEDURE — 96376 TX/PRO/DX INJ SAME DRUG ADON: CPT

## 2018-06-17 PROCEDURE — 99283 EMERGENCY DEPT VISIT LOW MDM: CPT | Mod: 25

## 2018-06-17 RX ORDER — ONDANSETRON 8 MG/1
8 TABLET, ORALLY DISINTEGRATING ORAL
Status: COMPLETED | OUTPATIENT
Start: 2018-06-17 | End: 2018-06-17

## 2018-06-17 RX ORDER — HYDROMORPHONE HYDROCHLORIDE 1 MG/ML
1 INJECTION, SOLUTION INTRAMUSCULAR; INTRAVENOUS; SUBCUTANEOUS
Status: COMPLETED | OUTPATIENT
Start: 2018-06-17 | End: 2018-06-17

## 2018-06-17 RX ORDER — DICYCLOMINE HYDROCHLORIDE 10 MG/1
10 CAPSULE ORAL
Status: COMPLETED | OUTPATIENT
Start: 2018-06-17 | End: 2018-06-17

## 2018-06-17 RX ORDER — HYDROMORPHONE HYDROCHLORIDE 1 MG/ML
2 INJECTION, SOLUTION INTRAMUSCULAR; INTRAVENOUS; SUBCUTANEOUS
Status: DISCONTINUED | OUTPATIENT
Start: 2018-06-17 | End: 2018-06-17

## 2018-06-17 RX ADMIN — Medication 1 MG: at 08:06

## 2018-06-17 RX ADMIN — SODIUM CHLORIDE 1000 ML: 0.9 INJECTION, SOLUTION INTRAVENOUS at 04:06

## 2018-06-17 RX ADMIN — ONDANSETRON 8 MG: 8 TABLET, ORALLY DISINTEGRATING ORAL at 01:06

## 2018-06-17 RX ADMIN — Medication 1 MG: at 06:06

## 2018-06-17 RX ADMIN — Medication 1 MG: at 05:06

## 2018-06-17 RX ADMIN — DICYCLOMINE HYDROCHLORIDE 10 MG: 10 CAPSULE ORAL at 06:06

## 2018-06-17 NOTE — ED TRIAGE NOTES
Patient reports he received chemo in Friday and been nauseous and vomiting since yesterday. Patient tried PO Zofran and phenergan last night. Patient vomited 2 hours ago last. Denies fever. Patient urinating well.     APPEARANCE: Resting comfortably in no acute distress. Patient has clean hair, skin and nails. Clothing is appropriate and properly fastened.  NEURO: Awake, alert, appropriate for age, and cooperative with a calm affect; pupils equal and round.  HEENT: Head symmetrical. Bilateral eyes without redness or drainage. Bilateral ears without drainage. Bilateral nares patent without drainage.  CARDIAC:  S1 S2 auscultated.  No murmur, rub, or gallop auscultated. Port noted to right chest wall without swelling or redness.  RESPIRATORY:  Respirations even and unlabored with normal effort and rate.  Lungs clear throughout auscultation.  No accessory muscle use or retractions noted.  GI/: Abdomen soft and non-distended. Adequate bowel sounds auscultated with tenderness noted on palpation in all four quadrants.    NEUROVASCULAR: All extremities are warm and pink with palpable pulses and capillary refill less than 3 seconds.  MUSCULOSKELETAL: Moves all extremities well; no obvious deformities noted.  SKIN: pale; Warm and dry, adequate turgor, mucus membranes moist and pink; no breakdown.

## 2018-06-17 NOTE — DISCHARGE INSTRUCTIONS
Continue Zofran and Phenergan as needed.  Morphine for pain.  Can combine with Tylenol 1000mg) and/or Motrin (600mg)    Our goal in the emergency department is to always give you outstanding care and exceptional service. You may receive a survey by mail or e-mail in the next week regarding your experience in our ED. We would greatly appreciate your completing and returning the survey. Your feedback provides us with a way to recognize our staff who give very good care and it helps us learn how to improve when your experience was below our aspiration of excellence.

## 2018-06-17 NOTE — ED PROVIDER NOTES
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  Last dose of chemo given Friday which completed round 3 of 4.  Yesterday was seen in clinic for routine dose of zofran and phenergan.  Today he vomited about 3 hours ago which is not unusual. Patient has been instructed if he vomits to come to ER.  Has not eaten or drank all day today.  Was able to tolerate rice last night.  UOP x 2 today. Patient has 1 prior episode dehydration for which he was admitted.   Patient has abdominal pain since appy on 5/24 but patient is not especially concerned about it today.    No fever, No cough/URI, No diarrhea, No ST.  No black or blood in vomit.    ILLNESS: .per HPI, chronic Lyme disease, ALLERGIES: none, SURGERIES: none, HOSPITALIZATIONS: Lyme disease 2nd grade, MEDICATIONS: Per EPIC list, Immunizations: UTD.      The history is provided by the patient.     Review of patient's allergies indicates:   Allergen Reactions    Mushroom Anaphylaxis    Bamboo     Bee pollens     Mushroom flavor     Venom-honey bee      Past Medical History:   Diagnosis Date    Abdominal pain 08/12/2010    eval for abd and chest wall pain at [facility], [place], [place] - cxr wnl, EKG ([facility]), troponin wnl, normal chemistries    Allergy     Cancer     testicular    Chondromalacia patellae     Chronic nausea 2015    eval by GI Dr. Tushar Artis - given zofran and ciproheptadine     Chronic pain     Chronic pain     [facility] meds; followed by psych and pain clinic and unresponsive to behavioral/CBT/old records reports c/f conversion disorder     Foot pain 04/2010    4/10 + SHIRA, eval by Dr. ALLY Lion at [facility] -dx unclear ? gout and trial of nsaids and pdn, xrays normal 1/11, referred  "to podiatry, re-eval by rheum 2/12 and MRI and films wnl    Fracture of right radius 09/2009    Lyme arthritis     multiple joints    Lyme disease 2013    Memory loss 03/2012    eval for memory loss by neuro at [facility] - MRI, EEG wnl. Dr. Patricio suggested emotional factors & ref to Tushar Davies, PhD for  eval & neuropsych eval 3/12 Lupe Delgado, PhD - no evidence of formal thought disorder or psychosis - documented severe memory impairment; not related to to ADD or executive function issues. sugesstion that memory issues may be related to "emotional factors", ?conversion d/o    Stress fracture of tibia and fibula 08/2011    Urticaria      Past Surgical History:   Procedure Laterality Date    CHEST WALL BIOPSY      INJECTION OF ANESTHETIC AGENT AROUND NERVE N/A 5/29/2018    Procedure: BLOCK, NERVE;  Surgeon: Raiza Surgeon;  Location: [facility];  Service: Anesthesiology;  Laterality: N/A;    LAPAROSCOPIC APPENDECTOMY N/A 5/24/2018    Procedure: APPENDECTOMY, LAPAROSCOPIC;  Surgeon: Kenan Huang Jr., MD;  Location: [facility] OR [address] [place] [address];  Service: General;  Laterality: N/A;    PORTACATH PLACEMENT Right      Family History   Problem Relation Age of Onset    Arthritis Mother     Other Mother         benign tumor of brain and spinal cord    Hyperlipidemia Father     Gout Father     No Known Problems Sister     Arthritis Sister         shoulder    Depression Sister      Social History   Substance Use Topics    Smoking status: Former Smoker     Packs/day: 0.25     Types: Vaping with nicotine, Vaping w/o nicotine, Cigarettes, Cigars     Quit date: 3/15/2018    Smokeless tobacco: Former User    Alcohol use Yes      Comment: occ     Review of Systems   Constitutional: Positive for activity change and appetite change. Negative for fever.   HENT: Negative for congestion, rhinorrhea and sore throat.    Eyes: Negative for discharge.   Respiratory: Negative for cough.    Cardiovascular: Negative for chest pain. "   Gastrointestinal: Positive for abdominal pain, nausea and vomiting. Negative for diarrhea.   Genitourinary: Negative for decreased urine volume, difficulty urinating and dysuria.   Musculoskeletal: Negative for gait problem.   Skin: Negative for rash.   Allergic/Immunologic: Positive for immunocompromised state.   Neurological: Positive for headaches.   Hematological: Does not bruise/bleed easily.       Physical Exam     Initial Vitals [06/17/18 1248]   BP Pulse Resp Temp SpO2   116/66 105 18 98.3 °F (36.8 °C) 100 %      MAP       --         Physical Exam    Nursing note and vitals reviewed.  Constitutional: He appears well-developed and well-nourished. No distress.   HENT:   Head: Normocephalic.   Right Ear: External ear normal.   Left Ear: External ear normal.   Mouth/Throat: Oropharynx is clear and moist. No oropharyngeal exudate.   Eyes: Conjunctivae are normal.   Neck: Neck supple.   Cardiovascular: Normal rate, regular rhythm and normal heart sounds. Exam reveals no gallop and no friction rub.    No murmur heard.  Pulmonary/Chest: Breath sounds normal. No respiratory distress. He has no wheezes. He has no rhonchi. He has no rales.   Abdominal: Soft. Bowel sounds are normal. He exhibits no distension and no mass. There is no tenderness.   Musculoskeletal: Normal range of motion. He exhibits no edema or tenderness.   Lymphadenopathy:     He has no cervical adenopathy.   Neurological: He is alert and oriented to person, place, and time. He has normal strength.   Skin: Skin is warm and dry. No rash noted.   Psychiatric: His behavior is normal.         ED Course   Procedures  Labs Reviewed   CBC W/ AUTO DIFFERENTIAL - Abnormal; Notable for the following:        Result Value    WBC 29.34 (*)     RBC 3.61 (*)     Hemoglobin 10.5 (*)     Hematocrit 30.3 (*)     RDW 15.8 (*)     Platelets 142 (*)     MPV 8.9 (*)     Gran% 87.0 (*)     Lymph% 6.0 (*)     Mono% 1.0 (*)     Platelet Estimate Decreased (*)     All other  components within normal limits   COMPREHENSIVE METABOLIC PANEL - Abnormal; Notable for the following:     Sodium 134 (*)     BUN, Bld 21 (*)     Alkaline Phosphatase 49 (*)     AST 8 (*)     All other components within normal limits   LIPASE - Abnormal; Notable for the following:     Lipase <3 (*)     All other components within normal limits    Narrative:     ADD ON AMYLASE & LIPASE PER DR. NATHALIE TURCIOS AT  06/17/2018  19:01    AMYLASE   LIPASE   AMYLASE    Narrative:     ADD ON AMYLASE & LIPASE PER DR. NATHALIE TURCIOS AT  06/17/2018  19:01           No orders to display        Medical Decision Making:   History:   I obtained history from: someone other than patient.  Old Medical Records: I decided to obtain old medical records.  Old Records Summarized: records from clinic visits.  Initial Assessment:   22 yo male with mediastinal tumor on chemo with N/V and poor po.  Differential Diagnosis:   Gastritis  Post chemo n/v  Hepatitis  Pancreatitis  dehydration  Clinical Tests:   Lab Tests: Ordered and Reviewed  The following lab test(s) were unremarkable: CMP       <> Summary of Lab: CBC remarkable for leukocytosis - typical for this patient following neulasta.  ED Management:  1730 spoke with Heme/onc.  Elevated WBC likely 2/2 neulasta.  If patient feeling better after IVF, can be d/c and F/U tomorrow as scheduled.  Spoke with patient.  Nausea is improved, but still with abdominal pain.  Requested jello and something for pain.  Will give dilaudid.    PAtient still with chronic abdominal pain.  TOlerated food tray.  Able to be discharged.      Other:   I have discussed this case with another health care provider.       <> Summary of the Discussion: Adult Heme/Onc resident.                      Clinical Impression:   The primary encounter diagnosis was Chemotherapy induced nausea and vomiting. Diagnoses of Germ cell tumor and Abdominal pain, chronic, generalized were also pertinent to this visit.      Disposition:    Disposition: Discharged  Condition: Stable  Post chemo N/V.  Chronic abd pain.  Improved after IVF and pain meds.  F/U H/O clinic tomorrow as scheduled.                        Chris Paulino MD  06/18/18 3102

## 2018-06-18 ENCOUNTER — OFFICE VISIT (OUTPATIENT)
Dept: HEMATOLOGY/ONCOLOGY | Facility: CLINIC | Age: 23
End: 2018-06-18
Payer: COMMERCIAL

## 2018-06-18 VITALS
BODY MASS INDEX: 22.77 KG/M2 | TEMPERATURE: 98 F | HEART RATE: 96 BPM | WEIGHT: 145.06 LBS | OXYGEN SATURATION: 100 % | HEIGHT: 67 IN | SYSTOLIC BLOOD PRESSURE: 99 MMHG | DIASTOLIC BLOOD PRESSURE: 54 MMHG | RESPIRATION RATE: 16 BRPM

## 2018-06-18 DIAGNOSIS — C80.1 GERM CELL TUMOR: ICD-10-CM

## 2018-06-18 DIAGNOSIS — D72.829 LEUKOCYTOSIS, UNSPECIFIED TYPE: ICD-10-CM

## 2018-06-18 DIAGNOSIS — F41.9 ANXIETY: ICD-10-CM

## 2018-06-18 DIAGNOSIS — D69.6 THROMBOCYTOPENIA: ICD-10-CM

## 2018-06-18 DIAGNOSIS — G89.3 CANCER RELATED PAIN: ICD-10-CM

## 2018-06-18 DIAGNOSIS — K21.9 GASTROESOPHAGEAL REFLUX DISEASE, ESOPHAGITIS PRESENCE NOT SPECIFIED: ICD-10-CM

## 2018-06-18 DIAGNOSIS — C38.3 PRIMARY MEDIASTINAL SEMINOMA: Primary | ICD-10-CM

## 2018-06-18 DIAGNOSIS — D64.9 ANEMIA, UNSPECIFIED TYPE: ICD-10-CM

## 2018-06-18 PROCEDURE — 3008F BODY MASS INDEX DOCD: CPT | Mod: CPTII,S$GLB,, | Performed by: INTERNAL MEDICINE

## 2018-06-18 PROCEDURE — 99999 PR PBB SHADOW E&M-EST. PATIENT-LVL III: CPT | Mod: PBBFAC,,, | Performed by: INTERNAL MEDICINE

## 2018-06-18 PROCEDURE — 99214 OFFICE O/P EST MOD 30 MIN: CPT | Mod: S$GLB,,, | Performed by: INTERNAL MEDICINE

## 2018-06-18 RX ORDER — MORPHINE SULFATE 15 MG/1
45 TABLET, FILM COATED, EXTENDED RELEASE ORAL 2 TIMES DAILY
Qty: 60 TABLET | Refills: 0 | Status: SHIPPED | OUTPATIENT
Start: 2018-06-18 | End: 2018-06-18 | Stop reason: SDUPTHER

## 2018-06-18 RX ORDER — PANTOPRAZOLE SODIUM 40 MG/1
40 TABLET, DELAYED RELEASE ORAL DAILY
Qty: 30 TABLET | Refills: 11 | Status: SHIPPED | OUTPATIENT
Start: 2018-06-18 | End: 2018-06-18 | Stop reason: SDUPTHER

## 2018-06-18 RX ORDER — ALPRAZOLAM 0.5 MG/1
0.75 TABLET ORAL 3 TIMES DAILY PRN
Qty: 60 TABLET | Refills: 0 | Status: SHIPPED | OUTPATIENT
Start: 2018-06-18 | End: 2018-07-02 | Stop reason: SDUPTHER

## 2018-06-18 RX ORDER — MORPHINE SULFATE 15 MG/1
45 TABLET, FILM COATED, EXTENDED RELEASE ORAL 2 TIMES DAILY
Qty: 60 TABLET | Refills: 0 | Status: ON HOLD | OUTPATIENT
Start: 2018-06-18 | End: 2018-06-25

## 2018-06-18 RX ORDER — PANTOPRAZOLE SODIUM 40 MG/1
40 TABLET, DELAYED RELEASE ORAL DAILY
Qty: 30 TABLET | Refills: 11 | Status: SHIPPED | OUTPATIENT
Start: 2018-06-18 | End: 2018-10-01

## 2018-06-18 RX ORDER — ALPRAZOLAM 0.5 MG/1
0.75 TABLET ORAL 3 TIMES DAILY PRN
Qty: 60 TABLET | Refills: 0 | Status: SHIPPED | OUTPATIENT
Start: 2018-06-18 | End: 2018-06-18 | Stop reason: SDUPTHER

## 2018-06-18 NOTE — Clinical Note
RTC on 7/2/18 with CBC, CMP, Mg, LDH, HCG, AFP. See me, then chemo (cis/etop) on 7/2, 7/3, 7/5, 7/6, 7/7 Return to clinic on 7/9 ([facility]) for IVF

## 2018-06-18 NOTE — PROGRESS NOTES
"Subjective:      Patient ID: Alejandro Neff is a 23 y.o. male.     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 Mr. Hampton is a 24 yo man who first presented with chest pain and underwent CT chest on 2/14/18, which showed a 6 x 3.5 cm mediastinal mass. It abuts the aorta and pulmonary artery. Biopsy was done on 3/1/18. Pathology (reviewed at [facility]) showed germ cell tumor, most consistent with seminoma.   2. HCG, LDH, AFP on 3/22/18 all normal.   3. Testicular US 3/26/18 showed no testicular masses. CT C/A/P on 3/26/18 showed "stable appearance of anterior mediastinal mass consistent with provided history of seminoma. Several punctate sclerotic foci are noted in the pelvis at the right pubic bone, right ischial tuberosity, and left ilium adjacent to the sacroiliac joint.  These are strongly favored to reflect benign bone islands although metastatic disease could have a similar appearance and close follow-up is recommended. Several benign Schmorl's nodes are noted in the thoracic spine." Bone scan on 3/28/18 was negative for bone mets.   4. Audiogram on 4/6/18 normal. Followed by ENT.   5. Given his smoking history, I recommended 4 cycles of EP. EP cycle 1 day 1 on 4/16/18. Complicated by hospitalization for neutropenic fever 4/26-4/29. No source of infections identified. Treated with antibiotics empirically and improved.   6. Cycle 2 of EP started on on 5/7/18. Hospitalized during the first weekend for intractable nausea.   7. Restaging CT scan on 5/24/18 showed "Interval development of dilated appearance of the appendix with stranding of the periappendiceal fat.  Findings are worrisome for appendicitis". Patient did have abdominal pain and tenderness. Admitted and got appendectomy on 5/24/18.  8. Cycle 3 of EP 6/11/18-6/15/18     INTERVAL HISTORY:   Mr. Hampton returns today for follow up of primary mediastinal " seminoma. He received cycle 3 of EP last week, again presented to ED yesterday for intractable N/V, which improved after IV treatment and discharged home. He feels tired today. Still has chronic N/V and pain. Eating and drinking well.     ECO     ROS:   A ten point system review is obtained and neg except for what was stated in the interval history     Physical Examination:   Vital signs reviewed.   Gen: well hydrated, well developed, in no acute distress. Looks tired.   HEENT: normocephalic, anicteric sclerae, PERRLA, EOMI, oropharynx clear  Neck: supple, no JVD, thyromegaly, cervical or supraclavicular LAD  Lungs: CTAB, no wheezes or rales. Port site on chest clean.   Heart: RRR, no M/R/G  Abdomen: soft, non-distended, nontender, no hepatosplenomegaly, mass, or hernia. BS present  Ext: no clubbing, cyanosis, or edema  Neuro: alert and oriented x 4, no focal neuro deficit  Skin: no rash, erythema, open wound or ulcers  Psych: pleasant and appropriate mood and affect        Objective:      Laboratory Data:  Labs reviewed. Unremarkable.      Assessment/Plan:      1. Primary mediastinal seminoma    2. Germ cell tumor    3. Anxiety    4. Cancer related pain    5. Gastroesophageal reflux disease, esophagitis presence not specified    6. Leukocytosis, unspecified type    7. Anemia, unspecified type    8. Thrombocytopenia        1-2  - RTC on  for last cycle of EP     3.  - refilled xanax     4.  - increase MS contin to 45 mg bid as scheduled  - c/w IR morphine as needed    5.  - refilled protonix     6.  - 2/2 neulasta  - knows to call should fever develop    7.  - 2/2 chemotherapy. Monitor    8.   - 2/2 chemotherapy. Monitor.      RTC on  for last cycle of chemotherapy  Knows to call should symptoms arise in the interim.

## 2018-06-18 NOTE — PROVIDER PROGRESS NOTES - EMERGENCY DEPT.
Patient endorsed to me by Dr Paulino.  Alejandro remains with some pain and discomfort but he has tolerated full tray and 2 additional jellos. Still reporting band like pain across abdomen, still soft. Almost crampy? Attempted bentyl with no relief, given additional 2 mg of dilaudid seperately with no relief. Discussed with heme onc on call again, agrees no need for admission, should discuss pain issues with Dr Rojas tomorrow. Patient without emesis, watching tv- all questions answered, follow up arranged for tomorrow.

## 2018-06-20 ENCOUNTER — PATIENT MESSAGE (OUTPATIENT)
Dept: HEMATOLOGY/ONCOLOGY | Facility: CLINIC | Age: 23
End: 2018-06-20

## 2018-06-20 ENCOUNTER — PATIENT MESSAGE (OUTPATIENT)
Dept: SURGERY | Facility: CLINIC | Age: 23
End: 2018-06-20

## 2018-06-21 ENCOUNTER — NURSE TRIAGE (OUTPATIENT)
Dept: ADMINISTRATIVE | Facility: CLINIC | Age: 23
End: 2018-06-21

## 2018-06-21 ENCOUNTER — HOSPITAL ENCOUNTER (INPATIENT)
Facility: HOSPITAL | Age: 23
LOS: 3 days | Discharge: HOME | DRG: 809 | End: 2018-06-25
Attending: EMERGENCY MEDICINE | Admitting: INTERNAL MEDICINE
Payer: COMMERCIAL

## 2018-06-21 DIAGNOSIS — R50.81 NEUTROPENIA WITH FEVER: ICD-10-CM

## 2018-06-21 DIAGNOSIS — R50.81 FEBRILE NEUTROPENIA: ICD-10-CM

## 2018-06-21 DIAGNOSIS — R50.81 NEUTROPENIC FEVER: ICD-10-CM

## 2018-06-21 DIAGNOSIS — T45.1X5A CHEMOTHERAPY-INDUCED NEUTROPENIA: ICD-10-CM

## 2018-06-21 DIAGNOSIS — D70.9 FEBRILE NEUTROPENIA: ICD-10-CM

## 2018-06-21 DIAGNOSIS — D70.9 NEUTROPENIA WITH FEVER: ICD-10-CM

## 2018-06-21 DIAGNOSIS — E86.0 DEHYDRATION: ICD-10-CM

## 2018-06-21 DIAGNOSIS — R10.9 ABDOMINAL PAIN: ICD-10-CM

## 2018-06-21 DIAGNOSIS — D70.9 NEUTROPENIC FEVER: ICD-10-CM

## 2018-06-21 DIAGNOSIS — G89.3 CANCER RELATED PAIN: ICD-10-CM

## 2018-06-21 DIAGNOSIS — R50.81 FEVER IN OTHER DISEASES: Primary | ICD-10-CM

## 2018-06-21 DIAGNOSIS — D70.1 CHEMOTHERAPY-INDUCED NEUTROPENIA: ICD-10-CM

## 2018-06-21 DIAGNOSIS — R11.0 NAUSEA: ICD-10-CM

## 2018-06-21 PROCEDURE — 99285 EMERGENCY DEPT VISIT HI MDM: CPT | Mod: ,,, | Performed by: EMERGENCY MEDICINE

## 2018-06-21 PROCEDURE — 63600175 PHARM REV CODE 636 W HCPCS: Performed by: EMERGENCY MEDICINE

## 2018-06-21 PROCEDURE — 96374 THER/PROPH/DIAG INJ IV PUSH: CPT

## 2018-06-21 PROCEDURE — 99285 EMERGENCY DEPT VISIT HI MDM: CPT | Mod: 25

## 2018-06-21 PROCEDURE — 96375 TX/PRO/DX INJ NEW DRUG ADDON: CPT | Mod: 59

## 2018-06-21 RX ORDER — VANCOMYCIN 2 GRAM/500 ML IN 0.9 % SODIUM CHLORIDE INTRAVENOUS
2000
Status: COMPLETED | OUTPATIENT
Start: 2018-06-21 | End: 2018-06-22

## 2018-06-21 RX ORDER — FENTANYL CITRATE 50 UG/ML
100 INJECTION, SOLUTION INTRAMUSCULAR; INTRAVENOUS
Status: COMPLETED | OUTPATIENT
Start: 2018-06-21 | End: 2018-06-21

## 2018-06-21 RX ADMIN — FENTANYL CITRATE 100 MCG: 50 INJECTION, SOLUTION INTRAMUSCULAR; INTRAVENOUS at 11:06

## 2018-06-22 ENCOUNTER — PATIENT MESSAGE (OUTPATIENT)
Dept: SURGERY | Facility: CLINIC | Age: 23
End: 2018-06-22

## 2018-06-22 ENCOUNTER — PATIENT MESSAGE (OUTPATIENT)
Dept: HEMATOLOGY/ONCOLOGY | Facility: CLINIC | Age: 23
End: 2018-06-22

## 2018-06-22 PROBLEM — D70.9 NEUTROPENIA WITH FEVER: Status: ACTIVE | Noted: 2018-06-22

## 2018-06-22 PROBLEM — R10.84 GENERALIZED ABDOMINAL PAIN: Status: ACTIVE | Noted: 2018-06-22

## 2018-06-22 PROBLEM — R50.81 NEUTROPENIA WITH FEVER: Status: ACTIVE | Noted: 2018-06-22

## 2018-06-22 PROBLEM — R50.9 FEVER: Status: ACTIVE | Noted: 2018-06-22

## 2018-06-22 LAB
ALBUMIN SERPL BCP-MCNC: 3.1 G/DL
ALBUMIN SERPL BCP-MCNC: 3.6 G/DL
ALP SERPL-CCNC: 49 U/L
ALP SERPL-CCNC: 61 U/L
ALT SERPL W/O P-5'-P-CCNC: 12 U/L
ALT SERPL W/O P-5'-P-CCNC: 15 U/L
AMPHET+METHAMPHET UR QL: NEGATIVE
ANION GAP SERPL CALC-SCNC: 5 MMOL/L
ANION GAP SERPL CALC-SCNC: 6 MMOL/L
ANISOCYTOSIS BLD QL SMEAR: SLIGHT
APTT BLDCRRT: 25 SEC
AST SERPL-CCNC: 6 U/L
AST SERPL-CCNC: 8 U/L
BARBITURATES UR QL SCN>200 NG/ML: NEGATIVE
BASOPHILS # BLD AUTO: 0 K/UL
BASOPHILS NFR BLD: 0 %
BASOPHILS NFR BLD: 0 %
BENZODIAZ UR QL SCN>200 NG/ML: NORMAL
BILIRUB SERPL-MCNC: 0.4 MG/DL
BILIRUB SERPL-MCNC: 0.7 MG/DL
BILIRUB UR QL STRIP: NEGATIVE
BUN SERPL-MCNC: 12 MG/DL
BUN SERPL-MCNC: 14 MG/DL
BZE UR QL SCN: NEGATIVE
CALCIUM SERPL-MCNC: 8.2 MG/DL
CALCIUM SERPL-MCNC: 9 MG/DL
CANNABINOIDS UR QL SCN: NEGATIVE
CHLORIDE SERPL-SCNC: 103 MMOL/L
CHLORIDE SERPL-SCNC: 99 MMOL/L
CLARITY UR REFRACT.AUTO: CLEAR
CO2 SERPL-SCNC: 27 MMOL/L
CO2 SERPL-SCNC: 29 MMOL/L
COLOR UR AUTO: YELLOW
CREAT SERPL-MCNC: 0.7 MG/DL
CREAT SERPL-MCNC: 0.8 MG/DL
CREAT UR-MCNC: 195 MG/DL
DACRYOCYTES BLD QL SMEAR: ABNORMAL
DIFFERENTIAL METHOD: ABNORMAL
DIFFERENTIAL METHOD: ABNORMAL
EOSINOPHIL # BLD AUTO: 0 K/UL
EOSINOPHIL NFR BLD: 0 %
EOSINOPHIL NFR BLD: 1 %
ERYTHROCYTE [DISTWIDTH] IN BLOOD BY AUTOMATED COUNT: 14.5 %
ERYTHROCYTE [DISTWIDTH] IN BLOOD BY AUTOMATED COUNT: 14.5 %
EST. GFR  (AFRICAN AMERICAN): >60 ML/MIN/1.73 M^2
EST. GFR  (AFRICAN AMERICAN): >60 ML/MIN/1.73 M^2
EST. GFR  (NON AFRICAN AMERICAN): >60 ML/MIN/1.73 M^2
EST. GFR  (NON AFRICAN AMERICAN): >60 ML/MIN/1.73 M^2
ESTIMATED AVG GLUCOSE: 97 MG/DL
ETHANOL UR-MCNC: <10 MG/DL
GLUCOSE SERPL-MCNC: 107 MG/DL
GLUCOSE SERPL-MCNC: 132 MG/DL
GLUCOSE UR QL STRIP: NEGATIVE
HAV IGM SERPL QL IA: NEGATIVE
HBA1C MFR BLD HPLC: 5 %
HBV CORE IGM SERPL QL IA: NEGATIVE
HBV SURFACE AG SERPL QL IA: NEGATIVE
HCT VFR BLD AUTO: 22.5 %
HCT VFR BLD AUTO: 27.3 %
HCV AB SERPL QL IA: NEGATIVE
HGB BLD-MCNC: 8 G/DL
HGB BLD-MCNC: 9.2 G/DL
HGB UR QL STRIP: NEGATIVE
HIV 1+2 AB+HIV1 P24 AG SERPL QL IA: NEGATIVE
IMM GRANULOCYTES # BLD AUTO: 0 K/UL
IMM GRANULOCYTES # BLD AUTO: ABNORMAL K/UL
IMM GRANULOCYTES NFR BLD AUTO: 0 %
IMM GRANULOCYTES NFR BLD AUTO: ABNORMAL %
INR PPP: 1
KETONES UR QL STRIP: NEGATIVE
LACTATE SERPL-SCNC: 1.5 MMOL/L
LEUKOCYTE ESTERASE UR QL STRIP: NEGATIVE
LIPASE SERPL-CCNC: 4 U/L
LYMPHOCYTES # BLD AUTO: 0.9 K/UL
LYMPHOCYTES NFR BLD: 89.9 %
LYMPHOCYTES NFR BLD: 95 %
MAGNESIUM SERPL-MCNC: 1.8 MG/DL
MAGNESIUM SERPL-MCNC: 2.1 MG/DL
MCH RBC QN AUTO: 28.5 PG
MCH RBC QN AUTO: 30 PG
MCHC RBC AUTO-ENTMCNC: 33.7 G/DL
MCHC RBC AUTO-ENTMCNC: 35.6 G/DL
MCV RBC AUTO: 84 FL
MCV RBC AUTO: 85 FL
METHADONE UR QL SCN>300 NG/ML: NEGATIVE
MONOCYTES # BLD AUTO: 0.1 K/UL
MONOCYTES NFR BLD: 2 %
MONOCYTES NFR BLD: 6.1 %
NEUTROPHILS # BLD AUTO: 0 K/UL
NEUTROPHILS NFR BLD: 3 %
NEUTROPHILS NFR BLD: 3 %
NITRITE UR QL STRIP: NEGATIVE
NRBC BLD-RTO: 0 /100 WBC
NRBC BLD-RTO: 0 /100 WBC
OPIATES UR QL SCN: NORMAL
PCP UR QL SCN>25 NG/ML: NEGATIVE
PH UR STRIP: 6 [PH] (ref 5–8)
PHOSPHATE SERPL-MCNC: 3.4 MG/DL
PHOSPHATE SERPL-MCNC: 3.8 MG/DL
PLATELET # BLD AUTO: 28 K/UL
PLATELET # BLD AUTO: 38 K/UL
PLATELET BLD QL SMEAR: ABNORMAL
PLATELET BLD QL SMEAR: ABNORMAL
PMV BLD AUTO: 10.2 FL
PMV BLD AUTO: 9.4 FL
POIKILOCYTOSIS BLD QL SMEAR: SLIGHT
POTASSIUM SERPL-SCNC: 3.6 MMOL/L
POTASSIUM SERPL-SCNC: 3.8 MMOL/L
PROT SERPL-MCNC: 5 G/DL
PROT SERPL-MCNC: 6.1 G/DL
PROT UR QL STRIP: NEGATIVE
PROTHROMBIN TIME: 10.5 SEC
RBC # BLD AUTO: 2.67 M/UL
RBC # BLD AUTO: 3.23 M/UL
SODIUM SERPL-SCNC: 134 MMOL/L
SODIUM SERPL-SCNC: 135 MMOL/L
SP GR UR STRIP: 1.02 (ref 1–1.03)
TOXICOLOGY INFORMATION: NORMAL
URN SPEC COLLECT METH UR: NORMAL
UROBILINOGEN UR STRIP-ACNC: NEGATIVE EU/DL
WBC # BLD AUTO: 0.94 K/UL
WBC # BLD AUTO: 0.99 K/UL

## 2018-06-22 PROCEDURE — 99223 1ST HOSP IP/OBS HIGH 75: CPT | Mod: ,,, | Performed by: INTERNAL MEDICINE

## 2018-06-22 PROCEDURE — 25000003 PHARM REV CODE 250: Performed by: INTERNAL MEDICINE

## 2018-06-22 PROCEDURE — 85610 PROTHROMBIN TIME: CPT

## 2018-06-22 PROCEDURE — 85025 COMPLETE CBC W/AUTO DIFF WBC: CPT

## 2018-06-22 PROCEDURE — 25000003 PHARM REV CODE 250: Performed by: STUDENT IN AN ORGANIZED HEALTH CARE EDUCATION/TRAINING PROGRAM

## 2018-06-22 PROCEDURE — 20600001 HC STEP DOWN PRIVATE ROOM

## 2018-06-22 PROCEDURE — 84100 ASSAY OF PHOSPHORUS: CPT

## 2018-06-22 PROCEDURE — 82657 ENZYME CELL ACTIVITY: CPT

## 2018-06-22 PROCEDURE — 80307 DRUG TEST PRSMV CHEM ANLYZR: CPT

## 2018-06-22 PROCEDURE — 80074 ACUTE HEPATITIS PANEL: CPT

## 2018-06-22 PROCEDURE — 25000003 PHARM REV CODE 250: Performed by: EMERGENCY MEDICINE

## 2018-06-22 PROCEDURE — 83036 HEMOGLOBIN GLYCOSYLATED A1C: CPT

## 2018-06-22 PROCEDURE — 63600175 PHARM REV CODE 636 W HCPCS: Performed by: STUDENT IN AN ORGANIZED HEALTH CARE EDUCATION/TRAINING PROGRAM

## 2018-06-22 PROCEDURE — 63600175 PHARM REV CODE 636 W HCPCS: Performed by: INTERNAL MEDICINE

## 2018-06-22 PROCEDURE — 83735 ASSAY OF MAGNESIUM: CPT | Mod: 91

## 2018-06-22 PROCEDURE — 63600175 PHARM REV CODE 636 W HCPCS: Performed by: EMERGENCY MEDICINE

## 2018-06-22 PROCEDURE — 83735 ASSAY OF MAGNESIUM: CPT

## 2018-06-22 PROCEDURE — 85730 THROMBOPLASTIN TIME PARTIAL: CPT

## 2018-06-22 PROCEDURE — 84100 ASSAY OF PHOSPHORUS: CPT | Mod: 91

## 2018-06-22 PROCEDURE — 83690 ASSAY OF LIPASE: CPT

## 2018-06-22 PROCEDURE — 80053 COMPREHEN METABOLIC PANEL: CPT

## 2018-06-22 PROCEDURE — 86703 HIV-1/HIV-2 1 RESULT ANTBDY: CPT

## 2018-06-22 PROCEDURE — 87040 BLOOD CULTURE FOR BACTERIA: CPT

## 2018-06-22 PROCEDURE — 85007 BL SMEAR W/DIFF WBC COUNT: CPT

## 2018-06-22 PROCEDURE — 80053 COMPREHEN METABOLIC PANEL: CPT | Mod: 91

## 2018-06-22 PROCEDURE — 85027 COMPLETE CBC AUTOMATED: CPT

## 2018-06-22 PROCEDURE — 87086 URINE CULTURE/COLONY COUNT: CPT

## 2018-06-22 PROCEDURE — 83605 ASSAY OF LACTIC ACID: CPT

## 2018-06-22 PROCEDURE — 81003 URINALYSIS AUTO W/O SCOPE: CPT

## 2018-06-22 RX ORDER — IBUPROFEN 200 MG
16 TABLET ORAL
Status: DISCONTINUED | OUTPATIENT
Start: 2018-06-22 | End: 2018-06-25 | Stop reason: HOSPADM

## 2018-06-22 RX ORDER — ONDANSETRON 4 MG/1
4 TABLET, FILM COATED ORAL EVERY 6 HOURS PRN
Status: DISCONTINUED | OUTPATIENT
Start: 2018-06-22 | End: 2018-06-23

## 2018-06-22 RX ORDER — ENOXAPARIN SODIUM 100 MG/ML
40 INJECTION SUBCUTANEOUS EVERY 24 HOURS
Status: DISCONTINUED | OUTPATIENT
Start: 2018-06-22 | End: 2018-06-22

## 2018-06-22 RX ORDER — POLYETHYLENE GLYCOL 3350 17 G/17G
17 POWDER, FOR SOLUTION ORAL 2 TIMES DAILY
Status: DISCONTINUED | OUTPATIENT
Start: 2018-06-22 | End: 2018-06-23

## 2018-06-22 RX ORDER — ALPRAZOLAM 0.25 MG/1
0.75 TABLET ORAL 3 TIMES DAILY PRN
Status: DISCONTINUED | OUTPATIENT
Start: 2018-06-22 | End: 2018-06-25 | Stop reason: HOSPADM

## 2018-06-22 RX ORDER — CEFEPIME HYDROCHLORIDE 2 G/1
2 INJECTION, POWDER, FOR SOLUTION INTRAVENOUS
Status: DISCONTINUED | OUTPATIENT
Start: 2018-06-22 | End: 2018-06-22

## 2018-06-22 RX ORDER — FENTANYL CITRATE 50 UG/ML
50 INJECTION, SOLUTION INTRAMUSCULAR; INTRAVENOUS
Status: COMPLETED | OUTPATIENT
Start: 2018-06-22 | End: 2018-06-22

## 2018-06-22 RX ORDER — DIPHENHYDRAMINE HCL 25 MG
25 CAPSULE ORAL EVERY 6 HOURS PRN
Status: DISCONTINUED | OUTPATIENT
Start: 2018-06-22 | End: 2018-06-25 | Stop reason: HOSPADM

## 2018-06-22 RX ORDER — ACETAMINOPHEN 325 MG/1
650 TABLET ORAL EVERY 6 HOURS PRN
Status: DISCONTINUED | OUTPATIENT
Start: 2018-06-22 | End: 2018-06-25 | Stop reason: HOSPADM

## 2018-06-22 RX ORDER — MORPHINE SULFATE 15 MG/1
30 TABLET ORAL EVERY 4 HOURS PRN
Status: DISCONTINUED | OUTPATIENT
Start: 2018-06-22 | End: 2018-06-22

## 2018-06-22 RX ORDER — ACETAMINOPHEN 325 MG/1
TABLET ORAL
Status: DISPENSED
Start: 2018-06-22 | End: 2018-06-23

## 2018-06-22 RX ORDER — CEFEPIME HYDROCHLORIDE 2 G/1
2 INJECTION, POWDER, FOR SOLUTION INTRAVENOUS
Status: DISCONTINUED | OUTPATIENT
Start: 2018-06-22 | End: 2018-06-25

## 2018-06-22 RX ORDER — SODIUM CHLORIDE 0.9 % (FLUSH) 0.9 %
5 SYRINGE (ML) INJECTION
Status: DISCONTINUED | OUTPATIENT
Start: 2018-06-22 | End: 2018-06-25 | Stop reason: HOSPADM

## 2018-06-22 RX ORDER — MORPHINE SULFATE 15 MG/1
45 TABLET, FILM COATED, EXTENDED RELEASE ORAL 2 TIMES DAILY
Status: DISCONTINUED | OUTPATIENT
Start: 2018-06-22 | End: 2018-06-23

## 2018-06-22 RX ORDER — PROMETHAZINE HYDROCHLORIDE 12.5 MG/1
12.5 TABLET ORAL EVERY 6 HOURS PRN
Status: DISCONTINUED | OUTPATIENT
Start: 2018-06-22 | End: 2018-06-23

## 2018-06-22 RX ORDER — HYDROMORPHONE HYDROCHLORIDE 1 MG/ML
0.5 INJECTION, SOLUTION INTRAMUSCULAR; INTRAVENOUS; SUBCUTANEOUS
Status: DISCONTINUED | OUTPATIENT
Start: 2018-06-22 | End: 2018-06-22

## 2018-06-22 RX ORDER — PANTOPRAZOLE SODIUM 40 MG/1
40 TABLET, DELAYED RELEASE ORAL DAILY
Status: DISCONTINUED | OUTPATIENT
Start: 2018-06-22 | End: 2018-06-25 | Stop reason: HOSPADM

## 2018-06-22 RX ORDER — GLUCAGON 1 MG
1 KIT INJECTION
Status: DISCONTINUED | OUTPATIENT
Start: 2018-06-22 | End: 2018-06-25 | Stop reason: HOSPADM

## 2018-06-22 RX ORDER — BACLOFEN 10 MG/1
10 TABLET ORAL 2 TIMES DAILY PRN
Status: DISCONTINUED | OUTPATIENT
Start: 2018-06-22 | End: 2018-06-25 | Stop reason: HOSPADM

## 2018-06-22 RX ORDER — IBUPROFEN 200 MG
24 TABLET ORAL
Status: DISCONTINUED | OUTPATIENT
Start: 2018-06-22 | End: 2018-06-25 | Stop reason: HOSPADM

## 2018-06-22 RX ORDER — PROMETHAZINE HYDROCHLORIDE 25 MG/ML
25 INJECTION, SOLUTION INTRAMUSCULAR; INTRAVENOUS EVERY 6 HOURS PRN
Status: DISCONTINUED | OUTPATIENT
Start: 2018-06-22 | End: 2018-06-22

## 2018-06-22 RX ORDER — SODIUM CHLORIDE, SODIUM LACTATE, POTASSIUM CHLORIDE, CALCIUM CHLORIDE 600; 310; 30; 20 MG/100ML; MG/100ML; MG/100ML; MG/100ML
INJECTION, SOLUTION INTRAVENOUS CONTINUOUS
Status: ACTIVE | OUTPATIENT
Start: 2018-06-22 | End: 2018-06-23

## 2018-06-22 RX ORDER — MORPHINE SULFATE 15 MG/1
30 TABLET ORAL EVERY 6 HOURS PRN
Status: DISCONTINUED | OUTPATIENT
Start: 2018-06-22 | End: 2018-06-25 | Stop reason: HOSPADM

## 2018-06-22 RX ADMIN — ACETAMINOPHEN 650 MG: 325 TABLET ORAL at 04:06

## 2018-06-22 RX ADMIN — MORPHINE SULFATE 45 MG: 15 TABLET, EXTENDED RELEASE ORAL at 08:06

## 2018-06-22 RX ADMIN — POLYETHYLENE GLYCOL 3350 17 G: 17 POWDER, FOR SOLUTION ORAL at 08:06

## 2018-06-22 RX ADMIN — ONDANSETRON 4 MG: 4 TABLET, FILM COATED ORAL at 05:06

## 2018-06-22 RX ADMIN — ALPRAZOLAM 0.75 MG: 0.25 TABLET ORAL at 03:06

## 2018-06-22 RX ADMIN — Medication 0.5 MG: at 03:06

## 2018-06-22 RX ADMIN — Medication 0.5 MG: at 06:06

## 2018-06-22 RX ADMIN — VANCOMYCIN HYDROCHLORIDE 2000 MG: 10 INJECTION, POWDER, LYOPHILIZED, FOR SOLUTION INTRAVENOUS at 01:06

## 2018-06-22 RX ADMIN — SODIUM CHLORIDE 1000 ML: 0.9 INJECTION, SOLUTION INTRAVENOUS at 01:06

## 2018-06-22 RX ADMIN — MORPHINE SULFATE 30 MG: 15 TABLET ORAL at 08:06

## 2018-06-22 RX ADMIN — CEFEPIME 2 G: 2 INJECTION, POWDER, FOR SOLUTION INTRAVENOUS at 04:06

## 2018-06-22 RX ADMIN — PANTOPRAZOLE SODIUM 40 MG: 40 TABLET, DELAYED RELEASE ORAL at 09:06

## 2018-06-22 RX ADMIN — DIPHENHYDRAMINE HYDROCHLORIDE 25 MG: 25 CAPSULE ORAL at 08:06

## 2018-06-22 RX ADMIN — CEFTRIAXONE SODIUM 2 G: 2 INJECTION, POWDER, FOR SOLUTION INTRAMUSCULAR; INTRAVENOUS at 12:06

## 2018-06-22 RX ADMIN — CEFEPIME 2 G: 2 INJECTION, POWDER, FOR SOLUTION INTRAVENOUS at 06:06

## 2018-06-22 RX ADMIN — CEFEPIME 2 G: 2 INJECTION, POWDER, FOR SOLUTION INTRAVENOUS at 10:06

## 2018-06-22 RX ADMIN — MORPHINE SULFATE 45 MG: 15 TABLET, EXTENDED RELEASE ORAL at 09:06

## 2018-06-22 RX ADMIN — SODIUM CHLORIDE, SODIUM LACTATE, POTASSIUM CHLORIDE, AND CALCIUM CHLORIDE: .6; .31; .03; .02 INJECTION, SOLUTION INTRAVENOUS at 07:06

## 2018-06-22 RX ADMIN — MORPHINE SULFATE 30 MG: 15 TABLET ORAL at 04:06

## 2018-06-22 RX ADMIN — ALPRAZOLAM 0.75 MG: 0.25 TABLET ORAL at 08:06

## 2018-06-22 RX ADMIN — ONDANSETRON 4 MG: 4 TABLET, FILM COATED ORAL at 03:06

## 2018-06-22 RX ADMIN — Medication 0.5 MG: at 01:06

## 2018-06-22 RX ADMIN — PROMETHAZINE HYDROCHLORIDE 25 MG: 25 INJECTION INTRAMUSCULAR; INTRAVENOUS at 08:06

## 2018-06-22 RX ADMIN — FENTANYL CITRATE 50 MCG: 50 INJECTION INTRAMUSCULAR; INTRAVENOUS at 02:06

## 2018-06-22 NOTE — ASSESSMENT & PLAN NOTE
-- no localizing Sx  -- ANC ~40   -- blood cx sent   -- port with no signs of infection   -- abdominal pain for the last 2 weeks, not suggestive of acute abdomen  -- will start cefepime 2 g Q8 for now.   -- VS stable after fluids

## 2018-06-22 NOTE — PROGRESS NOTES
" Admit Assessment    Patient Identification  Alejandro Neff   :  1995  Admit Date:  2018  Attending Provider:  Jose Angel Noland MD              Referral:   Pt has a diagnosis of mediastinal seminoma and was admitted this hospital stay due to Febrile neutropenia [D70.9, R50.81]  Chemotherapy-induced neutropenia [D70.1, T45.1X5A]  Abdominal pain [R10.9]  Fever in other diseases [R50.81]. Oncology social worker is involved for psychosocial services.  Patient presents as a 23 y.o. year old male.    Persons interviewed: Patient     Living Situation:      Resides with friends at [address] [place] [address], phone: [phone] (home).           Current or Past Agencies and Description of Services/Supplies    DME  None       Home Health  None    IV Infusion  None    Nutrition: Oral    Outpatient Pharmacy:     [facility]  [address]  [place] [address]  Phone: [phone] Fax: [phone]    [facility]  [address] [place] [id number]  Phone: [phone] Fax: [phone]      Patient Preference of agencies include: As stated above    Patient/Caregiver informed of right to choose providers or agencies.  Patient provides permission to release any necessary information to [facility] and to Non[facility] agencies as needed to facilitate patient care, treatment planning, and patient discharge planning.  Written and verbal resources provided.            Adjustment to Diagnosis and Treatment   Appropriately - the patient stated that he was living with his "caregivers" (friends). His family lives out of state. He was in college here at time of his diagnosis, but he took a semester off to complete his treatments. The patient was studying marketing and [employer]             History/Current Symptoms of Anxiety/Depression: No  History/Current Substance Use:   Social History     Social History Main Topics    Smoking status: Former Smoker    "  Packs/day: 0.25     Types: Vaping with nicotine, Vaping w/o nicotine, Cigarettes, Cigars     Quit date: 3/15/2018    Smokeless tobacco: Former User    Alcohol use Yes      Comment: occ    Drug use: Yes     Types: Marijuana, LSD, Cocaine, Other-see comments      Comment: Kratom (capsule or leaf form)    Sexual activity: Yes     Partners: Female     Birth control/ protection: Condom       Indications of Abuse/Neglect: No    Financial:  Payor/Plan Subscr  Sex Relation Sub. Ins. ID Effective Group Num   1. BLUE CROSS BL* TASHADAVIDJOSEALBERTO* [birth year]1965 Male  [id number]                                   [address] [address]                            Other identified concerns/needs: None    Plan:    Interventions/Referrals: Offered services to the patient and provided him with contact information. Will follow as needed for supportive counseling and discharge arrangements.    Patient/caregiver engaged in treatment planning process.     providing psychosocial and supportive counseling, resources, education, assistance and discharge planning as appropriate.  Patient/caregiver state understanding of  available resources,  following, remains available.

## 2018-06-22 NOTE — TELEPHONE ENCOUNTER
Patient called to repot the following:     -on chemotherapy   -temperature spiking to 101.7  -then 10 min later temp is 99.7   -started this afternoon   -feels weaker than normal   -body aches  -stiffness in chest started when fever started  -hives or acne?  -bumps draining, painful on tongue     Education completed per [facility] On Call Care Advice including and will report to [facility] ER . Patient verbalized understanding.    Reason for Disposition   Fever in a cancer patient who is currently is receiving chemotherapy or radiation therapy, or cancer patient who has metastatic or end-stage cancer and is receiving palliative care   [1] Fever >  100.5 F (38.1 C) AND weak immune system (e.g., diabetes, splenectomy, leukemia, HIV infection, chronic steroid use)    Protocols used:  FEVER-A-AH,  CANCER - FEVER-A-AH

## 2018-06-22 NOTE — HOSPITAL COURSE
06/22/2018: Admitted to Medical Oncology.  06/23/2018: Fever to 101.7. Still no source identified. Cultures NGTD. Continues to report abdominal pain and using a lot of opioids, but no objective signs of pain and exam benign.  06/24/2018: Ongoing low grade fevers yesterday, no growth on cultures. ANC improved to 600 today.  06/25/2018: ANC > 1k today. No fevers since 6/23/18.  Discharged. No need for antibiotics as fever was likely chemotherapy related.  Downtitrated his long acting morphine as it was making him too foggy.

## 2018-06-22 NOTE — ASSESSMENT & PLAN NOTE
-- for the last 2 weeks   -- he did not use bowel regimen on regular base even though he stated he had normal daily BM, will check KUB   -- had CT abdomen and US last week and was non reveling   -- with skin rash and possible vasculitis with normal imaging will check HIV, hepatitis panel ( hep B?).   -- urin tox  -- PBG Deaminase to rule out acute porphyria

## 2018-06-22 NOTE — ED PROVIDER NOTES
Encounter Date: 6/21/2018  22 yo M with germ cell brain testicular cancer now presents with fever and abdominal pain after chemo 6 days ago. Pt received cisplatin and etoposide.     Pt reports rash on his back that started today. Pt has abdominal pain at baseline but does state that the abdominal pain is worse now.  Pain is in the bilateral lower quadrants.     At 9 pm Pt took 30 mg of continuous release morphine and 15 mg of immediate relase morphine without improvement.     Pain radiates to his upper back. Pt has had baseline nausea and vomiting.  No diarrhea.  Last stool this am.      History     Chief Complaint   Patient presents with    Fever Post Chemo     fever x 24 hours post chemo since friday . pt is awake alert and oriented x 4. pt also complains of nausea and abdominal pain lower quadrant 9/10 radiating to his back.     HPI  Review of patient's allergies indicates:   Allergen Reactions    Mushroom Anaphylaxis    Bamboo     Bee pollens     Mushroom flavor     Venom-honey bee      Past Medical History:   Diagnosis Date    Abdominal pain 08/12/2010    eval for abd and chest wall pain at [facility], [place], [place] - cxr wnl, EKG ([facility]), troponin wnl, normal chemistries    Allergy     Cancer     testicular    Chondromalacia patellae     Chronic nausea 2015    eval by GI Dr. Tushar Artis - given zofran and ciproheptadine     Chronic pain     Chronic pain     [facility] - no meds; followed by psych and pain clinic and unresponsive to behavioral/CBT/old records reports c/f conversion disorder     Foot pain 04/2010    4/10 + SHIRA, eval by Dr. ALLY Lion at [facility] -dx unclear ? gout and trial of nsaids and pdn, xrays normal 1/11, referred to podiatry, re-eval by rheum 2/12 and MRI and films wnl    Fracture of right radius 09/2009    Lyme arthritis     multiple joints    Lyme disease 2013    Memory loss 03/2012    eval for  "memory loss by neuro at [facility] - MRI, EEG wnl. Dr. Patricio suggested emotional factors & ref to Tushar Davies, PhD for MH eval & neuropsych eval 3/12 Lupe Delgado, PhD - no evidence of formal thought disorder or psychosis - documented severe memory impairment; not related to to ADD or executive function issues. sugesstion that memory issues may be related to "emotional factors", ?conversion d/o    Stress fracture of tibia and fibula 08/2011    Urticaria      Past Surgical History:   Procedure Laterality Date    CHEST WALL BIOPSY      INJECTION OF ANESTHETIC AGENT AROUND NERVE N/A 5/29/2018    Procedure: BLOCK, NERVE;  Surgeon: Raiza Surgeon;  Location: [facility];  Service: Anesthesiology;  Laterality: N/A;    LAPAROSCOPIC APPENDECTOMY N/A 5/24/2018    Procedure: APPENDECTOMY, LAPAROSCOPIC;  Surgeon: Kenan Huang Jr., MD;  Location: [facility] OR [address] [place] [address];  Service: General;  Laterality: N/A;    PORTACATH PLACEMENT Right      Family History   Problem Relation Age of Onset    Arthritis Mother     Other Mother         benign tumor of brain and spinal cord    Hyperlipidemia Father     Gout Father     No Known Problems Sister     Arthritis Sister         shoulder    Depression Sister      Social History   Substance Use Topics    Smoking status: Former Smoker     Packs/day: 0.25     Types: Vaping with nicotine, Vaping w/o nicotine, Cigarettes, Cigars     Quit date: 3/15/2018    Smokeless tobacco: Former User    Alcohol use Yes      Comment: occ     Review of Systems   Constitutional: Positive for appetite change and fever.   HENT: Negative for congestion and sore throat.    Eyes: Negative for discharge.   Respiratory: Negative for shortness of breath.    Cardiovascular: Negative for chest pain.   Gastrointestinal: Positive for abdominal pain and vomiting. Negative for abdominal distention, constipation and nausea.   Genitourinary: Positive for dysuria.   Musculoskeletal: Negative for back pain.   Skin: Negative for " rash.   Neurological: Negative for weakness.   Hematological: Does not bruise/bleed easily.       Physical Exam     Initial Vitals [06/21/18 2223]   BP Pulse Resp Temp SpO2   136/81 (!) 120 17 100.1 °F (37.8 °C) 98 %      MAP       --         Physical Exam    Nursing note and vitals reviewed.  Constitutional: He appears well-developed. He is not diaphoretic. No distress.   HENT:   Head: Normocephalic.   Mouth/Throat: Oropharynx is clear and moist.   Eyes: Conjunctivae and EOM are normal. Pupils are equal, round, and reactive to light. Right eye exhibits no discharge. Left eye exhibits no discharge. No scleral icterus.   Neck: No tracheal deviation present. No JVD present.   Cardiovascular: Normal rate and regular rhythm.   No murmur heard.  Pulmonary/Chest: Breath sounds normal. No respiratory distress. He has no wheezes. He has no rhonchi. He has no rales.   Abdominal: Soft. He exhibits no distension and no mass. There is tenderness. There is no rebound and no guarding.   Diffusely TTP   Musculoskeletal: Normal range of motion.   Lymphadenopathy:     He has no cervical adenopathy.   Neurological: He is alert and oriented to person, place, and time.   Skin: Skin is warm. Capillary refill takes less than 2 seconds.   Psychiatric: He has a normal mood and affect.         ED Course   Procedures  Labs Reviewed   COMPREHENSIVE METABOLIC PANEL - Abnormal; Notable for the following:        Result Value    Sodium 134 (*)     Glucose 132 (*)     AST 8 (*)     Anion Gap 6 (*)     All other components within normal limits   CBC W/ AUTO DIFFERENTIAL - Abnormal; Notable for the following:     WBC 0.94 (*)     RBC 3.23 (*)     Hemoglobin 9.2 (*)     Hematocrit 27.3 (*)     Platelets 38 (*)     Gran% 3.0 (*)     Lymph% 95.0 (*)     Mono% 2.0 (*)     Platelet Estimate Decreased (*)     All other components within normal limits    Narrative:     PLT   critical result(s) called and verbal readback obtained from   Jeny Gordon RN,  06/22/2018 01:17  WBC, PRELIM PLT critical results called and verbal readback obtained   from Jeny Gordon RN, 06/22/2018 01:03   CULTURE, BLOOD   CULTURE, URINE   LACTIC ACID, PLASMA   MAGNESIUM   PHOSPHORUS   PROTIME-INR   APTT   URINALYSIS   LIPASE   TOXICOLOGY SCREEN, URINE, RANDOM (COMPLIANCE)   HEMOGLOBIN A1C   COMPREHENSIVE METABOLIC PANEL   MAGNESIUM   PHOSPHORUS   CBC W/ AUTO DIFFERENTIAL   PBG DEAMINASE, ERYTHROCYTES   HIV 1 / 2 ANTIBODY        Given fentanyl for pain.  Oncology consulted.   BCX and UCX sent.  UA negative. WBC 0.9  ANC 27. CXR without PNA.  On Re-eval pt with abd pain.  ABd soft.  Pt well appearing. ABd soft. Pancytopenic. Pt was given vanc and ceftriaxone. NS bolus x 1. HR improved to 105.       Imaging Results          X - Ray Chest AP Portable (Final result)  Result time 06/21/18 23:40:00    Final result by Lonnie Sesay MD (06/21/18 23:40:00)                 Impression:      No acute findings.    Lordotic positioning.  Right pulmonary apex not fully included.    Right IJ catheter tip overlies the SVC/RA junction.    No significant change from prior study.      Electronically signed by: Lonnie Sesay MD  Date:    06/21/2018  Time:    23:40             Narrative:    EXAMINATION:  XR CHEST AP PORTABLE    CLINICAL HISTORY:  Sepsis;    TECHNIQUE:  Single frontal view of the chest was performed.    COMPARISON:  May 13, 2018    FINDINGS:  Right IJ catheter tip overlies the SVC/RA junction.    Lordotic positioning.  Right pulmonary apex not fully included.    Heart and lungs  appear unchanged when allowing for differences in technique and positioning.                                 Medical Decision Making:   Initial Assessment:   22 yo with fever and neutropenia 6 days after chemo.   Plan for admission to onc.                       Clinical Impression:   The primary encounter diagnosis was Fever in other diseases. Diagnoses of Abdominal pain, Chemotherapy-induced neutropenia, and  Febrile neutropenia were also pertinent to this visit.                             Yeimi Kwong MD  06/22/18 [id number]

## 2018-06-22 NOTE — ED TRIAGE NOTES
Pt in ED 4 days ago for nausea and abdominal pain. Belly pain and nausea have decreased at home this week, but abdominal pain has increased today to 8/10. Nausea has returned as well. Pt also reports flank pain.   Denies blood in urine or stool. Pt reports painful urination.     Tmax of 101.2 at home today and has been running low grade temps of 99 all day. Reports body aches and chills.      APPEARANCE: Resting comfortably in no acute distress. Patient has clean hair, skin and nails. Clothing is appropriate and properly fastened.  NEURO: Awake, alert, appropriate for age, and cooperative with a calm affect; pupils equal and round.  HEENT: Head symmetrical. Bilateral eyes without redness or drainage. Bilateral ears without drainage. Bilateral nares patent without drainage. Alopecia noted.   CARDIAC:  S1 S2 auscultated.  No murmur, rub, or gallop auscultated.  RESPIRATORY:  Respirations even and unlabored with normal effort and rate.  Lungs clear throughout auscultation.  No accessory muscle use or retractions noted.  GI/: Abdomen soft and non-distended. Adequate bowel sounds auscultated with tenderness to lower quadrants.  Pt reports flank pain. Reports pain before urination. LBM yesterday.   NEUROVASCULAR: All extremities are warm and pink with palpable pulses and capillary refill less than 3 seconds.  MUSCULOSKELETAL: Moves all extremities well; no obvious deformities noted.  SKIN: Warm and dry, adequate turgor, mucus membranes moist and pink; No rashes noted. Rt PAC noted.   SOCIAL: Patient is accompanied by friend.

## 2018-06-22 NOTE — HPI
"23 years old male with Hx of mediastinal seminoma s/p 3 cycles of OP TESTICULAR EP protocol. presented to the ER today with one day of documented elevated fever at home ~100, started in the AM with progressive weakness. He reported abdominal pain, sharp, in the below the belly button for the last 2 weeks, constant all day long, sharp, relived with narcotics and not moving. Denied pain wake him from sleep. Also reported no diarrhea, blood per rectum, painful defecation or pain related to food. He had appendectomy 5/2018.   Patient denied any congestion, mouth sores or odynophagia. Also denied cough, SOB, burning urine. He reported constant non change chest pain before the Dx of the Ca.   In the ER he was tachycardiac, LA normal, given fluids and started on vanc and ceftriaxone. CBC comes back with ANC ~40, acute drop from 2 days ago.          Oncologic History:  1. Mr. Hampton is a 24 yo man who first presented with chest pain and underwent CT chest on 2/14/18, which showed a 6 x 3.5 cm mediastinal mass. It abuts the aorta and pulmonary artery. Biopsy was done on 3/1/18. Pathology (reviewed at [facility]) showed germ cell tumor, most consistent with seminoma.   2. HCG, LDH, AFP on 3/22/18 all normal.   3. Testicular US 3/26/18 showed no testicular masses. CT C/A/P on 3/26/18 showed "stable appearance of anterior mediastinal mass consistent with provided history of seminoma. Several punctate sclerotic foci are noted in the pelvis at the right pubic bone, right ischial tuberosity, and left ilium adjacent to the sacroiliac joint.  These are strongly favored to reflect benign bone islands although metastatic disease could have a similar appearance and close follow-up is recommended. Several benign Schmorl's nodes are noted in the thoracic spine." Bone scan on 3/28/18 was negative for bone mets.   4. Audiogram on 4/6/18 normal. Followed by ENT.   5. Given his smoking history, I recommended 4 cycles of EP. EP cycle 1 day 1 " "on 4/16/18. Complicated by hospitalization for neutropenic fever 4/26-4/29. No source of infections identified. Treated with antibiotics empirically and improved.   6. Cycle 2 of EP started on on 5/7/18. Hospitalized during the first weekend for intractable nausea.   7. Restaging CT scan on 5/24/18 showed "Interval development of dilated appearance of the appendix with stranding of the periappendiceal fat.  Findings are worrisome for appendicitis". Patient did have abdominal pain and tenderness. Admitted and got appendectomy on 5/24/18.  8. Cycle 3 of EP 6/11/18-6/15/18  "

## 2018-06-22 NOTE — ASSESSMENT & PLAN NOTE
- Tmax at home 101.7, has been afebrile here  - No localizing symptoms and benign exam  - UA and CXR without signs of infection  - ANC ~40 on admit, received Neulasta on 6/15/2018  - BC NGTD  - Received sepsis bolus in ED, tachy on admit prior to bolus, but since bolus VSS  - Continue cefepime 2g q8

## 2018-06-22 NOTE — H&P
"[facility]  Hematology/Oncology  H&P    Patient Name: Alejandro Neff  MRN: [id number]  Admission Date: 6/21/2018  Code Status: Full Code   Attending Provider: Yeimi Kwong MD  Primary Care Physician: Nata Hernandez MD  Principal Problem:Neutropenia with fever    Subjective:     HPI: 23 years old male with Hx of mediastinal seminoma s/p 3 cycles of OP TESTICULAR EP protocol. presented to the ER today with one day of documented elevated fever at home ~100, started in the AM with progressive weakness. He reported abdominal pain, sharp, in the below the belly button for the last 2 weeks, constant all day long, sharp, relived with narcotics and not moving. Denied pain wake him from sleep. Also reported no diarrhea, blood per rectum, painful defecation or pain related to food. He had appendectomy 5/2018.   Patient denied any congestion, mouth sores or odynophagia. Also denied cough, SOB, burning urine. He reported constant non change chest pain before the Dx of the Ca.   In the ER he was tachycardiac, LA normal, given fluids and started on vanc and ceftriaxone. CBC comes back with ANC ~40, acute drop from 2 days ago.          Oncologic History:  1. Mr. Hampton is a 22 yo man who first presented with chest pain and underwent CT chest on 2/14/18, which showed a 6 x 3.5 cm mediastinal mass. It abuts the aorta and pulmonary artery. Biopsy was done on 3/1/18. Pathology (reviewed at [facility]) showed germ cell tumor, most consistent with seminoma.   2. HCG, LDH, AFP on 3/22/18 all normal.   3. Testicular US 3/26/18 showed no testicular masses. CT C/A/P on 3/26/18 showed "stable appearance of anterior mediastinal mass consistent with provided history of seminoma. Several punctate sclerotic foci are noted in the pelvis at the right pubic bone, right ischial tuberosity, and left ilium adjacent to the sacroiliac joint.  These are strongly favored to reflect benign bone islands although metastatic " "disease could have a similar appearance and close follow-up is recommended. Several benign Schmorl's nodes are noted in the thoracic spine." Bone scan on 3/28/18 was negative for bone mets.   4. Audiogram on 4/6/18 normal. Followed by ENT.   5. Given his smoking history, I recommended 4 cycles of EP. EP cycle 1 day 1 on 4/16/18. Complicated by hospitalization for neutropenic fever 4/26-4/29. No source of infections identified. Treated with antibiotics empirically and improved.   6. Cycle 2 of EP started on on 5/7/18. Hospitalized during the first weekend for intractable nausea.   7. Restaging CT scan on 5/24/18 showed "Interval development of dilated appearance of the appendix with stranding of the periappendiceal fat.  Findings are worrisome for appendicitis". Patient did have abdominal pain and tenderness. Admitted and got appendectomy on 5/24/18.  8. Cycle 3 of EP 6/11/18-6/15/18        Oncology Treatment Plan:   OP TESTICULAR EP    Medications:  Continuous Infusions:  Scheduled Meds:   ceFEPime (MAXIPIME) IVPB  2 g Intravenous Q8H    morphine  45 mg Oral BID    pantoprazole  40 mg Oral Daily    polyethylene glycol  17 g Oral BID    sodium chloride 0.9%  30 mL/kg Intravenous ED 1 Time    vancomycin (VANCOCIN) IVPB  2,000 mg Intravenous ED 1 Time     PRN Meds:ALPRAZolam, baclofen, dextrose 50%, dextrose 50%, glucagon (human recombinant), glucose, glucose, HYDROmorphone, magic mouthwash (diphenhydrAMINE 12.5 mg/5 mL 20 mL, aluminum & magnesium hydroxide-simethicone (MYLANTA) 20 mL, lidocaine HCl 2% (XYLOCAINE) 20 mL) solution, morphine, ondansetron, sodium chloride 0.9%     Review of patient's allergies indicates:   Allergen Reactions    Mushroom Anaphylaxis    Bamboo     Bee pollens     Mushroom flavor     Venom-honey bee         Past Medical History:   Diagnosis Date    Abdominal pain 08/12/2010    eval for abd and chest wall pain at [place], [place] - cxr wnl, EKG ([facility]), " "troponin wnl, normal chemistries    Allergy     Cancer     testicular    Chondromalacia patellae     Chronic nausea 2015    eval by GI Dr. Tushar Artis - given zofran and ciproheptadine     Chronic pain     Chronic pain     [facility] - [facility] meds; followed by psych and pain clinic and unresponsive to behavioral/CBT/old records reports c/f conversion disorder     Foot pain 04/2010    4/10 + SHIRA, eval by Dr. ALLY Lion at [facility] -dx unclear ? gout and trial of nsaids and pdn, xrays normal 1/11, referred to podiatry, re-eval by rheum 2/12 and MRI and films wnl    Fracture of right radius 09/2009    Lyme arthritis     multiple joints    Lyme disease 2013    Memory loss 03/2012    eval for memory loss by neuro [place] [facility] - [place], EEG wnl. Dr. Patricio suggested emotional factors & ref to Tushar Davies, PhD for  eval & neuropsych eval 3/12 Lupe Delgado, PhD - no evidence of formal thought disorder or psychosis - documented severe memory impairment; not related to to ADD or executive function issues. sugesstion that memory issues may be related to "emotional factors", ?conversion d/o    Stress fracture of tibia and fibula 08/2011    Urticaria      Past Surgical History:   Procedure Laterality Date    CHEST WALL BIOPSY      INJECTION OF ANESTHETIC AGENT AROUND NERVE N/A 5/29/2018    Procedure: BLOCK, NERVE;  Surgeon: Raiza Surgeon;  Location: [facility];  Service: Anesthesiology;  Laterality: N/A;    LAPAROSCOPIC APPENDECTOMY N/A 5/24/2018    Procedure: APPENDECTOMY, LAPAROSCOPIC;  Surgeon: Kenan Huang Jr., MD;  Location: [address];  Service: General;  Laterality: N/A;    PORTACATH PLACEMENT Right      Family History     Problem Relation (Age of Onset)    Arthritis Mother, Sister    Depression Sister    Gout Father    Hyperlipidemia Father    No Known Problems Sister    Other Mother        Social History Main Topics    Smoking status: Former " Smoker     Packs/day: 0.25     Types: Vaping with nicotine, Vaping w/o nicotine, Cigarettes, Cigars     Quit date: 3/15/2018    Smokeless tobacco: Former User    Alcohol use Yes      Comment: occ    Drug use: Yes     Types: Marijuana, LSD, Cocaine, Other-see comments      Comment: Kratom (capsule or leaf form)    Sexual activity: Yes     Partners: Female     Birth control/ protection: Condom       Review of Systems   Constitutional: Positive for fatigue and fever. Negative for chills.   HENT: Negative for mouth sores.    Eyes: Negative for photophobia and visual disturbance.   Respiratory: Negative for cough and chest tightness.    Cardiovascular: Positive for chest pain.   Gastrointestinal: Positive for abdominal pain. Negative for abdominal distention and constipation.   Genitourinary: Negative for dysuria and flank pain.   Musculoskeletal: Positive for back pain.   Skin: Positive for rash.   Neurological: Negative for dizziness and seizures.   Hematological: Negative for adenopathy.     Objective:     Vital Signs (Most Recent):  Temp: 100.1 °F (37.8 °C) (06/21/18 2223)  Pulse: 106 (06/22/18 0045)  Resp: (!) 22 (06/21/18 2329)  BP: 136/81 (06/21/18 2223)  SpO2: 99 % (06/22/18 0045) Vital Signs (24h Range):  Temp:  [100.1 °F (37.8 °C)] 100.1 °F (37.8 °C)  Pulse:  [106-120] 106  Resp:  [17-22] 22  SpO2:  [98 %-100 %] 99 %  BP: (136)/(81) 136/81     Weight: 66.7 kg (147 lb)  Body mass index is 23.02 kg/m².  Body surface area is 1.78 meters squared.    No intake or output data in the 24 hours ending 06/22/18 0139    Physical Exam   Constitutional: He is oriented to person, place, and time. No distress.   HENT:   Mouth/Throat: No oropharyngeal exudate.       No mucous ulcers.   Multiple small painless papules, non vesicular, in the dorsal 2/3 of the tongue.      Eyes: No scleral icterus.   Neck: Normal range of motion. No JVD present.   Cardiovascular: Tachycardia present.    Pulmonary/Chest: Effort normal and  breath sounds normal. No stridor. No respiratory distress.   Abdominal: Soft. There is tenderness (all over). There is no guarding.   Musculoskeletal: Normal range of motion. He exhibits no edema or deformity.   Neurological: He is alert and oriented to person, place, and time. No cranial nerve deficit.   Skin: Rash noted.   Multiple raised painless papules with red base, non itchy all over the back, noticed 2 days ago                       Significant Labs:   BMP:   Recent Labs  Lab 06/22/18  0010   *   *   K 3.8   CL 99   CO2 29   BUN 14   CREATININE 0.8   CALCIUM 9.0   MG 2.1   , CBC:   Recent Labs  Lab 06/22/18  0010   WBC 0.94*   HGB 9.2*   HCT 27.3*   PLT 38*   , CMP:   Recent Labs  Lab 06/22/18  0010   *   K 3.8   CL 99   CO2 29   *   BUN 14   CREATININE 0.8   CALCIUM 9.0   PROT 6.1   ALBUMIN 3.6   BILITOT 0.7   ALKPHOS 61   AST 8*   ALT 15   ANIONGAP 6*   EGFRNONAA >60.0    and Coagulation:   Recent Labs  Lab 06/22/18  0010   INR 1.0   APTT 25.0       Diagnostic Results:  CXR with no acute changes.     Assessment/Plan:     * Neutropenia with fever    -- no localizing Sx  -- ANC ~40   -- blood cx sent   -- port with no signs of infection   -- abdominal pain for the last 2 weeks, not suggestive of acute abdomen  -- will start cefepime 2 g Q8 for now.   -- VS stable after fluids           Generalized abdominal pain    -- for the last 2 weeks   -- he did not use bowel regimen on regular base even though he stated he had normal daily BM, will check KUB   -- had CT abdomen and US last week and was non reveling   -- with skin rash and possible vasculitis with normal imaging will check HIV, hepatitis panel ( hep B?).   -- urin tox  -- PBG Deaminase to rule out acute porphyria              Anemia associated with chemotherapy    transfuse when <7           Primary mediastinal seminoma      -- Testicular US 3/26/18 showed no testicular masses, HCG, LDH, AFP on 3/22/18 all normal.   -- Bone  scan on 3/28/18 was negative for bone mets.   -- s/p 3 cycles of OP TESTICULAR EP protocol              IRENE Wiley  Hematology/Oncology  [facility]      ATTENDING NOTE, ONCOLOGY INPATIENT TEAM    As above.  Patient seen and examined, chart reviewed.  Appears comfortable, in NAD, but complains of abdominal pain.  Lungs are clear to auscultation.  Abdomen is soft, nontender.  Labs reviewed.  WBCs 990 /mm3.    PLAN  Follow CBC daily.  Follow blood cultures.  Continue cefepime.  Continue MS contin, switch to oral morphine 30 mg Q 6 hours prn.  Patient is ambulatory, no need for DVT prophylaxis.  We will follow.  His multiple questions were answered to his satisfaction.      Jose Angel Noland MD

## 2018-06-22 NOTE — ASSESSMENT & PLAN NOTE
- Chronic   - CT abdomen and US non reveling   - Continue home pain regimen (MS Contin 45 + morphine IR 30mg q6 PRN)  - Started on bowel regimen

## 2018-06-22 NOTE — NURSING
Pt arrived to his room ([id number]) via wheelchair from the ED. Pt's right chest port was accessed in the ED with the remainder of 2 gm Vancomycin infusing per pump. Pt had a dose of Cefepime 2 gm IV due, but not given, at 0215 with next dose due at 0700. Pt was ambulatory from the wheelchair to the bed without difficulty. Pt complains of sharp lower abdominal pain. Pt was oriented to new surroundings and routines.

## 2018-06-22 NOTE — SUBJECTIVE & OBJECTIVE
Oncology Treatment Plan:   OP TESTICULAR EP    Medications:  Continuous Infusions:  Scheduled Meds:   ceFEPime (MAXIPIME) IVPB  2 g Intravenous Q8H    morphine  45 mg Oral BID    pantoprazole  40 mg Oral Daily    polyethylene glycol  17 g Oral BID    sodium chloride 0.9%  30 mL/kg Intravenous ED 1 Time    vancomycin (VANCOCIN) IVPB  2,000 mg Intravenous ED 1 Time     PRN Meds:ALPRAZolam, baclofen, dextrose 50%, dextrose 50%, glucagon (human recombinant), glucose, glucose, HYDROmorphone, magic mouthwash (diphenhydrAMINE 12.5 mg/5 mL 20 mL, aluminum & magnesium hydroxide-simethicone (MYLANTA) 20 mL, lidocaine HCl 2% (XYLOCAINE) 20 mL) solution, morphine, ondansetron, sodium chloride 0.9%     Review of patient's allergies indicates:   Allergen Reactions    Mushroom Anaphylaxis    Bamboo     Bee pollens     Mushroom flavor     Venom-honey bee         Past Medical History:   Diagnosis Date    Abdominal pain 08/12/2010    eval for abd and chest wall pain at [place], [place] - cxr wnl, EKG ([facility]), troponin wnl, normal chemistries    Allergy     Cancer     testicular    Chondromalacia patellae     Chronic nausea 2015    eval by GI Dr. Tushar Artis - given zofran and ciproheptadine     Chronic pain     Chronic pain     [facility]; followed by psych and pain clinic and unresponsive to behavioral/CBT/old records reports c/f conversion disorder     Foot pain 04/2010    4/10 + SHIRA, eval by Dr. ALLY Lion at [facility] Rheum -dx unclear ? gout and trial of nsaids and pdn, xrays normal 1/11, referred to podiatry, re-eval by rheum 2/12 and MRI and films wnl    Fracture of right radius 09/2009    Lyme arthritis     multiple joints    Lyme disease 2013    Memory loss 03/2012    eval for memory loss by neuro at [facility] [place] [place] - MRI, EEG wnl. Dr. Patricio suggested emotional factors & ref to Tushar Davies, PhD for  eval & neuropsych eval 3/12 Lupe  "Danny, PhD - no evidence of formal thought disorder or psychosis - documented severe memory impairment; not related to to ADD or executive function issues. sugesstion that memory issues may be related to "emotional factors", ?conversion d/o    Stress fracture of tibia and fibula 08/2011    Urticaria      Past Surgical History:   Procedure Laterality Date    CHEST WALL BIOPSY      INJECTION OF ANESTHETIC AGENT AROUND NERVE N/A 5/29/2018    Procedure: BLOCK, NERVE;  Surgeon: Raiza Surgeon;  Location: [facility];  Service: Anesthesiology;  Laterality: N/A;    LAPAROSCOPIC APPENDECTOMY N/A 5/24/2018    Procedure: APPENDECTOMY, LAPAROSCOPIC;  Surgeon: Kenan Huang Jr., MD;  Location: [facility] OR [address] [place] [address];  Service: General;  Laterality: N/A;    PORTACATH PLACEMENT Right      Family History     Problem Relation (Age of Onset)    Arthritis Mother, Sister    Depression Sister    Gout Father    Hyperlipidemia Father    No Known Problems Sister    Other Mother        Social History Main Topics    Smoking status: Former Smoker     Packs/day: 0.25     Types: Vaping with nicotine, Vaping w/o nicotine, Cigarettes, Cigars     Quit date: 3/15/2018    Smokeless tobacco: Former User    Alcohol use Yes      Comment: occ    Drug use: Yes     Types: Marijuana, LSD, Cocaine, Other-see comments      Comment: Kratom (capsule or leaf form)    Sexual activity: Yes     Partners: Female     Birth control/ protection: Condom       Review of Systems   Constitutional: Positive for fatigue and fever. Negative for chills.   HENT: Negative for mouth sores.    Eyes: Negative for photophobia and visual disturbance.   Respiratory: Negative for cough and chest tightness.    Cardiovascular: Positive for chest pain.   Gastrointestinal: Positive for abdominal pain. Negative for abdominal distention and constipation.   Genitourinary: Negative for dysuria and flank pain.   Musculoskeletal: Positive for back pain.   Skin: Positive for rash. "   Neurological: Negative for dizziness and seizures.   Hematological: Negative for adenopathy.     Objective:     Vital Signs (Most Recent):  Temp: 100.1 °F (37.8 °C) (06/21/18 2223)  Pulse: 106 (06/22/18 0045)  Resp: (!) 22 (06/21/18 2329)  BP: 136/81 (06/21/18 2223)  SpO2: 99 % (06/22/18 0045) Vital Signs (24h Range):  Temp:  [100.1 °F (37.8 °C)] 100.1 °F (37.8 °C)  Pulse:  [106-120] 106  Resp:  [17-22] 22  SpO2:  [98 %-100 %] 99 %  BP: (136)/(81) 136/81     Weight: 66.7 kg (147 lb)  Body mass index is 23.02 kg/m².  Body surface area is 1.78 meters squared.    No intake or output data in the 24 hours ending 06/22/18 0139    Physical Exam   Constitutional: He is oriented to person, place, and time. No distress.   HENT:   Mouth/Throat: No oropharyngeal exudate.       No mucous ulcers.   Multiple small painless papules, non vesicular, in the dorsal 2/3 of the tongue.      Eyes: No scleral icterus.   Neck: Normal range of motion. No JVD present.   Cardiovascular: Tachycardia present.    Pulmonary/Chest: Effort normal and breath sounds normal. No stridor. No respiratory distress.   Abdominal: Soft. There is tenderness (all over). There is no guarding.   Musculoskeletal: Normal range of motion. He exhibits no edema or deformity.   Neurological: He is alert and oriented to person, place, and time. No cranial nerve deficit.   Skin: Rash noted.   Multiple raised painless papules with red base, non itchy all over the back, noticed 2 days ago                       Significant Labs:   BMP:   Recent Labs  Lab 06/22/18  0010   *   *   K 3.8   CL 99   CO2 29   BUN 14   CREATININE 0.8   CALCIUM 9.0   MG 2.1   , CBC:   Recent Labs  Lab 06/22/18  0010   WBC 0.94*   HGB 9.2*   HCT 27.3*   PLT 38*   , CMP:   Recent Labs  Lab 06/22/18  0010   *   K 3.8   CL 99   CO2 29   *   BUN 14   CREATININE 0.8   CALCIUM 9.0   PROT 6.1   ALBUMIN 3.6   BILITOT 0.7   ALKPHOS 61   AST 8*   ALT 15   ANIONGAP 6*   EGFRNONAA  >60.0    and Coagulation:   Recent Labs  Lab 06/22/18  0010   INR 1.0   APTT 25.0       Diagnostic Results:  CXR with no acute changes.

## 2018-06-22 NOTE — ASSESSMENT & PLAN NOTE
- Testicular US 3/26/18 showed no testicular masses, HCG, LDH, AFP on 3/22/18 all normal.   - Bone scan on 3/28/18 was negative for bone mets.   - s/p 3 cycles of OP TESTICULAR EP protocol

## 2018-06-22 NOTE — ASSESSMENT & PLAN NOTE
-- Testicular US 3/26/18 showed no testicular masses, HCG, LDH, AFP on 3/22/18 all normal.   -- Bone scan on 3/28/18 was negative for bone mets.   -- s/p 3 cycles of OP TESTICULAR EP protocol

## 2018-06-23 LAB
ALBUMIN SERPL BCP-MCNC: 3.2 G/DL
ALP SERPL-CCNC: 56 U/L
ALT SERPL W/O P-5'-P-CCNC: 12 U/L
ANION GAP SERPL CALC-SCNC: 6 MMOL/L
ANISOCYTOSIS BLD QL SMEAR: SLIGHT
AST SERPL-CCNC: 7 U/L
BACTERIA UR CULT: NORMAL
BASOPHILS # BLD AUTO: 0 K/UL
BASOPHILS NFR BLD: 0 %
BILIRUB SERPL-MCNC: 0.4 MG/DL
BUN SERPL-MCNC: 10 MG/DL
CALCIUM SERPL-MCNC: 8.9 MG/DL
CHLORIDE SERPL-SCNC: 100 MMOL/L
CO2 SERPL-SCNC: 30 MMOL/L
CREAT SERPL-MCNC: 0.8 MG/DL
DACRYOCYTES BLD QL SMEAR: ABNORMAL
DIFFERENTIAL METHOD: ABNORMAL
EOSINOPHIL # BLD AUTO: 0 K/UL
EOSINOPHIL NFR BLD: 0.8 %
ERYTHROCYTE [DISTWIDTH] IN BLOOD BY AUTOMATED COUNT: 14.3 %
EST. GFR  (AFRICAN AMERICAN): >60 ML/MIN/1.73 M^2
EST. GFR  (NON AFRICAN AMERICAN): >60 ML/MIN/1.73 M^2
GLUCOSE SERPL-MCNC: 104 MG/DL
HCT VFR BLD AUTO: 24.4 %
HGB BLD-MCNC: 8.2 G/DL
IMM GRANULOCYTES # BLD AUTO: 0 K/UL
IMM GRANULOCYTES NFR BLD AUTO: 0 %
LYMPHOCYTES # BLD AUTO: 1 K/UL
LYMPHOCYTES NFR BLD: 85.7 %
MAGNESIUM SERPL-MCNC: 2 MG/DL
MCH RBC QN AUTO: 28.4 PG
MCHC RBC AUTO-ENTMCNC: 33.6 G/DL
MCV RBC AUTO: 84 FL
MONOCYTES # BLD AUTO: 0.1 K/UL
MONOCYTES NFR BLD: 6.7 %
NEUTROPHILS # BLD AUTO: 0.1 K/UL
NEUTROPHILS NFR BLD: 6.8 %
NRBC BLD-RTO: 0 /100 WBC
PHOSPHATE SERPL-MCNC: 4.3 MG/DL
PLATELET # BLD AUTO: 26 K/UL
PLATELET BLD QL SMEAR: ABNORMAL
PMV BLD AUTO: 10.3 FL
POLYCHROMASIA BLD QL SMEAR: ABNORMAL
POTASSIUM SERPL-SCNC: 4 MMOL/L
PROT SERPL-MCNC: 5.7 G/DL
RBC # BLD AUTO: 2.89 M/UL
SODIUM SERPL-SCNC: 136 MMOL/L
WBC # BLD AUTO: 1.19 K/UL

## 2018-06-23 PROCEDURE — 25000003 PHARM REV CODE 250: Performed by: STUDENT IN AN ORGANIZED HEALTH CARE EDUCATION/TRAINING PROGRAM

## 2018-06-23 PROCEDURE — 80053 COMPREHEN METABOLIC PANEL: CPT

## 2018-06-23 PROCEDURE — 63600175 PHARM REV CODE 636 W HCPCS: Performed by: STUDENT IN AN ORGANIZED HEALTH CARE EDUCATION/TRAINING PROGRAM

## 2018-06-23 PROCEDURE — 85025 COMPLETE CBC W/AUTO DIFF WBC: CPT

## 2018-06-23 PROCEDURE — 84100 ASSAY OF PHOSPHORUS: CPT

## 2018-06-23 PROCEDURE — 20600001 HC STEP DOWN PRIVATE ROOM

## 2018-06-23 PROCEDURE — 25000003 PHARM REV CODE 250: Performed by: INTERNAL MEDICINE

## 2018-06-23 PROCEDURE — 99233 SBSQ HOSP IP/OBS HIGH 50: CPT | Mod: ,,, | Performed by: INTERNAL MEDICINE

## 2018-06-23 PROCEDURE — 63600175 PHARM REV CODE 636 W HCPCS: Performed by: INTERNAL MEDICINE

## 2018-06-23 PROCEDURE — 83735 ASSAY OF MAGNESIUM: CPT

## 2018-06-23 PROCEDURE — 25000003 PHARM REV CODE 250: Performed by: HOSPITALIST

## 2018-06-23 RX ORDER — PROCHLORPERAZINE EDISYLATE 5 MG/ML
10 INJECTION INTRAMUSCULAR; INTRAVENOUS EVERY 6 HOURS PRN
Status: DISCONTINUED | OUTPATIENT
Start: 2018-06-23 | End: 2018-06-23

## 2018-06-23 RX ORDER — ONDANSETRON 8 MG/1
8 TABLET, ORALLY DISINTEGRATING ORAL EVERY 6 HOURS PRN
Status: DISCONTINUED | OUTPATIENT
Start: 2018-06-23 | End: 2018-06-25 | Stop reason: HOSPADM

## 2018-06-23 RX ORDER — MORPHINE SULFATE 15 MG/1
30 TABLET, FILM COATED, EXTENDED RELEASE ORAL 2 TIMES DAILY
Status: DISCONTINUED | OUTPATIENT
Start: 2018-06-23 | End: 2018-06-24

## 2018-06-23 RX ORDER — POLYETHYLENE GLYCOL 3350 17 G/17G
17 POWDER, FOR SOLUTION ORAL 3 TIMES DAILY
Status: DISCONTINUED | OUTPATIENT
Start: 2018-06-23 | End: 2018-06-24

## 2018-06-23 RX ADMIN — MORPHINE SULFATE 45 MG: 15 TABLET, EXTENDED RELEASE ORAL at 08:06

## 2018-06-23 RX ADMIN — MORPHINE SULFATE 15 MG: 15 TABLET, EXTENDED RELEASE ORAL at 09:06

## 2018-06-23 RX ADMIN — CEFEPIME 2 G: 2 INJECTION, POWDER, FOR SOLUTION INTRAVENOUS at 06:06

## 2018-06-23 RX ADMIN — PROMETHAZINE HYDROCHLORIDE 12.5 MG: 12.5 TABLET ORAL at 06:06

## 2018-06-23 RX ADMIN — ALPRAZOLAM 0.75 MG: 0.25 TABLET ORAL at 03:06

## 2018-06-23 RX ADMIN — MORPHINE SULFATE 30 MG: 15 TABLET ORAL at 03:06

## 2018-06-23 RX ADMIN — PANTOPRAZOLE SODIUM 40 MG: 40 TABLET, DELAYED RELEASE ORAL at 08:06

## 2018-06-23 RX ADMIN — SODIUM CHLORIDE, SODIUM LACTATE, POTASSIUM CHLORIDE, AND CALCIUM CHLORIDE: .6; .31; .03; .02 INJECTION, SOLUTION INTRAVENOUS at 10:06

## 2018-06-23 RX ADMIN — CEFEPIME 2 G: 2 INJECTION, POWDER, FOR SOLUTION INTRAVENOUS at 02:06

## 2018-06-23 RX ADMIN — PROCHLORPERAZINE EDISYLATE 10 MG: 5 INJECTION INTRAMUSCULAR; INTRAVENOUS at 09:06

## 2018-06-23 RX ADMIN — PROMETHAZINE HYDROCHLORIDE 25 MG: 25 INJECTION INTRAMUSCULAR; INTRAVENOUS at 10:06

## 2018-06-23 RX ADMIN — CEFEPIME 2 G: 2 INJECTION, POWDER, FOR SOLUTION INTRAVENOUS at 11:06

## 2018-06-23 NOTE — PROGRESS NOTES
"[facility]  Hematology/Oncology  Progress Note    Patient Name: Alejandro Neff  Admission Date: 6/21/2018  Hospital Length of Stay: 1 days  Code Status: Full Code     Subjective:     HPI:  23 years old male with Hx of mediastinal seminoma s/p 3 cycles of OP TESTICULAR EP protocol. presented to the ER today with one day of documented elevated fever at home ~100, started in the AM with progressive weakness. He reported abdominal pain, sharp, in the below the belly button for the last 2 weeks, constant all day long, sharp, relived with narcotics and not moving. Denied pain wake him from sleep. Also reported no diarrhea, blood per rectum, painful defecation or pain related to food. He had appendectomy 5/2018.   Patient denied any congestion, mouth sores or odynophagia. Also denied cough, SOB, burning urine. He reported constant non change chest pain before the Dx of the Ca.   In the ER he was tachycardiac, LA normal, given fluids and started on vanc and ceftriaxone. CBC comes back with ANC ~40, acute drop from 2 days ago.          Oncologic History:  1. Mr. Hampton is a 24 yo man who first presented with chest pain and underwent CT chest on 2/14/18, which showed a 6 x 3.5 cm mediastinal mass. It abuts the aorta and pulmonary artery. Biopsy was done on 3/1/18. Pathology (reviewed at [facility]) showed germ cell tumor, most consistent with seminoma.   2. HCG, LDH, AFP on 3/22/18 all normal.   3. Testicular US 3/26/18 showed no testicular masses. CT C/A/P on 3/26/18 showed "stable appearance of anterior mediastinal mass consistent with provided history of seminoma. Several punctate sclerotic foci are noted in the pelvis at the right pubic bone, right ischial tuberosity, and left ilium adjacent to the sacroiliac joint.  These are strongly favored to reflect benign bone islands although metastatic disease could have a similar appearance and close follow-up is recommended. Several benign Schmorl's " "nodes are noted in the thoracic spine." Bone scan on 3/28/18 was negative for bone mets.   4. Audiogram on 4/6/18 normal. Followed by ENT.   5. Given his smoking history, I recommended 4 cycles of EP. EP cycle 1 day 1 on 4/16/18. Complicated by hospitalization for neutropenic fever 4/26-4/29. No source of infections identified. Treated with antibiotics empirically and improved.   6. Cycle 2 of EP started on on 5/7/18. Hospitalized during the first weekend for intractable nausea.   7. Restaging CT scan on 5/24/18 showed "Interval development of dilated appearance of the appendix with stranding of the periappendiceal fat.  Findings are worrisome for appendicitis". Patient did have abdominal pain and tenderness. Admitted and got appendectomy on 5/24/18.  8. Cycle 3 of EP 6/11/18-6/15/18    Interval History: Some nausea overnight, responded to IV Phenergan.    Oncology Treatment Plan:   OP TESTICULAR EP    Medications:  Continuous Infusions:   lactated ringers 75 mL/hr at 06/22/18 1950     Scheduled Meds:   ceFEPime (MAXIPIME) IVPB  2 g Intravenous Q8H    morphine  45 mg Oral BID    pantoprazole  40 mg Oral Daily    polyethylene glycol  17 g Oral BID     PRN Meds:acetaminophen, ALPRAZolam, baclofen, dextrose 50%, dextrose 50%, diphenhydrAMINE, glucagon (human recombinant), glucose, glucose, magic mouthwash (diphenhydrAMINE 12.5 mg/5 mL 20 mL, aluminum & magnesium hydroxide-simethicone (MYLANTA) 20 mL, lidocaine HCl 2% (XYLOCAINE) 20 mL) solution, morphine, ondansetron, promethazine (PHENERGAN) IVPB, promethazine, sodium chloride 0.9%     Review of Systems   Constitutional: Positive for fever. Negative for chills.   HENT: Negative for mouth sores and sore throat.    Eyes: Negative for photophobia.   Respiratory: Negative for cough and shortness of breath.    Cardiovascular: Negative for chest pain and leg swelling.   Gastrointestinal: Positive for abdominal pain (chronic) and nausea. Negative for diarrhea and " vomiting.   Genitourinary: Negative for dysuria and hematuria.   Musculoskeletal: Negative for neck pain and neck stiffness.   Skin: Negative for color change and wound.   Neurological: Negative for seizures, speech difficulty, weakness and headaches.   Psychiatric/Behavioral: Negative for confusion and dysphoric mood.     Objective:     Vital Signs (Most Recent):  Temp: 98.3 °F (36.8 °C) (06/23/18 0410)  Pulse: 86 (06/23/18 0410)  Resp: 17 (06/23/18 0410)  BP: 108/63 (06/23/18 0410)  SpO2: 100 % (06/23/18 0410) Vital Signs (24h Range):  Temp:  [97.9 °F (36.6 °C)-101.1 °F (38.4 °C)] 98.3 °F (36.8 °C)  Pulse:  [] 86  Resp:  [17-20] 17  SpO2:  [96 %-100 %] 100 %  BP: ()/(54-63) 108/63     Weight: 66.7 kg (147 lb 0.8 oz)  Body mass index is 23.03 kg/m².  Body surface area is 1.78 meters squared.    No intake or output data in the 24 hours ending 06/23/18 0648    Physical Exam   Constitutional: He is oriented to person, place, and time. He appears well-developed and well-nourished. No distress.   HENT:   Head: Normocephalic and atraumatic.   Eyes: Pupils are equal, round, and reactive to light.   Neck: Neck supple.   Cardiovascular: Normal rate, regular rhythm, normal heart sounds and intact distal pulses.    Pulmonary/Chest: Effort normal and breath sounds normal. No respiratory distress. He has no wheezes.   Abdominal: Soft. Bowel sounds are normal. He exhibits no distension. There is no tenderness. There is no guarding.   Musculoskeletal: Normal range of motion. He exhibits no edema.   Neurological: He is alert and oriented to person, place, and time.   Skin: Skin is warm and dry. He is not diaphoretic.   Psychiatric: He has a normal mood and affect.       Significant Labs:   CBC:     Recent Labs  Lab 06/22/18  0010 06/22/18  0403 06/23/18  0345   WBC 0.94* 0.99* 1.19*   HGB 9.2* 8.0* 8.2*   HCT 27.3* 22.5* 24.4*   PLT 38* 28*  --    , CMP:     Recent Labs  Lab 06/22/18  0010 06/22/18  0403   * 135*    K 3.8 3.6   CL 99 103   CO2 29 27   * 107   BUN 14 12   CREATININE 0.8 0.7   CALCIUM 9.0 8.2*   PROT 6.1 5.0*   ALBUMIN 3.6 3.1*   BILITOT 0.7 0.4   ALKPHOS 61 49*   AST 8* 6*   ALT 15 12   ANIONGAP 6* 5*   EGFRNONAA >60.0 >60.0    and All pertinent labs from the last 24 hours have been reviewed.    Diagnostic Results:  I have reviewed all pertinent imaging results/findings within the past 24 hours.    Assessment/Plan:     * Neutropenia with fever    - Tmax at home 101.7, has been afebrile here  - No localizing symptoms and benign exam  - UA and CXR without signs of infection  - ANC ~40 on admit, received Neulasta on 6/15/2018  - BC NGTD  - Received sepsis bolus in ED, tachy on admit prior to bolus, but since bolus VSS  - Continue cefepime 2g q8        Generalized abdominal pain    - Chronic   - CT abdomen and US non reveling   - Continue home pain regimen (MS Contin 45 + morphine IR 30mg q6 PRN)  - Started on bowel regimen        Anemia associated with chemotherapy    - Stable, monitor  - Transfuse as needed to keep Hb >7.0        Primary mediastinal seminoma    - Testicular US 3/26/18 showed no testicular masses, HCG, LDH, AFP on 3/22/18 all normal.   - Bone scan on 3/28/18 was negative for bone mets.   - s/p 3 cycles of OP TESTICULAR EP protocol                 Beth Brown MD  Hematology/Oncology  [facility]    ATTENDING NOTE, ONCOLOGY INPATIENT TEAM    As above; events of last 24 hours noted.  Patient seen and examined, chart reviewed.  Appears comfortable, in NAD, but he complains of bouts of nausea and fatigue.  Lungs are clear to auscultation.  Abdomen is soft, nontender.  Labs reviewed.  WBCs are 1,100 /mm3.    PLAN  Repeat CBC in am.  Continue cefepime.  Add compazine 10 mg IV Q 6 hours prn.  Patient to remain ambulatory; no need for DVT prophylaxis.  We will follow.      Jose Angel Noland MD\

## 2018-06-23 NOTE — ASSESSMENT & PLAN NOTE
- Chronic   - CT abdomen and US non reveling   - Continue home pain regimen (MS Contin + morphine IR 30mg q6 PRN)  - 6/23: Decreased MS Contin to 30mg at patient's request as he feels 45mg made him foggy  - Started on bowel regimen

## 2018-06-23 NOTE — PROGRESS NOTES
Dr. Brown notified of pt's elevated temp. Pt is asymptomatic and does not wish to take tylenol at this time.       06/23/18 1123 06/23/18 1155 06/23/18 1547   Vital Signs   Temp (!) 100.6 °F (38.1 °C) 99.5 °F (37.5 °C) (!) 100.5 °F (38.1 °C)   Temp src Oral Oral Oral

## 2018-06-23 NOTE — PLAN OF CARE
"Problem: Patient Care Overview  Goal: Plan of Care Review  Outcome: Ongoing (interventions implemented as appropriate)  Pt AAOx4. Pt c/o of "fogginess" this AM and requested a decrease in dosage of 12 hour morphine. Pt has remained free from injury this shift. Pt c/o nausea this shift; PRN prochlorperazine administered as ordered. Bed in low locked position. Call light and personal belongings within reach. Side rails up x2. Nonskid socks in place. All questions and comments addressed at this time. Will continue to monitor.  Fall, Pressure ulcer, infection precautions continued.        "

## 2018-06-23 NOTE — ASSESSMENT & PLAN NOTE
- Tmax at home 101.7, low grade fevers here last 24 hours  - No localizing symptoms and benign exam  - UA and CXR without signs of infection  - ANC ~40 on admit, received Neulasta on 6/15/2018  - BC NGTD  - Received sepsis bolus in ED, tachy on admit prior to bolus, but since bolus VSS  - 6/24: BP a little on lower side this morning, will give another bolus of LR  - Continue cefepime 2g q8

## 2018-06-23 NOTE — SUBJECTIVE & OBJECTIVE
Interval History: Some nausea overnight, responded to IV Phenergan.    Oncology Treatment Plan:   OP TESTICULAR EP    Medications:  Continuous Infusions:   lactated ringers 75 mL/hr at 06/22/18 1950     Scheduled Meds:   ceFEPime (MAXIPIME) IVPB  2 g Intravenous Q8H    morphine  45 mg Oral BID    pantoprazole  40 mg Oral Daily    polyethylene glycol  17 g Oral BID     PRN Meds:acetaminophen, ALPRAZolam, baclofen, dextrose 50%, dextrose 50%, diphenhydrAMINE, glucagon (human recombinant), glucose, glucose, magic mouthwash (diphenhydrAMINE 12.5 mg/5 mL 20 mL, aluminum & magnesium hydroxide-simethicone (MYLANTA) 20 mL, lidocaine HCl 2% (XYLOCAINE) 20 mL) solution, morphine, ondansetron, promethazine (PHENERGAN) IVPB, promethazine, sodium chloride 0.9%     Review of Systems   Constitutional: Positive for fever. Negative for chills.   HENT: Negative for mouth sores and sore throat.    Eyes: Negative for photophobia.   Respiratory: Negative for cough and shortness of breath.    Cardiovascular: Negative for chest pain and leg swelling.   Gastrointestinal: Positive for abdominal pain (chronic) and nausea. Negative for diarrhea and vomiting.   Genitourinary: Negative for dysuria and hematuria.   Musculoskeletal: Negative for neck pain and neck stiffness.   Skin: Negative for color change and wound.   Neurological: Negative for seizures, speech difficulty, weakness and headaches.   Psychiatric/Behavioral: Negative for confusion and dysphoric mood.     Objective:     Vital Signs (Most Recent):  Temp: 98.3 °F (36.8 °C) (06/23/18 0410)  Pulse: 86 (06/23/18 0410)  Resp: 17 (06/23/18 0410)  BP: 108/63 (06/23/18 0410)  SpO2: 100 % (06/23/18 0410) Vital Signs (24h Range):  Temp:  [97.9 °F (36.6 °C)-101.1 °F (38.4 °C)] 98.3 °F (36.8 °C)  Pulse:  [] 86  Resp:  [17-20] 17  SpO2:  [96 %-100 %] 100 %  BP: ()/(54-63) 108/63     Weight: 66.7 kg (147 lb 0.8 oz)  Body mass index is 23.03 kg/m².  Body surface area is 1.78  meters squared.    No intake or output data in the 24 hours ending 06/23/18 0648    Physical Exam   Constitutional: He is oriented to person, place, and time. He appears well-developed and well-nourished. No distress.   HENT:   Head: Normocephalic and atraumatic.   Eyes: Pupils are equal, round, and reactive to light.   Neck: Neck supple.   Cardiovascular: Normal rate, regular rhythm, normal heart sounds and intact distal pulses.    Pulmonary/Chest: Effort normal and breath sounds normal. No respiratory distress. He has no wheezes.   Abdominal: Soft. Bowel sounds are normal. He exhibits no distension. There is no tenderness. There is no guarding.   Musculoskeletal: Normal range of motion. He exhibits no edema.   Neurological: He is alert and oriented to person, place, and time.   Skin: Skin is warm and dry. He is not diaphoretic.   Psychiatric: He has a normal mood and affect.       Significant Labs:   CBC:     Recent Labs  Lab 06/22/18  0010 06/22/18  0403 06/23/18  0345   WBC 0.94* 0.99* 1.19*   HGB 9.2* 8.0* 8.2*   HCT 27.3* 22.5* 24.4*   PLT 38* 28*  --    , CMP:     Recent Labs  Lab 06/22/18  0010 06/22/18  0403   * 135*   K 3.8 3.6   CL 99 103   CO2 29 27   * 107   BUN 14 12   CREATININE 0.8 0.7   CALCIUM 9.0 8.2*   PROT 6.1 5.0*   ALBUMIN 3.6 3.1*   BILITOT 0.7 0.4   ALKPHOS 61 49*   AST 8* 6*   ALT 15 12   ANIONGAP 6* 5*   EGFRNONAA >60.0 >60.0    and All pertinent labs from the last 24 hours have been reviewed.    Diagnostic Results:  I have reviewed all pertinent imaging results/findings within the past 24 hours.

## 2018-06-23 NOTE — PLAN OF CARE
Problem: Patient Care Overview  Goal: Plan of Care Review  Outcome: Ongoing (interventions implemented as appropriate)  Pt is resting in bed and is up to the bathroom. VS stable, pt is afebrile. Pt complains of pain to abdomen with nausea; vomited x 2. Order for IV phenergan received & med given. Medicated with morphine IR as per order for complaints of abdominal pain. Pt stated relief. Right chest port remains patent with LR infusing at 75 cc/hr/pump. IV Cefepime continues. Non-skid socks in place. Pt encouraged to call for assistance as needed. Will continue to monitor.

## 2018-06-24 LAB
ALBUMIN SERPL BCP-MCNC: 3.3 G/DL
ALP SERPL-CCNC: 59 U/L
ALT SERPL W/O P-5'-P-CCNC: 11 U/L
ANION GAP SERPL CALC-SCNC: 9 MMOL/L
ANISOCYTOSIS BLD QL SMEAR: SLIGHT
AST SERPL-CCNC: 8 U/L
BASOPHILS # BLD AUTO: 0 K/UL
BASOPHILS NFR BLD: 0 %
BILIRUB SERPL-MCNC: 0.4 MG/DL
BUN SERPL-MCNC: 8 MG/DL
CALCIUM SERPL-MCNC: 9.3 MG/DL
CHLORIDE SERPL-SCNC: 98 MMOL/L
CO2 SERPL-SCNC: 30 MMOL/L
CREAT SERPL-MCNC: 0.6 MG/DL
DIFFERENTIAL METHOD: ABNORMAL
EOSINOPHIL # BLD AUTO: 0 K/UL
EOSINOPHIL NFR BLD: 0.5 %
ERYTHROCYTE [DISTWIDTH] IN BLOOD BY AUTOMATED COUNT: 14.2 %
EST. GFR  (AFRICAN AMERICAN): >60 ML/MIN/1.73 M^2
EST. GFR  (NON AFRICAN AMERICAN): >60 ML/MIN/1.73 M^2
GLUCOSE SERPL-MCNC: 86 MG/DL
HCT VFR BLD AUTO: 24.2 %
HGB BLD-MCNC: 8.2 G/DL
HYPOCHROMIA BLD QL SMEAR: ABNORMAL
IMM GRANULOCYTES # BLD AUTO: 0.01 K/UL
IMM GRANULOCYTES NFR BLD AUTO: 0.5 %
LYMPHOCYTES # BLD AUTO: 1.1 K/UL
LYMPHOCYTES NFR BLD: 58.5 %
MAGNESIUM SERPL-MCNC: 1.9 MG/DL
MCH RBC QN AUTO: 28.6 PG
MCHC RBC AUTO-ENTMCNC: 33.9 G/DL
MCV RBC AUTO: 84 FL
MONOCYTES # BLD AUTO: 0.2 K/UL
MONOCYTES NFR BLD: 9.8 %
NEUTROPHILS # BLD AUTO: 0.6 K/UL
NEUTROPHILS NFR BLD: 30.7 %
NRBC BLD-RTO: 0 /100 WBC
PHOSPHATE SERPL-MCNC: 4.5 MG/DL
PLATELET # BLD AUTO: 33 K/UL
PLATELET BLD QL SMEAR: ABNORMAL
PMV BLD AUTO: 10.4 FL
POLYCHROMASIA BLD QL SMEAR: ABNORMAL
POTASSIUM SERPL-SCNC: 3.8 MMOL/L
PROT SERPL-MCNC: 5.8 G/DL
RBC # BLD AUTO: 2.87 M/UL
SODIUM SERPL-SCNC: 137 MMOL/L
WBC # BLD AUTO: 1.93 K/UL

## 2018-06-24 PROCEDURE — 83735 ASSAY OF MAGNESIUM: CPT

## 2018-06-24 PROCEDURE — 20600001 HC STEP DOWN PRIVATE ROOM

## 2018-06-24 PROCEDURE — 85025 COMPLETE CBC W/AUTO DIFF WBC: CPT

## 2018-06-24 PROCEDURE — 63600175 PHARM REV CODE 636 W HCPCS: Performed by: STUDENT IN AN ORGANIZED HEALTH CARE EDUCATION/TRAINING PROGRAM

## 2018-06-24 PROCEDURE — 80053 COMPREHEN METABOLIC PANEL: CPT

## 2018-06-24 PROCEDURE — 25000003 PHARM REV CODE 250: Performed by: STUDENT IN AN ORGANIZED HEALTH CARE EDUCATION/TRAINING PROGRAM

## 2018-06-24 PROCEDURE — 99233 SBSQ HOSP IP/OBS HIGH 50: CPT | Mod: ,,, | Performed by: INTERNAL MEDICINE

## 2018-06-24 PROCEDURE — 84100 ASSAY OF PHOSPHORUS: CPT

## 2018-06-24 RX ORDER — POLYETHYLENE GLYCOL 3350 17 G/17G
17 POWDER, FOR SOLUTION ORAL DAILY
Status: DISCONTINUED | OUTPATIENT
Start: 2018-06-25 | End: 2018-06-25 | Stop reason: HOSPADM

## 2018-06-24 RX ORDER — MORPHINE SULFATE 15 MG/1
15 TABLET, FILM COATED, EXTENDED RELEASE ORAL 2 TIMES DAILY
Status: DISCONTINUED | OUTPATIENT
Start: 2018-06-24 | End: 2018-06-24

## 2018-06-24 RX ORDER — GABAPENTIN 300 MG/1
300 CAPSULE ORAL 3 TIMES DAILY
Status: DISCONTINUED | OUTPATIENT
Start: 2018-06-24 | End: 2018-06-25 | Stop reason: HOSPADM

## 2018-06-24 RX ORDER — MORPHINE SULFATE 15 MG/1
30 TABLET, FILM COATED, EXTENDED RELEASE ORAL 2 TIMES DAILY
Status: DISCONTINUED | OUTPATIENT
Start: 2018-06-24 | End: 2018-06-25 | Stop reason: HOSPADM

## 2018-06-24 RX ORDER — POLYETHYLENE GLYCOL 3350 17 G/17G
17 POWDER, FOR SOLUTION ORAL 2 TIMES DAILY
Status: DISCONTINUED | OUTPATIENT
Start: 2018-06-24 | End: 2018-06-24

## 2018-06-24 RX ORDER — LIDOCAINE 50 MG/G
1 PATCH TOPICAL
Status: DISCONTINUED | OUTPATIENT
Start: 2018-06-24 | End: 2018-06-25 | Stop reason: HOSPADM

## 2018-06-24 RX ADMIN — ALPRAZOLAM 0.75 MG: 0.25 TABLET ORAL at 10:06

## 2018-06-24 RX ADMIN — CEFEPIME 2 G: 2 INJECTION, POWDER, FOR SOLUTION INTRAVENOUS at 02:06

## 2018-06-24 RX ADMIN — MORPHINE SULFATE 15 MG: 15 TABLET, EXTENDED RELEASE ORAL at 08:06

## 2018-06-24 RX ADMIN — CEFEPIME 2 G: 2 INJECTION, POWDER, FOR SOLUTION INTRAVENOUS at 11:06

## 2018-06-24 RX ADMIN — SODIUM CHLORIDE, SODIUM LACTATE, POTASSIUM CHLORIDE, AND CALCIUM CHLORIDE 1000 ML: .6; .31; .03; .02 INJECTION, SOLUTION INTRAVENOUS at 06:06

## 2018-06-24 RX ADMIN — MORPHINE SULFATE 30 MG: 15 TABLET, EXTENDED RELEASE ORAL at 10:06

## 2018-06-24 RX ADMIN — CEFEPIME 2 G: 2 INJECTION, POWDER, FOR SOLUTION INTRAVENOUS at 06:06

## 2018-06-24 RX ADMIN — PANTOPRAZOLE SODIUM 40 MG: 40 TABLET, DELAYED RELEASE ORAL at 08:06

## 2018-06-24 NOTE — PLAN OF CARE
Problem: Patient Care Overview  Goal: Plan of Care Review  Pt AAOx4. Pt reports feeling more himself today, and requested a decrease in dosage of 12 hour morphine. 15 mg of 12 hour morphine given this AM. 1L bolus of LR given this am d/t hypotension. IVF stopped this shift. Pt has remained free from injury this shift. Bed in low locked position. Call light and personal belongings within reach. Side rails up x2. Nonskid socks in place. All questions and comments addressed at this time. Will continue to monitor.  Fall, Pressure ulcer, infection precautions continued.

## 2018-06-24 NOTE — PROGRESS NOTES
"[facility]  Hematology/Oncology  Progress Note    Patient Name: Alejandro Neff  Admission Date: 6/21/2018  Hospital Length of Stay: 2 days  Code Status: Full Code     Subjective:     HPI:  23 years old male with Hx of mediastinal seminoma s/p 3 cycles of OP TESTICULAR EP protocol. presented to the ER today with one day of documented elevated fever at home ~100, started in the AM with progressive weakness. He reported abdominal pain, sharp, in the below the belly button for the last 2 weeks, constant all day long, sharp, relived with narcotics and not moving. Denied pain wake him from sleep. Also reported no diarrhea, blood per rectum, painful defecation or pain related to food. He had appendectomy 5/2018.   Patient denied any congestion, mouth sores or odynophagia. Also denied cough, SOB, burning urine. He reported constant non change chest pain before the Dx of the Ca.   In the ER he was tachycardiac, LA normal, given fluids and started on vanc and ceftriaxone. CBC comes back with ANC ~40, acute drop from 2 days ago.          Oncologic History:  1. Mr. Hampton is a 22 yo man who first presented with chest pain and underwent CT chest on 2/14/18, which showed a 6 x 3.5 cm mediastinal mass. It abuts the aorta and pulmonary artery. Biopsy was done on 3/1/18. Pathology (reviewed at [facility]) showed germ cell tumor, most consistent with seminoma.   2. HCG, LDH, AFP on 3/22/18 all normal.   3. Testicular US 3/26/18 showed no testicular masses. CT C/A/P on 3/26/18 showed "stable appearance of anterior mediastinal mass consistent with provided history of seminoma. Several punctate sclerotic foci are noted in the pelvis at the right pubic bone, right ischial tuberosity, and left ilium adjacent to the sacroiliac joint.  These are strongly favored to reflect benign bone islands although metastatic disease could have a similar appearance and close follow-up is recommended. Several benign Schmorl's " "nodes are noted in the thoracic spine." Bone scan on 3/28/18 was negative for bone mets.   4. Audiogram on 4/6/18 normal. Followed by ENT.   5. Given his smoking history, I recommended 4 cycles of EP. EP cycle 1 day 1 on 4/16/18. Complicated by hospitalization for neutropenic fever 4/26-4/29. No source of infections identified. Treated with antibiotics empirically and improved.   6. Cycle 2 of EP started on on 5/7/18. Hospitalized during the first weekend for intractable nausea.   7. Restaging CT scan on 5/24/18 showed "Interval development of dilated appearance of the appendix with stranding of the periappendiceal fat.  Findings are worrisome for appendicitis". Patient did have abdominal pain and tenderness. Admitted and got appendectomy on 5/24/18.  8. Cycle 3 of EP 6/11/18-6/15/18    Interval History: Ongoing low grade fevers yesterday, no growth on cultures. ANC improved to 600 today.    Oncology Treatment Plan:   OP TESTICULAR EP    Medications:  Continuous Infusions:    Scheduled Meds:   ceFEPime (MAXIPIME) IVPB  2 g Intravenous Q8H    lactated ringers  1,000 mL Intravenous Once    morphine  30 mg Oral BID    pantoprazole  40 mg Oral Daily    polyethylene glycol  17 g Oral TID     PRN Meds:acetaminophen, ALPRAZolam, baclofen, dextrose 50%, dextrose 50%, diphenhydrAMINE, glucagon (human recombinant), glucose, glucose, magic mouthwash (diphenhydrAMINE 12.5 mg/5 mL 20 mL, aluminum & magnesium hydroxide-simethicone (MYLANTA) 20 mL, lidocaine HCl 2% (XYLOCAINE) 20 mL) solution, morphine, ondansetron, promethazine (PHENERGAN) IVPB, sodium chloride 0.9%     Review of Systems   Constitutional: Positive for fever. Negative for chills.   HENT: Negative for mouth sores and sore throat.    Eyes: Negative for photophobia.   Respiratory: Negative for cough and shortness of breath.    Cardiovascular: Negative for chest pain and leg swelling.   Gastrointestinal: Positive for abdominal pain (chronic) and nausea. Negative " for diarrhea and vomiting.   Genitourinary: Negative for dysuria and hematuria.   Musculoskeletal: Negative for neck pain and neck stiffness.   Skin: Negative for color change and wound.   Neurological: Negative for seizures, speech difficulty, weakness and headaches.   Psychiatric/Behavioral: Negative for confusion and dysphoric mood.     Objective:     Vital Signs (Most Recent):  Temp: 97.9 °F (36.6 °C) (06/24/18 0330)  Pulse: 70 (06/24/18 0330)  Resp: 14 (06/24/18 0330)  BP: (!) 94/55 (06/24/18 0330)  SpO2: 97 % (06/24/18 0330) Vital Signs (24h Range):  Temp:  [97.9 °F (36.6 °C)-100.6 °F (38.1 °C)] 97.9 °F (36.6 °C)  Pulse:  [] 70  Resp:  [14-18] 14  SpO2:  [96 %-100 %] 97 %  BP: ()/(53-61) 94/55     Weight: 66.7 kg (147 lb 0.8 oz)  Body mass index is 23.03 kg/m².  Body surface area is 1.78 meters squared.      Intake/Output Summary (Last 24 hours) at 06/24/18 0752  Last data filed at 06/23/18 1610   Gross per 24 hour   Intake             2035 ml   Output                0 ml   Net             2035 ml       Physical Exam   Constitutional: He is oriented to person, place, and time. He appears well-developed and well-nourished. No distress.   HENT:   Head: Normocephalic and atraumatic.   Eyes: Pupils are equal, round, and reactive to light.   Neck: Neck supple.   Cardiovascular: Normal rate, regular rhythm, normal heart sounds and intact distal pulses.    Pulmonary/Chest: Effort normal and breath sounds normal. No respiratory distress. He has no wheezes.   Abdominal: Soft. Bowel sounds are normal. He exhibits no distension. There is no tenderness. There is no guarding.   Musculoskeletal: Normal range of motion. He exhibits no edema.   Neurological: He is alert and oriented to person, place, and time.   Skin: Skin is warm and dry. He is not diaphoretic.   Psychiatric: He has a normal mood and affect.       Significant Labs:   CBC:     Recent Labs  Lab 06/23/18  0345 06/24/18  0330   WBC 1.19* 1.93*   HGB  "8.2* 8.2*   HCT 24.4* 24.2*   PLT 26* 33*   , CMP:     Recent Labs  Lab 06/23/18  0345 06/24/18  0330    137   K 4.0 3.8    98   CO2 30* 30*    86   BUN 10 8   CREATININE 0.8 0.6   CALCIUM 8.9 9.3   PROT 5.7* 5.8*   ALBUMIN 3.2* 3.3*   BILITOT 0.4 0.4   ALKPHOS 56 59   AST 7* 8*   ALT 12 11   ANIONGAP 6* 9   EGFRNONAA >60.0 >60.0    and All pertinent labs from the last 24 hours have been reviewed.    Diagnostic Results:  I have reviewed all pertinent imaging results/findings within the past 24 hours.    Assessment/Plan:     * Neutropenia with fever    - Tmax at home 101.7, low grade fevers here last 24 hours  - No localizing symptoms and benign exam  - UA and CXR without signs of infection  - ANC ~40 on admit, received Neulasta on 6/15/2018  - BC NGTD  - Received sepsis bolus in ED, tachy on admit prior to bolus, but since bolus VSS  - 6/24: BP a little on lower side this morning, will give another bolus of LR  - Continue cefepime 2g q8        Generalized abdominal pain    - Chronic   - CT abdomen and US non reveling   - Continue home pain regimen (MS Contin + morphine IR 30mg q6 PRN)  - 6/23: Decreased MS Contin to 30mg at patient's request as he feels 45mg made him foggy  - Started on bowel regimen        Anemia associated with chemotherapy    - Stable, monitor  - Transfuse as needed to keep Hb >7.0        Primary mediastinal seminoma    - Testicular US 3/26/18 showed no testicular masses, HCG, LDH, AFP on 3/22/18 all normal.   - Bone scan on 3/28/18 was negative for bone mets.   - s/p 3 cycles of OP TESTICULAR EP protocol                 Beth Brown MD  Hematology/Oncology  [facility]      ATTENDING NOTE, ONCOLOGY INPATIENT TEAM    As above; events of last 24 hours noted.  Patient seen and examined, chart reviewed.  Appears comfortable, in NAD, states that he is "feeling better".  Lungs are clear to auscultation.  Abdomen is soft, nontender.  Labs reviewed. Platelets are " up to 33K today, and WBCs are 1,900 /mm3.  His ANC is 600 /mm3.    PLAN  Repeat CBC in am; if his WBCs and platelets continue to improve, we will discharge home on oral antibiotics tomorrow.  Continue cefepime for now.  Decrease MS contin to 30 mg Q 12 hours.  Patient is and will remain ambulatory.; no need for DVT prophylaxis.  We will follow.  His multiple questions were answered to his satisfaction.      Jose Angel Noland MD

## 2018-06-24 NOTE — SUBJECTIVE & OBJECTIVE
Interval History: Ongoing low grade fevers yesterday, no growth on cultures. ANC improved to 600 today.    Oncology Treatment Plan:   OP TESTICULAR EP    Medications:  Continuous Infusions:    Scheduled Meds:   ceFEPime (MAXIPIME) IVPB  2 g Intravenous Q8H    lactated ringers  1,000 mL Intravenous Once    morphine  30 mg Oral BID    pantoprazole  40 mg Oral Daily    polyethylene glycol  17 g Oral TID     PRN Meds:acetaminophen, ALPRAZolam, baclofen, dextrose 50%, dextrose 50%, diphenhydrAMINE, glucagon (human recombinant), glucose, glucose, magic mouthwash (diphenhydrAMINE 12.5 mg/5 mL 20 mL, aluminum & magnesium hydroxide-simethicone (MYLANTA) 20 mL, lidocaine HCl 2% (XYLOCAINE) 20 mL) solution, morphine, ondansetron, promethazine (PHENERGAN) IVPB, sodium chloride 0.9%     Review of Systems   Constitutional: Positive for fever. Negative for chills.   HENT: Negative for mouth sores and sore throat.    Eyes: Negative for photophobia.   Respiratory: Negative for cough and shortness of breath.    Cardiovascular: Negative for chest pain and leg swelling.   Gastrointestinal: Positive for abdominal pain (chronic) and nausea. Negative for diarrhea and vomiting.   Genitourinary: Negative for dysuria and hematuria.   Musculoskeletal: Negative for neck pain and neck stiffness.   Skin: Negative for color change and wound.   Neurological: Negative for seizures, speech difficulty, weakness and headaches.   Psychiatric/Behavioral: Negative for confusion and dysphoric mood.     Objective:     Vital Signs (Most Recent):  Temp: 97.9 °F (36.6 °C) (06/24/18 0330)  Pulse: 70 (06/24/18 0330)  Resp: 14 (06/24/18 0330)  BP: (!) 94/55 (06/24/18 0330)  SpO2: 97 % (06/24/18 0330) Vital Signs (24h Range):  Temp:  [97.9 °F (36.6 °C)-100.6 °F (38.1 °C)] 97.9 °F (36.6 °C)  Pulse:  [] 70  Resp:  [14-18] 14  SpO2:  [96 %-100 %] 97 %  BP: ()/(53-61) 94/55     Weight: 66.7 kg (147 lb 0.8 oz)  Body mass index is 23.03 kg/m².  Body  surface area is 1.78 meters squared.      Intake/Output Summary (Last 24 hours) at 06/24/18 0752  Last data filed at 06/23/18 1610   Gross per 24 hour   Intake             2035 ml   Output                0 ml   Net             2035 ml       Physical Exam   Constitutional: He is oriented to person, place, and time. He appears well-developed and well-nourished. No distress.   HENT:   Head: Normocephalic and atraumatic.   Eyes: Pupils are equal, round, and reactive to light.   Neck: Neck supple.   Cardiovascular: Normal rate, regular rhythm, normal heart sounds and intact distal pulses.    Pulmonary/Chest: Effort normal and breath sounds normal. No respiratory distress. He has no wheezes.   Abdominal: Soft. Bowel sounds are normal. He exhibits no distension. There is no tenderness. There is no guarding.   Musculoskeletal: Normal range of motion. He exhibits no edema.   Neurological: He is alert and oriented to person, place, and time.   Skin: Skin is warm and dry. He is not diaphoretic.   Psychiatric: He has a normal mood and affect.       Significant Labs:   CBC:     Recent Labs  Lab 06/23/18  0345 06/24/18  0330   WBC 1.19* 1.93*   HGB 8.2* 8.2*   HCT 24.4* 24.2*   PLT 26* 33*   , CMP:     Recent Labs  Lab 06/23/18  0345 06/24/18  0330    137   K 4.0 3.8    98   CO2 30* 30*    86   BUN 10 8   CREATININE 0.8 0.6   CALCIUM 8.9 9.3   PROT 5.7* 5.8*   ALBUMIN 3.2* 3.3*   BILITOT 0.4 0.4   ALKPHOS 56 59   AST 7* 8*   ALT 12 11   ANIONGAP 6* 9   EGFRNONAA >60.0 >60.0    and All pertinent labs from the last 24 hours have been reviewed.    Diagnostic Results:  I have reviewed all pertinent imaging results/findings within the past 24 hours.

## 2018-06-25 VITALS
SYSTOLIC BLOOD PRESSURE: 110 MMHG | HEART RATE: 88 BPM | HEIGHT: 67 IN | WEIGHT: 147.06 LBS | TEMPERATURE: 98 F | RESPIRATION RATE: 19 BRPM | DIASTOLIC BLOOD PRESSURE: 57 MMHG | OXYGEN SATURATION: 100 % | BODY MASS INDEX: 23.08 KG/M2

## 2018-06-25 LAB
ALBUMIN SERPL BCP-MCNC: 3.4 G/DL
ALP SERPL-CCNC: 60 U/L
ALT SERPL W/O P-5'-P-CCNC: 11 U/L
ANION GAP SERPL CALC-SCNC: 6 MMOL/L
ANISOCYTOSIS BLD QL SMEAR: SLIGHT
AST SERPL-CCNC: 9 U/L
BASOPHILS # BLD AUTO: ABNORMAL K/UL
BASOPHILS NFR BLD: 0 %
BILIRUB SERPL-MCNC: 0.3 MG/DL
BUN SERPL-MCNC: 14 MG/DL
CALCIUM SERPL-MCNC: 9.6 MG/DL
CHLORIDE SERPL-SCNC: 102 MMOL/L
CLINICAL BIOCHEMIST REVIEW: NORMAL
CO2 SERPL-SCNC: 30 MMOL/L
CREAT SERPL-MCNC: 0.7 MG/DL
DIFFERENTIAL METHOD: ABNORMAL
EOSINOPHIL # BLD AUTO: ABNORMAL K/UL
EOSINOPHIL NFR BLD: 1 %
ERYTHROCYTE [DISTWIDTH] IN BLOOD BY AUTOMATED COUNT: 15.5 %
EST. GFR  (AFRICAN AMERICAN): >60 ML/MIN/1.73 M^2
EST. GFR  (NON AFRICAN AMERICAN): >60 ML/MIN/1.73 M^2
GLUCOSE SERPL-MCNC: 92 MG/DL
HCT VFR BLD AUTO: 26.9 %
HGB BLD-MCNC: 9.2 G/DL
HYPOCHROMIA BLD QL SMEAR: ABNORMAL
IMM GRANULOCYTES # BLD AUTO: ABNORMAL K/UL
IMM GRANULOCYTES NFR BLD AUTO: ABNORMAL %
LYMPHOCYTES # BLD AUTO: ABNORMAL K/UL
LYMPHOCYTES NFR BLD: 51 %
MCH RBC QN AUTO: 29.2 PG
MCHC RBC AUTO-ENTMCNC: 34.2 G/DL
MCV RBC AUTO: 85 FL
MONOCYTES # BLD AUTO: ABNORMAL K/UL
MONOCYTES NFR BLD: 6 %
NEUTROPHILS NFR BLD: 41 %
NEUTS BAND NFR BLD MANUAL: 1 %
NRBC BLD-RTO: 1 /100 WBC
OVALOCYTES BLD QL SMEAR: ABNORMAL
PBG DEAMINASE RBC-CCNC: 7.5 NMOL/L/SEC
PLATELET # BLD AUTO: 48 K/UL
PMV BLD AUTO: 10.5 FL
POIKILOCYTOSIS BLD QL SMEAR: SLIGHT
POLYCHROMASIA BLD QL SMEAR: ABNORMAL
POTASSIUM SERPL-SCNC: 4.3 MMOL/L
PROT SERPL-MCNC: 6.1 G/DL
RBC # BLD AUTO: 3.15 M/UL
SODIUM SERPL-SCNC: 138 MMOL/L
WBC # BLD AUTO: 3.14 K/UL

## 2018-06-25 PROCEDURE — 63600175 PHARM REV CODE 636 W HCPCS: Performed by: STUDENT IN AN ORGANIZED HEALTH CARE EDUCATION/TRAINING PROGRAM

## 2018-06-25 PROCEDURE — 99238 HOSP IP/OBS DSCHRG MGMT 30/<: CPT | Mod: ,,, | Performed by: INTERNAL MEDICINE

## 2018-06-25 PROCEDURE — 25000003 PHARM REV CODE 250: Performed by: STUDENT IN AN ORGANIZED HEALTH CARE EDUCATION/TRAINING PROGRAM

## 2018-06-25 PROCEDURE — 63600175 PHARM REV CODE 636 W HCPCS: Performed by: INTERNAL MEDICINE

## 2018-06-25 PROCEDURE — 80053 COMPREHEN METABOLIC PANEL: CPT

## 2018-06-25 PROCEDURE — 85027 COMPLETE CBC AUTOMATED: CPT

## 2018-06-25 PROCEDURE — 85007 BL SMEAR W/DIFF WBC COUNT: CPT

## 2018-06-25 RX ORDER — MORPHINE SULFATE 15 MG/1
30 TABLET, FILM COATED, EXTENDED RELEASE ORAL 2 TIMES DAILY
Qty: 60 TABLET | Refills: 0
Start: 2018-06-25 | End: 2018-07-02

## 2018-06-25 RX ORDER — HEPARIN 100 UNIT/ML
3 SYRINGE INTRAVENOUS
Status: DISCONTINUED | OUTPATIENT
Start: 2018-06-25 | End: 2018-06-25 | Stop reason: HOSPADM

## 2018-06-25 RX ADMIN — CEFEPIME 2 G: 2 INJECTION, POWDER, FOR SOLUTION INTRAVENOUS at 06:06

## 2018-06-25 RX ADMIN — PANTOPRAZOLE SODIUM 40 MG: 40 TABLET, DELAYED RELEASE ORAL at 09:06

## 2018-06-25 RX ADMIN — HEPARIN 300 UNITS: 100 SYRINGE at 02:06

## 2018-06-25 RX ADMIN — MORPHINE SULFATE 30 MG: 15 TABLET, EXTENDED RELEASE ORAL at 09:06

## 2018-06-25 NOTE — NURSING
Pt discharged per Dr. Nazario at this time.    O2- Pt on room air with oxygen saturation 100%.  Activity-Pt ambulates independently.  Devices- Pt not being sent home on any devices.   Tolerating-Pt tolerating PO diet and medication.  Elimination-Pt voiding and having bowel movements independently.  Self Care- Pt able to perform personal hygiene independently.  Teaching- Pt instructed on when to take home meds.     Right chest port a cath flushed with normal saline and heparin per protocol and de-accessed.  Dressed site with band-aid.  Discharge instructions, AVS and prescriptions explained and given to pt.  All questions answered and pt verbalized understanding.  Pt refused transport and ambulated downstairs where family member waiting to drive him home.

## 2018-06-25 NOTE — ASSESSMENT & PLAN NOTE
- Tmax at home 101.7, low grade fevers here last 24 hours  - No localizing symptoms and benign exam  - UA and CXR without signs of infection  - ANC ~40 on admit, received Neulasta on 6/15/2018  - BC NGTD  - Received sepsis bolus in ED, tachy on admit prior to bolus, but since bolus VSS  - 06/25/2018 afebrile, ANC > 1000, will d/c cefepime

## 2018-06-25 NOTE — SUBJECTIVE & OBJECTIVE
Interval History: No acute events overnight.  No fevers or diarrhea.    Oncology Treatment Plan:   OP TESTICULAR EP    Medications:  Continuous Infusions:  Scheduled Meds:   gabapentin  300 mg Oral TID    lidocaine  1 patch Transdermal Q24H    morphine  30 mg Oral BID    pantoprazole  40 mg Oral Daily    polyethylene glycol  17 g Oral Daily     PRN Meds:acetaminophen, ALPRAZolam, alteplase, baclofen, dextrose 50%, dextrose 50%, diphenhydrAMINE, glucagon (human recombinant), glucose, glucose, magic mouthwash (diphenhydrAMINE 12.5 mg/5 mL 20 mL, aluminum & magnesium hydroxide-simethicone (MYLANTA) 20 mL, lidocaine HCl 2% (XYLOCAINE) 20 mL) solution, morphine, ondansetron, promethazine (PHENERGAN) IVPB, sodium chloride 0.9%     Review of Systems   Constitutional: Negative for chills and fever.   HENT: Negative for mouth sores and sore throat.    Eyes: Negative for photophobia.   Respiratory: Negative for cough and shortness of breath.    Cardiovascular: Negative for chest pain and leg swelling.   Gastrointestinal: Positive for abdominal pain (chronic) and nausea. Negative for diarrhea and vomiting.   Genitourinary: Negative for dysuria and hematuria.   Musculoskeletal: Negative for neck pain and neck stiffness.   Skin: Negative for color change and wound.   Neurological: Negative for seizures, speech difficulty, weakness and headaches.   Psychiatric/Behavioral: Negative for confusion and dysphoric mood.     Objective:     Vital Signs (Most Recent):  Temp: 97.8 °F (36.6 °C) (06/25/18 0739)  Pulse: 65 (06/25/18 0739)  Resp: 18 (06/25/18 0739)  BP: (!) 100/54 (06/25/18 0739)  SpO2: 99 % (06/25/18 0739) Vital Signs (24h Range):  Temp:  [97.6 °F (36.4 °C)-98.4 °F (36.9 °C)] 97.8 °F (36.6 °C)  Pulse:  [65-96] 65  Resp:  [16-20] 18  SpO2:  [97 %-99 %] 99 %  BP: ()/(51-61) 100/54     Weight:  (pt refuses to get up for weight)  Body mass index is 23.03 kg/m².  Body surface area is 1.78 meters  squared.      Intake/Output Summary (Last 24 hours) at 06/25/18 0907  Last data filed at 06/24/18 1548   Gross per 24 hour   Intake             1390 ml   Output                0 ml   Net             1390 ml       Physical Exam   Constitutional: He is oriented to person, place, and time. He appears well-developed and well-nourished. No distress.   HENT:   Head: Normocephalic and atraumatic.   Eyes: Pupils are equal, round, and reactive to light.   Neck: Neck supple.   Cardiovascular: Normal rate, regular rhythm, normal heart sounds and intact distal pulses.    Pulmonary/Chest: Effort normal and breath sounds normal. No respiratory distress. He has no wheezes.   Abdominal: Soft. Bowel sounds are normal. He exhibits no distension. There is no tenderness. There is no guarding.   Musculoskeletal: Normal range of motion. He exhibits no edema.   Neurological: He is alert and oriented to person, place, and time.   Skin: Skin is warm and dry. He is not diaphoretic.   Psychiatric: He has a normal mood and affect.       Significant Labs:   CBC:   Recent Labs  Lab 06/24/18  0330 06/25/18  0418   WBC 1.93* 3.14*   HGB 8.2* 9.2*   HCT 24.2* 26.9*   PLT 33* 48*    and CMP:   Recent Labs  Lab 06/24/18  0330 06/25/18  0418    138   K 3.8 4.3   CL 98 102   CO2 30* 30*   GLU 86 92   BUN 8 14   CREATININE 0.6 0.7   CALCIUM 9.3 9.6   PROT 5.8* 6.1   ALBUMIN 3.3* 3.4*   BILITOT 0.4 0.3   ALKPHOS 59 60   AST 8* 9*   ALT 11 11   ANIONGAP 9 6*   EGFRNONAA >60.0 >60.0       Diagnostic Results:  None

## 2018-06-25 NOTE — PROGRESS NOTES
ATTENDING NOTE, ONCOLOGY INPATIENT TEAM    Patient seen and examined, chart reviewed.  Full note to follow by claudia.  Appears comfortable, in NAD.  Lungs are clear to auscultation.  Abdomen is soft, nontender.  Labs reviewed.  WBCs are 3,100 /mm3,a nd his platelet count is 48,000 /mm3.    PLAN  We will discharge him home today and he can follow up with Dr. Rojas next week    Jose Angel Noland MD

## 2018-06-25 NOTE — NURSING
Spoke with Dr Schaefer via telephone per pt request for longer acting pain medication for chronic back pain unrelieved with scheduled PO morphine 30 mg.

## 2018-06-25 NOTE — PLAN OF CARE
"Problem: Patient Care Overview  Goal: Plan of Care Review  Outcome: Ongoing (interventions implemented as appropriate)  Patient remains free from falls and injury this shift. Bed in low, locked position with call light in reach. Patient encouraged to call for assistance when getting out of bed. Patient verbalized understanding. Pt had complaints of chronic back pain unrelieved with scheduled morphine, requested long acting pain medication, pt refused lidocaine patch and gabapentin saying " they didn't work and had too many side effects".  All belongings within reach will continue to monitor.'      "

## 2018-06-25 NOTE — PROGRESS NOTES
"[facility]  Hematology/Oncology  Progress Note    Patient Name: Alejandro Neff  Admission Date: 6/21/2018  Hospital Length of Stay: 3 days  Code Status: Full Code     Subjective:     HPI:  23 years old male with Hx of mediastinal seminoma s/p 3 cycles of OP TESTICULAR EP protocol. presented to the ER today with one day of documented elevated fever at home ~100, started in the AM with progressive weakness. He reported abdominal pain, sharp, in the below the belly button for the last 2 weeks, constant all day long, sharp, relived with narcotics and not moving. Denied pain wake him from sleep. Also reported no diarrhea, blood per rectum, painful defecation or pain related to food. He had appendectomy 5/2018.   Patient denied any congestion, mouth sores or odynophagia. Also denied cough, SOB, burning urine. He reported constant non change chest pain before the Dx of the Ca.   In the ER he was tachycardiac, LA normal, given fluids and started on vanc and ceftriaxone. CBC comes back with ANC ~40, acute drop from 2 days ago.          Oncologic History:  1. Mr. Hampton is a 22 yo man who first presented with chest pain and underwent CT chest on 2/14/18, which showed a 6 x 3.5 cm mediastinal mass. It abuts the aorta and pulmonary artery. Biopsy was done on 3/1/18. Pathology (reviewed at [facility]) showed germ cell tumor, most consistent with seminoma.   2. HCG, LDH, AFP on 3/22/18 all normal.   3. Testicular US 3/26/18 showed no testicular masses. CT C/A/P on 3/26/18 showed "stable appearance of anterior mediastinal mass consistent with provided history of seminoma. Several punctate sclerotic foci are noted in the pelvis at the right pubic bone, right ischial tuberosity, and left ilium adjacent to the sacroiliac joint.  These are strongly favored to reflect benign bone islands although metastatic disease could have a similar appearance and close follow-up is recommended. Several benign Schmorl's " "nodes are noted in the thoracic spine." Bone scan on 3/28/18 was negative for bone mets.   4. Audiogram on 4/6/18 normal. Followed by ENT.   5. Given his smoking history, I recommended 4 cycles of EP. EP cycle 1 day 1 on 4/16/18. Complicated by hospitalization for neutropenic fever 4/26-4/29. No source of infections identified. Treated with antibiotics empirically and improved.   6. Cycle 2 of EP started on on 5/7/18. Hospitalized during the first weekend for intractable nausea.   7. Restaging CT scan on 5/24/18 showed "Interval development of dilated appearance of the appendix with stranding of the periappendiceal fat.  Findings are worrisome for appendicitis". Patient did have abdominal pain and tenderness. Admitted and got appendectomy on 5/24/18.  8. Cycle 3 of EP 6/11/18-6/15/18    Interval History: No acute events overnight.  No fevers or diarrhea.    Oncology Treatment Plan:   OP TESTICULAR EP    Medications:  Continuous Infusions:  Scheduled Meds:   gabapentin  300 mg Oral TID    lidocaine  1 patch Transdermal Q24H    morphine  30 mg Oral BID    pantoprazole  40 mg Oral Daily    polyethylene glycol  17 g Oral Daily     PRN Meds:acetaminophen, ALPRAZolam, alteplase, baclofen, dextrose 50%, dextrose 50%, diphenhydrAMINE, glucagon (human recombinant), glucose, glucose, magic mouthwash (diphenhydrAMINE 12.5 mg/5 mL 20 mL, aluminum & magnesium hydroxide-simethicone (MYLANTA) 20 mL, lidocaine HCl 2% (XYLOCAINE) 20 mL) solution, morphine, ondansetron, promethazine (PHENERGAN) IVPB, sodium chloride 0.9%     Review of Systems   Constitutional: Negative for chills and fever.   HENT: Negative for mouth sores and sore throat.    Eyes: Negative for photophobia.   Respiratory: Negative for cough and shortness of breath.    Cardiovascular: Negative for chest pain and leg swelling.   Gastrointestinal: Positive for abdominal pain (chronic) and nausea. Negative for diarrhea and vomiting.   Genitourinary: Negative for " dysuria and hematuria.   Musculoskeletal: Negative for neck pain and neck stiffness.   Skin: Negative for color change and wound.   Neurological: Negative for seizures, speech difficulty, weakness and headaches.   Psychiatric/Behavioral: Negative for confusion and dysphoric mood.     Objective:     Vital Signs (Most Recent):  Temp: 97.8 °F (36.6 °C) (06/25/18 0739)  Pulse: 65 (06/25/18 0739)  Resp: 18 (06/25/18 0739)  BP: (!) 100/54 (06/25/18 0739)  SpO2: 99 % (06/25/18 0739) Vital Signs (24h Range):  Temp:  [97.6 °F (36.4 °C)-98.4 °F (36.9 °C)] 97.8 °F (36.6 °C)  Pulse:  [65-96] 65  Resp:  [16-20] 18  SpO2:  [97 %-99 %] 99 %  BP: ()/(51-61) 100/54     Weight:  (pt refuses to get up for weight)  Body mass index is 23.03 kg/m².  Body surface area is 1.78 meters squared.      Intake/Output Summary (Last 24 hours) at 06/25/18 0907  Last data filed at 06/24/18 1548   Gross per 24 hour   Intake             1390 ml   Output                0 ml   Net             1390 ml       Physical Exam   Constitutional: He is oriented to person, place, and time. He appears well-developed and well-nourished. No distress.   HENT:   Head: Normocephalic and atraumatic.   Eyes: Pupils are equal, round, and reactive to light.   Neck: Neck supple.   Cardiovascular: Normal rate, regular rhythm, normal heart sounds and intact distal pulses.    Pulmonary/Chest: Effort normal and breath sounds normal. No respiratory distress. He has no wheezes.   Abdominal: Soft. Bowel sounds are normal. He exhibits no distension. There is no tenderness. There is no guarding.   Musculoskeletal: Normal range of motion. He exhibits no edema.   Neurological: He is alert and oriented to person, place, and time.   Skin: Skin is warm and dry. He is not diaphoretic.   Psychiatric: He has a normal mood and affect.       Significant Labs:   CBC:   Recent Labs  Lab 06/24/18  0330 06/25/18  0418   WBC 1.93* 3.14*   HGB 8.2* 9.2*   HCT 24.2* 26.9*   PLT 33* 48*    and  CMP:   Recent Labs  Lab 06/24/18  0330 06/25/18  0418    138   K 3.8 4.3   CL 98 102   CO2 30* 30*   GLU 86 92   BUN 8 14   CREATININE 0.6 0.7   CALCIUM 9.3 9.6   PROT 5.8* 6.1   ALBUMIN 3.3* 3.4*   BILITOT 0.4 0.3   ALKPHOS 59 60   AST 8* 9*   ALT 11 11   ANIONGAP 9 6*   EGFRNONAA >60.0 >60.0       Diagnostic Results:  None    Assessment/Plan:     * Neutropenia with fever    - Tmax at home 101.7, low grade fevers here last 24 hours  - No localizing symptoms and benign exam  - UA and CXR without signs of infection  - ANC ~40 on admit, received Neulasta on 6/15/2018  - BC NGTD  - Received sepsis bolus in ED, tachy on admit prior to bolus, but since bolus VSS  - 06/25/2018 afebrile, ANC > 1000, will d/c cefepime        Generalized abdominal pain    - Chronic   - CT abdomen and US non reveling   - Continue home pain regimen (MS Contin + morphine IR 30mg q6 PRN)  - 6/23: Decreased MS Contin to 30mg at patient's request as he feels 45mg made him foggy  - Started on bowel regimen        Anemia associated with chemotherapy    - Stable, monitor  - Transfuse as needed to keep Hb >7.0        Primary mediastinal seminoma    - Testicular US 3/26/18 showed no testicular masses, HCG, LDH, AFP on 3/22/18 all normal.   - Bone scan on 3/28/18 was negative for bone mets.   - s/p 3 cycles of OP TESTICULAR EP protocol                 Eder Luna MD  Hematology/Oncology  [facility]      ATTENDING NOTE, ONCOLOGY INPATIENT TEAM    As above; please refer to my note of same day for our assessment and plan        Jose Angel Noland MD

## 2018-06-25 NOTE — NURSING
Called Dr Schaefer, Pt BP 94/51, asymptomatic, also requested order for cathflo for no blood return from port access to be able to draw am labs.

## 2018-06-26 NOTE — DISCHARGE SUMMARY
"[facility]  Hematology/Oncology  Discharge Summary      Patient Name: Alejandro Neff   MRN: [id number]  Admission Date: 6/21/2018  Hospital Length of Stay: 3 days  Discharge Date and Time: 6/25/2018  4:28 PM  Attending Physician: No att. providers found   Discharging Provider: Eder Luna MD  Primary Care Provider: Nata Hernandez MD    HPI: 23 years old male with Hx of mediastinal seminoma s/p 3 cycles of OP TESTICULAR EP protocol. presented to the ER today with one day of documented elevated fever at home ~100, started in the AM with progressive weakness. He reported abdominal pain, sharp, in the below the belly button for the last 2 weeks, constant all day long, sharp, relived with narcotics and not moving. Denied pain wake him from sleep. Also reported no diarrhea, blood per rectum, painful defecation or pain related to food. He had appendectomy 5/2018.   Patient denied any congestion, mouth sores or odynophagia. Also denied cough, SOB, burning urine. He reported constant non change chest pain before the Dx of the Ca.   In the ER he was tachycardiac, LA normal, given fluids and started on vanc and ceftriaxone. CBC comes back with ANC ~40, acute drop from 2 days ago.          Oncologic History:  1. Mr. Hampton is a 22 yo man who first presented with chest pain and underwent CT chest on 2/14/18, which showed a 6 x 3.5 cm mediastinal mass. It abuts the aorta and pulmonary artery. Biopsy was done on 3/1/18. Pathology (reviewed at [facility]) showed germ cell tumor, most consistent with seminoma.   2. HCG, LDH, AFP on 3/22/18 all normal.   3. Testicular US 3/26/18 showed no testicular masses. CT C/A/P on 3/26/18 showed "stable appearance of anterior mediastinal mass consistent with provided history of seminoma. Several punctate sclerotic foci are noted in the pelvis at the right pubic bone, right ischial tuberosity, and left ilium adjacent to the sacroiliac joint.  These are strongly " "favored to reflect benign bone islands although metastatic disease could have a similar appearance and close follow-up is recommended. Several benign Schmorl's nodes are noted in the thoracic spine." Bone scan on 3/28/18 was negative for bone mets.   4. Audiogram on 4/6/18 normal. Followed by ENT.   5. Given his smoking history, I recommended 4 cycles of EP. EP cycle 1 day 1 on 4/16/18. Complicated by hospitalization for neutropenic fever 4/26-4/29. No source of infections identified. Treated with antibiotics empirically and improved.   6. Cycle 2 of EP started on on 5/7/18. Hospitalized during the first weekend for intractable nausea.   7. Restaging CT scan on 5/24/18 showed "Interval development of dilated appearance of the appendix with stranding of the periappendiceal fat.  Findings are worrisome for appendicitis". Patient did have abdominal pain and tenderness. Admitted and got appendectomy on 5/24/18.  8. Cycle 3 of EP 6/11/18-6/15/18    * No surgery found *     Hospital Course: 06/22/2018: Admitted to Medical Oncology.  06/23/2018: Fever to 101.7. Still no source identified. Cultures NGTD. Continues to report abdominal pain and using a lot of opioids, but no objective signs of pain and exam benign.  06/24/2018: Ongoing low grade fevers yesterday, no growth on cultures. ANC improved to 600 today.  06/25/2018: ANC > 1k today. No fevers since 6/23/18.  Discharged. No need for antibiotics as fever was likely chemotherapy related.  Downtitrated his long acting morphine as it was making him too foggy.    Consults:     Significant Diagnostic Studies: Labs:   CMP   Recent Labs  Lab 06/25/18  0418      K 4.3      CO2 30*   GLU 92   BUN 14   CREATININE 0.7   CALCIUM 9.6   PROT 6.1   ALBUMIN 3.4*   BILITOT 0.3   ALKPHOS 60   AST 9*   ALT 11   ANIONGAP 6*   ESTGFRAFRICA >60.0   EGFRNONAA >60.0    and CBC   Recent Labs  Lab 06/25/18  0418   WBC 3.14*   HGB 9.2*   HCT 26.9*   PLT 48*       Pending Diagnostic " Studies:     None        Final Active Diagnoses:    Diagnosis Date Noted POA    PRINCIPAL PROBLEM:  Neutropenia with fever [D70.9, R50.81] 06/22/2018 Yes    Generalized abdominal pain [R10.84] 06/22/2018 Yes    Anemia associated with chemotherapy [D64.81, T45.1X5A] 04/27/2018 Yes    Primary mediastinal seminoma [C38.3] 03/01/2018 Yes      Problems Resolved During this Admission:    Diagnosis Date Noted Date Resolved POA      Discharged Condition: fair    Disposition: Home or Self Care    Follow Up:  Follow-up Information     Suha Rojas MD On 7/2/2018.    Specialty:  Hematology and Oncology  Why:  7AM Dr. Zhou, 8AM appointment, 9AM chemo  Contact information:  [address]  [place] [address]  [phone]             Chemo On 7/3/2018.    Why:  10AM chemo           Chemo On 7/5/2018.    Why:  9AM chemo           chemo On 7/6/2018.    Why:  1030AM chemo           chemo On 7/9/2018.    Why:  10AM chemo           chemo On 7/10/2018.    Why:  930AM chemo           Lourdes Webb Jr, MD On 6/27/2018.    Specialties:  General Surgery, Bariatrics  Why:   2:15 pm gen surg lourdes webb postop  Contact information:  [address] [address]  [phone]                 Patient Instructions:     Call MD for:  temperature >100.4     Call MD for:  persistent nausea and vomiting or diarrhea     Call MD for:  severe uncontrolled pain     Call MD for:  redness, tenderness, or signs of infection (pain, swelling, redness, odor or green/yellow discharge around incision site)     Call MD for:  difficulty breathing or increased cough     Call MD for:  severe persistent headache     Call MD for:  worsening rash     Call MD for:  persistent dizziness, light-headedness, or visual disturbances     Call MD for:  increased confusion or weakness       Medications:  Reconciled Home Medications:      Medication List      CHANGE how you take these medications    * morphine 15 MG  tablet  Commonly known as:  MSIR  Take 2 tablets (30 mg total) by mouth every 6 (six) hours as needed.  What changed:  Another medication with the same name was changed. Make sure you understand how and when to take each.     * morphine 15 MG 12 hr tablet  Commonly known as:  MS CONTIN  Take 2 tablets (30 mg total) by mouth 2 (two) times daily.  What changed:  how much to take        * This list has 2 medication(s) that are the same as other medications prescribed for you. Read the directions carefully, and ask your doctor or other care provider to review them with you.            CONTINUE taking these medications    albuterol 90 mcg/actuation inhaler  Inhale 2 puffs into the lungs every 6 (six) hours as needed for Wheezing.     ALPRAZolam 0.5 MG tablet  Commonly known as:  XANAX  Take 1.5 tablets (0.75 mg total) by mouth 3 (three) times daily as needed for Insomnia or Anxiety.     baclofen 10 MG tablet  Commonly known as:  LIORESAL  Take 1 tablet (10 mg total) by mouth 2 (two) times daily as needed for hiccups.     diphenhydrAMINE-aluminum-magnesium hydroxide-simethicone-lidocaine HCl 2%  Swish and spit 15 mLs every 4 (four) hours as needed (mouth sore).     gabapentin 300 MG capsule  Commonly known as:  NEURONTIN  Take 1 capsule (300 mg total) by mouth 3 (three) times daily.     MULTI VITAMIN ORAL  Take by mouth once daily.     OLANZapine 10 MG tablet  Commonly known as:  ZyPREXA  Take 1 tablet (10 mg total) by mouth nightly. Take 1 tablet 10 mg nightly from day 1 of chemo till day 8 (continue 3 days after chemo)     ondansetron 4 MG tablet  Commonly known as:  ZOFRAN  Take 1 tablet (4 mg total) by mouth every 6 (six) hours as needed for Nausea.     pantoprazole 40 MG tablet  Commonly known as:  PROTONIX  Take 1 tablet (40 mg total) by mouth once daily.     polyethylene glycol 17 gram/dose powder  Commonly known as:  GLYCOLAX  Mix 1 capful (17grams) in liquid and take by mouth once daily     * PROMETHEGAN 25 MG  suppository  Generic drug:  promethazine  Place 1 suppository (25 mg total) rectally every 6 (six) hours as needed for Nausea.     * promethazine 25 MG tablet  Commonly known as:  PHENERGAN  Take 1 tablet (25 mg total) by mouth every 4 (four) hours as needed for Nausea.     SANCUSO 3.1 mg/24 hour  Generic drug:  granisetron  place 1 patch onto the skin 24 hours before chemo, each patch can be worn up to 7 days     zolpidem 5 MG Tab  Commonly known as:  AMBIEN  Take 1 tablet (5 mg total) by mouth nightly as needed (insomnia).        * This list has 2 medication(s) that are the same as other medications prescribed for you. Read the directions carefully, and ask your doctor or other care provider to review them with you.            STOP taking these medications    aluminum-magnesium hydroxide-simethicone 200-200-20 mg/5 mL Susp  Commonly known as:  MAALOX     EPINEPHrine 0.3 mg/0.3 mL Atin  Commonly known as:  EPIPEN            Eder Luna MD  Hematology/Oncology  [facility]

## 2018-06-27 LAB — BACTERIA BLD CULT: NORMAL

## 2018-07-02 ENCOUNTER — INFUSION (OUTPATIENT)
Dept: INFUSION THERAPY | Facility: HOSPITAL | Age: 23
End: 2018-07-02
Attending: INTERNAL MEDICINE
Payer: COMMERCIAL

## 2018-07-02 ENCOUNTER — OFFICE VISIT (OUTPATIENT)
Dept: HEMATOLOGY/ONCOLOGY | Facility: CLINIC | Age: 23
End: 2018-07-02
Payer: COMMERCIAL

## 2018-07-02 VITALS
TEMPERATURE: 98 F | DIASTOLIC BLOOD PRESSURE: 74 MMHG | HEIGHT: 67 IN | RESPIRATION RATE: 18 BRPM | BODY MASS INDEX: 23.53 KG/M2 | HEART RATE: 104 BPM | OXYGEN SATURATION: 99 % | SYSTOLIC BLOOD PRESSURE: 142 MMHG | WEIGHT: 149.94 LBS

## 2018-07-02 VITALS
HEART RATE: 95 BPM | BODY MASS INDEX: 23.39 KG/M2 | RESPIRATION RATE: 18 BRPM | DIASTOLIC BLOOD PRESSURE: 55 MMHG | HEIGHT: 67 IN | WEIGHT: 149 LBS | TEMPERATURE: 98 F | SYSTOLIC BLOOD PRESSURE: 116 MMHG

## 2018-07-02 DIAGNOSIS — C80.1 GERM CELL TUMOR: Primary | ICD-10-CM

## 2018-07-02 DIAGNOSIS — D64.9 ANEMIA, UNSPECIFIED TYPE: ICD-10-CM

## 2018-07-02 DIAGNOSIS — R11.0 NAUSEA: ICD-10-CM

## 2018-07-02 DIAGNOSIS — C38.3 PRIMARY MEDIASTINAL SEMINOMA: Primary | ICD-10-CM

## 2018-07-02 DIAGNOSIS — D70.9 NEUTROPENIA WITH FEVER: ICD-10-CM

## 2018-07-02 DIAGNOSIS — F41.9 ANXIETY: ICD-10-CM

## 2018-07-02 DIAGNOSIS — R50.81 NEUTROPENIA WITH FEVER: ICD-10-CM

## 2018-07-02 DIAGNOSIS — Z51.11 ENCOUNTER FOR ANTINEOPLASTIC CHEMOTHERAPY: ICD-10-CM

## 2018-07-02 DIAGNOSIS — C80.1 GERM CELL TUMOR: ICD-10-CM

## 2018-07-02 DIAGNOSIS — G89.3 CANCER RELATED PAIN: ICD-10-CM

## 2018-07-02 PROCEDURE — 96413 CHEMO IV INFUSION 1 HR: CPT

## 2018-07-02 PROCEDURE — 63600175 PHARM REV CODE 636 W HCPCS: Performed by: INTERNAL MEDICINE

## 2018-07-02 PROCEDURE — 3008F BODY MASS INDEX DOCD: CPT | Mod: CPTII,S$GLB,, | Performed by: INTERNAL MEDICINE

## 2018-07-02 PROCEDURE — 99999 PR PBB SHADOW E&M-EST. PATIENT-LVL III: CPT | Mod: PBBFAC,,, | Performed by: INTERNAL MEDICINE

## 2018-07-02 PROCEDURE — A4216 STERILE WATER/SALINE, 10 ML: HCPCS | Performed by: INTERNAL MEDICINE

## 2018-07-02 PROCEDURE — 99215 OFFICE O/P EST HI 40 MIN: CPT | Mod: S$GLB,,, | Performed by: INTERNAL MEDICINE

## 2018-07-02 PROCEDURE — 96417 CHEMO IV INFUS EACH ADDL SEQ: CPT

## 2018-07-02 PROCEDURE — 96361 HYDRATE IV INFUSION ADD-ON: CPT

## 2018-07-02 PROCEDURE — 96367 TX/PROPH/DG ADDL SEQ IV INF: CPT

## 2018-07-02 PROCEDURE — 25000003 PHARM REV CODE 250: Performed by: INTERNAL MEDICINE

## 2018-07-02 RX ORDER — HEPARIN 100 UNIT/ML
500 SYRINGE INTRAVENOUS
Status: CANCELLED | OUTPATIENT
Start: 2018-07-02

## 2018-07-02 RX ORDER — SODIUM CHLORIDE 0.9 % (FLUSH) 0.9 %
10 SYRINGE (ML) INJECTION
Status: CANCELLED | OUTPATIENT
Start: 2018-07-04

## 2018-07-02 RX ORDER — SODIUM CHLORIDE 0.9 % (FLUSH) 0.9 %
10 SYRINGE (ML) INJECTION
Status: CANCELLED | OUTPATIENT
Start: 2018-07-02

## 2018-07-02 RX ORDER — PROMETHAZINE HYDROCHLORIDE 25 MG/ML
25 INJECTION, SOLUTION INTRAMUSCULAR; INTRAVENOUS
Status: CANCELLED
Start: 2018-07-03 | End: 2018-07-03

## 2018-07-02 RX ORDER — SODIUM CHLORIDE 0.9 % (FLUSH) 0.9 %
10 SYRINGE (ML) INJECTION
Status: CANCELLED | OUTPATIENT
Start: 2018-07-12

## 2018-07-02 RX ORDER — HEPARIN 100 UNIT/ML
500 SYRINGE INTRAVENOUS
Status: CANCELLED | OUTPATIENT
Start: 2018-07-12

## 2018-07-02 RX ORDER — PROMETHAZINE HYDROCHLORIDE 25 MG/ML
25 INJECTION, SOLUTION INTRAMUSCULAR; INTRAVENOUS
Status: CANCELLED
Start: 2018-07-08 | End: 2018-07-07

## 2018-07-02 RX ORDER — HEPARIN 100 UNIT/ML
500 SYRINGE INTRAVENOUS
Status: CANCELLED | OUTPATIENT
Start: 2018-07-04

## 2018-07-02 RX ORDER — PROMETHAZINE HYDROCHLORIDE 25 MG/ML
25 INJECTION, SOLUTION INTRAMUSCULAR; INTRAVENOUS
Status: DISCONTINUED | OUTPATIENT
Start: 2018-07-02 | End: 2018-07-02 | Stop reason: HOSPADM

## 2018-07-02 RX ORDER — ONDANSETRON HCL IN 0.9 % NACL 8 MG/50 ML
8 INTRAVENOUS SOLUTION, PIGGYBACK (ML) INTRAVENOUS
Status: CANCELLED
Start: 2018-07-03 | End: 2018-07-03

## 2018-07-02 RX ORDER — SODIUM CHLORIDE 0.9 % (FLUSH) 0.9 %
10 SYRINGE (ML) INJECTION
Status: DISCONTINUED | OUTPATIENT
Start: 2018-07-02 | End: 2018-07-02 | Stop reason: HOSPADM

## 2018-07-02 RX ORDER — SODIUM CHLORIDE 0.9 % (FLUSH) 0.9 %
10 SYRINGE (ML) INJECTION
Status: CANCELLED | OUTPATIENT
Start: 2018-07-07

## 2018-07-02 RX ORDER — PROMETHAZINE HYDROCHLORIDE 25 MG/ML
25 INJECTION, SOLUTION INTRAMUSCULAR; INTRAVENOUS
Status: CANCELLED
Start: 2018-07-06 | End: 2018-07-05

## 2018-07-02 RX ORDER — ONDANSETRON HCL IN 0.9 % NACL 8 MG/50 ML
8 INTRAVENOUS SOLUTION, PIGGYBACK (ML) INTRAVENOUS
Status: CANCELLED
Start: 2018-07-07 | End: 2018-07-06

## 2018-07-02 RX ORDER — PROMETHAZINE HYDROCHLORIDE 25 MG/ML
25 INJECTION, SOLUTION INTRAMUSCULAR; INTRAVENOUS
Status: CANCELLED
Start: 2018-07-07 | End: 2018-07-06

## 2018-07-02 RX ORDER — PROMETHAZINE HYDROCHLORIDE 25 MG/ML
25 INJECTION, SOLUTION INTRAMUSCULAR; INTRAVENOUS
Status: CANCELLED
Start: 2018-07-02 | End: 2018-07-02

## 2018-07-02 RX ORDER — ONDANSETRON 2 MG/ML
8 INJECTION INTRAMUSCULAR; INTRAVENOUS ONCE
Status: CANCELLED
Start: 2018-07-12 | End: 2018-07-07

## 2018-07-02 RX ORDER — ALPRAZOLAM 0.5 MG/1
1 TABLET ORAL 3 TIMES DAILY PRN
Qty: 60 TABLET | Refills: 0 | Status: SHIPPED | OUTPATIENT
Start: 2018-07-02 | End: 2018-10-01

## 2018-07-02 RX ORDER — FENTANYL 25 UG/1
1 PATCH TRANSDERMAL
Qty: 10 PATCH | Refills: 0 | Status: ON HOLD | OUTPATIENT
Start: 2018-07-02 | End: 2018-07-25 | Stop reason: HOSPADM

## 2018-07-02 RX ORDER — HEPARIN 100 UNIT/ML
500 SYRINGE INTRAVENOUS
Status: CANCELLED | OUTPATIENT
Start: 2018-07-06

## 2018-07-02 RX ORDER — SODIUM CHLORIDE 9 MG/ML
INJECTION, SOLUTION INTRAVENOUS CONTINUOUS
Status: CANCELLED
Start: 2018-07-12

## 2018-07-02 RX ORDER — ONDANSETRON 2 MG/ML
8 INJECTION INTRAMUSCULAR; INTRAVENOUS ONCE
Status: CANCELLED
Start: 2018-07-07 | End: 2018-07-06

## 2018-07-02 RX ORDER — ONDANSETRON 2 MG/ML
8 INJECTION INTRAMUSCULAR; INTRAVENOUS ONCE
Status: CANCELLED
Start: 2018-07-06 | End: 2018-07-05

## 2018-07-02 RX ORDER — ONDANSETRON HCL IN 0.9 % NACL 8 MG/50 ML
8 INTRAVENOUS SOLUTION, PIGGYBACK (ML) INTRAVENOUS
Status: COMPLETED | OUTPATIENT
Start: 2018-07-02 | End: 2018-07-02

## 2018-07-02 RX ORDER — ONDANSETRON HCL IN 0.9 % NACL 8 MG/50 ML
8 INTRAVENOUS SOLUTION, PIGGYBACK (ML) INTRAVENOUS
Status: CANCELLED
Start: 2018-07-02 | End: 2018-07-02

## 2018-07-02 RX ORDER — HEPARIN 100 UNIT/ML
500 SYRINGE INTRAVENOUS
Status: DISCONTINUED | OUTPATIENT
Start: 2018-07-02 | End: 2018-07-02 | Stop reason: HOSPADM

## 2018-07-02 RX ORDER — SODIUM CHLORIDE 0.9 % (FLUSH) 0.9 %
10 SYRINGE (ML) INJECTION
Status: CANCELLED | OUTPATIENT
Start: 2018-07-03

## 2018-07-02 RX ORDER — MORPHINE SULFATE 15 MG/1
30 TABLET ORAL EVERY 6 HOURS PRN
Qty: 90 TABLET | Refills: 0 | Status: SHIPPED | OUTPATIENT
Start: 2018-07-02 | End: 2018-10-01

## 2018-07-02 RX ORDER — PROMETHAZINE HYDROCHLORIDE 25 MG/ML
25 INJECTION, SOLUTION INTRAMUSCULAR; INTRAVENOUS
Status: CANCELLED
Start: 2018-07-04 | End: 2018-07-04

## 2018-07-02 RX ORDER — ONDANSETRON HCL IN 0.9 % NACL 8 MG/50 ML
8 INTRAVENOUS SOLUTION, PIGGYBACK (ML) INTRAVENOUS
Status: CANCELLED
Start: 2018-07-06 | End: 2018-07-05

## 2018-07-02 RX ORDER — ONDANSETRON 2 MG/ML
8 INJECTION INTRAMUSCULAR; INTRAVENOUS ONCE
Status: DISCONTINUED | OUTPATIENT
Start: 2018-07-02 | End: 2018-07-02 | Stop reason: HOSPADM

## 2018-07-02 RX ORDER — ONDANSETRON 2 MG/ML
8 INJECTION INTRAMUSCULAR; INTRAVENOUS ONCE
Status: CANCELLED
Start: 2018-07-03 | End: 2018-07-03

## 2018-07-02 RX ORDER — HEPARIN 100 UNIT/ML
500 SYRINGE INTRAVENOUS
Status: CANCELLED | OUTPATIENT
Start: 2018-07-03

## 2018-07-02 RX ORDER — ONDANSETRON HCL IN 0.9 % NACL 8 MG/50 ML
8 INTRAVENOUS SOLUTION, PIGGYBACK (ML) INTRAVENOUS
Status: CANCELLED
Start: 2018-07-04 | End: 2018-07-04

## 2018-07-02 RX ORDER — HEPARIN 100 UNIT/ML
500 SYRINGE INTRAVENOUS
Status: CANCELLED | OUTPATIENT
Start: 2018-07-07

## 2018-07-02 RX ORDER — ONDANSETRON 2 MG/ML
8 INJECTION INTRAMUSCULAR; INTRAVENOUS ONCE
Status: CANCELLED
Start: 2018-07-04 | End: 2018-07-04

## 2018-07-02 RX ORDER — ONDANSETRON 2 MG/ML
8 INJECTION INTRAMUSCULAR; INTRAVENOUS ONCE
Status: CANCELLED
Start: 2018-07-02 | End: 2018-07-02

## 2018-07-02 RX ORDER — SODIUM CHLORIDE 0.9 % (FLUSH) 0.9 %
10 SYRINGE (ML) INJECTION
Status: CANCELLED | OUTPATIENT
Start: 2018-07-06

## 2018-07-02 RX ADMIN — ETOPOSIDE 178 MG: 20 INJECTION INTRAVENOUS at 01:07

## 2018-07-02 RX ADMIN — PROMETHAZINE HYDROCHLORIDE 25 MG: 25 INJECTION INTRAMUSCULAR; INTRAVENOUS at 11:07

## 2018-07-02 RX ADMIN — SODIUM CHLORIDE 150 MG: 0.9 INJECTION, SOLUTION INTRAVENOUS at 11:07

## 2018-07-02 RX ADMIN — DEXAMETHASONE SODIUM PHOSPHATE: 4 INJECTION, SOLUTION INTRA-ARTICULAR; INTRALESIONAL; INTRAMUSCULAR; INTRAVENOUS; SOFT TISSUE at 10:07

## 2018-07-02 RX ADMIN — SODIUM CHLORIDE, PRESERVATIVE FREE 10 ML: 5 INJECTION INTRAVENOUS at 03:07

## 2018-07-02 RX ADMIN — SODIUM CHLORIDE 1000 ML: 0.9 INJECTION, SOLUTION INTRAVENOUS at 02:07

## 2018-07-02 RX ADMIN — CISPLATIN 35 MG: 100 INJECTION, SOLUTION INTRAVENOUS at 12:07

## 2018-07-02 RX ADMIN — HEPARIN 500 UNITS: 100 SYRINGE at 03:07

## 2018-07-02 RX ADMIN — ONDANSETRON HYDROCHLORIDE 8 MG: 2 INJECTION, SOLUTION INTRAMUSCULAR; INTRAVENOUS at 11:07

## 2018-07-02 RX ADMIN — SODIUM CHLORIDE 1000 ML: 0.9 INJECTION, SOLUTION INTRAVENOUS at 09:07

## 2018-07-02 NOTE — PLAN OF CARE
Problem: Chemotherapy Effects (Adult)  Goal: Signs and Symptoms of Listed Potential Problems Will be Absent, Minimized or Managed (Chemotherapy Effects)  Signs and symptoms of listed potential problems will be absent, minimized or managed by discharge/transition of care (reference Chemotherapy Effects (Adult) CPG).   Outcome: Ongoing (interventions implemented as appropriate)  Pt here for cisplatin/etoposide/ivf D1C4, labs, hx, meds, allergies reviewed. Pt with no complaints at this time, reclined in chair, continue to monitor

## 2018-07-02 NOTE — PROGRESS NOTES
"Subjective:      Patient ID: Alejandro Neff is a 23 y.o. male.     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 Mr. Hampton is a 24 yo man who first presented with chest pain and underwent CT chest on 2/14/18, which showed a 6 x 3.5 cm mediastinal mass. It abuts the aorta and pulmonary artery. Biopsy was done on 3/1/18. Pathology (reviewed at [facility]) showed germ cell tumor, most consistent with seminoma.   2. HCG, LDH, AFP on 3/22/18 all normal.   3. Testicular US 3/26/18 showed no testicular masses. CT C/A/P on 3/26/18 showed "stable appearance of anterior mediastinal mass consistent with provided history of seminoma. Several punctate sclerotic foci are noted in the pelvis at the right pubic bone, right ischial tuberosity, and left ilium adjacent to the sacroiliac joint.  These are strongly favored to reflect benign bone islands although metastatic disease could have a similar appearance and close follow-up is recommended. Several benign Schmorl's nodes are noted in the thoracic spine." Bone scan on 3/28/18 was negative for bone mets.   4. Audiogram on 4/6/18 normal. Followed by ENT.   5. Given his smoking history, I recommended 4 cycles of EP. EP cycle 1 day 1 on 4/16/18. Complicated by hospitalization for neutropenic fever 4/26-4/29. No source of infections identified. Treated with antibiotics empirically and improved.   6. Cycle 2 of EP started on on 5/7/18. Hospitalized during the first weekend for intractable nausea.   7. Restaging CT scan on 5/24/18 showed "Interval development of dilated appearance of the appendix with stranding of the periappendiceal fat.  Findings are worrisome for appendicitis". Patient had abdominal pain and tenderness. Admitted and got appendectomy on 5/24/18.  8. Cycle 3 of EP 6/11/18-6/15/18. Complicated by hospitalization for neutropenic fever 6/21-6/25     INTERVAL HISTORY:   Mr." Memo returns today for follow up of primary mediastinal seminoma. Due to start the last cycle of EP today. Again got hospitalized - for neutropenic fever. Fever subsided after counts recovered. Has been working last week and feels great. Denies other complaints.  +chronic pain but controlled.      ECO     ROS:   A ten point system review is obtained and neg except for what was stated in the interval history     Physical Examination:   Vital signs reviewed.   Gen: well hydrated, well developed, in no acute distress. Looks tired.   HEENT: normocephalic, anicteric sclerae, PERRLA, EOMI, oropharynx clear  Neck: supple, no JVD, thyromegaly, cervical or supraclavicular LAD  Lungs: CTAB, no wheezes or rales. Port site on chest clean.   Heart: RRR, no M/R/G  Abdomen: soft, non-distended, nontender, no hepatosplenomegaly, mass, or hernia. BS present  Ext: no clubbing, cyanosis, or edema  Neuro: alert and oriented x 4, no focal neuro deficit  Skin: no rash, erythema, open wound or ulcers  Psych: pleasant and appropriate mood and affect        Objective:      Laboratory Data:  Labs reviewed. Unremarkable.      Assessment/Plan:      1. Primary mediastinal seminoma    2. Germ cell tumor    3. Encounter for antineoplastic chemotherapy    4. Cancer related pain    5. Nausea    6. Anxiety    7. Neutropenia with fever    8. Anemia, unspecified type      1-3  - doing well  - cycle 4 of EP with neulasta support  - see me next Tuesday when he returns for IVF  - Will get restaging scan in a month     4  - refilled IR morphine 30 mg q6h prn for pain  - He feels MS contin is not working as well and would like to try fentanyl. Stop MS contin, switch to fentanyl 25 mcg/hr which is equivalent to his MS contin dose  - will need pain management after he is done with chemo, given his chronic pain    5.  - had intractable N/V that prompted ER visit/hospital admissions after each cycle of chemo despite maximizing on his antiemetics.    - c/w zofran, phenergan, sancuso     6.  - refilled xanax    7.  - resolved. Has been on neulasta  - knows to go to ER should he spike fever again    8.  - 2/2 chemo. Asymptomatic. Mild. monitor

## 2018-07-02 NOTE — PLAN OF CARE
Problem: Patient Care Overview  Goal: Plan of Care Review  Outcome: Ongoing (interventions implemented as appropriate)  Pt tolerated cisplatin/etoposide and ivf infusion without issue, pt to rtc 7/3/18, wants to keep port accessed, flushed and clamped for use for tomorrow, no distress noted upon d/c to home

## 2018-07-02 NOTE — Clinical Note
See me in the office next Tuesday 7/10 Please check with prior auth to see if we need prior-auth for neulasta injection. Previously receiving neulasta on-body injector but wants to get neulasta in the chemo room this time.

## 2018-07-03 ENCOUNTER — HOSPITAL ENCOUNTER (EMERGENCY)
Facility: HOSPITAL | Age: 23
Discharge: HOME | End: 2018-07-03
Attending: EMERGENCY MEDICINE
Payer: COMMERCIAL

## 2018-07-03 ENCOUNTER — INFUSION (OUTPATIENT)
Dept: INFUSION THERAPY | Facility: HOSPITAL | Age: 23
End: 2018-07-03
Attending: INTERNAL MEDICINE
Payer: COMMERCIAL

## 2018-07-03 ENCOUNTER — PATIENT MESSAGE (OUTPATIENT)
Dept: HEMATOLOGY/ONCOLOGY | Facility: CLINIC | Age: 23
End: 2018-07-03

## 2018-07-03 ENCOUNTER — TELEPHONE (OUTPATIENT)
Dept: PHARMACY | Facility: CLINIC | Age: 23
End: 2018-07-03

## 2018-07-03 ENCOUNTER — NURSE TRIAGE (OUTPATIENT)
Dept: ADMINISTRATIVE | Facility: CLINIC | Age: 23
End: 2018-07-03

## 2018-07-03 VITALS
BODY MASS INDEX: 23.39 KG/M2 | TEMPERATURE: 99 F | RESPIRATION RATE: 16 BRPM | TEMPERATURE: 98 F | WEIGHT: 150 LBS | DIASTOLIC BLOOD PRESSURE: 69 MMHG | HEART RATE: 96 BPM | HEART RATE: 126 BPM | SYSTOLIC BLOOD PRESSURE: 119 MMHG | HEIGHT: 67 IN | BODY MASS INDEX: 23.54 KG/M2 | OXYGEN SATURATION: 99 % | DIASTOLIC BLOOD PRESSURE: 73 MMHG | RESPIRATION RATE: 18 BRPM | HEIGHT: 67 IN | WEIGHT: 149 LBS | SYSTOLIC BLOOD PRESSURE: 122 MMHG

## 2018-07-03 DIAGNOSIS — R50.9 FEVER: ICD-10-CM

## 2018-07-03 DIAGNOSIS — M79.10 MYALGIA: ICD-10-CM

## 2018-07-03 DIAGNOSIS — C38.3 PRIMARY MEDIASTINAL SEMINOMA: ICD-10-CM

## 2018-07-03 DIAGNOSIS — Z91.89 AT HIGH RISK FOR INFECTION DUE TO CHEMOTHERAPY: ICD-10-CM

## 2018-07-03 DIAGNOSIS — Z92.21 AT HIGH RISK FOR INFECTION DUE TO CHEMOTHERAPY: ICD-10-CM

## 2018-07-03 DIAGNOSIS — C80.1 GERM CELL TUMOR: Primary | ICD-10-CM

## 2018-07-03 DIAGNOSIS — R50.9 ACUTE FEBRILE ILLNESS: Primary | ICD-10-CM

## 2018-07-03 LAB
ALBUMIN SERPL BCP-MCNC: 3.5 G/DL
ALP SERPL-CCNC: 56 U/L
ALT SERPL W/O P-5'-P-CCNC: 13 U/L
ANION GAP SERPL CALC-SCNC: 8 MMOL/L
AST SERPL-CCNC: 11 U/L
BASOPHILS # BLD AUTO: 0 K/UL
BASOPHILS NFR BLD: 0 %
BILIRUB SERPL-MCNC: 0.4 MG/DL
BILIRUB UR QL STRIP: NEGATIVE
BUN SERPL-MCNC: 10 MG/DL
CALCIUM SERPL-MCNC: 9.2 MG/DL
CHLORIDE SERPL-SCNC: 107 MMOL/L
CLARITY UR REFRACT.AUTO: CLEAR
CO2 SERPL-SCNC: 25 MMOL/L
COLOR UR AUTO: NORMAL
CREAT SERPL-MCNC: 0.8 MG/DL
DIFFERENTIAL METHOD: ABNORMAL
EOSINOPHIL # BLD AUTO: 0 K/UL
EOSINOPHIL NFR BLD: 0 %
ERYTHROCYTE [DISTWIDTH] IN BLOOD BY AUTOMATED COUNT: 17.2 %
EST. GFR  (AFRICAN AMERICAN): >60 ML/MIN/1.73 M^2
EST. GFR  (NON AFRICAN AMERICAN): >60 ML/MIN/1.73 M^2
GLUCOSE SERPL-MCNC: 145 MG/DL
GLUCOSE UR QL STRIP: NEGATIVE
HCT VFR BLD AUTO: 26.7 %
HGB BLD-MCNC: 8.8 G/DL
HGB UR QL STRIP: NEGATIVE
IMM GRANULOCYTES # BLD AUTO: 0.12 K/UL
IMM GRANULOCYTES NFR BLD AUTO: 1.7 %
KETONES UR QL STRIP: NEGATIVE
LEUKOCYTE ESTERASE UR QL STRIP: NEGATIVE
LYMPHOCYTES # BLD AUTO: 0.3 K/UL
LYMPHOCYTES NFR BLD: 3.9 %
MCH RBC QN AUTO: 29.2 PG
MCHC RBC AUTO-ENTMCNC: 33 G/DL
MCV RBC AUTO: 89 FL
MONOCYTES # BLD AUTO: 0.2 K/UL
MONOCYTES NFR BLD: 2.7 %
NEUTROPHILS # BLD AUTO: 6.5 K/UL
NEUTROPHILS NFR BLD: 91.7 %
NITRITE UR QL STRIP: NEGATIVE
NRBC BLD-RTO: 0 /100 WBC
PH UR STRIP: 6 [PH] (ref 5–8)
PLATELET # BLD AUTO: 277 K/UL
PMV BLD AUTO: 8.4 FL
POTASSIUM SERPL-SCNC: 4.1 MMOL/L
PROT SERPL-MCNC: 5.8 G/DL
PROT UR QL STRIP: NEGATIVE
RBC # BLD AUTO: 3.01 M/UL
SODIUM SERPL-SCNC: 140 MMOL/L
SP GR UR STRIP: 1.01 (ref 1–1.03)
URN SPEC COLLECT METH UR: NORMAL
UROBILINOGEN UR STRIP-ACNC: NEGATIVE EU/DL
WBC # BLD AUTO: 7.13 K/UL

## 2018-07-03 PROCEDURE — 99285 EMERGENCY DEPT VISIT HI MDM: CPT | Mod: ,,, | Performed by: EMERGENCY MEDICINE

## 2018-07-03 PROCEDURE — 96361 HYDRATE IV INFUSION ADD-ON: CPT

## 2018-07-03 PROCEDURE — 96413 CHEMO IV INFUSION 1 HR: CPT

## 2018-07-03 PROCEDURE — 63600175 PHARM REV CODE 636 W HCPCS: Performed by: INTERNAL MEDICINE

## 2018-07-03 PROCEDURE — 99284 EMERGENCY DEPT VISIT MOD MDM: CPT | Mod: 25

## 2018-07-03 PROCEDURE — 87040 BLOOD CULTURE FOR BACTERIA: CPT

## 2018-07-03 PROCEDURE — 25000003 PHARM REV CODE 250: Performed by: EMERGENCY MEDICINE

## 2018-07-03 PROCEDURE — 96367 TX/PROPH/DG ADDL SEQ IV INF: CPT

## 2018-07-03 PROCEDURE — 81003 URINALYSIS AUTO W/O SCOPE: CPT

## 2018-07-03 PROCEDURE — 63600175 PHARM REV CODE 636 W HCPCS: Performed by: EMERGENCY MEDICINE

## 2018-07-03 PROCEDURE — A4216 STERILE WATER/SALINE, 10 ML: HCPCS | Performed by: INTERNAL MEDICINE

## 2018-07-03 PROCEDURE — 25000003 PHARM REV CODE 250: Performed by: INTERNAL MEDICINE

## 2018-07-03 PROCEDURE — 80053 COMPREHEN METABOLIC PANEL: CPT

## 2018-07-03 PROCEDURE — 85025 COMPLETE CBC W/AUTO DIFF WBC: CPT

## 2018-07-03 PROCEDURE — 96374 THER/PROPH/DIAG INJ IV PUSH: CPT

## 2018-07-03 PROCEDURE — 96417 CHEMO IV INFUS EACH ADDL SEQ: CPT

## 2018-07-03 RX ORDER — ACETAMINOPHEN 325 MG/1
650 TABLET ORAL
Status: COMPLETED | OUTPATIENT
Start: 2018-07-03 | End: 2018-07-03

## 2018-07-03 RX ORDER — HEPARIN 100 UNIT/ML
500 SYRINGE INTRAVENOUS
Status: DISCONTINUED | OUTPATIENT
Start: 2018-07-03 | End: 2018-07-03 | Stop reason: HOSPADM

## 2018-07-03 RX ORDER — ONDANSETRON HCL IN 0.9 % NACL 8 MG/50 ML
8 INTRAVENOUS SOLUTION, PIGGYBACK (ML) INTRAVENOUS
Status: COMPLETED | OUTPATIENT
Start: 2018-07-03 | End: 2018-07-03

## 2018-07-03 RX ORDER — ONDANSETRON 2 MG/ML
8 INJECTION INTRAMUSCULAR; INTRAVENOUS ONCE
Status: DISCONTINUED | OUTPATIENT
Start: 2018-07-03 | End: 2018-07-03 | Stop reason: SDUPTHER

## 2018-07-03 RX ORDER — FENTANYL CITRATE 50 UG/ML
30 INJECTION, SOLUTION INTRAMUSCULAR; INTRAVENOUS
Status: COMPLETED | OUTPATIENT
Start: 2018-07-03 | End: 2018-07-03

## 2018-07-03 RX ORDER — SODIUM CHLORIDE 0.9 % (FLUSH) 0.9 %
10 SYRINGE (ML) INJECTION
Status: DISCONTINUED | OUTPATIENT
Start: 2018-07-03 | End: 2018-07-03 | Stop reason: HOSPADM

## 2018-07-03 RX ORDER — PROMETHAZINE HYDROCHLORIDE 25 MG/ML
25 INJECTION, SOLUTION INTRAMUSCULAR; INTRAVENOUS
Status: DISCONTINUED | OUTPATIENT
Start: 2018-07-03 | End: 2018-07-03 | Stop reason: SDUPTHER

## 2018-07-03 RX ORDER — HEPARIN 100 UNIT/ML
5 SYRINGE INTRAVENOUS
Status: COMPLETED | OUTPATIENT
Start: 2018-07-03 | End: 2018-07-03

## 2018-07-03 RX ADMIN — ETOPOSIDE 178 MG: 20 INJECTION INTRAVENOUS at 01:07

## 2018-07-03 RX ADMIN — SODIUM CHLORIDE 1000 ML: 0.9 INJECTION, SOLUTION INTRAVENOUS at 10:07

## 2018-07-03 RX ADMIN — SODIUM CHLORIDE, PRESERVATIVE FREE 10 ML: 5 INJECTION INTRAVENOUS at 03:07

## 2018-07-03 RX ADMIN — HEPARIN 500 UNITS: 100 SYRINGE at 03:07

## 2018-07-03 RX ADMIN — SODIUM CHLORIDE 1000 ML: 0.9 INJECTION, SOLUTION INTRAVENOUS at 02:07

## 2018-07-03 RX ADMIN — PROMETHAZINE HYDROCHLORIDE 25 MG: 25 INJECTION INTRAMUSCULAR; INTRAVENOUS at 11:07

## 2018-07-03 RX ADMIN — FENTANYL CITRATE 30 MCG: 50 INJECTION INTRAMUSCULAR; INTRAVENOUS at 09:07

## 2018-07-03 RX ADMIN — HEPARIN 500 UNITS: 100 SYRINGE at 10:07

## 2018-07-03 RX ADMIN — ACETAMINOPHEN 650 MG: 325 TABLET ORAL at 07:07

## 2018-07-03 RX ADMIN — CISPLATIN 35 MG: 100 INJECTION, SOLUTION INTRAVENOUS at 12:07

## 2018-07-03 RX ADMIN — DEXAMETHASONE SODIUM PHOSPHATE 10 MG: 4 INJECTION, SOLUTION INTRA-ARTICULAR; INTRALESIONAL; INTRAMUSCULAR; INTRAVENOUS; SOFT TISSUE at 11:07

## 2018-07-03 RX ADMIN — ONDANSETRON HYDROCHLORIDE 8 MG: 2 INJECTION, SOLUTION INTRAMUSCULAR; INTRAVENOUS at 11:07

## 2018-07-03 NOTE — PLAN OF CARE
Problem: Chemotherapy Effects (Adult)  Goal: Signs and Symptoms of Listed Potential Problems Will be Absent, Minimized or Managed (Chemotherapy Effects)  Signs and symptoms of listed potential problems will be absent, minimized or managed by discharge/transition of care (reference Chemotherapy Effects (Adult) CPG).   Outcome: Ongoing (interventions implemented as appropriate)  Pt here for cisplatin/etoposide/ivf, labs, hx, meds, allergies reviewed, pt with no complaints at this time just nausea which pt states is constant does not completely go away with meds., reclined in chair, warm blanket provided, continue to monitor

## 2018-07-03 NOTE — PLAN OF CARE
Problem: Patient Care Overview  Goal: Plan of Care Review  Outcome: Ongoing (interventions implemented as appropriate)  Pt tolerated cisplatin/ivf without issue, pt to rtc 7/5/18, no distress noted upon d/c to home

## 2018-07-03 NOTE — TELEPHONE ENCOUNTER
"  Reason for Disposition   [1] Fever AND [2] no signs of serious infection or localizing symptoms (all other triage questions negative)    Answer Assessment - Initial Assessment Questions  1. TEMPERATURE: "What is the most recent temperature?"  "How was it measured?"       100.6 per forehead 99 tympanic  2. ONSET: "When did the fever start?"       This pm  3. SYMPTOMS: "Do you have any other symptoms besides the fever?"  (e.g., sore throat, earache, runny nose, cough, rash, diarrhea, vomiting, abdominal pain, painful urination)      Reported tachycardia when he went for infusion at 134  4. CONTACTS: "Does anyone else in the family have an infection?"      no  5. FEVER TREATMENT: "What have you done so far to treat this fever?" (e.g., medications)      Stated he had been advised not to treat for fever until he called   6. CANCER: "What type of cancer do you have?"      Testicular   7. CANCER - TREATMENT: "What cancer treatments have you received?" "When did you last receive them?" (e.g., recent surgery, radiation, or chemotherapy). If triager has access to the patient's medical record, triager should review treatments and administration dates.      Chemo today  8. CANCER - NEUTROPENIA RISK: "Have you received chemotherapy recently? If Yes, "When was it and what was your last CBC and ANC (absolute neutrophil count)?" "Were you told that your white cell count was low?" If triager has access to the patient's medical record, triager should review most recent labs. An ANC less than 1,000 - 1,500 means that the neutrophils are low and the immune system is weak.      Advised his wbc's are ok    Protocols used:  CANCER - FEVER-A-    "

## 2018-07-04 NOTE — ED NOTES
Pt requesting port remain accessed. Informed pt it is [facility] policy to de access ports upon discharge.

## 2018-07-04 NOTE — ED PROVIDER NOTES
Encounter Date: 7/3/2018       History     Chief Complaint   Patient presents with    Fever Post Chemo     Chemo this AM - fever of 102.9 orally at home PTA. Hx of testicular cancer     22 yo WM with history of Primary Mediastinal Seminoma who received Day 2 of 4th course of EP chemo this morning who did well until out to dinner tonight when he began to feel hot and muscle aches in thighs. On return home, took temperature and noted various degrees of fever (Forehead 100.6, 101; Tympanic 99.9; Oral 102) and has come to the ER. Complains of diffuse abdominal pain which is chronically present and unchanged today. States appetite decreased for several days which is not typical for him when he is receiving chemo. No diarrhea, constipation or urinary symptoms. Felt like he was developing canker sore yesterday however this has resolved today. Denies earache, sore throat or chest pain. No cough, congestion or chest pain.  No known ill contacts.    PMH: No asthma, seizures.    (+) Mediastinal seminoma, chronic abdominal pain       The history is provided by the patient.     Review of patient's allergies indicates:   Allergen Reactions    Mushroom Anaphylaxis    Bamboo     Bee pollens     Mushroom flavor     Venom-honey bee      Past Medical History:   Diagnosis Date    Abdominal pain 08/12/2010    eval for abd and chest wall pain at [facility], [place], [place] - cxr wnl, EKG ([facility]), troponin wnl, normal chemistries    Allergy     Cancer     testicular    Chondromalacia patellae     Chronic nausea 2015    eval by GI Dr. Tushar Artis - given zofran and ciproheptadine     Chronic pain     Chronic pain     [facility] - no meds; followed by psych and pain clinic and unresponsive to behavioral/CBT/old records reports c/f conversion disorder     Foot pain 04/2010    4/10 + SHIRA, eval by Dr. ALLY Lion at [facility] -dx unclear ? gout and trial of nsaids  "and pdn, xrays normal 1/11, referred to podiatry, re-eval by rheum 2/12 and MRI and films wnl    Fracture of right radius 09/2009    Lyme arthritis     multiple joints    Lyme disease 2013    Memory loss 03/2012    eval for memory loss by neuro at [facility] [place] [place] - MRI, EEG wnl. Dr. Patricio suggested emotional factors & ref to Tushar Davies, PhD for MH eval & neuropsych eval 3/12 Lupe Delgado, PhD - no evidence of formal thought disorder or psychosis - documented severe memory impairment; not related to to ADD or executive function issues. sugesstion that memory issues may be related to "emotional factors", ?conversion d/o    Stress fracture of tibia and fibula 08/2011    Urticaria      Past Surgical History:   Procedure Laterality Date    CHEST WALL BIOPSY      INJECTION OF ANESTHETIC AGENT AROUND NERVE N/A 5/29/2018    Procedure: BLOCK, NERVE;  Surgeon: Raiza Surgeon;  Location: [facility];  Service: Anesthesiology;  Laterality: N/A;    LAPAROSCOPIC APPENDECTOMY N/A 5/24/2018    Procedure: APPENDECTOMY, LAPAROSCOPIC;  Surgeon: Kenan Huang Jr., MD;  Location: [facility] OR [address] [place] [address];  Service: General;  Laterality: N/A;    PORTACATH PLACEMENT Right      Family History   Problem Relation Age of Onset    Arthritis Mother     Other Mother         benign tumor of brain and spinal cord    Hyperlipidemia Father     Gout Father     No Known Problems Sister     Arthritis Sister         shoulder    Depression Sister      Social History   Substance Use Topics    Smoking status: Former Smoker     Packs/day: 0.25     Types: Vaping with nicotine, Vaping w/o nicotine, Cigarettes, Cigars     Quit date: 3/15/2018    Smokeless tobacco: Former User    Alcohol use Yes      Comment: occ     Review of Systems   Constitutional: Positive for activity change, appetite change, fatigue and fever. Negative for chills and diaphoresis.   HENT: Negative for congestion, dental problem, ear pain, facial swelling, mouth sores, nosebleeds, " rhinorrhea, sore throat, trouble swallowing and voice change.    Eyes: Negative for photophobia, pain, discharge, redness, itching and visual disturbance.   Respiratory: Negative for cough, chest tightness, shortness of breath, wheezing and stridor.    Cardiovascular: Negative for chest pain and palpitations.   Gastrointestinal: Positive for abdominal pain. Negative for abdominal distention, constipation, diarrhea, nausea and vomiting.   Endocrine: Negative.    Genitourinary: Negative for decreased urine volume, dysuria, flank pain, frequency, hematuria and testicular pain.   Musculoskeletal: Positive for myalgias ( bilateral thighs- with onset of fever ). Negative for arthralgias, back pain, gait problem, joint swelling, neck pain and neck stiffness.   Skin: Negative for pallor and rash.   Allergic/Immunologic: Immunocompromised state: on chemo.   Neurological: Negative for dizziness, syncope, facial asymmetry, speech difficulty, weakness, light-headedness, numbness and headaches.   Hematological: Negative for adenopathy. Does not bruise/bleed easily.   Psychiatric/Behavioral: Negative for agitation, confusion and sleep disturbance.   All other systems reviewed and are negative.      Physical Exam     Initial Vitals [07/03/18 1858]   BP Pulse Resp Temp SpO2   137/60 (!) 131 18 99.9 °F (37.7 °C) 97 %      MAP       --         Physical Exam    Nursing note and vitals reviewed.  Constitutional: He appears well-developed and well-nourished. He is not diaphoretic. He is active and cooperative. He is easily aroused.  Non-toxic appearance. He does not appear ill. No distress.   HENT:   Head: Normocephalic and atraumatic. Head is without abrasion, without contusion, without right periorbital erythema and without left periorbital erythema.   Right Ear: Hearing, tympanic membrane, external ear and ear canal normal.   Left Ear: Hearing, tympanic membrane, external ear and ear canal normal.   Nose: Nose normal. No mucosal  edema, rhinorrhea or sinus tenderness. No epistaxis.   Mouth/Throat: Uvula is midline, oropharynx is clear and moist and mucous membranes are normal. Mucous membranes are not pale, not dry and not cyanotic. No oral lesions. No trismus in the jaw. Normal dentition. No uvula swelling. No oropharyngeal exudate, posterior oropharyngeal edema or posterior oropharyngeal erythema.   Eyes: Conjunctivae, EOM and lids are normal. Pupils are equal, round, and reactive to light. Right eye exhibits no chemosis and no discharge. Left eye exhibits no chemosis and no discharge. Right conjunctiva is not injected. Right conjunctiva has no hemorrhage. Left conjunctiva is not injected. Left conjunctiva has no hemorrhage. No scleral icterus. Right eye exhibits normal extraocular motion. Left eye exhibits normal extraocular motion. Pupils are equal.   Neck: Trachea normal, normal range of motion, full passive range of motion without pain and phonation normal. Neck supple. No thyromegaly present. No stridor present. No spinous process tenderness and no muscular tenderness present. Normal range of motion present. No neck rigidity. No JVD present.   Cardiovascular: Normal rate, regular rhythm, S1 normal, S2 normal, normal heart sounds and intact distal pulses.  No extrasystoles are present. Exam reveals no friction rub.    No murmur heard.  Brisk capillary refill   Pulmonary/Chest: Effort normal and breath sounds normal. No accessory muscle usage or stridor. No tachypnea. No respiratory distress. He has no decreased breath sounds. He has no wheezes. He has no rales. He exhibits no tenderness.   Normal work of breathing    Abdominal: Soft. Normal appearance. He exhibits no distension and no mass. Bowel sounds are decreased. There is no hepatosplenomegaly. There is generalized tenderness ( non specific- worst in RLQ area). There is no rigidity, no rebound, no guarding and no CVA tenderness.   Musculoskeletal: Normal range of motion. He  exhibits no edema or tenderness.   Lymphadenopathy:        Head (right side): No submental, no submandibular and no tonsillar adenopathy present.        Head (left side): No submental, no submandibular and no tonsillar adenopathy present.     He has no cervical adenopathy.        Right cervical: No posterior cervical adenopathy present.       Left cervical: No posterior cervical adenopathy present.   Neurological: He is alert, oriented to person, place, and time and easily aroused. He has normal strength. He displays no tremor. No cranial nerve deficit or sensory deficit. He exhibits normal muscle tone. Coordination and gait normal.   Skin: Skin is warm, dry and intact. Capillary refill takes less than 2 seconds. No bruising, no ecchymosis, no petechiae, no purpura and no rash noted. Rash is not urticarial. No cyanosis or erythema. No pallor. Nails show no clubbing.   Psychiatric: He has a normal mood and affect. His speech is normal and behavior is normal. Judgment and thought content normal. Cognition and memory are normal.         ED Course    2040: Discussed patient with Hem-Onc Fellow (Dr Luna) who states do not need empiric antibiotic coverage if no focus of infection. Patient not currently neutropenic . Requests that we obtain CXR and if that is also normal he may be discharged home to follow up with Dr Rojas.     2150: Clinical exam stable. Complaining of increasing thigh pain which he says is only present when he has fever. States uses Fentanyl patch however having breakthrough pain now and only thing that helps is Fentanyl. Will give bolus dose and discuss further with Hem-Onc.    2235: States pain is continuing to worsen and was transiently helped with Fentanyl but is progressively worsening. States he would be more comfortable being admitted as pain is usual precursor to his neutropenic fever episodes. No response to earlier page. Will re-attempt as patient would like them aware of worsening pain before  agreeing to being discharged home       2245: Hem-Onc Consult Fellow (Dr Luna) aware of increasing pain and does not feel this warrants admission. Still recommends discharge home to follow up in clinic. Next appointment for chemo on 05 July. To return to ER sooner if worsening symptoms.          Procedures  Labs Reviewed   CBC W/ AUTO DIFFERENTIAL - Abnormal; Notable for the following:        Result Value    RBC 3.01 (*)     Hemoglobin 8.8 (*)     Hematocrit 26.7 (*)     RDW 17.2 (*)     MPV 8.4 (*)     Immature Granulocytes 1.7 (*)     Immature Grans (Abs) 0.12 (*)     Lymph # 0.3 (*)     Mono # 0.2 (*)     Gran% 91.7 (*)     Lymph% 3.9 (*)     Mono% 2.7 (*)     All other components within normal limits   CULTURE, BLOOD   COMPREHENSIVE METABOLIC PANEL   URINALYSIS, REFLEX TO URINE CULTURE          Imaging Results    None       X-Rays:   Independently Interpreted Readings:   Other Readings:  CXR: No infiltrate, effusion or pneumothorax. No cardiomegaly or mediastinal changes noted.  No significant change from prior study.     Medical Decision Making:   History:   Old Medical Records: I decided to obtain old medical records.  Old Records Summarized: records from clinic visits.       <> Summary of Records: Reviewed Clinic notes and prior ER visit notes in [facility]. Significant findings addressed in HPI / PMH.    Initial Assessment:   Hemodynamically stable adult with acute febrile illness currently on chemotherapy for seminoma without neutropenia currently   Differential Diagnosis:   DDx includes: Acute febrile illness- viral illness, evolving neutropenia with bacteremia, UTI, pneumonia / pleural effusion, evolving GE  Independently Interpreted Test(s):   I have ordered and independently interpreted X-rays - see prior notes.  Clinical Tests:   Lab Tests: Ordered and Reviewed  The following lab test(s) were unremarkable: CBC, CMP and Urinalysis  Radiological Study: Ordered and Reviewed  Other:   I have discussed this case  with another health care provider.       <> Summary of the Discussion: Adult Hem-Onc fellow                       Clinical Impression:   The primary encounter diagnosis was Acute febrile illness. Diagnoses of Primary mediastinal seminoma, Fever, At high risk for infection due to chemotherapy, and Myalgia were also pertinent to this visit.                             Kenan Vela III, MD  07/04/18 6890

## 2018-07-04 NOTE — ED TRIAGE NOTES
Pt. Reports he is on day 2 of chemotherapy this week and tonight he had a 102.9 oral temperature at home. Pt. Has also had increased nausea this week. Pt. No tylenol pta. Pt. Reports he had labs drawn Monday and was not neutropenic at that time. Pt. Has history of testicular cancer and is on last round of chemotherapy currently.Pt. PAC not accessed. Pt. Alert and oriented. BBS clear, abdomen soft and non tender. Pt. Reports nausea. Pulses strong with brisk cap refill. Pt. With Fentanyl patch 25 mcg to left chest and Sancuso patch for nausea to left upper arm

## 2018-07-04 NOTE — DISCHARGE INSTRUCTIONS
Maintain increased fluid intake for next 1-2 days    Continue to take pain medications as prescribed for chronic and breakthrough pain    May apply warm compresses / heating pads to thighs intermittently as needed for control of pain    Return to ER for persistent vomiting, breathing difficulty, continuing fever , worsening symptoms, increased drowsiness / confusion or new concerns / worsening symptoms.

## 2018-07-05 ENCOUNTER — INFUSION (OUTPATIENT)
Dept: INFUSION THERAPY | Facility: HOSPITAL | Age: 23
End: 2018-07-05
Attending: INTERNAL MEDICINE
Payer: COMMERCIAL

## 2018-07-05 VITALS
SYSTOLIC BLOOD PRESSURE: 118 MMHG | OXYGEN SATURATION: 98 % | HEART RATE: 90 BPM | TEMPERATURE: 99 F | RESPIRATION RATE: 16 BRPM | WEIGHT: 145.94 LBS | HEIGHT: 67 IN | BODY MASS INDEX: 22.91 KG/M2 | DIASTOLIC BLOOD PRESSURE: 68 MMHG

## 2018-07-05 DIAGNOSIS — C80.1 GERM CELL TUMOR: Primary | ICD-10-CM

## 2018-07-05 DIAGNOSIS — R50.9 FEVER, UNSPECIFIED FEVER CAUSE: Primary | ICD-10-CM

## 2018-07-05 PROCEDURE — 25000003 PHARM REV CODE 250: Performed by: INTERNAL MEDICINE

## 2018-07-05 PROCEDURE — 96361 HYDRATE IV INFUSION ADD-ON: CPT

## 2018-07-05 PROCEDURE — 63600175 PHARM REV CODE 636 W HCPCS: Performed by: INTERNAL MEDICINE

## 2018-07-05 PROCEDURE — 96413 CHEMO IV INFUSION 1 HR: CPT

## 2018-07-05 PROCEDURE — 96417 CHEMO IV INFUS EACH ADDL SEQ: CPT

## 2018-07-05 PROCEDURE — 96367 TX/PROPH/DG ADDL SEQ IV INF: CPT

## 2018-07-05 PROCEDURE — 96360 HYDRATION IV INFUSION INIT: CPT

## 2018-07-05 RX ORDER — CIPROFLOXACIN 500 MG/1
500 TABLET ORAL 2 TIMES DAILY
Qty: 14 TABLET | Refills: 0 | Status: SHIPPED | OUTPATIENT
Start: 2018-07-05 | End: 2018-07-12

## 2018-07-05 RX ORDER — ONDANSETRON HCL IN 0.9 % NACL 8 MG/50 ML
8 INTRAVENOUS SOLUTION, PIGGYBACK (ML) INTRAVENOUS
Status: COMPLETED | OUTPATIENT
Start: 2018-07-05 | End: 2018-07-05

## 2018-07-05 RX ORDER — ONDANSETRON 2 MG/ML
8 INJECTION INTRAMUSCULAR; INTRAVENOUS ONCE
Status: DISCONTINUED | OUTPATIENT
Start: 2018-07-05 | End: 2018-07-05 | Stop reason: SDUPTHER

## 2018-07-05 RX ORDER — SODIUM CHLORIDE 0.9 % (FLUSH) 0.9 %
10 SYRINGE (ML) INJECTION
Status: DISCONTINUED | OUTPATIENT
Start: 2018-07-05 | End: 2018-07-05 | Stop reason: HOSPADM

## 2018-07-05 RX ORDER — HEPARIN 100 UNIT/ML
500 SYRINGE INTRAVENOUS
Status: DISCONTINUED | OUTPATIENT
Start: 2018-07-05 | End: 2018-07-05 | Stop reason: HOSPADM

## 2018-07-05 RX ORDER — PROMETHAZINE HYDROCHLORIDE 25 MG/ML
25 INJECTION, SOLUTION INTRAMUSCULAR; INTRAVENOUS
Status: DISCONTINUED | OUTPATIENT
Start: 2018-07-05 | End: 2018-07-05 | Stop reason: SDUPTHER

## 2018-07-05 RX ADMIN — ONDANSETRON HYDROCHLORIDE 8 MG: 2 INJECTION, SOLUTION INTRAMUSCULAR; INTRAVENOUS at 10:07

## 2018-07-05 RX ADMIN — ETOPOSIDE 178 MG: 20 INJECTION INTRAVENOUS at 12:07

## 2018-07-05 RX ADMIN — PROMETHAZINE HYDROCHLORIDE 25 MG: 25 INJECTION INTRAMUSCULAR; INTRAVENOUS at 10:07

## 2018-07-05 RX ADMIN — HEPARIN 500 UNITS: 100 SYRINGE at 02:07

## 2018-07-05 RX ADMIN — SODIUM CHLORIDE 1000 ML: 0.9 INJECTION, SOLUTION INTRAVENOUS at 01:07

## 2018-07-05 RX ADMIN — CISPLATIN 35 MG: 100 INJECTION, SOLUTION INTRAVENOUS at 11:07

## 2018-07-05 RX ADMIN — SODIUM CHLORIDE 1000 ML: 0.9 INJECTION, SOLUTION INTRAVENOUS at 09:07

## 2018-07-05 RX ADMIN — DEXAMETHASONE SODIUM PHOSPHATE 10 MG: 4 INJECTION, SOLUTION INTRA-ARTICULAR; INTRALESIONAL; INTRAMUSCULAR; INTRAVENOUS; SOFT TISSUE at 10:07

## 2018-07-05 NOTE — PLAN OF CARE
Problem: Chemotherapy Effects (Adult)  Goal: Signs and Symptoms of Listed Potential Problems Will be Absent, Minimized or Managed (Chemotherapy Effects)  Signs and symptoms of listed potential problems will be absent, minimized or managed by discharge/transition of care (reference Chemotherapy Effects (Adult) CPG).   Pt admitted to unit for 4 of 4 cycles  Etoposide/Cisplatin, ambulated to unit unassisted. Side effects and self-care tips reviewed, Pt verbalized understanding. [place] and refreshments offered

## 2018-07-05 NOTE — PLAN OF CARE
Problem: Patient Care Overview  Goal: Plan of Care Review  Outcome: Ongoing (interventions implemented as appropriate)  Pt completed chemo regimen, tolerated well. Plan of care reviewed and pt instructed to contact MD with any further  questions or concerns, he verbalized understanding. Pt discharged, AVS not requested, Pt ambulated off unit unassisted,

## 2018-07-06 ENCOUNTER — TELEPHONE (OUTPATIENT)
Dept: HEMATOLOGY/ONCOLOGY | Facility: CLINIC | Age: 23
End: 2018-07-06

## 2018-07-06 ENCOUNTER — PATIENT MESSAGE (OUTPATIENT)
Dept: HEMATOLOGY/ONCOLOGY | Facility: CLINIC | Age: 23
End: 2018-07-06

## 2018-07-06 ENCOUNTER — HOSPITAL ENCOUNTER (INPATIENT)
Facility: HOSPITAL | Age: 23
LOS: 2 days | Discharge: HOME | DRG: 392 | End: 2018-07-09
Attending: EMERGENCY MEDICINE | Admitting: INTERNAL MEDICINE
Payer: COMMERCIAL

## 2018-07-06 ENCOUNTER — INFUSION (OUTPATIENT)
Dept: INFUSION THERAPY | Facility: HOSPITAL | Age: 23
End: 2018-07-06
Attending: INTERNAL MEDICINE
Payer: COMMERCIAL

## 2018-07-06 VITALS
DIASTOLIC BLOOD PRESSURE: 74 MMHG | BODY MASS INDEX: 22.91 KG/M2 | WEIGHT: 145.94 LBS | RESPIRATION RATE: 19 BRPM | TEMPERATURE: 98 F | SYSTOLIC BLOOD PRESSURE: 149 MMHG | HEIGHT: 67 IN | HEART RATE: 68 BPM

## 2018-07-06 DIAGNOSIS — C80.1 GERM CELL TUMOR: Primary | ICD-10-CM

## 2018-07-06 DIAGNOSIS — E86.0 DEHYDRATION: ICD-10-CM

## 2018-07-06 DIAGNOSIS — R11.2 INTRACTABLE VOMITING WITH NAUSEA, UNSPECIFIED VOMITING TYPE: ICD-10-CM

## 2018-07-06 DIAGNOSIS — T45.0X5A ADVERSE EFFECT OF ANTIEMETIC: ICD-10-CM

## 2018-07-06 DIAGNOSIS — C38.3 PRIMARY MEDIASTINAL SEMINOMA: Primary | ICD-10-CM

## 2018-07-06 DIAGNOSIS — R10.84 GENERALIZED ABDOMINAL PAIN: ICD-10-CM

## 2018-07-06 DIAGNOSIS — R11.2 VOMITING WITH NAUSEA, NOT INTRACTABLE: ICD-10-CM

## 2018-07-06 DIAGNOSIS — T45.1X5A CHEMOTHERAPY-INDUCED NAUSEA: ICD-10-CM

## 2018-07-06 DIAGNOSIS — R11.0 NAUSEA: ICD-10-CM

## 2018-07-06 DIAGNOSIS — R50.9 FEVER, UNSPECIFIED FEVER CAUSE: ICD-10-CM

## 2018-07-06 DIAGNOSIS — R50.9 FEVER: ICD-10-CM

## 2018-07-06 DIAGNOSIS — R11.0 CHEMOTHERAPY-INDUCED NAUSEA: ICD-10-CM

## 2018-07-06 LAB
ALBUMIN SERPL BCP-MCNC: 3.5 G/DL
ALP SERPL-CCNC: 44 U/L
ALT SERPL W/O P-5'-P-CCNC: 14 U/L
ANION GAP SERPL CALC-SCNC: 7 MMOL/L
AST SERPL-CCNC: 9 U/L
BASOPHILS # BLD AUTO: 0 K/UL
BASOPHILS NFR BLD: 0 %
BILIRUB SERPL-MCNC: 0.6 MG/DL
BILIRUB UR QL STRIP: NEGATIVE
BUN SERPL-MCNC: 14 MG/DL
CALCIUM SERPL-MCNC: 8.7 MG/DL
CHLORIDE SERPL-SCNC: 106 MMOL/L
CLARITY UR REFRACT.AUTO: CLEAR
CO2 SERPL-SCNC: 24 MMOL/L
COLOR UR AUTO: COLORLESS
CREAT SERPL-MCNC: 0.7 MG/DL
DIFFERENTIAL METHOD: ABNORMAL
EOSINOPHIL # BLD AUTO: 0 K/UL
EOSINOPHIL NFR BLD: 0 %
ERYTHROCYTE [DISTWIDTH] IN BLOOD BY AUTOMATED COUNT: 15.9 %
EST. GFR  (AFRICAN AMERICAN): >60 ML/MIN/1.73 M^2
EST. GFR  (NON AFRICAN AMERICAN): >60 ML/MIN/1.73 M^2
GLUCOSE SERPL-MCNC: 111 MG/DL
GLUCOSE UR QL STRIP: NEGATIVE
HCT VFR BLD AUTO: 23.2 %
HGB BLD-MCNC: 8 G/DL
HGB UR QL STRIP: NEGATIVE
IMM GRANULOCYTES # BLD AUTO: 0.03 K/UL
IMM GRANULOCYTES NFR BLD AUTO: 1 %
KETONES UR QL STRIP: NEGATIVE
LACTATE SERPL-SCNC: 1.7 MMOL/L
LEUKOCYTE ESTERASE UR QL STRIP: NEGATIVE
LIPASE SERPL-CCNC: 35 U/L
LYMPHOCYTES # BLD AUTO: 0.2 K/UL
LYMPHOCYTES NFR BLD: 7 %
MAGNESIUM SERPL-MCNC: 1.6 MG/DL
MCH RBC QN AUTO: 30.1 PG
MCHC RBC AUTO-ENTMCNC: 34.5 G/DL
MCV RBC AUTO: 87 FL
MONOCYTES # BLD AUTO: 0.1 K/UL
MONOCYTES NFR BLD: 1.6 %
NEUTROPHILS # BLD AUTO: 2.8 K/UL
NEUTROPHILS NFR BLD: 90.4 %
NITRITE UR QL STRIP: NEGATIVE
NRBC BLD-RTO: 0 /100 WBC
PH UR STRIP: 6 [PH] (ref 5–8)
PHOSPHATE SERPL-MCNC: 3.9 MG/DL
PLATELET # BLD AUTO: 287 K/UL
PMV BLD AUTO: 8.4 FL
POTASSIUM SERPL-SCNC: 4.1 MMOL/L
PROT SERPL-MCNC: 5.5 G/DL
PROT UR QL STRIP: NEGATIVE
RBC # BLD AUTO: 2.66 M/UL
SODIUM SERPL-SCNC: 137 MMOL/L
SP GR UR STRIP: 1.01 (ref 1–1.03)
URN SPEC COLLECT METH UR: ABNORMAL
UROBILINOGEN UR STRIP-ACNC: NEGATIVE EU/DL
WBC # BLD AUTO: 3.13 K/UL

## 2018-07-06 PROCEDURE — 93010 ELECTROCARDIOGRAM REPORT: CPT | Mod: ,,, | Performed by: INTERNAL MEDICINE

## 2018-07-06 PROCEDURE — 96375 TX/PRO/DX INJ NEW DRUG ADDON: CPT

## 2018-07-06 PROCEDURE — 96367 TX/PROPH/DG ADDL SEQ IV INF: CPT

## 2018-07-06 PROCEDURE — 63600175 PHARM REV CODE 636 W HCPCS: Performed by: INTERNAL MEDICINE

## 2018-07-06 PROCEDURE — 96360 HYDRATION IV INFUSION INIT: CPT

## 2018-07-06 PROCEDURE — 96413 CHEMO IV INFUSION 1 HR: CPT

## 2018-07-06 PROCEDURE — 99285 EMERGENCY DEPT VISIT HI MDM: CPT | Mod: 25

## 2018-07-06 PROCEDURE — 99284 EMERGENCY DEPT VISIT MOD MDM: CPT | Mod: ,,, | Performed by: EMERGENCY MEDICINE

## 2018-07-06 PROCEDURE — 25000003 PHARM REV CODE 250: Performed by: EMERGENCY MEDICINE

## 2018-07-06 PROCEDURE — 93005 ELECTROCARDIOGRAM TRACING: CPT

## 2018-07-06 PROCEDURE — 63600175 PHARM REV CODE 636 W HCPCS: Performed by: EMERGENCY MEDICINE

## 2018-07-06 PROCEDURE — 83690 ASSAY OF LIPASE: CPT

## 2018-07-06 PROCEDURE — 25000003 PHARM REV CODE 250: Performed by: STUDENT IN AN ORGANIZED HEALTH CARE EDUCATION/TRAINING PROGRAM

## 2018-07-06 PROCEDURE — 99223 1ST HOSP IP/OBS HIGH 75: CPT | Mod: ,,, | Performed by: INTERNAL MEDICINE

## 2018-07-06 PROCEDURE — 25000003 PHARM REV CODE 250: Performed by: INTERNAL MEDICINE

## 2018-07-06 PROCEDURE — 84100 ASSAY OF PHOSPHORUS: CPT

## 2018-07-06 PROCEDURE — G0378 HOSPITAL OBSERVATION PER HR: HCPCS

## 2018-07-06 PROCEDURE — 83605 ASSAY OF LACTIC ACID: CPT

## 2018-07-06 PROCEDURE — 81003 URINALYSIS AUTO W/O SCOPE: CPT

## 2018-07-06 PROCEDURE — 63600175 PHARM REV CODE 636 W HCPCS: Performed by: STUDENT IN AN ORGANIZED HEALTH CARE EDUCATION/TRAINING PROGRAM

## 2018-07-06 PROCEDURE — 83735 ASSAY OF MAGNESIUM: CPT

## 2018-07-06 PROCEDURE — 96361 HYDRATE IV INFUSION ADD-ON: CPT

## 2018-07-06 PROCEDURE — 96374 THER/PROPH/DIAG INJ IV PUSH: CPT

## 2018-07-06 PROCEDURE — 87040 BLOOD CULTURE FOR BACTERIA: CPT

## 2018-07-06 PROCEDURE — 80053 COMPREHEN METABOLIC PANEL: CPT

## 2018-07-06 PROCEDURE — 85025 COMPLETE CBC W/AUTO DIFF WBC: CPT

## 2018-07-06 PROCEDURE — 96417 CHEMO IV INFUS EACH ADDL SEQ: CPT

## 2018-07-06 RX ORDER — PROMETHAZINE HYDROCHLORIDE 25 MG/ML
25 INJECTION, SOLUTION INTRAMUSCULAR; INTRAVENOUS EVERY 4 HOURS PRN
Status: CANCELLED
Start: 2018-07-12

## 2018-07-06 RX ORDER — ONDANSETRON 2 MG/ML
8 INJECTION INTRAMUSCULAR; INTRAVENOUS ONCE
Status: DISCONTINUED | OUTPATIENT
Start: 2018-07-06 | End: 2018-07-06 | Stop reason: SDUPTHER

## 2018-07-06 RX ORDER — LORAZEPAM 2 MG/ML
1 INJECTION INTRAMUSCULAR
Status: CANCELLED
Start: 2018-07-06

## 2018-07-06 RX ORDER — PROMETHAZINE HYDROCHLORIDE 25 MG/ML
25 INJECTION, SOLUTION INTRAMUSCULAR; INTRAVENOUS EVERY 4 HOURS PRN
Status: CANCELLED
Start: 2018-07-07

## 2018-07-06 RX ORDER — OLANZAPINE 5 MG/1
10 TABLET ORAL NIGHTLY PRN
Status: DISCONTINUED | OUTPATIENT
Start: 2018-07-06 | End: 2018-07-08

## 2018-07-06 RX ORDER — ONDANSETRON 2 MG/ML
4 INJECTION INTRAMUSCULAR; INTRAVENOUS
Status: DISPENSED | OUTPATIENT
Start: 2018-07-06 | End: 2018-07-07

## 2018-07-06 RX ORDER — IBUPROFEN 200 MG
16 TABLET ORAL
Status: DISCONTINUED | OUTPATIENT
Start: 2018-07-06 | End: 2018-07-09 | Stop reason: HOSPADM

## 2018-07-06 RX ORDER — IBUPROFEN 200 MG
24 TABLET ORAL
Status: DISCONTINUED | OUTPATIENT
Start: 2018-07-06 | End: 2018-07-09 | Stop reason: HOSPADM

## 2018-07-06 RX ORDER — ALPRAZOLAM 0.25 MG/1
0.75 TABLET ORAL NIGHTLY PRN
Status: DISCONTINUED | OUTPATIENT
Start: 2018-07-06 | End: 2018-07-09 | Stop reason: HOSPADM

## 2018-07-06 RX ORDER — PROMETHAZINE HYDROCHLORIDE 25 MG/ML
25 INJECTION, SOLUTION INTRAMUSCULAR; INTRAVENOUS EVERY 4 HOURS PRN
Status: DISCONTINUED | OUTPATIENT
Start: 2018-07-06 | End: 2018-07-06 | Stop reason: SDUPTHER

## 2018-07-06 RX ORDER — ONDANSETRON HCL IN 0.9 % NACL 8 MG/50 ML
8 INTRAVENOUS SOLUTION, PIGGYBACK (ML) INTRAVENOUS
Status: COMPLETED | OUTPATIENT
Start: 2018-07-06 | End: 2018-07-06

## 2018-07-06 RX ORDER — SODIUM CHLORIDE 0.9 % (FLUSH) 0.9 %
5 SYRINGE (ML) INJECTION
Status: DISCONTINUED | OUTPATIENT
Start: 2018-07-06 | End: 2018-07-09 | Stop reason: HOSPADM

## 2018-07-06 RX ORDER — PROMETHAZINE HYDROCHLORIDE 25 MG/ML
25 INJECTION, SOLUTION INTRAMUSCULAR; INTRAVENOUS EVERY 4 HOURS PRN
Status: CANCELLED
Start: 2018-07-06

## 2018-07-06 RX ORDER — ENOXAPARIN SODIUM 100 MG/ML
40 INJECTION SUBCUTANEOUS EVERY 24 HOURS
Status: DISCONTINUED | OUTPATIENT
Start: 2018-07-06 | End: 2018-07-09 | Stop reason: HOSPADM

## 2018-07-06 RX ORDER — PANTOPRAZOLE SODIUM 40 MG/1
40 TABLET, DELAYED RELEASE ORAL DAILY
Status: DISCONTINUED | OUTPATIENT
Start: 2018-07-06 | End: 2018-07-09 | Stop reason: HOSPADM

## 2018-07-06 RX ORDER — ONDANSETRON 2 MG/ML
4 INJECTION INTRAMUSCULAR; INTRAVENOUS EVERY 6 HOURS
Status: DISCONTINUED | OUTPATIENT
Start: 2018-07-06 | End: 2018-07-09 | Stop reason: HOSPADM

## 2018-07-06 RX ORDER — HEPARIN 100 UNIT/ML
500 SYRINGE INTRAVENOUS
Status: DISCONTINUED | OUTPATIENT
Start: 2018-07-06 | End: 2018-07-06 | Stop reason: HOSPADM

## 2018-07-06 RX ORDER — GLUCAGON 1 MG
1 KIT INJECTION
Status: DISCONTINUED | OUTPATIENT
Start: 2018-07-06 | End: 2018-07-09 | Stop reason: HOSPADM

## 2018-07-06 RX ORDER — PROMETHAZINE HYDROCHLORIDE 25 MG/ML
25 INJECTION, SOLUTION INTRAMUSCULAR; INTRAVENOUS EVERY 4 HOURS PRN
Status: DISCONTINUED | OUTPATIENT
Start: 2018-07-06 | End: 2018-07-06

## 2018-07-06 RX ORDER — FENTANYL CITRATE 50 UG/ML
50 INJECTION, SOLUTION INTRAMUSCULAR; INTRAVENOUS
Status: COMPLETED | OUTPATIENT
Start: 2018-07-06 | End: 2018-07-06

## 2018-07-06 RX ORDER — LORAZEPAM 2 MG/ML
1 INJECTION INTRAMUSCULAR
Status: DISCONTINUED | OUTPATIENT
Start: 2018-07-06 | End: 2018-07-06 | Stop reason: HOSPADM

## 2018-07-06 RX ORDER — ONDANSETRON 2 MG/ML
4 INJECTION INTRAMUSCULAR; INTRAVENOUS EVERY 6 HOURS
Status: DISCONTINUED | OUTPATIENT
Start: 2018-07-07 | End: 2018-07-06

## 2018-07-06 RX ORDER — SODIUM CHLORIDE 0.9 % (FLUSH) 0.9 %
10 SYRINGE (ML) INJECTION
Status: DISCONTINUED | OUTPATIENT
Start: 2018-07-06 | End: 2018-07-06 | Stop reason: HOSPADM

## 2018-07-06 RX ADMIN — ONDANSETRON 4 MG: 2 INJECTION, SOLUTION INTRAMUSCULAR; INTRAVENOUS at 10:07

## 2018-07-06 RX ADMIN — SODIUM CHLORIDE 1000 ML: 0.9 INJECTION, SOLUTION INTRAVENOUS at 02:07

## 2018-07-06 RX ADMIN — CISPLATIN 35 MG: 100 INJECTION, SOLUTION INTRAVENOUS at 12:07

## 2018-07-06 RX ADMIN — ONDANSETRON HYDROCHLORIDE 8 MG: 2 INJECTION, SOLUTION INTRAMUSCULAR; INTRAVENOUS at 11:07

## 2018-07-06 RX ADMIN — DEXAMETHASONE SODIUM PHOSPHATE: 4 INJECTION, SOLUTION INTRA-ARTICULAR; INTRALESIONAL; INTRAMUSCULAR; INTRAVENOUS; SOFT TISSUE at 11:07

## 2018-07-06 RX ADMIN — FENTANYL CITRATE 50 MCG: 50 INJECTION INTRAMUSCULAR; INTRAVENOUS at 07:07

## 2018-07-06 RX ADMIN — SODIUM CHLORIDE 1000 ML: 0.9 INJECTION, SOLUTION INTRAVENOUS at 10:07

## 2018-07-06 RX ADMIN — ETOPOSIDE 178 MG: 20 INJECTION INTRAVENOUS at 01:07

## 2018-07-06 RX ADMIN — SODIUM CHLORIDE 1000 ML: 0.9 INJECTION, SOLUTION INTRAVENOUS at 06:07

## 2018-07-06 RX ADMIN — PANTOPRAZOLE SODIUM 40 MG: 40 TABLET, DELAYED RELEASE ORAL at 11:07

## 2018-07-06 RX ADMIN — PROMETHAZINE HYDROCHLORIDE 25 MG: 25 INJECTION INTRAMUSCULAR; INTRAVENOUS at 12:07

## 2018-07-06 RX ADMIN — LORAZEPAM 1 MG: 2 INJECTION INTRAMUSCULAR; INTRAVENOUS at 11:07

## 2018-07-06 RX ADMIN — ALPRAZOLAM 0.75 MG: 0.25 TABLET ORAL at 11:07

## 2018-07-06 RX ADMIN — PROMETHAZINE HYDROCHLORIDE 12.5 MG: 25 INJECTION INTRAMUSCULAR; INTRAVENOUS at 07:07

## 2018-07-06 RX ADMIN — OLANZAPINE 10 MG: 5 TABLET, FILM COATED ORAL at 11:07

## 2018-07-06 NOTE — PLAN OF CARE
Problem: Chemotherapy Effects (Adult)  Goal: Signs and Symptoms of Listed Potential Problems Will be Absent, Minimized or Managed (Chemotherapy Effects)  Signs and symptoms of listed potential problems will be absent, minimized or managed by discharge/transition of care (reference Chemotherapy Effects (Adult) CPG).   Outcome: Ongoing (interventions implemented as appropriate)  Patient here for cisplatin/Etoposide.  Assessment completed and labs reviewed.  VSS.  No needs at this time.  Chair reclined and blanket offered.  Will continue to monitor.

## 2018-07-06 NOTE — ED PROVIDER NOTES
Encounter Date: 7/6/2018    SCRIBE #1 NOTE: I, Serafin Orlando, am scribing for, and in the presence of,  Dr. Serra. I have scribed the following portions of the note -       History     Chief Complaint   Patient presents with    Emesis     just had chemo and told to come to er to ? get admitted     Time patient was seen by the provider: 5:50 PM      The patient is a 23 y.o. male with co-morbidities including: testicular CA who presents to the ED with a complaint of N/V x 3 days. Pt reports N/V, fever (101) and generalized body pain that has worsened this week. He is in close contact with his oncologist who advised him to visit the ED today following his chemotherapy session today. Pt was seen in ED 3 days ago with similar sx and states that his N/V and fever have persisted. He took Zofran for nausea but was unable to keep from vomiting it up. No known allergies to medications.          Review of patient's allergies indicates:   Allergen Reactions    Mushroom Anaphylaxis    Bamboo     Bee pollens     Mushroom flavor     Venom-honey bee      Past Medical History:   Diagnosis Date    Abdominal pain 08/12/2010    eval for abd and chest wall pain at [facility] ER, [place], [place] - cxr wnl, EKG (RVH), troponin wnl, normal chemistries    Allergy     Cancer     testicular    Chondromalacia patellae     Chronic nausea 2015    eval by GI Dr. Tushar Artis - given zofran and ciproheptadine     Chronic pain     Chronic pain     [facility] - no meds; followed by psych and pain clinic and unresponsive to behavioral/CBT/old records reports c/f conversion disorder     Foot pain 04/2010    4/10 + [facility]IRA, eval by Dr. ALLY Lion at [facility] -dx unclear ? gout and trial of nsaids and pdn, xrays normal 1/11, referred to podiatry, re-eval by rheum 2/12 and MRI and films wnl    Fracture of right radius 09/2009    Lyme arthritis     multiple joints    Lyme  "disease 2013    Memory loss 03/2012    [place] [facility] [place] w[place]. Dr. Patricio suggested emotional factors & ref to Tushar Davies, PhD for  eval & neuropsych eval 3/12 Lupe Delgado, PhD - no evidence of formal thought disorder or psychosis - documented severe memory impairment; not related to to ADD or executive function issues. sugesstion that memory issues may be related to "emotional factors", ?conversion d/o    Stress fracture of tibia and fibula 08/2011    Urticaria      Past Surgical History:   Procedure Laterality Date    CHEST WALL BIOPSY      INJECTION OF ANESTHETIC AGENT AROUND NERVE N/A 5/29/2018    Procedure: BLOCK, NERVE;  Surgeon: Raiza Surgeon;  [facility];  Service: Anesthesiology;  Laterality: N/A;    LAPAROSCOPIC APPENDECTOMY N/A 5/24/2018    Procedure: APPENDECTOMY, LAPAROSCOPIC;  Surgeon: Kenan Huang Jr., MD;  Location: [facility] OR [address] [place] [address];  Service: General;  Laterality: N/A;    PORTACATH PLACEMENT Right      Family History   Problem Relation Age of Onset    Arthritis Mother     Other Mother         benign tumor of brain and spinal cord    Hyperlipidemia Father     Gout Father     No Known Problems Sister     Arthritis Sister         shoulder    Depression Sister      Social History   Substance Use Topics    Smoking status: Former Smoker     Packs/day: 0.25     Types: Vaping with nicotine, Vaping w/o nicotine, Cigarettes, Cigars     Quit date: 3/15/2018    Smokeless tobacco: Former User    Alcohol use Yes      Comment: occ     Review of Systems   Constitutional: Negative for fever.   HENT: Negative for sore throat.    Eyes: Negative for visual disturbance.   Respiratory: Negative for shortness of breath.    Cardiovascular: Negative for chest pain.   Gastrointestinal: Positive for nausea and vomiting. Negative for abdominal pain and diarrhea.   Genitourinary: Negative for dysuria.   Musculoskeletal: Negative for back pain.   Skin: Negative for " rash.   Neurological: Negative for headaches.       Physical Exam     Initial Vitals [07/06/18 1632]   BP Pulse Resp Temp SpO2   (!) 147/76 (!) 125 18 99.7 °F (37.6 °C) 98 %      MAP       --         Physical Exam    Constitutional: He appears well-developed and well-nourished. He is not diaphoretic. No distress.   HENT:   Head: Normocephalic and atraumatic.   Right Ear: External ear normal.   Left Ear: External ear normal.   Mouth/Throat: Oropharynx is clear and moist.   Eyes: Pupils are equal, round, and reactive to light. Right eye exhibits no discharge. Left eye exhibits no discharge. No scleral icterus.   Pale conjunctivae   Neck: Normal range of motion. Neck supple. No JVD present.   No nuchal rigidity   Cardiovascular: Normal rate, regular rhythm and intact distal pulses.   No murmur heard.  Pulmonary/Chest: Breath sounds normal. No stridor. No respiratory distress. He has no wheezes. He has no rhonchi. He has no rales.   R upper chest port site w/o swelling/erythema/tenderness   Abdominal: Soft. Bowel sounds are normal. He exhibits no distension. There is no tenderness. There is no rebound.   Musculoskeletal: Normal range of motion. He exhibits no edema or tenderness.   Neurological: He is alert and oriented to person, place, and time.   Skin: Skin is warm and dry. No erythema. There is pallor.   Psychiatric: He has a normal mood and affect.         ED Course   Procedures  Labs Reviewed   URINALYSIS, REFLEX TO URINE CULTURE - Abnormal; Notable for the following:        Result Value    Color, UA Colorless (*)     All other components within normal limits    Narrative:     Preferred Collection Type->Urine, Clean Catch  Received 1 cup only   CBC W/ AUTO DIFFERENTIAL - Abnormal; Notable for the following:     WBC 3.13 (*)     RBC 2.66 (*)     Hemoglobin 8.0 (*)     Hematocrit 23.2 (*)     RDW 15.9 (*)     MPV 8.4 (*)     Immature Granulocytes 1.0 (*)     Lymph # 0.2 (*)     Mono # 0.1 (*)     Gran% 90.4 (*)      Lymph% 7.0 (*)     Mono% 1.6 (*)     All other components within normal limits   COMPREHENSIVE METABOLIC PANEL - Abnormal; Notable for the following:     Glucose 111 (*)     Total Protein 5.5 (*)     Alkaline Phosphatase 44 (*)     AST 9 (*)     Anion Gap 7 (*)     All other components within normal limits   CULTURE, BLOOD   CULTURE, BLOOD   LIPASE   MAGNESIUM   PHOSPHORUS   LACTIC ACID, PLASMA          Imaging Results          X-Ray Chest 1 View (In process)                  Medical Decision Making:   History:   Old Medical Records: I decided to obtain old medical records.  Initial Assessment:   Imp:  Severe n/v, inability to tolerate home meds, w/ fever, in setting of chemo.  Last infusion earlier today and advised by his oncologist to present to ED for admission.  Has had prior admits for intractable n/v following chemo as well.  Source of fever not evident - in past had been attributed to the chemo.  R/o febrile neutropenia.  Labs, cxr, ivf, meds, monitor, d/w onc.    8:02 PM case d/w onc fellow for admit for symptom control and hydration.  Pt is not neutropenic at this time.  They will eval.  Anticipate admission to their service.    Independently Interpreted Test(s):   I have ordered and independently interpreted X-rays - see prior notes.  Clinical Tests:   Lab Tests: Reviewed and Ordered  Radiological Study: Ordered and Reviewed            Scribe Attestation:   Scribe #1: I performed the above scribed service and the documentation accurately describes the services I performed. I attest to the accuracy of the note.               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 Diagnoses of Fever, Fever, unspecified fever cause, and Intractable vomiting with nausea, unspecified vomiting type were also pertinent to this visit.                             Emeka Serra MD  07/06/18 2004

## 2018-07-06 NOTE — ED TRIAGE NOTES
Pt. Reports fever, loose stool, vomiting, dizziness, nausea; onset Wednesday.  Pt. Reports just leaving his chemo appointment and was told by oncologist to go to the ER.

## 2018-07-06 NOTE — TELEPHONE ENCOUNTER
----- Message from Isa Cruz sent at 7/6/2018  9:13 AM CDT -----  Contact: MATTHEW VARGHESE [[id number]]            Name of Who is Calling:MATTHEW VARGHESE [[id number]]      What is the request in detail: pt states that he has been vomiting for almost 24 hours,pt has an appointment for chemo at 10:30, pt would like to know if he should go to ER or to his chemo appointment, please advise pt       Can the clinic reply by [facility]: yes       What Number to Call Back [facility]: [phone]

## 2018-07-06 NOTE — PLAN OF CARE
Problem: Patient Care Overview  Goal: Plan of Care Review  Outcome: Ongoing (interventions implemented as appropriate)  Tolerated infusion well.  No reactions noted.  AVS given.  VSS.  No questions or concerns at this time.  Ambulated off unit unassisted.

## 2018-07-07 PROBLEM — I95.9 HYPOTENSION: Status: ACTIVE | Noted: 2018-07-07

## 2018-07-07 LAB
ALBUMIN SERPL BCP-MCNC: 3.3 G/DL
ALP SERPL-CCNC: 41 U/L
ALT SERPL W/O P-5'-P-CCNC: 12 U/L
ANION GAP SERPL CALC-SCNC: 5 MMOL/L
AST SERPL-CCNC: 8 U/L
BASOPHILS # BLD AUTO: 0 K/UL
BASOPHILS NFR BLD: 0 %
BILIRUB SERPL-MCNC: 0.8 MG/DL
BUN SERPL-MCNC: 16 MG/DL
CALCIUM SERPL-MCNC: 8.9 MG/DL
CHLORIDE SERPL-SCNC: 106 MMOL/L
CO2 SERPL-SCNC: 26 MMOL/L
CREAT SERPL-MCNC: 0.7 MG/DL
DIFFERENTIAL METHOD: ABNORMAL
EOSINOPHIL # BLD AUTO: 0 K/UL
EOSINOPHIL NFR BLD: 0 %
ERYTHROCYTE [DISTWIDTH] IN BLOOD BY AUTOMATED COUNT: 15.8 %
EST. GFR  (AFRICAN AMERICAN): >60 ML/MIN/1.73 M^2
EST. GFR  (NON AFRICAN AMERICAN): >60 ML/MIN/1.73 M^2
GLUCOSE SERPL-MCNC: 82 MG/DL
HCT VFR BLD AUTO: 24 %
HGB BLD-MCNC: 8.2 G/DL
IMM GRANULOCYTES # BLD AUTO: 0.02 K/UL
IMM GRANULOCYTES NFR BLD AUTO: 0.5 %
LYMPHOCYTES # BLD AUTO: 0.9 K/UL
LYMPHOCYTES NFR BLD: 20.3 %
MAGNESIUM SERPL-MCNC: 1.8 MG/DL
MCH RBC QN AUTO: 29.6 PG
MCHC RBC AUTO-ENTMCNC: 34.2 G/DL
MCV RBC AUTO: 87 FL
MONOCYTES # BLD AUTO: 0.1 K/UL
MONOCYTES NFR BLD: 3 %
NEUTROPHILS # BLD AUTO: 3.3 K/UL
NEUTROPHILS NFR BLD: 76.2 %
NRBC BLD-RTO: 0 /100 WBC
PHOSPHATE SERPL-MCNC: 4.8 MG/DL
PLATELET # BLD AUTO: 236 K/UL
PMV BLD AUTO: 8.3 FL
POTASSIUM SERPL-SCNC: 4.2 MMOL/L
PROT SERPL-MCNC: 5.3 G/DL
RBC # BLD AUTO: 2.77 M/UL
SODIUM SERPL-SCNC: 137 MMOL/L
WBC # BLD AUTO: 4.34 K/UL

## 2018-07-07 PROCEDURE — 83735 ASSAY OF MAGNESIUM: CPT

## 2018-07-07 PROCEDURE — 25000003 PHARM REV CODE 250: Performed by: STUDENT IN AN ORGANIZED HEALTH CARE EDUCATION/TRAINING PROGRAM

## 2018-07-07 PROCEDURE — 63600175 PHARM REV CODE 636 W HCPCS: Performed by: STUDENT IN AN ORGANIZED HEALTH CARE EDUCATION/TRAINING PROGRAM

## 2018-07-07 PROCEDURE — 36415 COLL VENOUS BLD VENIPUNCTURE: CPT

## 2018-07-07 PROCEDURE — 80053 COMPREHEN METABOLIC PANEL: CPT

## 2018-07-07 PROCEDURE — 20600001 HC STEP DOWN PRIVATE ROOM

## 2018-07-07 PROCEDURE — 99233 SBSQ HOSP IP/OBS HIGH 50: CPT | Mod: ,,, | Performed by: INTERNAL MEDICINE

## 2018-07-07 PROCEDURE — 84100 ASSAY OF PHOSPHORUS: CPT

## 2018-07-07 PROCEDURE — 85025 COMPLETE CBC W/AUTO DIFF WBC: CPT

## 2018-07-07 RX ORDER — DEXAMETHASONE SODIUM PHOSPHATE 4 MG/ML
4 INJECTION, SOLUTION INTRA-ARTICULAR; INTRALESIONAL; INTRAMUSCULAR; INTRAVENOUS; SOFT TISSUE EVERY 12 HOURS
Status: DISCONTINUED | OUTPATIENT
Start: 2018-07-07 | End: 2018-07-08

## 2018-07-07 RX ORDER — MORPHINE SULFATE 15 MG/1
30 TABLET ORAL EVERY 6 HOURS PRN
Status: DISCONTINUED | OUTPATIENT
Start: 2018-07-07 | End: 2018-07-09 | Stop reason: HOSPADM

## 2018-07-07 RX ORDER — PROCHLORPERAZINE MALEATE 5 MG
10 TABLET ORAL 3 TIMES DAILY PRN
Status: DISCONTINUED | OUTPATIENT
Start: 2018-07-07 | End: 2018-07-08

## 2018-07-07 RX ADMIN — ONDANSETRON 4 MG: 2 INJECTION, SOLUTION INTRAMUSCULAR; INTRAVENOUS at 05:07

## 2018-07-07 RX ADMIN — PROMETHAZINE HYDROCHLORIDE 6.25 MG: 25 INJECTION INTRAMUSCULAR; INTRAVENOUS at 07:07

## 2018-07-07 RX ADMIN — ONDANSETRON 4 MG: 2 INJECTION, SOLUTION INTRAMUSCULAR; INTRAVENOUS at 06:07

## 2018-07-07 RX ADMIN — ONDANSETRON 4 MG: 2 INJECTION, SOLUTION INTRAMUSCULAR; INTRAVENOUS at 11:07

## 2018-07-07 RX ADMIN — PROCHLORPERAZINE MALEATE 10 MG: 5 TABLET, FILM COATED ORAL at 09:07

## 2018-07-07 RX ADMIN — ALPRAZOLAM 0.75 MG: 0.25 TABLET ORAL at 10:07

## 2018-07-07 RX ADMIN — PROCHLORPERAZINE MALEATE 10 MG: 5 TABLET, FILM COATED ORAL at 06:07

## 2018-07-07 RX ADMIN — PANTOPRAZOLE SODIUM 40 MG: 40 TABLET, DELAYED RELEASE ORAL at 08:07

## 2018-07-07 RX ADMIN — PROMETHAZINE HYDROCHLORIDE 6.25 MG: 25 INJECTION INTRAMUSCULAR; INTRAVENOUS at 10:07

## 2018-07-07 RX ADMIN — SODIUM CHLORIDE 1000 ML: 0.9 INJECTION, SOLUTION INTRAVENOUS at 05:07

## 2018-07-07 RX ADMIN — SODIUM CHLORIDE 1000 ML: 0.9 INJECTION, SOLUTION INTRAVENOUS at 12:07

## 2018-07-07 RX ADMIN — PROMETHAZINE HYDROCHLORIDE 6.25 MG: 25 INJECTION INTRAMUSCULAR; INTRAVENOUS at 01:07

## 2018-07-07 RX ADMIN — OLANZAPINE 10 MG: 5 TABLET, FILM COATED ORAL at 10:07

## 2018-07-07 RX ADMIN — PROCHLORPERAZINE MALEATE 10 MG: 5 TABLET, FILM COATED ORAL at 08:07

## 2018-07-07 RX ADMIN — DEXAMETHASONE SODIUM PHOSPHATE 4 MG: 4 INJECTION, SOLUTION INTRAMUSCULAR; INTRAVENOUS at 08:07

## 2018-07-07 NOTE — PROGRESS NOTES
Patient remains free from falls and injury this shift. Bed in low, locked position with call bell in reach. Patient up ad ernst. Frequent, intermittent nausea with no vomiting this shift. Medications alternated between scheduled and PO antiemetics. All belongings within reach will continue to monitor.

## 2018-07-07 NOTE — ED NOTES
Pain:  Rated 8/10.    Psychosocial:  Patient is calm and cooperative.  Patients insight and judgement are appropriate to situation.  Appears clean, well maintained, with clothing appropriate to environment.  No evidence of hallucinations, delusions, or psychosis.    Neuro:  Eyes open spontaneously.  Awake, alert.  Oriented x 4.  Speech clear and appropriate.  Tolerating saliva secretions well.  Able to follow commands, demonstrating ability to actively and appropriately communicate within context of current conversation.  Symmetrical facial muscles.  Moving all extremities well with no noted weakness.     Airway:  Bilateral chest rise and fall.  RR regular and non labored.  Air entry patent and clear to upper and lower lobes of the lungs.  Shortness of breath;     Circulatory:  Skin warm, dry, and pink.  Apical and radial pulses strong and regular.      Abdomen:  Abdomen soft and non distended.  Lower abdominal discomfort.     Urinary:  Patient reports routine urination without pain.  Reports frequency.  Voids independently.    Extremities:  No redness, heat, swelling, deformity, or pain.    Skin:  Intact with no bruising/discolorations noted.

## 2018-07-07 NOTE — ASSESSMENT & PLAN NOTE
-Patient having post chemo N/V  -Continue hydration with NS  - start on scheduled Zofran IV 4 mg q 6 hs   - ordered Phenergan 6.25 mg IV q 6 h PRN for breakthrough  - ordered EKG   -Advanced diet tolerated per patient request

## 2018-07-07 NOTE — HOSPITAL COURSE
Patient came with nausea, vomiting and was hypotensive. He was managed on Fluids, zofran, phenergan, dexamethasone, Marinol and lorazepam as needed, His electrolyes were monitored and corrected appropriately. His pain was controlled by Fentanyl patch. He is clinically doing better and has a good appetite. He is being discharged with his home medications, Valium and Sancuso patch. He is scheduled to continue with his OP Chemotherapy tomorrow and follow up with his Doctor. To Continue activity and regular diet as tolerated.

## 2018-07-07 NOTE — H&P
"[facility]  Hematology/Oncology  H&P    Patient Name: Alejandro Neff  MRN: [id number]  Admission Date: 7/6/2018  Code Status: Full Code   Attending Provider: Emeka Serra MD  Primary Care Physician: Nata Hernandez MD  Principal Problem:Vomiting with nausea, not intractable    Subjective:     HPI: 23 years old male with Hx of mediastinal seminoma ongoing cycles 4 of OP TESTICULAR EP protocol. presented to the ER 7/6/18 with a complaint of N/V x 3 days. Pt reports N/V, fever (101) and generalized body pain that has worsened this week. He is in close contact with his oncologist who advised him to visit the ED following his chemotherapy session today 7/6/18. Pt was seen in ED 3 days ago with similar sx and states that his N/V and fever have persisted. He took Zofran for nausea but was unable to keep from vomiting it up. No known allergies to medications. No documented fever since admitted. Lactate is normal. CXR and U/A with no signs of obvious infection. Blood culture sent.             Oncologic History:  1. Mr. Hampton is a 22 yo man who first presented with chest pain and underwent CT chest on 2/14/18, which showed a 6 x 3.5 cm mediastinal mass. It abuts the aorta and pulmonary artery. Biopsy was done on 3/1/18. Pathology (reviewed at [facility]) showed germ cell tumor, most consistent with seminoma.   2. HCG, LDH, AFP on 3/22/18 all normal.   3. Testicular US 3/26/18 showed no testicular masses. CT C/A/P on 3/26/18 showed "stable appearance of anterior mediastinal mass consistent with provided history of seminoma. Several punctate sclerotic foci are noted in the pelvis at the right pubic bone, right ischial tuberosity, and left ilium adjacent to the sacroiliac joint.  These are strongly favored to reflect benign bone islands although metastatic disease could have a similar appearance and close follow-up is recommended. Several benign Schmorl's nodes are noted in the thoracic " "spine." Bone scan on 3/28/18 was negative for bone mets.   4. Audiogram on 4/6/18 normal. Followed by ENT.   5. Given his smoking history, I recommended 4 cycles of EP. EP cycle 1 day 1 on 4/16/18. Complicated by hospitalization for neutropenic fever 4/26-4/29. No source of infections identified. Treated with antibiotics empirically and improved.   6. Cycle 2 of EP started on on 5/7/18. Hospitalized during the first weekend for intractable nausea.   7. Restaging CT scan on 5/24/18 showed "Interval development of dilated appearance of the appendix with stranding of the periappendiceal fat.  Findings are worrisome for appendicitis". Patient did have abdominal pain and tenderness. Admitted and got appendectomy on 5/24/18.  8. Cycle 3 of EP 6/11/18-6/15/18  9. Cycle 4 of EP 7/2/18-7/6/18     Oncology Treatment Plan:   OP TESTICULAR EP    Medications:  Continuous Infusions:  Scheduled Meds:   enoxaparin  40 mg Subcutaneous Daily    ondansetron  4 mg Intravenous ED 1 Time    [START ON 7/7/2018] ondansetron  4 mg Intravenous Q6H     PRN Meds:dextrose 50%, dextrose 50%, glucagon (human recombinant), glucose, glucose, promethazine (PHENERGAN) IVPB, sodium chloride 0.9%     Review of patient's allergies indicates:   Allergen Reactions    Mushroom Anaphylaxis    Bamboo     Bee pollens     Mushroom flavor     Venom-honey bee         Past Medical History:   Diagnosis Date    Abdominal pain 08/12/2010    eval for abd and chest wall pain at [place] - cxr wnl, EKG (RVH), troponin wnl, normal chemistries    Allergy     Cancer     testicular    Chondromalacia patellae     Chronic nausea 2015    eval by GI Dr. Tushar Artis - given zofran and ciproheptadine     Chronic pain     Chronic pain     [facility] - [facility] meds; followed by psych and pain clinic and unresponsive to behavioral/CBT/old records reports c/f conversion disorder     " "Foot pain 04/2010    4/10 + [facility], eval by Dr. ALLY Lion at [facility] -dx unclear ? gout and trial of nsaids and pdn, xrays normal 1/11, referred to podiatry, re-eval by rheum 2/12 and MRI and films wnl    Fracture of right radius 09/2009    Lyme arthritis     multiple joints    Lyme disease 2013    Memory loss 03/2012    eval for memory loss by neuro [place] [facility] [place], EEG wnl. Dr. Patricio suggested emotional factors & ref to Tushar Davies, PhD for  eval & neuropsych eval 3/12 Lupe Delgado, PhD - no evidence of formal thought disorder or psychosis - documented severe memory impairment; not related to to ADD or executive function issues. sugesstion that memory issues may be related to "emotional factors", ?conversion d/o    Stress fracture of tibia and fibula 08/2011    Urticaria      Past Surgical History:   Procedure Laterality Date    CHEST WALL BIOPSY      INJECTION OF ANESTHETIC AGENT AROUND NERVE N/A 5/29/2018    Procedure: BLOCK, NERVE;  Surgeon: Raiza Surgeon;  Location: [facility];  Service: Anesthesiology;  Laterality: N/A;    LAPAROSCOPIC APPENDECTOMY N/A 5/24/2018    Procedure: APPENDECTOMY, LAPAROSCOPIC;  Surgeon: Kenan Huang Jr., MD;  Location: [facility] OR [address] [place] [address];  Service: General;  Laterality: N/A;    PORTACATH PLACEMENT Right      Family History     Problem Relation (Age of Onset)    Arthritis Mother, Sister    Depression Sister    Gout Father    Hyperlipidemia Father    No Known Problems Sister    Other Mother        Social History Main Topics    Smoking status: Former Smoker     Packs/day: 0.25     Types: Vaping with nicotine, Vaping w/o nicotine, Cigarettes, Cigars     Quit date: 3/15/2018    Smokeless tobacco: Former User    Alcohol use Yes      Comment: occ    Drug use: Yes     Types: Marijuana, LSD, Cocaine, Other-see comments      Comment: Kratom (capsule or leaf form)    Sexual activity: Yes     Partners: Female     Birth control/ protection: Condom       Review of Systems "   Constitutional: Negative for chills, fever and unexpected weight change.   HENT: Negative for ear pain, sinus pressure and sore throat.    Eyes: Negative for pain.   Respiratory: Negative for cough and shortness of breath.    Cardiovascular: Negative for chest pain and leg swelling.   Gastrointestinal: Positive for nausea and vomiting. Negative for abdominal pain and diarrhea.   Genitourinary: Negative for dysuria and hematuria.   Musculoskeletal: Negative for back pain and myalgias.   Skin: Negative for rash and wound.   Neurological: Negative for weakness and headaches.     Objective:     Vital Signs (Most Recent):  Temp: 99.4 °F (37.4 °C) (07/06/18 1930)  Pulse: 86 (07/06/18 1913)  Resp: 18 (07/06/18 1632)  BP: 127/67 (07/06/18 1913)  SpO2: 99 % (07/06/18 1913) Vital Signs (24h Range):  Temp:  [98.2 °F (36.8 °C)-99.7 °F (37.6 °C)] 99.4 °F (37.4 °C)  Pulse:  [] 86  Resp:  [18-19] 18  SpO2:  [98 %-99 %] 99 %  BP: (113-149)/(65-76) 127/67     Weight: 65.8 kg (145 lb)  Body mass index is 22.71 kg/m².  Body surface area is 1.76 meters squared.    No intake or output data in the 24 hours ending 07/06/18 2059    Physical Exam  General: Well developed, well nourished male in NAD  HEENT: Conjunctiva pale; Oropharynx clear  Neck: No JVD noted, Supple  CV: Normal S1, S2 with no murmurs.  Resp: Lungs CTA Bilaterally, Unlabored  Abdomen: NTND, BS normoactive x4 quads, soft  Extrem: No cyanosis, clubbing, edema.  Skin: No rashes, lesions, ulcers  Neuro: motor strength in tact. No focal deficit   PSYCH: Oriented x3    Significant Labs:   CBC:   Recent Labs  Lab 07/06/18 1840   WBC 3.13*   HGB 8.0*   HCT 23.2*      , CMP:   Recent Labs  Lab 07/06/18 1840      K 4.1      CO2 24   *   BUN 14   CREATININE 0.7   CALCIUM 8.7   PROT 5.5*   ALBUMIN 3.5   BILITOT 0.6   ALKPHOS 44*   AST 9*   ALT 14   ANIONGAP 7*   EGFRNONAA >60.0    and Urine Studies:   Recent Labs  Lab 07/06/18  1909   COLORU  Colorless*   APPEARANCEUA Clear   PHUR 6.0   SPECGRAV 1.010   PROTEINUA Negative   GLUCUA Negative   KETONESU Negative   BILIRUBINUA Negative   OCCULTUA Negative   NITRITE Negative   UROBILINOGEN Negative   LEUKOCYTESUR Negative       Diagnostic Results:  I have reviewed all pertinent imaging results/findings within the past 24 hours.    Assessment/Plan:     * Vomiting with nausea, not intractable    -Patient having post chemo N/V  -Continue hydration with NS  - start on scheduled Zofran IV 4 mg q 6 hs   - ordered Phenergan 6.25 mg IV q 6 h PRN for breakthrough  - ordered EKG   -Advanced diet tolerated per patient request           Primary mediastinal seminoma    - Testicular US 3/26/18 showed no testicular masses, HCG, LDH, AFP on 3/22/18 all normal.   - Bone scan on 3/28/18 was negative for bone mets.   - Ongoing cycle 4 of OP TESTICULAR EP protocol        Anemia associated with chemotherapy    - Stable, monitor  - Transfuse as needed to keep Hb >7.0            Sean Hamilton MD  Hematology/Oncology  [facility]

## 2018-07-07 NOTE — PROGRESS NOTES
"[facility]  Hematology/Oncology  Progress Note    Patient Name: Alejandro Neff  Admission Date: 7/6/2018  Hospital Length of Stay: 0 days  Code Status: Full Code     Subjective:     HPI:  23 years old male with Hx of mediastinal seminoma ongoing cycles 4 of OP TESTICULAR EP protocol. presented to the ER 7/6/18 with a complaint of N/V x 3 days. Pt reports N/V, fever (101) and generalized body pain that has worsened this week. He is in close contact with his oncologist who advised him to visit the ED following his chemotherapy session today 7/6/18. Pt was seen in ED 3 days ago with similar sx and states that his N/V and fever have persisted. He took Zofran for nausea but was unable to keep from vomiting it up. No known allergies to medications. No documented fever since admitted. Lactate is normal. CXR and U/A with no signs of obvious infection. Blood culture sent.             Oncologic History:  1. Mr. Hampton is a 24 yo man who first presented with chest pain and underwent CT chest on 2/14/18, which showed a 6 x 3.5 cm mediastinal mass. It abuts the aorta and pulmonary artery. Biopsy was done on 3/1/18. Pathology (reviewed at [facility]) showed germ cell tumor, most consistent with seminoma.   2. HCG, LDH, AFP on 3/22/18 all normal.   3. Testicular US 3/26/18 showed no testicular masses. CT C/A/P on 3/26/18 showed "stable appearance of anterior mediastinal mass consistent with provided history of seminoma. Several punctate sclerotic foci are noted in the pelvis at the right pubic bone, right ischial tuberosity, and left ilium adjacent to the sacroiliac joint.  These are strongly favored to reflect benign bone islands although metastatic disease could have a similar appearance and close follow-up is recommended. Several benign Schmorl's nodes are noted in the thoracic spine." Bone scan on 3/28/18 was negative for bone mets.   4. Audiogram on 4/6/18 normal. Followed by ENT.   5. Given his " "smoking history, I recommended 4 cycles of EP. EP cycle 1 day 1 on 4/16/18. Complicated by hospitalization for neutropenic fever 4/26-4/29. No source of infections identified. Treated with antibiotics empirically and improved.   6. Cycle 2 of EP started on on 5/7/18. Hospitalized during the first weekend for intractable nausea.   7. Restaging CT scan on 5/24/18 showed "Interval development of dilated appearance of the appendix with stranding of the periappendiceal fat.  Findings are worrisome for appendicitis". Patient did have abdominal pain and tenderness. Admitted and got appendectomy on 5/24/18.  8. Cycle 3 of EP 6/11/18-6/15/18  9. Cycle 4 of EP 7/2/18-7/6/18    Interval History: Patient has nausea and low blood pressure    Oncology Treatment Plan:   OP TESTICULAR EP    Medications:  Continuous Infusions:  Scheduled Meds:   dexamethasone  4 mg Intravenous Q12H    enoxaparin  40 mg Subcutaneous Daily    ondansetron  4 mg Intravenous Q6H    pantoprazole  40 mg Oral Daily     PRN Meds:ALPRAZolam, dextrose 50%, dextrose 50%, glucagon (human recombinant), glucose, glucose, OLANZapine, prochlorperazine, promethazine (PHENERGAN) IVPB, sodium chloride 0.9%     Review of Systems   Constitutional: Positive for activity change, chills and fatigue. Negative for fever.   HENT: Negative for ear discharge, rhinorrhea and sore throat.    Eyes: Negative for pain and discharge.   Respiratory: Negative for cough and shortness of breath.    Cardiovascular: Negative for chest pain, palpitations and leg swelling.   Gastrointestinal: Positive for nausea. Negative for abdominal distention, abdominal pain, constipation, diarrhea and vomiting.   Genitourinary: Negative for dysuria and hematuria.   Musculoskeletal: Negative for arthralgias.   Skin: Negative for rash and wound.   Neurological: Negative for dizziness and headaches.   Hematological: Does not bruise/bleed easily.   Psychiatric/Behavioral: Negative for confusion. "     Objective:     Vital Signs (Most Recent):  Temp: 98.9 °F (37.2 °C) (07/07/18 1211)  Pulse: 77 (07/07/18 1211)  Resp: 18 (07/07/18 1211)  BP: (!) 113/58 (07/07/18 1312)  SpO2: 97 % (07/07/18 1211) Vital Signs (24h Range):  Temp:  [96.4 °F (35.8 °C)-99.4 °F (37.4 °C)] 98.9 °F (37.2 °C)  Pulse:  [52-96] 77  Resp:  [14-18] 18  SpO2:  [97 %-99 %] 97 %  BP: ()/(44-70) 113/58     Weight: 65.8 kg (144 lb 15.9 oz)  Body mass index is 22.71 kg/m².  Body surface area is 1.76 meters squared.      Intake/Output Summary (Last 24 hours) at 07/07/18 1659  Last data filed at 07/07/18 1230   Gross per 24 hour   Intake              200 ml   Output                0 ml   Net              200 ml       Physical Exam   Constitutional: He is oriented to person, place, and time. He appears well-developed.   HENT:   Head: Normocephalic and atraumatic.   Eyes: EOM are normal. Pupils are equal, round, and reactive to light.   Neck: Normal range of motion. No JVD present.   Cardiovascular: Normal rate, regular rhythm and normal heart sounds.    No murmur heard.  Pulmonary/Chest: Effort normal and breath sounds normal. He has no wheezes. He has no rales.   Abdominal: Soft. Bowel sounds are normal. He exhibits no distension. There is no tenderness.   Musculoskeletal: Normal range of motion.   Neurological: He is alert and oriented to person, place, and time.   Psychiatric: His behavior is normal.       Significant Labs:   CBC:   Recent Labs  Lab 07/06/18  1840 07/07/18  0643   WBC 3.13* 4.34   HGB 8.0* 8.2*   HCT 23.2* 24.0*    236    and CMP:   Recent Labs  Lab 07/06/18  1840 07/07/18  0644    137   K 4.1 4.2    106   CO2 24 26   * 82   BUN 14 16   CREATININE 0.7 0.7   CALCIUM 8.7 8.9   PROT 5.5* 5.3*   ALBUMIN 3.5 3.3*   BILITOT 0.6 0.8   ALKPHOS 44* 41*   AST 9* 8*   ALT 14 12   ANIONGAP 7* 5*   EGFRNONAA >60.0 >60.0       Diagnostic Results:  I have reviewed all pertinent imaging results/findings within the  past 24 hours.    Assessment/Plan:     * Vomiting with nausea, not intractable    -Patient having post chemo N/V  -Continue hydration with NS  - start on scheduled Zofran IV 4 mg q 6 hs   - ordered Phenergan 6.25 mg IV q 6 h PRN for breakthrough  - ordered EKG   -Advanced diet tolerated per patient request   Dexamethasone added          Hypotension    Patient was hypotensive  Managed with 1L saline bolus        Anemia associated with chemotherapy    - Stable, monitor  - Transfuse as needed to keep Hb >7.0        Primary mediastinal seminoma    - Testicular US 3/26/18 showed no testicular masses, HCG, LDH, AFP on 3/22/18 all normal.   - Bone scan on 3/28/18 was negative for bone mets.   - Ongoing cycle 4 of OP TESTICULAR EP protocol                 Genoveva Lopez MD  Hematology/Oncology  [facility]    Attending Note  I have personally taken the history and examined this patient and agree with the resident's note as stated above.  Discussed nausea regimen- will schedule an oral regimen and add Marinol  Possible discharge tomorrow

## 2018-07-07 NOTE — PLAN OF CARE
Problem: Patient Care Overview  Goal: Plan of Care Review  Outcome: Ongoing (interventions implemented as appropriate)  Pt had no lizett pain. Nausea meds given. Blood pressure low. MD ordered 1 L bolus. Gave PRN Xanax at bedtime. Ambulates independently. Frequent rounds made to assess pain and safety. Patient remained free from falls. Bed in lowest position. Side rails up X 2. Call light within reach. Will continue to monitor.

## 2018-07-07 NOTE — ASSESSMENT & PLAN NOTE
-Patient having post chemo N/V  -Continue hydration with NS  - start on scheduled Zofran IV 4 mg q 6 hs   - ordered Phenergan 6.25 mg IV q 6 h PRN for breakthrough  - ordered EKG   -Advanced diet tolerated per patient request   Dexamethasone added

## 2018-07-07 NOTE — SUBJECTIVE & OBJECTIVE
"Oncology Treatment Plan:   OP TESTICULAR EP    Medications:  Continuous Infusions:  Scheduled Meds:   enoxaparin  40 mg Subcutaneous Daily    ondansetron  4 mg Intravenous ED 1 Time    [START ON 7/7/2018] ondansetron  4 mg Intravenous Q6H     PRN Meds:dextrose 50%, dextrose 50%, glucagon (human recombinant), glucose, glucose, promethazine (PHENERGAN) IVPB, sodium chloride 0.9%     Review of patient's allergies indicates:   Allergen Reactions    Mushroom Anaphylaxis    Bamboo     Bee pollens     Mushroom flavor     Venom-honey bee         Past Medical History:   Diagnosis Date    Abdominal pain 08/12/2010    eval for abd and chest wall pain at [facility], [place], [place] - cxr wnl, EKG (RV), troponin wnl, normal chemistries    Allergy     Cancer     testicular    Chondromalacia patellae     Chronic nausea 2015    eval by GI Dr. Tushar Artis - given zofran and ciproheptadine     Chronic pain     Chronic pain     [facility] - no meds; followed by psych and pain clinic and unresponsive to behavioral/CBT/old records reports c/f conversion disorder     Foot pain 04/2010    4/10 + SHIRA, eval by Dr. ALLY Lion at [facility] -dx unclear ? gout and trial of nsaids and pdn, xrays normal 1/11, referred to podiatry, re-eval by rheum 2/12 and MRI and films wnl    Fracture of right radius 09/2009    Lyme arthritis     multiple joints    Lyme disease 2013    Memory loss 03/2012    eval for memory loss by neuro [place] [facility] [place], EEG wnl. Dr. Patricio suggested emotional factors & ref to Tushar Davies, PhD for  eval & neuropsych eval 3/12 Lupe Delgado, PhD - no evidence of formal thought disorder or psychosis - documented severe memory impairment; not related to to ADD or executive function issues. sugesstion that memory issues may be related to "emotional factors", ?conversion d/o    Stress fracture of tibia and fibula 08/2011    Urticaria      Past " Surgical History:   Procedure Laterality Date    CHEST WALL BIOPSY      INJECTION OF ANESTHETIC AGENT AROUND NERVE N/A 5/29/2018    Procedure: BLOCK, NERVE;  Surgeon: Raiza Surgeon;  Location: [facility];  Service: Anesthesiology;  Laterality: N/A;    LAPAROSCOPIC APPENDECTOMY N/A 5/24/2018    Procedure: APPENDECTOMY, LAPAROSCOPIC;  Surgeon: Kenan Huang Jr., MD;  Location: [facility] OR [address] [place] [address];  Service: General;  Laterality: N/A;    PORTACATH PLACEMENT Right      Family History     Problem Relation (Age of Onset)    Arthritis Mother, Sister    Depression Sister    Gout Father    Hyperlipidemia Father    No Known Problems Sister    Other Mother        Social History Main Topics    Smoking status: Former Smoker     Packs/day: 0.25     Types: Vaping with nicotine, Vaping w/o nicotine, Cigarettes, Cigars     Quit date: 3/15/2018    Smokeless tobacco: Former User    Alcohol use Yes      Comment: occ    Drug use: Yes     Types: Marijuana, LSD, Cocaine, Other-see comments      Comment: Kratom (capsule or leaf form)    Sexual activity: Yes     Partners: Female     Birth control/ protection: Condom       Review of Systems   Constitutional: Negative for chills, fever and unexpected weight change.   HENT: Negative for ear pain, sinus pressure and sore throat.    Eyes: Negative for pain.   Respiratory: Negative for cough and shortness of breath.    Cardiovascular: Negative for chest pain and leg swelling.   Gastrointestinal: Positive for nausea and vomiting. Negative for abdominal pain and diarrhea.   Genitourinary: Negative for dysuria and hematuria.   Musculoskeletal: Negative for back pain and myalgias.   Skin: Negative for rash and wound.   Neurological: Negative for weakness and headaches.     Objective:     Vital Signs (Most Recent):  Temp: 99.4 °F (37.4 °C) (07/06/18 1930)  Pulse: 86 (07/06/18 1913)  Resp: 18 (07/06/18 1632)  BP: 127/67 (07/06/18 1913)  SpO2: 99 % (07/06/18 1913) Vital Signs (24h  Range):  Temp:  [98.2 °F (36.8 °C)-99.7 °F (37.6 °C)] 99.4 °F (37.4 °C)  Pulse:  [] 86  Resp:  [18-19] 18  SpO2:  [98 %-99 %] 99 %  BP: (113-149)/(65-76) 127/67     Weight: 65.8 kg (145 lb)  Body mass index is 22.71 kg/m².  Body surface area is 1.76 meters squared.    No intake or output data in the 24 hours ending 07/06/18 2059    Physical Exam  General: Well developed, well nourished male in NAD  HEENT: Conjunctiva pale; Oropharynx clear  Neck: No JVD noted, Supple  CV: Normal S1, S2 with no murmurs.  Resp: Lungs CTA Bilaterally, Unlabored  Abdomen: NTND, BS normoactive x4 quads, soft  Extrem: No cyanosis, clubbing, edema.  Skin: No rashes, lesions, ulcers  Neuro: motor strength in tact. No focal deficit   PSYCH: Oriented x3    Significant Labs:   CBC:   Recent Labs  Lab 07/06/18  1840   WBC 3.13*   HGB 8.0*   HCT 23.2*      , CMP:   Recent Labs  Lab 07/06/18  1840      K 4.1      CO2 24   *   BUN 14   CREATININE 0.7   CALCIUM 8.7   PROT 5.5*   ALBUMIN 3.5   BILITOT 0.6   ALKPHOS 44*   AST 9*   ALT 14   ANIONGAP 7*   EGFRNONAA >60.0    and Urine Studies:   Recent Labs  Lab 07/06/18  1909   COLORU Colorless*   APPEARANCEUA Clear   PHUR 6.0   SPECGRAV 1.010   PROTEINUA Negative   GLUCUA Negative   KETONESU Negative   BILIRUBINUA Negative   OCCULTUA Negative   NITRITE Negative   UROBILINOGEN Negative   LEUKOCYTESUR Negative       Diagnostic Results:  I have reviewed all pertinent imaging results/findings within the past 24 hours.

## 2018-07-07 NOTE — SUBJECTIVE & OBJECTIVE
Interval History: Patient has nausea and low blood pressure    Oncology Treatment Plan:   OP TESTICULAR EP    Medications:  Continuous Infusions:  Scheduled Meds:   dexamethasone  4 mg Intravenous Q12H    enoxaparin  40 mg Subcutaneous Daily    ondansetron  4 mg Intravenous Q6H    pantoprazole  40 mg Oral Daily     PRN Meds:ALPRAZolam, dextrose 50%, dextrose 50%, glucagon (human recombinant), glucose, glucose, OLANZapine, prochlorperazine, promethazine (PHENERGAN) IVPB, sodium chloride 0.9%     Review of Systems   Constitutional: Positive for activity change, chills and fatigue. Negative for fever.   HENT: Negative for ear discharge, rhinorrhea and sore throat.    Eyes: Negative for pain and discharge.   Respiratory: Negative for cough and shortness of breath.    Cardiovascular: Negative for chest pain, palpitations and leg swelling.   Gastrointestinal: Positive for nausea. Negative for abdominal distention, abdominal pain, constipation, diarrhea and vomiting.   Genitourinary: Negative for dysuria and hematuria.   Musculoskeletal: Negative for arthralgias.   Skin: Negative for rash and wound.   Neurological: Negative for dizziness and headaches.   Hematological: Does not bruise/bleed easily.   Psychiatric/Behavioral: Negative for confusion.     Objective:     Vital Signs (Most Recent):  Temp: 98.9 °F (37.2 °C) (07/07/18 1211)  Pulse: 77 (07/07/18 1211)  Resp: 18 (07/07/18 1211)  BP: (!) 113/58 (07/07/18 1312)  SpO2: 97 % (07/07/18 1211) Vital Signs (24h Range):  Temp:  [96.4 °F (35.8 °C)-99.4 °F (37.4 °C)] 98.9 °F (37.2 °C)  Pulse:  [52-96] 77  Resp:  [14-18] 18  SpO2:  [97 %-99 %] 97 %  BP: ()/(44-70) 113/58     Weight: 65.8 kg (144 lb 15.9 oz)  Body mass index is 22.71 kg/m².  Body surface area is 1.76 meters squared.      Intake/Output Summary (Last 24 hours) at 07/07/18 1659  Last data filed at 07/07/18 1230   Gross per 24 hour   Intake              200 ml   Output                0 ml   Net               200 ml       Physical Exam   Constitutional: He is oriented to person, place, and time. He appears well-developed.   HENT:   Head: Normocephalic and atraumatic.   Eyes: EOM are normal. Pupils are equal, round, and reactive to light.   Neck: Normal range of motion. No JVD present.   Cardiovascular: Normal rate, regular rhythm and normal heart sounds.    No murmur heard.  Pulmonary/Chest: Effort normal and breath sounds normal. He has no wheezes. He has no rales.   Abdominal: Soft. Bowel sounds are normal. He exhibits no distension. There is no tenderness.   Musculoskeletal: Normal range of motion.   Neurological: He is alert and oriented to person, place, and time.   Psychiatric: His behavior is normal.       Significant Labs:   CBC:   Recent Labs  Lab 07/06/18 1840 07/07/18  0643   WBC 3.13* 4.34   HGB 8.0* 8.2*   HCT 23.2* 24.0*    236    and CMP:   Recent Labs  Lab 07/06/18 1840 07/07/18  0644    137   K 4.1 4.2    106   CO2 24 26   * 82   BUN 14 16   CREATININE 0.7 0.7   CALCIUM 8.7 8.9   PROT 5.5* 5.3*   ALBUMIN 3.5 3.3*   BILITOT 0.6 0.8   ALKPHOS 44* 41*   AST 9* 8*   ALT 14 12   ANIONGAP 7* 5*   EGFRNONAA >60.0 >60.0       Diagnostic Results:  I have reviewed all pertinent imaging results/findings within the past 24 hours.

## 2018-07-07 NOTE — ASSESSMENT & PLAN NOTE
- Testicular US 3/26/18 showed no testicular masses, HCG, LDH, AFP on 3/22/18 all normal.   - Bone scan on 3/28/18 was negative for bone mets.   - Ongoing cycle 4 of OP TESTICULAR EP protocol

## 2018-07-07 NOTE — HPI
"23 years old male with Hx of mediastinal seminoma ongoing cycles 4 of OP TESTICULAR EP protocol. presented to the ER 7/6/18 with a complaint of N/V x 3 days. Pt reports N/V, fever (101) and generalized body pain that has worsened this week. He is in close contact with his oncologist who advised him to visit the ED following his chemotherapy session today 7/6/18. Pt was seen in ED 3 days ago with similar sx and states that his N/V and fever have persisted. He took Zofran for nausea but was unable to keep from vomiting it up. No known allergies to medications. No documented fever since admitted. Lactate is normal. CXR and U/A with no signs of obvious infection. Blood culture sent.             Oncologic History:  1. Mr. Hampton is a 22 yo man who first presented with chest pain and underwent CT chest on 2/14/18, which showed a 6 x 3.5 cm mediastinal mass. It abuts the aorta and pulmonary artery. Biopsy was done on 3/1/18. Pathology (reviewed at [facility]) showed germ cell tumor, most consistent with seminoma.   2. HCG, LDH, AFP on 3/22/18 all normal.   3. Testicular US 3/26/18 showed no testicular masses. CT C/A/P on 3/26/18 showed "stable appearance of anterior mediastinal mass consistent with provided history of seminoma. Several punctate sclerotic foci are noted in the pelvis at the right pubic bone, right ischial tuberosity, and left ilium adjacent to the sacroiliac joint.  These are strongly favored to reflect benign bone islands although metastatic disease could have a similar appearance and close follow-up is recommended. Several benign Schmorl's nodes are noted in the thoracic spine." Bone scan on 3/28/18 was negative for bone mets.   4. Audiogram on 4/6/18 normal. Followed by ENT.   5. Given his smoking history, I recommended 4 cycles of EP. EP cycle 1 day 1 on 4/16/18. Complicated by hospitalization for neutropenic fever 4/26-4/29. No source of infections identified. Treated with antibiotics empirically and " "improved.   6. Cycle 2 of EP started on on 5/7/18. Hospitalized during the first weekend for intractable nausea.   7. Restaging CT scan on 5/24/18 showed "Interval development of dilated appearance of the appendix with stranding of the periappendiceal fat.  Findings are worrisome for appendicitis". Patient did have abdominal pain and tenderness. Admitted and got appendectomy on 5/24/18.  8. Cycle 3 of EP 6/11/18-6/15/18  9. Cycle 4 of EP 7/2/18-7/6/18  "

## 2018-07-08 LAB
ALBUMIN SERPL BCP-MCNC: 3.6 G/DL
ALP SERPL-CCNC: 46 U/L
ALT SERPL W/O P-5'-P-CCNC: 13 U/L
ANION GAP SERPL CALC-SCNC: 7 MMOL/L
AST SERPL-CCNC: 7 U/L
BACTERIA BLD CULT: NORMAL
BASOPHILS # BLD AUTO: 0 K/UL
BASOPHILS NFR BLD: 0 %
BILIRUB SERPL-MCNC: 0.4 MG/DL
BUN SERPL-MCNC: 17 MG/DL
CALCIUM SERPL-MCNC: 9.1 MG/DL
CHLORIDE SERPL-SCNC: 101 MMOL/L
CO2 SERPL-SCNC: 26 MMOL/L
CREAT SERPL-MCNC: 0.8 MG/DL
DIFFERENTIAL METHOD: ABNORMAL
EOSINOPHIL # BLD AUTO: 0 K/UL
EOSINOPHIL NFR BLD: 0 %
ERYTHROCYTE [DISTWIDTH] IN BLOOD BY AUTOMATED COUNT: 15.8 %
EST. GFR  (AFRICAN AMERICAN): >60 ML/MIN/1.73 M^2
EST. GFR  (NON AFRICAN AMERICAN): >60 ML/MIN/1.73 M^2
GLUCOSE SERPL-MCNC: 127 MG/DL
HCT VFR BLD AUTO: 25.5 %
HGB BLD-MCNC: 8.5 G/DL
IMM GRANULOCYTES # BLD AUTO: 0.03 K/UL
IMM GRANULOCYTES NFR BLD AUTO: 0.8 %
LYMPHOCYTES # BLD AUTO: 0.7 K/UL
LYMPHOCYTES NFR BLD: 17.4 %
MAGNESIUM SERPL-MCNC: 1.9 MG/DL
MCH RBC QN AUTO: 28.9 PG
MCHC RBC AUTO-ENTMCNC: 33.3 G/DL
MCV RBC AUTO: 87 FL
MONOCYTES # BLD AUTO: 0.1 K/UL
MONOCYTES NFR BLD: 1.8 %
NEUTROPHILS # BLD AUTO: 3 K/UL
NEUTROPHILS NFR BLD: 80 %
NRBC BLD-RTO: 0 /100 WBC
PHOSPHATE SERPL-MCNC: 4 MG/DL
PLATELET # BLD AUTO: 257 K/UL
PMV BLD AUTO: 8.4 FL
POTASSIUM SERPL-SCNC: 4.4 MMOL/L
PROT SERPL-MCNC: 5.8 G/DL
RBC # BLD AUTO: 2.94 M/UL
SODIUM SERPL-SCNC: 134 MMOL/L
WBC # BLD AUTO: 3.79 K/UL

## 2018-07-08 PROCEDURE — 85025 COMPLETE CBC W/AUTO DIFF WBC: CPT

## 2018-07-08 PROCEDURE — 83735 ASSAY OF MAGNESIUM: CPT

## 2018-07-08 PROCEDURE — 25000003 PHARM REV CODE 250: Performed by: STUDENT IN AN ORGANIZED HEALTH CARE EDUCATION/TRAINING PROGRAM

## 2018-07-08 PROCEDURE — 80053 COMPREHEN METABOLIC PANEL: CPT

## 2018-07-08 PROCEDURE — 63600175 PHARM REV CODE 636 W HCPCS: Performed by: STUDENT IN AN ORGANIZED HEALTH CARE EDUCATION/TRAINING PROGRAM

## 2018-07-08 PROCEDURE — 20600001 HC STEP DOWN PRIVATE ROOM

## 2018-07-08 PROCEDURE — 99233 SBSQ HOSP IP/OBS HIGH 50: CPT | Mod: ,,, | Performed by: INTERNAL MEDICINE

## 2018-07-08 PROCEDURE — 36415 COLL VENOUS BLD VENIPUNCTURE: CPT

## 2018-07-08 PROCEDURE — 84100 ASSAY OF PHOSPHORUS: CPT

## 2018-07-08 RX ORDER — LORAZEPAM 1 MG/1
1 TABLET ORAL EVERY 8 HOURS PRN
Status: DISCONTINUED | OUTPATIENT
Start: 2018-07-08 | End: 2018-07-09 | Stop reason: HOSPADM

## 2018-07-08 RX ORDER — SODIUM CHLORIDE 9 MG/ML
INJECTION, SOLUTION INTRAVENOUS CONTINUOUS
Status: DISCONTINUED | OUTPATIENT
Start: 2018-07-08 | End: 2018-07-09 | Stop reason: HOSPADM

## 2018-07-08 RX ORDER — DRONABINOL 2.5 MG/1
2.5 CAPSULE ORAL 3 TIMES DAILY
Status: DISCONTINUED | OUTPATIENT
Start: 2018-07-08 | End: 2018-07-09 | Stop reason: HOSPADM

## 2018-07-08 RX ORDER — DEXAMETHASONE SODIUM PHOSPHATE 4 MG/ML
8 INJECTION, SOLUTION INTRA-ARTICULAR; INTRALESIONAL; INTRAMUSCULAR; INTRAVENOUS; SOFT TISSUE EVERY 12 HOURS
Status: DISCONTINUED | OUTPATIENT
Start: 2018-07-08 | End: 2018-07-09 | Stop reason: HOSPADM

## 2018-07-08 RX ORDER — PROCHLORPERAZINE MALEATE 5 MG
10 TABLET ORAL 3 TIMES DAILY PRN
Status: DISCONTINUED | OUTPATIENT
Start: 2018-07-08 | End: 2018-07-09 | Stop reason: HOSPADM

## 2018-07-08 RX ORDER — FENTANYL 25 UG/1
1 PATCH TRANSDERMAL
Status: DISCONTINUED | OUTPATIENT
Start: 2018-07-08 | End: 2018-07-09 | Stop reason: HOSPADM

## 2018-07-08 RX ORDER — DIAZEPAM 5 MG/1
5 TABLET ORAL ONCE
Status: COMPLETED | OUTPATIENT
Start: 2018-07-08 | End: 2018-07-08

## 2018-07-08 RX ORDER — LORAZEPAM 1 MG/1
1 TABLET ORAL EVERY 8 HOURS PRN
Status: DISCONTINUED | OUTPATIENT
Start: 2018-07-08 | End: 2018-07-08

## 2018-07-08 RX ORDER — OLANZAPINE 5 MG/1
10 TABLET ORAL EVERY 8 HOURS
Status: DISCONTINUED | OUTPATIENT
Start: 2018-07-08 | End: 2018-07-09 | Stop reason: HOSPADM

## 2018-07-08 RX ADMIN — OLANZAPINE 10 MG: 5 TABLET, FILM COATED ORAL at 01:07

## 2018-07-08 RX ADMIN — SODIUM CHLORIDE 500 ML: 0.9 INJECTION, SOLUTION INTRAVENOUS at 05:07

## 2018-07-08 RX ADMIN — DEXAMETHASONE SODIUM PHOSPHATE 8 MG: 4 INJECTION, SOLUTION INTRAMUSCULAR; INTRAVENOUS at 09:07

## 2018-07-08 RX ADMIN — FENTANYL 1 PATCH: 25 PATCH, EXTENDED RELEASE TRANSDERMAL at 04:07

## 2018-07-08 RX ADMIN — SODIUM CHLORIDE: 0.9 INJECTION, SOLUTION INTRAVENOUS at 09:07

## 2018-07-08 RX ADMIN — SODIUM CHLORIDE: 0.9 INJECTION, SOLUTION INTRAVENOUS at 10:07

## 2018-07-08 RX ADMIN — PROMETHAZINE HYDROCHLORIDE 6.25 MG: 25 INJECTION INTRAMUSCULAR; INTRAVENOUS at 12:07

## 2018-07-08 RX ADMIN — LORAZEPAM 1 MG: 1 TABLET ORAL at 02:07

## 2018-07-08 RX ADMIN — OLANZAPINE 10 MG: 5 TABLET, FILM COATED ORAL at 09:07

## 2018-07-08 RX ADMIN — ONDANSETRON 4 MG: 2 INJECTION, SOLUTION INTRAMUSCULAR; INTRAVENOUS at 11:07

## 2018-07-08 RX ADMIN — ONDANSETRON 4 MG: 2 INJECTION, SOLUTION INTRAMUSCULAR; INTRAVENOUS at 06:07

## 2018-07-08 RX ADMIN — SODIUM CHLORIDE 500 ML: 0.9 INJECTION, SOLUTION INTRAVENOUS at 09:07

## 2018-07-08 RX ADMIN — DIAZEPAM 5 MG: 5 TABLET ORAL at 04:07

## 2018-07-08 RX ADMIN — DEXAMETHASONE SODIUM PHOSPHATE 4 MG: 4 INJECTION, SOLUTION INTRAMUSCULAR; INTRAVENOUS at 10:07

## 2018-07-08 RX ADMIN — ONDANSETRON 4 MG: 2 INJECTION, SOLUTION INTRAMUSCULAR; INTRAVENOUS at 12:07

## 2018-07-08 RX ADMIN — LORAZEPAM 1 MG: 1 TABLET ORAL at 09:07

## 2018-07-08 RX ADMIN — PROCHLORPERAZINE MALEATE 10 MG: 5 TABLET, FILM COATED ORAL at 01:07

## 2018-07-08 RX ADMIN — PANTOPRAZOLE SODIUM 40 MG: 40 TABLET, DELAYED RELEASE ORAL at 10:07

## 2018-07-08 RX ADMIN — ONDANSETRON 4 MG: 2 INJECTION, SOLUTION INTRAMUSCULAR; INTRAVENOUS at 05:07

## 2018-07-08 NOTE — ASSESSMENT & PLAN NOTE
-Patient having post chemo N/V  -Continue hydration with NS   -Advanced diet tolerated per patient request       On Zofran, dexamethasone and Marinol. Phenergan, prochlorperazine and lorazepam PRN

## 2018-07-08 NOTE — PLAN OF CARE
Problem: Patient Care Overview  Goal: Plan of Care Review  Outcome: Ongoing (interventions implemented as appropriate)  Pt had c/o pain 9/10. Gave PRNs. Nausea throughout shift. Gave nausea meds Q 6. hypotensive in early AM. Gave 0.5 liter bolus. Frequent rounds made to assess pain and safety. Patient remained free from falls. Bed in lowest position. Side rails up X 2. Call light within reach. Will continue to monitor.

## 2018-07-08 NOTE — PROGRESS NOTES
"[facility]  Hematology/Oncology  Progress Note    Patient Name: Alejandro Neff  Admission Date: 7/6/2018  Hospital Length of Stay: 1 days  Code Status: Full Code     Subjective:     HPI:  23 years old male with Hx of mediastinal seminoma ongoing cycles 4 of OP TESTICULAR EP protocol. presented to the ER 7/6/18 with a complaint of N/V x 3 days. Pt reports N/V, fever (101) and generalized body pain that has worsened this week. He is in close contact with his oncologist who advised him to visit the ED following his chemotherapy session today 7/6/18. Pt was seen in ED 3 days ago with similar sx and states that his N/V and fever have persisted. He took Zofran for nausea but was unable to keep from vomiting it up. No known allergies to medications. No documented fever since admitted. Lactate is normal. CXR and U/A with no signs of obvious infection. Blood culture sent.             Oncologic History:  1. Mr. Hampton is a 22 yo man who first presented with chest pain and underwent CT chest on 2/14/18, which showed a 6 x 3.5 cm mediastinal mass. It abuts the aorta and pulmonary artery. Biopsy was done on 3/1/18. Pathology (reviewed at [facility]) showed germ cell tumor, most consistent with seminoma.   2. HCG, LDH, AFP on 3/22/18 all normal.   3. Testicular US 3/26/18 showed no testicular masses. CT C/A/P on 3/26/18 showed "stable appearance of anterior mediastinal mass consistent with provided history of seminoma. Several punctate sclerotic foci are noted in the pelvis at the right pubic bone, right ischial tuberosity, and left ilium adjacent to the sacroiliac joint.  These are strongly favored to reflect benign bone islands although metastatic disease could have a similar appearance and close follow-up is recommended. Several benign Schmorl's nodes are noted in the thoracic spine." Bone scan on 3/28/18 was negative for bone mets.   4. Audiogram on 4/6/18 normal. Followed by ENT.   5. Given his " "smoking history, I recommended 4 cycles of EP. EP cycle 1 day 1 on 4/16/18. Complicated by hospitalization for neutropenic fever 4/26-4/29. No source of infections identified. Treated with antibiotics empirically and improved.   6. Cycle 2 of EP started on on 5/7/18. Hospitalized during the first weekend for intractable nausea.   7. Restaging CT scan on 5/24/18 showed "Interval development of dilated appearance of the appendix with stranding of the periappendiceal fat.  Findings are worrisome for appendicitis". Patient did have abdominal pain and tenderness. Admitted and got appendectomy on 5/24/18.  8. Cycle 3 of EP 6/11/18-6/15/18  9. Cycle 4 of EP 7/2/18-7/6/18    Interval History: Patient still complains of nauseas and his anti emetic regimen is stepped up.    Oncology Treatment Plan:   OP TESTICULAR EP    Medications:  Continuous Infusions:   sodium chloride 0.9% 125 mL/hr at 07/08/18 1015     Scheduled Meds:   dexamethasone  8 mg Intravenous Q12H    dronabinol  2.5 mg Oral TID    enoxaparin  40 mg Subcutaneous Daily    fentaNYL  1 patch Transdermal Q72H    OLANZapine  10 mg Oral Q8H    ondansetron  4 mg Intravenous Q6H    pantoprazole  40 mg Oral Daily    [COMPLETED] sodium chloride 0.9%  500 mL Intravenous Once     PRN Meds:ALPRAZolam, dextrose 50%, dextrose 50%, glucagon (human recombinant), glucose, glucose, LORazepam, morphine, prochlorperazine, promethazine (PHENERGAN) IVPB, sodium chloride 0.9%     Review of Systems   Constitutional: Positive for activity change and fatigue. Negative for appetite change, chills and fever.   HENT: Negative for mouth sores, nosebleeds and sore throat.    Eyes: Negative for redness.   Respiratory: Negative for cough and shortness of breath.    Cardiovascular: Negative for chest pain, palpitations and leg swelling.   Gastrointestinal: Positive for nausea. Negative for abdominal distention, abdominal pain, constipation, diarrhea and vomiting.   Genitourinary: " Negative for dysuria and hematuria.   Neurological: Negative for dizziness and headaches.   Hematological: Does not bruise/bleed easily.   Psychiatric/Behavioral: Negative for confusion.     Objective:     Vital Signs (Most Recent):  Temp: 98.4 °F (36.9 °C) (07/08/18 1552)  Pulse: (!) 111 (07/08/18 1552)  Resp: 20 (07/08/18 1552)  BP: (!) 111/55 (07/08/18 1648)  SpO2: 97 % (07/08/18 1552) Vital Signs (24h Range):  Temp:  [97.4 °F (36.3 °C)-98.9 °F (37.2 °C)] 98.4 °F (36.9 °C)  Pulse:  [] 111  Resp:  [] 20  SpO2:  [95 %-100 %] 97 %  BP: ()/(42-68) 111/55     Weight: 65.8 kg (144 lb 15.9 oz)  Body mass index is 22.71 kg/m².  Body surface area is 1.76 meters squared.      Intake/Output Summary (Last 24 hours) at 07/08/18 1714  Last data filed at 07/08/18 0608   Gross per 24 hour   Intake              660 ml   Output                0 ml   Net              660 ml       Physical Exam   Constitutional: He is oriented to person, place, and time. He appears well-developed.   HENT:   Head: Normocephalic and atraumatic.   Eyes: EOM are normal. Pupils are equal, round, and reactive to light.   Neck: Normal range of motion.   Cardiovascular: Normal rate, regular rhythm and normal heart sounds.    No murmur heard.  Pulmonary/Chest: Effort normal and breath sounds normal. He has no wheezes.   Abdominal: Soft. Bowel sounds are normal. He exhibits no distension. There is no tenderness.   Musculoskeletal: Normal range of motion.   Neurological: He is alert and oriented to person, place, and time. No sensory deficit. He exhibits normal muscle tone.       Significant Labs:   CBC:   Recent Labs  Lab 07/06/18  1840 07/07/18  0643 07/08/18  0530   WBC 3.13* 4.34 3.79*   HGB 8.0* 8.2* 8.5*   HCT 23.2* 24.0* 25.5*    236 257    and CMP:   Recent Labs  Lab 07/06/18  1840 07/07/18  0644 07/08/18  0530    137 134*   K 4.1 4.2 4.4    106 101   CO2 24 26 26   * 82 127*   BUN 14 16 17   CREATININE 0.7  0.7 0.8   CALCIUM 8.7 8.9 9.1   PROT 5.5* 5.3* 5.8*   ALBUMIN 3.5 3.3* 3.6   BILITOT 0.6 0.8 0.4   ALKPHOS 44* 41* 46*   AST 9* 8* 7*   ALT 14 12 13   ANIONGAP 7* 5* 7*   EGFRNONAA >60.0 >60.0 >60.0       Diagnostic Results:  I have reviewed all pertinent imaging results/findings within the past 24 hours.    Assessment/Plan:     * Vomiting with nausea, not intractable    -Patient having post chemo N/V  -Continue hydration with NS   -Advanced diet tolerated per patient request       On Zofran, dexamethasone and Marinol. Phenergan, prochlorperazine and lorazepam PRN           Hypotension    Patient was hypotensive  Managed with 1L saline bolus        Anemia associated with chemotherapy    - Stable, monitor  - Transfuse as needed to keep Hb >7.0        Primary mediastinal seminoma    - Testicular US 3/26/18 showed no testicular masses, HCG, LDH, AFP on 3/22/18 all normal.   - Bone scan on 3/28/18 was negative for bone mets.   - Ongoing cycle 4 of OP TESTICULAR EP protocol                 Genoveva Lopez MD  Hematology/Oncology  [facility]    Attending Note  I have personally taken the history and examined this patient and agree with the resident's note as stated above.  Blood pressure stabilized  Eating standard meals  Reports nausea - no vomiting  Will try marinol- has tired cannabis  Schedule PO

## 2018-07-08 NOTE — SUBJECTIVE & OBJECTIVE
Interval History: Patient still complains of nauseas and his anti emetic regimen is stepped up.    Oncology Treatment Plan:   OP TESTICULAR EP    Medications:  Continuous Infusions:   sodium chloride 0.9% 125 mL/hr at 07/08/18 1015     Scheduled Meds:   dexamethasone  8 mg Intravenous Q12H    dronabinol  2.5 mg Oral TID    enoxaparin  40 mg Subcutaneous Daily    fentaNYL  1 patch Transdermal Q72H    OLANZapine  10 mg Oral Q8H    ondansetron  4 mg Intravenous Q6H    pantoprazole  40 mg Oral Daily    [COMPLETED] sodium chloride 0.9%  500 mL Intravenous Once     PRN Meds:ALPRAZolam, dextrose 50%, dextrose 50%, glucagon (human recombinant), glucose, glucose, LORazepam, morphine, prochlorperazine, promethazine (PHENERGAN) IVPB, sodium chloride 0.9%     Review of Systems   Constitutional: Positive for activity change and fatigue. Negative for appetite change, chills and fever.   HENT: Negative for mouth sores, nosebleeds and sore throat.    Eyes: Negative for redness.   Respiratory: Negative for cough and shortness of breath.    Cardiovascular: Negative for chest pain, palpitations and leg swelling.   Gastrointestinal: Positive for nausea. Negative for abdominal distention, abdominal pain, constipation, diarrhea and vomiting.   Genitourinary: Negative for dysuria and hematuria.   Neurological: Negative for dizziness and headaches.   Hematological: Does not bruise/bleed easily.   Psychiatric/Behavioral: Negative for confusion.     Objective:     Vital Signs (Most Recent):  Temp: 98.4 °F (36.9 °C) (07/08/18 1552)  Pulse: (!) 111 (07/08/18 1552)  Resp: 20 (07/08/18 1552)  BP: (!) 111/55 (07/08/18 1648)  SpO2: 97 % (07/08/18 1552) Vital Signs (24h Range):  Temp:  [97.4 °F (36.3 °C)-98.9 °F (37.2 °C)] 98.4 °F (36.9 °C)  Pulse:  [] 111  Resp:  [] 20  SpO2:  [95 %-100 %] 97 %  BP: ()/(42-68) 111/55     Weight: 65.8 kg (144 lb 15.9 oz)  Body mass index is 22.71 kg/m².  Body surface area is 1.76 meters  squared.      Intake/Output Summary (Last 24 hours) at 07/08/18 1714  Last data filed at 07/08/18 0608   Gross per 24 hour   Intake              660 ml   Output                0 ml   Net              660 ml       Physical Exam   Constitutional: He is oriented to person, place, and time. He appears well-developed.   HENT:   Head: Normocephalic and atraumatic.   Eyes: EOM are normal. Pupils are equal, round, and reactive to light.   Neck: Normal range of motion.   Cardiovascular: Normal rate, regular rhythm and normal heart sounds.    No murmur heard.  Pulmonary/Chest: Effort normal and breath sounds normal. He has no wheezes.   Abdominal: Soft. Bowel sounds are normal. He exhibits no distension. There is no tenderness.   Musculoskeletal: Normal range of motion.   Neurological: He is alert and oriented to person, place, and time. No sensory deficit. He exhibits normal muscle tone.       Significant Labs:   CBC:   Recent Labs  Lab 07/06/18  1840 07/07/18  0643 07/08/18  0530   WBC 3.13* 4.34 3.79*   HGB 8.0* 8.2* 8.5*   HCT 23.2* 24.0* 25.5*    236 257    and CMP:   Recent Labs  Lab 07/06/18  1840 07/07/18  0644 07/08/18  0530    137 134*   K 4.1 4.2 4.4    106 101   CO2 24 26 26   * 82 127*   BUN 14 16 17   CREATININE 0.7 0.7 0.8   CALCIUM 8.7 8.9 9.1   PROT 5.5* 5.3* 5.8*   ALBUMIN 3.5 3.3* 3.6   BILITOT 0.6 0.8 0.4   ALKPHOS 44* 41* 46*   AST 9* 8* 7*   ALT 14 12 13   ANIONGAP 7* 5* 7*   EGFRNONAA >60.0 >60.0 >60.0       Diagnostic Results:  I have reviewed all pertinent imaging results/findings within the past 24 hours.

## 2018-07-08 NOTE — PROGRESS NOTES
Patient remains free from falls and injury this shift. Bed in low, locked position with call bell in reach. Patient up ad ernst. Nausea is constant with one episode dry heaving  this shift. Medications alternated between scheduled and PO antiemetics/benzos. Two episodes asymptomatic hypotension, boluses given. One episode of tightness in chest after dry heaving, MD notified, valium ordered for anxiety. All belongings within reach will continue to monitor.

## 2018-07-09 ENCOUNTER — PATIENT MESSAGE (OUTPATIENT)
Dept: HEMATOLOGY/ONCOLOGY | Facility: CLINIC | Age: 23
End: 2018-07-09

## 2018-07-09 ENCOUNTER — TELEPHONE (OUTPATIENT)
Dept: HEMATOLOGY/ONCOLOGY | Facility: CLINIC | Age: 23
End: 2018-07-09

## 2018-07-09 VITALS
SYSTOLIC BLOOD PRESSURE: 117 MMHG | BODY MASS INDEX: 22.76 KG/M2 | DIASTOLIC BLOOD PRESSURE: 56 MMHG | HEART RATE: 96 BPM | OXYGEN SATURATION: 95 % | RESPIRATION RATE: 16 BRPM | HEIGHT: 67 IN | TEMPERATURE: 99 F | WEIGHT: 145 LBS

## 2018-07-09 PROBLEM — T45.1X5A CHEMOTHERAPY-INDUCED NAUSEA: Status: ACTIVE | Noted: 2017-03-11

## 2018-07-09 LAB
ALBUMIN SERPL BCP-MCNC: 3.9 G/DL
ALP SERPL-CCNC: 47 U/L
ALT SERPL W/O P-5'-P-CCNC: 14 U/L
ANION GAP SERPL CALC-SCNC: 11 MMOL/L
AST SERPL-CCNC: 8 U/L
BASOPHILS # BLD AUTO: 0.01 K/UL
BASOPHILS NFR BLD: 0.3 %
BILIRUB SERPL-MCNC: 0.4 MG/DL
BUN SERPL-MCNC: 18 MG/DL
CALCIUM SERPL-MCNC: 9.5 MG/DL
CHLORIDE SERPL-SCNC: 102 MMOL/L
CO2 SERPL-SCNC: 21 MMOL/L
CREAT SERPL-MCNC: 0.9 MG/DL
DIFFERENTIAL METHOD: ABNORMAL
EOSINOPHIL # BLD AUTO: 0 K/UL
EOSINOPHIL NFR BLD: 0 %
ERYTHROCYTE [DISTWIDTH] IN BLOOD BY AUTOMATED COUNT: 15.4 %
EST. GFR  (AFRICAN AMERICAN): >60 ML/MIN/1.73 M^2
EST. GFR  (NON AFRICAN AMERICAN): >60 ML/MIN/1.73 M^2
GLUCOSE SERPL-MCNC: 178 MG/DL
HCT VFR BLD AUTO: 28.5 %
HGB BLD-MCNC: 9.6 G/DL
IMM GRANULOCYTES # BLD AUTO: 0.06 K/UL
IMM GRANULOCYTES NFR BLD AUTO: 1.5 %
LYMPHOCYTES # BLD AUTO: 0.4 K/UL
LYMPHOCYTES NFR BLD: 9.7 %
MAGNESIUM SERPL-MCNC: 1.7 MG/DL
MCH RBC QN AUTO: 28.9 PG
MCHC RBC AUTO-ENTMCNC: 33.7 G/DL
MCV RBC AUTO: 86 FL
MONOCYTES # BLD AUTO: 0.1 K/UL
MONOCYTES NFR BLD: 1.3 %
NEUTROPHILS # BLD AUTO: 3.4 K/UL
NEUTROPHILS NFR BLD: 87.2 %
NRBC BLD-RTO: 0 /100 WBC
PHOSPHATE SERPL-MCNC: 3.2 MG/DL
PLATELET # BLD AUTO: 258 K/UL
PMV BLD AUTO: 8.5 FL
POTASSIUM SERPL-SCNC: 4.4 MMOL/L
PROT SERPL-MCNC: 6.4 G/DL
RBC # BLD AUTO: 3.32 M/UL
SODIUM SERPL-SCNC: 134 MMOL/L
WBC # BLD AUTO: 3.92 K/UL

## 2018-07-09 PROCEDURE — 84100 ASSAY OF PHOSPHORUS: CPT

## 2018-07-09 PROCEDURE — 36415 COLL VENOUS BLD VENIPUNCTURE: CPT

## 2018-07-09 PROCEDURE — 25000003 PHARM REV CODE 250: Performed by: STUDENT IN AN ORGANIZED HEALTH CARE EDUCATION/TRAINING PROGRAM

## 2018-07-09 PROCEDURE — 63600175 PHARM REV CODE 636 W HCPCS: Performed by: STUDENT IN AN ORGANIZED HEALTH CARE EDUCATION/TRAINING PROGRAM

## 2018-07-09 PROCEDURE — 83735 ASSAY OF MAGNESIUM: CPT

## 2018-07-09 PROCEDURE — 99233 SBSQ HOSP IP/OBS HIGH 50: CPT | Mod: ,,, | Performed by: INTERNAL MEDICINE

## 2018-07-09 PROCEDURE — 80053 COMPREHEN METABOLIC PANEL: CPT

## 2018-07-09 PROCEDURE — 85025 COMPLETE CBC W/AUTO DIFF WBC: CPT

## 2018-07-09 RX ORDER — HEPARIN 100 UNIT/ML
100 SYRINGE INTRAVENOUS ONCE
Status: COMPLETED | OUTPATIENT
Start: 2018-07-09 | End: 2018-07-09

## 2018-07-09 RX ORDER — DIAZEPAM 5 MG/1
5 TABLET ORAL EVERY 8 HOURS PRN
Qty: 30 TABLET | Refills: 0 | Status: SHIPPED | OUTPATIENT
Start: 2018-07-09 | End: 2018-10-01

## 2018-07-09 RX ADMIN — PROCHLORPERAZINE MALEATE 10 MG: 5 TABLET, FILM COATED ORAL at 08:07

## 2018-07-09 RX ADMIN — OLANZAPINE 10 MG: 5 TABLET, FILM COATED ORAL at 06:07

## 2018-07-09 RX ADMIN — LORAZEPAM 1 MG: 1 TABLET ORAL at 04:07

## 2018-07-09 RX ADMIN — ONDANSETRON 4 MG: 2 INJECTION, SOLUTION INTRAMUSCULAR; INTRAVENOUS at 06:07

## 2018-07-09 RX ADMIN — OLANZAPINE 10 MG: 5 TABLET, FILM COATED ORAL at 01:07

## 2018-07-09 RX ADMIN — DEXAMETHASONE SODIUM PHOSPHATE 8 MG: 4 INJECTION, SOLUTION INTRAMUSCULAR; INTRAVENOUS at 08:07

## 2018-07-09 RX ADMIN — PANTOPRAZOLE SODIUM 40 MG: 40 TABLET, DELAYED RELEASE ORAL at 08:07

## 2018-07-09 RX ADMIN — HEPARIN 100 UNITS: 100 SYRINGE at 02:07

## 2018-07-09 RX ADMIN — ONDANSETRON 4 MG: 2 INJECTION, SOLUTION INTRAMUSCULAR; INTRAVENOUS at 11:07

## 2018-07-09 NOTE — PROGRESS NOTES
Admit Assessment    Patient Identification  Alejandro Neff   :  1995  Admit Date:  2018  Attending Provider:  Radha Marquez MD              Referral:   Pt was admitted to  with a diagnosis of Chemotherapy-induced nausea, and was admitted this hospital stay due to Adverse effect of antiemetic [T45.0X5A]  Fever [R50.9]  Primary mediastinal seminoma [C38.3]  Fever, unspecified fever cause [R50.9]  Intractable vomiting with nausea, unspecified vomiting type [R11.2]  Primary mediastinal seminoma [C38.3].       is involved was referred to the Social Work Department via routine referral.  Patient presents as a 23 y.o. year old single male.    Persons interviewed : Patient     Living Situation:      Resides at [address] [place] [address], phone: [phone] (home).  Patient lives locally with roommates.        Current or Past Agencies and Description of Services/Supplies    DME: Patient did not have any needs prior to hospitalization nor will have any post hospitalization.          Home Health: Patient not active with home health services nor will need services post discharge. Patient is completely independent of ADL's and ambulation.       IV Infusion: N/A      Nutrition: Oral. Recently intake has been down due to nausea. Had ordered Boost Breeze in the past to try at home but he stated that he did not care for it and his appetite had improved at that time.     Outpatient Pharmacy:     [facility]  [address]  [place] [address]  Phone: [phone] Fax: [phone]    [facility] [place]  [address]  [facility] [address]  Phone: [phone] Fax: [phone]      Patient Preference of agencies include: Patient does not express a preference     Patient/Caregiver informed of right to choose providers or agencies.  Patient provides permission to release any necessary information to [facility] and to  Non[facility] agencies as needed to facilitate patient care, treatment planning, and patient discharge planning.  Written and verbal resources provided.      Coping: Patient appears to be coping well. He is wanting to pursue discharge so that he can finish his last chemo treatment. He has good support from friends.           Adjustment to Diagnosis and Treatment: Appropriate    Emotional/Behavioral/Cognitive Issues: None observed or noted             History/Current Symptoms of Anxiety/Depression: No:   History/Current Substance Use:   Social History     Social History Main Topics    Smoking status: Former Smoker     Packs/day: 0.25     Types: Vaping with nicotine, Vaping w/o nicotine, Cigarettes, Cigars     Quit date: 3/15/2018    Smokeless tobacco: Former User    Alcohol use Yes      Comment: occ    Drug use: Yes     Types: Marijuana, LSD, Cocaine, Other-see comments      Comment: Kratom (capsule or leaf form)    Sexual activity: Yes     Partners: Female     Birth control/ protection: Condom       Indications of Abuse/Neglect: No:   Abuse Screen  Do You Feel Unsafe at Home, Work or School?: no    Financial:  Payor/Plan Subscr  Sex Relation Sub. Ins. ID Effective Group Num   1. BLUE CROSS BL* ALBERTO HERNANDEZ* 3/24/1965 Male  [id number]                                   [address] [address]                            Other identified concerns/needs:None at this time     Plan: Return home     Interventions/Referrals: None at this time  Patient/caregiver engaged in treatment planning process.     providing psychosocial and supportive counseling, resources, education, assistance and discharge planning as appropriate.  Patient/caregiver state understanding of  available resources,  following, remains available.

## 2018-07-09 NOTE — TELEPHONE ENCOUNTER
Called and spoke with Mr Memo Neff. Informed patient our  is currently working on his appointments. Alessia, our  will call him with his appointment information. Patient will also be able to view his appointment via Nudge.

## 2018-07-09 NOTE — PROGRESS NOTES
Discharge instructions given and explained to pt.  Pt. verbalized understanding with no further questions.  PAC heparinized with 500 units and bruce needle D/C'd with tip intact.  VS WDL.  Patient is awaiting ride home.        ROAD TEST  O=SpO2 95% on RA  A=Ambulating around room   D=Bruce D/C'd  T=Tolerating regular diet  E=Voids  S=Performs self care independently

## 2018-07-09 NOTE — ASSESSMENT & PLAN NOTE
-Patient having post chemo N/V  -Continue hydration with NS   -Advanced diet tolerated per patient request       On Zofran, dexamethasone and Marinol. Phenergan, prochlorperazine and lorazepam PRN   To be discharged today with Valium and Sancuso patch

## 2018-07-09 NOTE — PLAN OF CARE
Problem: Patient Care Overview  Goal: Plan of Care Review  Outcome: Ongoing (interventions implemented as appropriate)  Pt had no complaints of pain. Requested PRNs as needed to control nausea. Independent and ambulating in room and to restroom. Afebrile. Frequents rounds made to assess pain and safety. Patient remained free from falls. Bed in lowest position. Side rails up X 2. Call light within reach. Will continue to monitor.

## 2018-07-09 NOTE — DISCHARGE SUMMARY
"[facility]  Hematology/Oncology  Discharge Summary      Patient Name: Alejandro Neff  MRN: [id number]  Admission Date: 7/6/2018  Hospital Length of Stay: 2 days  Discharge Date and Time:  07/09/2018 6:37 PM  Attending Physician: No att. providers found   Discharging Provider: Genoveva Lopez MD  Primary Care Provider: Nata Hernandez MD    HPI: 23 years old male with Hx of mediastinal seminoma ongoing cycles 4 of OP TESTICULAR EP protocol. presented to the ER 7/6/18 with a complaint of N/V x 3 days. Pt reports N/V, fever (101) and generalized body pain that has worsened this week. He is in close contact with his oncologist who advised him to visit the ED following his chemotherapy session today 7/6/18. Pt was seen in ED 3 days ago with similar sx and states that his N/V and fever have persisted. He took Zofran for nausea but was unable to keep from vomiting it up. No known allergies to medications. No documented fever since admitted. Lactate is normal. CXR and U/A with no signs of obvious infection. Blood culture sent.             Oncologic History:  1. Mr. Hampton is a 22 yo man who first presented with chest pain and underwent CT chest on 2/14/18, which showed a 6 x 3.5 cm mediastinal mass. It abuts the aorta and pulmonary artery. Biopsy was done on 3/1/18. Pathology (reviewed at [facility]) showed germ cell tumor, most consistent with seminoma.   2. HCG, LDH, AFP on 3/22/18 all normal.   3. Testicular US 3/26/18 showed no testicular masses. CT C/A/P on 3/26/18 showed "stable appearance of anterior mediastinal mass consistent with provided history of seminoma. Several punctate sclerotic foci are noted in the pelvis at the right pubic bone, right ischial tuberosity, and left ilium adjacent to the sacroiliac joint.  These are strongly favored to reflect benign bone islands although metastatic disease could have a similar appearance and close follow-up is recommended. Several benign " "Schmorl's nodes are noted in the thoracic spine." Bone scan on 3/28/18 was negative for bone mets.   4. Audiogram on 4/6/18 normal. Followed by ENT.   5. Given his smoking history, I recommended 4 cycles of EP. EP cycle 1 day 1 on 4/16/18. Complicated by hospitalization for neutropenic fever 4/26-4/29. No source of infections identified. Treated with antibiotics empirically and improved.   6. Cycle 2 of EP started on on 5/7/18. Hospitalized during the first weekend for intractable nausea.   7. Restaging CT scan on 5/24/18 showed "Interval development of dilated appearance of the appendix with stranding of the periappendiceal fat.  Findings are worrisome for appendicitis". Patient did have abdominal pain and tenderness. Admitted and got appendectomy on 5/24/18.  8. Cycle 3 of EP 6/11/18-6/15/18  9. Cycle 4 of EP 7/2/18-7/6/18    * No surgery found *     Hospital Course: Patient came with nausea, vomiting and was hypotensive. He was managed on Fluids, zofran, phenergan, dexamethasone, Marinol and lorazepam as needed, His electrolyes were monitored and corrected appropriately. His pain was controlled by Fentanyl patch. He is clinically doing better and has a good appetite. He is being discharged with his home medications, Valium and Sancuso patch. He is scheduled to continue with his OP Chemotherapy tomorrow and follow up with his Doctor. To Continue activity and regular diet as tolerated.      Consults:     Significant Diagnostic Studies: Labs:   CMP   Recent Labs  Lab 07/08/18  0530 07/09/18  0607   * 134*   K 4.4 4.4    102   CO2 26 21*   * 178*   BUN 17 18   CREATININE 0.8 0.9   CALCIUM 9.1 9.5   PROT 5.8* 6.4   ALBUMIN 3.6 3.9   BILITOT 0.4 0.4   ALKPHOS 46* 47*   AST 7* 8*   ALT 13 14   ANIONGAP 7* 11   ESTGFRAFRICA >60.0 >60.0   EGFRNONAA >60.0 >60.0    and CBC   Recent Labs  Lab 07/08/18  0530 07/09/18  0607   WBC 3.79* 3.92   HGB 8.5* 9.6*   HCT 25.5* 28.5*    258       Pending " Diagnostic Studies:     None        Final Active Diagnoses:    Diagnosis Date Noted POA    PRINCIPAL PROBLEM:  Chemotherapy-induced nausea [R11.0, T45.1X5A] 03/11/2017 Yes    Hypotension [I95.9] 07/07/2018 Unknown    Anemia associated with chemotherapy [D64.81, T45.1X5A] 04/27/2018 Yes    Primary mediastinal seminoma [C38.3] 03/01/2018 Yes      Problems Resolved During this Admission:    Diagnosis Date Noted Date Resolved POA      Discharged Condition: fair    Disposition: Home or Self Care    Follow Up:  Follow-up Information     Suha Rojas MD.    Specialty:  Hematology and Oncology  Why:  as scheduled by clinic  Contact information:  [address] [facility]  [address]  [facility] [id number] [phone]                 Patient Instructions:     Diet Adult Regular     Notify your health care provider if you experience any of the following:  persistent nausea and vomiting or diarrhea     Notify your health care provider if you experience any of the following:  severe uncontrolled pain     Activity as tolerated       Medications:  Reconciled Home Medications:      Medication List      START taking these medications    diazePAM 5 MG tablet  Commonly known as:  VALIUM  Take 1 tablet (5 mg total) by mouth every 8 (eight) hours as needed (chemotherapy related nausea).        CHANGE how you take these medications    ALPRAZolam 0.5 MG tablet  Commonly known as:  XANAX  Take 2 tablets (1 mg total) by mouth 3 (three) times daily as needed for Insomnia or Anxiety.  What changed:  reasons to take this     promethazine 25 MG tablet  Commonly known as:  PHENERGAN  Take 1 tablet (25 mg total) by mouth every 4 (four) hours as needed for Nausea.  What changed:  Another medication with the same name was removed. Continue taking this medication, and follow the directions you see here.        CONTINUE taking these medications    albuterol 90 mcg/actuation inhaler  Inhale 2 puffs into the lungs every 6 (six) hours as needed for  Wheezing.     baclofen 10 MG tablet  Commonly known as:  LIORESAL  Take 1 tablet (10 mg total) by mouth 2 (two) times daily as needed for hiccups.     diphenhydrAMINE-aluminum-magnesium hydroxide-simethicone-lidocaine HCl 2%  Swish and spit 15 mLs every 4 (four) hours as needed (mouth sore).     fentaNYL 25 mcg/hr  Commonly known as:  DURAGESIC  Place 1 patch onto the skin every 72 hours.     granisetron 3.1 mg/24 hour  Commonly known as:  SANCUSO  place 1 patch onto the skin 24 hours before chemo, each patch can be worn up to 7 days     morphine 15 MG tablet  Commonly known as:  MSIR  Take 2 tablets (30 mg total) by mouth every 6 (six) hours as needed.     OLANZapine 10 MG tablet  Commonly known as:  ZyPREXA  Take 1 tablet (10 mg total) by mouth nightly. Take 1 tablet 10 mg nightly from day 1 of chemo till day 8 (continue 3 days after chemo)     ondansetron 4 MG tablet  Commonly known as:  ZOFRAN  Take 1 tablet (4 mg total) by mouth every 6 (six) hours as needed for Nausea.     pantoprazole 40 MG tablet  Commonly known as:  PROTONIX  Take 1 tablet (40 mg total) by mouth once daily.     polyethylene glycol 17 gram/dose powder  Commonly known as:  GLYCOLAX  Mix 1 capful (17grams) in liquid and take by mouth once daily        ASK your doctor about these medications    ciprofloxacin HCl 500 MG tablet  Commonly known as:  CIPRO  Take 1 tablet (500 mg total) by mouth 2 (two) times daily for 7 days            Genoveva Lopez MD  Hematology/Oncology  [facility]

## 2018-07-09 NOTE — TELEPHONE ENCOUNTER
----- Message from Estephania Hernandez sent at 7/9/2018  2:51 PM CDT -----  Contact: Alejandro  Name of Who is Calling: Alejandro      What is the request in detail: Patient states he was being discharged on today and he would like to see if he could be scheduled to have chemo done on tomorrow      Can the clinic reply by [facility]: No       What Number to Call Back if not in [facility]: [phone]

## 2018-07-09 NOTE — PROGRESS NOTES
"[facility]  Hematology/Oncology  Progress Note    Patient Name: Alejandro Neff  Admission Date: 7/6/2018  Hospital Length of Stay: 2 days  Code Status: Prior     Subjective:     HPI:  23 years old male with Hx of mediastinal seminoma ongoing cycles 4 of OP TESTICULAR EP protocol. presented to the ER 7/6/18 with a complaint of N/V x 3 days. Pt reports N/V, fever (101) and generalized body pain that has worsened this week. He is in close contact with his oncologist who advised him to visit the ED following his chemotherapy session today 7/6/18. Pt was seen in ED 3 days ago with similar sx and states that his N/V and fever have persisted. He took Zofran for nausea but was unable to keep from vomiting it up. No known allergies to medications. No documented fever since admitted. Lactate is normal. CXR and U/A with no signs of obvious infection. Blood culture sent.             Oncologic History:  1. Mr. Hampton is a 22 yo man who first presented with chest pain and underwent CT chest on 2/14/18, which showed a 6 x 3.5 cm mediastinal mass. It abuts the aorta and pulmonary artery. Biopsy was done on 3/1/18. Pathology (reviewed at [facility]) showed germ cell tumor, most consistent with seminoma.   2. HCG, LDH, AFP on 3/22/18 all normal.   3. Testicular US 3/26/18 showed no testicular masses. CT C/A/P on 3/26/18 showed "stable appearance of anterior mediastinal mass consistent with provided history of seminoma. Several punctate sclerotic foci are noted in the pelvis at the right pubic bone, right ischial tuberosity, and left ilium adjacent to the sacroiliac joint.  These are strongly favored to reflect benign bone islands although metastatic disease could have a similar appearance and close follow-up is recommended. Several benign Schmorl's nodes are noted in the thoracic spine." Bone scan on 3/28/18 was negative for bone mets.   4. Audiogram on 4/6/18 normal. Followed by ENT.   5. Given his " "smoking history, I recommended 4 cycles of EP. EP cycle 1 day 1 on 4/16/18. Complicated by hospitalization for neutropenic fever 4/26-4/29. No source of infections identified. Treated with antibiotics empirically and improved.   6. Cycle 2 of EP started on on 5/7/18. Hospitalized during the first weekend for intractable nausea.   7. Restaging CT scan on 5/24/18 showed "Interval development of dilated appearance of the appendix with stranding of the periappendiceal fat.  Findings are worrisome for appendicitis". Patient did have abdominal pain and tenderness. Admitted and got appendectomy on 5/24/18.  8. Cycle 3 of EP 6/11/18-6/15/18  9. Cycle 4 of EP 7/2/18-7/6/18    Interval History: Patient is clinically better, nausea reduced. To be discharged today    Oncology Treatment Plan:   OP TESTICULAR EP    Medications:  Continuous Infusions:  Scheduled Meds:  PRN Meds:     Review of Systems   Constitutional: Positive for activity change and fatigue. Negative for chills and fever.   HENT: Negative for mouth sores, nosebleeds and sore throat.    Eyes: Negative for visual disturbance.   Respiratory: Negative for cough and shortness of breath.    Cardiovascular: Negative for chest pain, palpitations and leg swelling.   Gastrointestinal: Positive for abdominal distention and nausea. Negative for abdominal pain, constipation, diarrhea and vomiting.   Genitourinary: Negative for dysuria and hematuria.   Neurological: Negative for dizziness and light-headedness.   Hematological: Does not bruise/bleed easily.   Psychiatric/Behavioral: Negative for confusion.     Objective:     Vital Signs (Most Recent):  Temp: 99 °F (37.2 °C) (07/09/18 1234)  Pulse: 96 (07/09/18 1234)  Resp: 16 (07/09/18 1234)  BP: (!) 117/56 (07/09/18 1234)  SpO2: 95 % (07/09/18 1234) Vital Signs (24h Range):  Temp:  [97.6 °F (36.4 °C)-99 °F (37.2 °C)] 99 °F (37.2 °C)  Pulse:  [66-96] 96  Resp:  [15-21] 16  SpO2:  [95 %-99 %] 95 %  BP: ()/(51-56) 117/56 "     Weight: 65.8 kg (144 lb 15.9 oz)  Body mass index is 22.71 kg/m².  Body surface area is 1.76 meters squared.      Intake/Output Summary (Last 24 hours) at 07/09/18 1808  Last data filed at 07/09/18 1355   Gross per 24 hour   Intake          2994.58 ml   Output                0 ml   Net          2994.58 ml       Physical Exam   Constitutional: He is oriented to person, place, and time. He appears well-developed.   HENT:   Head: Normocephalic and atraumatic.   Eyes: EOM are normal. Pupils are equal, round, and reactive to light.   Neck: Normal range of motion. No JVD present.   Cardiovascular: Normal rate, regular rhythm and normal heart sounds.    No murmur heard.  Pulmonary/Chest: Effort normal and breath sounds normal. He has no rales.   Abdominal: Soft. Bowel sounds are normal. He exhibits no distension. There is no tenderness.   Neurological: He is alert and oriented to person, place, and time.       Significant Labs:   CBC:   Recent Labs  Lab 07/08/18  0530 07/09/18  0607   WBC 3.79* 3.92   HGB 8.5* 9.6*   HCT 25.5* 28.5*    258    and CMP:   Recent Labs  Lab 07/08/18  0530 07/09/18  0607   * 134*   K 4.4 4.4    102   CO2 26 21*   * 178*   BUN 17 18   CREATININE 0.8 0.9   CALCIUM 9.1 9.5   PROT 5.8* 6.4   ALBUMIN 3.6 3.9   BILITOT 0.4 0.4   ALKPHOS 46* 47*   AST 7* 8*   ALT 13 14   ANIONGAP 7* 11   EGFRNONAA >60.0 >60.0       Diagnostic Results:  I have reviewed all pertinent imaging results/findings within the past 24 hours.    Assessment/Plan:     * Chemotherapy-induced nausea    -Patient having post chemo N/V  -Continue hydration with NS   -Advanced diet tolerated per patient request       On Zofran, dexamethasone and Marinol. Phenergan, prochlorperazine and lorazepam PRN   To be discharged today with Valium and Sancuso patch          Hypotension    Patient was hypotensive  Managed with 1L saline bolus        Anemia associated with chemotherapy    - Stable, monitor  - Transfuse  as needed to keep Hb >7.0        Primary mediastinal seminoma    - Testicular US 3/26/18 showed no testicular masses, HCG, LDH, AFP on 3/22/18 all normal.   - Bone scan on 3/28/18 was negative for bone mets.   - Ongoing cycle 4 of OP TESTICULAR EP protocol                 Genoveva Lopez MD  Hematology/Oncology  [facility]    Attending Note  I have personally taken the history and examined this patient and agree with the resident's note as stated above.  Discharge today

## 2018-07-10 ENCOUNTER — PATIENT MESSAGE (OUTPATIENT)
Dept: HEMATOLOGY/ONCOLOGY | Facility: CLINIC | Age: 23
End: 2018-07-10

## 2018-07-10 ENCOUNTER — TELEPHONE (OUTPATIENT)
Dept: HEMATOLOGY/ONCOLOGY | Facility: CLINIC | Age: 23
End: 2018-07-10

## 2018-07-10 NOTE — TELEPHONE ENCOUNTER
Returned call and spoke with Mr SANTOS. Patient scheduled tomorrow for his chemotherapy treatment. Mr Az asking if poss can he skip the Neulasta injection/OB,if he really not needing it. Patient states due to the extreme bone pain it cause, he prefer to not take it.   Please advise.

## 2018-07-10 NOTE — TELEPHONE ENCOUNTER
----- Message from Arcelia Hassan sent at 7/10/2018 12:07 PM CDT -----  Name of Who is Calling: MATTHEW VARGHESE [[id number]]      What is the request in detail: Pt would like to reschedule his upcoming chemo appt for a later time. Pt states the medication has him tired.       Can the clinic reply by [facility]:   No       What Number to Call Back [facility] not [facility]: [phone]

## 2018-07-11 ENCOUNTER — PATIENT MESSAGE (OUTPATIENT)
Dept: HEMATOLOGY/ONCOLOGY | Facility: CLINIC | Age: 23
End: 2018-07-11

## 2018-07-11 ENCOUNTER — NURSE TRIAGE (OUTPATIENT)
Dept: ADMINISTRATIVE | Facility: CLINIC | Age: 23
End: 2018-07-11

## 2018-07-11 ENCOUNTER — HOSPITAL ENCOUNTER (EMERGENCY)
Facility: OTHER | Age: 23
Discharge: HOME | End: 2018-07-12
Attending: EMERGENCY MEDICINE
Payer: COMMERCIAL

## 2018-07-11 ENCOUNTER — INFUSION (OUTPATIENT)
Dept: INFUSION THERAPY | Facility: HOSPITAL | Age: 23
End: 2018-07-11
Attending: INTERNAL MEDICINE
Payer: COMMERCIAL

## 2018-07-11 VITALS
OXYGEN SATURATION: 99 % | TEMPERATURE: 99 F | HEIGHT: 67 IN | WEIGHT: 150 LBS | HEART RATE: 98 BPM | RESPIRATION RATE: 12 BRPM | DIASTOLIC BLOOD PRESSURE: 73 MMHG | SYSTOLIC BLOOD PRESSURE: 112 MMHG | BODY MASS INDEX: 23.54 KG/M2

## 2018-07-11 VITALS
OXYGEN SATURATION: 99 % | RESPIRATION RATE: 20 BRPM | SYSTOLIC BLOOD PRESSURE: 111 MMHG | TEMPERATURE: 100 F | HEART RATE: 108 BPM | DIASTOLIC BLOOD PRESSURE: 53 MMHG

## 2018-07-11 DIAGNOSIS — R00.0 TACHYCARDIA: ICD-10-CM

## 2018-07-11 DIAGNOSIS — R50.9 TEMPERATURE ELEVATED: Primary | ICD-10-CM

## 2018-07-11 DIAGNOSIS — C80.1 GERM CELL TUMOR: Primary | ICD-10-CM

## 2018-07-11 DIAGNOSIS — Z91.89 AT RISK FOR INFECTION DUE TO CHEMOTHERAPY: ICD-10-CM

## 2018-07-11 LAB
ALBUMIN SERPL BCP-MCNC: 3.9 G/DL
ALP SERPL-CCNC: 50 U/L
ALT SERPL W/O P-5'-P-CCNC: 11 U/L
ANION GAP SERPL CALC-SCNC: 13 MMOL/L
AST SERPL-CCNC: 7 U/L
BACTERIA #/AREA URNS HPF: NORMAL /HPF
BACTERIA BLD CULT: NORMAL
BASOPHILS # BLD AUTO: 0 K/UL
BASOPHILS NFR BLD: 0 %
BILIRUB SERPL-MCNC: 0.4 MG/DL
BILIRUB UR QL STRIP: NEGATIVE
BUN SERPL-MCNC: 14 MG/DL
CALCIUM SERPL-MCNC: 9.7 MG/DL
CHLORIDE SERPL-SCNC: 104 MMOL/L
CLARITY UR: CLEAR
CO2 SERPL-SCNC: 22 MMOL/L
COLOR UR: YELLOW
CREAT SERPL-MCNC: 0.9 MG/DL
DIFFERENTIAL METHOD: ABNORMAL
EOSINOPHIL # BLD AUTO: 0 K/UL
EOSINOPHIL NFR BLD: 0 %
ERYTHROCYTE [DISTWIDTH] IN BLOOD BY AUTOMATED COUNT: 15.1 %
EST. GFR  (AFRICAN AMERICAN): >60 ML/MIN/1.73 M^2
EST. GFR  (NON AFRICAN AMERICAN): >60 ML/MIN/1.73 M^2
GLUCOSE SERPL-MCNC: 196 MG/DL
GLUCOSE UR QL STRIP: ABNORMAL
HCT VFR BLD AUTO: 27.4 %
HGB BLD-MCNC: 9.4 G/DL
HGB UR QL STRIP: NEGATIVE
KETONES UR QL STRIP: NEGATIVE
LACTATE SERPL-SCNC: 1.9 MMOL/L
LEUKOCYTE ESTERASE UR QL STRIP: NEGATIVE
LYMPHOCYTES # BLD AUTO: 0.1 K/UL
LYMPHOCYTES NFR BLD: 3.3 %
MCH RBC QN AUTO: 28.7 PG
MCHC RBC AUTO-ENTMCNC: 34.3 G/DL
MCV RBC AUTO: 84 FL
MICROSCOPIC COMMENT: NORMAL
MONOCYTES # BLD AUTO: 0 K/UL
MONOCYTES NFR BLD: 0.3 %
NEUTROPHILS # BLD AUTO: 3.5 K/UL
NEUTROPHILS NFR BLD: 95.8 %
NITRITE UR QL STRIP: NEGATIVE
PH UR STRIP: 6 [PH] (ref 5–8)
PLATELET # BLD AUTO: 180 K/UL
PMV BLD AUTO: 8.5 FL
POTASSIUM SERPL-SCNC: 4.3 MMOL/L
PROT SERPL-MCNC: 6.6 G/DL
PROT UR QL STRIP: NEGATIVE
RBC # BLD AUTO: 3.27 M/UL
SODIUM SERPL-SCNC: 139 MMOL/L
SP GR UR STRIP: 1.01 (ref 1–1.03)
URN SPEC COLLECT METH UR: ABNORMAL
UROBILINOGEN UR STRIP-ACNC: NEGATIVE EU/DL
WBC # BLD AUTO: 3.6 K/UL
WBC #/AREA URNS HPF: 1 /HPF (ref 0–5)
YEAST URNS QL MICRO: NORMAL

## 2018-07-11 PROCEDURE — 96417 CHEMO IV INFUS EACH ADDL SEQ: CPT

## 2018-07-11 PROCEDURE — 93005 ELECTROCARDIOGRAM TRACING: CPT

## 2018-07-11 PROCEDURE — 85025 COMPLETE CBC W/AUTO DIFF WBC: CPT

## 2018-07-11 PROCEDURE — 96361 HYDRATE IV INFUSION ADD-ON: CPT

## 2018-07-11 PROCEDURE — 93010 ELECTROCARDIOGRAM REPORT: CPT | Mod: ,,, | Performed by: INTERNAL MEDICINE

## 2018-07-11 PROCEDURE — 81000 URINALYSIS NONAUTO W/SCOPE: CPT

## 2018-07-11 PROCEDURE — 80053 COMPREHEN METABOLIC PANEL: CPT

## 2018-07-11 PROCEDURE — 87040 BLOOD CULTURE FOR BACTERIA: CPT | Mod: 59

## 2018-07-11 PROCEDURE — A4216 STERILE WATER/SALINE, 10 ML: HCPCS | Performed by: INTERNAL MEDICINE

## 2018-07-11 PROCEDURE — 87086 URINE CULTURE/COLONY COUNT: CPT

## 2018-07-11 PROCEDURE — 25000003 PHARM REV CODE 250: Performed by: EMERGENCY MEDICINE

## 2018-07-11 PROCEDURE — 83605 ASSAY OF LACTIC ACID: CPT

## 2018-07-11 PROCEDURE — 96367 TX/PROPH/DG ADDL SEQ IV INF: CPT

## 2018-07-11 PROCEDURE — 96413 CHEMO IV INFUSION 1 HR: CPT

## 2018-07-11 PROCEDURE — 96360 HYDRATION IV INFUSION INIT: CPT

## 2018-07-11 PROCEDURE — 25000003 PHARM REV CODE 250: Performed by: INTERNAL MEDICINE

## 2018-07-11 PROCEDURE — 63600175 PHARM REV CODE 636 W HCPCS: Performed by: INTERNAL MEDICINE

## 2018-07-11 PROCEDURE — 99284 EMERGENCY DEPT VISIT MOD MDM: CPT | Mod: 25

## 2018-07-11 RX ORDER — ONDANSETRON 4 MG/1
4 TABLET, ORALLY DISINTEGRATING ORAL
Status: COMPLETED | OUTPATIENT
Start: 2018-07-11 | End: 2018-07-11

## 2018-07-11 RX ORDER — LORAZEPAM 2 MG/ML
1 INJECTION INTRAMUSCULAR
Status: CANCELLED
Start: 2018-07-11

## 2018-07-11 RX ORDER — ONDANSETRON HCL IN 0.9 % NACL 8 MG/50 ML
8 INTRAVENOUS SOLUTION, PIGGYBACK (ML) INTRAVENOUS
Status: COMPLETED | OUTPATIENT
Start: 2018-07-11 | End: 2018-07-11

## 2018-07-11 RX ORDER — ONDANSETRON 2 MG/ML
8 INJECTION INTRAMUSCULAR; INTRAVENOUS ONCE
Status: DISCONTINUED | OUTPATIENT
Start: 2018-07-11 | End: 2018-07-11 | Stop reason: HOSPADM

## 2018-07-11 RX ORDER — SODIUM CHLORIDE 0.9 % (FLUSH) 0.9 %
10 SYRINGE (ML) INJECTION
Status: DISCONTINUED | OUTPATIENT
Start: 2018-07-11 | End: 2018-07-11 | Stop reason: HOSPADM

## 2018-07-11 RX ORDER — MORPHINE SULFATE 15 MG/1
15 TABLET, FILM COATED, EXTENDED RELEASE ORAL
Status: COMPLETED | OUTPATIENT
Start: 2018-07-11 | End: 2018-07-11

## 2018-07-11 RX ORDER — HEPARIN 100 UNIT/ML
500 SYRINGE INTRAVENOUS
Status: DISCONTINUED | OUTPATIENT
Start: 2018-07-11 | End: 2018-07-11 | Stop reason: HOSPADM

## 2018-07-11 RX ADMIN — SODIUM CHLORIDE 1000 ML: 0.9 INJECTION, SOLUTION INTRAVENOUS at 03:07

## 2018-07-11 RX ADMIN — HEPARIN 500 UNITS: 100 SYRINGE at 04:07

## 2018-07-11 RX ADMIN — DEXAMETHASONE SODIUM PHOSPHATE 10 MG: 4 INJECTION, SOLUTION INTRA-ARTICULAR; INTRALESIONAL; INTRAMUSCULAR; INTRAVENOUS; SOFT TISSUE at 11:07

## 2018-07-11 RX ADMIN — CISPLATIN 35 MG: 100 INJECTION, SOLUTION INTRAVENOUS at 01:07

## 2018-07-11 RX ADMIN — PROMETHAZINE HYDROCHLORIDE 25 MG: 25 INJECTION INTRAMUSCULAR; INTRAVENOUS at 12:07

## 2018-07-11 RX ADMIN — SODIUM CHLORIDE, PRESERVATIVE FREE 10 ML: 5 INJECTION INTRAVENOUS at 04:07

## 2018-07-11 RX ADMIN — SODIUM CHLORIDE 1000 ML: 0.9 INJECTION, SOLUTION INTRAVENOUS at 10:07

## 2018-07-11 RX ADMIN — SODIUM CHLORIDE 2040 ML: 0.9 INJECTION, SOLUTION INTRAVENOUS at 07:07

## 2018-07-11 RX ADMIN — ONDANSETRON 4 MG: 4 TABLET, ORALLY DISINTEGRATING ORAL at 07:07

## 2018-07-11 RX ADMIN — ETOPOSIDE 178 MG: 20 INJECTION INTRAVENOUS at 02:07

## 2018-07-11 RX ADMIN — ONDANSETRON HYDROCHLORIDE 8 MG: 2 INJECTION, SOLUTION INTRAMUSCULAR; INTRAVENOUS at 12:07

## 2018-07-11 RX ADMIN — MORPHINE SULFATE 15 MG: 15 TABLET, FILM COATED, EXTENDED RELEASE ORAL at 09:07

## 2018-07-11 NOTE — ED PROVIDER NOTES
"Encounter Date: 7/11/2018    SCRIBE #1 NOTE: IAlicia, sandee scribing for, and in the presence of, Dr. Keen.       History     Chief Complaint   Patient presents with    Fever     reports fever like symptoms, chills body aches started around 2 am this morning, currently receiving chemo for testicular CA, spoke with nurse on call was told to come to ED      Time seen by provider: 6:54 PM    This is a 23 y.o. male who presents with complaint of fever that began four hours ago. The patient woke up this morning "shaking with chills and body aches". He proceeded with regular chemotherapy session. During treatment, he had a "slight fever at 99.6 degrees". He notes a maximum temperature of 100.0 degrees prior to arrival. He reports nausea, one episode of vomiting "that's normal after chemo", dizziness and intermittent lower abdominal cramping. He also reports a rash on chest wall. He denies diarrhea, SOB, rhinorrhea, or chest pain. He has a surgical history of appendectomy, and portacath placement.      The history is provided by the patient.     Review of patient's allergies indicates:   Allergen Reactions    Mushroom Anaphylaxis    Bamboo     Bee pollens     Mushroom flavor     Venom-honey bee      Past Medical History:   Diagnosis Date    Abdominal pain 08/12/2010    eval for abd and chest wall pain at [place], [place] - cxr wnl, EKG (RVH), troponin wnl, normal chemistries    Allergy     Cancer     testicular    Chondromalacia patellae     Chronic nausea 2015    eval by GI Dr. Tushar Artis - given zofran and ciproheptadine     Chronic pain     Chronic pain     [facility] - no meds; followed by psych and pain clinic and unresponsive to behavioral/CBT/old records reports c/f conversion disorder     Foot pain 04/2010    4/10 + SHIRA, eval by Dr. ALLY Lion at [facility] -dx unclear ? gout and trial of nsaids and pdn, xrays normal " "1/11, referred to podiatry, re-eval by rheum 2/12 and MRI and films wnl    Fracture of right radius 09/2009    Lyme arthritis     multiple joints    Lyme disease 2013    Memory loss 03/2012    eval for memory loss [facility] [place] [facility] [place], EEG wnl. Dr. Patricio suggested emotional factors & ref to Tushar Davies, PhD for MH eval & neuropsych eval 3/12 Lupe Delgado, PhD - no evidence of formal thought disorder or psychosis - documented severe memory impairment; not related to to ADD or executive function issues. sugesstion that memory issues may be related to "emotional factors", ?conversion d/o    Stress fracture of tibia and fibula 08/2011    Urticaria      Past Surgical History:   Procedure Laterality Date    APPENDECTOMY      CHEST WALL BIOPSY      INJECTION OF ANESTHETIC AGENT AROUND NERVE N/A 5/29/2018    Procedure: BLOCK, NERVE;  Surgeon: Raiza Surgeon;  Location: [facility];  Service: Anesthesiology;  Laterality: N/A;    LAPAROSCOPIC APPENDECTOMY N/A 5/24/2018    Procedure: APPENDECTOMY, LAPAROSCOPIC;  Surgeon: Kenan Huang Jr., MD;  Location: [facility] OR [address] [place] [address];  Service: General;  Laterality: N/A;    PORTACATH PLACEMENT Right      Family History   Problem Relation Age of Onset    Arthritis Mother     Other Mother         benign tumor of brain and spinal cord    Hyperlipidemia Father     Gout Father     No Known Problems Sister     Arthritis Sister         shoulder    Depression Sister      Social History   Substance Use Topics    Smoking status: Former Smoker     Packs/day: 0.25     Types: Vaping with nicotine, Vaping w/o nicotine, Cigarettes, Cigars     Quit date: 3/15/2018    Smokeless tobacco: Former User    Alcohol use No     Review of Systems   Constitutional: Positive for chills and fever. Negative for activity change and diaphoresis.   HENT: Negative for congestion, drooling, ear pain, rhinorrhea, sneezing, sore throat and trouble swallowing.    Eyes: Negative for pain.   Respiratory: " Negative for cough, chest tightness, shortness of breath, wheezing and stridor.    Cardiovascular: Negative for chest pain and palpitations.   Gastrointestinal: Positive for abdominal pain, nausea and vomiting. Negative for abdominal distention, constipation and diarrhea.   Genitourinary: Negative for difficulty urinating, dysuria, frequency and urgency.   Musculoskeletal: Positive for myalgias. Negative for arthralgias, back pain, neck pain and neck stiffness.   Skin: Positive for rash. Negative for pallor and wound.   Neurological: Positive for dizziness. Negative for syncope, weakness, light-headedness, numbness and headaches.   Psychiatric/Behavioral: Negative for behavioral problems and confusion.       Physical Exam     Initial Vitals [07/11/18 1846]   BP Pulse Resp Temp SpO2   133/66 (!) 125 20 98.1 °F (36.7 °C) 97 %      MAP       --         Physical Exam    Nursing note and vitals reviewed.  Constitutional: He appears well-developed and well-nourished. He is not diaphoretic. No distress.   HENT:   Head: Normocephalic and atraumatic.   Right Ear: External ear normal.   Left Ear: External ear normal.   Eyes: EOM are normal. Pupils are equal, round, and reactive to light. Right eye exhibits no discharge. Left eye exhibits no discharge.   Neck: Normal range of motion.   Cardiovascular: Regular rhythm and normal heart sounds. Tachycardia present.  Exam reveals no gallop and no friction rub.    No murmur heard.  Pulmonary/Chest: Breath sounds normal. No respiratory distress. He has no wheezes. He has no rhonchi. He has no rales.   Port to right chest wall, no erythema or lesions.    Abdominal: Soft. There is no tenderness. There is no rebound and no guarding.   Musculoskeletal: Normal range of motion. He exhibits no edema or tenderness.   Neurological: He is alert and oriented to person, place, and time.   Skin: Skin is warm and dry. Rash noted. No abscess noted. No erythema. No pallor.   Very few scattered  erythematous papules over chest wall, no vesicles or pustules.   Psychiatric: He has a normal mood and affect. His behavior is normal. Judgment and thought content normal.         ED Course   Procedures  Labs Reviewed   CBC W/ AUTO DIFFERENTIAL - Abnormal; Notable for the following:        Result Value    WBC 3.60 (*)     RBC 3.27 (*)     Hemoglobin 9.4 (*)     Hematocrit 27.4 (*)     RDW 15.1 (*)     MPV 8.5 (*)     Lymph # 0.1 (*)     Mono # 0.0 (*)     Gran% 95.8 (*)     Lymph% 3.3 (*)     Mono% 0.3 (*)     All other components within normal limits   COMPREHENSIVE METABOLIC PANEL - Abnormal; Notable for the following:     CO2 22 (*)     Glucose 196 (*)     Alkaline Phosphatase 50 (*)     AST 7 (*)     All other components within normal limits   URINALYSIS - Abnormal; Notable for the following:     Glucose, UA 3+ (*)     All other components within normal limits   CULTURE, BLOOD   CULTURE, BLOOD   CULTURE, URINE   LACTIC ACID, PLASMA   URINALYSIS MICROSCOPIC   LACTIC ACID, PLASMA           Medical Decision Making:   Clinical Tests:   Lab Tests: Ordered and Reviewed  Medical Tests: Ordered and Reviewed  ED Management:  8:40 PM- Paged Hematology.    Emergent evaluation a 23-year-old male on chemotherapy who presents with complaint of elevated temperature.  The he reports T-max 100.0°, here he is afebrile.  There has been no actual documented fever.  He does have chills and body aches, but no other specific symptoms.  There is a rash to the chest wall but is very minimal and nonspecific, definitely no cellulitis.  It does not involve his port site, the port site is nontender and no erythema.  Labs are reassuring, there is slightly decreased WBC, but no neutropenia.  I discussed the case with Dr. Marquez, and she agrees with discharge planning.  He was discharged in good condition and encouraged to follow up with Dr. Rojas tomorrow.            Scribe Attestation:   Scribe #1: I performed the above scribed service and the  documentation accurately describes the services I performed. I attest to the accuracy of the note.    Attending Attestation:           Physician Attestation for Scribe:  Physician Attestation Statement for Scribe #1: I, Dr. Keen, reviewed documentation, as scribed by Alicia Woo in my presence, and it is both accurate and complete.                    Clinical Impression:     1. Temperature elevated    2. Tachycardia    3. At risk for infection due to chemotherapy                                   Darby Keen MD  07/11/18 8037

## 2018-07-11 NOTE — PLAN OF CARE
Problem: Patient Care Overview  Goal: Plan of Care Review  Outcome: Ongoing (interventions implemented as appropriate)  Tolerated chemo infusion without difficulty. No complaints voiced. Instructed to monitor Temp this evening. AVS given to pt. Instructed to notify MD.  Pt verbalized understanding.

## 2018-07-11 NOTE — TELEPHONE ENCOUNTER
Reason for Disposition   Fever in a cancer patient who is currently is receiving chemotherapy or radiation therapy, or cancer patient who has metastatic or end-stage cancer and is receiving palliative care   [1] Neutropenia known or suspected (e.g., recent cancer chemotherapy) AND [2] signs or symptoms of suspected infection are present    Protocols used:  FEVER-A-AH,  CANCER - FEVER-A-AH  pt reports that last chemo was today- pt reports fever of 99.0, feeling weak, dizzy, and very achy. States that his temperature is fluctuating and feels like it has been higher than that.  Advised ER

## 2018-07-12 ENCOUNTER — INFUSION (OUTPATIENT)
Dept: INFUSION THERAPY | Facility: HOSPITAL | Age: 23
End: 2018-07-12
Attending: INTERNAL MEDICINE
Payer: COMMERCIAL

## 2018-07-12 VITALS
RESPIRATION RATE: 18 BRPM | HEART RATE: 106 BPM | DIASTOLIC BLOOD PRESSURE: 57 MMHG | TEMPERATURE: 99 F | SYSTOLIC BLOOD PRESSURE: 120 MMHG

## 2018-07-12 DIAGNOSIS — C80.1 GERM CELL TUMOR: Primary | ICD-10-CM

## 2018-07-12 PROCEDURE — 25000003 PHARM REV CODE 250: Performed by: INTERNAL MEDICINE

## 2018-07-12 PROCEDURE — 96367 TX/PROPH/DG ADDL SEQ IV INF: CPT

## 2018-07-12 PROCEDURE — 63600175 PHARM REV CODE 636 W HCPCS: Performed by: INTERNAL MEDICINE

## 2018-07-12 PROCEDURE — 96361 HYDRATE IV INFUSION ADD-ON: CPT

## 2018-07-12 PROCEDURE — 96372 THER/PROPH/DIAG INJ SC/IM: CPT

## 2018-07-12 PROCEDURE — 96365 THER/PROPH/DIAG IV INF INIT: CPT

## 2018-07-12 RX ORDER — PROMETHAZINE HYDROCHLORIDE 25 MG/ML
25 INJECTION, SOLUTION INTRAMUSCULAR; INTRAVENOUS EVERY 4 HOURS PRN
Status: DISCONTINUED | OUTPATIENT
Start: 2018-07-12 | End: 2018-07-12

## 2018-07-12 RX ORDER — ONDANSETRON 2 MG/ML
8 INJECTION INTRAMUSCULAR; INTRAVENOUS ONCE
Status: DISCONTINUED | OUTPATIENT
Start: 2018-07-12 | End: 2018-07-12

## 2018-07-12 RX ORDER — HEPARIN 100 UNIT/ML
500 SYRINGE INTRAVENOUS
Status: DISCONTINUED | OUTPATIENT
Start: 2018-07-12 | End: 2018-07-12 | Stop reason: HOSPADM

## 2018-07-12 RX ORDER — SODIUM CHLORIDE 0.9 % (FLUSH) 0.9 %
10 SYRINGE (ML) INJECTION
Status: DISCONTINUED | OUTPATIENT
Start: 2018-07-12 | End: 2018-07-12 | Stop reason: HOSPADM

## 2018-07-12 RX ORDER — ONDANSETRON HCL IN 0.9 % NACL 8 MG/50 ML
8 INTRAVENOUS SOLUTION, PIGGYBACK (ML) INTRAVENOUS ONCE
Status: COMPLETED | OUTPATIENT
Start: 2018-07-12 | End: 2018-07-12

## 2018-07-12 RX ORDER — PROMETHAZINE HYDROCHLORIDE 25 MG/ML
25 INJECTION, SOLUTION INTRAMUSCULAR; INTRAVENOUS EVERY 4 HOURS PRN
Status: DISCONTINUED | OUTPATIENT
Start: 2018-07-12 | End: 2018-07-12 | Stop reason: SDUPTHER

## 2018-07-12 RX ORDER — SODIUM CHLORIDE 9 MG/ML
INJECTION, SOLUTION INTRAVENOUS CONTINUOUS
Status: ACTIVE | OUTPATIENT
Start: 2018-07-12 | End: 2018-07-12

## 2018-07-12 RX ADMIN — HEPARIN 500 UNITS: 100 SYRINGE at 05:07

## 2018-07-12 RX ADMIN — SODIUM CHLORIDE: 0.9 INJECTION, SOLUTION INTRAVENOUS at 03:07

## 2018-07-12 RX ADMIN — PROMETHAZINE HYDROCHLORIDE 25 MG: 25 INJECTION INTRAMUSCULAR; INTRAVENOUS at 04:07

## 2018-07-12 RX ADMIN — PEGFILGRASTIM 6 MG: 6 INJECTION SUBCUTANEOUS at 04:07

## 2018-07-12 RX ADMIN — ONDANSETRON HYDROCHLORIDE 8 MG: 2 INJECTION, SOLUTION INTRAMUSCULAR; INTRAVENOUS at 04:07

## 2018-07-12 NOTE — NURSING
"1540 ivf started, started zofran on another primary pt states cannot run both at same time cause as pt states" my heart cant handle both at the same time"  "

## 2018-07-12 NOTE — ED NOTES
Pt resting in bed, aao, reports aching pain in legs and back, pain 9 out of 10, vs are stable, no acute distress noted at this time, will continue to monitor

## 2018-07-12 NOTE — PLAN OF CARE
Problem: Chemotherapy Effects (Adult)  Goal: Signs and Symptoms of Listed Potential Problems Will be Absent, Minimized or Managed (Chemotherapy Effects)  Signs and symptoms of listed potential problems will be absent, minimized or managed by discharge/transition of care (reference Chemotherapy Effects (Adult) CPG).   Outcome: Ongoing (interventions implemented as appropriate)  Pt here for ivf, anti-emetics, labs, hx, meds, allergies reviewed, pt with complaints of nausea, reclined in chair, continue to monitor

## 2018-07-12 NOTE — NURSING
Report received from LON Mcginnis.     Reviewed labs and md notes. Called and spoke to Dr. Rojas who confirmed patient is to only receive 1 L of ivfs, injection, zofran, and phenergan today. Plan to receive only 1 L of ivfs tomorrow.     Updated patient with plan of care and is in agreement.     Patient completed 1 L of ivfs. Port flushed, hep locked, and deaccessed. Plan to rtc tomorrow. Denied AVS. Discharged home, ambulated independently with friend by side.

## 2018-07-13 ENCOUNTER — INFUSION (OUTPATIENT)
Dept: INFUSION THERAPY | Facility: HOSPITAL | Age: 23
End: 2018-07-13
Attending: INTERNAL MEDICINE
Payer: COMMERCIAL

## 2018-07-13 VITALS
RESPIRATION RATE: 18 BRPM | TEMPERATURE: 99 F | DIASTOLIC BLOOD PRESSURE: 59 MMHG | SYSTOLIC BLOOD PRESSURE: 133 MMHG | HEART RATE: 110 BPM

## 2018-07-13 DIAGNOSIS — C80.1 GERM CELL TUMOR: Primary | ICD-10-CM

## 2018-07-13 LAB — BACTERIA UR CULT: NO GROWTH

## 2018-07-13 PROCEDURE — 25000003 PHARM REV CODE 250: Performed by: INTERNAL MEDICINE

## 2018-07-13 PROCEDURE — 96360 HYDRATION IV INFUSION INIT: CPT

## 2018-07-13 RX ADMIN — SODIUM CHLORIDE 1000 ML: 0.9 INJECTION, SOLUTION INTRAVENOUS at 03:07

## 2018-07-13 NOTE — NURSING
Patient tolerated 1 L of ivfs well today. NAD noted upon discharge. Port flushed, hep locked, and deaccessed. Reviewed appts with patient. Discharged home, ambulated independently.

## 2018-07-16 ENCOUNTER — OFFICE VISIT (OUTPATIENT)
Dept: HEMATOLOGY/ONCOLOGY | Facility: CLINIC | Age: 23
End: 2018-07-16
Payer: COMMERCIAL

## 2018-07-16 VITALS
BODY MASS INDEX: 22.44 KG/M2 | HEART RATE: 86 BPM | WEIGHT: 143.31 LBS | DIASTOLIC BLOOD PRESSURE: 55 MMHG | TEMPERATURE: 97 F | RESPIRATION RATE: 19 BRPM | SYSTOLIC BLOOD PRESSURE: 110 MMHG | OXYGEN SATURATION: 100 %

## 2018-07-16 DIAGNOSIS — C38.3 PRIMARY MEDIASTINAL SEMINOMA: Primary | ICD-10-CM

## 2018-07-16 DIAGNOSIS — C80.1 GERM CELL TUMOR: ICD-10-CM

## 2018-07-16 DIAGNOSIS — R73.9 HYPERGLYCEMIA: ICD-10-CM

## 2018-07-16 DIAGNOSIS — G89.29 OTHER CHRONIC PAIN: ICD-10-CM

## 2018-07-16 PROCEDURE — 3008F BODY MASS INDEX DOCD: CPT | Mod: CPTII,S$GLB,, | Performed by: INTERNAL MEDICINE

## 2018-07-16 PROCEDURE — 99999 PR PBB SHADOW E&M-EST. PATIENT-LVL III: CPT | Mod: PBBFAC,,, | Performed by: INTERNAL MEDICINE

## 2018-07-16 PROCEDURE — 99214 OFFICE O/P EST MOD 30 MIN: CPT | Mod: S$GLB,,, | Performed by: INTERNAL MEDICINE

## 2018-07-16 NOTE — PROGRESS NOTES
"Subjective:      Patient ID: Alejandro Neff is a 23 y.o. male.     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 Mr. Hampton is a 24 yo man who first presented with chest pain and underwent CT chest on 2/14/18, which showed a 6 x 3.5 cm mediastinal mass. It abuts the aorta and pulmonary artery. Biopsy was done on 3/1/18. Pathology (reviewed at [facility]) showed germ cell tumor, most consistent with seminoma.   2. HCG, LDH, AFP on 3/22/18 all normal.   3. Testicular US 3/26/18 showed no testicular masses. CT C/A/P on 3/26/18 showed "stable appearance of anterior mediastinal mass consistent with provided history of seminoma. Several punctate sclerotic foci are noted in the pelvis at the right pubic bone, right ischial tuberosity, and left ilium adjacent to the sacroiliac joint.  These are strongly favored to reflect benign bone islands although metastatic disease could have a similar appearance and close follow-up is recommended. Several benign Schmorl's nodes are noted in the thoracic spine." Bone scan on 3/28/18 was negative for bone mets.   4. Audiogram on 4/6/18 normal. Followed by ENT.   5. Given his smoking history, I recommended 4 cycles of EP. EP cycle 1 day 1 on 4/16/18. Complicated by hospitalization for neutropenic fever 4/26-4/29. No source of infections identified. Treated with antibiotics empirically and improved.   6. Cycle 2 of EP started on on 5/7/18. Hospitalized during the first weekend for intractable nausea.   7. Restaging CT scan on 5/24/18 showed "Interval development of dilated appearance of the appendix with stranding of the periappendiceal fat.  Findings are worrisome for appendicitis". Patient had abdominal pain and tenderness. Admitted and got appendectomy on 5/24/18.  8. Cycle 3 of EP 6/11/18-6/15/18. Complicated by hospitalization for neutropenic fever 6/21-6/25  9. Cycle 4 of EP " 18-18. Complicated by hospitalization for N/V -.      INTERVAL HISTORY:   Mr. Hampton returns today for follow up of primary mediastinal seminoma. Finally finished chemotherapy on . Was hospitalized - for severe nausea and vomiting. Feeling good now. Eating and drinking well. Fentanyl patch fell off yesterday. Had some nausea which he feels is from withdrawal. No complaints today.        ECO     ROS:   A ten point system review is obtained and neg except for what was stated in the interval history     Physical Examination:   Vital signs reviewed.   Gen: well hydrated, well developed, in no acute distress. Looks tired.   HEENT: normocephalic, anicteric sclerae, PERRLA, EOMI, oropharynx clear  Neck: supple, no JVD, thyromegaly, cervical or supraclavicular LAD  Lungs: CTAB, no wheezes or rales. Port site on chest clean.   Heart: RRR, no M/R/G  Abdomen: soft, non-distended, nontender, no hepatosplenomegaly, mass, or hernia. BS present  Ext: no clubbing, cyanosis, or edema  Neuro: alert and oriented x 4, no focal neuro deficit  Skin: no rash, erythema, open wound or ulcers  Psych: pleasant and appropriate mood and affect        Objective:      Laboratory Data:  Labs reviewed. Unremarkable.      Assessment/Plan:      1. Primary mediastinal seminoma    2. Germ cell tumor    3. Other chronic pain    4. Hyperglycemia        1-2  - doing well  - finished all chemo last Wednesday. Had a lot of hospitalizations after each dose of chemotherapy  - labs and restaging CT scan on   - see me back on   - Will get restaging scan in a month     3  - was on low dose fentanyl patch. Can stop fentanyl  - morphine IR prn. He is also taking kratom for pain    4.  - 2/2 steroids  - monitor. HbA1c on return            Distress Screening Results: Psychosocial Distress screening score of Distress Score: 0 noted and reviewed. No intervention indicated.

## 2018-07-17 LAB
BACTERIA BLD CULT: NORMAL
BACTERIA BLD CULT: NORMAL

## 2018-07-22 ENCOUNTER — PATIENT MESSAGE (OUTPATIENT)
Dept: HEMATOLOGY/ONCOLOGY | Facility: CLINIC | Age: 23
End: 2018-07-22

## 2018-07-22 ENCOUNTER — NURSE TRIAGE (OUTPATIENT)
Dept: ADMINISTRATIVE | Facility: CLINIC | Age: 23
End: 2018-07-22

## 2018-07-22 ENCOUNTER — HOSPITAL ENCOUNTER (OUTPATIENT)
Facility: HOSPITAL | Age: 23
Discharge: HOME | End: 2018-07-25
Attending: EMERGENCY MEDICINE | Admitting: INTERNAL MEDICINE
Payer: COMMERCIAL

## 2018-07-22 DIAGNOSIS — C38.3 PRIMARY MEDIASTINAL SEMINOMA: ICD-10-CM

## 2018-07-22 DIAGNOSIS — R11.2 INTRACTABLE VOMITING WITH NAUSEA, UNSPECIFIED VOMITING TYPE: Primary | ICD-10-CM

## 2018-07-22 DIAGNOSIS — R10.84 GENERALIZED ABDOMINAL PAIN: ICD-10-CM

## 2018-07-22 DIAGNOSIS — R07.9 CHEST PAIN: ICD-10-CM

## 2018-07-22 DIAGNOSIS — D72.829 LEUKOCYTOSIS, UNSPECIFIED TYPE: ICD-10-CM

## 2018-07-22 DIAGNOSIS — R11.0 NAUSEA: ICD-10-CM

## 2018-07-22 DIAGNOSIS — T45.1X5A ADVERSE EFFECT OF CHEMOTHERAPY, INITIAL ENCOUNTER: ICD-10-CM

## 2018-07-22 LAB
ALBUMIN SERPL BCP-MCNC: 3.9 G/DL
ALP SERPL-CCNC: 91 U/L
ALT SERPL W/O P-5'-P-CCNC: 14 U/L
ANION GAP SERPL CALC-SCNC: 11 MMOL/L
ANISOCYTOSIS BLD QL SMEAR: SLIGHT
AST SERPL-CCNC: 17 U/L
BASO STIPL BLD QL SMEAR: ABNORMAL
BASOPHILS NFR BLD: 0 %
BILIRUB SERPL-MCNC: 0.5 MG/DL
BUN SERPL-MCNC: 6 MG/DL
CALCIUM SERPL-MCNC: 9 MG/DL
CHLORIDE SERPL-SCNC: 103 MMOL/L
CO2 SERPL-SCNC: 26 MMOL/L
CREAT SERPL-MCNC: 0.8 MG/DL
DACRYOCYTES BLD QL SMEAR: ABNORMAL
DIFFERENTIAL METHOD: ABNORMAL
EOSINOPHIL NFR BLD: 0 %
ERYTHROCYTE [DISTWIDTH] IN BLOOD BY AUTOMATED COUNT: 17.5 %
EST. GFR  (AFRICAN AMERICAN): >60 ML/MIN/1.73 M^2
EST. GFR  (NON AFRICAN AMERICAN): >60 ML/MIN/1.73 M^2
GLUCOSE SERPL-MCNC: 79 MG/DL
HCT VFR BLD AUTO: 26.5 %
HGB BLD-MCNC: 8.6 G/DL
HYPOCHROMIA BLD QL SMEAR: ABNORMAL
IMM GRANULOCYTES # BLD AUTO: ABNORMAL K/UL
IMM GRANULOCYTES NFR BLD AUTO: ABNORMAL %
LIPASE SERPL-CCNC: 3 U/L
LYMPHOCYTES NFR BLD: 14 %
MCH RBC QN AUTO: 29.2 PG
MCHC RBC AUTO-ENTMCNC: 32.5 G/DL
MCV RBC AUTO: 90 FL
METAMYELOCYTES NFR BLD MANUAL: 1 %
MONOCYTES NFR BLD: 7 %
MYELOCYTES NFR BLD MANUAL: 2 %
NEUTROPHILS NFR BLD: 73 %
NEUTS BAND NFR BLD MANUAL: 3 %
NRBC BLD-RTO: 1 /100 WBC
OVALOCYTES BLD QL SMEAR: ABNORMAL
PLATELET # BLD AUTO: 193 K/UL
PMV BLD AUTO: 9.3 FL
POIKILOCYTOSIS BLD QL SMEAR: SLIGHT
POLYCHROMASIA BLD QL SMEAR: ABNORMAL
POTASSIUM SERPL-SCNC: 3.3 MMOL/L
PROT SERPL-MCNC: 5.9 G/DL
RBC # BLD AUTO: 2.95 M/UL
SODIUM SERPL-SCNC: 140 MMOL/L
TOXIC GRANULES BLD QL SMEAR: PRESENT
WBC # BLD AUTO: 17.02 K/UL

## 2018-07-22 PROCEDURE — 85027 COMPLETE CBC AUTOMATED: CPT

## 2018-07-22 PROCEDURE — 99283 EMERGENCY DEPT VISIT LOW MDM: CPT | Mod: ,,, | Performed by: EMERGENCY MEDICINE

## 2018-07-22 PROCEDURE — 63600175 PHARM REV CODE 636 W HCPCS: Performed by: EMERGENCY MEDICINE

## 2018-07-22 PROCEDURE — 80053 COMPREHEN METABOLIC PANEL: CPT

## 2018-07-22 PROCEDURE — 99284 EMERGENCY DEPT VISIT MOD MDM: CPT | Mod: 25

## 2018-07-22 PROCEDURE — 25000003 PHARM REV CODE 250: Performed by: EMERGENCY MEDICINE

## 2018-07-22 PROCEDURE — 96375 TX/PRO/DX INJ NEW DRUG ADDON: CPT

## 2018-07-22 PROCEDURE — 85007 BL SMEAR W/DIFF WBC COUNT: CPT

## 2018-07-22 PROCEDURE — 83690 ASSAY OF LIPASE: CPT

## 2018-07-22 PROCEDURE — 96365 THER/PROPH/DIAG IV INF INIT: CPT

## 2018-07-22 RX ADMIN — SODIUM CHLORIDE 1000 ML: 0.9 INJECTION, SOLUTION INTRAVENOUS at 10:07

## 2018-07-22 RX ADMIN — PROMETHAZINE HYDROCHLORIDE 25 MG: 25 INJECTION INTRAMUSCULAR; INTRAVENOUS at 11:07

## 2018-07-23 ENCOUNTER — TELEPHONE (OUTPATIENT)
Dept: HEMATOLOGY/ONCOLOGY | Facility: CLINIC | Age: 23
End: 2018-07-23

## 2018-07-23 PROBLEM — F11.93 OPIOID WITHDRAWAL: Status: ACTIVE | Noted: 2018-07-23

## 2018-07-23 PROBLEM — R11.2 INTRACTABLE VOMITING WITH NAUSEA: Status: ACTIVE | Noted: 2018-07-23

## 2018-07-23 LAB
ALBUMIN SERPL BCP-MCNC: 3.1 G/DL
ALP SERPL-CCNC: 78 U/L
ALT SERPL W/O P-5'-P-CCNC: 11 U/L
AMPHET+METHAMPHET UR QL: NEGATIVE
ANION GAP SERPL CALC-SCNC: 9 MMOL/L
ANISOCYTOSIS BLD QL SMEAR: SLIGHT
AST SERPL-CCNC: 13 U/L
BARBITURATES UR QL SCN>200 NG/ML: NEGATIVE
BASOPHILS # BLD AUTO: ABNORMAL K/UL
BASOPHILS NFR BLD: 0 %
BENZODIAZ UR QL SCN>200 NG/ML: NORMAL
BILIRUB SERPL-MCNC: 0.3 MG/DL
BILIRUB UR QL STRIP: NEGATIVE
BUN SERPL-MCNC: 6 MG/DL
BZE UR QL SCN: NEGATIVE
CALCIUM SERPL-MCNC: 8.3 MG/DL
CANNABINOIDS UR QL SCN: NEGATIVE
CHLORIDE SERPL-SCNC: 106 MMOL/L
CLARITY UR REFRACT.AUTO: ABNORMAL
CO2 SERPL-SCNC: 24 MMOL/L
COLOR UR AUTO: YELLOW
CREAT SERPL-MCNC: 0.7 MG/DL
CREAT UR-MCNC: 221 MG/DL
DACRYOCYTES BLD QL SMEAR: ABNORMAL
DIFFERENTIAL METHOD: ABNORMAL
EOSINOPHIL # BLD AUTO: ABNORMAL K/UL
EOSINOPHIL NFR BLD: 0 %
ERYTHROCYTE [DISTWIDTH] IN BLOOD BY AUTOMATED COUNT: 17.6 %
EST. GFR  (AFRICAN AMERICAN): >60 ML/MIN/1.73 M^2
EST. GFR  (NON AFRICAN AMERICAN): >60 ML/MIN/1.73 M^2
ETHANOL UR-MCNC: <10 MG/DL
GLUCOSE SERPL-MCNC: 104 MG/DL
GLUCOSE UR QL STRIP: NEGATIVE
HCT VFR BLD AUTO: 23.7 %
HGB BLD-MCNC: 7.6 G/DL
HGB UR QL STRIP: NEGATIVE
HYPOCHROMIA BLD QL SMEAR: ABNORMAL
IMM GRANULOCYTES # BLD AUTO: ABNORMAL K/UL
IMM GRANULOCYTES NFR BLD AUTO: ABNORMAL %
KETONES UR QL STRIP: ABNORMAL
LEUKOCYTE ESTERASE UR QL STRIP: NEGATIVE
LYMPHOCYTES # BLD AUTO: ABNORMAL K/UL
LYMPHOCYTES NFR BLD: 16 %
MAGNESIUM SERPL-MCNC: 1.6 MG/DL
MCH RBC QN AUTO: 29 PG
MCHC RBC AUTO-ENTMCNC: 32.1 G/DL
MCV RBC AUTO: 91 FL
METHADONE UR QL SCN>300 NG/ML: NEGATIVE
MONOCYTES # BLD AUTO: ABNORMAL K/UL
MONOCYTES NFR BLD: 5 %
MYELOCYTES NFR BLD MANUAL: 1 %
NEUTROPHILS NFR BLD: 78 %
NITRITE UR QL STRIP: NEGATIVE
NRBC BLD-RTO: 0 /100 WBC
OPIATES UR QL SCN: NEGATIVE
OVALOCYTES BLD QL SMEAR: ABNORMAL
PCP UR QL SCN>25 NG/ML: NEGATIVE
PH UR STRIP: 5 [PH] (ref 5–8)
PHOSPHATE SERPL-MCNC: 4 MG/DL
PLATELET # BLD AUTO: 148 K/UL
PLATELET BLD QL SMEAR: ABNORMAL
PMV BLD AUTO: 9.1 FL
POIKILOCYTOSIS BLD QL SMEAR: SLIGHT
POLYCHROMASIA BLD QL SMEAR: ABNORMAL
POTASSIUM SERPL-SCNC: 3.3 MMOL/L
PROT SERPL-MCNC: 4.9 G/DL
PROT UR QL STRIP: NEGATIVE
RBC # BLD AUTO: 2.62 M/UL
SODIUM SERPL-SCNC: 139 MMOL/L
SP GR UR STRIP: 1.01 (ref 1–1.03)
TOXICOLOGY INFORMATION: NORMAL
URN SPEC COLLECT METH UR: ABNORMAL
UROBILINOGEN UR STRIP-ACNC: NEGATIVE EU/DL
WBC # BLD AUTO: 13.51 K/UL

## 2018-07-23 PROCEDURE — 80307 DRUG TEST PRSMV CHEM ANLYZR: CPT

## 2018-07-23 PROCEDURE — 25000003 PHARM REV CODE 250: Performed by: EMERGENCY MEDICINE

## 2018-07-23 PROCEDURE — 84100 ASSAY OF PHOSPHORUS: CPT

## 2018-07-23 PROCEDURE — 25000003 PHARM REV CODE 250: Performed by: STUDENT IN AN ORGANIZED HEALTH CARE EDUCATION/TRAINING PROGRAM

## 2018-07-23 PROCEDURE — 80053 COMPREHEN METABOLIC PANEL: CPT

## 2018-07-23 PROCEDURE — 63600175 PHARM REV CODE 636 W HCPCS: Performed by: INTERNAL MEDICINE

## 2018-07-23 PROCEDURE — 99215 OFFICE O/P EST HI 40 MIN: CPT | Mod: ,,, | Performed by: INTERNAL MEDICINE

## 2018-07-23 PROCEDURE — 63600175 PHARM REV CODE 636 W HCPCS: Performed by: STUDENT IN AN ORGANIZED HEALTH CARE EDUCATION/TRAINING PROGRAM

## 2018-07-23 PROCEDURE — 83735 ASSAY OF MAGNESIUM: CPT

## 2018-07-23 PROCEDURE — 85007 BL SMEAR W/DIFF WBC COUNT: CPT

## 2018-07-23 PROCEDURE — 87040 BLOOD CULTURE FOR BACTERIA: CPT | Mod: 59

## 2018-07-23 PROCEDURE — 85027 COMPLETE CBC AUTOMATED: CPT

## 2018-07-23 PROCEDURE — G0378 HOSPITAL OBSERVATION PER HR: HCPCS

## 2018-07-23 PROCEDURE — 81003 URINALYSIS AUTO W/O SCOPE: CPT

## 2018-07-23 RX ORDER — ONDANSETRON HYDROCHLORIDE 4 MG/5ML
4 SOLUTION ORAL ONCE
Status: DISCONTINUED | OUTPATIENT
Start: 2018-07-23 | End: 2018-07-23

## 2018-07-23 RX ORDER — ENOXAPARIN SODIUM 100 MG/ML
40 INJECTION SUBCUTANEOUS EVERY 24 HOURS
Status: DISCONTINUED | OUTPATIENT
Start: 2018-07-23 | End: 2018-07-25 | Stop reason: HOSPADM

## 2018-07-23 RX ORDER — PROCHLORPERAZINE EDISYLATE 5 MG/ML
5 INJECTION INTRAMUSCULAR; INTRAVENOUS ONCE
Status: COMPLETED | OUTPATIENT
Start: 2018-07-23 | End: 2018-07-23

## 2018-07-23 RX ORDER — IBUPROFEN 200 MG
16 TABLET ORAL
Status: DISCONTINUED | OUTPATIENT
Start: 2018-07-23 | End: 2018-07-25 | Stop reason: HOSPADM

## 2018-07-23 RX ORDER — ONDANSETRON 2 MG/ML
4 INJECTION INTRAMUSCULAR; INTRAVENOUS EVERY 8 HOURS PRN
Status: DISCONTINUED | OUTPATIENT
Start: 2018-07-23 | End: 2018-07-23

## 2018-07-23 RX ORDER — DEXTROSE MONOHYDRATE, SODIUM CHLORIDE, AND POTASSIUM CHLORIDE 50; 1.49; 9 G/1000ML; G/1000ML; G/1000ML
INJECTION, SOLUTION INTRAVENOUS CONTINUOUS
Status: DISCONTINUED | OUTPATIENT
Start: 2018-07-23 | End: 2018-07-23

## 2018-07-23 RX ORDER — SODIUM CHLORIDE 0.9 % (FLUSH) 0.9 %
5 SYRINGE (ML) INJECTION
Status: DISCONTINUED | OUTPATIENT
Start: 2018-07-23 | End: 2018-07-25 | Stop reason: HOSPADM

## 2018-07-23 RX ORDER — ONDANSETRON 2 MG/ML
8 INJECTION INTRAMUSCULAR; INTRAVENOUS EVERY 8 HOURS PRN
Status: DISCONTINUED | OUTPATIENT
Start: 2018-07-23 | End: 2018-07-23

## 2018-07-23 RX ORDER — DICYCLOMINE HYDROCHLORIDE 10 MG/1
10 CAPSULE ORAL 4 TIMES DAILY PRN
Status: DISCONTINUED | OUTPATIENT
Start: 2018-07-23 | End: 2018-07-25 | Stop reason: HOSPADM

## 2018-07-23 RX ORDER — HYDROMORPHONE HYDROCHLORIDE 1 MG/ML
0.5 INJECTION, SOLUTION INTRAMUSCULAR; INTRAVENOUS; SUBCUTANEOUS ONCE
Status: DISCONTINUED | OUTPATIENT
Start: 2018-07-23 | End: 2018-07-23

## 2018-07-23 RX ORDER — HYDROMORPHONE HYDROCHLORIDE 1 MG/ML
0.5 INJECTION, SOLUTION INTRAMUSCULAR; INTRAVENOUS; SUBCUTANEOUS ONCE
Status: COMPLETED | OUTPATIENT
Start: 2018-07-23 | End: 2018-07-23

## 2018-07-23 RX ORDER — LORAZEPAM 2 MG/ML
1 INJECTION INTRAMUSCULAR EVERY 6 HOURS PRN
Status: DISCONTINUED | OUTPATIENT
Start: 2018-07-23 | End: 2018-07-24

## 2018-07-23 RX ORDER — DEXAMETHASONE SODIUM PHOSPHATE 4 MG/ML
4 INJECTION, SOLUTION INTRA-ARTICULAR; INTRALESIONAL; INTRAMUSCULAR; INTRAVENOUS; SOFT TISSUE ONCE
Status: COMPLETED | OUTPATIENT
Start: 2018-07-23 | End: 2018-07-23

## 2018-07-23 RX ORDER — LORAZEPAM 2 MG/ML
1 INJECTION INTRAMUSCULAR ONCE
Status: COMPLETED | OUTPATIENT
Start: 2018-07-23 | End: 2018-07-23

## 2018-07-23 RX ORDER — GLUCAGON 1 MG
1 KIT INJECTION
Status: DISCONTINUED | OUTPATIENT
Start: 2018-07-23 | End: 2018-07-25 | Stop reason: HOSPADM

## 2018-07-23 RX ORDER — SODIUM CHLORIDE 9 MG/ML
INJECTION, SOLUTION INTRAVENOUS CONTINUOUS
Status: ACTIVE | OUTPATIENT
Start: 2018-07-23 | End: 2018-07-24

## 2018-07-23 RX ORDER — IBUPROFEN 200 MG
24 TABLET ORAL
Status: DISCONTINUED | OUTPATIENT
Start: 2018-07-23 | End: 2018-07-25 | Stop reason: HOSPADM

## 2018-07-23 RX ORDER — ONDANSETRON 2 MG/ML
8 INJECTION INTRAMUSCULAR; INTRAVENOUS EVERY 8 HOURS PRN
Status: DISCONTINUED | OUTPATIENT
Start: 2018-07-23 | End: 2018-07-24

## 2018-07-23 RX ORDER — ACETAMINOPHEN 325 MG/1
650 TABLET ORAL EVERY 4 HOURS PRN
Status: DISCONTINUED | OUTPATIENT
Start: 2018-07-23 | End: 2018-07-25 | Stop reason: HOSPADM

## 2018-07-23 RX ORDER — ONDANSETRON 2 MG/ML
4 INJECTION INTRAMUSCULAR; INTRAVENOUS ONCE
Status: COMPLETED | OUTPATIENT
Start: 2018-07-23 | End: 2018-07-23

## 2018-07-23 RX ORDER — DICYCLOMINE HYDROCHLORIDE 10 MG/1
10 CAPSULE ORAL 4 TIMES DAILY PRN
Qty: 30 CAPSULE | Refills: 0 | Status: CANCELLED | OUTPATIENT
Start: 2018-07-23 | End: 2018-07-28

## 2018-07-23 RX ORDER — ONDANSETRON 2 MG/ML
4 INJECTION INTRAMUSCULAR; INTRAVENOUS ONCE
Status: DISCONTINUED | OUTPATIENT
Start: 2018-07-23 | End: 2018-07-23

## 2018-07-23 RX ORDER — MORPHINE SULFATE 15 MG/1
15 TABLET ORAL EVERY 6 HOURS PRN
Status: DISCONTINUED | OUTPATIENT
Start: 2018-07-23 | End: 2018-07-25 | Stop reason: HOSPADM

## 2018-07-23 RX ADMIN — PROMETHAZINE HYDROCHLORIDE 6.25 MG: 25 INJECTION INTRAMUSCULAR; INTRAVENOUS at 08:07

## 2018-07-23 RX ADMIN — SODIUM CHLORIDE: 0.9 INJECTION, SOLUTION INTRAVENOUS at 04:07

## 2018-07-23 RX ADMIN — SODIUM CHLORIDE: 0.9 INJECTION, SOLUTION INTRAVENOUS at 02:07

## 2018-07-23 RX ADMIN — ONDANSETRON 4 MG: 2 INJECTION, SOLUTION INTRAMUSCULAR; INTRAVENOUS at 12:07

## 2018-07-23 RX ADMIN — DEXTROSE MONOHYDRATE, SODIUM CHLORIDE, AND POTASSIUM CHLORIDE: 50; 9; 1.49 INJECTION, SOLUTION INTRAVENOUS at 02:07

## 2018-07-23 RX ADMIN — HYDROMORPHONE HYDROCHLORIDE 0.5 MG: 1 INJECTION, SOLUTION INTRAMUSCULAR; INTRAVENOUS; SUBCUTANEOUS at 11:07

## 2018-07-23 RX ADMIN — ONDANSETRON 4 MG: 2 INJECTION, SOLUTION INTRAMUSCULAR; INTRAVENOUS at 10:07

## 2018-07-23 RX ADMIN — PROMETHAZINE HYDROCHLORIDE 6.25 MG: 25 INJECTION INTRAMUSCULAR; INTRAVENOUS at 03:07

## 2018-07-23 RX ADMIN — DICYCLOMINE HYDROCHLORIDE 10 MG: 10 CAPSULE ORAL at 08:07

## 2018-07-23 RX ADMIN — PROMETHAZINE HYDROCHLORIDE 6.25 MG: 25 INJECTION INTRAMUSCULAR; INTRAVENOUS at 02:07

## 2018-07-23 RX ADMIN — MORPHINE SULFATE 15 MG: 15 TABLET ORAL at 03:07

## 2018-07-23 RX ADMIN — LORAZEPAM 1 MG: 2 INJECTION, SOLUTION INTRAMUSCULAR; INTRAVENOUS at 06:07

## 2018-07-23 RX ADMIN — PROMETHAZINE HYDROCHLORIDE 6.25 MG: 25 INJECTION INTRAMUSCULAR; INTRAVENOUS at 09:07

## 2018-07-23 RX ADMIN — PROCHLORPERAZINE EDISYLATE 5 MG: 5 INJECTION INTRAMUSCULAR; INTRAVENOUS at 10:07

## 2018-07-23 RX ADMIN — ONDANSETRON 8 MG: 2 INJECTION, SOLUTION INTRAMUSCULAR; INTRAVENOUS at 05:07

## 2018-07-23 RX ADMIN — DEXAMETHASONE SODIUM PHOSPHATE 4 MG: 4 INJECTION, SOLUTION INTRAMUSCULAR; INTRAVENOUS at 06:07

## 2018-07-23 NOTE — ED NOTES
LOC: The patient is awake, alert, and oriented to place, time, situation. Affect is appropriate.  Speech is appropriate and clear.     APPEARANCE: Patient resting comfortably in no acute distress.  Patient is clean and well groomed.    MUSCULOSKELETAL: Patient moving upper and lower extremities without difficulty.  Denies weakness.      NEUROLOGIC: Eyes open spontaneously.  Behavior appropriate to situation.  Follows commands; facial expression symmetrical.  Purposeful motor response noted; normal sensation in all extremities.

## 2018-07-23 NOTE — ED PROVIDER NOTES
Encounter Date: 7/22/2018       History     Chief Complaint   Patient presents with    Emesis     Pt states he has hx of testicular cancer; last chemo 7/10. He is trying to wean himself off morphine and fentanyl patch. Last morphine dose one week ago and he has half fentanyl patch on today. Pt c/o N/VD for the past 3 days.      23-year-old white male with a history of testicular cancer in his chest being taking care of at [facility] by medical oncologist finished chemo has a port takes morphine and fentanyl for pain which is chronic he has been trying to stop the morphine and fentanyl and feels like he has been withdrawing as he is tapering the dose is down presents to the ER complaining of nausea vomiting and diarrhea off and on during the last week.  No abdominal pain.  No fevers.          Review of patient's allergies indicates:   Allergen Reactions    Mushroom Anaphylaxis    Bamboo     Bee pollens     Mushroom flavor     Venom-honey bee      Past Medical History:   Diagnosis Date    Abdominal pain 08/12/2010    eval for abd and chest wall pain at [facility] ER,  - cxr wnl, [facility] ([facility] troponin wnl, normal chemistries    Allergy     Cancer     testicular    Chondromalacia patellae     Chronic nausea 2015    eval by GI Dr. Tushar Artis - given zofran and ciproheptadine     Chronic pain     Chronic pain     [facility] - no meds; followed by psych and pain clinic and unresponsive to behavioral/CBT/old records reports c/f conversion disorder     Foot pain 04/2010    4/10 + SHIRA, eval by Dr. ALLY Lion at [facility] Rheum -dx unclear ? gout and trial of nsaids and pdn, xrays normal 1/11, referred to podiatry, re-eval by rheum 2/12 and MRI and films wnl    Fracture of right radius 09/2009    Lyme arthritis     multiple joints    Lyme disease 2013    Memory loss 03/2012    eval for memory loss by neuro at [facility] - MRI, EEG wnl.  "Dr. Patricio suggested emotional factors & ref to Tushar Davies, PhD for MH eval & neuropsych eval 3/12 Lupe Delgado, PhD - no evidence of formal thought disorder or psychosis - documented severe memory impairment; not related to to ADD or executive function issues. sugesstion that memory issues may be related to "emotional factors", ?conversion d/o    Stress fracture of tibia and fibula 08/2011    Urticaria      Past Surgical History:   Procedure Laterality Date    APPENDECTOMY      CHEST WALL BIOPSY      INJECTION OF ANESTHETIC AGENT AROUND NERVE N/A 5/29/2018    Procedure: BLOCK, NERVE;  Surgeon: Raiza Surgeon;  Location: [facility];  Service: Anesthesiology;  Laterality: N/A;    LAPAROSCOPIC APPENDECTOMY N/A 5/24/2018    Procedure: APPENDECTOMY, LAPAROSCOPIC;  Surgeon: Kenan Huang Jr., MD;  Location: [facility] OR [address] [place] [address];  Service: General;  Laterality: N/A;    PORTACATH PLACEMENT Right      Family History   Problem Relation Age of Onset    Arthritis Mother     Other Mother         benign tumor of brain and spinal cord    Hyperlipidemia Father     Gout Father     No Known Problems Sister     Arthritis Sister         shoulder    Depression Sister      Social History   Substance Use Topics    Smoking status: Former Smoker     Packs/day: 0.25     Types: Vaping with nicotine, Vaping w/o nicotine, Cigarettes, Cigars     Quit date: 3/15/2018    Smokeless tobacco: Former User    Alcohol use No     Review of Systems   Constitutional: Negative for fever.   HENT: Negative for sore throat.    Respiratory: Negative for shortness of breath.    Cardiovascular: Negative for chest pain.   Gastrointestinal: Positive for diarrhea, nausea and vomiting.   Genitourinary: Negative for dysuria.   Musculoskeletal: Negative for back pain.   Skin: Negative for rash.   Neurological: Negative for weakness.   Hematological: Does not bruise/bleed easily.       Physical Exam     Initial Vitals [07/22/18 2153]   BP Pulse Resp Temp " SpO2   122/70 95 18 98.6 °F (37 °C) 99 %      MAP       --         Physical Exam    Nursing note and vitals reviewed.  Constitutional: He appears well-developed and well-nourished. No distress.   HENT:   Head: Normocephalic and atraumatic.   Mouth/Throat: Oropharynx is clear and moist.   Eyes: Conjunctivae and EOM are normal. Pupils are equal, round, and reactive to light.   Neck: Normal range of motion. Neck supple.   Cardiovascular: Normal rate, regular rhythm, normal heart sounds and intact distal pulses. Exam reveals no gallop and no friction rub.    No murmur heard.  Pulmonary/Chest: Breath sounds normal. No respiratory distress. He has no wheezes. He has no rhonchi. He has no rales.   Abdominal: Soft. Bowel sounds are normal. He exhibits no distension and no mass. There is no tenderness. There is no rebound and no guarding.   Musculoskeletal: Normal range of motion. He exhibits no edema or tenderness.   Neurological: He is alert and oriented to person, place, and time. He has normal strength and normal reflexes. He displays normal reflexes. No cranial nerve deficit or sensory deficit.   Skin: Skin is warm and dry. Capillary refill takes less than 2 seconds. No rash noted. No erythema.   Psychiatric: He has a normal mood and affect. His behavior is normal. Judgment and thought content normal.         ED Course   Procedures  Labs Reviewed   CBC W/ AUTO DIFFERENTIAL   COMPREHENSIVE METABOLIC PANEL   LIPASE          Imaging Results    None                               Clinical Impression:    Seminoma chest  Intractable nausea vomiting  Leukocytosis        Disposition:   Disposition: Admitted  A 23-year-old white male with seminoma in the chest being treated by heme on with the 4 cycles last cycle was July 2 through July 11 since then he has had nausea vomiting diarrhea off and on presents to the ER asking for some IV fluids some blood work and nausea medicines.  He says he has been having episodes where he feels  hot at home but did not check his temperature.  No abdominal pain however the patient does have chronic back pain for which she takes morphine and fentanyl.  He decided to get off of the morphine so he has not taken that in a few days and today he decided to get off of the fentanyl by only putting a half of a patch on of self.  Abdominal exam positive bowel sounds soft nontender nondistended no rebound guarding or masses lungs clear to auscultation bilaterally. Temperature 98.6° pulse 95 respiratory rate of 18 blood pressure 122/70 O2 sat room air 99%.  CBC white count of 17 hemoglobin 8.6 hematocrit 26.5 chemistry potassium 3.3 UA pending chest x-ray pending I called and spoke with the heme Onc resident who decided to admit the patient for intractable nausea vomiting leukocytosis immunosuppressed state on chemo.                        Cassius Steve MD  07/22/18 7778

## 2018-07-23 NOTE — ASSESSMENT & PLAN NOTE
- likely to be due to opioid withdrawal   - symptomatic treatment with IVF and anti-emetic   - phenergan (1st) and Zofran (2nd)  - IVF

## 2018-07-23 NOTE — NURSING
Patient arrives to the unit at this time via wheelchair. Patient ambulated to the bed. Right chest Port noted with D5 NS @ 125mL/hr running. Patient has no complaints of pain at this time. Patient asking for something to help with sleep, MD advised ok to give PRN phenergan now. Patient oriented to room. Fall precautions instituted. Patient educated on fall precautions and verbalizes understanding. Patient has no skin breakdown noted at this time. Will continue to monitor.

## 2018-07-23 NOTE — SUBJECTIVE & OBJECTIVE
Interval History: Patient was admitted from the ED last night around 10pm for 3 day hx of N/V/D. Since admission, he has not had any episodes of emesis. Patient does report dry heaving. He remains afebrile with one semi-formed bowel movement. He states that he has mild abdominal cramps but believes that these symptoms most likely stem from his attempt to wean himself off of his pain medication.     Oncology Treatment Plan:   OP TESTICULAR EP    Medications:  Continuous Infusions:   sodium chloride 0.9% 100 mL/hr at 07/23/18 0430     Scheduled Meds:   enoxaparin  40 mg Subcutaneous Daily     PRN Meds:acetaminophen, dextrose 50%, dextrose 50%, dicyclomine, glucagon (human recombinant), glucose, glucose, ondansetron, promethazine (PHENERGAN) IVPB, sodium chloride 0.9%     Review of Systems   Constitutional: Negative.    HENT: Negative.    Eyes: Negative.    Respiratory: Negative.    Gastrointestinal: Positive for diarrhea (resolving), nausea and vomiting (resolving ).   Genitourinary: Negative.    Musculoskeletal: Negative.    Skin: Negative.    Neurological: Negative.    Psychiatric/Behavioral: Negative.      Objective:     Vital Signs (Most Recent):  Temp: 98.1 °F (36.7 °C) (07/23/18 0530)  Pulse: 80 (07/23/18 0530)  Resp: 16 (07/23/18 0530)  BP: 105/62 (07/23/18 0530)  SpO2: 100 % (07/23/18 0530) Vital Signs (24h Range):  Temp:  [98.1 °F (36.7 °C)-98.6 °F (37 °C)] 98.1 °F (36.7 °C)  Pulse:  [72-95] 80  Resp:  [16-18] 16  SpO2:  [99 %-100 %] 100 %  BP: (104-122)/(58-70) 105/62     Weight: 66.3 kg (146 lb 0.9 oz)  Body mass index is 22.88 kg/m².  Body surface area is 1.77 meters squared.    No intake or output data in the 24 hours ending 07/23/18 0733    Physical Exam   Constitutional: He is oriented to person, place, and time. He appears well-developed.   HENT:   Head: Normocephalic and atraumatic.   Eyes: Pupils are equal, round, and reactive to light.   Neck: Normal range of motion.   Cardiovascular: Normal rate  and regular rhythm.    Pulmonary/Chest: Effort normal.   Abdominal: Soft.   Musculoskeletal: Normal range of motion.   Neurological: He is alert and oriented to person, place, and time.   Skin: Skin is warm and dry.   Psychiatric: He has a normal mood and affect.       Significant Labs:   BMP:   Recent Labs  Lab 07/22/18 2245 07/23/18  0619   GLU 79 104    139   K 3.3* 3.3*    106   CO2 26 24   BUN 6 6   CREATININE 0.8 0.7   CALCIUM 9.0 8.3*   MG  --  1.6    and CBC:   Recent Labs  Lab 07/22/18 2245 07/23/18  0600   WBC 17.02* 13.51*   HGB 8.6* 7.6*   HCT 26.5* 23.7*    148*       Diagnostic Results:  I have reviewed all pertinent imaging results/findings within the past 24 hours.

## 2018-07-23 NOTE — SUBJECTIVE & OBJECTIVE
"Oncology Treatment Plan:   OP TESTICULAR EP    Medications:  Continuous Infusions:   dextrose 5 % and 0.9 % NaCl with KCl 20 mEq 125 mL/hr at 07/23/18 0206     Scheduled Meds:   enoxaparin  40 mg Subcutaneous Daily     PRN Meds:acetaminophen, dextrose 50%, dextrose 50%, glucagon (human recombinant), glucose, glucose, ondansetron, promethazine (PHENERGAN) IVPB, sodium chloride 0.9%     Review of patient's allergies indicates:   Allergen Reactions    Mushroom Anaphylaxis    Bamboo     Bee pollens     Mushroom flavor     Venom-honey bee         Past Medical History:   Diagnosis Date    Abdominal pain 08/12/2010    eval for abd and chest wall pain at [place] - cxr wnl, EKG ([facility]), troponin wnl, normal chemistries    Allergy     Cancer     testicular    Chondromalacia patellae     Chronic nausea 2015    eval by  Dr. Tushar Artis - given zofran and ciproheptadine     Chronic pain     Chronic pain     [facility] - no meds; followed by psych and pain clinic and unresponsive to behavioral/CBT/old records reports c/f conversion disorder     Foot pain 04/2010    4/10 + SHIRA, eval by Dr. ALLY Lion at [facility] Rheum -dx unclear ? gout and trial of nsaids and pdn, xrays normal 1/11, referred to podiatry, re-eval by rheum 2/12 and MRI and films wnl    Fracture of right radius 09/2009    Lyme arthritis     multiple joints    Lyme disease 2013    Memory loss 03/2012    eval for memory loss by neuro at [facility] - MRI, EEG wnl. Dr. Patricio suggested emotional factors & ref to Tushar Davies, PhD for  eval & neuropsych eval 3/12 Lupe Delgado, PhD - no evidence of formal thought disorder or psychosis - documented severe memory impairment; not related to to ADD or executive function issues. sugesstion that memory issues may be related to "emotional factors", ?conversion d/o    Stress fracture of tibia and fibula 08/2011    Urticaria      Past " Surgical History:   Procedure Laterality Date    APPENDECTOMY      CHEST WALL BIOPSY      INJECTION OF ANESTHETIC AGENT AROUND NERVE N/A 5/29/2018    Procedure: BLOCK, NERVE;  Surgeon: Raiza Surgeon;  [facility];  Service: Anesthesiology;  Laterality: N/A;    LAPAROSCOPIC APPENDECTOMY N/A 5/24/2018    Procedure: APPENDECTOMY, LAPAROSCOPIC;  Surgeon: Kenan Huang Jr., MD;  Location: [facility] OR [address] [place] [address];  Service: General;  Laterality: N/A;    PORTACATH PLACEMENT Right      Family History     Problem Relation (Age of Onset)    Arthritis Mother, Sister    Depression Sister    Gout Father    Hyperlipidemia Father    No Known Problems Sister    Other Mother        Social History Main Topics    Smoking status: Former Smoker     Packs/day: 0.25     Types: Vaping with nicotine, Vaping w/o nicotine, Cigarettes, Cigars     Quit date: 3/15/2018    Smokeless tobacco: Former User    Alcohol use No    Drug use: Unknown     Types: LSD, Other-see comments    Sexual activity: Yes     Partners: Female     Birth control/ protection: Condom       Review of Systems   Constitutional: Positive for fatigue. Negative for chills, fever and unexpected weight change.   HENT: Negative for ear pain, sinus pressure and sore throat.    Eyes: Negative for pain.   Respiratory: Negative for cough and shortness of breath.    Cardiovascular: Negative for chest pain and leg swelling.   Gastrointestinal: Positive for abdominal pain, diarrhea, nausea and vomiting.   Genitourinary: Negative for dysuria and hematuria.   Musculoskeletal: Negative for back pain and myalgias.   Skin: Negative for rash and wound.   Neurological: Negative for weakness and headaches.     Objective:     Vital Signs (Most Recent):  Temp: 98.3 °F (36.8 °C) (07/23/18 0259)  Pulse: 79 (07/23/18 0259)  Resp: 18 (07/23/18 0259)  BP: (!) 109/58 (07/23/18 0259)  SpO2: 100 % (07/23/18 0259) Vital Signs (24h Range):  Temp:  [98.3 °F (36.8 °C)-98.6 °F (37 °C)] 98.3 °F  (36.8 °C)  Pulse:  [72-95] 79  Resp:  [18] 18  SpO2:  [99 %-100 %] 100 %  BP: (104-122)/(58-70) 109/58     Weight: 66.3 kg (146 lb 0.9 oz)  Body mass index is 22.88 kg/m².  Body surface area is 1.77 meters squared.    No intake or output data in the 24 hours ending 07/23/18 0342    Physical Exam  General: Well developed, well nourished male in NAD  HEENT: Conjunctiva pale  Neck: No JVD noted, Supple  CV: Normal S1, S2 with no murmurs  Resp: Lungs CTA Bilaterally, Unlabored  Abdomen: NTND, BS normoactive x4 quads, soft  Extrem: No cyanosis, clubbing, edema.  Skin: No rashes, lesions, ulcers  Neuro: motor strength in tact. No focal deficit   PSYCH: Oriented x3    Significant Labs:   CBC:   Recent Labs  Lab 07/22/18  2245   WBC 17.02*   HGB 8.6*   HCT 26.5*       and CMP:   Recent Labs  Lab 07/22/18  2245      K 3.3*      CO2 26   GLU 79   BUN 6   CREATININE 0.8   CALCIUM 9.0   PROT 5.9*   ALBUMIN 3.9   BILITOT 0.5   ALKPHOS 91   AST 17   ALT 14   ANIONGAP 11   EGFRNONAA >60.0       Diagnostic Results:  I have reviewed all pertinent imaging results/findings within the past 24 hours.

## 2018-07-23 NOTE — ASSESSMENT & PLAN NOTE
- symptomatic care   - Bentyl 10 mg every 6 hours as needed for GI cramps  - if c.diff come back negative, consider Loperamide 2 mg every 4 hours as needed for diarrhea  - Zofran or Promethazine  as needed for N/V

## 2018-07-23 NOTE — TELEPHONE ENCOUNTER
Primary mediastinal seminoma  Pt on a lot of opiods for cancer treatment. Vomiting almost constantly barely able to hold anything down. Very achy. Had 10 oz water all day, cant take solids, supposed to take morphine for withdrawals- cant tolerate. Very loose oily stools- twice in 24 hours, Vx 2-3, no green, afeb.nausea and/or vomiting begin? 7/16- fentanyl patches stopped sticking well. Last uop at 090.  Finished chemo for CA. Very nauseous. Had zofran - no ODT - makes pt puke. rec ED due to persistent N/V, dec uop,     Reason for Disposition   Nursing judgment or information in reference    Protocols used: ST NO GUIDELINE AVAILABLE - SICK ADULT CALL-A-AH

## 2018-07-23 NOTE — ASSESSMENT & PLAN NOTE
- see HPI for detailed Oncology history   - Testicular US 3/26/18 showed no testicular masses, HCG, LDH, AFP on 3/22/18 all normal.   - Bone scan on 3/28/18 was negative for bone mets.   - s/p cycle 4 of OP TESTICULAR EP protocol  - received Neulasta after last chemo cycle 7/11/18

## 2018-07-23 NOTE — PLAN OF CARE
Problem: Patient Care Overview  Goal: Plan of Care Review  Outcome: Ongoing (interventions implemented as appropriate)  Plan of care reviewed at beginning of shift and updated throughout the day.  Pt admitted for intractable nausea and vomiting.  Pt is awake, alert, oriented, afebrile and free of injury and falls.  Safety and fall precautions maintained.  VSS.  Pt c/o continuous nausea throughout the day which is managed by PRN Phenergan IV and Zofran IV.  He had nausea with dry heaving this morning, but no actual vomiting.  He also c/o chronic back pain which was managed by Dilaudid 0.5 mg IV x 1 dose at 1131 and then a dose of PRN morphine IR 15 mg PO at 1551 this afternoon.  IVF maintained at 100 mL/hr to right chest port a cath, infusing without difficulty.  Pt ambulates and performs ADLs independently.  UOP adequate. Urinalysis collected this morning.   LBM yesterday, no further bowel movements today.  Contact precautions discontinued.  Bed locked and in lowest position, side rails up x 2, non-skid footwear in place and call light and personal belongings within reach.  Pt instructed to call for assistance when needed and he verbalized understanding.  Will monitor.

## 2018-07-23 NOTE — HPI
"23 years old male with Hx of mediastinal seminoma presented to the ER 7/22/18 with a complaint of N/V  x1 week. Pt reports N/V and diarrhea for about one week. Reports that he is trying to wean himself off pain medication and Fentanyl patch. Reports everyday nausea and vomiting 4-8 times per days. Its associated with abdominal pain and diarrhea of watery stool 1-2 per day. Denies fever, chills, cough, chest pain, palpitation, dysuria, urgency or frequency.     Oncologic History:  1. Mr. Hampton is a 22 yo man who first presented with chest pain and underwent CT chest on 2/14/18, which showed a 6 x 3.5 cm mediastinal mass. It abuts the aorta and pulmonary artery. Biopsy was done on 3/1/18. Pathology (reviewed at [facility]) showed germ cell tumor, most consistent with seminoma.   2. HCG, LDH, AFP on 3/22/18 all normal.   3. Testicular US 3/26/18 showed no testicular masses. CT C/A/P on 3/26/18 showed "stable appearance of anterior mediastinal mass consistent with provided history of seminoma. Several punctate sclerotic foci are noted in the pelvis at the right pubic bone, right ischial tuberosity, and left ilium adjacent to the sacroiliac joint.  These are strongly favored to reflect benign bone islands although metastatic disease could have a similar appearance and close follow-up is recommended. Several benign Schmorl's nodes are noted in the thoracic spine." Bone scan on 3/28/18 was negative for bone mets.   4. Audiogram on 4/6/18 normal. Followed by ENT.   5. Given his smoking history, I recommended 4 cycles of EP. EP cycle 1 day 1 on 4/16/18. Complicated by hospitalization for neutropenic fever 4/26-4/29. No source of infections identified. Treated with antibiotics empirically and improved.   6. Cycle 2 of EP started on on 5/7/18. Hospitalized during the first weekend for intractable nausea.   7. Restaging CT scan on 5/24/18 showed "Interval development of dilated appearance of the appendix with stranding of the " "periappendiceal fat.  Findings are worrisome for appendicitis". Patient had abdominal pain and tenderness. Admitted and got appendectomy on 5/24/18.  8. Cycle 3 of EP 6/11/18-6/15/18. Complicated by hospitalization for neutropenic fever 6/21-6/25  9. Cycle 4 of EP 7/2/18-7/11/18. Complicated by hospitalization for N/V 7/7-7/9.   "

## 2018-07-23 NOTE — ASSESSMENT & PLAN NOTE
- likely to be due to opioid withdrawal   - symptomatic treatment with IVF and anti-emetic   - phenergan (1st) and Zofran (2nd)  - IVF   -Reports no emesis overnight, mostly dry heaving

## 2018-07-23 NOTE — TELEPHONE ENCOUNTER
Pt is currently admitted into the hospital. LVM for pt to call when he is discharged to let us know how he is doing.

## 2018-07-23 NOTE — ASSESSMENT & PLAN NOTE
- likely to be 2/2 Neulasta vs steroids vs reactive leukocytoso  - pt does not look toxic, afebrile, Do not suspect infectious process but will remain cautious given immunocompromised status    - monitor

## 2018-07-23 NOTE — ASSESSMENT & PLAN NOTE
- likely to be 2/2 Neulasta vs steroids vs reactive leukocytoso  - pt does not look toxic, afebrile, Do not suspect infectious process but will remain cautious given immunocompromised status

## 2018-07-23 NOTE — PROVIDER PROGRESS NOTES - EMERGENCY DEPT.
Encounter Date: 7/22/2018  SCRIBE NOTE: I, Michelle Johnson, am scribing for, and in the presence of, Dr. Hoyt.    ED Physician Progress Notes           23 y.o. male patient with hx of testicular cancer, on chemotherapy, was admitted for nausea, vomiting, and high white count, who after admission states he wants to leave AMA. The admitting team does not want to do AMA. This patient was not signed out to me. I reviewed the patient's charts. I had a long conversation encouraging the patient to stay along with his father for about 20 minutes. The patient is considering my advice and will see what he will decide to do.     1:47 AM  Patient decided to go AMA.     1:54 AM  Patient changed his mind and is deciding to stay in ED.

## 2018-07-23 NOTE — H&P
"[facility]  Hematology/Oncology  H&P    Patient Name: Alejandro Neff  MRN: [id number]  Admission Date: 7/22/2018  Code Status: Full Code   Attending Provider: No att. providers found  Primary Care Physician: Nata Hernandez MD  Principal Problem:Intractable vomiting with nausea    Subjective:     HPI: 23 years old male with Hx of mediastinal seminoma presented to the ER 7/22/18 with a complaint of N/V  x1 week. Pt reports N/V and diarrhea for about one week. Reports that he is trying to wean himself off pain medication and Fentanyl patch. Reports everyday nausea and vomiting 4-8 times per days. Its associated with abdominal pain and diarrhea of watery stool 1-2 per day. Denies fever, chills, cough, chest pain, palpitation, dysuria, urgency or frequency.     Oncologic History:  1. Mr. Hampton is a 24 yo man who first presented with chest pain and underwent CT chest on 2/14/18, which showed a 6 x 3.5 cm mediastinal mass. It abuts the aorta and pulmonary artery. Biopsy was done on 3/1/18. Pathology (reviewed at [facility]) showed germ cell tumor, most consistent with seminoma.   2. HCG, LDH, AFP on 3/22/18 all normal.   3. Testicular US 3/26/18 showed no testicular masses. CT C/A/P on 3/26/18 showed "stable appearance of anterior mediastinal mass consistent with provided history of seminoma. Several punctate sclerotic foci are noted in the pelvis at the right pubic bone, right ischial tuberosity, and left ilium adjacent to the sacroiliac joint.  These are strongly favored to reflect benign bone islands although metastatic disease could have a similar appearance and close follow-up is recommended. Several benign Schmorl's nodes are noted in the thoracic spine." Bone scan on 3/28/18 was negative for bone mets.   4. Audiogram on 4/6/18 normal. Followed by ENT.   5. Given his smoking history, I recommended 4 cycles of EP. EP cycle 1 day 1 on 4/16/18. Complicated by hospitalization for " "neutropenic fever 4/26-4/29. No source of infections identified. Treated with antibiotics empirically and improved.   6. Cycle 2 of EP started on on 5/7/18. Hospitalized during the first weekend for intractable nausea.   7. Restaging CT scan on 5/24/18 showed "Interval development of dilated appearance of the appendix with stranding of the periappendiceal fat.  Findings are worrisome for appendicitis". Patient had abdominal pain and tenderness. Admitted and got appendectomy on 5/24/18.  8. Cycle 3 of EP 6/11/18-6/15/18. Complicated by hospitalization for neutropenic fever 6/21-6/25  9. Cycle 4 of EP 7/2/18-7/11/18. Complicated by hospitalization for N/V 7/7-7/9.      Oncology Treatment Plan:   OP TESTICULAR EP    Medications:  Continuous Infusions:   dextrose 5 % and 0.9 % NaCl with KCl 20 mEq 125 mL/hr at 07/23/18 0206     Scheduled Meds:   enoxaparin  40 mg Subcutaneous Daily     PRN Meds:acetaminophen, dextrose 50%, dextrose 50%, glucagon (human recombinant), glucose, glucose, ondansetron, promethazine (PHENERGAN) IVPB, sodium chloride 0.9%     Review of patient's allergies indicates:   Allergen Reactions    Mushroom Anaphylaxis    Bamboo     Bee pollens     Mushroom flavor     Venom-honey bee         Past Medical History:   Diagnosis Date    Abdominal pain 08/12/2010    eval for abd and chest wall pain at [place], [place] - cxr wnl, EKG ([facility]), troponin wnl, normal chemistries    Allergy     Cancer     testicular    Chondromalacia patellae     Chronic nausea 2015    eval by GI Dr. Tushar Artis - given zofran and ciproheptadine     Chronic pain     Chronic pain     [facility] - [facility] meds; followed by psych and pain clinic and unresponsive to behavioral/CBT/old records reports c/f conversion disorder     Foot pain 04/2010    4/10 + SHIRA, eval by Dr. ALLY Lion at [facility] -dx unclear ? gout and trial of nsaids and pdn, xrays " "normal 1/11, referred to podiatry, re-eval by rheum 2/12 and MRI and films wnl    Fracture of right radius 09/2009    Lyme arthritis     multiple joints    Lyme disease 2013    Memory loss 03/2012    [place] [facility] wnl. Dr. Patricio suggested emotional factors & ref to Tushar Davies, PhD for MH eval & neuropsych eval 3/12 Lupe Delgado, PhD - no evidence of formal thought disorder or psychosis - documented severe memory impairment; not related to to ADD or executive function issues. sugesstion that memory issues may be related to "emotional factors", ?conversion d/o    Stress fracture of tibia and fibula 08/2011    Urticaria      Past Surgical History:   Procedure Laterality Date    APPENDECTOMY      CHEST WALL BIOPSY      INJECTION OF ANESTHETIC AGENT AROUND NERVE N/A 5/29/2018    Procedure: BLOCK, NERVE;  Surgeon: Raiza Surgeon;  Location: [facility];  Service: Anesthesiology;  Laterality: N/A;    LAPAROSCOPIC APPENDECTOMY N/A 5/24/2018    Procedure: APPENDECTOMY, LAPAROSCOPIC;  Surgeon: Kenan Huang Jr., MD;  Location: [facility] OR [address] [place] [address];  Service: General;  Laterality: N/A;    PORTACATH PLACEMENT Right      Family History     Problem Relation (Age of Onset)    Arthritis Mother, Sister    Depression Sister    Gout Father    Hyperlipidemia Father    No Known Problems Sister    Other Mother        Social History Main Topics    Smoking status: Former Smoker     Packs/day: 0.25     Types: Vaping with nicotine, Vaping w/o nicotine, Cigarettes, Cigars     Quit date: 3/15/2018    Smokeless tobacco: Former User    Alcohol use No    Drug use: Unknown     Types: LSD, Other-see comments    Sexual activity: Yes     Partners: Female     Birth control/ protection: Condom       Review of Systems   Constitutional: Positive for fatigue. Negative for chills, fever and unexpected weight change.   HENT: Negative for ear pain, sinus pressure and sore throat.    Eyes: Negative for pain. "   Respiratory: Negative for cough and shortness of breath.    Cardiovascular: Negative for chest pain and leg swelling.   Gastrointestinal: Positive for abdominal pain, diarrhea, nausea and vomiting.   Genitourinary: Negative for dysuria and hematuria.   Musculoskeletal: Negative for back pain and myalgias.   Skin: Negative for rash and wound.   Neurological: Negative for weakness and headaches.     Objective:     Vital Signs (Most Recent):  Temp: 98.3 °F (36.8 °C) (07/23/18 0259)  Pulse: 79 (07/23/18 0259)  Resp: 18 (07/23/18 0259)  BP: (!) 109/58 (07/23/18 0259)  SpO2: 100 % (07/23/18 0259) Vital Signs (24h Range):  Temp:  [98.3 °F (36.8 °C)-98.6 °F (37 °C)] 98.3 °F (36.8 °C)  Pulse:  [72-95] 79  Resp:  [18] 18  SpO2:  [99 %-100 %] 100 %  BP: (104-122)/(58-70) 109/58     Weight: 66.3 kg (146 lb 0.9 oz)  Body mass index is 22.88 kg/m².  Body surface area is 1.77 meters squared.    No intake or output data in the 24 hours ending 07/23/18 0342    Physical Exam  General: Well developed, well nourished male in NAD  HEENT: Conjunctiva pale  Neck: No JVD noted, Supple  CV: Normal S1, S2 with no murmurs  Resp: Lungs CTA Bilaterally, Unlabored  Abdomen: NTND, BS normoactive x4 quads, soft  Extrem: No cyanosis, clubbing, edema.  Skin: No rashes, lesions, ulcers  Neuro: motor strength in tact. No focal deficit   PSYCH: Oriented x3    Significant Labs:   CBC:   Recent Labs  Lab 07/22/18  2245   WBC 17.02*   HGB 8.6*   HCT 26.5*       and CMP:   Recent Labs  Lab 07/22/18  2245      K 3.3*      CO2 26   GLU 79   BUN 6   CREATININE 0.8   CALCIUM 9.0   PROT 5.9*   ALBUMIN 3.9   BILITOT 0.5   ALKPHOS 91   AST 17   ALT 14   ANIONGAP 11   EGFRNONAA >60.0       Diagnostic Results:  I have reviewed all pertinent imaging results/findings within the past 24 hours.    Assessment/Plan:     * Intractable vomiting with nausea    - likely to be due to opioid withdrawal   - symptomatic treatment with IVF and anti-emetic   -  phenergan (1st) and Zofran (2nd)  - IVF           Diarrhea    - likely to be from Opioid withdrawal but infection can not be ruled out  - send for C.diff            Primary mediastinal seminoma    - see HPI for detailed Oncology history   - Testicular US 3/26/18 showed no testicular masses, HCG, LDH, AFP on 3/22/18 all normal.   - Bone scan on 3/28/18 was negative for bone mets.   - s/p cycle 4 of OP TESTICULAR EP protocol  - received Neulasta after last chemo cycle 7/11/18        Opioid withdrawal    - symptomatic care   - Bentyl 10 mg every 6 hours as needed for GI cramps  - if c.diff come back negative, consider Loperamide 2 mg every 4 hours as needed for diarrhea  - Zofran or Promethazine  as needed for N/V           Leukocytosis    - likely to be 2/2 Neulasta vs steroids vs reactive leukocytoso  - pt does not look toxic, afebrile, Do not suspect infectious process but will remain cautious given immunocompromised status    - monitor             Sean Hamilton MD  Hematology/Oncology  [facility]      I have reviewed the notes, assessments, and/or procedures performed by the daytime housestaff, as above.  I have personally interviewed and examined the patient at the beside, and rounded with the housestaff. I concur with her/his assessment and plan and the documentation of Alejandro Neff.  I, Dr. Walker Snell, personally spent more than 70 mins during this encounter, greater than 50% was spent in direct counseling and/or coordination of care.     Walker Snell M.D., M.S., F.A.C.P.  Hematology/Oncology Attending  [facility]

## 2018-07-23 NOTE — ED TRIAGE NOTES
Alejandro Neff, a 23 y.o. male presents to the ED with N/V, abdominal pain, back and leg pain for about 3 days with the pain getting worst today. Pt was recently on morphine due to chemo and pt thinks it may be withdrawals. Pt denies any chest pain, SOB.       Chief Complaint   Patient presents with    Emesis     Pt states he has hx of testicular cancer; last chemo 7/10. He is trying to wean himself off morphine and fentanyl patch. Last morphine dose one week ago and he has half fentanyl patch on today. Pt c/o N/VD for the past 3 days.      Review of patient's allergies indicates:   Allergen Reactions    Mushroom Anaphylaxis    Bamboo     Bee pollens     Mushroom flavor     Venom-honey bee      Past Medical History:   Diagnosis Date    Abdominal pain 08/12/2010    eval for abd and chest wall pain at [place], [place] - cxr wnl, EKG ([facility]), troponin wnl, normal chemistries    Allergy     Cancer     testicular    Chondromalacia patellae     Chronic nausea 2015    eval by  Dr. Tushar Artis - given zofran and ciproheptadine     Chronic pain     Chronic pain     [facility] [place] - no meds; followed by psych and pain clinic and unresponsive to behavioral/CBT/old records reports c/f conversion disorder     Foot pain 04/2010    4/10 + SHIRA, eval by Dr. ALLY Lion at [facility] -dx unclear ? gout and trial of nsaids and pdn, xrays normal 1/11, referred to podiatry, re-eval by rheum 2/12 and MRI and films wnl    Fracture of right radius 09/2009    Lyme arthritis     multiple joints    Lyme disease 2013    Memory loss 03/2012    eval for memory loss by neuro [place] [facility] [place], EEG wnl. Dr. Patricio suggested emotional factors & ref to Tushar Davies, PhD for  eval & neuropsych eval 3/12 Lupe Delgado, PhD - no evidence of formal thought disorder or psychosis - documented severe memory impairment; not related to to ADD or executive function  "issues. sugesstion that memory issues may be related to "emotional factors", ?conversion d/o    Stress fracture of tibia and fibula 08/2011    Urticaria      "

## 2018-07-23 NOTE — TELEPHONE ENCOUNTER
Attempted to follow up with pt via telephone. Unable to reach pt and LVM informing pt to call Dr. Rojas's office when he is discharged to let us know how he is doing.

## 2018-07-23 NOTE — PROGRESS NOTES
"[facility]  Hematology/Oncology  Progress Note    Patient Name: Alejandro Neff  Admission Date: 7/22/2018  Hospital Length of Stay: 0 days  Code Status: Full Code     Subjective:     HPI:  23 years old male with Hx of mediastinal seminoma presented to the ER 7/22/18 with a complaint of N/V  x1 week. Pt reports N/V and diarrhea for about one week. Reports that he is trying to wean himself off pain medication and Fentanyl patch. Reports everyday nausea and vomiting 4-8 times per days. Its associated with abdominal pain and diarrhea of watery stool 1-2 per day. Denies fever, chills, cough, chest pain, palpitation, dysuria, urgency or frequency.     Oncologic History:  1. Mr. Hampton is a 24 yo man who first presented with chest pain and underwent CT chest on 2/14/18, which showed a 6 x 3.5 cm mediastinal mass. It abuts the aorta and pulmonary artery. Biopsy was done on 3/1/18. Pathology (reviewed at [facility]) showed germ cell tumor, most consistent with seminoma.   2. HCG, LDH, AFP on 3/22/18 all normal.   3. Testicular US 3/26/18 showed no testicular masses. CT C/A/P on 3/26/18 showed "stable appearance of anterior mediastinal mass consistent with provided history of seminoma. Several punctate sclerotic foci are noted in the pelvis at the right pubic bone, right ischial tuberosity, and left ilium adjacent to the sacroiliac joint.  These are strongly favored to reflect benign bone islands although metastatic disease could have a similar appearance and close follow-up is recommended. Several benign Schmorl's nodes are noted in the thoracic spine." Bone scan on 3/28/18 was negative for bone mets.   4. Audiogram on 4/6/18 normal. Followed by ENT.   5. Given his smoking history, I recommended 4 cycles of EP. EP cycle 1 day 1 on 4/16/18. Complicated by hospitalization for neutropenic fever 4/26-4/29. No source of infections identified. Treated with antibiotics empirically and improved.   6. " "Cycle 2 of EP started on on 5/7/18. Hospitalized during the first weekend for intractable nausea.   7. Restaging CT scan on 5/24/18 showed "Interval development of dilated appearance of the appendix with stranding of the periappendiceal fat.  Findings are worrisome for appendicitis". Patient had abdominal pain and tenderness. Admitted and got appendectomy on 5/24/18.  8. Cycle 3 of EP 6/11/18-6/15/18. Complicated by hospitalization for neutropenic fever 6/21-6/25  9. Cycle 4 of EP 7/2/18-7/11/18. Complicated by hospitalization for N/V 7/7-7/9.     Interval History: Patient was admitted from the ED last night around 10pm for 3 day hx of N/V/D. Since admission, he has not had any episodes of emesis. Patient does report dry heaving. He remains afebrile with one semi-formed bowel movement. He states that he has mild abdominal cramps but believes that these symptoms most likely stem from his attempt to wean himself off of his pain medication.     Oncology Treatment Plan:   OP TESTICULAR EP    Medications:  Continuous Infusions:   sodium chloride 0.9% 100 mL/hr at 07/23/18 0430     Scheduled Meds:   enoxaparin  40 mg Subcutaneous Daily     PRN Meds:acetaminophen, dextrose 50%, dextrose 50%, dicyclomine, glucagon (human recombinant), glucose, glucose, ondansetron, promethazine (PHENERGAN) IVPB, sodium chloride 0.9%     Review of Systems   Constitutional: Negative.    HENT: Negative.    Eyes: Negative.    Respiratory: Negative.    Gastrointestinal: Positive for diarrhea (resolving), nausea and vomiting (resolving ).   Genitourinary: Negative.    Musculoskeletal: Negative.    Skin: Negative.    Neurological: Negative.    Psychiatric/Behavioral: Negative.      Objective:     Vital Signs (Most Recent):  Temp: 98.1 °F (36.7 °C) (07/23/18 0530)  Pulse: 80 (07/23/18 0530)  Resp: 16 (07/23/18 0530)  BP: 105/62 (07/23/18 0530)  SpO2: 100 % (07/23/18 0530) Vital Signs (24h Range):  Temp:  [98.1 °F (36.7 °C)-98.6 °F (37 °C)] 98.1 " °F (36.7 °C)  Pulse:  [72-95] 80  Resp:  [16-18] 16  SpO2:  [99 %-100 %] 100 %  BP: (104-122)/(58-70) 105/62     Weight: 66.3 kg (146 lb 0.9 oz)  Body mass index is 22.88 kg/m².  Body surface area is 1.77 meters squared.    No intake or output data in the 24 hours ending 07/23/18 0733    Physical Exam   Constitutional: He is oriented to person, place, and time. He appears well-developed.   HENT:   Head: Normocephalic and atraumatic.   Eyes: Pupils are equal, round, and reactive to light.   Neck: Normal range of motion.   Cardiovascular: Normal rate and regular rhythm.    Pulmonary/Chest: Effort normal.   Abdominal: Soft.   Musculoskeletal: Normal range of motion.   Neurological: He is alert and oriented to person, place, and time.   Skin: Skin is warm and dry.   Psychiatric: He has a normal mood and affect.       Significant Labs:   BMP:   Recent Labs  Lab 07/22/18 2245 07/23/18  0619   GLU 79 104    139   K 3.3* 3.3*    106   CO2 26 24   BUN 6 6   CREATININE 0.8 0.7   CALCIUM 9.0 8.3*   MG  --  1.6    and CBC:   Recent Labs  Lab 07/22/18 2245 07/23/18  0600   WBC 17.02* 13.51*   HGB 8.6* 7.6*   HCT 26.5* 23.7*    148*       Diagnostic Results:  I have reviewed all pertinent imaging results/findings within the past 24 hours.    Assessment/Plan:     * Intractable vomiting with nausea    - likely to be due to opioid withdrawal   - symptomatic treatment with IVF and anti-emetic   - phenergan (1st) and Zofran (2nd)  - IVF   -Reports no emesis overnight, mostly dry heaving        Opioid withdrawal    - symptomatic care   - Bentyl 10 mg every 6 hours as needed for GI cramps  - if c.diff come back negative, consider Loperamide 2 mg every 4 hours as needed for diarrhea  - Zofran or Promethazine  as needed for N/V           Diarrhea    - likely to be from Opioid withdrawal but infection can not be ruled out  - send for C.diff            Leukocytosis    - likely to be 2/2 Neulasta vs steroids vs reactive  leukocytoso  - pt does not look toxic, afebrile, Do not suspect infectious process but will remain cautious given immunocompromised status            Primary mediastinal seminoma    - see HPI for detailed Oncology history   - Testicular US 3/26/18 showed no testicular masses, HCG, LDH, AFP on 3/22/18 all normal.   - Bone scan on 3/28/18 was negative for bone mets.   - s/p cycle 4 of OP TESTICULAR EP protocol  - received Neulasta after last chemo cycle 7/11/18                 Anthony Davis MD  Hematology/Oncology  [facility]

## 2018-07-24 ENCOUNTER — PATIENT MESSAGE (OUTPATIENT)
Dept: ADMINISTRATIVE | Facility: OTHER | Age: 23
End: 2018-07-24

## 2018-07-24 LAB
ALBUMIN SERPL BCP-MCNC: 3.5 G/DL
ALP SERPL-CCNC: 91 U/L
ALT SERPL W/O P-5'-P-CCNC: 9 U/L
ANION GAP SERPL CALC-SCNC: 7 MMOL/L
ANISOCYTOSIS BLD QL SMEAR: SLIGHT
AST SERPL-CCNC: 10 U/L
BASOPHILS # BLD AUTO: ABNORMAL K/UL
BASOPHILS NFR BLD: 0 %
BILIRUB SERPL-MCNC: 0.3 MG/DL
BUN SERPL-MCNC: 5 MG/DL
CALCIUM SERPL-MCNC: 9.2 MG/DL
CHLORIDE SERPL-SCNC: 107 MMOL/L
CO2 SERPL-SCNC: 26 MMOL/L
CREAT SERPL-MCNC: 0.8 MG/DL
DACRYOCYTES BLD QL SMEAR: ABNORMAL
DIFFERENTIAL METHOD: ABNORMAL
EOSINOPHIL # BLD AUTO: ABNORMAL K/UL
EOSINOPHIL NFR BLD: 0 %
ERYTHROCYTE [DISTWIDTH] IN BLOOD BY AUTOMATED COUNT: 17.8 %
EST. GFR  (AFRICAN AMERICAN): >60 ML/MIN/1.73 M^2
EST. GFR  (NON AFRICAN AMERICAN): >60 ML/MIN/1.73 M^2
GLUCOSE SERPL-MCNC: 131 MG/DL
HCT VFR BLD AUTO: 27.5 %
HGB BLD-MCNC: 8.8 G/DL
HYPOCHROMIA BLD QL SMEAR: ABNORMAL
IMM GRANULOCYTES # BLD AUTO: ABNORMAL K/UL
IMM GRANULOCYTES NFR BLD AUTO: ABNORMAL %
LYMPHOCYTES # BLD AUTO: ABNORMAL K/UL
LYMPHOCYTES NFR BLD: 4 %
MAGNESIUM SERPL-MCNC: 1.9 MG/DL
MCH RBC QN AUTO: 29 PG
MCHC RBC AUTO-ENTMCNC: 32 G/DL
MCV RBC AUTO: 91 FL
MONOCYTES # BLD AUTO: ABNORMAL K/UL
MONOCYTES NFR BLD: 1 %
MYELOCYTES NFR BLD MANUAL: 1 %
NEUTROPHILS NFR BLD: 94 %
NRBC BLD-RTO: 0 /100 WBC
OVALOCYTES BLD QL SMEAR: ABNORMAL
PHOSPHATE SERPL-MCNC: 4.2 MG/DL
PLATELET # BLD AUTO: 219 K/UL
PLATELET BLD QL SMEAR: ABNORMAL
PMV BLD AUTO: 8.9 FL
POIKILOCYTOSIS BLD QL SMEAR: SLIGHT
POLYCHROMASIA BLD QL SMEAR: ABNORMAL
POTASSIUM SERPL-SCNC: 4.5 MMOL/L
PROT SERPL-MCNC: 5.7 G/DL
RBC # BLD AUTO: 3.03 M/UL
SODIUM SERPL-SCNC: 140 MMOL/L
WBC # BLD AUTO: 24.19 K/UL

## 2018-07-24 PROCEDURE — 25000003 PHARM REV CODE 250: Performed by: STUDENT IN AN ORGANIZED HEALTH CARE EDUCATION/TRAINING PROGRAM

## 2018-07-24 PROCEDURE — 84100 ASSAY OF PHOSPHORUS: CPT

## 2018-07-24 PROCEDURE — 63600175 PHARM REV CODE 636 W HCPCS: Performed by: STUDENT IN AN ORGANIZED HEALTH CARE EDUCATION/TRAINING PROGRAM

## 2018-07-24 PROCEDURE — 83735 ASSAY OF MAGNESIUM: CPT

## 2018-07-24 PROCEDURE — 80053 COMPREHEN METABOLIC PANEL: CPT

## 2018-07-24 PROCEDURE — 85007 BL SMEAR W/DIFF WBC COUNT: CPT

## 2018-07-24 PROCEDURE — 63600175 PHARM REV CODE 636 W HCPCS: Performed by: INTERNAL MEDICINE

## 2018-07-24 PROCEDURE — 99215 OFFICE O/P EST HI 40 MIN: CPT | Mod: ,,, | Performed by: INTERNAL MEDICINE

## 2018-07-24 PROCEDURE — G0378 HOSPITAL OBSERVATION PER HR: HCPCS

## 2018-07-24 PROCEDURE — 85027 COMPLETE CBC AUTOMATED: CPT

## 2018-07-24 RX ORDER — PROCHLORPERAZINE MALEATE 5 MG
5 TABLET ORAL ONCE
Status: DISCONTINUED | OUTPATIENT
Start: 2018-07-24 | End: 2018-07-25 | Stop reason: HOSPADM

## 2018-07-24 RX ORDER — PROCHLORPERAZINE MALEATE 5 MG
5 TABLET ORAL ONCE
Status: COMPLETED | OUTPATIENT
Start: 2018-07-24 | End: 2018-07-24

## 2018-07-24 RX ORDER — LORAZEPAM 0.5 MG/1
0.5 TABLET ORAL EVERY 8 HOURS PRN
Status: DISCONTINUED | OUTPATIENT
Start: 2018-07-24 | End: 2018-07-25 | Stop reason: HOSPADM

## 2018-07-24 RX ORDER — SODIUM CHLORIDE 9 MG/ML
INJECTION, SOLUTION INTRAVENOUS CONTINUOUS
Status: DISCONTINUED | OUTPATIENT
Start: 2018-07-24 | End: 2018-07-24

## 2018-07-24 RX ORDER — PROMETHAZINE HYDROCHLORIDE 25 MG/1
25 SUPPOSITORY RECTAL ONCE
Status: COMPLETED | OUTPATIENT
Start: 2018-07-24 | End: 2018-07-24

## 2018-07-24 RX ORDER — PROCHLORPERAZINE MALEATE 5 MG
10 TABLET ORAL 3 TIMES DAILY
Status: DISCONTINUED | OUTPATIENT
Start: 2018-07-24 | End: 2018-07-25 | Stop reason: HOSPADM

## 2018-07-24 RX ORDER — DEXAMETHASONE 4 MG/1
4 TABLET ORAL DAILY
Status: DISCONTINUED | OUTPATIENT
Start: 2018-07-24 | End: 2018-07-25 | Stop reason: HOSPADM

## 2018-07-24 RX ORDER — ONDANSETRON 8 MG/1
8 TABLET, ORALLY DISINTEGRATING ORAL EVERY 8 HOURS PRN
Status: DISCONTINUED | OUTPATIENT
Start: 2018-07-24 | End: 2018-07-24

## 2018-07-24 RX ORDER — LORAZEPAM 0.5 MG/1
0.5 TABLET ORAL EVERY 8 HOURS PRN
Status: DISCONTINUED | OUTPATIENT
Start: 2018-07-24 | End: 2018-07-24

## 2018-07-24 RX ORDER — PROCHLORPERAZINE MALEATE 5 MG
5 TABLET ORAL 3 TIMES DAILY PRN
Status: DISCONTINUED | OUTPATIENT
Start: 2018-07-24 | End: 2018-07-24

## 2018-07-24 RX ADMIN — SODIUM CHLORIDE: 0.9 INJECTION, SOLUTION INTRAVENOUS at 08:07

## 2018-07-24 RX ADMIN — PROMETHAZINE HYDROCHLORIDE 25 MG: 25 SUPPOSITORY RECTAL at 10:07

## 2018-07-24 RX ADMIN — PROMETHAZINE HYDROCHLORIDE 6.25 MG: 25 INJECTION INTRAMUSCULAR; INTRAVENOUS at 04:07

## 2018-07-24 RX ADMIN — LORAZEPAM 1 MG: 2 INJECTION, SOLUTION INTRAMUSCULAR; INTRAVENOUS at 12:07

## 2018-07-24 RX ADMIN — SODIUM CHLORIDE: 0.9 INJECTION, SOLUTION INTRAVENOUS at 12:07

## 2018-07-24 RX ADMIN — LORAZEPAM 0.5 MG: 0.5 TABLET ORAL at 11:07

## 2018-07-24 RX ADMIN — MORPHINE SULFATE 15 MG: 15 TABLET ORAL at 03:07

## 2018-07-24 RX ADMIN — PROCHLORPERAZINE MALEATE 5 MG: 5 TABLET, FILM COATED ORAL at 03:07

## 2018-07-24 RX ADMIN — PROCHLORPERAZINE MALEATE 5 MG: 5 TABLET, FILM COATED ORAL at 04:07

## 2018-07-24 RX ADMIN — LORAZEPAM 0.5 MG: 0.5 TABLET ORAL at 08:07

## 2018-07-24 RX ADMIN — DEXAMETHASONE 4 MG: 4 TABLET ORAL at 03:07

## 2018-07-24 RX ADMIN — PROCHLORPERAZINE MALEATE 10 MG: 5 TABLET, FILM COATED ORAL at 08:07

## 2018-07-24 NOTE — ASSESSMENT & PLAN NOTE
- symptomatic care   - Bentyl 10 mg every 6 hours as needed for GI cramps  - Zofran or Promethazine  as needed for N/V

## 2018-07-24 NOTE — PROGRESS NOTES
"[facility]  Hematology/Oncology  Progress Note    Patient Name: Alejandro Neff  Admission Date: 7/22/2018  Hospital Length of Stay: 0 days  Code Status: Full Code     Subjective:     HPI:  23 years old male with Hx of mediastinal seminoma presented to the ER 7/22/18 with a complaint of N/V  x1 week. Pt reports N/V and diarrhea for about one week. Reports that he is trying to wean himself off pain medication and Fentanyl patch. Reports everyday nausea and vomiting 4-8 times per days. Its associated with abdominal pain and diarrhea of watery stool 1-2 per day. Denies fever, chills, cough, chest pain, palpitation, dysuria, urgency or frequency.     Oncologic History:  1. Mr. Hampton is a 24 yo man who first presented with chest pain and underwent CT chest on 2/14/18, which showed a 6 x 3.5 cm mediastinal mass. It abuts the aorta and pulmonary artery. Biopsy was done on 3/1/18. Pathology (reviewed at [facility]) showed germ cell tumor, most consistent with seminoma.   2. HCG, LDH, AFP on 3/22/18 all normal.   3. Testicular US 3/26/18 showed no testicular masses. CT C/A/P on 3/26/18 showed "stable appearance of anterior mediastinal mass consistent with provided history of seminoma. Several punctate sclerotic foci are noted in the pelvis at the right pubic bone, right ischial tuberosity, and left ilium adjacent to the sacroiliac joint.  These are strongly favored to reflect benign bone islands although metastatic disease could have a similar appearance and close follow-up is recommended. Several benign Schmorl's nodes are noted in the thoracic spine." Bone scan on 3/28/18 was negative for bone mets.   4. Audiogram on 4/6/18 normal. Followed by ENT.   5. Given his smoking history, I recommended 4 cycles of EP. EP cycle 1 day 1 on 4/16/18. Complicated by hospitalization for neutropenic fever 4/26-4/29. No source of infections identified. Treated with antibiotics empirically and improved.   6. " "Cycle 2 of EP started on on 5/7/18. Hospitalized during the first weekend for intractable nausea.   7. Restaging CT scan on 5/24/18 showed "Interval development of dilated appearance of the appendix with stranding of the periappendiceal fat.  Findings are worrisome for appendicitis". Patient had abdominal pain and tenderness. Admitted and got appendectomy on 5/24/18.  8. Cycle 3 of EP 6/11/18-6/15/18. Complicated by hospitalization for neutropenic fever 6/21-6/25  9. Cycle 4 of EP 7/2/18-7/11/18. Complicated by hospitalization for N/V 7/7-7/9.     Interval History: Pt had no acute events overnight. He reports some continued abdominal pain but states that he had no episodes of emesis overnight, was able to eat dinner without any issues. Converted all medication to PO. Patient requested a Phenergan suppository. Will continue to monitor. Possible discharge this afternoon.     Oncology Treatment Plan:   OP TESTICULAR EP    Medications:  Continuous Infusions:   sodium chloride 0.9%       Scheduled Meds:   enoxaparin  40 mg Subcutaneous Daily     PRN Meds:acetaminophen, dextrose 50%, dextrose 50%, dicyclomine, glucagon (human recombinant), glucose, glucose, lorazepam, morphine, ondansetron, promethazine (PHENERGAN) IVPB, sodium chloride 0.9%     Review of Systems   Constitutional: Negative.    HENT: Negative.    Eyes: Negative.    Respiratory: Negative.    Cardiovascular: Negative.    Gastrointestinal: Positive for abdominal pain (diffuse). Negative for abdominal distention, anal bleeding, diarrhea, nausea and vomiting.   Endocrine: Negative.    Genitourinary: Negative.    Neurological: Negative.    Psychiatric/Behavioral: Negative.      Objective:     Vital Signs (Most Recent):  Temp: 96.8 °F (36 °C) (07/24/18 0400)  Pulse: (!) 56 (07/24/18 0400)  Resp: 14 (07/24/18 0400)  BP: 100/64 (07/24/18 0400)  SpO2: 97 % (07/24/18 0400) Vital Signs (24h Range):  Temp:  [96.5 °F (35.8 °C)-98.9 °F (37.2 °C)] 96.8 °F (36 " °C)  Pulse:  [56-86] 56  Resp:  [14-18] 14  SpO2:  [97 %-100 %] 97 %  BP: (100-122)/(56-70) 100/64     Weight: 66.3 kg (146 lb 0.9 oz)  Body mass index is 22.88 kg/m².  Body surface area is 1.77 meters squared.      Intake/Output Summary (Last 24 hours) at 07/24/18 0747  Last data filed at 07/24/18 0432   Gross per 24 hour   Intake          2883.33 ml   Output              250 ml   Net          2633.33 ml       Physical Exam   Constitutional: He is oriented to person, place, and time. He appears well-developed.   HENT:   Head: Normocephalic and atraumatic.   Eyes: EOM are normal. Pupils are equal, round, and reactive to light.   Neck: Normal range of motion.   Cardiovascular: Normal rate, regular rhythm and normal heart sounds.    Pulmonary/Chest: Effort normal and breath sounds normal.   Abdominal: Soft. Bowel sounds are normal. There is tenderness.   Musculoskeletal: Normal range of motion.   Neurological: He is alert and oriented to person, place, and time.   Skin: Skin is warm and dry.   Psychiatric: He has a normal mood and affect.       Significant Labs:   BMP:   Recent Labs  Lab 07/22/18 2245 07/23/18  0619 07/24/18  0355   GLU 79 104 131*    139 140   K 3.3* 3.3* 4.5    106 107   CO2 26 24 26   BUN 6 6 5*   CREATININE 0.8 0.7 0.8   CALCIUM 9.0 8.3* 9.2   MG  --  1.6 1.9    and CBC:   Recent Labs  Lab 07/22/18 2245 07/23/18  0600 07/24/18  0355   WBC 17.02* 13.51* 24.19*   HGB 8.6* 7.6* 8.8*   HCT 26.5* 23.7* 27.5*    148* 219       Diagnostic Results:  I have reviewed all pertinent imaging results/findings within the past 24 hours.    Assessment/Plan:     * Intractable vomiting with nausea    - likely to be due to opioid withdrawal   - symptomatic treatment with IVF and anti-emetic   - phenergan (1st) and Zofran (2nd)  - IVF   -gave IV decadron 4mg, and IV ativan 1mg for nausea management   -Reports no emesis overnight        Opioid withdrawal    - symptomatic care   - Bentyl 10 mg every  6 hours as needed for GI cramps  - Zofran or Promethazine  as needed for N/V           Diarrhea    - likely to be from Opioid withdrawal   -resolved             Leukocytosis    - likely to be 2/2 Neulasta vs steroids vs reactive leukocytoso  - pt does not look toxic, afebrile, Do not suspect infectious process but will remain cautious given immunocompromised status            Primary mediastinal seminoma    - see HPI for detailed Oncology history   - Testicular US 3/26/18 showed no testicular masses, HCG, LDH, AFP on 3/22/18 all normal.   - Bone scan on 3/28/18 was negative for bone mets.   - s/p cycle 4 of OP TESTICULAR EP protocol  - received Neulasta after last chemo cycle 7/11/18               Anthony Davis MD  Hematology/Oncology  [facility]        I have reviewed the notes, assessments, and/or procedures performed by the housestaff, as above.  I have personally interviewed and examined the patient at the beside, and rounded with the housestaff. I concur with her/his assessment and plan and the documentation of Alejandro Neff.  I, Dr. Walker Snell, personally spent more than 35 mins during this encounter, greater than 50% was spent in direct counseling and/or coordination of care.     Walker Snell M.D., M.S., F.A.C.P.  Hematology/Oncology Attending  [facility]

## 2018-07-24 NOTE — SUBJECTIVE & OBJECTIVE
Interval History: Pt had no acute events overnight. He reports some continued abdominal pain but states that he had no episodes of emesis overnight, was able to eat dinner without any issues. Converted all medication to PO. Patient requested a Phenergan suppository. Will continue to monitor. Possible discharge this afternoon.     Oncology Treatment Plan:   OP TESTICULAR EP    Medications:  Continuous Infusions:   sodium chloride 0.9%       Scheduled Meds:   enoxaparin  40 mg Subcutaneous Daily     PRN Meds:acetaminophen, dextrose 50%, dextrose 50%, dicyclomine, glucagon (human recombinant), glucose, glucose, lorazepam, morphine, ondansetron, promethazine (PHENERGAN) IVPB, sodium chloride 0.9%     Review of Systems   Constitutional: Negative.    HENT: Negative.    Eyes: Negative.    Respiratory: Negative.    Cardiovascular: Negative.    Gastrointestinal: Positive for abdominal pain (diffuse). Negative for abdominal distention, anal bleeding, diarrhea, nausea and vomiting.   Endocrine: Negative.    Genitourinary: Negative.    Neurological: Negative.    Psychiatric/Behavioral: Negative.      Objective:     Vital Signs (Most Recent):  Temp: 96.8 °F (36 °C) (07/24/18 0400)  Pulse: (!) 56 (07/24/18 0400)  Resp: 14 (07/24/18 0400)  BP: 100/64 (07/24/18 0400)  SpO2: 97 % (07/24/18 0400) Vital Signs (24h Range):  Temp:  [96.5 °F (35.8 °C)-98.9 °F (37.2 °C)] 96.8 °F (36 °C)  Pulse:  [56-86] 56  Resp:  [14-18] 14  SpO2:  [97 %-100 %] 97 %  BP: (100-122)/(56-70) 100/64     Weight: 66.3 kg (146 lb 0.9 oz)  Body mass index is 22.88 kg/m².  Body surface area is 1.77 meters squared.      Intake/Output Summary (Last 24 hours) at 07/24/18 0747  Last data filed at 07/24/18 0432   Gross per 24 hour   Intake          2883.33 ml   Output              250 ml   Net          2633.33 ml       Physical Exam   Constitutional: He is oriented to person, place, and time. He appears well-developed.   HENT:   Head: Normocephalic and atraumatic.    Eyes: EOM are normal. Pupils are equal, round, and reactive to light.   Neck: Normal range of motion.   Cardiovascular: Normal rate, regular rhythm and normal heart sounds.    Pulmonary/Chest: Effort normal and breath sounds normal.   Abdominal: Soft. Bowel sounds are normal. There is tenderness.   Musculoskeletal: Normal range of motion.   Neurological: He is alert and oriented to person, place, and time.   Skin: Skin is warm and dry.   Psychiatric: He has a normal mood and affect.       Significant Labs:   BMP:   Recent Labs  Lab 07/22/18 2245 07/23/18  0619 07/24/18  0355   GLU 79 104 131*    139 140   K 3.3* 3.3* 4.5    106 107   CO2 26 24 26   BUN 6 6 5*   CREATININE 0.8 0.7 0.8   CALCIUM 9.0 8.3* 9.2   MG  --  1.6 1.9    and CBC:   Recent Labs  Lab 07/22/18 2245 07/23/18  0600 07/24/18  0355   WBC 17.02* 13.51* 24.19*   HGB 8.6* 7.6* 8.8*   HCT 26.5* 23.7* 27.5*    148* 219       Diagnostic Results:  I have reviewed all pertinent imaging results/findings within the past 24 hours.

## 2018-07-24 NOTE — ASSESSMENT & PLAN NOTE
- likely to be due to opioid withdrawal   - symptomatic treatment with IVF and anti-emetic   - phenergan (1st) and Zofran (2nd)  - IVF   -gave IV decadron 4mg, and IV ativan 1mg for nausea management   -Reports no emesis overnight

## 2018-07-24 NOTE — PROGRESS NOTES
Pt sitting up in bed dry heaving at this time and said that he feels that he is unable to take his scheduled Decadron PO, in spite of receiving phenergan suppository almost an hour ago.  He said he will attempt to take his Ativan PO.  Notified Dr. Davis and he verbalized understanding and said that Dr. Snell is adamant that pt is not to receive any more IV nausea medications.  Pt did indeed take Ativan PO at 1149 but states that he feels extremely nauseated.  Will monitor.

## 2018-07-24 NOTE — PROGRESS NOTES
Admit Assessment    Patient Identification  Alejandro Neff   :  1995  Admit Date:  2018  Attending Provider:  Walker Snell MD              Referral:   Pt was admitted to  with a diagnosis of Intractable vomiting with nausea, and was admitted this hospital stay due to Chest pain [R07.9]  Leukocytosis, unspecified type [D72.829]  Intractable vomiting with nausea, unspecified vomiting type [R11.2]  Adverse effect of chemotherapy, initial encounter [T45.1X5A].         is involved was referred to the Social Work Department via routine referral.  Patient presents as a 23 y.o. year oldmale.    Persons interviewed : Patient     Living Situation:      Resides at [address] [place], [place] [address], phone: [phone] (home).  Patient lives at  [address] [place] [address]. He lives with roommates    Functional Status Prior  Ambulation: 0-->independent  Transferrin-->independent  Toiletin-->independent    Current or Past Agencies and Description of Services/Supplies    DME: Patient has note required DME prior to hospitalization and is not anticipated to have any needs post discharge         Home Health: patient was not active on service with an agency and is not anticipated to have any needs post discharge      IV Infusion:N/A      Nutrition: Oral but reports he cannot hold anything down due to nausea     Outpatient Pharmacy:     [facility]  [address] [facility] [address]  Phone: [phone] Fax: [phone]    [facility]  [address] [address]  Phone: [phone] Fax: [phone]      Patient Preference of agencies include ; Does not express preference     Patient/Caregiver informed of right to choose providers or agencies.  Patient provides permission to release any necessary information to [facility] and to [facility] agencies as needed to facilitate patient care, treatment  "planning, and patient discharge planning.  Written and verbal resources provided.      Coping: Patient keeps eyes closed and will not make eye contact. He stays focus on " they just want to kick  me out of here. The only thing  I can tolerate is IV meds but they won't let me have those." Answers questions minimally.           Adjustment to Diagnosis and Treatment: Patient recently had his last treatment and had just gone back to work a few days ago.     Emotional/Behavioral/Cognitive Issues: None observed or noted            History/Current Symptoms of Anxiety/Depression: No:   History/Current Substance Use:   Social History     Social History Main Topics    Smoking status: Former Smoker     Packs/day: 0.25     Types: Vaping with nicotine, Vaping w/o nicotine, Cigarettes, Cigars     Quit date: 3/15/2018    Smokeless tobacco: Former User    Alcohol use No    Drug use: Unknown     Types: LSD, Other-see comments    Sexual activity: Yes     Partners: Female     Birth control/ protection: Condom       Indications of Abuse/Neglect: No:   Abuse Screen  Do You Feel Unsafe at Home, Work or School?: no    Financial:  Payor/Plan Subscr  Sex Relation Sub. Ins. ID Effective Group Num   1. [employer] BL* MARYDA* 3/24/1965 Male  [id number]                                   [address] [address]                            Other identified concerns/needs: None at this time    Plan:Return home     Interventions/Referrals: None at this time  Patient/caregiver engaged in treatment planning process.     providing psychosocial and supportive counseling, resources, education, assistance and discharge planning as appropriate.  Patient/caregiver state understanding of  available resources,  following, remains available.                           "

## 2018-07-25 VITALS
TEMPERATURE: 99 F | HEIGHT: 67 IN | BODY MASS INDEX: 22.92 KG/M2 | WEIGHT: 146.06 LBS | OXYGEN SATURATION: 97 % | SYSTOLIC BLOOD PRESSURE: 107 MMHG | DIASTOLIC BLOOD PRESSURE: 52 MMHG | RESPIRATION RATE: 18 BRPM | HEART RATE: 64 BPM

## 2018-07-25 LAB
ALBUMIN SERPL BCP-MCNC: 3.5 G/DL
ALP SERPL-CCNC: 88 U/L
ALT SERPL W/O P-5'-P-CCNC: 9 U/L
ANION GAP SERPL CALC-SCNC: 7 MMOL/L
ANISOCYTOSIS BLD QL SMEAR: ABNORMAL
AST SERPL-CCNC: 10 U/L
BASOPHILS # BLD AUTO: ABNORMAL K/UL
BASOPHILS NFR BLD: 0 %
BILIRUB SERPL-MCNC: 0.3 MG/DL
BUN SERPL-MCNC: 5 MG/DL
CALCIUM SERPL-MCNC: 9.3 MG/DL
CHLORIDE SERPL-SCNC: 105 MMOL/L
CO2 SERPL-SCNC: 29 MMOL/L
CREAT SERPL-MCNC: 0.7 MG/DL
DIFFERENTIAL METHOD: ABNORMAL
EOSINOPHIL # BLD AUTO: ABNORMAL K/UL
EOSINOPHIL NFR BLD: 0 %
ERYTHROCYTE [DISTWIDTH] IN BLOOD BY AUTOMATED COUNT: 17.8 %
EST. GFR  (AFRICAN AMERICAN): >60 ML/MIN/1.73 M^2
EST. GFR  (NON AFRICAN AMERICAN): >60 ML/MIN/1.73 M^2
GLUCOSE SERPL-MCNC: 124 MG/DL
HCT VFR BLD AUTO: 27.1 %
HGB BLD-MCNC: 8.8 G/DL
HYPOCHROMIA BLD QL SMEAR: ABNORMAL
IMM GRANULOCYTES # BLD AUTO: ABNORMAL K/UL
IMM GRANULOCYTES NFR BLD AUTO: ABNORMAL %
LYMPHOCYTES # BLD AUTO: ABNORMAL K/UL
LYMPHOCYTES NFR BLD: 7 %
MAGNESIUM SERPL-MCNC: 2 MG/DL
MCH RBC QN AUTO: 29.5 PG
MCHC RBC AUTO-ENTMCNC: 32.5 G/DL
MCV RBC AUTO: 91 FL
MONOCYTES # BLD AUTO: ABNORMAL K/UL
MONOCYTES NFR BLD: 4 %
NEUTROPHILS NFR BLD: 89 %
NRBC BLD-RTO: 1 /100 WBC
OVALOCYTES BLD QL SMEAR: ABNORMAL
PHOSPHATE SERPL-MCNC: 4.5 MG/DL
PLATELET # BLD AUTO: 211 K/UL
PMV BLD AUTO: 8.8 FL
POIKILOCYTOSIS BLD QL SMEAR: SLIGHT
POLYCHROMASIA BLD QL SMEAR: ABNORMAL
POTASSIUM SERPL-SCNC: 4.2 MMOL/L
PROT SERPL-MCNC: 5.7 G/DL
RBC # BLD AUTO: 2.98 M/UL
SODIUM SERPL-SCNC: 141 MMOL/L
TOXIC GRANULES BLD QL SMEAR: PRESENT
WBC # BLD AUTO: 24.42 K/UL

## 2018-07-25 PROCEDURE — 83735 ASSAY OF MAGNESIUM: CPT

## 2018-07-25 PROCEDURE — 85007 BL SMEAR W/DIFF WBC COUNT: CPT

## 2018-07-25 PROCEDURE — G0378 HOSPITAL OBSERVATION PER HR: HCPCS

## 2018-07-25 PROCEDURE — 84100 ASSAY OF PHOSPHORUS: CPT

## 2018-07-25 PROCEDURE — 63600175 PHARM REV CODE 636 W HCPCS: Performed by: STUDENT IN AN ORGANIZED HEALTH CARE EDUCATION/TRAINING PROGRAM

## 2018-07-25 PROCEDURE — 99226 PR SUBSEQUENT OBSERVATION CARE,LEVEL III: CPT | Mod: ,,, | Performed by: INTERNAL MEDICINE

## 2018-07-25 PROCEDURE — 25000003 PHARM REV CODE 250: Performed by: STUDENT IN AN ORGANIZED HEALTH CARE EDUCATION/TRAINING PROGRAM

## 2018-07-25 PROCEDURE — 85027 COMPLETE CBC AUTOMATED: CPT

## 2018-07-25 PROCEDURE — 80053 COMPREHEN METABOLIC PANEL: CPT

## 2018-07-25 RX ORDER — MORPHINE SULFATE 15 MG/1
15 TABLET ORAL ONCE
Status: COMPLETED | OUTPATIENT
Start: 2018-07-25 | End: 2018-07-25

## 2018-07-25 RX ORDER — MORPHINE SULFATE 15 MG/1
15 TABLET ORAL EVERY 6 HOURS PRN
Qty: 20 TABLET | Refills: 0 | Status: CANCELLED | OUTPATIENT
Start: 2018-07-25 | End: 2018-07-30

## 2018-07-25 RX ORDER — LORAZEPAM 0.5 MG/1
0.5 TABLET ORAL EVERY 8 HOURS PRN
Qty: 15 TABLET | Refills: 0 | Status: CANCELLED | OUTPATIENT
Start: 2018-07-25 | End: 2018-07-30

## 2018-07-25 RX ORDER — DEXAMETHASONE 4 MG/1
4 TABLET ORAL DAILY
Qty: 5 TABLET | Refills: 0 | Status: SHIPPED | OUTPATIENT
Start: 2018-07-26 | End: 2018-07-31

## 2018-07-25 RX ORDER — HEPARIN 100 UNIT/ML
300 SYRINGE INTRAVENOUS
Status: DISCONTINUED | OUTPATIENT
Start: 2018-07-25 | End: 2018-07-25 | Stop reason: HOSPADM

## 2018-07-25 RX ORDER — PROCHLORPERAZINE MALEATE 10 MG
10 TABLET ORAL 3 TIMES DAILY
Qty: 15 TABLET | Refills: 0 | Status: CANCELLED | OUTPATIENT
Start: 2018-07-25 | End: 2018-07-30

## 2018-07-25 RX ORDER — DRONABINOL 2.5 MG/1
2.5 CAPSULE ORAL
Qty: 10 CAPSULE | Refills: 0 | Status: CANCELLED | OUTPATIENT
Start: 2018-07-25 | End: 2018-07-30

## 2018-07-25 RX ADMIN — PROCHLORPERAZINE MALEATE 10 MG: 5 TABLET, FILM COATED ORAL at 08:07

## 2018-07-25 RX ADMIN — MORPHINE SULFATE 15 MG: 15 TABLET ORAL at 12:07

## 2018-07-25 RX ADMIN — MORPHINE SULFATE 15 MG: 15 TABLET ORAL at 06:07

## 2018-07-25 RX ADMIN — LORAZEPAM 0.5 MG: 0.5 TABLET ORAL at 12:07

## 2018-07-25 RX ADMIN — LORAZEPAM 0.5 MG: 0.5 TABLET ORAL at 06:07

## 2018-07-25 RX ADMIN — MORPHINE SULFATE 15 MG: 15 TABLET ORAL at 08:07

## 2018-07-25 RX ADMIN — DEXAMETHASONE 4 MG: 4 TABLET ORAL at 08:07

## 2018-07-25 NOTE — PLAN OF CARE
"Problem: Patient Care Overview  Goal: Plan of Care Review  Outcome: Ongoing (interventions implemented as appropriate)  Pt is resting in bed & is up to the bathroom. VS stable, pt is afebrile. Pt refused his midnight VS. Pt complains of pain "from the neck down". Pt requested PRN morphine and ativan & he was asleep when I promptly returned with the medications. When he called again sometime later, pt states "sorry, I lose consciousness sometimes". Pt denies complaints of abd pain, N/V. Non-skid socks in place. Pt encouraged to call for assistance as needed. Will continue to monitor.      "

## 2018-07-25 NOTE — PROGRESS NOTES
ROAD TEST    Oxygen: Pt tolerated well on room air.  Ambulation: Pt up ad ernst.  Devices: Pt going home without device present  Tolerates (diet): Pt tolerates regular diet  Elimination: Pt voids without distress  Self Care: Pt performs care per self  Teaching: Written and verbal discharge teaching given.    AVS read aloud to pt. All questioned answered. Prescription brought to bedside. Pt denied transport service, pt ambulated per self.

## 2018-07-25 NOTE — PROGRESS NOTES
"[facility]  Hematology/Oncology  Progress Note    Patient Name: Alejandro Neff  Admission Date: 7/22/2018  Hospital Length of Stay: 0 days  Code Status: Full Code     Subjective:     HPI:  23 years old male with Hx of mediastinal seminoma presented to the ER 7/22/18 with a complaint of N/V  x1 week. Pt reports N/V and diarrhea for about one week. Reports that he is trying to wean himself off pain medication and Fentanyl patch. Reports everyday nausea and vomiting 4-8 times per days. Its associated with abdominal pain and diarrhea of watery stool 1-2 per day. Denies fever, chills, cough, chest pain, palpitation, dysuria, urgency or frequency.     Oncologic History:  1. Mr. Hampton is a 22 yo man who first presented with chest pain and underwent CT chest on 2/14/18, which showed a 6 x 3.5 cm mediastinal mass. It abuts the aorta and pulmonary artery. Biopsy was done on 3/1/18. Pathology (reviewed at [facility]) showed germ cell tumor, most consistent with seminoma.   2. HCG, LDH, AFP on 3/22/18 all normal.   3. Testicular US 3/26/18 showed no testicular masses. CT C/A/P on 3/26/18 showed "stable appearance of anterior mediastinal mass consistent with provided history of seminoma. Several punctate sclerotic foci are noted in the pelvis at the right pubic bone, right ischial tuberosity, and left ilium adjacent to the sacroiliac joint.  These are strongly favored to reflect benign bone islands although metastatic disease could have a similar appearance and close follow-up is recommended. Several benign Schmorl's nodes are noted in the thoracic spine." Bone scan on 3/28/18 was negative for bone mets.   4. Audiogram on 4/6/18 normal. Followed by ENT.   5. Given his smoking history, I recommended 4 cycles of EP. EP cycle 1 day 1 on 4/16/18. Complicated by hospitalization for neutropenic fever 4/26-4/29. No source of infections identified. Treated with antibiotics empirically and improved.   6. " "Cycle 2 of EP started on on 5/7/18. Hospitalized during the first weekend for intractable nausea.   7. Restaging CT scan on 5/24/18 showed "Interval development of dilated appearance of the appendix with stranding of the periappendiceal fat.  Findings are worrisome for appendicitis". Patient had abdominal pain and tenderness. Admitted and got appendectomy on 5/24/18.  8. Cycle 3 of EP 6/11/18-6/15/18. Complicated by hospitalization for neutropenic fever 6/21-6/25  9. Cycle 4 of EP 7/2/18-7/11/18. Complicated by hospitalization for N/V 7/7-7/9.     Interval History: Patient had no acute events overnight. He was able to tolerate his dinner. Still some bouts of nausea but continues to take his anti-nausea medication. Patient reports diffuse abdominal pain but states that he feels better than he did yesterday. Will continue to monitor closely.     Oncology Treatment Plan:   OP TESTICULAR EP    Medications:  Continuous Infusions:  Scheduled Meds:   dexamethasone  4 mg Oral Daily    enoxaparin  40 mg Subcutaneous Daily    morphine  15 mg Oral Once    prochlorperazine  10 mg Oral TID    prochlorperazine  5 mg Oral Once     PRN Meds:acetaminophen, dextrose 50%, dextrose 50%, dicyclomine, glucagon (human recombinant), glucose, glucose, LORazepam, morphine, sodium chloride 0.9%     Review of Systems   Constitutional: Negative.    HENT: Negative.    Eyes: Negative.    Respiratory: Negative.    Cardiovascular: Negative.    Gastrointestinal: Positive for abdominal pain. Negative for nausea and vomiting.   Endocrine: Negative.    Musculoskeletal: Negative.    Skin: Negative.    Neurological: Negative.    Psychiatric/Behavioral: Negative.      Objective:     Vital Signs (Most Recent):  Temp: 98.5 °F (36.9 °C) (07/25/18 0415)  Pulse: (!) 49 (07/25/18 0415)  Resp: 15 (07/25/18 0415)  BP: (!) 105/56 (07/25/18 0415)  SpO2: 97 % (07/25/18 0415) Vital Signs (24h Range):  Temp:  [98.3 °F (36.8 °C)-99 °F (37.2 °C)] 98.5 °F (36.9 " °C)  Pulse:  [49-97] 49  Resp:  [15-17] 15  SpO2:  [95 %-99 %] 97 %  BP: (105-124)/(55-70) 105/56     Weight: 66.3 kg (146 lb 0.9 oz)  Body mass index is 22.88 kg/m².  Body surface area is 1.77 meters squared.      Intake/Output Summary (Last 24 hours) at 07/25/18 0824  Last data filed at 07/24/18 1800   Gross per 24 hour   Intake           631.67 ml   Output                0 ml   Net           631.67 ml       Physical Exam   Constitutional: He is oriented to person, place, and time. He appears well-developed and well-nourished.   HENT:   Head: Normocephalic and atraumatic.   Eyes: EOM are normal. Pupils are equal, round, and reactive to light.   Neck: Normal range of motion.   Cardiovascular: Regular rhythm and normal heart sounds.    Abdominal: Soft. Bowel sounds are normal.   Musculoskeletal: Normal range of motion.   Neurological: He is alert and oriented to person, place, and time.   Skin: Skin is warm and dry.   Psychiatric: He has a normal mood and affect.       Significant Labs:   BMP:   Recent Labs  Lab 07/24/18  0355 07/25/18  0345   * 124*    141   K 4.5 4.2    105   CO2 26 29   BUN 5* 5*   CREATININE 0.8 0.7   CALCIUM 9.2 9.3   MG 1.9 2.0    and CBC:   Recent Labs  Lab 07/24/18  0355 07/25/18  0345   WBC 24.19* 24.42*   HGB 8.8* 8.8*   HCT 27.5* 27.1*    211       Diagnostic Results:  I have reviewed all pertinent imaging results/findings within the past 24 hours.    Assessment/Plan:     * Intractable vomiting with nausea    - likely to be due to opioid withdrawal   - symptomatic treatment with IVF and anti-emetic   - phenergan (1st) and Zofran (2nd) Zofran suppository (3rd)  - IVF   -gave IV decadron 4mg, and IV ativan 1mg for nausea management   -Reports no emesis overnight        Opioid withdrawal    - symptomatic care   - Bentyl 10 mg every 6 hours as needed for GI cramps  - Zofran or Promethazine  as needed for N/V           Diarrhea    - likely to be from Opioid withdrawal    -resolved             Leukocytosis    - likely to be 2/2 Neulasta vs steroids vs reactive leukocytoso  - pt does not look toxic, afebrile, Do not suspect infectious process but will remain cautious given immunocompromised status            Primary mediastinal seminoma    - see HPI for detailed Oncology history   - Testicular US 3/26/18 showed no testicular masses, HCG, LDH, AFP on 3/22/18 all normal.   - Bone scan on 3/28/18 was negative for bone mets.   - s/p cycle 4 of OP TESTICULAR EP protocol  - received Neulasta after last chemo cycle 7/11/18               Anthony Davis MD  Hematology/Oncology  [facility]        I have reviewed the notes, assessments, and/or procedures performed by the housestaff, as above.  I have personally interviewed and examined the patient at the beside, and rounded with the housestaff. I concur with her/his assessment and plan and the documentation of Alejandro Neff.  I, Dr. Walker Snell, personally spent more than  35 mins during this encounter, greater than 50% was spent in direct counseling and/or coordination of care.     Walker Snell M.D., M.S., F.A.C.P.  Hematology/Oncology Attending  [facility]

## 2018-07-25 NOTE — DISCHARGE SUMMARY
"[facility]  Hematology/Oncology  Discharge Summary      Patient Name: Alejandro Neff  MRN: [id number]  Admission Date: 7/22/2018  Hospital Length of Stay: 0 days  Discharge Date and Time: 7/25/2018  4:35 PM  Attending Physician: No att. providers found   Discharging Provider: Anthony Davis MD  Primary Care Provider: Nata Hernandez MD    HPI: 23 years old male with Hx of mediastinal seminoma presented to the ER 7/22/18 with a complaint of N/V  x1 week. Pt reports N/V and diarrhea for about one week. Reports that he is trying to wean himself off pain medication and Fentanyl patch. Reports everyday nausea and vomiting 4-8 times per days. Its associated with abdominal pain and diarrhea of watery stool 1-2 per day. Denies fever, chills, cough, chest pain, palpitation, dysuria, urgency or frequency.     Oncologic History:  1. Mr. Hampton is a 24 yo man who first presented with chest pain and underwent CT chest on 2/14/18, which showed a 6 x 3.5 cm mediastinal mass. It abuts the aorta and pulmonary artery. Biopsy was done on 3/1/18. Pathology (reviewed at [facility]) showed germ cell tumor, most consistent with seminoma.   2. HCG, LDH, AFP on 3/22/18 all normal.   3. Testicular US 3/26/18 showed no testicular masses. CT C/A/P on 3/26/18 showed "stable appearance of anterior mediastinal mass consistent with provided history of seminoma. Several punctate sclerotic foci are noted in the pelvis at the right pubic bone, right ischial tuberosity, and left ilium adjacent to the sacroiliac joint.  These are strongly favored to reflect benign bone islands although metastatic disease could have a similar appearance and close follow-up is recommended. Several benign Schmorl's nodes are noted in the thoracic spine." Bone scan on 3/28/18 was negative for bone mets.   4. Audiogram on 4/6/18 normal. Followed by ENT.   5. Given his smoking history, I recommended 4 cycles of EP. EP cycle 1 day 1 on " "4/16/18. Complicated by hospitalization for neutropenic fever 4/26-4/29. No source of infections identified. Treated with antibiotics empirically and improved.   6. Cycle 2 of EP started on on 5/7/18. Hospitalized during the first weekend for intractable nausea.   7. Restaging CT scan on 5/24/18 showed "Interval development of dilated appearance of the appendix with stranding of the periappendiceal fat.  Findings are worrisome for appendicitis". Patient had abdominal pain and tenderness. Admitted and got appendectomy on 5/24/18.  8. Cycle 3 of EP 6/11/18-6/15/18. Complicated by hospitalization for neutropenic fever 6/21-6/25  9. Cycle 4 of EP 7/2/18-7/11/18. Complicated by hospitalization for N/V 7/7-7/9.     * No surgery found *     Hospital Course: Mr. Hampton was admitted to the heme/onc floor due to uncontrollable N/V. He has a PMHx of mediastinal seminoma. He stated that his symptoms were most likely from his abrupt halting of all pain medication and that he was going through opiate withdrawals. Throughout his hospital stay, his nausea was treated with steroids, phenergan, zofran, and ativan. His nausea eventually came under control to where he was able to tolerate a full diet w/o any episodes of N/V.     Consults:     Significant Diagnostic Studies: Labs:   BMP:   Recent Labs  Lab 07/24/18  0355 07/25/18  0345   * 124*    141   K 4.5 4.2    105   CO2 26 29   BUN 5* 5*   CREATININE 0.8 0.7   CALCIUM 9.2 9.3   MG 1.9 2.0    and CMP   Recent Labs  Lab 07/24/18  0355 07/25/18  0345    141   K 4.5 4.2    105   CO2 26 29   * 124*   BUN 5* 5*   CREATININE 0.8 0.7   CALCIUM 9.2 9.3   PROT 5.7* 5.7*   ALBUMIN 3.5 3.5   BILITOT 0.3 0.3   ALKPHOS 91 88   AST 10 10   ALT 9* 9*   ANIONGAP 7* 7*   ESTGFRAFRICA >60.0 >60.0   EGFRNONAA >60.0 >60.0       Pending Diagnostic Studies:     Procedure Component Value Units Date/Time    CBC with Automated Differential [[id number]] Collected:  " 07/23/18 0530    Order Status:  Sent Lab Status:  No result     Specimen:  Blood from Blood         Final Active Diagnoses:    Diagnosis Date Noted POA    PRINCIPAL PROBLEM:  Intractable vomiting with nausea [R11.2] 07/23/2018 Yes    Opioid withdrawal [F11.23] 07/23/2018 Yes    Diarrhea [R19.7] 05/31/2018 Yes    Leukocytosis [D72.829] 05/15/2018 Yes    Primary mediastinal seminoma [C38.3] 03/01/2018 Yes      Problems Resolved During this Admission:    Diagnosis Date Noted Date Resolved POA      Discharged Condition: fair    Disposition: Home or Self Care    Follow Up:  Follow-up Information     Suha Rojas MD.    Specialty:  Hematology and Oncology  Why:  as scheduled by clinic  Contact information:  [address]  [address]  [facility] [place] [facility] [address] [phone]                 Patient Instructions:     Activity as tolerated       Medications:  Reconciled Home Medications:      Medication List      START taking these medications    dexamethasone 4 MG Tab  Commonly known as:  DECADRON  Take 1 tablet (4 mg total) by mouth once daily. for 5 days  Start taking on:  7/26/2018        CHANGE how you take these medications    ALPRAZolam 0.5 MG tablet  Commonly known as:  XANAX  Take 2 tablets (1 mg total) by mouth 3 (three) times daily as needed for Insomnia or Anxiety.  What changed:  reasons to take this     SANCUSO 3.1 mg/24 hour  Generic drug:  granisetron  Place 1 patch (3.1 mg total) onto the skin every 7 days.  What changed:  · how much to take  · when to take this        CONTINUE taking these medications    albuterol 90 mcg/actuation inhaler  Inhale 2 puffs into the lungs every 6 (six) hours as needed for Wheezing.     baclofen 10 MG tablet  Commonly known as:  LIORESAL  Take 1 tablet (10 mg total) by mouth 2 (two) times daily as needed for hiccups.     diazePAM 5 MG tablet  Commonly known as:  VALIUM  Take 1 tablet (5 mg total) by mouth every 8 (eight) hours as needed (chemotherapy related nausea).      diphenhydrAMINE-aluminum-magnesium hydroxide-simethicone-lidocaine HCl 2%  Swish and spit 15 mLs every 4 (four) hours as needed (mouth sore).     morphine 15 MG tablet  Commonly known as:  MSIR  Take 2 tablets (30 mg total) by mouth every 6 (six) hours as needed.     OLANZapine 10 MG tablet  Commonly known as:  ZyPREXA  Take 1 tablet (10 mg total) by mouth nightly. Take 1 tablet 10 mg nightly from day 1 of chemo till day 8 (continue 3 days after chemo)     pantoprazole 40 MG tablet  Commonly known as:  PROTONIX  Take 1 tablet (40 mg total) by mouth once daily.     polyethylene glycol 17 gram/dose powder  Commonly known as:  GLYCOLAX  Mix 1 capful (17grams) in liquid and take by mouth once daily        STOP taking these medications    fentaNYL 25 mcg/hr  Commonly known as:  DURAGESIC     ondansetron 4 MG tablet  Commonly known as:  ZOFRAN     promethazine 25 MG tablet  Commonly known as:  PHENERGAN            Anthony Davis MD  Hematology/Oncology  [facility]

## 2018-07-25 NOTE — HOSPITAL COURSE
Mr. Hampton was admitted to the heme/onc floor due to uncontrollable N/V. He has a PMHx of mediastinal seminoma. He stated that his symptoms were most likely from his abrupt halting of all pain medication and that he was going through opiate withdrawals. Throughout his hospital stay, his nausea was treated with steroids, phenergan, zofran, and ativan. His nausea eventually came under control to where he was able to tolerate a full diet w/o any episodes of N/V.

## 2018-07-25 NOTE — SUBJECTIVE & OBJECTIVE
Interval History: Patient had no acute events overnight. He was able to tolerate his dinner. Still some bouts of nausea but continues to take his anti-nausea medication. Patient reports diffuse abdominal pain but states that he feels better than he did yesterday. Will continue to monitor closely.     Oncology Treatment Plan:   OP TESTICULAR EP    Medications:  Continuous Infusions:  Scheduled Meds:   dexamethasone  4 mg Oral Daily    enoxaparin  40 mg Subcutaneous Daily    morphine  15 mg Oral Once    prochlorperazine  10 mg Oral TID    prochlorperazine  5 mg Oral Once     PRN Meds:acetaminophen, dextrose 50%, dextrose 50%, dicyclomine, glucagon (human recombinant), glucose, glucose, LORazepam, morphine, sodium chloride 0.9%     Review of Systems   Constitutional: Negative.    HENT: Negative.    Eyes: Negative.    Respiratory: Negative.    Cardiovascular: Negative.    Gastrointestinal: Positive for abdominal pain. Negative for nausea and vomiting.   Endocrine: Negative.    Musculoskeletal: Negative.    Skin: Negative.    Neurological: Negative.    Psychiatric/Behavioral: Negative.      Objective:     Vital Signs (Most Recent):  Temp: 98.5 °F (36.9 °C) (07/25/18 0415)  Pulse: (!) 49 (07/25/18 0415)  Resp: 15 (07/25/18 0415)  BP: (!) 105/56 (07/25/18 0415)  SpO2: 97 % (07/25/18 0415) Vital Signs (24h Range):  Temp:  [98.3 °F (36.8 °C)-99 °F (37.2 °C)] 98.5 °F (36.9 °C)  Pulse:  [49-97] 49  Resp:  [15-17] 15  SpO2:  [95 %-99 %] 97 %  BP: (105-124)/(55-70) 105/56     Weight: 66.3 kg (146 lb 0.9 oz)  Body mass index is 22.88 kg/m².  Body surface area is 1.77 meters squared.      Intake/Output Summary (Last 24 hours) at 07/25/18 0824  Last data filed at 07/24/18 1800   Gross per 24 hour   Intake           631.67 ml   Output                0 ml   Net           631.67 ml       Physical Exam   Constitutional: He is oriented to person, place, and time. He appears well-developed and well-nourished.   HENT:   Head:  Normocephalic and atraumatic.   Eyes: EOM are normal. Pupils are equal, round, and reactive to light.   Neck: Normal range of motion.   Cardiovascular: Regular rhythm and normal heart sounds.    Abdominal: Soft. Bowel sounds are normal.   Musculoskeletal: Normal range of motion.   Neurological: He is alert and oriented to person, place, and time.   Skin: Skin is warm and dry.   Psychiatric: He has a normal mood and affect.       Significant Labs:   BMP:   Recent Labs  Lab 07/24/18  0355 07/25/18  0345   * 124*    141   K 4.5 4.2    105   CO2 26 29   BUN 5* 5*   CREATININE 0.8 0.7   CALCIUM 9.2 9.3   MG 1.9 2.0    and CBC:   Recent Labs  Lab 07/24/18  0355 07/25/18  0345   WBC 24.19* 24.42*   HGB 8.8* 8.8*   HCT 27.5* 27.1*    211       Diagnostic Results:  I have reviewed all pertinent imaging results/findings within the past 24 hours.

## 2018-07-25 NOTE — ASSESSMENT & PLAN NOTE
- likely to be due to opioid withdrawal   - symptomatic treatment with IVF and anti-emetic   - phenergan (1st) and Zofran (2nd) Zofran suppository (3rd)  - IVF   -gave IV decadron 4mg, and IV ativan 1mg for nausea management   -Reports no emesis overnight

## 2018-07-28 LAB
BACTERIA BLD CULT: NORMAL
BACTERIA BLD CULT: NORMAL

## 2018-07-30 ENCOUNTER — HOSPITAL ENCOUNTER (OUTPATIENT)
Dept: RADIOLOGY | Facility: OTHER | Age: 23
Discharge: HOME | End: 2018-07-30
Attending: INTERNAL MEDICINE
Payer: COMMERCIAL

## 2018-07-30 ENCOUNTER — LAB VISIT (OUTPATIENT)
Dept: LAB | Facility: OTHER | Age: 23
End: 2018-07-30
Attending: INTERNAL MEDICINE
Payer: COMMERCIAL

## 2018-07-30 ENCOUNTER — INFUSION (OUTPATIENT)
Dept: INFUSION THERAPY | Facility: OTHER | Age: 23
End: 2018-07-30
Attending: INTERNAL MEDICINE
Payer: COMMERCIAL

## 2018-07-30 DIAGNOSIS — R73.9 HYPERGLYCEMIA: ICD-10-CM

## 2018-07-30 DIAGNOSIS — C38.3 PRIMARY MEDIASTINAL SEMINOMA: ICD-10-CM

## 2018-07-30 DIAGNOSIS — R11.0 CHEMOTHERAPY-INDUCED NAUSEA: Primary | ICD-10-CM

## 2018-07-30 DIAGNOSIS — T45.1X5A CHEMOTHERAPY-INDUCED NAUSEA: Primary | ICD-10-CM

## 2018-07-30 LAB
AFP SERPL-MCNC: 1.9 NG/ML
ALBUMIN SERPL BCP-MCNC: 4.5 G/DL
ALP SERPL-CCNC: 57 U/L
ALT SERPL W/O P-5'-P-CCNC: 24 U/L
ANION GAP SERPL CALC-SCNC: 12 MMOL/L
AST SERPL-CCNC: 17 U/L
BILIRUB SERPL-MCNC: 0.4 MG/DL
BUN SERPL-MCNC: 15 MG/DL
CALCIUM SERPL-MCNC: 9.9 MG/DL
CHLORIDE SERPL-SCNC: 106 MMOL/L
CO2 SERPL-SCNC: 24 MMOL/L
CREAT SERPL-MCNC: 0.8 MG/DL
ERYTHROCYTE [DISTWIDTH] IN BLOOD BY AUTOMATED COUNT: 18.7 %
EST. GFR  (AFRICAN AMERICAN): >60 ML/MIN/1.73 M^2
EST. GFR  (NON AFRICAN AMERICAN): >60 ML/MIN/1.73 M^2
ESTIMATED AVG GLUCOSE: 88 MG/DL
GLUCOSE SERPL-MCNC: 97 MG/DL
HBA1C MFR BLD HPLC: 4.7 %
HCG INTACT+B SERPL-ACNC: <1.2 MIU/ML
HCT VFR BLD AUTO: 32.8 %
HGB BLD-MCNC: 10.3 G/DL
LDH SERPL L TO P-CCNC: 231 U/L
MCH RBC QN AUTO: 28.9 PG
MCHC RBC AUTO-ENTMCNC: 31.4 G/DL
MCV RBC AUTO: 92 FL
NEUTROPHILS # BLD AUTO: 2.5 K/UL
PLATELET # BLD AUTO: 174 K/UL
PMV BLD AUTO: 8.2 FL
POTASSIUM SERPL-SCNC: 4.4 MMOL/L
PROT SERPL-MCNC: 7 G/DL
RBC # BLD AUTO: 3.57 M/UL
SODIUM SERPL-SCNC: 142 MMOL/L
WBC # BLD AUTO: 4.15 K/UL

## 2018-07-30 PROCEDURE — A4216 STERILE WATER/SALINE, 10 ML: HCPCS | Performed by: INTERNAL MEDICINE

## 2018-07-30 PROCEDURE — 96523 IRRIG DRUG DELIVERY DEVICE: CPT

## 2018-07-30 PROCEDURE — 85027 COMPLETE CBC AUTOMATED: CPT

## 2018-07-30 PROCEDURE — 84702 CHORIONIC GONADOTROPIN TEST: CPT

## 2018-07-30 PROCEDURE — 25000003 PHARM REV CODE 250: Performed by: INTERNAL MEDICINE

## 2018-07-30 PROCEDURE — 25500020 PHARM REV CODE 255: Performed by: INTERNAL MEDICINE

## 2018-07-30 PROCEDURE — 71260 CT THORAX DX C+: CPT | Mod: 26,,, | Performed by: RADIOLOGY

## 2018-07-30 PROCEDURE — 74177 CT ABD & PELVIS W/CONTRAST: CPT | Mod: 26,,, | Performed by: RADIOLOGY

## 2018-07-30 PROCEDURE — 36415 COLL VENOUS BLD VENIPUNCTURE: CPT

## 2018-07-30 PROCEDURE — 80053 COMPREHEN METABOLIC PANEL: CPT

## 2018-07-30 PROCEDURE — 74177 CT ABD & PELVIS W/CONTRAST: CPT | Mod: TC

## 2018-07-30 PROCEDURE — 82105 ALPHA-FETOPROTEIN SERUM: CPT

## 2018-07-30 PROCEDURE — 83615 LACTATE (LD) (LDH) ENZYME: CPT

## 2018-07-30 PROCEDURE — 83036 HEMOGLOBIN GLYCOSYLATED A1C: CPT

## 2018-07-30 PROCEDURE — 63600175 PHARM REV CODE 636 W HCPCS: Performed by: INTERNAL MEDICINE

## 2018-07-30 RX ORDER — SODIUM CHLORIDE 0.9 % (FLUSH) 0.9 %
10 SYRINGE (ML) INJECTION
Status: CANCELLED | OUTPATIENT
Start: 2018-07-30

## 2018-07-30 RX ORDER — HEPARIN 100 UNIT/ML
500 SYRINGE INTRAVENOUS
Status: COMPLETED | OUTPATIENT
Start: 2018-07-30 | End: 2018-07-30

## 2018-07-30 RX ORDER — HEPARIN 100 UNIT/ML
500 SYRINGE INTRAVENOUS
Status: CANCELLED | OUTPATIENT
Start: 2018-07-30

## 2018-07-30 RX ORDER — SODIUM CHLORIDE 0.9 % (FLUSH) 0.9 %
10 SYRINGE (ML) INJECTION
Status: COMPLETED | OUTPATIENT
Start: 2018-07-30 | End: 2018-07-30

## 2018-07-30 RX ADMIN — IOHEXOL 75 ML: 350 INJECTION, SOLUTION INTRAVENOUS at 11:07

## 2018-07-30 RX ADMIN — HEPARIN 500 UNITS: 100 SYRINGE at 11:07

## 2018-07-30 RX ADMIN — IOHEXOL 30 ML: 350 INJECTION, SOLUTION INTRAVENOUS at 11:07

## 2018-07-30 RX ADMIN — SODIUM CHLORIDE, PRESERVATIVE FREE 10 ML: 5 INJECTION INTRAVENOUS at 11:07

## 2018-07-31 ENCOUNTER — OFFICE VISIT (OUTPATIENT)
Dept: HEMATOLOGY/ONCOLOGY | Facility: CLINIC | Age: 23
End: 2018-07-31
Payer: COMMERCIAL

## 2018-07-31 VITALS
HEIGHT: 67 IN | WEIGHT: 141.75 LBS | RESPIRATION RATE: 16 BRPM | TEMPERATURE: 99 F | HEART RATE: 103 BPM | BODY MASS INDEX: 22.25 KG/M2 | OXYGEN SATURATION: 99 % | DIASTOLIC BLOOD PRESSURE: 57 MMHG | SYSTOLIC BLOOD PRESSURE: 100 MMHG

## 2018-07-31 DIAGNOSIS — D64.81 ANEMIA DUE TO CHEMOTHERAPY: ICD-10-CM

## 2018-07-31 DIAGNOSIS — T45.1X5A ANEMIA DUE TO CHEMOTHERAPY: ICD-10-CM

## 2018-07-31 DIAGNOSIS — C38.3 PRIMARY MEDIASTINAL SEMINOMA: Primary | ICD-10-CM

## 2018-07-31 DIAGNOSIS — F11.93 OPIOID WITHDRAWAL: ICD-10-CM

## 2018-07-31 DIAGNOSIS — C80.1 GERM CELL TUMOR: ICD-10-CM

## 2018-07-31 PROCEDURE — 99999 PR PBB SHADOW E&M-EST. PATIENT-LVL III: CPT | Mod: PBBFAC,,, | Performed by: INTERNAL MEDICINE

## 2018-07-31 PROCEDURE — 3008F BODY MASS INDEX DOCD: CPT | Mod: CPTII,S$GLB,, | Performed by: INTERNAL MEDICINE

## 2018-07-31 PROCEDURE — 99214 OFFICE O/P EST MOD 30 MIN: CPT | Mod: S$GLB,,, | Performed by: INTERNAL MEDICINE

## 2018-07-31 NOTE — Clinical Note
RTC in 2 months with CBC, CMP, LDH, AFP, HCG, and PET scan the previous Friday prior to return visit Schedule for port flush appointment after office visit

## 2018-07-31 NOTE — PROGRESS NOTES
"Subjective:      Patient ID: Alejandro Neff is a 23 y.o. male.     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 Mr. Hampton is a 24 yo man who first presented with chest pain and underwent CT chest on 2/14/18, which showed a 6 x 3.5 cm mediastinal mass. It abuts the aorta and pulmonary artery. Biopsy was done on 3/1/18. Pathology (reviewed at [facility]) showed germ cell tumor, most consistent with seminoma.   2. HCG, LDH, AFP on 3/22/18 all normal.   3. Testicular US 3/26/18 showed no testicular masses. CT C/A/P on 3/26/18 showed "stable appearance of anterior mediastinal mass consistent with provided history of seminoma. Several punctate sclerotic foci are noted in the pelvis at the right pubic bone, right ischial tuberosity, and left ilium adjacent to the sacroiliac joint.  These are strongly favored to reflect benign bone islands although metastatic disease could have a similar appearance and close follow-up is recommended. Several benign Schmorl's nodes are noted in the thoracic spine." Bone scan on 3/28/18 was negative for bone mets.   4. Audiogram on 4/6/18 normal. Followed by ENT.   5. Given his smoking history, I recommended 4 cycles of EP. EP cycle 1 day 1 on 4/16/18. Complicated by hospitalization for neutropenic fever 4/26-4/29. No source of infections identified. Treated with antibiotics empirically and improved.   6. Cycle 2 of EP started on on 5/7/18. Hospitalized during the first weekend for intractable nausea.   7. Restaging CT scan on 5/24/18 showed "Interval development of dilated appearance of the appendix with stranding of the periappendiceal fat.  Findings are worrisome for appendicitis". Patient had abdominal pain and tenderness. Admitted and got appendectomy on 5/24/18.  8. Cycle 3 of EP 6/11/18-6/15/18. Complicated by hospitalization for neutropenic fever 6/21-6/25  9. Cycle 4 of EP " 18-18. Complicated by hospitalization for N/V -. Again hospitalized - for intractable N/V which patient feels is from opioid withdrawal      INTERVAL HISTORY:   Mr. Hampton returns today for follow up of primary mediastinal seminoma. Finally finished chemotherapy on . Hospitalized - for intractable N/V which patient feels is from opioid withdrawal. He is doing much better now. Taking short acting morphine twice a day. Doing well. Back to work. He is planning to take off for the upcoming trimester and start school in Spring 2019.      ECO     ROS:   A ten point system review is obtained and neg except for what was stated in the interval history     Physical Examination:   Vital signs reviewed.   Gen: well hydrated, well developed, in no acute distress. Looks tired.   HEENT: normocephalic, anicteric sclerae, PERRLA, EOMI, oropharynx clear  Neck: supple, no JVD, thyromegaly, cervical or supraclavicular LAD  Lungs: CTAB, no wheezes or rales. Port site on chest clean.   Heart: RRR, no M/R/G  Abdomen: soft, non-distended, nontender, no hepatosplenomegaly, mass, or hernia. BS present  Ext: no clubbing, cyanosis, or edema  Neuro: alert and oriented x 4, no focal neuro deficit  Skin: no rash, erythema, open wound or ulcers  Psych: pleasant and appropriate mood and affect        Objective:      Laboratory Data:  Labs reviewed. Unremarkable. Tumor markers are completely normal    Imaging Data:    CT on 18  Continued interval decrease in size in the anterior mediastinal mass now measuring 2.5 x 1.3 cm, previously 2.8 x 1.7 cm.  No new lesions are identified.  No significant detrimental change from prior exam.     Assessment/Plan:      1. Primary mediastinal seminoma    2. Germ cell tumor    3. Anemia due to chemotherapy    4. Opioid withdrawal        1-2  - doing well  - finished 4 cycles of EP on 18  - tumor markers completely normal  - CT scan showed continued decrease in size of  his mediastinal tumor, now 2.5 x 1.3 cm     3  - stable. monitor     4.  - doing well. Only on short acting morphine twice a day    Plan:  - RTC in 2 months with labs and PET scan         Answers for HPI/ROS submitted by the patient on 7/29/2018   appetite change : No  unexpected weight change: Yes  visual disturbance: No  cough: No  shortness of breath: No  chest pain: No  abdominal pain: Yes  diarrhea: No  frequency: Yes  back pain: Yes  rash: No  headaches: No  adenopathy: No  nervous/ anxious: No

## 2018-08-10 ENCOUNTER — PATIENT MESSAGE (OUTPATIENT)
Dept: HEMATOLOGY/ONCOLOGY | Facility: CLINIC | Age: 23
End: 2018-08-10

## 2018-08-10 PROCEDURE — 99284 EMERGENCY DEPT VISIT MOD MDM: CPT | Mod: ,,, | Performed by: EMERGENCY MEDICINE

## 2018-08-10 PROCEDURE — 99284 EMERGENCY DEPT VISIT MOD MDM: CPT | Mod: 25

## 2018-08-11 ENCOUNTER — HOSPITAL ENCOUNTER (EMERGENCY)
Facility: HOSPITAL | Age: 23
Discharge: HOME | End: 2018-08-11
Attending: EMERGENCY MEDICINE
Payer: COMMERCIAL

## 2018-08-11 VITALS
HEART RATE: 63 BPM | TEMPERATURE: 99 F | BODY MASS INDEX: 21.87 KG/M2 | DIASTOLIC BLOOD PRESSURE: 55 MMHG | OXYGEN SATURATION: 98 % | SYSTOLIC BLOOD PRESSURE: 94 MMHG | RESPIRATION RATE: 16 BRPM | HEIGHT: 67 IN | WEIGHT: 139.31 LBS

## 2018-08-11 DIAGNOSIS — R11.2 NON-INTRACTABLE VOMITING WITH NAUSEA, UNSPECIFIED VOMITING TYPE: Primary | ICD-10-CM

## 2018-08-11 LAB
ALBUMIN SERPL BCP-MCNC: 4.5 G/DL
ALP SERPL-CCNC: 49 U/L
ALT SERPL W/O P-5'-P-CCNC: 22 U/L
ANION GAP SERPL CALC-SCNC: 11 MMOL/L
AST SERPL-CCNC: 17 U/L
BASOPHILS # BLD AUTO: 0.01 K/UL
BASOPHILS NFR BLD: 0.2 %
BILIRUB SERPL-MCNC: 1 MG/DL
BILIRUB UR QL STRIP: NEGATIVE
BUN SERPL-MCNC: 14 MG/DL
CALCIUM SERPL-MCNC: 10.1 MG/DL
CHLORIDE SERPL-SCNC: 104 MMOL/L
CLARITY UR REFRACT.AUTO: CLEAR
CO2 SERPL-SCNC: 24 MMOL/L
COLOR UR AUTO: YELLOW
CREAT SERPL-MCNC: 0.7 MG/DL
DIFFERENTIAL METHOD: ABNORMAL
EOSINOPHIL # BLD AUTO: 0 K/UL
EOSINOPHIL NFR BLD: 0.2 %
ERYTHROCYTE [DISTWIDTH] IN BLOOD BY AUTOMATED COUNT: 16.6 %
EST. GFR  (AFRICAN AMERICAN): >60 ML/MIN/1.73 M^2
EST. GFR  (NON AFRICAN AMERICAN): >60 ML/MIN/1.73 M^2
GLUCOSE SERPL-MCNC: 74 MG/DL
GLUCOSE UR QL STRIP: NEGATIVE
HCT VFR BLD AUTO: 33.1 %
HGB BLD-MCNC: 11.1 G/DL
HGB UR QL STRIP: NEGATIVE
IMM GRANULOCYTES # BLD AUTO: 0.01 K/UL
IMM GRANULOCYTES NFR BLD AUTO: 0.2 %
KETONES UR QL STRIP: ABNORMAL
LEUKOCYTE ESTERASE UR QL STRIP: NEGATIVE
LYMPHOCYTES # BLD AUTO: 1.3 K/UL
LYMPHOCYTES NFR BLD: 24.3 %
MAGNESIUM SERPL-MCNC: 1.8 MG/DL
MCH RBC QN AUTO: 30.9 PG
MCHC RBC AUTO-ENTMCNC: 33.5 G/DL
MCV RBC AUTO: 92 FL
MONOCYTES # BLD AUTO: 0.7 K/UL
MONOCYTES NFR BLD: 13.2 %
NEUTROPHILS # BLD AUTO: 3.3 K/UL
NEUTROPHILS NFR BLD: 61.9 %
NITRITE UR QL STRIP: NEGATIVE
NRBC BLD-RTO: 0 /100 WBC
PH UR STRIP: 6 [PH] (ref 5–8)
PHOSPHATE SERPL-MCNC: 3.5 MG/DL
PLATELET # BLD AUTO: 186 K/UL
PMV BLD AUTO: 8.8 FL
POTASSIUM SERPL-SCNC: 3.6 MMOL/L
PROT SERPL-MCNC: 6.6 G/DL
PROT UR QL STRIP: NEGATIVE
RBC # BLD AUTO: 3.59 M/UL
SODIUM SERPL-SCNC: 139 MMOL/L
SP GR UR STRIP: 1.01 (ref 1–1.03)
URN SPEC COLLECT METH UR: ABNORMAL
UROBILINOGEN UR STRIP-ACNC: NEGATIVE EU/DL
WBC # BLD AUTO: 5.36 K/UL

## 2018-08-11 PROCEDURE — 25000003 PHARM REV CODE 250: Performed by: NURSE PRACTITIONER

## 2018-08-11 PROCEDURE — 63600175 PHARM REV CODE 636 W HCPCS: Performed by: NURSE PRACTITIONER

## 2018-08-11 PROCEDURE — 96375 TX/PRO/DX INJ NEW DRUG ADDON: CPT

## 2018-08-11 PROCEDURE — 83735 ASSAY OF MAGNESIUM: CPT

## 2018-08-11 PROCEDURE — 85025 COMPLETE CBC W/AUTO DIFF WBC: CPT

## 2018-08-11 PROCEDURE — 80053 COMPREHEN METABOLIC PANEL: CPT

## 2018-08-11 PROCEDURE — 63600175 PHARM REV CODE 636 W HCPCS: Performed by: STUDENT IN AN ORGANIZED HEALTH CARE EDUCATION/TRAINING PROGRAM

## 2018-08-11 PROCEDURE — 81003 URINALYSIS AUTO W/O SCOPE: CPT

## 2018-08-11 PROCEDURE — 84100 ASSAY OF PHOSPHORUS: CPT

## 2018-08-11 PROCEDURE — 63600175 PHARM REV CODE 636 W HCPCS: Performed by: EMERGENCY MEDICINE

## 2018-08-11 PROCEDURE — 96365 THER/PROPH/DIAG IV INF INIT: CPT

## 2018-08-11 RX ORDER — HEPARIN SODIUM,PORCINE 10 UNIT/ML
10 VIAL (ML) INTRAVENOUS ONCE
Status: DISCONTINUED | OUTPATIENT
Start: 2018-08-11 | End: 2018-08-11

## 2018-08-11 RX ORDER — PROMETHAZINE HYDROCHLORIDE 12.5 MG/1
12.5 TABLET ORAL 4 TIMES DAILY
Qty: 5 TABLET | Refills: 0 | Status: SHIPPED | OUTPATIENT
Start: 2018-08-11 | End: 2019-08-12 | Stop reason: SDUPTHER

## 2018-08-11 RX ORDER — KETOROLAC TROMETHAMINE 30 MG/ML
10 INJECTION, SOLUTION INTRAMUSCULAR; INTRAVENOUS
Status: COMPLETED | OUTPATIENT
Start: 2018-08-11 | End: 2018-08-11

## 2018-08-11 RX ORDER — HEPARIN 100 UNIT/ML
3 SYRINGE INTRAVENOUS ONCE
Status: COMPLETED | OUTPATIENT
Start: 2018-08-11 | End: 2018-08-11

## 2018-08-11 RX ORDER — HEPARIN SODIUM 1000 [USP'U]/ML
1000 INJECTION, SOLUTION INTRAVENOUS; SUBCUTANEOUS
Status: DISCONTINUED | OUTPATIENT
Start: 2018-08-11 | End: 2018-08-11

## 2018-08-11 RX ADMIN — KETOROLAC TROMETHAMINE 10 MG: 30 INJECTION, SOLUTION INTRAMUSCULAR at 03:08

## 2018-08-11 RX ADMIN — HEPARIN SODIUM (PORCINE) LOCK FLUSH IV SOLN 100 UNIT/ML 300 UNITS: 100 SOLUTION at 07:08

## 2018-08-11 RX ADMIN — PROMETHAZINE HYDROCHLORIDE 25 MG: 25 INJECTION INTRAMUSCULAR; INTRAVENOUS at 01:08

## 2018-08-11 RX ADMIN — SODIUM CHLORIDE 1000 ML: 0.9 INJECTION, SOLUTION INTRAVENOUS at 01:08

## 2018-08-11 NOTE — ED PROVIDER NOTES
Encounter Date: 8/10/2018       History     Chief Complaint   Patient presents with    Drug / Alcohol Assessment     Patient states that he is withdrawing from morphine. States that last dose was 48hrs ago. Patient states that he has been vomiting and fatigued. Denies HI/SI      Patient is a 31-year-old male with medical history of testicular cancer, mediastinal seminoma presenting to the ED for nausea, vomiting, fatigue and body aches over the past few days.  Patient states he recently stopped taking his morphine and is attempting to detox himself.  Patient states today he used multiple doses of Phenergan and CBD or to for the nausea but symptoms did not resolve.  Patient denies any fever, chills, chest pain or dizziness.          Review of patient's allergies indicates:   Allergen Reactions    Mushroom Anaphylaxis    Bamboo     Bee pollens     Mushroom flavor     Venom-honey bee      Past Medical History:   Diagnosis Date    Abdominal pain 08/12/2010    eval for abd and chest wall pain at [facility], [place], [place] - cxr wnl, EKG ([facility]), troponin wnl, normal chemistries    Allergy     Cancer     testicular    Chondromalacia patellae     Chronic nausea 2015    eval by GI Dr. Tushar Artis - given zofran and ciproheptadine     Chronic pain     Chronic pain     [facility] - no meds; followed by psych and pain clinic and unresponsive to behavioral/CBT/old records reports c/f conversion disorder     Foot pain 04/2010    4/10 + SHIRA, eval by Dr. ALLY Lion at [facility] -dx unclear ? gout and trial of nsaids and pdn, xrays normal 1/11, referred to podiatry, re-eval by rheum 2/12 and MRI and films wnl    Fracture of right radius 09/2009    Lyme arthritis     multiple joints    Lyme disease 2013    Memory loss 03/2012    eval for memory loss by neuro at [facility] - MRI, EEG wnl. Dr. Patricio suggested emotional factors & ref to Tushar Davies, PhD for   "eval & neuropsych eval 3/12 Lupe Delgado, PhD - no evidence of formal thought disorder or psychosis - documented severe memory impairment; not related to to ADD or executive function issues. sugesstion that memory issues may be related to "emotional factors", ?conversion d/o    Stress fracture of tibia and fibula 08/2011    Urticaria      Past Surgical History:   Procedure Laterality Date    APPENDECTOMY      CHEST WALL BIOPSY      INJECTION OF ANESTHETIC AGENT AROUND NERVE N/A 5/29/2018    Procedure: BLOCK, NERVE;  Surgeon: Raiza Surgeon;  Location: [facility];  Service: Anesthesiology;  Laterality: N/A;    LAPAROSCOPIC APPENDECTOMY N/A 5/24/2018    Procedure: APPENDECTOMY, LAPAROSCOPIC;  Surgeon: Kenan Huang Jr., MD;  Location: [facility] OR [address] [place] [address];  Service: General;  Laterality: N/A;    PORTACATH PLACEMENT Right      Family History   Problem Relation Age of Onset    Arthritis Mother     Other Mother         benign tumor of brain and spinal cord    Hyperlipidemia Father     Gout Father     No Known Problems Sister     Arthritis Sister         shoulder    Depression Sister      Social History   Substance Use Topics    Smoking status: Former Smoker     Packs/day: 0.25     Types: Vaping with nicotine, Vaping w/o nicotine, Cigarettes, Cigars     Quit date: 3/15/2018    Smokeless tobacco: Former User    Alcohol use No     Review of Systems   Constitutional: Positive for appetite change and fatigue. Negative for chills and fever.   HENT: Negative for sore throat.    Respiratory: Negative for cough, chest tightness, shortness of breath and wheezing.    Cardiovascular: Negative for chest pain and palpitations.   Gastrointestinal: Positive for abdominal pain and nausea. Negative for constipation, diarrhea and vomiting.   Genitourinary: Negative for decreased urine volume, difficulty urinating, dysuria, flank pain and urgency.   Musculoskeletal: Positive for myalgias ( generalized). Negative for back " pain, neck pain and neck stiffness.   Skin: Positive for pallor. Negative for rash.   Neurological: Positive for weakness. Negative for dizziness, tremors, seizures, syncope, numbness and headaches.   Hematological: Does not bruise/bleed easily.       Physical Exam     Initial Vitals [08/10/18 2244]   BP Pulse Resp Temp SpO2   121/70 106 18 99.4 °F (37.4 °C) 100 %      MAP       --         Physical Exam    Nursing note and vitals reviewed.  Constitutional: Vital signs are normal. He appears well-developed and well-nourished. He is not diaphoretic. He is cooperative. He does not have a sickly appearance. He does not appear ill. No distress.   HENT:   Head: Normocephalic and atraumatic.   Mouth/Throat: Uvula is midline. Mucous membranes are dry. Posterior oropharyngeal erythema present.   Eyes: Conjunctivae, EOM and lids are normal. Pupils are equal, round, and reactive to light.   Neck: Trachea normal, normal range of motion, full passive range of motion without pain and phonation normal. Neck supple. No spinous process tenderness and no muscular tenderness present. No JVD present.   Cardiovascular: Normal rate, regular rhythm and normal heart sounds.   Pulses:       Radial pulses are 2+ on the right side, and 2+ on the left side.        Dorsalis pedis pulses are 2+ on the right side, and 2+ on the left side.   Pulmonary/Chest: Effort normal and breath sounds normal.   Abdominal: Soft. Normal appearance and bowel sounds are normal. There is no tenderness. There is no rigidity, no rebound and no guarding.   Musculoskeletal: Normal range of motion.        Right upper leg: He exhibits tenderness. He exhibits no bony tenderness.        Left upper leg: He exhibits tenderness. He exhibits no bony tenderness.   Neurological: He is alert and oriented to person, place, and time. He has normal strength. No cranial nerve deficit or sensory deficit. GCS eye subscore is 4. GCS verbal subscore is 5. GCS motor subscore is 6.    Skin: Skin is warm, dry and intact. Capillary refill takes less than 2 seconds. No abrasion, no laceration and no rash noted. No cyanosis. There is pallor. Nails show no clubbing.   Psychiatric: He has a normal mood and affect. His speech is normal and behavior is normal. Judgment and thought content normal. Cognition and memory are normal.         ED Course   Procedures  Labs Reviewed   CBC W/ AUTO DIFFERENTIAL - Abnormal; Notable for the following components:       Result Value    RBC 3.59 (*)     Hemoglobin 11.1 (*)     Hematocrit 33.1 (*)     RDW 16.6 (*)     MPV 8.8 (*)     All other components within normal limits   COMPREHENSIVE METABOLIC PANEL - Abnormal; Notable for the following components:    Alkaline Phosphatase 49 (*)     All other components within normal limits   URINALYSIS, REFLEX TO URINE CULTURE - Abnormal; Notable for the following components:    Ketones, UA 3+ (*)     All other components within normal limits    Narrative:     rec'd 1 cup  Preferred Collection Type->Urine, Clean Catch   MAGNESIUM   PHOSPHORUS          Imaging Results    None                APC / Resident Notes:   Emergent evaluation of a 22 yo male patient presenting to the ER with chief complaint of generalized body aches, nausea and abdominal pain since with detoxing from morphine.  Patient states he recently stopped taking his morphine due to wanting be off pain medications.  Patient states since that time he has had increased muscle aches and abdominal pain. On exam, pt A&O x3. Patient pale on exam.  Pupils equal round reactive 3-2.  Abdomen soft and nontender.  I will get labs, hydrate, medicate and reassess.    Differential diagnoses include but are not limited to withdrawal,  PUD, gastritis, gastroesophageal reflux, acute cholecystitis, biliary colic, pancreatitis, hepatitis, transverse colitis, atypical appendicitis. I discussed the care of this patient with my Supervising Physician.      Patient's CBC and CMP  unremarkable. Mag 1.6 with a phos of 3.5.          AOC: I assumed care of this patient from Jeanette Cruz under the continued supervision of attending Dr. Padilla while the patient was active pending re-assessment and PO challenge after phenergan and 1L NS. I will continue the plan and re-assess for any changes.   Adan Coronel MD  PGY-3 [employer] EM  08/11/2018 1:58AM    Update:   Pt is resting comfortably in bed and cognitively intact upon arousal. He is hemodynamically stable and is in no acute distress. He is tolerating PO fluids and has not required any further anti-emetic administration. Will discharge to home with return precautions and instruction for close PCP f/u. Pt aware of plan and in agreement.  Adan Coronel MD  PGY-3 [employer] EM  08/11/2018  5:31AM        Attending Attestation:   Physician Attestation Statement for Resident:  As the supervising MD   Physician Attestation Statement: I have personally seen and examined this patient.   I agree with the above history. -:   As the supervising MD I agree with the above PE.    As the supervising MD I agree with the above treatment, course, plan, and disposition.                       Clinical Impression:   The encounter diagnosis was Non-intractable vomiting with nausea, unspecified vomiting type.      Disposition:   Disposition: Discharged  Condition: Stable                        Adan Coronel MD  Resident  08/11/18 0532       Margarito Padilla III, MD  08/30/18 0233

## 2018-08-11 NOTE — ED TRIAGE NOTES
Pt presents to ED c/o opioid withdrawal. Pt reports that he was taking Morphine and had his last dose 3 days ago. Reports that he has withdrawn from Fentanyl in the past. Pt reports nausea, vomiting, CP, and SOB. Pt has hx of testicular cancer, last chemo treatment was in July. Denies fever or chills.     LOC: Patient name and date of birth verified.  The patient is awake, alert and aware of environment with an appropriate affect, the patient is oriented x 3 and speaking appropriately.  Pt in NAD.    APPEARANCE: Patient resting comfortably and in no acute distress, patient is clean and well groomed, patient's clothing is properly fastened.  SKIN: The skin is warm and dry, color consistent with ethnicity, patient has normal skin turgor and moist mucus membranes, skin intact, no breakdown or brusing noted.  MUSCULOSKELETAL: Patient moving all extremities well, no obvious swelling or deformities noted.  RESPIRATORY: Airway is open and patent, respirations are spontaneous, patient has a normal effort and rate, no accessory muscle use noted.  CARDIAC: Patient has a normal rate and rhythm, no periphreal edema noted, capillary refill < 3 seconds.  ABDOMEN: Soft and non tender to palpation, no distention noted. Bowel sounds present in all four quadrants.  NEUROLOGIC: Eyes open spontaneously, behavior appropriate to situation, follows commands, facial expression symmetrical, bilateral hand grasp equal and even, purposeful motor response noted, normal sensation in all extremities when touched with a finger.

## 2018-08-15 ENCOUNTER — PATIENT MESSAGE (OUTPATIENT)
Dept: HEMATOLOGY/ONCOLOGY | Facility: CLINIC | Age: 23
End: 2018-08-15

## 2018-08-15 DIAGNOSIS — K92.0 HEMATEMESIS WITH NAUSEA: Primary | ICD-10-CM

## 2018-08-16 ENCOUNTER — TELEPHONE (OUTPATIENT)
Dept: GASTROENTEROLOGY | Facility: CLINIC | Age: 23
End: 2018-08-16

## 2018-08-16 NOTE — TELEPHONE ENCOUNTER
----- Message from Kathrine Best sent at 8/16/2018  8:25 AM CDT -----  Contact: Dr. Rojas Office- [phone] or [phone]  Office called to schedule a np appt for the pt- being referred for Hematemesis with nausea [K92.0]- next available is too far out- please contact pt at [phone]

## 2018-08-16 NOTE — TELEPHONE ENCOUNTER
Patient's voicemail was full. Unable to leave message. Called ext for Dr Rojas's office. No answer.

## 2018-09-05 ENCOUNTER — PATIENT MESSAGE (OUTPATIENT)
Dept: HEMATOLOGY/ONCOLOGY | Facility: CLINIC | Age: 23
End: 2018-09-05

## 2018-09-23 NOTE — PROGRESS NOTES
"   Subjective:      Patient ID: Alejandro Neff is a 23 y.o. male.     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 Mr. Hampton is a 22 yo man who first presented with chest pain and underwent CT chest on 18, which showed a 6 x 3.5 cm mediastinal mass. It abuts the aorta and pulmonary artery. Biopsy was done on 3/1/18. Pathology (reviewed at [facility]) showed germ cell tumor, most consistent with seminoma.   2. HCG, LDH, AFP on 3/22/18 all normal.   3. Testicular US 3/26/18 showed no testicular masses. CT C/A/P on 3/26/18 showed "stable appearance of anterior mediastinal mass consistent with provided history of seminoma. Several punctate sclerotic foci are noted in the pelvis at the right pubic bone, right ischial tuberosity, and left ilium adjacent to the sacroiliac joint.  These are strongly favored to reflect benign bone islands although metastatic disease could have a similar appearance and close follow-up is recommended. Several benign Schmorl's nodes are noted in the thoracic spine." Bone scan on 3/28/18 was negative for bone mets.   4. Audiogram on 18 normal. Followed by ENT.   5. Given his smoking history, I recommended 4 cycles of EP. EP cycle 1 day 1 on 18. Complicated by hospitalization for neutropenic fever -. No source of infections identified. Treated with antibiotics empirically and improved.   6. Cycle 2 of EP started on on 18. Hospitalized during the first weekend for intractable nausea.      INTERVAL HISTORY:   Mr. Hampton returns today for follow up of primary mediastinal seminoma. He still feels tired, but denies fever, chills. Has been having lower abdominal pain over the past 2 weeks. Had diarrhea last  but has since resolved. Appetite is good. Gaining weight.     ECO     ROS:   A ten point system review is obtained and neg except for what was stated in the " interval history     Physical Examination:   Vital signs reviewed.   Gen: well hydrated, well developed, in no acute distress.  HEENT: normocephalic, anicteric sclerae, PERRLA, EOMI, oropharynx clear  Neck: supple, no JVD, thyromegaly, cervical or supraclavicular LAD  Lungs: CTAB, no wheezes or rales. Port site on chest clean.   Heart: RRR, no M/R/G  Abdomen: soft, non-distended, McBurney's sign positive. no hepatosplenomegaly, mass, or hernia. BS present  Ext: no clubbing, cyanosis, or edema  Neuro: alert and oriented x 4, no focal neuro deficit  Skin: no rash, erythema, open wound or ulcers  Psych: pleasant and appropriate mood and affect        Objective:         Laboratory Data:  Labs from today reviewed. Leukocytosis.     Assessment/Plan:      1. Primary mediastinal seminoma    2. Acute appendicitis with localized peritonitis    3. Leukocytosis, unspecified type    4. Anemia, unspecified type    5. Thrombocytopenia        1.  - Primary mediastinal seminoma, good risk disease. S/p 2 cycle of EP.  - Reviewed CT images with patient today. Interval decrease in the size of his tumor.   - However, he does have appendicitis now. McBurney's sign positive.  - will admit to hospital for surgery. Spoke with surgery on call. They are aware of the patient.   - RTC on 6/4.      2.  - manage as per #1     3.  - likely from appendicitis     4.  - mild. Stable. Monitor.      5.  - resolved. Monitor.                 urgency , frequency

## 2018-09-28 ENCOUNTER — LAB VISIT (OUTPATIENT)
Dept: LAB | Facility: HOSPITAL | Age: 23
End: 2018-09-28
Attending: INTERNAL MEDICINE
Payer: COMMERCIAL

## 2018-09-28 ENCOUNTER — HOSPITAL ENCOUNTER (OUTPATIENT)
Dept: RADIOLOGY | Facility: HOSPITAL | Age: 23
Discharge: HOME | End: 2018-09-28
Attending: INTERNAL MEDICINE
Payer: COMMERCIAL

## 2018-09-28 DIAGNOSIS — C38.3 PRIMARY MEDIASTINAL SEMINOMA: ICD-10-CM

## 2018-09-28 LAB
AFP SERPL-MCNC: 1.5 NG/ML
ALBUMIN SERPL BCP-MCNC: 4.6 G/DL
ALP SERPL-CCNC: 55 U/L
ALT SERPL W/O P-5'-P-CCNC: 22 U/L
ANION GAP SERPL CALC-SCNC: 8 MMOL/L
AST SERPL-CCNC: 17 U/L
BILIRUB SERPL-MCNC: 0.7 MG/DL
BUN SERPL-MCNC: 12 MG/DL
CALCIUM SERPL-MCNC: 10.1 MG/DL
CHLORIDE SERPL-SCNC: 105 MMOL/L
CO2 SERPL-SCNC: 29 MMOL/L
CREAT SERPL-MCNC: 1.1 MG/DL
ERYTHROCYTE [DISTWIDTH] IN BLOOD BY AUTOMATED COUNT: 12.7 %
EST. GFR  (AFRICAN AMERICAN): >60 ML/MIN/1.73 M^2
EST. GFR  (NON AFRICAN AMERICAN): >60 ML/MIN/1.73 M^2
GLUCOSE SERPL-MCNC: 89 MG/DL
HCG INTACT+B SERPL-ACNC: <1.2 MIU/ML
HCT VFR BLD AUTO: 41.7 %
HGB BLD-MCNC: 13.8 G/DL
IMM GRANULOCYTES # BLD AUTO: 0.01 K/UL
LDH SERPL L TO P-CCNC: 152 U/L
MCH RBC QN AUTO: 29.7 PG
MCHC RBC AUTO-ENTMCNC: 33.1 G/DL
MCV RBC AUTO: 90 FL
NEUTROPHILS # BLD AUTO: 2.2 K/UL
PLATELET # BLD AUTO: 178 K/UL
PMV BLD AUTO: 8.6 FL
POTASSIUM SERPL-SCNC: 4.1 MMOL/L
PROT SERPL-MCNC: 7 G/DL
RBC # BLD AUTO: 4.64 M/UL
SODIUM SERPL-SCNC: 142 MMOL/L
WBC # BLD AUTO: 4.94 K/UL

## 2018-09-28 PROCEDURE — 83615 LACTATE (LD) (LDH) ENZYME: CPT

## 2018-09-28 PROCEDURE — 82105 ALPHA-FETOPROTEIN SERUM: CPT

## 2018-09-28 PROCEDURE — 78815 PET IMAGE W/CT SKULL-THIGH: CPT | Mod: 26,PI,, | Performed by: RADIOLOGY

## 2018-09-28 PROCEDURE — 78815 PET IMAGE W/CT SKULL-THIGH: CPT | Mod: TC

## 2018-09-28 PROCEDURE — A9552 F18 FDG: HCPCS

## 2018-09-28 PROCEDURE — 85027 COMPLETE CBC AUTOMATED: CPT

## 2018-09-28 PROCEDURE — 84702 CHORIONIC GONADOTROPIN TEST: CPT

## 2018-09-28 PROCEDURE — 80053 COMPREHEN METABOLIC PANEL: CPT

## 2018-09-28 PROCEDURE — 36415 COLL VENOUS BLD VENIPUNCTURE: CPT

## 2018-09-28 NOTE — PROGRESS NOTES
"Subjective:      Patient ID: Alejandro Neff is a 23 y.o. male.     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 Mr. Hampton is a 24 yo man who first presented with chest pain and underwent CT chest on 2/14/18, which showed a 6 x 3.5 cm mediastinal mass. It abuts the aorta and pulmonary artery. Biopsy was done on 3/1/18. Pathology (reviewed at [facility]) showed germ cell tumor, most consistent with seminoma.   2. HCG, LDH, AFP on 3/22/18 all normal.   3. Testicular US 3/26/18 showed no testicular masses. CT C/A/P on 3/26/18 showed "stable appearance of anterior mediastinal mass consistent with provided history of seminoma. Several punctate sclerotic foci are noted in the pelvis at the right pubic bone, right ischial tuberosity, and left ilium adjacent to the sacroiliac joint.  These are strongly favored to reflect benign bone islands although metastatic disease could have a similar appearance and close follow-up is recommended. Several benign Schmorl's nodes are noted in the thoracic spine." Bone scan on 3/28/18 was negative for bone mets.   4. Audiogram on 4/6/18 normal. Followed by ENT.   5. Given his smoking history, I recommended 4 cycles of EP. EP cycle 1 day 1 on 4/16/18. Complicated by hospitalization for neutropenic fever 4/26-4/29. No source of infections identified. Treated with antibiotics empirically and improved.   6. Cycle 2 of EP started on on 5/7/18. Hospitalized during the first weekend for intractable nausea.   7. Restaging CT scan on 5/24/18 showed "Interval development of dilated appearance of the appendix with stranding of the periappendiceal fat.  Findings are worrisome for appendicitis". Patient had abdominal pain and tenderness. Admitted and got appendectomy on 5/24/18.  8. Cycle 3 of EP 6/11/18-6/15/18. Complicated by hospitalization for neutropenic fever 6/21-6/25  9. Cycle 4 of EP " 18-18. Complicated by hospitalization for N/V -. Again hospitalized - for intractable N/V which patient feels is from opioid withdrawal      INTERVAL HISTORY:   Mr. Hampton returns today for follow up of primary mediastinal seminoma. Completed 4 cycles of  on . Has done well. Feels tired but has been working 77 hours per week. No other complaints. Port flushed last week with PET scan.      ECO     ROS:   A ten point system review is obtained and neg except for what was stated in the interval history     Physical Examination:   Vital signs reviewed.   Gen: well hydrated, well developed, in no acute distress. Looks tired.   HEENT: normocephalic, anicteric sclerae, PERRLA, EOMI, oropharynx clear  Neck: supple, no JVD, thyromegaly, cervical or supraclavicular LAD  Lungs: CTAB, no wheezes or rales. Port site on chest clean.   Heart: RRR, no M/R/G  Abdomen: soft, non-distended, nontender, no hepatosplenomegaly, mass, or hernia. BS present  Ext: no clubbing, cyanosis, or edema  Neuro: alert and oriented x 4, no focal neuro deficit  Skin: no rash, erythema, open wound or ulcers  Psych: pleasant and appropriate mood and affect        Objective:      Laboratory Data:  Labs reviewed. Unremarkable. Tumor markers are completely normal     Imaging Data:    PET scan 18  There is physiologic intracranial, head, and neck activity.  Soft tissue activity in the anterior mediastinum SUV max 2.0 very low-grade configuration could be thymus.  There is a central line.  There is physiologic liver, spleen, pancreas, adrenal, GI  and bowel activity.  No retroperitoneal or pelvic adenopathy is seen.  Pelvic organs show nothing unusual.  Testicular activity normal.  The lungs are clear.  No suspicious bone lesions are seen.     Assessment/Plan:      1. Primary mediastinal seminoma    2. Germ cell tumor    3. Anemia associated with chemotherapy    4. Other chronic pain      1-2  - doing well  - finished 4  cycles of EP on 7/11/18  - PET scan did not show FDG-avid lesions  - Tumor markers normal  - Given residual lesion, will perform CT scan every 2 months for surveillance  - Discussed port removal. He would like to keep it for now. Will flush port on return in 2 months     3  - improved. monitor     4.  - doing well.     Plan:  - RTC in 2 months with labs and CT C/A/P

## 2018-10-01 ENCOUNTER — OFFICE VISIT (OUTPATIENT)
Dept: HEMATOLOGY/ONCOLOGY | Facility: CLINIC | Age: 23
End: 2018-10-01
Payer: COMMERCIAL

## 2018-10-01 VITALS
OXYGEN SATURATION: 99 % | DIASTOLIC BLOOD PRESSURE: 57 MMHG | SYSTOLIC BLOOD PRESSURE: 105 MMHG | HEIGHT: 67 IN | TEMPERATURE: 99 F | WEIGHT: 141.31 LBS | RESPIRATION RATE: 12 BRPM | HEART RATE: 78 BPM | BODY MASS INDEX: 22.18 KG/M2

## 2018-10-01 DIAGNOSIS — D64.81 ANEMIA ASSOCIATED WITH CHEMOTHERAPY: ICD-10-CM

## 2018-10-01 DIAGNOSIS — T45.1X5A ANEMIA ASSOCIATED WITH CHEMOTHERAPY: ICD-10-CM

## 2018-10-01 DIAGNOSIS — C80.1 GERM CELL TUMOR: ICD-10-CM

## 2018-10-01 DIAGNOSIS — G89.29 OTHER CHRONIC PAIN: ICD-10-CM

## 2018-10-01 DIAGNOSIS — C38.3 PRIMARY MEDIASTINAL SEMINOMA: Primary | ICD-10-CM

## 2018-10-01 LAB — POCT GLUCOSE: 64 MG/DL (ref 70–110)

## 2018-10-01 PROCEDURE — 99214 OFFICE O/P EST MOD 30 MIN: CPT | Mod: S$GLB,,, | Performed by: INTERNAL MEDICINE

## 2018-10-01 PROCEDURE — 3008F BODY MASS INDEX DOCD: CPT | Mod: CPTII,S$GLB,, | Performed by: INTERNAL MEDICINE

## 2018-10-01 PROCEDURE — 99999 PR PBB SHADOW E&M-EST. PATIENT-LVL III: CPT | Mod: PBBFAC,,, | Performed by: INTERNAL MEDICINE

## 2018-10-01 NOTE — Clinical Note
On 12/7, get CBC, CMP, TSH, LDH, hCG, AFP, CT C/A/P. On 12/10, see me in the office, then get port flushed

## 2018-10-10 ENCOUNTER — OFFICE VISIT (OUTPATIENT)
Dept: GASTROENTEROLOGY | Facility: CLINIC | Age: 23
End: 2018-10-10
Payer: COMMERCIAL

## 2018-10-10 ENCOUNTER — PATIENT MESSAGE (OUTPATIENT)
Dept: HEMATOLOGY/ONCOLOGY | Facility: CLINIC | Age: 23
End: 2018-10-10

## 2018-10-10 ENCOUNTER — TELEPHONE (OUTPATIENT)
Dept: ENDOSCOPY | Facility: HOSPITAL | Age: 23
End: 2018-10-10

## 2018-10-10 VITALS
HEIGHT: 67 IN | SYSTOLIC BLOOD PRESSURE: 107 MMHG | BODY MASS INDEX: 22.36 KG/M2 | HEART RATE: 63 BPM | WEIGHT: 142.44 LBS | DIASTOLIC BLOOD PRESSURE: 70 MMHG

## 2018-10-10 DIAGNOSIS — Z12.11 SPECIAL SCREENING FOR MALIGNANT NEOPLASMS, COLON: Primary | ICD-10-CM

## 2018-10-10 DIAGNOSIS — K62.5 RECTAL BLEEDING: ICD-10-CM

## 2018-10-10 DIAGNOSIS — K92.0 HEMATEMESIS WITH NAUSEA: Primary | ICD-10-CM

## 2018-10-10 DIAGNOSIS — R10.30 LOWER ABDOMINAL PAIN: ICD-10-CM

## 2018-10-10 PROBLEM — K35.80 ACUTE APPENDICITIS: Status: RESOLVED | Noted: 2018-05-28 | Resolved: 2018-10-10

## 2018-10-10 PROBLEM — R11.0 NAUSEA: Status: RESOLVED | Noted: 2018-05-31 | Resolved: 2018-10-10

## 2018-10-10 PROBLEM — Z90.49 S/P LAPAROSCOPIC APPENDECTOMY: Status: RESOLVED | Noted: 2018-05-26 | Resolved: 2018-10-10

## 2018-10-10 PROBLEM — R11.2 INTRACTABLE VOMITING WITH NAUSEA: Status: RESOLVED | Noted: 2018-07-23 | Resolved: 2018-10-10

## 2018-10-10 PROBLEM — R19.7 DIARRHEA: Status: RESOLVED | Noted: 2018-05-31 | Resolved: 2018-10-10

## 2018-10-10 PROCEDURE — 99204 OFFICE O/P NEW MOD 45 MIN: CPT | Mod: S$GLB,,, | Performed by: PHYSICIAN ASSISTANT

## 2018-10-10 PROCEDURE — 99999 PR PBB SHADOW E&M-EST. PATIENT-LVL III: CPT | Mod: PBBFAC,,, | Performed by: PHYSICIAN ASSISTANT

## 2018-10-10 PROCEDURE — 3008F BODY MASS INDEX DOCD: CPT | Mod: CPTII,S$GLB,, | Performed by: PHYSICIAN ASSISTANT

## 2018-10-10 RX ORDER — SODIUM, POTASSIUM,MAG SULFATES 17.5-3.13G
1 SOLUTION, RECONSTITUTED, ORAL ORAL ONCE
Qty: 354 ML | Refills: 0 | Status: SHIPPED | OUTPATIENT
Start: 2018-10-10 | End: 2018-10-11

## 2018-10-10 RX ORDER — PANTOPRAZOLE SODIUM 40 MG/1
40 TABLET, DELAYED RELEASE ORAL DAILY
Qty: 30 TABLET | Refills: 2
Start: 2018-10-10 | End: 2020-12-22

## 2018-10-10 NOTE — PROGRESS NOTES
[facility] Consultation Note    Reason for Consult:  The primary encounter diagnosis was Hematemesis with nausea. Diagnoses of Lower abdominal pain and Rectal bleeding were also pertinent to this visit.    PCP:   Nata Henrandez   [address] / [facility] [address]    Referring MD:  Suha Rojas Md  [address]  [address]  [place] [address]    HPI:  This is a 23 y.o. male referred by Dr Rojas in oncology for evaluation of hematemesis and nausea  PMH of primary mediastinal seminoma and Lyme disease s/p chemotherapy  Complete 7/2018    He report having nausea, vomiting, and abdominal pain during, chemotherapy, but his has persisted.  Throwing up bright red blood daily x  >1 montn  Seeing blood in the stool  Daily of every other day  X > 1 month  Wakes up nauseous  cbd helps the nausea  Denies GERD now but did have GERD  during chemo    Also have chronic lower abdominal pain  Onset-started during chemo and has persisted, >30 months  Palliative/Worse - no change with BM  Quality-sharp  Radiation-no   Severity-8/10  Timing- constant    Has 1 Bm per day    Denies nsaids use    [place] screening- Family Hx of:  Colon cancer-no    ROS:  Constitutional: No fevers, chills, No weight loss  ENT: No allergies  CV: No chest pain  Pulm: No cough, No shortness of breath  Ophtho: No vision changes  GI: see HPI  Derm: No rash  Heme: No lymphadenopathy, No bruising  MSK: No arthritis  : No dysuria, No hematuria  Endo: No hot or cold intolerance  Neuro: No syncope, No seizure  Psych: No anxiety, No depression    Medical History:  has a past medical history of Abdominal pain (08/12/2010), Allergy, Cancer, Chondromalacia patellae, Chronic nausea (2015), Chronic pain, Chronic pain, Foot pain (04/2010), Fracture of right radius (09/2009), Lyme arthritis, Lyme disease (2013), Memory loss (03/2012), Stress fracture of tibia and fibula (08/2011), and Urticaria.    Surgical History:  has a  "past surgical history that includes Portacath placement (Right); Chest wall biopsy; Appendectomy; BLOCK, NERVE (N/A, 5/29/2018); APPENDECTOMY, LAPAROSCOPIC (N/A, 5/24/2018); Ynzamlpaf-Kmoi-Y-Cath (N/A, 4/4/2018); and HCCFXH-CIRUGY-AB (N/A, 3/1/2018).    Family History: family history includes Arthritis in his mother and sister; Depression in his sister; Gout in his father; Hyperlipidemia in his father; No Known Problems in his sister; Other in his mother..     Social History:  reports that he quit smoking about 6 months ago. His smoking use included vaping with nicotine, vaping w/o nicotine, cigarettes, and cigars. He smoked 0.25 packs per day. He has quit using smokeless tobacco. He reports that he has current or past drug history. Drugs: LSD and Other-see comments. He reports that he does not drink alcohol.    Review of patient's allergies indicates:   Allergen Reactions    Mushroom Anaphylaxis    Bamboo     Bee pollens     Mushroom flavor     Venom-honey bee        Current Outpatient Medications on File Prior to Visit   Medication Sig Dispense Refill    albuterol 90 mcg/actuation inhaler Inhale 2 puffs into the lungs every 6 (six) hours as needed for Wheezing. 1 each 11    baclofen (LIORESAL) 10 MG tablet Take 1 tablet (10 mg total) by mouth 2 (two) times daily as needed for hiccups. 60 tablet 1    promethazine (PHENERGAN) 12.5 MG Tab Take 1 tablet (12.5 mg total) by mouth 4 (four) times daily. 5 tablet 0     No current facility-administered medications on file prior to visit.          Objective Findings:    Vital Signs:  /70   Pulse 63   Ht 5' 7" (1.702 m)   Wt 64.6 kg (142 lb 6.7 oz)   BMI 22.31 kg/m²   Body mass index is 22.31 kg/m².    Physical Exam:  General Appearance: Well appearing in no acute distress  Head:   Normocephalic, without obvious abnormality  Eyes:    No scleral icterus  ENT: Neck supple, Lips, mucosa, and tongue normal  Lungs: CTA bilaterally in anterior and posterior " fields, no wheezes, no crackles.  Heart:  Regular rate and rhythm, S1, S2 normal, no murmurs heard  Abdomen: Soft, mild diffuse tenderness, no guarding or rebound tenderness. non distended with positive bowel sounds in all four quadrants.  Extremities: no edema  Skin: No rash  Neurologic: AAO x 3      Labs:  Lab Results   Component Value Date    WBC 4.94 09/28/2018    HGB 13.8 (L) 09/28/2018    HCT 41.7 09/28/2018     09/28/2018    ALT 22 09/28/2018    AST 17 09/28/2018     09/28/2018    K 4.1 09/28/2018     09/28/2018    CREATININE 1.1 09/28/2018    BUN 12 09/28/2018    CO2 29 09/28/2018    TSH 1.077 03/28/2017    INR 1.0 06/22/2018    HGBA1C 4.7 07/30/2018       Imaging:    Endoscopy:    none  Assessment:  1. Hematemesis with nausea    2. Lower abdominal pain    3. Rectal bleeding       22yo M  With PMH of primary mediastinal seminoma and Lyme disease s/p chemotherapy complete 7/2018. Presents with daily hematemasis, nausea, and rectal bleeding      Recommendations:  1. Schedule EGD to rule out gastric ulcers or benjamin luke tear  2. Schedule colonoscopy with visualization of the terminal ileum to rule out IBD  3. Start protonix 40mg daily. He says he has some at home  Follow-up in about 2 months (around 12/10/2018).      Order summary:  Orders Placed This Encounter    pantoprazole (PROTONIX) 40 MG tablet    Case request GI: EGD (ESOPHAGOGASTRODUODENOSCOPY), COLONOSCOPY         Thank you so much for allowing me to participate in the care of Alejandro Al PA-C

## 2018-10-10 NOTE — H&P (VIEW-ONLY)
[facility] Consultation Note    Reason for Consult:  The primary encounter diagnosis was Hematemesis with nausea. Diagnoses of Lower abdominal pain and Rectal bleeding were also pertinent to this visit.    PCP:   Nata Hernandez   [address] / [facility] [address]    Referring MD:  Suha Rojas Md  [address]  [address]  [place] [address]    HPI:  This is a 23 y.o. male referred by Dr Rojas in oncology for evaluation of hematemesis and nausea  PMH of primary mediastinal seminoma and Lyme disease s/p chemotherapy  Complete 7/2018    He report having nausea, vomiting, and abdominal pain during, chemotherapy, but his has persisted.  Throwing up bright red blood daily x  >1 montn  Seeing blood in the stool  Daily of every other day  X > 1 month  Wakes up nauseous  cbd helps the nausea  Denies GERD now but did have GERD  during chemo    Also have chronic lower abdominal pain  Onset-started during chemo and has persisted, >30 months  Palliative/Worse - no change with BM  Quality-sharp  Radiation-no   Severity-8/10  Timing- constant    Has 1 Bm per day    Denies nsaids use    [place] screening- Family Hx of:  Colon cancer-no    ROS:  Constitutional: No fevers, chills, No weight loss  ENT: No allergies  CV: No chest pain  Pulm: No cough, No shortness of breath  Ophtho: No vision changes  GI: see HPI  Derm: No rash  Heme: No lymphadenopathy, No bruising  MSK: No arthritis  : No dysuria, No hematuria  Endo: No hot or cold intolerance  Neuro: No syncope, No seizure  Psych: No anxiety, No depression    Medical History:  has a past medical history of Abdominal pain (08/12/2010), Allergy, Cancer, Chondromalacia patellae, Chronic nausea (2015), Chronic pain, Chronic pain, Foot pain (04/2010), Fracture of right radius (09/2009), Lyme arthritis, Lyme disease (2013), Memory loss (03/2012), Stress fracture of tibia and fibula (08/2011), and Urticaria.    Surgical History:  has a  "past surgical history that includes Portacath placement (Right); Chest wall biopsy; Appendectomy; BLOCK, NERVE (N/A, 5/29/2018); APPENDECTOMY, LAPAROSCOPIC (N/A, 5/24/2018); Skwazwutr-Igxp-Q-Cath (N/A, 4/4/2018); and FZKHSB-BTPCQK-WU (N/A, 3/1/2018).    Family History: family history includes Arthritis in his mother and sister; Depression in his sister; Gout in his father; Hyperlipidemia in his father; No Known Problems in his sister; Other in his mother..     Social History:  reports that he quit smoking about 6 months ago. His smoking use included vaping with nicotine, vaping w/o nicotine, cigarettes, and cigars. He smoked 0.25 packs per day. He has quit using smokeless tobacco. He reports that he has current or past drug history. Drugs: LSD and Other-see comments. He reports that he does not drink alcohol.    Review of patient's allergies indicates:   Allergen Reactions    Mushroom Anaphylaxis    Bamboo     Bee pollens     Mushroom flavor     Venom-honey bee        Current Outpatient Medications on File Prior to Visit   Medication Sig Dispense Refill    albuterol 90 mcg/actuation inhaler Inhale 2 puffs into the lungs every 6 (six) hours as needed for Wheezing. 1 each 11    baclofen (LIORESAL) 10 MG tablet Take 1 tablet (10 mg total) by mouth 2 (two) times daily as needed for hiccups. 60 tablet 1    promethazine (PHENERGAN) 12.5 MG Tab Take 1 tablet (12.5 mg total) by mouth 4 (four) times daily. 5 tablet 0     No current facility-administered medications on file prior to visit.          Objective Findings:    Vital Signs:  /70   Pulse 63   Ht 5' 7" (1.702 m)   Wt 64.6 kg (142 lb 6.7 oz)   BMI 22.31 kg/m²   Body mass index is 22.31 kg/m².    Physical Exam:  General Appearance: Well appearing in no acute distress  Head:   Normocephalic, without obvious abnormality  Eyes:    No scleral icterus  ENT: Neck supple, Lips, mucosa, and tongue normal  Lungs: CTA bilaterally in anterior and posterior " fields, no wheezes, no crackles.  Heart:  Regular rate and rhythm, S1, S2 normal, no murmurs heard  Abdomen: Soft, mild diffuse tenderness, no guarding or rebound tenderness. non distended with positive bowel sounds in all four quadrants.  Extremities: no edema  Skin: No rash  Neurologic: AAO x 3      Labs:  Lab Results   Component Value Date    WBC 4.94 09/28/2018    HGB 13.8 (L) 09/28/2018    HCT 41.7 09/28/2018     09/28/2018    ALT 22 09/28/2018    AST 17 09/28/2018     09/28/2018    K 4.1 09/28/2018     09/28/2018    CREATININE 1.1 09/28/2018    BUN 12 09/28/2018    CO2 29 09/28/2018    TSH 1.077 03/28/2017    INR 1.0 06/22/2018    HGBA1C 4.7 07/30/2018       Imaging:    Endoscopy:    none  Assessment:  1. Hematemesis with nausea    2. Lower abdominal pain    3. Rectal bleeding       24yo M  With PMH of primary mediastinal seminoma and Lyme disease s/p chemotherapy complete 7/2018. Presents with daily hematemasis, nausea, and rectal bleeding      Recommendations:  1. Schedule EGD to rule out gastric ulcers or benjamin luke tear  2. Schedule colonoscopy with visualization of the terminal ileum to rule out IBD  3. Start protonix 40mg daily. He says he has some at home  Follow-up in about 2 months (around 12/10/2018).      Order summary:  Orders Placed This Encounter    pantoprazole (PROTONIX) 40 MG tablet    Case request GI: EGD (ESOPHAGOGASTRODUODENOSCOPY), COLONOSCOPY         Thank you so much for allowing me to participate in the care of Alejandro Al PA-C

## 2018-10-10 NOTE — LETTER
October 10, 2018      Suha Rojas MD  [address]  [address]  [facility] [address]           [facility]  [address]  [place] [address]  Phone: [phone]  Fax: [phone]          Patient: Alejandro Neff   MR Number: [id number]   YOB: 1995   Date of Visit: 10/10/2018       Dear Dr. Suha Rojas:    Thank you for referring Alejandro Neff to me for evaluation. Attached you will find relevant portions of my assessment and plan of care.    If you have questions, please do not hesitate to call me. I look forward to following Alejandro Neff along with you.    Sincerely,    Mackenzie Al PA-C    Enclosure  CC:  No Recipients    If you would like to receive this communication electronically, please contact [email or web address] or [phone] to request more information on [facility] access.    For providers and/or their staff who would like to refer a patient to [facility], please contact us through our one-stop-shop provider referral line, [facility], at [phone].    If you feel you have received this communication in error or would no longer like to receive these types of communications, please e-mail [email or web address]

## 2018-10-11 ENCOUNTER — PATIENT MESSAGE (OUTPATIENT)
Dept: HEMATOLOGY/ONCOLOGY | Facility: CLINIC | Age: 23
End: 2018-10-11

## 2018-10-11 DIAGNOSIS — M25.519 SHOULDER PAIN, UNSPECIFIED CHRONICITY, UNSPECIFIED LATERALITY: ICD-10-CM

## 2018-10-11 DIAGNOSIS — C38.3 PRIMARY MEDIASTINAL SEMINOMA: Primary | ICD-10-CM

## 2018-10-11 NOTE — TELEPHONE ENCOUNTER
Spoke with  Memo over the phone. He had a PET scan two weeks ago which was negative. If we repeat a CT scan today it is unlikely to show significant changes. I recommend he continue to monitor the pain. If pain persists for two weeks, we will repeat a CT chest for further evaluation. He understands and agrees with the plan.

## 2018-10-12 ENCOUNTER — HOSPITAL ENCOUNTER (OUTPATIENT)
Dept: RADIOLOGY | Facility: OTHER | Age: 23
Discharge: HOME | End: 2018-10-12
Attending: INTERNAL MEDICINE
Payer: COMMERCIAL

## 2018-10-12 ENCOUNTER — INFUSION (OUTPATIENT)
Dept: INFUSION THERAPY | Facility: OTHER | Age: 23
End: 2018-10-12
Attending: INTERNAL MEDICINE
Payer: COMMERCIAL

## 2018-10-12 ENCOUNTER — LAB VISIT (OUTPATIENT)
Dept: LAB | Facility: OTHER | Age: 23
End: 2018-10-12
Attending: INTERNAL MEDICINE
Payer: COMMERCIAL

## 2018-10-12 VITALS
DIASTOLIC BLOOD PRESSURE: 61 MMHG | OXYGEN SATURATION: 100 % | SYSTOLIC BLOOD PRESSURE: 112 MMHG | HEART RATE: 74 BPM | RESPIRATION RATE: 17 BRPM | TEMPERATURE: 98 F

## 2018-10-12 DIAGNOSIS — R11.0 CHEMOTHERAPY-INDUCED NAUSEA: ICD-10-CM

## 2018-10-12 DIAGNOSIS — T45.1X5A CHEMOTHERAPY-INDUCED NAUSEA: ICD-10-CM

## 2018-10-12 DIAGNOSIS — M25.512 CHRONIC LEFT SHOULDER PAIN: Primary | ICD-10-CM

## 2018-10-12 DIAGNOSIS — C80.1 GERM CELL TUMOR: Primary | ICD-10-CM

## 2018-10-12 DIAGNOSIS — C38.3 PRIMARY MEDIASTINAL SEMINOMA: ICD-10-CM

## 2018-10-12 DIAGNOSIS — G89.29 CHRONIC LEFT SHOULDER PAIN: Primary | ICD-10-CM

## 2018-10-12 LAB
ALBUMIN SERPL BCP-MCNC: 4.5 G/DL
ALP SERPL-CCNC: 59 U/L
ALT SERPL W/O P-5'-P-CCNC: 16 U/L
ANION GAP SERPL CALC-SCNC: 9 MMOL/L
AST SERPL-CCNC: 17 U/L
BILIRUB SERPL-MCNC: 0.5 MG/DL
BUN SERPL-MCNC: 16 MG/DL
CALCIUM SERPL-MCNC: 9.7 MG/DL
CHLORIDE SERPL-SCNC: 104 MMOL/L
CO2 SERPL-SCNC: 24 MMOL/L
CREAT SERPL-MCNC: 0.9 MG/DL
ERYTHROCYTE [DISTWIDTH] IN BLOOD BY AUTOMATED COUNT: 12.6 %
EST. GFR  (AFRICAN AMERICAN): >60 ML/MIN/1.73 M^2
EST. GFR  (NON AFRICAN AMERICAN): >60 ML/MIN/1.73 M^2
GLUCOSE SERPL-MCNC: 96 MG/DL
HCG INTACT+B SERPL-ACNC: <1.2 MIU/ML
HCT VFR BLD AUTO: 38.7 %
HGB BLD-MCNC: 13.4 G/DL
LDH SERPL L TO P-CCNC: 130 U/L
MCH RBC QN AUTO: 29.7 PG
MCHC RBC AUTO-ENTMCNC: 34.6 G/DL
MCV RBC AUTO: 86 FL
NEUTROPHILS # BLD AUTO: 2.4 K/UL
PLATELET # BLD AUTO: 148 K/UL
PMV BLD AUTO: 8.4 FL
POTASSIUM SERPL-SCNC: 4.3 MMOL/L
PROT SERPL-MCNC: 7 G/DL
RBC # BLD AUTO: 4.51 M/UL
SODIUM SERPL-SCNC: 137 MMOL/L
WBC # BLD AUTO: 3.89 K/UL

## 2018-10-12 PROCEDURE — 83615 LACTATE (LD) (LDH) ENZYME: CPT

## 2018-10-12 PROCEDURE — 71260 CT THORAX DX C+: CPT | Mod: TC

## 2018-10-12 PROCEDURE — 36591 DRAW BLOOD OFF VENOUS DEVICE: CPT

## 2018-10-12 PROCEDURE — 63600175 PHARM REV CODE 636 W HCPCS: Performed by: INTERNAL MEDICINE

## 2018-10-12 PROCEDURE — 85027 COMPLETE CBC AUTOMATED: CPT

## 2018-10-12 PROCEDURE — 71260 CT THORAX DX C+: CPT | Mod: 26,,, | Performed by: RADIOLOGY

## 2018-10-12 PROCEDURE — 84702 CHORIONIC GONADOTROPIN TEST: CPT

## 2018-10-12 PROCEDURE — A4216 STERILE WATER/SALINE, 10 ML: HCPCS | Performed by: INTERNAL MEDICINE

## 2018-10-12 PROCEDURE — 25000003 PHARM REV CODE 250: Performed by: INTERNAL MEDICINE

## 2018-10-12 PROCEDURE — 82105 ALPHA-FETOPROTEIN SERUM: CPT

## 2018-10-12 PROCEDURE — 25500020 PHARM REV CODE 255: Performed by: INTERNAL MEDICINE

## 2018-10-12 PROCEDURE — 80053 COMPREHEN METABOLIC PANEL: CPT

## 2018-10-12 RX ORDER — HEPARIN 100 UNIT/ML
500 SYRINGE INTRAVENOUS
Status: COMPLETED | OUTPATIENT
Start: 2018-10-12 | End: 2018-10-12

## 2018-10-12 RX ORDER — HEPARIN 100 UNIT/ML
500 SYRINGE INTRAVENOUS
Status: CANCELLED | OUTPATIENT
Start: 2018-10-12

## 2018-10-12 RX ORDER — SODIUM CHLORIDE 0.9 % (FLUSH) 0.9 %
10 SYRINGE (ML) INJECTION
Status: COMPLETED | OUTPATIENT
Start: 2018-10-12 | End: 2018-10-12

## 2018-10-12 RX ORDER — SODIUM CHLORIDE 0.9 % (FLUSH) 0.9 %
10 SYRINGE (ML) INJECTION
Status: CANCELLED | OUTPATIENT
Start: 2018-10-12

## 2018-10-12 RX ADMIN — Medication 10 ML: at 12:10

## 2018-10-12 RX ADMIN — HEPARIN 500 UNITS: 100 SYRINGE at 12:10

## 2018-10-12 RX ADMIN — IOHEXOL 75 ML: 350 INJECTION, SOLUTION INTRAVENOUS at 11:10

## 2018-10-12 NOTE — PLAN OF CARE
Problem: Patient Care Overview  Goal: Plan of Care Review  Outcome: Ongoing (interventions implemented as appropriate)  Pt tolerated lab draw from port without issue. VSS. AVS given.

## 2018-10-13 LAB — AFP SERPL-MCNC: 1.5 NG/ML

## 2018-10-15 ENCOUNTER — PATIENT MESSAGE (OUTPATIENT)
Dept: HEMATOLOGY/ONCOLOGY | Facility: CLINIC | Age: 23
End: 2018-10-15

## 2018-10-16 ENCOUNTER — HOSPITAL ENCOUNTER (OUTPATIENT)
Dept: RADIOLOGY | Facility: OTHER | Age: 23
Discharge: HOME | End: 2018-10-16
Attending: INTERNAL MEDICINE
Payer: COMMERCIAL

## 2018-10-16 DIAGNOSIS — G89.29 CHRONIC LEFT SHOULDER PAIN: ICD-10-CM

## 2018-10-16 DIAGNOSIS — M25.512 CHRONIC LEFT SHOULDER PAIN: ICD-10-CM

## 2018-10-16 PROCEDURE — 73221 MRI JOINT UPR EXTREM W/O DYE: CPT | Mod: 26,LT,, | Performed by: RADIOLOGY

## 2018-10-16 PROCEDURE — 73221 MRI JOINT UPR EXTREM W/O DYE: CPT | Mod: TC,LT

## 2018-10-17 ENCOUNTER — OFFICE VISIT (OUTPATIENT)
Dept: CARDIOTHORACIC SURGERY | Facility: CLINIC | Age: 23
End: 2018-10-17
Payer: COMMERCIAL

## 2018-10-17 VITALS
DIASTOLIC BLOOD PRESSURE: 68 MMHG | HEART RATE: 62 BPM | BODY MASS INDEX: 22.15 KG/M2 | HEIGHT: 67 IN | SYSTOLIC BLOOD PRESSURE: 108 MMHG | WEIGHT: 141.13 LBS | OXYGEN SATURATION: 100 %

## 2018-10-17 DIAGNOSIS — J98.59 MEDIASTINAL MASS: ICD-10-CM

## 2018-10-17 DIAGNOSIS — C62.90 SEMINOMA: Primary | ICD-10-CM

## 2018-10-17 DIAGNOSIS — G89.29 CHRONIC LEFT SHOULDER PAIN: ICD-10-CM

## 2018-10-17 DIAGNOSIS — M25.512 CHRONIC LEFT SHOULDER PAIN: ICD-10-CM

## 2018-10-17 DIAGNOSIS — M67.814 BICEPS TENDINOSIS OF LEFT SHOULDER: ICD-10-CM

## 2018-10-17 PROCEDURE — 99999 PR PBB SHADOW E&M-EST. PATIENT-LVL III: CPT | Mod: PBBFAC,,, | Performed by: THORACIC SURGERY (CARDIOTHORACIC VASCULAR SURGERY)

## 2018-10-17 PROCEDURE — 3008F BODY MASS INDEX DOCD: CPT | Mod: CPTII,S$GLB,, | Performed by: THORACIC SURGERY (CARDIOTHORACIC VASCULAR SURGERY)

## 2018-10-17 PROCEDURE — 99204 OFFICE O/P NEW MOD 45 MIN: CPT | Mod: S$GLB,,, | Performed by: THORACIC SURGERY (CARDIOTHORACIC VASCULAR SURGERY)

## 2018-10-17 NOTE — LETTER
[facility]  [address]  [facility] [address]  Phone: [phone]  Fax: [phone] October 17, 2018        Suha Rojas MD  [address]  [address]  [facility] [address]    Patient: Alejandro Neff   MR Number: [id number]   YOB: 1995   Date of Visit: 10/17/2018       Dear Dr. Rojas:    Thank you for referring Alejandro Neff to me for evaluation. He presents for evaluation of left shoulder pain in the setting of treated primary mediastinal seminoma.  The patient is concerned that the residual mediastinal soft tissue may account for the left shoulder discomfort.  A recent MRI shows left biceps tendinosis at the shoulder.    I reviewed the chest CT images and the MRI images/report.  I strongly suspect that the left shoulder pain is due to tendinosis rather than the small residual soft tissue density in the anterior mediastinum.  There is no plausible anatomic reason for the mediastinal remnant to cause left shoulder pain.  There is no indication for surgical intervention which may require median sternotomy due to anticipated post chemotherapy fibrosis.  Resection is generally reserved for residual nonseminomatous tumors following treatment.     Sincerely,          MD GLENN Ramirez MD

## 2018-10-17 NOTE — PROGRESS NOTES
History & Physical    SUBJECTIVE:     History of Present Illness:    Patient is a 23 y.o. male former smoker with history of lyme disease and primary mediastinal seminoma (completed 4 cycles EP on 7/11/18) presenting for evaluation of shoulder pain. Initial shoulder pain dates back to Nov 2017. No trauma. Resolved spontaneously. Oncological history dates back to Feb 2018 when he underwent chest CT for substernal chest pain/SOB which identified mediastinal mass abutting aorta and PA. IR biopsy confirmed seminoma. Labs normal. Completed 4 cycles EP. Restaging PET with residual tissue but not PET avid. Under surveillance with Chest CTs every 2 months. About 2 weeks ago started with left shoulder pain described as a constant dull ache. No inciting event. No trauma to shoulder. Lifts <5lbs at work. Recent MRI of shoulder significant for tendinosis of biceps tendon.     Right sided port placement, appendectomy  Former smoker. Quit 3 months ago. 3 pack years.       Chief Complaint   Patient presents with    Consult       Review of patient's allergies indicates:   Allergen Reactions    Mushroom Anaphylaxis    Bamboo     Bee pollens     Mushroom flavor     Venom-honey bee        Current Outpatient Medications   Medication Sig Dispense Refill    albuterol 90 mcg/actuation inhaler Inhale 2 puffs into the lungs every 6 (six) hours as needed for Wheezing. 1 each 11    baclofen (LIORESAL) 10 MG tablet Take 1 tablet (10 mg total) by mouth 2 (two) times daily as needed for hiccups. 60 tablet 1    pantoprazole (PROTONIX) 40 MG tablet Take 1 tablet (40 mg total) by mouth once daily. 30 tablet 2    promethazine (PHENERGAN) 12.5 MG Tab Take 1 tablet (12.5 mg total) by mouth 4 (four) times daily. 5 tablet 0     No current facility-administered medications for this visit.        Past Medical History:   Diagnosis Date    Abdominal pain 08/12/2010    eval for abd and chest wall pain at [facility], [place], [place] -  "cxr wnl, EKG ([facility]), troponin wnl, normal chemistries    Allergy     Cancer     testicular    Chondromalacia patellae     Chronic nausea 2015    eval by GI Dr. Tushar Artis - given zofran and ciproheptadine     Chronic pain     Chronic pain     [facility] meds; followed by psych and pain clinic and unresponsive to behavioral/CBT/old records reports c/f conversion disorder     Foot pain 04/2010    4/10 + SHIRA, eval by Dr. ALLY Lion at [facility] -dx unclear ? gout and trial of nsaids and pdn, xrays normal 1/11, referred to podiatry, re-eval by rheum 2/12 and MRI and films wnl    Fracture of right radius 09/2009    Lyme arthritis     multiple joints    Lyme disease 2013    Memory loss 03/2012    eval for memory loss by neuro at [facility] - MRI, EEG wnl. Dr. Patricio suggested emotional factors & ref to Tushar Davies, PhD for  eval & neuropsych eval 3/12 Lupe Delgado, PhD - no evidence of formal thought disorder or psychosis - documented severe memory impairment; not related to to ADD or executive function issues. sugesstion that memory issues may be related to "emotional factors", ?conversion d/o    Stress fracture of tibia and fibula 08/2011    Urticaria      Past Surgical History:   Procedure Laterality Date    APPENDECTOMY      APPENDECTOMY, LAPAROSCOPIC N/A 5/24/2018    Performed by Kenan Huang Jr., MD at [facility] OR 2ND [facility]    SWDSDV-GALOTQ-MI N/A 3/1/2018    Performed by Nikita Clayton MD at [facility] CATH LAB    BLOCK, NERVE N/A 5/29/2018    Performed by Raiza Surgeon at [facility]    CHEST WALL BIOPSY      INJECTION OF ANESTHETIC AGENT AROUND NERVE N/A 5/29/2018    Procedure: BLOCK, NERVE;  Surgeon: Raiza Surgeon;  Location: [facility];  Service: Anesthesiology;  Laterality: N/A;    Znslcxxmg-Ivxm-E-Cath N/A 4/4/2018    Performed by [facility] Diagnostic Provider at [facility] OR 2ND [facility]    LAPAROSCOPIC APPENDECTOMY N/A 5/24/2018    Procedure: " "APPENDECTOMY, LAPAROSCOPIC;  Surgeon: Kenan Huang Jr., MD;  Location: [facility] OR [address] [place] [address];  Service: General;  Laterality: N/A;    PORTACATH PLACEMENT Right      Family History   Problem Relation Age of Onset    Arthritis Mother     Other Mother         benign tumor of brain and spinal cord    Hyperlipidemia Father     Gout Father     No Known Problems Sister     Arthritis Sister         shoulder    Depression Sister     Colon cancer Neg Hx     Esophageal cancer Neg Hx     Stomach cancer Neg Hx      Social History     Tobacco Use    Smoking status: Former Smoker     Packs/day: 0.25     Types: Vaping with nicotine, Vaping w/o nicotine, Cigarettes, Cigars     Last attempt to quit: 3/15/2018     Years since quittin.5    Smokeless tobacco: Former User   Substance Use Topics    Alcohol use: No    Drug use: Unknown     Types: LSD, Other-see comments        Review of Systems:  Review of Systems   Constitutional: Negative for activity change, fatigue and fever.   Respiratory: Negative for cough, chest tightness and shortness of breath.    Cardiovascular: Negative for chest pain and palpitations.   Gastrointestinal: Negative.    Genitourinary: Negative.    Musculoskeletal: Positive for arthralgias (left shoulder pain ).   Skin: Negative for color change and rash.   Neurological: Negative for dizziness and syncope.   Psychiatric/Behavioral: Negative for agitation. The patient is not nervous/anxious.        OBJECTIVE:     Vital Signs (Most Recent)  Pulse: 62 (10/17/18 0949)  BP: 108/68 (10/17/18 0949)  SpO2: 100 % (10/17/18 0949)  5' 7" (1.702 m)  64 kg (141 lb 1.5 oz)     Physical Exam:  Physical Exam   Constitutional: He appears well-developed and well-nourished.   HENT:   Head: Normocephalic and atraumatic.   Eyes: EOM are normal.   Neck: Normal range of motion. Neck supple. No tracheal deviation present.   Cardiovascular: Normal rate, regular rhythm, normal heart sounds and intact distal pulses. "   Pulmonary/Chest: Effort normal and breath sounds normal. He has no wheezes.   Musculoskeletal: He exhibits no edema or tenderness.        Left shoulder: He exhibits pain (active shoulder extension ). He exhibits no bony tenderness, no swelling, no effusion, no crepitus, normal pulse and normal strength.   Lymphadenopathy:     He has no cervical adenopathy.   Vitals reviewed.      PET 9/28/18:  Soft tissue activity in the anterior mediastinum SUV max 2.0 very low-grade configuration could be thymus.  There is a central line.     Chest CT 10/12/18:   1. History mediastinal seminoma.  Stable soft tissue density in the anterior mediastinum from most recent prior examinations, could represent residual or treated tumor or thymus gland.  No new disease detected.  2. No acute cardiopulmonary disease.  3.  RECIST SUMMARY:  Date of prior examination for comparison: 07/30/2018  Lesion 1: Anterior mediastinum soft tissue.  2.7 cm. Series 2 image 22.  Prior measurement 2.5 cm.    MRI Shoulder 10/16/18  Rotator cuff: Supraspinatus, infraspinatus, teres minor, and subscapularis tendons are intact.  Labrum: Grossly intact on this non-arthrographic study.  Biceps: Long head biceps tendon is intact.  There is tendinosis.  Bone: There is no fracture.  Bone marrow signal is unremarkable.  Acromioclavicular joint: The AC joint is unremarkable.  Cartilage: Articular cartilage of the glenohumeral joint is preserved    ASSESSMENT/PLAN:     Patient is a 23 y.o. male former smoker with history of lyme disease and primary mediastinal seminoma (completed 4 cycles EP on 7/11/18) presenting for evaluation of shoulder pain and residual anterior mediastinal tissue.     PLAN:Plan     Shoulder pain not likely to be related to residual anterior mediastinal mass. MRI shoulder shows long head of biceps tendon with tendinosis. Not likely to benefit from resection of residual anterior mediastinal tissue   Will present at multidisciplinary lung tumor  board.    ATTENDING ATTESTATION:    I evaluated the patient and I agree with the assessment and plan.  The patient has a plausible reason for the left shoulder pain as demonstrated on the MRI which shows biceps tendinosis.  I do not see an anatomic reason to explain how the small residual anterior mediastinal soft tissue remnant can cause left shoulder pain.  In addition, resection is generally reserved for nonseminomatous germ cell tumors with residual or recurrent mediastinal disease.  The fibrotic effect associated with chemotherapy administration usually requires a median sternotomy or hemiclamshell rather than a minimally invasive approach.  There is also an increased risk of mediastinal hemorrhage associated with resection in this circumstance.  There is no current indication for resection.

## 2018-10-24 ENCOUNTER — DOCUMENTATION ONLY (OUTPATIENT)
Dept: CARDIOTHORACIC SURGERY | Facility: HOSPITAL | Age: 23
End: 2018-10-24

## 2018-10-24 NOTE — PATIENT CARE CONFERENCE
[facility]      THORACIC MULTIDISCIPLINARY TUMOR BOARD  PATIENT REVIEW FORM  ________________________________________________________________________    CLINIC #: [id number]  DATE: 10/24/2018    DIAGNOSIS:  Nonseminomatous Germ Cell Tumors     PRESENTER:      PATIENT SUMMARY: 23 y.o. male former smoker with history of lyme disease and primary mediastinal seminoma (completed 4 cycles EP on 7/11/18) presenting for evaluation of shoulder pain. Initial shoulder pain dates back to Nov 2017. No trauma. Oncological history dates back to Feb 2018 when he underwent chest CT for substernal chest pain/SOB which identified mediastinal mass abutting aorta and PA. IR biopsy confirmed seminoma. Labs normal. Completed 4 cycles EP. Restaging PET with residual tissue but not PET avid. Under surveillance with Chest CTs every 2 months. About 2 weeks ago started with left shoulder pain described as a constant dull ache. No inciting event. No trauma to shoulder. Lifts <5lbs at work. Recent MRI of shoulder significant for tendinosis of biceps tendon.     BOARD RECOMMENDATIONS: No surgical intervention recommended for small amount of residual mediastinal mass remnant. Shoulder pain not likely to be related to mediastinal mass.     CONSULT NEEDED:     [] Surgery    [] Hem/Onc    [] Rad/Onc    [] Dietary                 [] Social Service    [] Psychology       [] Pulmonology    Clinical Stage: Tumor  Node(s)  Metastasis   Pathologic Stage: Tumor  Node(s)  Metastasis     GROUP STAGE:     [] O    [] 1A    [] IB    [] IIA    [] IIB     [] IIIA     [] IIIB     [] IIIC    [] IV                               [] Local recurrence     [] Regional recurrence     [] Distant recurrence                   [] NSCLC     [] SCLC     Tumor type-     Unstageable:      [] Yes     [] No  Metastatic site(s):          [x] Sofia[facility] Treatment Guidelines reviewed and care planned is consistent with guidelines.         (i.e., NCCN, NCI, PD, ACO, AUA,  etc.)    PRESENTATION AT CANCER CONFERENCE:         [] Prospective    [] Retrospective     [] Follow-Up          [] Eligible for clinical trial

## 2018-10-30 ENCOUNTER — OFFICE VISIT (OUTPATIENT)
Dept: PAIN MEDICINE | Facility: CLINIC | Age: 23
End: 2018-10-30
Attending: ANESTHESIOLOGY
Payer: COMMERCIAL

## 2018-10-30 VITALS
DIASTOLIC BLOOD PRESSURE: 66 MMHG | WEIGHT: 138.44 LBS | TEMPERATURE: 98 F | SYSTOLIC BLOOD PRESSURE: 99 MMHG | BODY MASS INDEX: 21.73 KG/M2 | HEIGHT: 67 IN | HEART RATE: 67 BPM

## 2018-10-30 DIAGNOSIS — M54.12 CERVICAL RADICULOPATHY: Primary | ICD-10-CM

## 2018-10-30 DIAGNOSIS — M79.18 MYOFASCIAL PAIN: ICD-10-CM

## 2018-10-30 PROCEDURE — 99244 OFF/OP CNSLTJ NEW/EST MOD 40: CPT | Mod: S$GLB,,, | Performed by: ANESTHESIOLOGY

## 2018-10-30 PROCEDURE — 99999 PR PBB SHADOW E&M-EST. PATIENT-LVL IV: CPT | Mod: PBBFAC,,, | Performed by: ANESTHESIOLOGY

## 2018-10-30 NOTE — PROGRESS NOTES
Subjective:      Patient ID: Alejandro Neff is a 23 y.o. male.    Chief Complaint: Shoulder Pain (Left side) and Abdominal Pain    Referred by: Suha Rojas MD     Pain Scales  Best: 3/10  Worst: 10/10  Usually: 6/10  Today: 4/10    Shoulder Pain    Pertinent negatives include no fever, itching or numbness.   Abdominal Pain   Associated symptoms include hematochezia, nausea, vomiting and weight loss. Pertinent negatives include no constipation, diarrhea, fever, flatus, frequency or headaches.     23 y.o. male with PMH of lyme disease and primary mediastinal seminoma (completed 4 cycles EP on 7/11/18, Restaging PET with residual tissue but not PET avid lesions indicating any mets) presenting for evaluation of left shoulder pain and abdominal pain.     Shoulder pain worse than abdominal pain.     In regards to the shoulder pain, it has been present for about a month. No inciting trauma. Had this exact pain in November 2017 that lasted for 4-5 months and resolved spontaneously around the time of starting chemotherapy. Pain is sharp, worse at the end of the day. Muscle relaxers and opioids do not help. Pain localized over the posterior scapula, upper trapezius and lateral shoulder. Nothing particularly helps. Occasionally gets weak in the left arm and drops things, but weakness associated with pain.     In regards to the lower abdominal pain. Pain is difficult to describe. No particular alleviating factors. He does have regular BMs. No pain change with BM or micturition. Opioids/MS contin aggravated abdominal pain . No weakness in legs. Also has back pain.     Interventional Pain History  None    Past Medical History:   Diagnosis Date    Abdominal pain 08/12/2010    eval for abd and chest wall pain at [facility], [place], [place] - cxr wnl, EKG ([facility]), troponin wnl, normal chemistries    Allergy     Cancer     testicular    Chondromalacia patellae     Chronic nausea 2015    eval by GI Dr. Finley  "Nurko - given zofran and ciproheptadine     Chronic pain     Chronic pain     [facility] - no meds; followed by psych and pain clinic and unresponsive to behavioral/CBT/old records reports c/f conversion disorder     Foot pain 04/2010    4/10 + [place], eval by Dr. ALLY Lion at [facility] -dx unclear ? gout and trial of nsaids and pdn, xrays normal 1/11, referred to podiatry, re-eval by rheum 2/12 and MRI and films wnl    Fracture of right radius 09/2009    Lyme arthritis     multiple joints    Lyme disease 2013    Memory loss 03/2012    eval for memory loss by neuro at [facility] - MRI, EEG wnl. Dr. Patricio suggested emotional factors & ref to Tusahr Davies, PhD for  eval & neuropsych eval 3/12 Lupe Delgado, PhD - no evidence of formal thought disorder or psychosis - documented severe memory impairment; not related to to ADD or executive function issues. sugesstion that memory issues may be related to "emotional factors", ?conversion d/o    Stress fracture of tibia and fibula 08/2011    Urticaria        Past Surgical History:   Procedure Laterality Date    APPENDECTOMY      APPENDECTOMY, LAPAROSCOPIC N/A 5/24/2018    Performed by Kenan Huagn Jr., MD at [facility] OR [address] [place] [address]    [id number] N/A 3/1/2018    Performed by [facility]ita Clayton [facility] at [facility] CATH LAB    BLOCK, NERVE N/A 5/29/2018    Performed by Raiza Surgeon at [facility]    CHEST WALL BIOPSY      INJECTION OF ANESTHETIC AGENT AROUND NERVE N/A 5/29/2018    Procedure: BLOCK, NERVE;  Surgeon: Raiza Surgeon;  Location: [facility];  Service: Anesthesiology;  Laterality: N/A;    Blkqmajsl-Cfbg-C-Cath N/A 4/4/2018    Performed by [facility] Diagnostic Provider at [facility] OR [address] [place] [address]    LAPAROSCOPIC APPENDECTOMY N/A 5/24/2018    Procedure: APPENDECTOMY, LAPAROSCOPIC;  Surgeon: Kenan uHang Jr., MD;  Location: [facility] OR [address] [place] [address];  Service: General;  Laterality: N/A;    PORTACATH PLACEMENT Right  "       Review of patient's allergies indicates:   Allergen Reactions    Mushroom Anaphylaxis    Bamboo     Bee pollens     Mushroom flavor     Venom-honey bee        Current Outpatient Medications   Medication Sig Dispense Refill    albuterol sulfate (PROAIR HFA INHL)       baclofen (LIORESAL) 10 MG tablet Take 1 tablet (10 mg total) by mouth 2 (two) times daily as needed for hiccups. 60 tablet 1    pantoprazole (PROTONIX) 40 MG tablet Take 1 tablet (40 mg total) by mouth once daily. 30 tablet 2    promethazine (PHENERGAN) 12.5 MG Tab Take 1 tablet (12.5 mg total) by mouth 4 (four) times daily. 5 tablet 0    albuterol 90 mcg/actuation inhaler Inhale 2 puffs into the lungs every 6 (six) hours as needed for Wheezing. 1 each 11     No current facility-administered medications for this visit.        Family History   Problem Relation Age of Onset    Arthritis Mother     Other Mother         benign tumor of brain and spinal cord    Hyperlipidemia Father     Gout Father     No Known Problems Sister     Arthritis Sister         shoulder    Depression Sister     Colon cancer Neg Hx     Esophageal cancer Neg Hx     Stomach cancer Neg Hx        Social History     Socioeconomic History    Marital status: Single     Spouse name: Not on file    Number of children: Not on file    Years of education: Not on file    Highest education level: Not on file   Social Needs    Financial resource strain: Not on file    Food insecurity - worry: Not on file    Food insecurity - inability: Not on file    Transportation needs - medical: Not on file    Transportation needs - non-medical: Not on file   Occupational History    Occupation: student at [employer]   Tobacco Use    Smoking status: Former Smoker     Packs/day: 0.25     Types: Vaping with nicotine, Vaping w/o nicotine, Cigarettes, Cigars     Last attempt to quit: 3/15/2018     Years since quittin.6    Smokeless tobacco: Former User   Substance and Sexual  "Activity    Alcohol use: No    Drug use: Unknown     Types: LSD, Other-see comments    Sexual activity: Yes     Partners: Female     Birth control/protection: Condom   Other Topics Concern    Not on file   Social History Narrative    Majoring in [employer]eting     From [place] and [place] - lived 1 year ago and in high school x 1 semester                   Review of Systems   Constitution: Positive for decreased appetite and weight loss. Negative for fever, weakness and weight gain.   HENT: Positive for tinnitus. Negative for ear pain and odynophagia.    Eyes: Negative for blurred vision and redness.   Cardiovascular: Positive for chest pain (constant, mediastinal pain) and syncope (doesn't remember last episode). Negative for irregular heartbeat.   Respiratory: Positive for cough. Negative for hemoptysis and shortness of breath.    Hematologic/Lymphatic: Negative for adenopathy and bleeding problem.   Skin: Positive for dry skin. Negative for itching and rash.   Musculoskeletal: Positive for arthritis and joint pain. Negative for muscle cramps.        Lyme Dz     Gastrointestinal: Positive for abdominal pain, hematemesis, hematochezia, nausea and vomiting. Negative for bloating, change in bowel habit, bowel incontinence, constipation, diarrhea, dysphagia, excessive appetite and flatus.   Genitourinary: Negative for bladder incontinence and frequency.   Neurological: Negative for headaches, loss of balance, numbness and paresthesias.   Psychiatric/Behavioral: Negative for depression. The patient has insomnia.    Allergic/Immunologic: Negative for environmental allergies.           Objective:   BP 99/66   Pulse 67   Temp 98.2 °F (36.8 °C)   Ht 5' 7" (1.702 m)   Wt 62.8 kg (138 lb 7.2 oz)   BMI 21.68 kg/m²   Pain Disability Index Review:  Last 3 PDI Scores 10/30/2018   Pain Disability Index (PDI) 32     BP 99/66   Pulse 67   Temp 98.2 °F (36.8 °C)   Ht 5' 7" (1.702 m)   Wt 62.8 kg (138 lb 7.2 oz)   " BMI 21.68 kg/m²   GEN: No acute distress. Calm, comfortable  HENT: Normocephalic, atraumatic, moist mucous membranes  EYE: Anicteric sclera, non-injected  CV: Non-diaphoretic. Pulses 2+ in BUE.  ABD: Soft, feels like there is stool present. Slight TTP at lower bilateral abdominal quadrants.   CHEST: Breathing comfortably. Chest expansion symmetric  EXT: No clubbing, cyanosis, edema  Psych: Mood and affect are appropriate  GAIT: Independent, normal ambulation  C-spine:       Inspection: No erythema, bruising, surgical incisions. Normal cervical lordosis      Palpation: No TTP of bilateral upper trapezius, levator, paraspinals, rhomboids      ROM: WFL Not painful      (-) Spurling's bilaterally      (+) Tinel's at elbow on left  Shoulder:      Inspection: Poor posture and anteriorly rolled shoulders. No gross deformity      Palpation: No TTP of shoulder area bilaterally. No biceps tendon TTP, no subAC TTP.       ROM: WFL and not painful      (-) Hawkin's      (-) Empty Can      (-) Cross arm      (-) Speed's      (-) Yergason's  Neuro Exam:     Alert, speech is fluent and appropriate     Strength: 4/5 bilateral hip adduction with pain and generalized weakness/deconditioning throughout but with no focal findings     Sensation: Grossly intact to LT in BUE & BLE     Reflexes: 2+ in b/l patella. Could not elicit bilateral achilles     Tone: No abnormality appreciated      No Clonus     (-) Lee bilaterally     Gait: WNL              Ortho/SPM Exam      Assessment:       Encounter Diagnoses   Name Primary?    Cervical radiculopathy Yes    Myofascial pain          Plan:   We discussed with the patient the assessment and recommendations. The following is the plan we agreed on:        Alejandro was seen today for shoulder pain and abdominal pain.    Diagnoses and all orders for this visit:    Cervical radiculopathy  -     Ambulatory consult to Physical Therapy  -     Ambulatory Referral to Medical Massage  Therapy    Myofascial pain  -     Ambulatory consult to Physical Therapy  -     Ambulatory Referral to Medical Massage Therapy    1. Referral to PT for shoulder/neck pain, with focus on postural proprioceptive training, general strengthening  2. Referral for massage therapy for myofascial pains  3. I discussed with him that I do not feel like finding of biceps tendonosis on MRI is not likely significant as his history and exam is inconsistent with biceps tendonitis.   4. RTC in 6-8 weeks. Consider further work-up at that time.  He does have a colonoscopy/EGD scheduled for his N/V and bowel issues.   5. I also discussed with him that he had a significant amount of stool on his previous abdominal X-ray and this could be part of his abd pain issue as it was worsened with constipating medications. He is on miralax currently, but may try something stronger in the future for a short term if pain does not resolve.     I have personally taken the history and examined this patient and agree with the fellow's note as stated above.

## 2018-10-30 NOTE — LETTER
October 30, 2018      Suha Rojas MD  [address]  [address]  [place] [address]           [facility]  [address]  [place] [address]  Phone: [phone]  Fax: [phone]          Patient: Alejandro Neff   MR Number: [id number]   YOB: 1995   Date of Visit: 10/30/2018       Dear Dr. Suha Rojas:    Thank you for referring Alejandro Neff to me for evaluation. Attached you will find relevant portions of my assessment and plan of care.    If you have questions, please do not hesitate to call me. I look forward to following Alejandro Neff along with you.    Sincerely,    Stanton Bran MD    Enclosure  CC:  No Recipients    If you would like to receive this communication electronically, please contact [email or web address] or [phone] to request more information on [employer] access.    For providers and/or their staff who would like to refer a patient to [facility], please contact us through our one-stop-shop provider referral line, [facility], at [phone].    If you feel you have received this communication in error or would no longer like to receive these types of communications, please e-mail [email or web address]

## 2018-11-06 ENCOUNTER — ANESTHESIA EVENT (OUTPATIENT)
Dept: ENDOSCOPY | Facility: HOSPITAL | Age: 23
End: 2018-11-06
Payer: COMMERCIAL

## 2018-11-06 ENCOUNTER — HOSPITAL ENCOUNTER (OUTPATIENT)
Facility: HOSPITAL | Age: 23
Discharge: HOME | End: 2018-11-06
Attending: INTERNAL MEDICINE | Admitting: INTERNAL MEDICINE
Payer: COMMERCIAL

## 2018-11-06 ENCOUNTER — ANESTHESIA (OUTPATIENT)
Dept: ENDOSCOPY | Facility: HOSPITAL | Age: 23
End: 2018-11-06
Payer: COMMERCIAL

## 2018-11-06 VITALS
WEIGHT: 135 LBS | SYSTOLIC BLOOD PRESSURE: 105 MMHG | RESPIRATION RATE: 16 BRPM | HEART RATE: 82 BPM | HEIGHT: 67 IN | DIASTOLIC BLOOD PRESSURE: 57 MMHG | BODY MASS INDEX: 21.19 KG/M2 | OXYGEN SATURATION: 100 % | TEMPERATURE: 99 F

## 2018-11-06 DIAGNOSIS — R11.2 NAUSEA AND VOMITING: ICD-10-CM

## 2018-11-06 DIAGNOSIS — R11.2 NAUSEA AND VOMITING, INTRACTABILITY OF VOMITING NOT SPECIFIED, UNSPECIFIED VOMITING TYPE: Primary | ICD-10-CM

## 2018-11-06 PROCEDURE — 25000003 PHARM REV CODE 250: Performed by: INTERNAL MEDICINE

## 2018-11-06 PROCEDURE — 43239 EGD BIOPSY SINGLE/MULTIPLE: CPT | Performed by: INTERNAL MEDICINE

## 2018-11-06 PROCEDURE — 88305 TISSUE EXAM BY PATHOLOGIST: CPT | Mod: 26,,, | Performed by: PATHOLOGY

## 2018-11-06 PROCEDURE — 25000003 PHARM REV CODE 250: Performed by: NURSE ANESTHETIST, CERTIFIED REGISTERED

## 2018-11-06 PROCEDURE — 45378 DIAGNOSTIC COLONOSCOPY: CPT | Mod: ,,, | Performed by: INTERNAL MEDICINE

## 2018-11-06 PROCEDURE — 63600175 PHARM REV CODE 636 W HCPCS: Performed by: NURSE ANESTHETIST, CERTIFIED REGISTERED

## 2018-11-06 PROCEDURE — 45378 DIAGNOSTIC COLONOSCOPY: CPT | Performed by: INTERNAL MEDICINE

## 2018-11-06 PROCEDURE — 37000008 HC ANESTHESIA 1ST 15 MINUTES: Performed by: INTERNAL MEDICINE

## 2018-11-06 PROCEDURE — 37000009 HC ANESTHESIA EA ADD 15 MINS: Performed by: INTERNAL MEDICINE

## 2018-11-06 PROCEDURE — 88305 TISSUE EXAM BY PATHOLOGIST: CPT | Performed by: PATHOLOGY

## 2018-11-06 PROCEDURE — 27201012 HC FORCEPS, HOT/COLD, DISP: Performed by: INTERNAL MEDICINE

## 2018-11-06 PROCEDURE — E9220 PRA ENDO ANESTHESIA: HCPCS | Mod: ,,, | Performed by: NURSE ANESTHETIST, CERTIFIED REGISTERED

## 2018-11-06 PROCEDURE — 43239 EGD BIOPSY SINGLE/MULTIPLE: CPT | Mod: 51,,, | Performed by: INTERNAL MEDICINE

## 2018-11-06 RX ORDER — PROPOFOL 10 MG/ML
VIAL (ML) INTRAVENOUS CONTINUOUS PRN
Status: DISCONTINUED | OUTPATIENT
Start: 2018-11-06 | End: 2018-11-06

## 2018-11-06 RX ORDER — HEPARIN 100 UNIT/ML
5 SYRINGE INTRAVENOUS
Status: DISCONTINUED | OUTPATIENT
Start: 2018-11-06 | End: 2018-11-06 | Stop reason: HOSPADM

## 2018-11-06 RX ORDER — SODIUM CHLORIDE 9 MG/ML
INJECTION, SOLUTION INTRAVENOUS CONTINUOUS
Status: DISCONTINUED | OUTPATIENT
Start: 2018-11-06 | End: 2018-11-06 | Stop reason: HOSPADM

## 2018-11-06 RX ORDER — GLYCOPYRROLATE 0.2 MG/ML
INJECTION INTRAMUSCULAR; INTRAVENOUS
Status: DISCONTINUED | OUTPATIENT
Start: 2018-11-06 | End: 2018-11-06

## 2018-11-06 RX ORDER — PROPOFOL 10 MG/ML
VIAL (ML) INTRAVENOUS
Status: DISCONTINUED | OUTPATIENT
Start: 2018-11-06 | End: 2018-11-06

## 2018-11-06 RX ADMIN — GLYCOPYRROLATE 0.2 MG: 0.2 INJECTION, SOLUTION INTRAMUSCULAR; INTRAVENOUS at 01:11

## 2018-11-06 RX ADMIN — SODIUM CHLORIDE: 0.9 INJECTION, SOLUTION INTRAVENOUS at 12:11

## 2018-11-06 RX ADMIN — PROPOFOL 100 MG: 10 INJECTION, EMULSION INTRAVENOUS at 01:11

## 2018-11-06 RX ADMIN — PROPOFOL 200 MCG/KG/MIN: 10 INJECTION, EMULSION INTRAVENOUS at 01:11

## 2018-11-06 NOTE — PROVATION PATIENT INSTRUCTIONS
Discharge Summary/Instructions after an Endoscopic Procedure  Patient Name: Alejandro Neff  Patient MRN: [id number]  Patient   YOB: 1995 Tuesday, November 06, 2018  Terence Rosales MD  RESTRICTIONS:  During your procedure today, you received medications for sedation.  These   medications may affect your judgment, balance and coordination.  Therefore,   for 24 hours, you have the following restrictions:   - DO NOT drive a car, operate machinery, make legal/financial decisions,   sign important papers or drink alcohol.    ACTIVITY:  Today: no heavy lifting, straining or running due to procedural   sedation/anesthesia.  The following day: return to full activity including work.  DIET:  Eat and drink normally unless instructed otherwise.     TREATMENT FOR COMMON SIDE EFFECTS:  - Mild abdominal pain, nausea, belching, bloating or excessive gas:  rest,   eat lightly and use a heating pad.  - Sore Throat: treat with throat lozenges and/or gargle with warm salt   water.  - Because air was used during the procedure, expelling large amounts of air   from your rectum or belching is normal.  - If a bowel prep was taken, you may not have a bowel movement for 1-3 days.    This is normal.  SYMPTOMS TO WATCH FOR AND REPORT TO YOUR PHYSICIAN:  1. Abdominal pain or bloating, other than gas cramps.  2. Chest pain.  3. Back pain.  4. Signs of infection such as: chills or fever occurring within 24 hours   after the procedure.  5. Rectal bleeding, which would show as bright red, maroon, or black stools.   (A tablespoon of blood from the rectum is not serious, especially if   hemorrhoids are present.)  6. Vomiting.  7. Weakness or dizziness.  GO DIRECTLY TO THE NEAREST EMERGENCY ROOM IF YOU HAVE ANY OF THE FOLLOWING:      Difficulty breathing              Chills and/or fever over 101 F   Persistent vomiting and/or vomiting blood   Severe abdominal pain   Severe chest pain   Black, tarry stools   Bleeding- more than one  tablespoon   Any other symptom or condition that you feel may need urgent attention  Your doctor recommends these additional instructions:  If any biopsies were taken, your doctors clinic will contact you in 1 to 2   weeks with any results.  - Patient has a contact number available for emergencies.  The signs and   symptoms of potential delayed complications were discussed with the   patient.  Return to normal activities tomorrow.  Written discharge   instructions were provided to the patient.   - Discharge patient to home.   - Resume previous diet.   - Continue present medications.   - Await pathology results.   For questions, problems or results please call your physician - Terence Rosales MD at Work:  [phone].  [facility], EMERGENCY ROOM PHONE NUMBER: [phone]  IF A COMPLICATION OR EMERGENCY SITUATION ARISES AND YOU ARE UNABLE TO REACH   YOUR PHYSICIAN - GO DIRECTLY TO THE EMERGENCY ROOM.  Terence Rosales MD  11/6/2018 1:33:42 PM  This report has been verified and signed electronically.  [employer]

## 2018-11-06 NOTE — ANESTHESIA PREPROCEDURE EVALUATION
"                                                                                                             11/06/2018  Alejandro Neff is a 23 y.o., male.  Past Medical History:   Diagnosis Date    Abdominal pain 08/12/2010    eval for abd and chest wall pain at [facility], [place], [place] - cxr wnl, EKG ([facility]), troponin wnl, normal chemistries    Allergy     Cancer     testicular    Chondromalacia patellae     Chronic nausea 2015    eval by GI Dr. Tushar Artis - given zofran and ciproheptadine     Chronic pain     Chronic pain     [facility] - no meds; followed by psych and pain clinic and unresponsive to behavioral/CBT/old records reports c/f conversion disorder     Foot pain 04/2010    4/10 + [place], eval by Dr. ALLY Lion at [facility] -dx unclear ? gout and trial of nsaids and pdn, xrays normal 1/11, referred to podiatry, re-eval by rheum 2/12 and MRI and films wnl    Fracture of right radius 09/2009    Lyme arthritis     multiple joints    Lyme disease 2013    Memory loss 03/2012    eval for memory loss by neuro at [facility] - MRI, EEG wnl. Dr. Patricio suggested emotional factors & ref to Tushar Davies, PhD for  eval & neuropsych eval 3/12 Lupe Delgado, PhD - no evidence of formal thought disorder or psychosis - documented severe memory impairment; not related to to ADD or executive function issues. sugesstion that memory issues may be related to "emotional factors", ?conversion d/o    Stress fracture of tibia and fibula 08/2011    Urticaria      Past Surgical History:   Procedure Laterality Date    APPENDECTOMY      APPENDECTOMY, LAPAROSCOPIC N/A 5/24/2018    Performed by Kenan Huang Jr., MD at [facility] OR 2ND FLR    [id number] N/A 3/1/2018    Performed by Nikita Clayton MD at [facility] CATH LAB    BLOCK, NERVE N/A 5/29/2018    Performed by Raiza Surgeon at [facility] RAIZA    CHEST WALL BIOPSY      INJECTION OF ANESTHETIC " AGENT AROUND NERVE N/A 5/29/2018    Procedure: BLOCK, NERVE;  Surgeon: Raiza Surgeon;  Location: [facility];  Service: Anesthesiology;  Laterality: N/A;    Wkywrognl-Abex-Y-Cath N/A 4/4/2018    Performed by [facility] Diagnostic Provider at [facility] OR 2ND FLR    LAPAROSCOPIC APPENDECTOMY N/A 5/24/2018    Procedure: APPENDECTOMY, LAPAROSCOPIC;  Surgeon: Kenan Huang Jr., MD;  Location: [facility] OR [address] [place] [address];  Service: General;  Laterality: N/A;    PORTACATH PLACEMENT Right          Anesthesia Evaluation    I have reviewed the Patient Summary Reports.     I have reviewed the Medications.     Review of Systems  Anesthesia Hx:  Denies Hx of Anesthetic complications  Neg history of prior surgery. Denies Family Hx of Anesthesia complications.   Denies Personal Hx of Anesthesia complications.   Cardiovascular:   Exercise tolerance: good        Physical Exam  General:  Well nourished    Airway/Jaw/Neck:  Airway Findings: Mouth Opening: Normal Tongue: Normal  General Airway Assessment: Adult  Mallampati: I  TM Distance: Normal, at least 6 cm         Dental:  DENTAL FINDINGS: Normal   Chest/Lungs:  Chest/Lungs Clear    Heart/Vascular:  Heart Findings: Normal       Mental Status:  Mental Status Findings:  Alert and Oriented         Anesthesia Plan  Type of Anesthesia, risks & benefits discussed:  Anesthesia Type:  general  Patient's Preference:   Intra-op Monitoring Plan: standard ASA monitors  Intra-op Monitoring Plan Comments:   Post Op Pain Control Plan:   Post Op Pain Control Plan Comments:   Induction:   IV  Beta Blocker:  Patient is not currently on a Beta-Blocker (No further documentation required).       Informed Consent: Patient understands risks and agrees with Anesthesia plan.  Questions answered. Anesthesia consent signed with patient.  ASA Score: 2     Day of Surgery Review of History & Physical:    H&P update referred to the provider.         Ready For Surgery From Anesthesia Perspective.

## 2018-11-06 NOTE — ANESTHESIA POSTPROCEDURE EVALUATION
"Anesthesia Post Evaluation    Patient: Alejandro Neff    Procedure(s) Performed: Procedure(s) (LRB):  EGD (ESOPHAGOGASTRODUODENOSCOPY) (N/A)  COLONOSCOPY (N/A)    Final Anesthesia Type: general  Patient location during evaluation: GI PACU  Patient participation: Yes- Able to Participate  Level of consciousness: awake and alert  Post-procedure vital signs: reviewed and stable  Pain management: adequate  Airway patency: patent  PONV status at discharge: No PONV  Anesthetic complications: no      Cardiovascular status: blood pressure returned to baseline  Respiratory status: unassisted  Hydration status: euvolemic  Follow-up not needed.        Visit Vitals  BP (!) 105/57 (BP Location: Left arm, Patient Position: Sitting)   Pulse 82   Temp 36.9 °C (98.5 °F)   Resp 16   Ht 5' 7" (1.702 m)   Wt 61.2 kg (135 lb)   SpO2 100%   BMI 21.14 kg/m²       Pain/Bea Score: Pain Assessment Performed: Yes (11/6/2018  2:19 PM)  Presence of Pain: denies (11/6/2018  2:19 PM)  Bea Score: 10 (11/6/2018  2:19 PM)        "

## 2018-11-06 NOTE — INTERVAL H&P NOTE
The patient has been examined and the H&P has been reviewed:    There is no interval changes since last encounter.    EGD/Colonoscopy: Nausea and vomiting, Blood in stool  Sedation: GA  ASA: Per anesthesia  Mallampati: Per anesthesia    Endoscopy risks, benefits and alternative options discussed and understood by patient/family.          Active Hospital Problems    Diagnosis  POA    Nausea and vomiting [R11.2]  Yes      Resolved Hospital Problems   No resolved problems to display.

## 2018-11-06 NOTE — DISCHARGE INSTRUCTIONS
Understanding Colon and Rectal Polyps    The colon (also called the large intestine) is a muscular tube that forms the last part of the digestive tract. It absorbs water and stores food waste. The colon is about 4 to 6 feet long. The rectum is the last 6 inches of the colon. The colon and rectum have a smooth lining composed of millions of cells. Changes in these cells can lead to growths in the colon that can become cancerous and should be removed. Multiple tests are available to screen for colon cancer, but the colonoscopy is the most recommended test. During colonoscopy, these polyps can be removed. How often you need this test depends on many things including your condition, your family history, symptoms, and what the findings were at the previous colonoscopy.   When the colon lining changes  Changes that happen in the cells that line the colon or rectum can lead to growths called polyps. Over a period of years, polyps can turn cancerous. Removing polyps early may prevent cancer from ever forming.  Polyps  Polyps are fleshy clumps of tissue that form on the lining of the colon or rectum. Small polyps are usually benign (not cancerous). However, over time, cells in a polyp can change and become cancerous. Certain types of polyps known as adenomatous polyps are premalignant. The risk for invasive cancer increases with the size of the polyp and certain cell and gene features. This means that they can become cancerous if they're not removed. Hyperplastic polyps are benign. They can grow quite large and not turn cancerous.   Cancer  Almost all colorectal cancers start when polyp cells begin growing abnormally. As a cancerous tumor grows, it may involve more and more of the colon or rectum. In time, cancer can also grow beyond the colon or rectum and spread to nearby organs or to glands called lymph nodes. The cells can also travel to other parts of the body. This is known as metastasis. The earlier a cancerous  tumor is removed, the better the chance of preventing its spread.    Date Last Reviewed: 8/1/2016  © 6142-8022 [employer]. [address] [place] [address], [place] [address]. All rights reserved. This information is not intended as a substitute for professional medical care. Always follow your healthcare professional's instructions.        Upper GI Endoscopy     During endoscopy, a long, flexible tube is used to view the inside of your upper GI tract.      Upper GI endoscopy allows your healthcare provider to look directly into the beginning of your gastrointestinal (GI) tract. The esophagus, stomach, and duodenum (the first part of the small intestine) make up the upper GI tract.   Before the exam  Follow these and any other instructions you are given before your endoscopy. If you dont follow the healthcare providers instructions carefully, the test may need to be canceled or done over:  · Don't eat or drink anything after midnight the night before your exam. If your exam is in the afternoon, drink only clear liquids in the morning. Don't eat or drink anything for 8 hours before the exam. In some cases, you may be able to take medicines with sips of water until 2 hours before the procedure. Speak with your healthcare provider about this.   · Bring your X-rays and any other test results you have.  · Because you will be sedated, arrange for an adult to drive you home after the exam.  · Tell your healthcare provider before the exam if you are taking any medicines or have any medical problems.  The procedure  Here is what to expect:  · You will lie on the endoscopy table. Usually patients lie on the left side.  · You will be monitored and given oxygen.  · Your throat may be numbed with a spray or gargle. You are given medicine through an intravenous (IV) line that will help you relax and remain comfortable. You may be awake or asleep during the procedure.  · The healthcare provider will put the endoscope in  your mouth and down your esophagus. It is thinner than most pieces of food that you swallow. It will not affect your breathing. The medicine helps keep you from gagging.  · Air is put into your GI tract to expand it. It can make you burp.  · During the procedure, the healthcare provider can take biopsies (tissue samples), remove abnormalities, such as polyps, or treat abnormalities through a variety of devices placed through the endoscope. You will not feel this.   · The endoscope carries images of your upper GI tract to a video screen. If you are awake, you may be able to look at the images.  · After the procedure is done, you will rest for a time. An adult must drive you home.  When to call your healthcare provider  Contact your healthcare provider if you have:  · Black or tarry stools, or blood in your stool  · Fever  · Pain in your belly that does not go away  · Nausea and vomiting, or vomiting blood   Date Last Reviewed: 7/1/2016  © 1604-1865 [employer]. [address] [place] [address], [place] [address]. All rights reserved. This information is not intended as a substitute for professional medical care. Always follow your healthcare professional's instructions.

## 2018-11-06 NOTE — TRANSFER OF CARE
"Anesthesia Transfer of Care Note    Patient: Alejandro Neff    Procedure(s) Performed: Procedure(s) (LRB):  EGD (ESOPHAGOGASTRODUODENOSCOPY) (N/A)  COLONOSCOPY (N/A)    Patient location: GI    Anesthesia Type: general    Transport from OR: Transported from OR on room air with adequate spontaneous ventilation    Post pain: adequate analgesia    Post assessment: no apparent anesthetic complications    Post vital signs: stable    Level of consciousness: responds to stimulation    Nausea/Vomiting: no nausea/vomiting    Complications: none    Transfer of care protocol was followed      Last vitals:   Visit Vitals  /66   Pulse 101   Temp 36.9 °C (98.5 °F)   Resp 18   Ht 5' 7" (1.702 m)   Wt 61.2 kg (135 lb)   SpO2 98%   BMI 21.14 kg/m²     "

## 2018-11-06 NOTE — PLAN OF CARE
Discharge instructions given and explained to pt and pt's friend. Both verbalize understanding of instructions.

## 2018-11-09 ENCOUNTER — TELEPHONE (OUTPATIENT)
Dept: GASTROENTEROLOGY | Facility: CLINIC | Age: 23
End: 2018-11-09

## 2018-11-09 NOTE — TELEPHONE ENCOUNTER
----- Message from Terence Rosales MD sent at 11/9/2018  7:35 AM CST -----  Please notify patient, the stomach biopsies did not reveal any H.pylori.

## 2018-11-11 ENCOUNTER — PATIENT MESSAGE (OUTPATIENT)
Dept: GASTROENTEROLOGY | Facility: CLINIC | Age: 23
End: 2018-11-11

## 2018-11-11 ENCOUNTER — TELEPHONE (OUTPATIENT)
Dept: GASTROENTEROLOGY | Facility: CLINIC | Age: 23
End: 2018-11-11

## 2018-11-11 DIAGNOSIS — K64.8 INTERNAL HEMORRHOIDS: Primary | ICD-10-CM

## 2018-11-11 DIAGNOSIS — K62.5 RECTAL BLEEDING: ICD-10-CM

## 2018-11-13 ENCOUNTER — TELEPHONE (OUTPATIENT)
Dept: ENDOSCOPY | Facility: HOSPITAL | Age: 23
End: 2018-11-13

## 2018-11-13 ENCOUNTER — CLINICAL SUPPORT (OUTPATIENT)
Dept: REHABILITATION | Facility: OTHER | Age: 23
End: 2018-11-13
Attending: ANESTHESIOLOGY
Payer: COMMERCIAL

## 2018-11-13 DIAGNOSIS — R29.3 POSTURAL IMBALANCE: ICD-10-CM

## 2018-11-13 DIAGNOSIS — R68.89 DECREASED STRENGTH, ENDURANCE, AND MOBILITY: ICD-10-CM

## 2018-11-13 DIAGNOSIS — Z74.09 DECREASED STRENGTH, ENDURANCE, AND MOBILITY: ICD-10-CM

## 2018-11-13 DIAGNOSIS — R53.1 DECREASED STRENGTH, ENDURANCE, AND MOBILITY: ICD-10-CM

## 2018-11-13 DIAGNOSIS — M25.519 NECK AND SHOULDER PAIN: ICD-10-CM

## 2018-11-13 DIAGNOSIS — M54.2 NECK AND SHOULDER PAIN: ICD-10-CM

## 2018-11-13 PROCEDURE — 97110 THERAPEUTIC EXERCISES: CPT | Mod: PN

## 2018-11-13 PROCEDURE — 97162 PT EVAL MOD COMPLEX 30 MIN: CPT | Mod: PN

## 2018-11-13 NOTE — PLAN OF CARE
"[facility]  Physical Therapy Initial Evaluation    Name: Alejandro Neff  Clinic Number: [id number]    Therapy Diagnosis:   Encounter Diagnoses   Name Primary?    Neck and shoulder pain     Postural imbalance     Decreased strength, endurance, and mobility      Physician: Stanton Bran MD    Physician Orders: PT Eval and Treat   Medical Diagnosis from Referral:   M54.12 (ICD-10-CM) - Cervical radiculopathy   M79.18 (ICD-10-CM) - Myofascial pain       Evaluation Date: 11/13/2018  Authorization Period Expiration: 12/31/18  Plan of Care Expiration: 02/11/19  Visit # / Visits authorized: 1/ 20    Time In: 11:00  Time Out: 12:00  Total Billable Time: 60 minutes    Precautions: Standard, Hx tinnitus, hypotension, Hx Lyme disease, Hx CA (mediastinal seminoma) w/ chemotherapy - completed 4 cycles EP on 7/11/18 (port still present in R pec, tumor not operated on due to shrinking from chemo), Restaging PET with residual tissue but not PET avid lesions indicating any mets    Subjective   Date of onset: ~4-6 weeks  History of current condition - Az reports: Insidious onset of left shoulder pain for >1 month - denies TORO or trauma at onset. Previous episode of same shoulder pain in November 2017, which he notes resolved spontaneously after 4-5 month when he started chemo. He notes that today's symptoms are the same but less intense. Pain is described as sharp and constant, localized over the L acromion, anterior shoulder over biceps tendon, down the L arm, and occasionally down and around the L scapula. Pain does not go past the elbow. Pain worsens at the end of the day and with OH reaching while at work or with ADLs and sleeping at night. Muscle relaxers and opioids do not help; "I just stop using my L arm." Occasionally gets weak in the left arm and drops things, but weakness associated with pain. "I avoid holding things in my L hand in case things fall out and I don't notice that my hand " "opened by accident. I don't want to drop and break anything." He denies hand weakness or numbness/tingling down the LUE to the hand. Pain at work with activities as well with daily activities at home. 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 His c/c is pain at night while sleeping - wakes up frequently throughout the night, UA to sleep >1 hour. Was told by the MD that it was a biceps tendinosis, but another MD told him that it was something different; he is therefore unsure of what is causing his pain. R hand dominant. Chemo port still present in R pec.      He also notes global pain in back and all extremities. He was prescribed a referral for medical massage by Dr. Bran but was told that [facility] does not provide those services.    Past Medical History:   Diagnosis Date    Abdominal pain 08/12/2010    eval for abd and chest wall pain at [facility], [place], [place] - cxr wnl, EKG (RV), troponin wnl, normal chemistries    Allergy     Cancer     testicular    Chondromalacia patellae     Chronic nausea 2015    eval by  Dr. Tushar Artis - given zofran and ciproheptadine     Chronic pain     Chronic pain     [facility] - no meds; followed by psych and pain clinic and unresponsive to behavioral/CBT/old records reports c/f conversion disorder     Foot pain 04/2010    4/10 + SHIRA, eval by Dr. ALLY Lion at [facility] -dx unclear ? gout and trial of nsaids and pdn, xrays normal 1/11, referred to podiatry, re-eval by rheum 2/12 and MRI and films wnl    Fracture of right radius 09/2009    Lyme arthritis     multiple joints    Lyme disease 2013    Memory loss 03/2012    eval for memory loss by neuro at [facility] - MRI, EEG wnl. Dr. Patricio suggested emotional factors & ref to Tushar Davies, PhD for  eval & neuropsych eval 3/12 Lupe Delgado, PhD - no evidence of formal thought disorder or psychosis - documented severe memory " "impairment; not related to to ADD or executive function issues. sugesstion that memory issues may be related to "emotional factors", ?conversion d/o    Stress fracture of tibia and fibula 08/2011    Urticaria      Alejandro Neff  has a past surgical history that includes Portacath placement (Right); Chest wall biopsy; Appendectomy; EGD (ESOPHAGOGASTRODUODENOSCOPY) (N/A, 11/6/2018); COLONOSCOPY (N/A, 11/6/2018); BLOCK, NERVE (N/A, 5/29/2018); APPENDECTOMY, LAPAROSCOPIC (N/A, 5/24/2018); Icevfsyic-Jytd-F-Cath (N/A, 4/4/2018); and TKTKGS-[id number]-QY (N/A, 3/1/2018).    Alejandro has a current medication list which includes the following prescription(s): albuterol, albuterol sulfate, baclofen, pantoprazole, and promethazine.    Review of patient's allergies indicates:   Allergen Reactions    Mushroom Anaphylaxis    Bamboo     Bee pollens     Mushroom flavor     Venom-honey bee         Imaging, MRI studies: on 10/12/18 to shoulder - Impression: Tendinosis of the biceps tendon.  Otherwise unremarkable shoulder MRI.    Prior Therapy: yes - at other locations outside of [facility] for back, leg pain.  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 Not in school currently due to medical complications. Hopes to get back in January 2019    Pain:  Current 3/10, worst 9/10, best 3/10   Location: left shoulder  Description: Sharp and Shooting  Aggravating Factors: sleeping, driving, reaching OH, out. Lifting, carrying heavy objects. Gripping. PM>AM  Easing Factors: rest    Pts goals: be able to sleep a full night without waking up from pain    Objective     Posture Alignment: slouched posture, FHP, increased " kyphosis (UA to self correct to upright posture w/o cuing, UA to maintain w/o cuing, UA to reverse increased TSP kyphosis), increased GHJ IR L>R  Palpation: increased tone and tenderness over L UT  DTR's: 2+  Dermatomes: Sensation: Light Touch: Intact    CERVICAL SPINE AROM:   Flexion: WNL, no pain   Extension: WNL, no pain   Left Sidebend: WNL, no pain   Right Sidebend: WNL, no pain   Left Rotation: WNL, no pain   Right Rotation: WNL, no pain     Deep Neck Flexor: good  Shoulder ROM:  Right Left  *denotes endrange pain at anterior shoulder and acromion  Flexion   WNL  WNL*  Abduction  WNL  WNL*  ER at 0 deg  WNL  WNL  IR   WNL WNL    Elbow ROM: WNL no pain  Wrist ROM: WNL no pain      UPPER EXTREMITY STRENGTH:   Left Right   Shoulder Flexion 4/5 4-/5   Shoulder Abduction 4/5 4-/5   Shoulder Internal Rotation 4+/5 4+/5   Shoulder External Rotation 4/5 4-/5   Elbow Flexion  5/5 5/5   Elbow Extension 5/5 5/5   Wrist Flexion 5/5 5/5   Wrist Extension 5/5 5/5    5 5       Scapular Strength:  Upper Trap: 5/5 - pain noted in L UT  Mid Trap 3+  Low Trap: 3-  Rhomboids: 3+  Serratus Anterior: 4    Joint Mobility: T3-7 = 1+/6, T7-9 = 2-/6. Crepitus and grinding noted with B GHJ AP/PA glides    Special Tests:    Clonus: -  Vertebral artery test: -  Alar ligament integrity test: -  Supraspinatus (Empty Can Test): -  Dawn: -  Neers: -  Speeds: -  Neural Tension: NT today      CMS Impairment/Limitation/Restriction for FOTO Neck Survey    Therapist reviewed FOTO scores for Alejandro Neff on 11/13/2018.   FOTO documents entered into [employer] - see Media section.    Limitation Score: 40%  Category: Mobility    Current : CK = at least 40% but < 60% impaired, limited or restricted  Goal: CI = at least 1% but < 20% impaired, limited or restricted  Discharge: N/A         TREATMENT   Treatment Time In: 11:30  Treatment Time Out: 12:00  Total Treatment time separate from Evaluation: 30 minutes    Az received  "therapeutic exercises to develop strength, endurance, ROM, flexibility, posture and core stabilization for 25 minutes including:  Seated thoracic ext: 10x  Open books: 10x - defer for RUE 2* to port  Prone scap squeezes: 10 x 10"  Prone Is, Ts: 5 x 10" ea - crepitus/grinding noted in R GHJ, no noted pain    Az received the following manual therapy techniques: Joint mobilizations were applied to the: TSP, ribe for 5 minutes, including: spring PAs, RR/LR ribs    Home Exercises and Patient Education Provided    Education provided:   - Patient educated regarding pathogenesis, diagnosis, protocol, prognosis, POC, and HEP. Written Home Exercises Provided with written and verbal instructions for frequency and duration of the following exercises: see EMR. Pt educated on HEP and activity modifications to reduce c/o pain and improve overall function. Pt was educated in posture and body mechanics.  Use of a lumbar roll was recommended and demonstrated here today.  Purchase information provided. Pt also educated on use of modalities prn to reduce c/o pain and dysfunction.     Written Home Exercises Provided: yes.  Exercises were reviewed and Az was able to demonstrate them prior to the end of the session.  Az demonstrated good  understanding of the education provided.     See EMR under Patient Instructions for exercises provided 11/13/2018.    Assessment   Alejandro is a 23 y.o. male referred to outpatient Physical Therapy with a medical diagnosis of cervical radiculopathy and myofascial pain. Pt presents with insidious onset of L shoulder pain with associated limitations in postural awareness and endurance, poor TSP joint mobility, and poor scapular > shoulder strength, which continues to facilitate poor postural alignment and c/o pain, as well as referred pain down the LUE. No noted tests were definitive at identifying RTC vs cervical pathology.    Pt prognosis is Good.   Pt will benefit from skilled outpatient Physical Therapy " to address the deficits stated above and in the chart below, provide pt/family education, and to maximize pt's level of independence.     Plan of care discussed with patient: Yes  Pt's spiritual, cultural and educational needs considered and patient is agreeable to the plan of care and goals as stated below:     Anticipated Barriers for therapy: financial considerations, transportation (painful to drive), age, Hx cancer and presence of port    Medical Necessity is demonstrated by the following  History  Co-morbidities and personal factors that may impact the plan of care Co-morbidities:   chronic constipation, difficulty sleeping, education level, financial considerations, history of cancer, level of undertstanding of current condition, young age and recent chemo with port present, tumor present (non-operative currently), chronic global pain in BU/BLE/trunk and back, Hx Lyme disease    Personal Factors:   age  education level  social background  lifestyle     high   Examination  Body Structures and Functions, activity limitations and participation restrictions that may impact the plan of care Body Regions:   neck  back  upper extremities  trunk    Body Systems:    gross symmetry  ROM  strength  gross coordinated movement  motor control  motor learning  muscular flexibility, joint mobility, postural awareness and endurance    Participation Restrictions:   ADLs, HHCs, work-related activities, sleeping, CPU work, driving, school-related activities    Activity limitations:   Learning and applying knowledge  no deficits    General Tasks and Commands  no deficits    Communication  no deficits    Mobility  lifting and carrying objects  driving (bike, car, motorcycle)    Self care  washing oneself (bathing, drying, washing hands)  caring for body parts (brushing teeth, shaving, grooming)  dressing  looking after one's health    Domestic Life  shopping  cooking  doing house work (cleaning house, washing dishes,  laundry)  assisting others    Interactions/Relationships  no deficits    Life Areas  higher education  employment    Community and Social Life  community life  recreation and leisure         moderate   Clinical Presentation evolving clinical presentation with changing clinical characteristics moderate   Decision Making/ Complexity Score: moderate     Goals:  Short Term Goals (4 Weeks):   1. Pt will report 20% reduction in pain of the cervical spine and LUE for ease with ADL's  2. PT will demonstrate 1/3 MMT improvement in periscapular strength for ease with upright posture  3. Pt will demonstrate improved joint mobility of the thoracic spine by a half grade for ease with driving to MD appointments.  4. Pt to improve perceived functional mobility with FOTO limitation <=30% disability.    Long Term Goals (8 Weeks):   1. Pt will report being independent with HEP for maintenance of improvements gained during therapy sessions  2. PT will report 50% reduction of pain of the neck and LUE for ease with donning upper body clothing   3. Pt will demonstrate full LUE and periscapular strength without the provocation of pain for ease with household chores  4. Pt will demonstrate appropriate upright posture without external cueing for ease with work related activities.   5. Pt to improve perceived functional mobility with FOTO limitation <=20% disability.      Plan   Plan of care Certification: 11/13/2018 to 02/11/2019.    Outpatient Physical Therapy 2 times weekly for 10 weeks to include the following interventions: Aquatic Therapy, Cervical/Lumbar Traction, Electrical Stimulation prn, Iontophoresis (with dexamethasone prn), Manual Therapy, Moist Heat/ Ice, Neuromuscular Re-ed, Patient Education, Self Care, Therapeutic Activites, Therapeutic Exercise and IASTYM, therapeutic taping, dry needling. Progress HEP towards D/C. Recommend F/U with MD if symptoms worsen or do not resolve. Patient may be seen by a PTA for treatment to  carry out their plan of care.  Face-to-face conferences will be held.        Rossy Chavez, PT

## 2018-12-02 ENCOUNTER — PATIENT MESSAGE (OUTPATIENT)
Dept: HEMATOLOGY/ONCOLOGY | Facility: CLINIC | Age: 23
End: 2018-12-02

## 2018-12-03 ENCOUNTER — CLINICAL SUPPORT (OUTPATIENT)
Dept: REHABILITATION | Facility: OTHER | Age: 23
End: 2018-12-03
Payer: COMMERCIAL

## 2018-12-03 DIAGNOSIS — M25.519 NECK AND SHOULDER PAIN: ICD-10-CM

## 2018-12-03 DIAGNOSIS — Z74.09 DECREASED STRENGTH, ENDURANCE, AND MOBILITY: ICD-10-CM

## 2018-12-03 DIAGNOSIS — R68.89 DECREASED STRENGTH, ENDURANCE, AND MOBILITY: ICD-10-CM

## 2018-12-03 DIAGNOSIS — M54.2 NECK AND SHOULDER PAIN: ICD-10-CM

## 2018-12-03 DIAGNOSIS — R29.3 POSTURAL IMBALANCE: ICD-10-CM

## 2018-12-03 DIAGNOSIS — R53.1 DECREASED STRENGTH, ENDURANCE, AND MOBILITY: ICD-10-CM

## 2018-12-03 PROCEDURE — 97110 THERAPEUTIC EXERCISES: CPT | Mod: PN

## 2018-12-03 NOTE — PROGRESS NOTES
"  Physical Therapy Daily Treatment Note     Name: Alejandro Neff  Clinic Number: [id number]    Therapy Diagnosis:   Encounter Diagnoses   Name Primary?    Neck and shoulder pain     Postural imbalance     Decreased strength, endurance, and mobility      Physician: Stanton Bran MD    Visit Date: 12/3/2018    Physician Orders: PT Eval and Treat   Medical Diagnosis from Referral:   M54.12 (ICD-10-CM) - Cervical radiculopathy   M79.18 (ICD-10-CM) - Myofascial pain         Evaluation Date: 11/13/2018  Authorization Period Expiration: 12/31/18  Plan of Care Expiration: 02/11/19  Visit # / Visits authorized: 1/ 20       Time In: 12:00  Time Out: 1:00  Total Billable Time: 60 minutes    Precautions: Standard, Hx tinnitus, hypotension, Hx Lyme disease, Hx CA (mediastinal seminoma) w/ chemotherapy - completed 4 cycles EP on 7/11/18 (port still present in R pec, tumor not operated on due to shrinking from chemo), Restaging PET with residual tissue but not PET avid lesions indicating any mets    Subjective     Pt reports: that he is tired today - "it was a late night and I woke up 20 minutes ago." His c/c today is anterior L shoulder pain.  He was compliant with home exercise program.  Response to previous treatment: min soreness  Functional change: none    Pain: 4/10  Location: left shoulder      Objective     Az received therapeutic exercises to develop strength, endurance, ROM, flexibility, posture and core stabilization for 60 minutes including:    UBE: 3'/3' (collected history, assessed pt complaints, education on causes of shoulder/neck pain; importance of exercise)  1/2 foam pec stretch: 2 min  1/2 foal OH flex, horiz abd: 15  1/2 foam SA punches: 15 x 3#  Open books: 10x  Prone Scap squeezes: 10 x 10"  Prone Is, Ts, Ys: !0 x 10"    Supine Bc ER: 2 x 10 x OTB  Supine unilat abd: 2  10 x OTB - performed bilaterally  Chin tucks:  - NP, add next visit    Seated thoracic ext: 10x    Standing TB Is, Ts, Ys - NP, " add next visit  Standing TB IT, ER - NP, add next visit  Standing NR - NP, add next visit  Standing SALPD - NP, add next visit      Home Exercises Provided and Patient Education Provided     Education provided:   - none today    Written Home Exercises Provided: Patient instructed to cont prior HEP.  Exercises were reviewed and Az was able to demonstrate them prior to the end of the session.  Az demonstrated good  understanding of the education provided.     See EMR under Patient Instructions for exercises provided prior visit.    Assessment     Pt tolerated overall session well today. Audible crepitus noted with prone exercises. Pt demo's good return technique of HEP, indicating good compliance at home.    Az is progressing well towards his goals.   Pt prognosis is Good.     Pt will continue to benefit from skilled outpatient physical therapy to address the deficits listed in the problem list box on initial evaluation, provide pt/family education and to maximize pt's level of independence in the home and community environment.     Pt's spiritual, cultural and educational needs considered and pt agreeable to plan of care and goals.    Anticipated barriers to physical therapy: financial considerations, transportation (painful to drive), age, Hx cancer and presence of port    Goals: See IE on 11/13/18 - PN due by 12/13/18  Short Term Goals (4 Weeks):   1. Pt will report 20% reduction in pain of the cervical spine and LUE for ease with ADL's  2. PT will demonstrate 1/3 MMT improvement in periscapular strength for ease with upright posture  3. Pt will demonstrate improved joint mobility of the thoracic spine by a half grade for ease with driving to MD appointments.  4. Pt to improve perceived functional mobility with FOTO limitation <=30% disability.     Long Term Goals (8 Weeks):   1. Pt will report being independent with HEP for maintenance of improvements gained during therapy sessions  2. PT will report 50% reduction  of pain of the neck and LUE for ease with donning upper body clothing   3. Pt will demonstrate full LUE and periscapular strength without the provocation of pain for ease with household chores  4. Pt will demonstrate appropriate upright posture without external cueing for ease with work related activities.   5. Pt to improve perceived functional mobility with FOTO limitation <=20% disability.    Plan     Continue with POC with emphases on postural endurance and strength/stability of the scapula/GHJ    Rossy Chavez, PT

## 2018-12-04 NOTE — PROGRESS NOTES
"  Physical Therapy Daily Treatment Note     Name: Alejandro Neff  Clinic Number: [id number]    Therapy Diagnosis:   Encounter Diagnoses   Name Primary?    Neck and shoulder pain     Postural imbalance     Decreased strength, endurance, and mobility      Physician: Stanton Bran MD    Visit Date: 12/5/2018    Physician Orders: PT Eval and Treat   Medical Diagnosis from Referral:   M54.12 (ICD-10-CM) - Cervical radiculopathy   M79.18 (ICD-10-CM) - Myofascial pain         Evaluation Date: 11/13/2018  Authorization Period Expiration: 12/31/18  Plan of Care Expiration: 02/11/19  Visit # / Visits authorized: 3/ 20       Time In: 12:15 - pt 15 min late for appt  Time Out: 1:00  Total Billable Time: 60 minutes    Precautions: Standard, Hx tinnitus, hypotension, Hx Lyme disease, Hx CA (mediastinal seminoma) w/ chemotherapy - completed 4 cycles EP on 7/11/18 (port still present in R pec, tumor not operated on due to shrinking from chemo), Restaging PET with residual tissue but not PET avid lesions indicating any mets    Subjective     Pt reports: that he is having increased L shoulder pain today. Notes pain is more posterior than anterior.  He was compliant with home exercise program.  Response to previous treatment: min soreness  Functional change: none    Pain: 4/10  Location: left shoulder      Objective     Az received therapeutic exercises to develop strength, endurance, ROM, flexibility, posture and core stabilization for 45 minutes including:    **Exercises in BOLD performed today**    UBE: 3'/3' - NP today  1/2 foam pec stretch: 2 min  1/2 foal OH flex, horiz abd: 15x  1/2 foam SA punches: 15 x 4#  Open books: 10x OTB  - NP today  Prone Scap squeezes: 10 x 10" - NP today  Prone Is, Ts, Ys: !0 x 10" - NP today    Supine Bc ER: 2 x 10 x OTB  Supine unilat abd: 2  10 x OTB - performed bilaterally  Chin tucks:  - NP, add next visit    Seated thoracic ext: 10x - NP    Standing TB Is, Ts, Ys - NP, add next " visit  +Standing TB IR: 2 x 10 x OTB  +Standing TB ER: 2 x 10 x OTB  +Standing NR: 1 x 15 x GTB  +Standing SALPD: 1 x 15 x OTB  +standing wall angels: 3 x 10    Home Exercises Provided and Patient Education Provided     Education provided:   - none today    Written Home Exercises Provided: Patient instructed to cont prior HEP.  Exercises were reviewed and Az was able to demonstrate them prior to the end of the session.  Az demonstrated good  understanding of the education provided.     See EMR under Patient Instructions for exercises provided prior visit.    Assessment     New exercises added to promote scapular and humeral strength, stability and muscular endurance. Pt tolerated overall session well today with good training effect.    Az is progressing well towards his goals.   Pt prognosis is Good.     Pt will continue to benefit from skilled outpatient physical therapy to address the deficits listed in the problem list box on initial evaluation, provide pt/family education and to maximize pt's level of independence in the home and community environment.     Pt's spiritual, cultural and educational needs considered and pt agreeable to plan of care and goals.    Anticipated barriers to physical therapy: financial considerations, transportation (painful to drive), age, Hx cancer and presence of port    Goals: See IE on 11/13/18 - PN due by 12/13/18  Short Term Goals (4 Weeks):   1. Pt will report 20% reduction in pain of the cervical spine and LUE for ease with ADL's  2. PT will demonstrate 1/3 MMT improvement in periscapular strength for ease with upright posture  3. Pt will demonstrate improved joint mobility of the thoracic spine by a half grade for ease with driving to MD appointments.  4. Pt to improve perceived functional mobility with FOTO limitation <=30% disability.     Long Term Goals (8 Weeks):   1. Pt will report being independent with HEP for maintenance of improvements gained during therapy  sessions  2. PT will report 50% reduction of pain of the neck and LUE for ease with donning upper body clothing   3. Pt will demonstrate full LUE and periscapular strength without the provocation of pain for ease with household chores  4. Pt will demonstrate appropriate upright posture without external cueing for ease with work related activities.   5. Pt to improve perceived functional mobility with FOTO limitation <=20% disability.    Plan     Continue with POC with emphases on postural endurance and strength/stability of the scapula/GHJ    Rossy Chavez, PT

## 2018-12-05 ENCOUNTER — CLINICAL SUPPORT (OUTPATIENT)
Dept: REHABILITATION | Facility: OTHER | Age: 23
End: 2018-12-05
Payer: COMMERCIAL

## 2018-12-05 DIAGNOSIS — R68.89 DECREASED STRENGTH, ENDURANCE, AND MOBILITY: ICD-10-CM

## 2018-12-05 DIAGNOSIS — M54.2 NECK AND SHOULDER PAIN: ICD-10-CM

## 2018-12-05 DIAGNOSIS — R29.3 POSTURAL IMBALANCE: ICD-10-CM

## 2018-12-05 DIAGNOSIS — M25.519 NECK AND SHOULDER PAIN: ICD-10-CM

## 2018-12-05 DIAGNOSIS — R53.1 DECREASED STRENGTH, ENDURANCE, AND MOBILITY: ICD-10-CM

## 2018-12-05 DIAGNOSIS — Z74.09 DECREASED STRENGTH, ENDURANCE, AND MOBILITY: ICD-10-CM

## 2018-12-05 PROCEDURE — 97110 THERAPEUTIC EXERCISES: CPT | Mod: PN

## 2018-12-07 ENCOUNTER — HOSPITAL ENCOUNTER (OUTPATIENT)
Dept: RADIOLOGY | Facility: OTHER | Age: 23
Discharge: HOME | End: 2018-12-07
Attending: INTERNAL MEDICINE
Payer: COMMERCIAL

## 2018-12-07 ENCOUNTER — INFUSION (OUTPATIENT)
Dept: INFUSION THERAPY | Facility: OTHER | Age: 23
End: 2018-12-07
Attending: INTERNAL MEDICINE
Payer: COMMERCIAL

## 2018-12-07 VITALS
RESPIRATION RATE: 18 BRPM | TEMPERATURE: 99 F | DIASTOLIC BLOOD PRESSURE: 55 MMHG | HEART RATE: 72 BPM | OXYGEN SATURATION: 99 % | SYSTOLIC BLOOD PRESSURE: 115 MMHG

## 2018-12-07 DIAGNOSIS — R11.0 CHEMOTHERAPY-INDUCED NAUSEA: Primary | ICD-10-CM

## 2018-12-07 DIAGNOSIS — T45.1X5A CHEMOTHERAPY-INDUCED NAUSEA: Primary | ICD-10-CM

## 2018-12-07 DIAGNOSIS — C38.3 PRIMARY MEDIASTINAL SEMINOMA: ICD-10-CM

## 2018-12-07 LAB
AFP SERPL-MCNC: 1.4 NG/ML
ALBUMIN SERPL BCP-MCNC: 4.4 G/DL
ALP SERPL-CCNC: 49 U/L
ALT SERPL W/O P-5'-P-CCNC: 17 U/L
ANION GAP SERPL CALC-SCNC: 6 MMOL/L
AST SERPL-CCNC: 14 U/L
BILIRUB SERPL-MCNC: 0.8 MG/DL
BUN SERPL-MCNC: 11 MG/DL
CALCIUM SERPL-MCNC: 9.3 MG/DL
CHLORIDE SERPL-SCNC: 103 MMOL/L
CO2 SERPL-SCNC: 27 MMOL/L
CREAT SERPL-MCNC: 0.8 MG/DL
ERYTHROCYTE [DISTWIDTH] IN BLOOD BY AUTOMATED COUNT: 14.7 %
EST. GFR  (AFRICAN AMERICAN): >60 ML/MIN/1.73 M^2
EST. GFR  (NON AFRICAN AMERICAN): >60 ML/MIN/1.73 M^2
GLUCOSE SERPL-MCNC: 89 MG/DL
HCG INTACT+B SERPL-ACNC: <1.2 MIU/ML
HCT VFR BLD AUTO: 40.4 %
HGB BLD-MCNC: 13.6 G/DL
LDH SERPL L TO P-CCNC: 136 U/L
MCH RBC QN AUTO: 29.2 PG
MCHC RBC AUTO-ENTMCNC: 33.7 G/DL
MCV RBC AUTO: 87 FL
NEUTROPHILS # BLD AUTO: 3 K/UL
PLATELET # BLD AUTO: 161 K/UL
PMV BLD AUTO: 8.7 FL
POTASSIUM SERPL-SCNC: 4 MMOL/L
PROT SERPL-MCNC: 6.8 G/DL
RBC # BLD AUTO: 4.65 M/UL
SODIUM SERPL-SCNC: 136 MMOL/L
TSH SERPL DL<=0.005 MIU/L-ACNC: 0.55 UIU/ML
WBC # BLD AUTO: 4.61 K/UL

## 2018-12-07 PROCEDURE — 74177 CT ABD & PELVIS W/CONTRAST: CPT | Mod: TC

## 2018-12-07 PROCEDURE — A4216 STERILE WATER/SALINE, 10 ML: HCPCS | Performed by: INTERNAL MEDICINE

## 2018-12-07 PROCEDURE — 82105 ALPHA-FETOPROTEIN SERUM: CPT

## 2018-12-07 PROCEDURE — 84702 CHORIONIC GONADOTROPIN TEST: CPT

## 2018-12-07 PROCEDURE — 84443 ASSAY THYROID STIM HORMONE: CPT

## 2018-12-07 PROCEDURE — 36591 DRAW BLOOD OFF VENOUS DEVICE: CPT

## 2018-12-07 PROCEDURE — 80053 COMPREHEN METABOLIC PANEL: CPT

## 2018-12-07 PROCEDURE — 25500020 PHARM REV CODE 255: Performed by: INTERNAL MEDICINE

## 2018-12-07 PROCEDURE — 96523 IRRIG DRUG DELIVERY DEVICE: CPT

## 2018-12-07 PROCEDURE — 85027 COMPLETE CBC AUTOMATED: CPT

## 2018-12-07 PROCEDURE — 74177 CT ABD & PELVIS W/CONTRAST: CPT | Mod: 26,,, | Performed by: RADIOLOGY

## 2018-12-07 PROCEDURE — 71260 CT THORAX DX C+: CPT | Mod: 26,,, | Performed by: RADIOLOGY

## 2018-12-07 PROCEDURE — 63600175 PHARM REV CODE 636 W HCPCS: Performed by: INTERNAL MEDICINE

## 2018-12-07 PROCEDURE — 83615 LACTATE (LD) (LDH) ENZYME: CPT

## 2018-12-07 PROCEDURE — 25000003 PHARM REV CODE 250: Performed by: INTERNAL MEDICINE

## 2018-12-07 RX ORDER — SODIUM CHLORIDE 0.9 % (FLUSH) 0.9 %
10 SYRINGE (ML) INJECTION
Status: CANCELLED | OUTPATIENT
Start: 2018-12-07

## 2018-12-07 RX ORDER — SODIUM CHLORIDE 0.9 % (FLUSH) 0.9 %
10 SYRINGE (ML) INJECTION
Status: COMPLETED | OUTPATIENT
Start: 2018-12-07 | End: 2018-12-07

## 2018-12-07 RX ORDER — HEPARIN 100 UNIT/ML
500 SYRINGE INTRAVENOUS
Status: CANCELLED | OUTPATIENT
Start: 2018-12-07

## 2018-12-07 RX ORDER — HEPARIN 100 UNIT/ML
500 SYRINGE INTRAVENOUS
Status: COMPLETED | OUTPATIENT
Start: 2018-12-07 | End: 2018-12-07

## 2018-12-07 RX ADMIN — IOHEXOL 30 ML: 350 INJECTION, SOLUTION INTRAVENOUS at 03:12

## 2018-12-07 RX ADMIN — Medication 10 ML: at 03:12

## 2018-12-07 RX ADMIN — IOHEXOL 75 ML: 350 INJECTION, SOLUTION INTRAVENOUS at 03:12

## 2018-12-07 RX ADMIN — HEPARIN 500 UNITS: 100 SYRINGE at 03:12

## 2018-12-07 NOTE — PLAN OF CARE
Problem: Patient Care Overview  Goal: Plan of Care Review  Outcome: Ongoing (interventions implemented as appropriate)  Port flushed and labs drawn from port, patient tolerated well. VSS, NAD. D/C'd home with self.

## 2018-12-10 ENCOUNTER — OFFICE VISIT (OUTPATIENT)
Dept: HEMATOLOGY/ONCOLOGY | Facility: CLINIC | Age: 23
End: 2018-12-10
Payer: COMMERCIAL

## 2018-12-10 ENCOUNTER — CLINICAL SUPPORT (OUTPATIENT)
Dept: REHABILITATION | Facility: OTHER | Age: 23
End: 2018-12-10
Payer: COMMERCIAL

## 2018-12-10 VITALS
HEIGHT: 67 IN | WEIGHT: 138 LBS | RESPIRATION RATE: 16 BRPM | TEMPERATURE: 98 F | DIASTOLIC BLOOD PRESSURE: 59 MMHG | OXYGEN SATURATION: 99 % | SYSTOLIC BLOOD PRESSURE: 113 MMHG | BODY MASS INDEX: 21.66 KG/M2 | HEART RATE: 74 BPM

## 2018-12-10 DIAGNOSIS — Z74.09 DECREASED STRENGTH, ENDURANCE, AND MOBILITY: ICD-10-CM

## 2018-12-10 DIAGNOSIS — M54.2 NECK AND SHOULDER PAIN: ICD-10-CM

## 2018-12-10 DIAGNOSIS — R29.3 POSTURAL IMBALANCE: ICD-10-CM

## 2018-12-10 DIAGNOSIS — T45.1X5A ANTINEOPLASTIC CHEMOTHERAPY INDUCED ANEMIA: ICD-10-CM

## 2018-12-10 DIAGNOSIS — M25.519 NECK AND SHOULDER PAIN: ICD-10-CM

## 2018-12-10 DIAGNOSIS — T45.1X5A CHEMOTHERAPY-INDUCED THROMBOCYTOPENIA: ICD-10-CM

## 2018-12-10 DIAGNOSIS — D69.59 CHEMOTHERAPY-INDUCED THROMBOCYTOPENIA: ICD-10-CM

## 2018-12-10 DIAGNOSIS — C38.3 PRIMARY MEDIASTINAL SEMINOMA: Primary | ICD-10-CM

## 2018-12-10 DIAGNOSIS — D64.81 ANTINEOPLASTIC CHEMOTHERAPY INDUCED ANEMIA: ICD-10-CM

## 2018-12-10 DIAGNOSIS — R68.89 DECREASED STRENGTH, ENDURANCE, AND MOBILITY: ICD-10-CM

## 2018-12-10 DIAGNOSIS — R53.1 DECREASED STRENGTH, ENDURANCE, AND MOBILITY: ICD-10-CM

## 2018-12-10 PROCEDURE — 97110 THERAPEUTIC EXERCISES: CPT | Mod: PN

## 2018-12-10 PROCEDURE — 99214 OFFICE O/P EST MOD 30 MIN: CPT | Mod: S$GLB,,, | Performed by: INTERNAL MEDICINE

## 2018-12-10 PROCEDURE — 99999 PR PBB SHADOW E&M-EST. PATIENT-LVL III: CPT | Mod: PBBFAC,,, | Performed by: INTERNAL MEDICINE

## 2018-12-10 PROCEDURE — 3008F BODY MASS INDEX DOCD: CPT | Mod: CPTII,S$GLB,, | Performed by: INTERNAL MEDICINE

## 2018-12-10 NOTE — Clinical Note
Return to infusion area for nurse to flush port and draw CBC, CMP, AFP, HCG, LDH on 2/8/19. RTC on 2/11/19.

## 2018-12-10 NOTE — PROGRESS NOTES
"Subjective:      Patient ID: Alejandro Neff is a 23 y.o. male.     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 Mr. Hampton is a 22 yo man who first presented with chest pain and underwent CT chest on 2/14/18, which showed a 6 x 3.5 cm mediastinal mass. It abuts the aorta and pulmonary artery. Biopsy was done on 3/1/18. Pathology (reviewed at [facility]) showed germ cell tumor, most consistent with seminoma.   2. HCG, LDH, AFP on 3/22/18 all normal.   3. Testicular US 3/26/18 showed no testicular masses. CT C/A/P on 3/26/18 showed "stable appearance of anterior mediastinal mass consistent with provided history of seminoma. Several punctate sclerotic foci are noted in the pelvis at the right pubic bone, right ischial tuberosity, and left ilium adjacent to the sacroiliac joint.  These are strongly favored to reflect benign bone islands although metastatic disease could have a similar appearance and close follow-up is recommended. Several benign Schmorl's nodes are noted in the thoracic spine." Bone scan on 3/28/18 was negative for bone mets.   4. Audiogram on 4/6/18 normal. Followed by ENT.   5. Given his smoking history, I recommended 4 cycles of EP. EP cycle 1 day 1 on 4/16/18. Complicated by hospitalization for neutropenic fever 4/26-4/29. No source of infections identified. Treated with antibiotics empirically and improved.   6. Cycle 2 of EP started on on 5/7/18. Hospitalized during the first weekend for intractable nausea.   7. Restaging CT scan on 5/24/18 showed "Interval development of dilated appearance of the appendix with stranding of the periappendiceal fat.  Findings are worrisome for appendicitis". Patient had abdominal pain and tenderness. Admitted and got appendectomy on 5/24/18.  8. Cycle 3 of EP 6/11/18-6/15/18. Complicated by hospitalization for neutropenic fever 6/21-6/25  9. Cycle 4 of EP " 18-18. Complicated by hospitalization for N/V -. Again hospitalized - for intractable N/V which patient feels is from opioid withdrawal      INTERVAL HISTORY:   Mr. Hampton returns today for follow up of primary mediastinal seminoma. Completed 4 cycles of  on . Feels well. Getting PT for left shoulder pain. Chronic chest pain, taking kratom for pain.      ECO     ROS:   A ten point system review is obtained and neg except for what was stated in the interval history     Physical Examination:   Vital signs reviewed.   Gen: well hydrated, well developed, in no acute distress. Looks tired.   HEENT: normocephalic, anicteric sclerae, PERRLA, EOMI, oropharynx clear  Neck: supple, no JVD, thyromegaly, cervical or supraclavicular LAD  Lungs: CTAB, no wheezes or rales. Port site on chest clean.   Heart: RRR, no M/R/G  Abdomen: soft, non-distended, nontender, no hepatosplenomegaly, mass, or hernia. BS present  Ext: no clubbing, cyanosis, or edema  Neuro: alert and oriented x 4, no focal neuro deficit  Skin: no rash, erythema, open wound or ulcers  Psych: pleasant and appropriate mood and affect        Objective:      Laboratory Data:  Labs reviewed. Unremarkable. Tumor markers are completely normal     Imaging Data:  CT C/A/P 18:  There has been no significant change in the soft tissue mass in the anterior mediastinum.  This measures 2.6 x 1.1 cm today (previously 2.7 x 1.4 cm).    No acute findings or evidence of new or metastatic disease     Assessment/Plan:      1. Primary mediastinal seminoma    2. Antineoplastic chemotherapy induced anemia    3. Chemotherapy-induced thrombocytopenia        1  - Mr Memo Neff is a 24 yo man with primary mediastinal seminoma, good risk disease, s/p 4 cycles of EP finished on 18  - PET scan did not show FDG-avid lesions  - doing well. CT on 18 showed no significant change in mediastinal soft tissue mass, 2.6 x 1.1 cm (previously 2.7 x 1.4  cm)  - Tumor markers normal  - return to clinic with tumor markers in 2 months. Surveillance CT in 4 months     2.  - mild. Stable. Monitor    3.  - resolved.

## 2018-12-10 NOTE — PROGRESS NOTES
"  Physical Therapy Daily Treatment Note     Name: Alejandro Neff  Clinic Number: [id number]    Therapy Diagnosis:   Encounter Diagnoses   Name Primary?    Neck and shoulder pain     Postural imbalance     Decreased strength, endurance, and mobility      Physician: Stanton Bran MD    Visit Date: 12/10/2018    Physician Orders: PT Eval and Treat   Medical Diagnosis from Referral:   M54.12 (ICD-10-CM) - Cervical radiculopathy   M79.18 (ICD-10-CM) - Myofascial pain         Evaluation Date: 11/13/2018  Authorization Period Expiration: 12/31/18  Plan of Care Expiration: 02/11/19  Visit # / Visits authorized: 3/ 20       Time In: 12:06  Time Out: 1:00  Total Billable Time: 54 minutes - PN note next visit    Precautions: Standard, Hx tinnitus, hypotension, Hx Lyme disease, Hx CA (mediastinal seminoma) w/ chemotherapy - completed 4 cycles EP on 7/11/18 (port still present in R pec, tumor not operated on due to shrinking from chemo), Restaging PET with residual tissue but not PET avid lesions indicating any mets    Subjective     Pt reports: that he continues to have L shoulder pain as well as clicking/grinding in the shoulder. He also notes min improvement following the ionto patch.   He was compliant with home exercise program.  Response to previous treatment: min soreness  Functional change: none    Pain: 4/10  Location: left shoulder      Objective     Az received therapeutic exercises to develop strength, endurance, ROM, flexibility, posture and core stabilization for 54 minutes including:    **Exercises in BOLD performed today**    UBE: 2'/2'  SA punches: 2 x 10 x OTB - performed supine today  Open books: 10x OTB  - NP today  Prone Scap squeezes: 10 x 10" - performed unilat with LUE today over EOB  Prone Is, Ts, Ys: !0 x 10"  - performed unilat with LUE today over EOB, defer Ys today    Supine Bc ER: 2 x 10 x OTB  Supine unilat abd: 2  10 x OTB - performed bilaterally      Standing TB Is, Ts, Ys - NP, add " next visit  Standing TB IR: 2 x 10 x OTB  Standing TB ER: 2 x 10 x OTB  Standing trunk ext with row: 2 x 15 x GTB  Standing trunk ext with arm ext: 1 x 10 x OTB, 1 x 15 x GTB  +standing trunk ext with W: 2 x 10 x OTB  standing wall angels: 3 x 10    Consider push up plus, body blade next visit to improve stability    Home Exercises Provided and Patient Education Provided     Education provided:   - none today    Written Home Exercises Provided: Patient instructed to cont prior HEP.  Exercises were reviewed and Az was able to demonstrate them prior to the end of the session.  Az demonstrated good  understanding of the education provided.     See EMR under Patient Instructions for exercises provided prior visit.    Assessment     Pt presents with increased crepitus during active shoulder ROM. Minor reduction in crepitus with slow controlled motion during scapular activation exercises.    Az is progressing well towards his goals.   Pt prognosis is Good.     Pt will continue to benefit from skilled outpatient physical therapy to address the deficits listed in the problem list box on initial evaluation, provide pt/family education and to maximize pt's level of independence in the home and community environment.     Pt's spiritual, cultural and educational needs considered and pt agreeable to plan of care and goals.    Anticipated barriers to physical therapy: financial considerations, transportation (painful to drive), age, Hx cancer and presence of port    Goals: See IE on 11/13/18 - PN due by 12/13/18  Short Term Goals (4 Weeks):   1. Pt will report 20% reduction in pain of the cervical spine and LUE for ease with ADL's  2. PT will demonstrate 1/3 MMT improvement in periscapular strength for ease with upright posture  3. Pt will demonstrate improved joint mobility of the thoracic spine by a half grade for ease with driving to MD appointments.  4. Pt to improve perceived functional mobility with FOTO limitation <=30%  disability.     Long Term Goals (8 Weeks):   1. Pt will report being independent with HEP for maintenance of improvements gained during therapy sessions  2. PT will report 50% reduction of pain of the neck and LUE for ease with donning upper body clothing   3. Pt will demonstrate full LUE and periscapular strength without the provocation of pain for ease with household chores  4. Pt will demonstrate appropriate upright posture without external cueing for ease with work related activities.   5. Pt to improve perceived functional mobility with FOTO limitation <=20% disability.    Plan     Continue with POC with emphases on postural endurance and strength/stability of the scapula/GHJ    Rossy Chavez, PT

## 2018-12-18 ENCOUNTER — OFFICE VISIT (OUTPATIENT)
Dept: PAIN MEDICINE | Facility: CLINIC | Age: 23
End: 2018-12-18
Attending: ANESTHESIOLOGY
Payer: COMMERCIAL

## 2018-12-18 VITALS
SYSTOLIC BLOOD PRESSURE: 95 MMHG | WEIGHT: 138.88 LBS | DIASTOLIC BLOOD PRESSURE: 58 MMHG | HEART RATE: 58 BPM | HEIGHT: 67 IN | BODY MASS INDEX: 21.8 KG/M2 | TEMPERATURE: 98 F

## 2018-12-18 DIAGNOSIS — M75.52 CHRONIC BURSITIS OF LEFT SHOULDER: ICD-10-CM

## 2018-12-18 DIAGNOSIS — M75.42 IMPINGEMENT SYNDROME OF LEFT SHOULDER: ICD-10-CM

## 2018-12-18 DIAGNOSIS — M25.512 CHRONIC LEFT SHOULDER PAIN: Primary | ICD-10-CM

## 2018-12-18 DIAGNOSIS — M79.18 MYOFASCIAL PAIN: ICD-10-CM

## 2018-12-18 DIAGNOSIS — G89.29 CHRONIC LEFT SHOULDER PAIN: Primary | ICD-10-CM

## 2018-12-18 PROCEDURE — 99214 OFFICE O/P EST MOD 30 MIN: CPT | Mod: 25,S$GLB,, | Performed by: ANESTHESIOLOGY

## 2018-12-18 PROCEDURE — 64418 NJX AA&/STRD SPRSCAP NRV: CPT | Mod: 59,LT,S$GLB, | Performed by: ANESTHESIOLOGY

## 2018-12-18 PROCEDURE — 20550 NJX 1 TENDON SHEATH/LIGAMENT: CPT | Mod: 59,LT,S$GLB, | Performed by: ANESTHESIOLOGY

## 2018-12-18 PROCEDURE — 3008F BODY MASS INDEX DOCD: CPT | Mod: CPTII,S$GLB,, | Performed by: ANESTHESIOLOGY

## 2018-12-18 PROCEDURE — 20611 DRAIN/INJ JOINT/BURSA W/US: CPT | Mod: 59,LT,S$GLB, | Performed by: ANESTHESIOLOGY

## 2018-12-18 PROCEDURE — 99999 PR PBB SHADOW E&M-EST. PATIENT-LVL III: CPT | Mod: PBBFAC,,, | Performed by: ANESTHESIOLOGY

## 2018-12-18 RX ORDER — BETAMETHASONE SODIUM PHOSPHATE AND BETAMETHASONE ACETATE 3; 3 MG/ML; MG/ML
6 INJECTION, SUSPENSION INTRA-ARTICULAR; INTRALESIONAL; INTRAMUSCULAR; SOFT TISSUE
Status: COMPLETED | OUTPATIENT
Start: 2018-12-18 | End: 2018-12-18

## 2018-12-18 RX ADMIN — BETAMETHASONE SODIUM PHOSPHATE AND BETAMETHASONE ACETATE 6 MG: 3; 3 INJECTION, SUSPENSION INTRA-ARTICULAR; INTRALESIONAL; INTRAMUSCULAR; SOFT TISSUE at 12:12

## 2018-12-18 NOTE — PROGRESS NOTES
"Subjective:      Patient ID: Alejandro Neff is a 23 y.o. male.    Chief Complaint: No chief complaint on file.    Referred by: No ref. provider found     Pain Scales  Best: 3/10  Worst: 9/10  Usually: 7/10  Today: 3/10    Patient presents for follow-up left shoulder pain. He has completed PT without relief. Denies any changes in quality or character of symptoms. He is interested in an injection today. Denies any recent fevers, chills, infections.     Past Medical History:   Diagnosis Date    Abdominal pain 08/12/2010    eval for abd and chest wall pain at [facility], [place], [place] - cxr wnl, EKG ([facility]), troponin wnl, normal chemistries    Allergy     Cancer     testicular    Chondromalacia patellae     Chronic nausea 2015    eval by  Dr. Tushar Artis - given zofran and ciproheptadine     Chronic pain     Chronic pain     [facility] - no meds; followed by psych and pain clinic and unresponsive to behavioral/CBT/old records reports c/f conversion disorder     Foot pain 04/2010    4/10 + SHIRA, eval by Dr. ALLY Lion at [facility] -dx unclear ? gout and trial of nsaids and pdn, xrays normal 1/11, referred to podiatry, re-eval by rheum 2/12 and MRI and films wnl    Fracture of right radius 09/2009    Lyme arthritis     multiple joints    Lyme disease 2013    Memory loss 03/2012    eval for memory loss by neuro at [facility] - [place], EEG wnl. Dr. Patricio suggested emotional factors & ref to Tushar Davies, PhD for  eval & neuropsych eval 3/12 Lupe Delgado, PhD - no evidence of formal thought disorder or psychosis - documented severe memory impairment; not related to to ADD or executive function issues. sugesstion that memory issues may be related to "emotional factors", ?conversion d/o    Stress fracture of tibia and fibula 08/2011    Urticaria        Past Surgical History:   Procedure Laterality Date    APPENDECTOMY      APPENDECTOMY, " LAPAROSCOPIC N/A 5/24/2018    Performed by Kenan Huang Jr., MD at [facility] OR 2ND [facility]    [id number] N/A 3/1/2018    Performed by Nikita Clayton MD at [facility] beds at the [facility]    BLOCK, NERVE N/A 5/29/2018    Performed by Raiza Surgeon at [facility]    CHEST WALL BIOPSY      COLONOSCOPY N/A 11/6/2018    Procedure: COLONOSCOPY;  Surgeon: Terence Rosales MD;  Location: [facility]);  Service: Endoscopy;  Laterality: N/A;  low volume prep    COLONOSCOPY N/A 11/6/2018    Performed by Terence Rosales MD at [facility] ([facility])    EGD (ESOPHAGOGASTRODUODENOSCOPY) N/A 11/6/2018    Performed by Terence Rosales MD at [facility] ([facility])    ESOPHAGOGASTRODUODENOSCOPY N/A 11/6/2018    Procedure: EGD (ESOPHAGOGASTRODUODENOSCOPY);  Surgeon: Terence Rosales MD;  Location: [address]);  Service: Endoscopy;  Laterality: N/A;    INJECTION OF ANESTHETIC AGENT AROUND NERVE N/A 5/29/2018    Procedure: BLOCK, NERVE;  Surgeon: Raiza Surgeon;  Location: [facility];  Service: Anesthesiology;  Laterality: N/A;    Kdyvzwdeu-Ygtz-P-Cath N/A 4/4/2018    Performed by [facility] Diagnostic Provider at [facility] OR [facility]    LAPAROSCOPIC APPENDECTOMY N/A 5/24/2018    Procedure: APPENDECTOMY, LAPAROSCOPIC;  Surgeon: Kenan Huang Jr., MD;  Location: [facility] OR [address] [place] [address];  Service: General;  Laterality: N/A;    PORTACATH PLACEMENT Right        Review of patient's allergies indicates:   Allergen Reactions    Mushroom Anaphylaxis    Bamboo     Bee pollens     Mushroom flavor     Venom-honey bee        Current Outpatient Medications   Medication Sig Dispense Refill    albuterol 90 mcg/actuation inhaler Inhale 2 puffs into the lungs every 6 (six) hours as needed for Wheezing. 1 each 11    albuterol sulfate (PROAIR HFA INHL)       baclofen (LIORESAL) 10 MG tablet Take 1 tablet (10 mg total) by mouth 2 (two) times daily as needed for hiccups. 60 tablet 1    pantoprazole (PROTONIX) 40 MG tablet Take 1 tablet (40 mg total)  by mouth once daily. 30 tablet 2    promethazine (PHENERGAN) 12.5 MG Tab Take 1 tablet (12.5 mg total) by mouth 4 (four) times daily. 5 tablet 0     No current facility-administered medications for this visit.        Family History   Problem Relation Age of Onset    Arthritis Mother     Other Mother         benign tumor of brain and spinal cord    Hyperlipidemia Father     Gout Father     No Known Problems Sister     Arthritis Sister         shoulder    Depression Sister     Colon cancer Neg Hx     Esophageal cancer Neg Hx     Stomach cancer Neg Hx        Social History     Socioeconomic History    Marital status: Single     Spouse name: Not on file    Number of children: Not on file    Years of education: Not on file    Highest education level: Not on file   Social Needs    Financial resource strain: Not on file    Food insecurity - worry: Not on file    Food insecurity - inability: Not on file    Transportation needs - medical: Not on file    Transportation needs - non-medical: Not on file   Occupational History    Occupation: student at [employer]   Tobacco Use    Smoking status: Former Smoker     Packs/day: 0.25     Types: Vaping with nicotine, Vaping w/o nicotine, Cigarettes, Cigars     Last attempt to quit: 3/15/2018     Years since quittin.7    Smokeless tobacco: Former User   Substance and Sexual Activity    Alcohol use: Yes     Comment: occasionally     Drug use: Unknown     Types: LSD, Other-see comments    Sexual activity: Yes     Partners: Female     Birth control/protection: Condom   Other Topics Concern    Not on file   Social History Narrative    Majoring in [employer]eting     From [place] and [place] - lived 1 year ago and in high school x 1 semester                   Review of Systems   Constitution: Negative for chills, fever, malaise/fatigue, weight gain and weight loss.   HENT: Negative for ear pain and hoarse voice.    Eyes: Negative for blurred vision, pain and  "visual disturbance.   Cardiovascular: Negative for chest pain, dyspnea on exertion and irregular heartbeat.   Respiratory: Negative for cough, shortness of breath and wheezing.    Endocrine: Negative for cold intolerance and heat intolerance.   Hematologic/Lymphatic: Negative for adenopathy and bleeding problem. Does not bruise/bleed easily.   Skin: Negative for color change, itching and rash.   Musculoskeletal: Negative for back pain and neck pain.   Gastrointestinal: Negative for change in bowel habit, diarrhea, hematemesis, hematochezia, melena and vomiting.   Genitourinary: Negative for flank pain, frequency, hematuria and urgency.   Neurological: Negative for difficulty with concentration, dizziness, headaches, loss of balance and seizures.   Psychiatric/Behavioral: Negative for altered mental status, depression and suicidal ideas. The patient is not nervous/anxious.    Allergic/Immunologic: Negative for HIV exposure.           Objective:   BP (!) 95/58   Pulse (!) 58   Temp 97.9 °F (36.6 °C)   Ht 5' 7" (1.702 m)   Wt 63 kg (138 lb 14.4 oz)   BMI 21.75 kg/m²   Pain Disability Index Review:  Last 3 PDI Scores 12/18/2018 10/30/2018   Pain Disability Index (PDI) 39 32     Normocephalic.  Atraumatic.  Affect appropriate.  Breathing unlabored.  Extra ocular muscles intact.       GEN: No acute distress. Calm, comfortable  HENT: Normocephalic, atraumatic, moist mucous membranes  EYE: Anicteric sclera, non-injected  CV: Non-diaphoretic. Pulses 2+ in BUE.  CHEST: Breathing comfortably. Chest expansion symmetric  EXT: No clubbing, cyanosis, edema  Psych: Mood and affect are appropriate  GAIT: Independent, normal ambulation  Shoulder:      Inspection: Poor posture and anteriorly rolled shoulders. No gross deformity      Palpation: No TTP of shoulder area bilaterally. No biceps tendon TTP, no subAC TTP.       ROM: WFL and not painful      (+) Hawkin's      (-) Empty Can      (+) Cross arm      (-) Speed's      (-) " Joya's  Neuro Exam:     Alert, speech is fluent and appropriate     Sensation: Grossly intact to LT in BUE & BLE     Tone: No abnormality appreciated      Gait: WNL  Assessment:       Encounter Diagnoses   Name Primary?    Chronic left shoulder pain Yes    Myofascial pain     Chronic bursitis of left shoulder     Impingement syndrome of left shoulder          Plan:   We discussed with the patient the assessment and recommendations. The following is the plan we agreed on:  1. PROCEDURE:  Under clean technique, using ultrasound guidance, & after discussing with the patient, 0.4 mL of betamethasone X with 10 mL of bupivacaine 0.25% were used to inject on left suprascapular nerve, supraspinatus tendon sheath,  levator scapula & subacromial bursa.  2. RTC in 1 month for follow up or as needed      Diagnoses and all orders for this visit:    Chronic left shoulder pain  -     betamethasone acetate-betamethasone sodium phosphate injection 6 mg    Myofascial pain  -     betamethasone acetate-betamethasone sodium phosphate injection 6 mg    Chronic bursitis of left shoulder  -     betamethasone acetate-betamethasone sodium phosphate injection 6 mg    Impingement syndrome of left shoulder  -     betamethasone acetate-betamethasone sodium phosphate injection 6 mg     I have personally taken the history and examined this patient and agree with the resident's note as stated above.

## 2018-12-24 ENCOUNTER — CLINICAL SUPPORT (OUTPATIENT)
Dept: REHABILITATION | Facility: OTHER | Age: 23
End: 2018-12-24
Payer: COMMERCIAL

## 2018-12-24 DIAGNOSIS — M54.2 NECK AND SHOULDER PAIN: ICD-10-CM

## 2018-12-24 DIAGNOSIS — R53.1 DECREASED STRENGTH, ENDURANCE, AND MOBILITY: ICD-10-CM

## 2018-12-24 DIAGNOSIS — Z74.09 DECREASED STRENGTH, ENDURANCE, AND MOBILITY: ICD-10-CM

## 2018-12-24 DIAGNOSIS — R29.3 POSTURAL IMBALANCE: ICD-10-CM

## 2018-12-24 DIAGNOSIS — M25.519 NECK AND SHOULDER PAIN: ICD-10-CM

## 2018-12-24 DIAGNOSIS — R68.89 DECREASED STRENGTH, ENDURANCE, AND MOBILITY: ICD-10-CM

## 2018-12-24 PROCEDURE — 97110 THERAPEUTIC EXERCISES: CPT | Mod: PN

## 2018-12-24 NOTE — PROGRESS NOTES
Physical Therapy Daily Treatment Note     Name: Alejandro Neff  Clinic Number: [id number]    Therapy Diagnosis:   Encounter Diagnoses   Name Primary?    Neck and shoulder pain     Postural imbalance     Decreased strength, endurance, and mobility      Physician: Stanton Bran MD    Visit Date: 12/24/2018    Physician Orders: PT Eval and Treat   Medical Diagnosis from Referral:   M54.12 (ICD-10-CM) - Cervical radiculopathy   M79.18 (ICD-10-CM) - Myofascial pain         Evaluation Date: 11/13/2018  Authorization Period Expiration: 12/31/18  Plan of Care Expiration: 02/11/19  Visit # / Visits authorized: 4/ 20       Time In: 12:00  Time Out: 12:53  Total Billable Time: 53 minutes - PN note today    Precautions: Standard, Hx tinnitus, hypotension, Hx Lyme disease, Hx CA (mediastinal seminoma) w/ chemotherapy - completed 4 cycles EP on 7/11/18 (port still present in R pec, tumor not operated on due to shrinking from chemo), Restaging PET with residual tissue but not PET avid lesions indicating any mets    Subjective     Pt reports: that he got a nerve block around the L GHJ and noted marked reduction of pain for 3 days. He says that his pain is back with occasional clicking in the shoulder with OH reaching.   He was compliant with home exercise program.  Response to previous treatment: min soreness  Functional change: none    Pain: 4/10  Location: left shoulder      Objective     Shoulder ROM:          Right   Left                  *denotes endrange pain at anterior shoulder and acromion  Flexion                         WNL    WNL*  Abduction                    WNL    WNL*  ER at 0 deg                 WNL    WNL  IR                                 WNL    WNL        UPPER EXTREMITY STRENGTH:    Left Right   Shoulder Flexion 4+/5 4/5   Shoulder Abduction 4+/5 4/5   Shoulder Internal Rotation 5/5 5/5   Shoulder External Rotation 4+/5 4/5      Scapular Strength:  Upper Trap: 5/5 - pain noted in L UT  Mid Trap  "4  Low Trap: 3+  Rhomboids: 4  Serratus Anterior: 4+     CMS Impairment/Limitation/Restriction for FOTO Neck Survey     Therapist reviewed FOTO scores for Alejandro Neff on 11/13/2018.   FOTO documents entered into EPIC - see Media section.     Limitation Score: 55%  Category: Mobility     Current : CK = at least 40% but < 60% impaired, limited or restricted  Goal: CI = at least 1% but < 20% impaired, limited or restricted  Discharge: N/A             Az received therapeutic exercises to develop strength, endurance, ROM, flexibility, posture and core stabilization for 54 minutes including:    **Exercises in BOLD performed today**    UBE: 2'/2'  SA punches: 2 x 10 x OTB - performed supine today  Open books: 10x OTB  - NP today  Prone EOB Scap squeezes: 10 x 10"   Prone EOB Is, Ts, Ys: 2 x 10    Supine Bc ER: 3 x 10 x OTB  Supine unilat abd: 2  10 x OTB - performed bilaterally  +prone reach, roll, and lift: 10 x 3"    +EOT modified plank: 1 x 30"  +EOT shoulder taps: 2 x 10  +EOT pushup plus: 1 x 15  +body blade (yellow): 3 x 30" (flex/ext, IR/ER)  Standing TB Is, Ts, Ys - NP, add next visit  Standing TB IR: 2 x 10 x OTB  Standing TB ER: 2 x 10 x OTB  Standing trunk ext with row: 2 x 15 x GTB  Standing trunk ext with arm ext: 1 x 10 x OTB, 1 x 15 x GTB  standing trunk ext with W: 2 x 10 x OTB    Consider push up plus, body blade next visit to improve stability    Home Exercises Provided and Patient Education Provided     Education provided:   - none today    Written Home Exercises Provided: Patient instructed to cont prior HEP.  Exercises were reviewed and Az was able to demonstrate them prior to the end of the session.  Az demonstrated good  understanding of the education provided.     See EMR under Patient Instructions for exercises provided prior visit.    Assessment     Pt presents with normalized ROM with endrange pain in the superior and anterior L shoulder. Pt presents with slow improvements in shoulder " and scapular strength. Decreased strength, muscular endurance, and NM control/coordination, increasing c/o crepitus and pain as well as s/s of impingement and instability within the L GHJ,    Az is progressing well towards his goals.   Pt prognosis is Good.     Pt will continue to benefit from skilled outpatient physical therapy to address the deficits listed in the problem list box on initial evaluation, provide pt/family education and to maximize pt's level of independence in the home and community environment.     Pt's spiritual, cultural and educational needs considered and pt agreeable to plan of care and goals.    Anticipated barriers to physical therapy: financial considerations, transportation (painful to drive), age, Hx cancer and presence of port    Goals: See IE on 11/13/18 - PN due by 12/13/18  Short Term Goals (4 Weeks):   1. Pt will report 20% reduction in pain of the cervical spine and LUE for ease with ADL's - Not met, in progress  2. PT will demonstrate 1/3 MMT improvement in periscapular strength for ease with upright posture - Not met, in progress  3. Pt will demonstrate improved joint mobility of the thoracic spine by a half grade for ease with driving to MD appointments. - Not met, in progress  4. Pt to improve perceived functional mobility with FOTO limitation <=30% disability. - Not met, in progress     Long Term Goals (8 Weeks):   1. Pt will report being independent with HEP for maintenance of improvements gained during therapy sessions - Not met, in progress  2. PT will report 50% reduction of pain of the neck and LUE for ease with donning upper body clothing  - Not met, in progress  3. Pt will demonstrate full LUE and periscapular strength without the provocation of pain for ease with household chores - Not met, in progress  4. Pt will demonstrate appropriate upright posture without external cueing for ease with work related activities.  - Not met, in progress  5. Pt to improve perceived  functional mobility with FOTO limitation <=20% disability. - Not met, in progress    Plan     Continue with POC with emphases on postural endurance and strength/stability of the scapula/GHJ    Rossy Chavez, PT

## 2019-01-15 ENCOUNTER — PATIENT MESSAGE (OUTPATIENT)
Dept: SPINE | Facility: CLINIC | Age: 24
End: 2019-01-15

## 2019-01-17 ENCOUNTER — OFFICE VISIT (OUTPATIENT)
Dept: PAIN MEDICINE | Facility: CLINIC | Age: 24
End: 2019-01-17
Payer: COMMERCIAL

## 2019-01-17 VITALS
TEMPERATURE: 98 F | HEART RATE: 72 BPM | WEIGHT: 138.88 LBS | HEIGHT: 67 IN | SYSTOLIC BLOOD PRESSURE: 91 MMHG | DIASTOLIC BLOOD PRESSURE: 53 MMHG | BODY MASS INDEX: 21.8 KG/M2

## 2019-01-17 DIAGNOSIS — M75.42 IMPINGEMENT SYNDROME OF LEFT SHOULDER: ICD-10-CM

## 2019-01-17 DIAGNOSIS — M79.18 MYOFASCIAL PAIN: ICD-10-CM

## 2019-01-17 DIAGNOSIS — M75.52 CHRONIC BURSITIS OF LEFT SHOULDER: ICD-10-CM

## 2019-01-17 DIAGNOSIS — G89.29 CHRONIC LEFT SHOULDER PAIN: Primary | ICD-10-CM

## 2019-01-17 DIAGNOSIS — M25.512 CHRONIC LEFT SHOULDER PAIN: Primary | ICD-10-CM

## 2019-01-17 PROCEDURE — 99999 PR PBB SHADOW E&M-EST. PATIENT-LVL III: CPT | Mod: PBBFAC,,, | Performed by: NURSE PRACTITIONER

## 2019-01-17 PROCEDURE — 99213 OFFICE O/P EST LOW 20 MIN: CPT | Mod: S$GLB,,, | Performed by: NURSE PRACTITIONER

## 2019-01-17 PROCEDURE — 3008F BODY MASS INDEX DOCD: CPT | Mod: CPTII,S$GLB,, | Performed by: NURSE PRACTITIONER

## 2019-01-17 PROCEDURE — 99213 PR OFFICE/OUTPT VISIT, EST, LEVL III, 20-29 MIN: ICD-10-PCS | Mod: S$GLB,,, | Performed by: NURSE PRACTITIONER

## 2019-01-17 PROCEDURE — 3008F PR BODY MASS INDEX (BMI) DOCUMENTED: ICD-10-PCS | Mod: CPTII,S$GLB,, | Performed by: NURSE PRACTITIONER

## 2019-01-17 PROCEDURE — 99999 PR PBB SHADOW E&M-EST. PATIENT-LVL III: ICD-10-PCS | Mod: PBBFAC,,, | Performed by: NURSE PRACTITIONER

## 2019-01-17 NOTE — LETTER
January 17, 2019      [facility]  [address]  [place] [address]  Phone: [phone]  Fax: [phone]       Patient: Alejandro Neff   YOB: 1995  Date of Visit: 01/17/2019    To Whom It May Concern:    Leanne Neff  was at [facility] on 01/17/2019. He may return to work/school on 1/17/2019 with no restrictions. If you have any questions or concerns, or if I can be of further assistance, please do not hesitate to contact me.    Sincerely,      Bing Rodriguez NP

## 2019-01-17 NOTE — PROGRESS NOTES
Chronic patient Established Note (Follow up visit)      SUBJECTIVE:    Alejandro Neff presents to the clinic for a follow-up appointment for neck and shoulder pain. He reports 90% relief for one day with previous injections in office. He continues to report left shoulder pain that is anterior. He describes this pain as sharp and aching. His pain worsens throughout the day with prolonged activity. He denies any neck or radiating arm pain today. He does report weakness with increased pain. He denies any other health changes. Since the last visit, Alejandro Neff states the pain has been persistant. Current pain intensity is 4/10.    Pain Disability Index Review:  Last 3 PDI Scores 12/18/2018 10/30/2018   Pain Disability Index (PDI) 39 32       Pain Medications:  Baclofen    Opioid Contract: no     report:  Reviewed and consistent with medication use as prescribed.    Pain Procedures:   none    Physical Therapy/Home Exercise: yes    Imaging:   MRI Shoulder 10/16/2018:  FINDINGS:  Rotator cuff: Supraspinatus, infraspinatus, teres minor, and subscapularis tendons are intact.    Labrum: Grossly intact on this non-arthrographic study.    Biceps: Long head biceps tendon is intact.  There is tendinosis.    Bone: There is no fracture.  Bone marrow signal is unremarkable.    Acromioclavicular joint: The AC joint is unremarkable.    Cartilage: Articular cartilage of the glenohumeral joint is preserved.      Impression       Tendinosis of the biceps tendon.  Otherwise unremarkable shoulder MRI.         Allergies:   Review of patient's allergies indicates:   Allergen Reactions    Mushroom Anaphylaxis    Bamboo     Bee pollens     Mushroom flavor     Venom-honey bee        Current Medications:   Current Outpatient Medications   Medication Sig Dispense Refill    albuterol 90 mcg/actuation inhaler Inhale 2 puffs into the lungs every 6 (six) hours as needed for Wheezing. 1 each 11    albuterol sulfate (PROAIR  HFA INHL)       baclofen (LIORESAL) 10 MG tablet Take 1 tablet (10 mg total) by mouth 2 (two) times daily as needed for hiccups. 60 tablet 1    pantoprazole (PROTONIX) 40 MG tablet Take 1 tablet (40 mg total) by mouth once daily. 30 tablet 2    promethazine (PHENERGAN) 12.5 MG Tab Take 1 tablet (12.5 mg total) by mouth 4 (four) times daily. 5 tablet 0     No current facility-administered medications for this visit.        REVIEW OF SYSTEMS:    GENERAL:  No weight loss, malaise or fevers.  HEENT:  Negative for frequent or significant headaches.  NECK:  Negative for lumps, goiter, pain and significant neck swelling.  RESPIRATORY:  Negative for cough, wheezing or shortness of breath.  CARDIOVASCULAR:  Negative for chest pain, leg swelling or palpitations.  GI:  Negative for abdominal discomfort, blood in stools or black stools or change in bowel habits.  MUSCULOSKELETAL:  See HPI.  SKIN:  Negative for lesions, rash, and itching.  PSYCH:  Negative for sleep disturbance, mood disorder and recent psychosocial stressors.  HEMATOLOGY/LYMPHOLOGY:  Negative for prolonged bleeding, bruising easily or swollen nodes. HX of cancer.   NEURO:   No history of headaches, syncope, paralysis, seizures or tremors.  All other reviewed and negative other than HPI.    Past Medical History:  Past Medical History:   Diagnosis Date    Abdominal pain 08/12/2010    eval for abd and chest wall pain at [place], [place] - cxr wnl, EKG (RVH), troponin wnl, normal chemistries    Allergy     Cancer     testicular    Chondromalacia patellae     Chronic nausea 2015    eval by GI Dr. Tushar Artis - given zofran and ciproheptadine     Chronic pain     Chronic pain     [facility] - no meds; followed by psych and pain clinic and unresponsive to behavioral/CBT/old records reports c/f conversion disorder     Foot pain 04/2010    4/10 + SHIRA, eval by Dr. ALLY Lion at [facility]  "Rheum -dx unclear ? gout and trial of nsaids and pdn, xrays normal 1/11, referred to podiatry, re-eval by rheum 2/12 and MRI and films wnl    Fracture of right radius 09/2009    Lyme arthritis     multiple joints    Lyme disease 2013    Memory loss 03/2012    eval for memory loss by neuro at [facility] - MRI, EEG wnl. Dr. Patricio suggested emotional factors & ref to Tushar Davies, PhD for  eval & neuropsych eval 3/12 Lupe Delgado, PhD - no evidence of formal thought disorder or psychosis - documented severe memory impairment; not related to to ADD or executive function issues. sugesstion that memory issues may be related to "emotional factors", ?conversion d/o    Stress fracture of tibia and fibula 08/2011    Urticaria        Past Surgical History:  Past Surgical History:   Procedure Laterality Date    APPENDECTOMY      APPENDECTOMY, LAPAROSCOPIC N/A 5/24/2018    Performed by Kenan Huang Jr., MD at [facility] OR 2ND FLR    EEXZVT-HAESPW-AI N/A 3/1/2018    Performed by [facility] at [facility] CATH LAB    BLOCK, NERVE N/A 5/29/2018    Performed by Raiza Surgeon at [facility] RAIZA    CHEST WALL BIOPSY      COLONOSCOPY N/A 11/6/2018    Performed by Terence Rosales MD at [facility] ENDO (4TH FLR)    EGD (ESOPHAGOGASTRODUODENOSCOPY) N/A 11/6/2018    Performed by Terence Rosales MD at [facility] ENDO (4TH FLR)    Xkhsezubz-Hqmp-Z-Cath N/A 4/4/2018    Performed by [facility] Diagnostic Provider at [facility] OR 2ND FLR    PORTACATH PLACEMENT Right        Family History:  Family History   Problem Relation Age of Onset    Arthritis Mother     Other Mother         benign tumor of brain and spinal cord    Hyperlipidemia Father     Gout Father     No Known Problems Sister     Arthritis Sister         shoulder    Depression Sister     Colon cancer Neg Hx     Esophageal cancer Neg Hx     Stomach cancer Neg Hx        Social History:  Social History     Socioeconomic History    Marital status: Single     Spouse name: Not on file    " "Number of children: Not on file    Years of education: Not on file    Highest education level: Not on file   Social Needs    Financial resource strain: Not on file    Food insecurity - worry: Not on file    Food insecurity - inability: Not on file    Transportation needs - medical: Not on file    Transportation needs - non-medical: Not on file   Occupational History    Occupation: student at [employer]   Tobacco Use    Smoking status: Former Smoker     Packs/day: 0.25     Types: Vaping with nicotine, Vaping w/o nicotine, Cigarettes, Cigars     Last attempt to quit: 3/15/2018     Years since quittin.8    Smokeless tobacco: Former User   Substance and Sexual Activity    Alcohol use: Yes     Comment: occasionally     Drug use: Unknown     Types: LSD, Other-see comments    Sexual activity: Yes     Partners: Female     Birth control/protection: Condom   Other Topics Concern    Not on file   Social History Narrative    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  Psych:  Mood and affect appropriate.  Skin: Skin color, texture, turgor normal, no rashes or lesions, in both upper and lower body.  Head/face:  Atraumatic, normocephalic. No palpable lymph nodes  Neck: Mild pain to palpation over the left trapezius muscle. Spurling Negative. No pain with neck flexion, extension, or lateral flexion. .  Cor: RRR  Pulm: CTA  GI: Abdomen soft and non-tender.  Extremities: Peripheral joint ROM is full and pain free without obvious instability or laxity in all four extremities. No deformities, edema, or skin discoloration. Good capillary refill.  Musculoskeletal: Hip, sacroiliac and knee provocative " maneuvers are negative. Pain with palpation over left AC joint. Full ROM of left shoulder with mild pain on internal rotation. Impingement testing is negative. Bilateral upper extremity strength is normal and symmetric.  No atrophy or tone abnormalities are noted.  Neuro: Bilateral upper extremity coordination and muscle stretch reflexes are physiologic and symmetric.  Plantar response are downgoing. No loss of sensation is noted.  Gait: Normal.    ASSESSMENT: 23 y.o. year old male with shoulder pain, consistent with the followin. Chronic left shoulder pain     2. Chronic bursitis of left shoulder     3. Impingement syndrome of left shoulder     4. Myofascial pain           PLAN:     - Previous imaging was reviewed and discussed with the patient today.    - Schedule for left glenohumeral joint injection.   The procedure, risks, benefits and options were discussed with patient. There are no contraindications to the procedure. The patient expressed understanding and agreed to proceed.      - Continue physical therapy and home exercise routine.     - I have stressed the importance of physical activity and a home exercise plan to help with pain and improve health.    - Patient can continue with medications for now since they are providing benefits, using them appropriately, and without side effects.    - RTC 2 weeks after above procedure.     - Counseled patient regarding the importance of activity modification and constant sleeping habits.    - Dr. Bran was consulted on the patient and agrees with this plan.    The above plan and management options were discussed at length with patient. Patient is in agreement with the above and verbalized understanding.    Bing Rodriguez NP  2019

## 2019-02-04 ENCOUNTER — HOSPITAL ENCOUNTER (OUTPATIENT)
Facility: OTHER | Age: 24
Discharge: HOME | End: 2019-02-04
Attending: ANESTHESIOLOGY | Admitting: ANESTHESIOLOGY
Payer: COMMERCIAL

## 2019-02-04 VITALS
DIASTOLIC BLOOD PRESSURE: 52 MMHG | HEART RATE: 72 BPM | WEIGHT: 135 LBS | RESPIRATION RATE: 18 BRPM | HEIGHT: 67 IN | SYSTOLIC BLOOD PRESSURE: 112 MMHG | TEMPERATURE: 98 F | BODY MASS INDEX: 21.19 KG/M2 | OXYGEN SATURATION: 97 %

## 2019-02-04 DIAGNOSIS — G89.29 CHRONIC LEFT SHOULDER PAIN: Primary | ICD-10-CM

## 2019-02-04 DIAGNOSIS — M25.512 SHOULDER PAIN, LEFT: ICD-10-CM

## 2019-02-04 DIAGNOSIS — M25.512 CHRONIC LEFT SHOULDER PAIN: Primary | ICD-10-CM

## 2019-02-04 PROCEDURE — 25000003 PHARM REV CODE 250: Performed by: ANESTHESIOLOGY

## 2019-02-04 PROCEDURE — 77002 NEEDLE LOCALIZATION BY XRAY: CPT | Performed by: ANESTHESIOLOGY

## 2019-02-04 PROCEDURE — 20610 PR DRAIN/INJECT LARGE JOINT/BURSA: ICD-10-PCS | Mod: LT,,, | Performed by: ANESTHESIOLOGY

## 2019-02-04 PROCEDURE — 77002 PR FLUOROSCOPIC GUIDANCE NEEDLE PLACEMENT: ICD-10-PCS | Mod: 26,,, | Performed by: ANESTHESIOLOGY

## 2019-02-04 PROCEDURE — 25500020 PHARM REV CODE 255: Performed by: ANESTHESIOLOGY

## 2019-02-04 PROCEDURE — 20610 DRAIN/INJ JOINT/BURSA W/O US: CPT | Performed by: ANESTHESIOLOGY

## 2019-02-04 PROCEDURE — 63600175 PHARM REV CODE 636 W HCPCS: Performed by: ANESTHESIOLOGY

## 2019-02-04 PROCEDURE — 20610 DRAIN/INJ JOINT/BURSA W/O US: CPT | Mod: LT,,, | Performed by: ANESTHESIOLOGY

## 2019-02-04 PROCEDURE — 77002 NEEDLE LOCALIZATION BY XRAY: CPT | Mod: 26,,, | Performed by: ANESTHESIOLOGY

## 2019-02-04 RX ORDER — ALPRAZOLAM 0.5 MG/1
1 TABLET, ORALLY DISINTEGRATING ORAL
Status: DISCONTINUED | OUTPATIENT
Start: 2019-02-04 | End: 2019-02-04 | Stop reason: HOSPADM

## 2019-02-04 RX ORDER — SODIUM CHLORIDE 9 MG/ML
500 INJECTION, SOLUTION INTRAVENOUS CONTINUOUS
Status: DISCONTINUED | OUTPATIENT
Start: 2019-02-04 | End: 2019-02-04 | Stop reason: HOSPADM

## 2019-02-04 RX ORDER — LIDOCAINE HYDROCHLORIDE 10 MG/ML
INJECTION INFILTRATION; PERINEURAL
Status: DISCONTINUED | OUTPATIENT
Start: 2019-02-04 | End: 2019-02-04 | Stop reason: HOSPADM

## 2019-02-04 RX ORDER — TRIAMCINOLONE ACETONIDE 40 MG/ML
INJECTION, SUSPENSION INTRA-ARTICULAR; INTRAMUSCULAR
Status: DISCONTINUED | OUTPATIENT
Start: 2019-02-04 | End: 2019-02-04 | Stop reason: HOSPADM

## 2019-02-04 RX ORDER — BUPIVACAINE HYDROCHLORIDE 2.5 MG/ML
INJECTION, SOLUTION EPIDURAL; INFILTRATION; INTRACAUDAL
Status: DISCONTINUED | OUTPATIENT
Start: 2019-02-04 | End: 2019-02-04 | Stop reason: HOSPADM

## 2019-02-04 RX ADMIN — ALPRAZOLAM 1 MG: 0.5 TABLET, ORALLY DISINTEGRATING ORAL at 01:02

## 2019-02-04 NOTE — OP NOTE
Glenohumeral Joint Injection & Arthrogram under Fluoroscopic Guidance  Time-out taken to identify patient and procedure side prior to starting the procedure.   I attest that I have reviewed the patient's home medications prior to the procedure and no contraindication have been identified. I  re-evaluated the patient after the patient was positioned for the procedure in the procedure room immediately before the procedural time-out. The vital signs are current and represent the current state of the patient which has not significantly changed since the preprocedure assessment.  Date of Service: 02/04/2019    PCP: Nata Hernandez MD    Referring Physician:    PROCEDURE: left  glenohumeral joint injection under fluoroscopic guidance.    REASON FOR PROCEDURE:  left shoulder  Chronic left shoulder pain [M25.512, G89.29]  1. Chronic left shoulder pain    2. Shoulder pain, left      POSTOP DIAGNOSIS: left shoulder Chronic left shoulder pain [M25.512, G89.29]  1. Chronic left shoulder pain    2. Shoulder pain, left      PHYSICIAN: Stanton Bran MD  ASSISTANTS: Reid Shirley    LOCAL ANESTHESIA: Xylocaine 1% 9ml with Sodium Bicarbonate 1ml. 3ml per site.  MEDICATIONS INJECTED: Kenalog 20mg, 2mL bupivacaine 0.25% and 2ml sterile saline per side.  SEDATION MEDICATIONS:  None    ESTIMATED  BLOOD LOSS: None  COMPLICATIONS:  None    TECHNIQUE:   With a patient laying in a supine position, the desired area was prepped and draped in the usual sterile fashion using ChloraPrep and a fenestrated drape.  The area was determined under fluoroscopic guidance.  Local anesthetic was given by raising a wheal and going down to the area of the joint using a 27-gauge 1.25in needle.  A 3.5 inch 22 gauge spinal needle introduced under fluoroscopic guidance into the glenohumeral joint.  Omnipaque injected to confirm placement under live fluoroscopy.  The medication was then injected slowly.  The patient tolerated the procedure  well.    PAIN BEFORE THE PROCEDURE:  4/10  PAIN AFTER  THE PROCEDURE:  6/10    The patient was monitored for a period of time after this injection.  They were given post-procedure and discharge instructions to follow at home. The patient was discharged in a stable condition.

## 2019-02-04 NOTE — DISCHARGE INSTRUCTIONS
Thank you for allowing us to care for you today. You may receive a survey about the care we provided. Your feedback is valuable and helps us provide excellent care throughout the community.     Home Care Instructions for Pain Management:    1. DIET:   You may resume your normal diet today.   2. BATHING:   You may shower with luke warm water. No tub baths or anything that will soak injection sites under water for the next 24 hours.  3. DRESSING:   You may remove your bandage today.   4. ACTIVITY LEVEL:   You may resume your normal activities 24 hrs after your procedure. Nothing strenuous today.  5. MEDICATIONS:   You may resume your normal medications today. To restart blood thinners, ask your doctor.  6. DRIVING    If you have received any sedatives by mouth today, you may not drive for 12 hours.    If you have received any sedation through your IV, you may not drive for 24 hrs.   7. SPECIAL INSTRUCTIONS:   No heat to the injection site for 24 hrs including, hot bath or shower, heating pad, moist heat, or hot tubs.    Use ice pack to injection site for any pain or discomfort.  Apply ice packs for 20 minute intervals as needed.    IF you have diabetes, be sure to monitor your blood sugar more closely. IF your injection contained steroids your blood sugar levels may become higher than normal.    If you are still having pain upon discharge:  Your pain may improve over the next 48 hours. The anesthetic (numbing medication) works immediately to 48 hours. IF your injection contained a steroid (anti-inflammatory medication), it takes approximately 3 days to start feeling relief and 7-10 days to see your greatest results from the medication. It is possible you may need subsequent injections. This would be discussed at your follow up appointment with pain management or your referring doctor.      PLEASE CALL YOUR DOCTOR IF:  1. Redness or swelling around the injection site.  2. Fever of 101 degrees or more  3. Drainage  (pus) from the injection site.  4. For any continuous bleeding (some dried blood over the incision is normal.)    FOR EMERGENCIES:   If any unusual problems or difficulties occur during clinic hours, call [phone] or [phone].

## 2019-02-08 ENCOUNTER — INFUSION (OUTPATIENT)
Dept: INFUSION THERAPY | Facility: OTHER | Age: 24
End: 2019-02-08
Attending: INTERNAL MEDICINE
Payer: COMMERCIAL

## 2019-02-08 VITALS
DIASTOLIC BLOOD PRESSURE: 64 MMHG | RESPIRATION RATE: 17 BRPM | TEMPERATURE: 98 F | HEART RATE: 64 BPM | SYSTOLIC BLOOD PRESSURE: 115 MMHG | OXYGEN SATURATION: 99 %

## 2019-02-08 DIAGNOSIS — R11.0 CHEMOTHERAPY-INDUCED NAUSEA: ICD-10-CM

## 2019-02-08 DIAGNOSIS — T45.1X5A CHEMOTHERAPY-INDUCED NAUSEA: ICD-10-CM

## 2019-02-08 DIAGNOSIS — C38.3 PRIMARY MEDIASTINAL SEMINOMA: Primary | ICD-10-CM

## 2019-02-08 LAB
AFP SERPL-MCNC: 1.5 NG/ML
ALBUMIN SERPL BCP-MCNC: 4.3 G/DL
ALP SERPL-CCNC: 46 U/L
ALT SERPL W/O P-5'-P-CCNC: 12 U/L
ANION GAP SERPL CALC-SCNC: 9 MMOL/L
AST SERPL-CCNC: 11 U/L
BILIRUB SERPL-MCNC: 0.5 MG/DL
BUN SERPL-MCNC: 16 MG/DL
CALCIUM SERPL-MCNC: 9.8 MG/DL
CHLORIDE SERPL-SCNC: 103 MMOL/L
CO2 SERPL-SCNC: 26 MMOL/L
CREAT SERPL-MCNC: 0.9 MG/DL
ERYTHROCYTE [DISTWIDTH] IN BLOOD BY AUTOMATED COUNT: 13.5 %
EST. GFR  (AFRICAN AMERICAN): >60 ML/MIN/1.73 M^2
EST. GFR  (NON AFRICAN AMERICAN): >60 ML/MIN/1.73 M^2
GLUCOSE SERPL-MCNC: 100 MG/DL
HCG INTACT+B SERPL-ACNC: <1.2 MIU/ML
HCT VFR BLD AUTO: 39.1 %
HGB BLD-MCNC: 13.7 G/DL
LDH SERPL L TO P-CCNC: 130 U/L
MCH RBC QN AUTO: 30 PG
MCHC RBC AUTO-ENTMCNC: 35 G/DL
MCV RBC AUTO: 86 FL
NEUTROPHILS # BLD AUTO: 5.5 K/UL
PLATELET # BLD AUTO: 193 K/UL
PMV BLD AUTO: 8.7 FL
POTASSIUM SERPL-SCNC: 4 MMOL/L
PROT SERPL-MCNC: 6.9 G/DL
RBC # BLD AUTO: 4.57 M/UL
SODIUM SERPL-SCNC: 138 MMOL/L
WBC # BLD AUTO: 7.7 K/UL

## 2019-02-08 PROCEDURE — 25000003 PHARM REV CODE 250: Performed by: INTERNAL MEDICINE

## 2019-02-08 PROCEDURE — A4216 STERILE WATER/SALINE, 10 ML: HCPCS | Performed by: INTERNAL MEDICINE

## 2019-02-08 PROCEDURE — 36591 DRAW BLOOD OFF VENOUS DEVICE: CPT

## 2019-02-08 PROCEDURE — 80053 COMPREHEN METABOLIC PANEL: CPT

## 2019-02-08 PROCEDURE — 85027 COMPLETE CBC AUTOMATED: CPT

## 2019-02-08 PROCEDURE — 63600175 PHARM REV CODE 636 W HCPCS: Performed by: INTERNAL MEDICINE

## 2019-02-08 PROCEDURE — 83615 LACTATE (LD) (LDH) ENZYME: CPT

## 2019-02-08 PROCEDURE — 84702 CHORIONIC GONADOTROPIN TEST: CPT

## 2019-02-08 PROCEDURE — 82105 ALPHA-FETOPROTEIN SERUM: CPT

## 2019-02-08 RX ORDER — HEPARIN 100 UNIT/ML
500 SYRINGE INTRAVENOUS
Status: CANCELLED | OUTPATIENT
Start: 2019-02-08

## 2019-02-08 RX ORDER — SODIUM CHLORIDE 0.9 % (FLUSH) 0.9 %
10 SYRINGE (ML) INJECTION
Status: COMPLETED | OUTPATIENT
Start: 2019-02-08 | End: 2019-02-08

## 2019-02-08 RX ORDER — HEPARIN 100 UNIT/ML
500 SYRINGE INTRAVENOUS
Status: COMPLETED | OUTPATIENT
Start: 2019-02-08 | End: 2019-02-08

## 2019-02-08 RX ORDER — SODIUM CHLORIDE 0.9 % (FLUSH) 0.9 %
10 SYRINGE (ML) INJECTION
Status: CANCELLED | OUTPATIENT
Start: 2019-02-08

## 2019-02-08 RX ADMIN — HEPARIN SODIUM (PORCINE) LOCK FLUSH IV SOLN 100 UNIT/ML 500 UNITS: 100 SOLUTION at 11:02

## 2019-02-08 RX ADMIN — Medication 10 ML: at 11:02

## 2019-02-10 NOTE — PROGRESS NOTES
"Subjective:      Patient ID: Alejandro Neff is a 23 y.o. male.     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 Mr. Hampton is a 22 yo man who first presented with chest pain and underwent CT chest on 2/14/18, which showed a 6 x 3.5 cm mediastinal mass. It abuts the aorta and pulmonary artery. Biopsy was done on 3/1/18. Pathology (reviewed at [facility]) showed germ cell tumor, most consistent with seminoma.   2. HCG, LDH, AFP on 3/22/18 all normal.   3. Testicular US 3/26/18 showed no testicular masses. CT C/A/P on 3/26/18 showed "stable appearance of anterior mediastinal mass consistent with provided history of seminoma. Several punctate sclerotic foci are noted in the pelvis at the right pubic bone, right ischial tuberosity, and left ilium adjacent to the sacroiliac joint.  These are strongly favored to reflect benign bone islands although metastatic disease could have a similar appearance and close follow-up is recommended. Several benign Schmorl's nodes are noted in the thoracic spine." Bone scan on 3/28/18 was negative for bone mets.   4. Audiogram on 4/6/18 normal. Followed by ENT.   5. Given his smoking history, I recommended 4 cycles of EP. EP cycle 1 day 1 on 4/16/18. Complicated by hospitalization for neutropenic fever 4/26-4/29. No source of infections identified. Treated with antibiotics empirically and improved.   6. Cycle 2 of EP started on on 5/7/18. Hospitalized during the first weekend for intractable nausea.   7. Restaging CT scan on 5/24/18 showed "Interval development of dilated appearance of the appendix with stranding of the periappendiceal fat.  Findings are worrisome for appendicitis". Patient had abdominal pain and tenderness. Admitted and got appendectomy on 5/24/18.  8. Cycle 3 of EP 6/11/18-6/15/18. Complicated by hospitalization for neutropenic fever 6/21-6/25  9. Cycle 4 of EP " 18-18. Complicated by hospitalization for N/V -. Again hospitalized - for intractable N/V which patient feels is from opioid withdrawal      INTERVAL HISTORY:   Mr. Hampton returns today for follow up of primary mediastinal seminoma. Completed 4 cycles of  on . Feels well. Got PT and injection for left shoulder pain. No significant improvement. Feels tired. Still has insomnia. No other complaints. Back to school and working at the same time.      ECO     ROS:   A ten point system review is obtained and neg except for what was stated in the interval history     Physical Examination:   Vital signs reviewed.   Gen: well hydrated, well developed, in no acute distress.   HEENT: normocephalic, anicteric sclerae, PERRLA, EOMI, oropharynx clear  Neck: supple, no JVD, thyromegaly, cervical or supraclavicular LAD  Lungs: CTAB, no wheezes or rales. Port site on chest clean.   Heart: RRR, no M/R/G  Abdomen: soft, non-distended, nontender, no hepatosplenomegaly, mass, or hernia. BS present  Ext: no clubbing, cyanosis, or edema  Neuro: alert and oriented x 4, no focal neuro deficit  Skin: no rash, erythema, open wound or ulcers  Psych: pleasant and appropriate mood and affect        Objective:      Laboratory Data:  Labs reviewed. Unremarkable. Tumor markers are completely normal     Imaging Data:  CT C/A/P 18:  There has been no significant change in the soft tissue mass in the anterior mediastinum.  This measures 2.6 x 1.1 cm today (previously 2.7 x 1.4 cm).    No acute findings or evidence of new or metastatic disease     Assessment/Plan:      1. Primary mediastinal seminoma    2. Chronic left shoulder pain    3. Anemia, unspecified type    4. Insomnia, unspecified type        1  - Mr Memo Neff is a 22 yo man with primary mediastinal seminoma, good risk disease, s/p 4 cycles of EP finished on 18  - PET scan did not show FDG-avid lesions  - doing well. CT on 18 showed no  significant change in mediastinal soft tissue mass, 2.6 x 1.1 cm (previously 2.7 x 1.4 cm)  - Tumor markers normal  - return to clinic in 2 months with tumor markers and surveillance CT scans     2.  - f/u with pain management    3.  - mild. Stable  - monitor    4.  - he drinks 4 monsters in the morning, takes melatonin and CBD oil at night  - asked patient to cut down on caffeine in take  - c/w melatonin prn

## 2019-02-11 ENCOUNTER — OFFICE VISIT (OUTPATIENT)
Dept: HEMATOLOGY/ONCOLOGY | Facility: CLINIC | Age: 24
End: 2019-02-11
Payer: COMMERCIAL

## 2019-02-11 VITALS
SYSTOLIC BLOOD PRESSURE: 132 MMHG | BODY MASS INDEX: 21.21 KG/M2 | WEIGHT: 135.13 LBS | RESPIRATION RATE: 18 BRPM | HEART RATE: 94 BPM | OXYGEN SATURATION: 98 % | HEIGHT: 67 IN | TEMPERATURE: 98 F | DIASTOLIC BLOOD PRESSURE: 71 MMHG

## 2019-02-11 DIAGNOSIS — G89.29 CHRONIC LEFT SHOULDER PAIN: ICD-10-CM

## 2019-02-11 DIAGNOSIS — D64.9 ANEMIA, UNSPECIFIED TYPE: ICD-10-CM

## 2019-02-11 DIAGNOSIS — M25.512 CHRONIC LEFT SHOULDER PAIN: ICD-10-CM

## 2019-02-11 DIAGNOSIS — C38.3 PRIMARY MEDIASTINAL SEMINOMA: Primary | ICD-10-CM

## 2019-02-11 DIAGNOSIS — G47.00 INSOMNIA, UNSPECIFIED TYPE: ICD-10-CM

## 2019-02-11 PROCEDURE — 3008F BODY MASS INDEX DOCD: CPT | Mod: CPTII,S$GLB,, | Performed by: INTERNAL MEDICINE

## 2019-02-11 PROCEDURE — 3008F PR BODY MASS INDEX (BMI) DOCUMENTED: ICD-10-PCS | Mod: CPTII,S$GLB,, | Performed by: INTERNAL MEDICINE

## 2019-02-11 PROCEDURE — 99999 PR PBB SHADOW E&M-EST. PATIENT-LVL III: ICD-10-PCS | Mod: PBBFAC,,, | Performed by: INTERNAL MEDICINE

## 2019-02-11 PROCEDURE — 99214 PR OFFICE/OUTPT VISIT, EST, LEVL IV, 30-39 MIN: ICD-10-PCS | Mod: S$GLB,,, | Performed by: INTERNAL MEDICINE

## 2019-02-11 PROCEDURE — 99214 OFFICE O/P EST MOD 30 MIN: CPT | Mod: S$GLB,,, | Performed by: INTERNAL MEDICINE

## 2019-02-11 PROCEDURE — 99999 PR PBB SHADOW E&M-EST. PATIENT-LVL III: CPT | Mod: PBBFAC,,, | Performed by: INTERNAL MEDICINE

## 2019-02-11 NOTE — Clinical Note
Return for port flush/port accessed on 4/12, get CBC, CMP, AFP, HCG, LDH, and CT C/A/P on 4/12 (patient prefers early afternoon CT scan). See me on 4/15

## 2019-02-19 ENCOUNTER — OFFICE VISIT (OUTPATIENT)
Dept: PAIN MEDICINE | Facility: CLINIC | Age: 24
End: 2019-02-19
Attending: ANESTHESIOLOGY
Payer: COMMERCIAL

## 2019-02-19 VITALS
HEIGHT: 67 IN | DIASTOLIC BLOOD PRESSURE: 67 MMHG | TEMPERATURE: 98 F | HEART RATE: 85 BPM | RESPIRATION RATE: 18 BRPM | WEIGHT: 134.5 LBS | SYSTOLIC BLOOD PRESSURE: 126 MMHG | BODY MASS INDEX: 21.11 KG/M2

## 2019-02-19 DIAGNOSIS — M75.42 IMPINGEMENT SYNDROME OF LEFT SHOULDER: ICD-10-CM

## 2019-02-19 DIAGNOSIS — M79.18 MYOFASCIAL PAIN: ICD-10-CM

## 2019-02-19 DIAGNOSIS — G89.29 CHRONIC LEFT SHOULDER PAIN: Primary | ICD-10-CM

## 2019-02-19 DIAGNOSIS — M25.512 CHRONIC LEFT SHOULDER PAIN: Primary | ICD-10-CM

## 2019-02-19 PROCEDURE — 99999 PR PBB SHADOW E&M-EST. PATIENT-LVL IV: CPT | Mod: PBBFAC,,, | Performed by: ANESTHESIOLOGY

## 2019-02-19 PROCEDURE — 99999 PR PBB SHADOW E&M-EST. PATIENT-LVL IV: ICD-10-PCS | Mod: PBBFAC,,, | Performed by: ANESTHESIOLOGY

## 2019-02-19 PROCEDURE — 99213 PR OFFICE/OUTPT VISIT, EST, LEVL III, 20-29 MIN: ICD-10-PCS | Mod: S$GLB,,, | Performed by: ANESTHESIOLOGY

## 2019-02-19 PROCEDURE — 3008F BODY MASS INDEX DOCD: CPT | Mod: CPTII,S$GLB,, | Performed by: ANESTHESIOLOGY

## 2019-02-19 PROCEDURE — 3008F PR BODY MASS INDEX (BMI) DOCUMENTED: ICD-10-PCS | Mod: CPTII,S$GLB,, | Performed by: ANESTHESIOLOGY

## 2019-02-19 PROCEDURE — 99213 OFFICE O/P EST LOW 20 MIN: CPT | Mod: S$GLB,,, | Performed by: ANESTHESIOLOGY

## 2019-02-19 NOTE — PROGRESS NOTES
Subjective:      Patient ID: Alejandro Neff is a 23 y.o. male.    Chief Complaint: Shoulder Pain ( left )    Referred by: No ref. provider found     Pain Scales  Best: 3/10  Worst: 10/10  Usually: 8/10  Today: 8/10    Shoulder Pain    Pertinent negatives include no fever or itching.     Alejandro Neff is a 23 y.o. male presents today for follow up 2 weeks s/p glenohumeral joint injection under fluoro. He reports that he did not have any relief from the injection and continues to complain of left shoulder pain that is unchanged. He did have an injection in the clinic of the subacromial bursa, suprascapular nerve block, and TPIs which did provide 90% relief but only for 24 hours. He has not been evaluated by orthopedics for this issue.     Interventional Pain History  12/18/18 - left suprascapular nerve, supraspinatus tendon sheath,  levator scapula & subacromial bursa - 90% relief x 24 hours  2/4/19 - left glenohumeral injection - no relief    Past Medical History:   Diagnosis Date    Abdominal pain 08/12/2010    eval for abd and chest wall pain at [place] - cxr wnl, EKG (RVH), troponin wnl, normal chemistries    Allergy     Cancer     testicular    Chondromalacia patellae     Chronic nausea 2015    eval by GI Dr. Tushar Artis - given zofran and ciproheptadine     Chronic pain     Chronic pain     [facility] - [facility] meds; followed by psych and pain clinic and unresponsive to behavioral/CBT/old records reports c/f conversion disorder     Foot pain 04/2010    4/10 + SHIRA, eval by Dr. ALLY Lion at [facility] -dx unclear ? gout and trial of nsaids and pdn, xrays normal 1/11, referred to podiatry, re-eval by rheum 2/12 and MRI and films wnl    Fracture of right radius 09/2009    Lyme arthritis     multiple joints    Lyme disease 2013    Memory loss 03/2012    eval for memory loss by neuro at [facility] - MRI, EEG wnl.  "Dr. Patricio suggested emotional factors & ref to Tushar Davies, PhD for MH eval & neuropsych eval 3/12 Lupe Delgado, PhD - no evidence of formal thought disorder or psychosis - documented severe memory impairment; not related to to ADD or executive function issues. sugesstion that memory issues may be related to "emotional factors", ?conversion d/o    Stress fracture of tibia and fibula 08/2011    Urticaria        Past Surgical History:   Procedure Laterality Date    APPENDECTOMY      APPENDECTOMY, LAPAROSCOPIC N/A 5/24/2018    Per[facility] by [facility] Jr[facility] at [facility] OR 2ND FLR    [id number] N/A 3/1/2018    Performed by [facility]ita Clayton MD at [facility] CATH LAB    BLOCK, NERVE N/A 5/29/2018    Performed by Raiza Surgeon at [facility] RAIZA    CHEST WALL BIOPSY      COLONOSCOPY N/A 11/6/2018    Performed by Terence Rosales MD at [facility] ENDO (4TH FLR)    EGD (ESOPHAGOGASTRODUODENOSCOPY) N/A 11/6/2018    Performed by [facility]rence Rosales MD at [facility] ENDO (4TH FLR)    INJECTION, JOINT LEFT GLENOHUMERAL UNDER FLOURO Left 2/4/2019    Performed by Stanton Bran MD at [facility] PAIN MGT    Jcodddmau-Lgut-G-Cath N/A 4/4/2018    Performed by [facility] Diagnostic Provider at [facility] OR 2ND FLR    PORTACATH PLACEMENT Right        Review of patient's allergies indicates:   Allergen Reactions    Mushroom Anaphylaxis    Bamboo     Bee pollens     Mushroom flavor     Venom-honey bee        Current Outpatient Medications   Medication Sig Dispense Refill    albuterol 90 mcg/actuation inhaler Inhale 2 puffs into the lungs every 6 (six) hours as needed for Wheezing. 1 each 11    albuterol sulfate (PROAIR HFA INHL)       baclofen (LIORESAL) 10 MG tablet Take 1 tablet (10 mg total) by mouth 2 (two) times daily as needed for hiccups. 60 tablet 1    pantoprazole (PROTONIX) 40 MG tablet Take 1 tablet (40 mg total) by mouth once daily. 30 tablet 2    promethazine (PHENERGAN) 12.5 MG Tab Take 1 tablet (12.5 mg total) by mouth 4 (four) " times daily. 5 tablet 0     No current facility-administered medications for this visit.        Family History   Problem Relation Age of Onset    Arthritis Mother     Other Mother         benign tumor of brain and spinal cord    Hyperlipidemia Father     Gout Father     No Known Problems Sister     Arthritis Sister         shoulder    Depression Sister     Colon cancer Neg Hx     Esophageal cancer Neg Hx     Stomach cancer Neg Hx        Social History     Socioeconomic History    Marital status: Single     Spouse name: Not on file    Number of children: Not on file    Years of education: Not on file    Highest education level: Not on file   Social Needs    Financial resource strain: Not on file    Food insecurity - worry: Not on file    Food insecurity - inability: Not on file    Transportation needs - medical: Not on file    Transportation needs - non-medical: Not on file   Occupational History    Occupation: student at [employer]   Tobacco Use    Smoking status: Former Smoker     Packs/day: 0.25     Types: Vaping with nicotine, Vaping w/o nicotine, Cigarettes, Cigars     Last attempt to quit: 3/15/2018     Years since quittin.9    Smokeless tobacco: Former User   Substance and Sexual Activity    Alcohol use: Yes     Comment: occasionally     Drug use: Unknown     Types: LSD, Other-see comments    Sexual activity: Yes     Partners: Female     Birth control/protection: Condom   Other Topics Concern    Not on file   Social History Narrative    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   HENT: Negative for ear pain and hoarse voice.    Eyes: Negative for blurred vision, pain and visual disturbance.   Cardiovascular: Negative for chest pain, dyspnea on exertion and irregular heartbeat.   Respiratory:  "Negative for cough, shortness of breath and wheezing.    Endocrine: Negative for cold intolerance and heat intolerance.   Hematologic/Lymphatic: Negative for adenopathy and bleeding problem. Does not bruise/bleed easily.   Skin: Negative for color change, itching and rash.   Musculoskeletal: Negative for back pain and neck pain.        Left shoulder pain   Gastrointestinal: Negative for change in bowel habit, diarrhea, hematemesis, hematochezia, melena and vomiting.   Genitourinary: Negative for flank pain, frequency, hematuria and urgency.   Neurological: Negative for difficulty with concentration, dizziness, headaches, loss of balance and seizures.   Psychiatric/Behavioral: Negative for altered mental status, depression and suicidal ideas. The patient is not nervous/anxious.    Allergic/Immunologic: Negative for HIV exposure.           Objective:   /67   Pulse 85   Temp 97.7 °F (36.5 °C) (Oral)   Resp 18   Ht 5' 7" (1.702 m)   Wt 61 kg (134 lb 8 oz)   BMI 21.07 kg/m²   Pain Disability Index Review:  Last 3 PDI Scores 2/19/2019 1/17/2019 12/18/2018   Pain Disability Index (PDI) 51 41 39     Normocephalic.  Atraumatic.  Affect appropriate.  Breathing unlabored.  Extra ocular muscles intact.      GEN: No acute distress. Calm, comfortable  HENT: Normocephalic, atraumatic, moist mucous membranes  EYE: Anicteric sclera, non-injected  CV: Non-diaphoretic. Pulses 2+ in BUE.  CHEST: Breathing comfortably. Chest expansion symmetric  EXT: No clubbing, cyanosis, edema  Psych: Mood and affect are appropriate  GAIT: Independent, normal ambulation    Shoulder:      Inspection: Poor posture and anteriorly rolled shoulders. No gross deformity      Palpation: No TTP of shoulder area bilaterally. No biceps tendon TTP, no subAC TTP.       ROM: WFL and not painful. Palpable clicking of the left shoulder with range when compared to the right.       (-) Hawkin's      (-) Empty Can      (-) Cross arm      (-) Speed's      (-) " Joya's  Neuro Exam:     Alert, speech is fluent and appropriate     Sensation: Grossly intact to LT in BUE & BLE     Tone: No abnormality appreciated      Gait: WNL      Imaging:  MRI Shoulder 10/16/2018:       FINDINGS:  Rotator cuff: Supraspinatus, infraspinatus, teres minor, and subscapularis tendons are intact.    Labrum: Grossly intact on this non-arthrographic study.    Biceps: Long head biceps tendon is intact.  There is tendinosis.    Bone: There is no fracture.  Bone marrow signal is unremarkable.    Acromioclavicular joint: The AC joint is unremarkable.    Cartilage: Articular cartilage of the glenohumeral joint is preserved.       Impression         Tendinosis of the biceps tendon.  Otherwise unremarkable shoulder MRI.       Assessment:       Encounter Diagnoses   Name Primary?    Chronic left shoulder pain Yes    Impingement syndrome of left shoulder     Myofascial pain          Plan:   We discussed with the patient the assessment and recommendations. The following is the plan we agreed on:  - Refer to orthopedics for evaluation and management of the left shoulder    -Also place referral to PM&R (Sports Med), Dr. Gayle, for evaluation of the left shoulder pain and see if he is a candidate for pulsed RF to the suprascapular nerve. He had previous block that did provide short term relief.     -RTC PRVERNON          Alejandro was seen today for shoulder pain.    Diagnoses and all orders for this visit:    Chronic left shoulder pain  -     Ambulatory consult to Orthopedics  -     Ambulatory Referral to Orthopedics    Impingement syndrome of left shoulder  -     Ambulatory consult to Orthopedics  -     Ambulatory Referral to Orthopedics    Myofascial pain  -     Ambulatory consult to Orthopedics  -     Ambulatory Referral to Orthopedics     Discussed and evaluated patient with staff. Thank you for allowing me to participate in their care.     Jordin Fonseca  Pain Medicine Fellow  [facility]  PGYV  I have  personally taken the history and examined this patient and agree with the fellow's note as stated above.

## 2019-02-25 ENCOUNTER — INITIAL CONSULT (OUTPATIENT)
Dept: PHYSICAL MEDICINE AND REHAB | Facility: CLINIC | Age: 24
End: 2019-02-25
Payer: COMMERCIAL

## 2019-02-25 VITALS
WEIGHT: 134 LBS | SYSTOLIC BLOOD PRESSURE: 117 MMHG | BODY MASS INDEX: 21.03 KG/M2 | HEART RATE: 79 BPM | DIASTOLIC BLOOD PRESSURE: 66 MMHG | HEIGHT: 67 IN

## 2019-02-25 DIAGNOSIS — M79.18 MYOFASCIAL PAIN ON LEFT SIDE: ICD-10-CM

## 2019-02-25 DIAGNOSIS — G89.29 CHRONIC LEFT SHOULDER PAIN: Primary | ICD-10-CM

## 2019-02-25 DIAGNOSIS — M25.512 CHRONIC LEFT SHOULDER PAIN: Primary | ICD-10-CM

## 2019-02-25 PROCEDURE — 20553 NJX 1/MLT TRIGGER POINTS 3/>: CPT | Mod: S$GLB,,, | Performed by: PHYSICAL MEDICINE & REHABILITATION

## 2019-02-25 PROCEDURE — 99999 PR PBB SHADOW E&M-EST. PATIENT-LVL III: ICD-10-PCS | Mod: PBBFAC,,, | Performed by: PHYSICAL MEDICINE & REHABILITATION

## 2019-02-25 PROCEDURE — 99204 PR OFFICE/OUTPT VISIT, NEW, LEVL IV, 45-59 MIN: ICD-10-PCS | Mod: 25,S$GLB,, | Performed by: PHYSICAL MEDICINE & REHABILITATION

## 2019-02-25 PROCEDURE — 3008F BODY MASS INDEX DOCD: CPT | Mod: CPTII,S$GLB,, | Performed by: PHYSICAL MEDICINE & REHABILITATION

## 2019-02-25 PROCEDURE — 3008F PR BODY MASS INDEX (BMI) DOCUMENTED: ICD-10-PCS | Mod: CPTII,S$GLB,, | Performed by: PHYSICAL MEDICINE & REHABILITATION

## 2019-02-25 PROCEDURE — 99204 OFFICE O/P NEW MOD 45 MIN: CPT | Mod: 25,S$GLB,, | Performed by: PHYSICAL MEDICINE & REHABILITATION

## 2019-02-25 PROCEDURE — 99999 PR PBB SHADOW E&M-EST. PATIENT-LVL III: CPT | Mod: PBBFAC,,, | Performed by: PHYSICAL MEDICINE & REHABILITATION

## 2019-02-25 PROCEDURE — 20553 PR INJECT TRIGGER POINTS, > 3: ICD-10-PCS | Mod: S$GLB,,, | Performed by: PHYSICAL MEDICINE & REHABILITATION

## 2019-02-25 NOTE — PROGRESS NOTES
[facility]    CHIEF COMPLAINT:   Chief Complaint   Patient presents with    Shoulder Pain     left shoulder pain     HISTORY OF PRESENT ILLNESS: Alejandro Neff is a 24 y.o. male who presents to me for initial evaluation of left shoulder pain. He has had shoulder pain for 1 year. He has pain in his posterior shoulder along his shoulder blade and anteriorly. He takes CBD and Kratom for his pain moderate relief. He has constant pain and his pain is increased with any movement; overhead exacerbates pain. He has weakness, numbness, and tingling. Numbness and tingling began about 2 weeks ago. He finished his chemotherapy in July 2018. Pain management GH steroid injection 2/4/19 without relief. When he stretches his arm he has numbness and tingling of his arm from his shoulder down to his hand. He also received ultrasound-guided injection of the left suprascapular nerve, subacromial bursa, and levator scapulae muscle on 12/18/2018.  He reports near total resolution of his pain for several days following that procedure.  He feels his current pain is constant throughout the day, but worsened with movement.    Review of Systems   Constitutional: Negative for fever.   HENT: Negative for drooling.    Eyes: Negative for discharge.   Respiratory: Negative for choking.    Cardiovascular: Negative for chest pain.   Genitourinary: Negative for flank pain.   Skin: Negative for wound.   Allergic/Immunologic: Negative for immunocompromised state.   Neurological: Negative for tremors and syncope.   Psychiatric/Behavioral: Negative for behavioral problems.     Past Medical History:   Past Medical History:   Diagnosis Date    Abdominal pain 08/12/2010    eval for abd and chest wall pain at [place] - cxr wnl, EKG ([facility]), troponin wnl, normal chemistries    Allergy     Cancer     testicular    Chondromalacia patellae     Chronic nausea 2015    eval by GI Dr. Tushar ann  "zofran and ciproheptadine     Chronic pain     Chronic pain     [facility] - no meds; followed by psych and pain clinic and unresponsive to behavioral/CBT/old records reports c/f conversion disorder     Foot pain 04/2010    4/10 + [place], eval by Dr. ALLY Lion at [facility] -dx unclear ? gout and trial of nsaids and pdn, xrays normal 1/11, referred to podiatry, re-eval by rheum 2/12 and MRI and films wnl    Fracture of right radius 09/2009    Lyme arthritis     multiple joints    Lyme disease 2013    Memory loss 03/2012    eval for memory loss by neuro at [facility] - MRI, EEG wnl. Dr. Patricio suggested emotional factors & ref to Tushar Davies, PhD for  eval & neuropsych eval 3/12 Lupe Delgado, PhD - no evidence of formal thought disorder or psychosis - documented severe memory impairment; not related to to ADD or executive function issues. sugesstion that memory issues may be related to "emotional factors", ?conversion d/o    Stress fracture of tibia and fibula 08/2011    Urticaria        Past Surgical History:   Past Surgical History:   Procedure Laterality Date    APPENDECTOMY      APPENDECTOMY, LAPAROSCOPIC N/A 5/24/2018    Performed by [facility] Jr., MD at [facility] OR 2ND FLR    [id number] N/A 3/1/2018    Performed by [facility] at [facility] CATH LAB    BLOCK, NERVE N/A 5/29/2018    Performed by Raiza Surgeon at [facility] RAIZA    CHEST WALL BIOPSY      COLONOSCOPY N/A 11/6/2018    Performed by Terence Rosales MD at [facility] ENDO (4TH FLR)    EGD (ESOPHAGOGASTRODUODENOSCOPY) N/A 11/6/2018    Performed by Terence Rosales MD at [facility] ENDO (4TH FLR)    INJECTION, JOINT LEFT GLENOHUMERAL UNDER FLOURO Left 2/4/2019    Performed by Stanton Bran MD at [facility] PAIN MGT    Pmsaykrcr-Xkrm-I-Cath N/A 4/4/2018    Performed by [facility] Diagnostic Provider at [facility] OR 2ND FLR    PORTACATH PLACEMENT Right        Family History:   Family History   Problem " Relation Age of Onset    Arthritis Mother     Other Mother         benign tumor of brain and spinal cord    Hyperlipidemia Father     Gout Father     No Known Problems Sister     Arthritis Sister         shoulder    Depression Sister     Colon cancer Neg Hx     Esophageal cancer Neg Hx     Stomach cancer Neg Hx        Medications:   Current Outpatient Medications on File Prior to Visit   Medication Sig Dispense Refill    albuterol 90 mcg/actuation inhaler Inhale 2 puffs into the lungs every 6 (six) hours as needed for Wheezing. 1 each 11    albuterol sulfate (PROAIR HFA INHL)       baclofen (LIORESAL) 10 MG tablet Take 1 tablet (10 mg total) by mouth 2 (two) times daily as needed for hiccups. 60 tablet 1    pantoprazole (PROTONIX) 40 MG tablet Take 1 tablet (40 mg total) by mouth once daily. 30 tablet 2    promethazine (PHENERGAN) 12.5 MG Tab Take 1 tablet (12.5 mg total) by mouth 4 (four) times daily. 5 tablet 0     No current facility-administered medications on file prior to visit.        Allergies:   Review of patient's allergies indicates:   Allergen Reactions    Mushroom Anaphylaxis    Bamboo     Bee pollens     Mushroom flavor     Venom-honey bee        Social History:   Social History     Socioeconomic History    Marital status: Single     Spouse name: None    Number of children: None    Years of education: None    Highest education level: None   Social Needs    Financial resource strain: None    Food insecurity - worry: None    Food insecurity - inability: None    Transportation needs - medical: None    Transportation needs - non-medical: None   Occupational History    Occupation: student at [employer]   Tobacco Use    Smoking status: Former Smoker     Packs/day: 0.25     Types: Vaping with nicotine, Vaping w/o nicotine, Cigarettes, Cigars     Last attempt to quit: 3/15/2018     Years since quittin.9    Smokeless tobacco: Former User   Substance and Sexual Activity     "Alcohol use: Yes     Comment: occasionally     Drug use: Unknown     Types: LSD, Other-see comments    Sexual activity: Yes     Partners: Female     Birth control/protection: Condom   Other Topics Concern    None   Social History Narrative    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    PHYSICAL EXAMINATION:   General    Vitals:    02/25/19 1140   Weight: 60.8 kg (134 lb)   Height: 5' 7" (1.702 m)     Constitutional: Oriented to person, place, and time. No apparent distress. Pleasant.  HENT:   Head: Normocephalic and atraumatic.   Eyes: Right eye exhibits no discharge. Left eye exhibits no discharge.  Pulmonary/Chest: Effort normal. No respiratory distress.   Abdominal: There is no guarding.   Neurological: Alert and oriented to person, place, and time.   Psychiatric: Behavior is normal.   Right Hand Exam     Muscle Strength   Wrist extension: 5/5   Wrist flexion: 5/5   : 5/5     Comments:  Negative Lee's      Left Hand Exam     Muscle Strength   Wrist extension: 5/5   Wrist flexion: 5/5   :  5/5     Comments:  Negative Lee's      Left Shoulder Exam     Tenderness   Left shoulder tenderness location: Tender over the anterior left shoulder as well as the trapezius, lower cervical paraspinals, and teres major musculature.    Range of Motion   Active abduction: 160   Passive abduction: 160   External rotation: 80   Forward flexion: 170   Internal rotation 90 degrees: 60     Muscle Strength   Abduction: 5/5   Internal rotation: 5/5   External rotation: 5/5   Biceps: 5/5     Tests   Irizarry test: negative  Cross arm: negative  Impingement: negative    Other   Erythema: absent  Scars: absent  Sensation: normal  Pulse: present     Comments:  Empty can negative   Some asymmetry of left scapula; prominence of medial scapula border  Resisted external rotation and internal rotation did " not elicit pain          Neurologic:  Spurling's test is negative bilaterally.  Tone is normal throughout the bilateral upper extremities.  Reflexes are 2+ and symmetric throughout the bilateral upper extremities.  Strength is 5/5 throughout the bilateral upper extremities.  INSPECTION: There is no swelling, ecchymoses, erythema of the left shoulder.    Imaging  MRI of the left shoulder from 10/16/2018: Tendinosis of the biceps tendon.  Otherwise unremarkable shoulder MRI.    ASSESSMENT:   Encounter Diagnosis   Name Primary?    Chronic left shoulder pain Yes     PLAN:     1.  Alejandro's history is complicated by a history chronic pain syndrome, potential conversion disorder, progressive Lyme disease, and mediastinal tumor.  His left shoulder MRI is fairly benign, as is his shoulder exam today.  However, he does report near full resolution of his left shoulder pain following the injections he had in December.  This is also complicated because apparently at that time he had injection of the suprascapular nerve, subacromial bursa, and periscapular musculature/trigger point injections.  As such, it is difficult to assess which of these injections produced the most relief.  He was referred today by Interventional Pain for consideration of pulsed radiofrequency ablation of the left suprascapular nerve.  Considering other interventions have failed to produce significant and lasting relief, it may be appropriate to proceed with this procedure considering this is very low risk for complications.  We will seek insurance approval for pulse radiofrequency ablation of the left suprascapular nerve.  In the meantime, he does have a few tender spots in the periscapular musculature.  Offered him trigger point injections today and he wished to proceed.  See separate procedure note below.  If he does get significant relief from today's injections, he could always cancel the suprascapular radiofrequency ablation.    2.  We will be in  "contact with them over the next 2 weeks to potentially schedule the suprascapular nerve radiofrequency ablation pending insurance approval, and his response to today's trigger point injections.  It today's injections are unsuccessful, alternate diagnoses such as cervical radiculopathy, brachial plexopathy, and intrathoracic referred pain can be considered.    Procedure Note: Trigger point injections  Time: 11:30  Indications: Pain  Description: After 3 maximal tender points were identified in the left cervical paraspinal, trapezius, and teres major musculature, the overlying skin was cleaned with etoh swabs. Each point was injected with 1 cc of 1% Lidocaine after negative aspiration using a 27 g 1.5" needle. A stellate pattern was then used to needle the surrounding area. Needle was removed and areas cleaned. Blood loss minimal.  Complications: None  Disposition: Pt left in good condition with no complaints.    The above note was completed, in part, with the aid of Dragon dictation software/hardware. Translation errors may be present.  "

## 2019-02-26 ENCOUNTER — HOSPITAL ENCOUNTER (OUTPATIENT)
Dept: RADIOLOGY | Facility: HOSPITAL | Age: 24
Discharge: HOME | End: 2019-02-26
Attending: PHYSICIAN ASSISTANT
Payer: COMMERCIAL

## 2019-02-26 ENCOUNTER — OFFICE VISIT (OUTPATIENT)
Dept: ORTHOPEDICS | Facility: CLINIC | Age: 24
End: 2019-02-26
Payer: COMMERCIAL

## 2019-02-26 VITALS
SYSTOLIC BLOOD PRESSURE: 109 MMHG | HEART RATE: 88 BPM | BODY MASS INDEX: 21.3 KG/M2 | WEIGHT: 136 LBS | DIASTOLIC BLOOD PRESSURE: 66 MMHG

## 2019-02-26 DIAGNOSIS — M54.2 CERVICAL SPINE PAIN: ICD-10-CM

## 2019-02-26 DIAGNOSIS — M25.512 CHRONIC LEFT SHOULDER PAIN: Primary | ICD-10-CM

## 2019-02-26 DIAGNOSIS — G89.29 CHRONIC LEFT SHOULDER PAIN: Primary | ICD-10-CM

## 2019-02-26 PROCEDURE — 72050 X-RAY EXAM NECK SPINE 4/5VWS: CPT | Mod: TC

## 2019-02-26 PROCEDURE — 72050 XR CERVICAL SPINE COMPLETE 5 VIEW: ICD-10-PCS | Mod: 26,,, | Performed by: RADIOLOGY

## 2019-02-26 PROCEDURE — 99203 PR OFFICE/OUTPT VISIT, NEW, LEVL III, 30-44 MIN: ICD-10-PCS | Mod: S$GLB,,, | Performed by: PHYSICIAN ASSISTANT

## 2019-02-26 PROCEDURE — 99999 PR PBB SHADOW E&M-EST. PATIENT-LVL III: ICD-10-PCS | Mod: PBBFAC,,, | Performed by: PHYSICIAN ASSISTANT

## 2019-02-26 PROCEDURE — 3008F BODY MASS INDEX DOCD: CPT | Mod: CPTII,S$GLB,, | Performed by: PHYSICIAN ASSISTANT

## 2019-02-26 PROCEDURE — 3008F PR BODY MASS INDEX (BMI) DOCUMENTED: ICD-10-PCS | Mod: CPTII,S$GLB,, | Performed by: PHYSICIAN ASSISTANT

## 2019-02-26 PROCEDURE — 99999 PR PBB SHADOW E&M-EST. PATIENT-LVL III: CPT | Mod: PBBFAC,,, | Performed by: PHYSICIAN ASSISTANT

## 2019-02-26 PROCEDURE — 72050 X-RAY EXAM NECK SPINE 4/5VWS: CPT | Mod: 26,,, | Performed by: RADIOLOGY

## 2019-02-26 PROCEDURE — 99203 OFFICE O/P NEW LOW 30 MIN: CPT | Mod: S$GLB,,, | Performed by: PHYSICIAN ASSISTANT

## 2019-02-26 NOTE — LETTER
February 26, 2019      Jordin Fonseca MD  [address]  [facility] [address]           [facility]  [address]  [facility] [address]  Phone: [phone]          Patient: Alejandro Neff   MR Number: [id number]   YOB: 1995   Date of Visit: 2/26/2019       Dear Dr. Jordin Fonseca:    Thank you for referring Alejandro Neff to me for evaluation. Attached you will find relevant portions of my assessment and plan of care.    If you have questions, please do not hesitate to call me. I look forward to following Alejandro Neff along with you.    Sincerely,    Alirio Ortiz PA-C    Enclosure  CC:  No Recipients    If you would like to receive this communication electronically, please contact [email or web address] or [phone] to request more information on [employer] access.    For providers and/or their staff who would like to refer a patient to [facility], please contact us through our one-stop-shop provider referral line, [facility], at [phone].    If you feel you have received this communication in error or would no longer like to receive these types of communications, please e-mail [email or web address]

## 2019-02-26 NOTE — PROGRESS NOTES
SUBJECTIVE:     Chief Complaint & History of Present Illness:  Alejandro Neff is a  New  patient 24 y.o. male who is seen here today with a complaint of  left shoulder pain .  Patient is here today for evaluation treatment of persistent left shoulder pain for over 2 years.  Patient has a long history of multiple issues in the shoulder chest and upper abdominal regions to include mediastinal seminoma, germ cell tumor testicular cancer Lyme disease, and has a port placed in his left the superior chest.  He has been seen by multiple providers in an attempt to isolate the sources of his shoulder pain he has undergone nerve blocks in injection therapies with short-term relief as currently being followed by pain management for consideration of nerve ablation.  He has consulted Orthopedics today to rule out any mechanical issues within the shoulder or shoulder girdle region that could account for some or all of his pain symptoms.  On a scale of 1-10, with 10 being worst pain imaginable, he rates this pain as 4 on good days and 8 on bad days.  he describes the pain as achy and sore.    Review of patient's allergies indicates:   Allergen Reactions    Mushroom Anaphylaxis    Bamboo     Bee pollens     Mushroom flavor     Venom-honey bee          Current Outpatient Medications   Medication Sig Dispense Refill    albuterol 90 mcg/actuation inhaler Inhale 2 puffs into the lungs every 6 (six) hours as needed for Wheezing. 1 each 11    albuterol sulfate (PROAIR HFA INHL)       baclofen (LIORESAL) 10 MG tablet Take 1 tablet (10 mg total) by mouth 2 (two) times daily as needed for hiccups. 60 tablet 1    pantoprazole (PROTONIX) 40 MG tablet Take 1 tablet (40 mg total) by mouth once daily. 30 tablet 2    promethazine (PHENERGAN) 12.5 MG Tab Take 1 tablet (12.5 mg total) by mouth 4 (four) times daily. 5 tablet 0     No current facility-administered medications for this visit.        Past Medical History:  "  Diagnosis Date    Abdominal pain 08/12/2010    eval for abd and chest wall pain at [facility], [place], [place] - cxr wnl, EKG (RVH), troponin wnl, normal chemistries    Allergy     Cancer     testicular    Chondromalacia patellae     Chronic nausea 2015    eval by GI Dr. Tushar Artis - given zofran and ciproheptadine     Chronic pain     Chronic pain     inpatient [facility] - no meds; followed by psych and pain clinic and unresponsive to behavioral/CBT/old records reports c/f conversion disorder     Foot pain 04/2010    4/10 + SHIRA, eval by Dr. ALLY Lion at [facility] -dx unclear ? gout and trial of nsaids and pdn, xrays normal 1/11, referred to podiatry, re-eval by rheum 2/12 and MRI and films wnl    Fracture of right radius 09/2009    Lyme arthritis     multiple joints    Lyme disease 2013    Memory loss 03/2012    eval for memory loss by neuro at [facility] - MRI, EEG wnl. Dr. Patricio suggested emotional factors & ref to Tushar Davies, PhD for  eval & neuropsych eval 3/12 Lupe Delgado, PhD - no evidence of formal thought disorder or psychosis - documented severe memory impairment; not related to to ADD or executive function issues. sugesstion that memory issues may be related to "emotional factors", ?conversion d/o    Stress fracture of tibia and fibula 08/2011    Urticaria        Past Surgical History:   Procedure Laterality Date    APPENDECTOMY      APPENDECTOMY, LAPAROSCOPIC N/A 5/24/2018    Performed by Kenan Huang Jr., MD at [facility] OR 2ND FLR    FXTFVI-AFZKET-QF N/A 3/1/2018    Performed by Nikita Clayton MD at [facility] CATH LAB    BLOCK, NERVE N/A 5/29/2018    Performed by Raiza Surgeon at [facility] RAIZA    CHEST WALL BIOPSY      COLONOSCOPY N/A 11/6/2018    Performed by Terence Rosales MD at [facility] ENDO (4TH FLR)    EGD (ESOPHAGOGASTRODUODENOSCOPY) N/A 11/6/2018    Performed by Terence Rosales MD at [facility] ENDO (4TH FLR)    INJECTION, " JOINT LEFT GLENOHUMERAL UNDER FLOURO Left 2/4/2019    Performed by Stanton Bran MD at [facility] PAIN MGT    Hrblgqcof-Gqzb-Q-Cath N/A 4/4/2018    Performed by [facility] Diagnostic Provider at [facility] OR 2ND FLR    PORTACATH PLACEMENT Right        Vital Signs (Most Recent)  Vitals:    02/26/19 1319   BP: 109/66   Pulse: 88       Review of Systems:  ROS:  Constitutional: no fever or chills  Eyes: no visual changes  ENT: no nasal congestion or sore throat, Tinnitus bilateral ears  Respiratory: no cough or shortness of breath  Cardiovascular: no chest pain or palpitations  Gastrointestinal: no nausea or vomiting, tolerating diet  Genitourinary: no hematuria or dysuria  Integument/Breast: no rash or pruritis  Hematologic/Lymphatic: no easy bruising or lymphadenopathy, Positive for Lyme disease mediastinal seminoma, 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  Neurological: Mood & affect are normal.  Shoulder exam: left  Tenderness: globally  ROM: forward flexion 180/180, extension 45/45, full abduction 180/180, abduction-glenohumeral 90/90, external rotation 50/50  Shoulder Strength: biceps 5/5, triceps 5/5, abduction 5/5, adduction 5/5, external rotation 5/5 with shoulder at side, flexion 5/5, and extension 5/5  negative for tenderness about the glenohumeral joint, negative for tenderness over the acromioclavicular joint and negative for impingement  sign  Stability tests: anterior apprehension test negative and posterior apprehension test negative  Special Tests:Cross-chest abduction: negative                     RADIOGRAPHS:  MRI of the shoulder and x-ray of the cervical spine reviewed by me today demonstrate no evidence of mechanical injury loss of joint space advanced degenerative joint disease fracture dislocation or any soft tissue abnormalities within the shoulder    ASSESSMENT/PLAN:       ICD-10-CM ICD-9-CM   1. Chronic left shoulder pain M25.512 719.41    G89.29 338.29   2. Cervical spine pain M54.2 723.1       Plan: We discussed with the patient at length all the different treatment options available for his leftshoulder including anti-inflammatories, acetaminophen, rest, ice, Physical therapy to include strengthening exercise, occasional cortisone injections for temporary relief, arthroscopic surgical repair, and finally shoulder arthroplasty.   I can find no mechanical orthopedic cause for the patient's shoulder pain and/or radiation to the cervical region. He is tentatively scheduled to undergo an ablation procedure with pain management and a possible Neurology consult  Follow-up [email or web address].

## 2019-02-28 DIAGNOSIS — C38.3 PRIMARY MEDIASTINAL SEMINOMA: Primary | ICD-10-CM

## 2019-03-06 ENCOUNTER — DOCUMENTATION ONLY (OUTPATIENT)
Dept: REHABILITATION | Facility: OTHER | Age: 24
End: 2019-03-06

## 2019-03-06 DIAGNOSIS — Z74.09 DECREASED STRENGTH, ENDURANCE, AND MOBILITY: ICD-10-CM

## 2019-03-06 DIAGNOSIS — R53.1 DECREASED STRENGTH, ENDURANCE, AND MOBILITY: ICD-10-CM

## 2019-03-06 DIAGNOSIS — M25.519 NECK AND SHOULDER PAIN: ICD-10-CM

## 2019-03-06 DIAGNOSIS — R68.89 DECREASED STRENGTH, ENDURANCE, AND MOBILITY: ICD-10-CM

## 2019-03-06 DIAGNOSIS — R29.3 POSTURAL IMBALANCE: ICD-10-CM

## 2019-03-06 DIAGNOSIS — M54.2 NECK AND SHOULDER PAIN: ICD-10-CM

## 2019-03-06 NOTE — PROGRESS NOTES
REHAB SERVICES OUTPATIENT DISCHARGE SUMMARY  Physical Therapy      Name:  Alejandro Neff  Date:  3/6/2019    Date of Evaluation:  11/13/2018  Physician:  Dr. Bran  Total # Of Visits:  5   Diagnosis:    Encounter Diagnoses   Name Primary?    Neck and shoulder pain     Postural imbalance     Decreased strength, endurance, and mobility          Physical/Functional Status:  Unable to assess pt's functional limitations and current impairments due to pt not returning to the clinic.     The patient is to be discharged from our Therapy service for the following reason(s):  Patient has not attended therapy since 12/24/2018    Degree of Goal Achievement: Patient has not met goals secondary to:  Not returning to clinic for follow up assessment.    Patient Education: None provided    Discharge Plan:  D/C due to non-compliance for remaining visit.    Rossy Chavez, PT  3/6/2019

## 2019-03-07 ENCOUNTER — TELEPHONE (OUTPATIENT)
Dept: PHYSICAL MEDICINE AND REHAB | Facility: CLINIC | Age: 24
End: 2019-03-07

## 2019-03-07 DIAGNOSIS — G89.29 CHRONIC LEFT SHOULDER PAIN: Primary | ICD-10-CM

## 2019-03-07 DIAGNOSIS — M25.512 CHRONIC LEFT SHOULDER PAIN: Primary | ICD-10-CM

## 2019-03-07 RX ORDER — LIDOCAINE HYDROCHLORIDE 10 MG/ML
1 INJECTION, SOLUTION EPIDURAL; INFILTRATION; INTRACAUDAL; PERINEURAL ONCE
Status: CANCELLED | OUTPATIENT
Start: 2019-03-07 | End: 2019-03-07

## 2019-03-07 RX ORDER — MIDAZOLAM HYDROCHLORIDE 5 MG/ML
2 INJECTION INTRAMUSCULAR; INTRAVENOUS ONCE AS NEEDED
Status: CANCELLED | OUTPATIENT
Start: 2019-03-07 | End: 2030-08-03

## 2019-03-07 RX ORDER — SODIUM CHLORIDE, SODIUM LACTATE, POTASSIUM CHLORIDE, CALCIUM CHLORIDE 600; 310; 30; 20 MG/100ML; MG/100ML; MG/100ML; MG/100ML
INJECTION, SOLUTION INTRAVENOUS CONTINUOUS
Status: CANCELLED | OUTPATIENT
Start: 2019-03-07

## 2019-03-07 NOTE — TELEPHONE ENCOUNTER
----- Message from Jerry Gayle MD sent at 3/6/2019  5:49 PM CST -----  What's the status of him getting the suprascap nerve ablation. He is [employer] so I guess it doesn't seem promising, but was that actually put in for approval? If a no-go, I need to figure something else out.    ----- Message -----  From: Jerry Gayle MD  Sent: 2/25/2019   5:40 PM  To: Jerry Gayle MD

## 2019-03-28 ENCOUNTER — TELEPHONE (OUTPATIENT)
Dept: PHYSICAL MEDICINE AND REHAB | Facility: CLINIC | Age: 24
End: 2019-03-28

## 2019-03-28 ENCOUNTER — PATIENT MESSAGE (OUTPATIENT)
Dept: SURGERY | Facility: HOSPITAL | Age: 24
End: 2019-03-28

## 2019-04-12 ENCOUNTER — INFUSION (OUTPATIENT)
Dept: INFUSION THERAPY | Facility: OTHER | Age: 24
End: 2019-04-12
Attending: INTERNAL MEDICINE
Payer: COMMERCIAL

## 2019-04-12 ENCOUNTER — HOSPITAL ENCOUNTER (OUTPATIENT)
Dept: RADIOLOGY | Facility: OTHER | Age: 24
Discharge: HOME | End: 2019-04-12
Attending: INTERNAL MEDICINE
Payer: COMMERCIAL

## 2019-04-12 VITALS
TEMPERATURE: 98 F | DIASTOLIC BLOOD PRESSURE: 55 MMHG | RESPIRATION RATE: 17 BRPM | HEART RATE: 77 BPM | SYSTOLIC BLOOD PRESSURE: 112 MMHG | OXYGEN SATURATION: 99 %

## 2019-04-12 DIAGNOSIS — C38.3 PRIMARY MEDIASTINAL SEMINOMA: ICD-10-CM

## 2019-04-12 DIAGNOSIS — R11.0 CHEMOTHERAPY-INDUCED NAUSEA: ICD-10-CM

## 2019-04-12 DIAGNOSIS — C38.3 PRIMARY MEDIASTINAL SEMINOMA: Primary | ICD-10-CM

## 2019-04-12 DIAGNOSIS — T45.1X5A CHEMOTHERAPY-INDUCED NAUSEA: ICD-10-CM

## 2019-04-12 LAB
AFP SERPL-MCNC: 1.5 NG/ML (ref 0–8.4)
ALBUMIN SERPL BCP-MCNC: 4.5 G/DL (ref 3.5–5.2)
ALP SERPL-CCNC: 45 U/L (ref 55–135)
ALT SERPL W/O P-5'-P-CCNC: 18 U/L (ref 10–44)
ANION GAP SERPL CALC-SCNC: 8 MMOL/L (ref 8–16)
AST SERPL-CCNC: 16 U/L (ref 10–40)
BILIRUB SERPL-MCNC: 1.2 MG/DL (ref 0.1–1)
BUN SERPL-MCNC: 13 MG/DL (ref 6–20)
CALCIUM SERPL-MCNC: 9.6 MG/DL (ref 8.7–10.5)
CHLORIDE SERPL-SCNC: 102 MMOL/L (ref 95–110)
CO2 SERPL-SCNC: 24 MMOL/L (ref 23–29)
CREAT SERPL-MCNC: 1 MG/DL (ref 0.5–1.4)
ERYTHROCYTE [DISTWIDTH] IN BLOOD BY AUTOMATED COUNT: 14 % (ref 11.5–14.5)
EST. GFR  (AFRICAN AMERICAN): >60 ML/MIN/1.73 M^2
EST. GFR  (NON AFRICAN AMERICAN): >60 ML/MIN/1.73 M^2
GLUCOSE SERPL-MCNC: 89 MG/DL (ref 70–110)
HCG INTACT+B SERPL-ACNC: <1.2 MIU/ML
HCT VFR BLD AUTO: 42.4 % (ref 40–54)
HGB BLD-MCNC: 14.8 G/DL (ref 14–18)
LDH SERPL L TO P-CCNC: 125 U/L (ref 110–260)
MCH RBC QN AUTO: 30.1 PG (ref 27–31)
MCHC RBC AUTO-ENTMCNC: 34.9 G/DL (ref 32–36)
MCV RBC AUTO: 86 FL (ref 82–98)
NEUTROPHILS # BLD AUTO: 3.3 K/UL (ref 1.8–7.7)
PLATELET # BLD AUTO: 206 K/UL (ref 150–350)
PMV BLD AUTO: 8.7 FL (ref 9.2–12.9)
POTASSIUM SERPL-SCNC: 3.7 MMOL/L (ref 3.5–5.1)
PROT SERPL-MCNC: 6.9 G/DL (ref 6–8.4)
RBC # BLD AUTO: 4.92 M/UL (ref 4.6–6.2)
SODIUM SERPL-SCNC: 134 MMOL/L (ref 136–145)
WBC # BLD AUTO: 5.75 K/UL (ref 3.9–12.7)

## 2019-04-12 PROCEDURE — 71260 CT THORAX DX C+: CPT | Mod: 26,,, | Performed by: RADIOLOGY

## 2019-04-12 PROCEDURE — 85027 COMPLETE CBC AUTOMATED: CPT

## 2019-04-12 PROCEDURE — 82105 ALPHA-FETOPROTEIN SERUM: CPT

## 2019-04-12 PROCEDURE — 83615 LACTATE (LD) (LDH) ENZYME: CPT

## 2019-04-12 PROCEDURE — A4216 STERILE WATER/SALINE, 10 ML: HCPCS | Performed by: INTERNAL MEDICINE

## 2019-04-12 PROCEDURE — 74177 CT ABD & PELVIS W/CONTRAST: CPT | Mod: TC

## 2019-04-12 PROCEDURE — 63600175 PHARM REV CODE 636 W HCPCS: Performed by: INTERNAL MEDICINE

## 2019-04-12 PROCEDURE — 71260 CT CHEST ABDOMEN PELVIS WITH CONTRAST (XPD): ICD-10-PCS | Mod: 26,,, | Performed by: RADIOLOGY

## 2019-04-12 PROCEDURE — 74177 CT ABD & PELVIS W/CONTRAST: CPT | Mod: 26,,, | Performed by: RADIOLOGY

## 2019-04-12 PROCEDURE — 74177 CT CHEST ABDOMEN PELVIS WITH CONTRAST (XPD): ICD-10-PCS | Mod: 26,,, | Performed by: RADIOLOGY

## 2019-04-12 PROCEDURE — 84702 CHORIONIC GONADOTROPIN TEST: CPT

## 2019-04-12 PROCEDURE — 36591 DRAW BLOOD OFF VENOUS DEVICE: CPT

## 2019-04-12 PROCEDURE — 25000003 PHARM REV CODE 250: Performed by: INTERNAL MEDICINE

## 2019-04-12 PROCEDURE — 25500020 PHARM REV CODE 255: Performed by: INTERNAL MEDICINE

## 2019-04-12 PROCEDURE — 80053 COMPREHEN METABOLIC PANEL: CPT

## 2019-04-12 RX ORDER — SODIUM CHLORIDE 0.9 % (FLUSH) 0.9 %
10 SYRINGE (ML) INJECTION
Status: CANCELLED | OUTPATIENT
Start: 2019-04-12

## 2019-04-12 RX ORDER — HEPARIN 100 UNIT/ML
500 SYRINGE INTRAVENOUS
Status: COMPLETED | OUTPATIENT
Start: 2019-04-12 | End: 2019-04-12

## 2019-04-12 RX ORDER — SODIUM CHLORIDE 0.9 % (FLUSH) 0.9 %
10 SYRINGE (ML) INJECTION
Status: COMPLETED | OUTPATIENT
Start: 2019-04-12 | End: 2019-04-12

## 2019-04-12 RX ORDER — HEPARIN 100 UNIT/ML
500 SYRINGE INTRAVENOUS
Status: CANCELLED | OUTPATIENT
Start: 2019-04-12

## 2019-04-12 RX ADMIN — Medication 10 ML: at 02:04

## 2019-04-12 RX ADMIN — IOHEXOL 75 ML: 350 INJECTION, SOLUTION INTRAVENOUS at 03:04

## 2019-04-12 RX ADMIN — IOHEXOL 30 ML: 350 INJECTION, SOLUTION INTRAVENOUS at 01:04

## 2019-04-12 RX ADMIN — HEPARIN SODIUM (PORCINE) LOCK FLUSH IV SOLN 100 UNIT/ML 500 UNITS: 100 SOLUTION at 03:04

## 2019-04-12 NOTE — PLAN OF CARE
Problem: Adult Inpatient Plan of Care  Goal: Plan of Care Review  Outcome: Ongoing (interventions implemented as appropriate)  Labs redrawn from chest port complete. Carolyn from lab confirmed receiving specimen. Pt tolerated well. VSS. NAD. Port to right chest de-accessed after heparinized per protocol.  Pt verbalized understanding of discharge instructions before leaving with self.

## 2019-04-16 ENCOUNTER — OFFICE VISIT (OUTPATIENT)
Dept: HEMATOLOGY/ONCOLOGY | Facility: CLINIC | Age: 24
End: 2019-04-16
Payer: COMMERCIAL

## 2019-04-16 VITALS — SYSTOLIC BLOOD PRESSURE: 105 MMHG | HEART RATE: 73 BPM | TEMPERATURE: 99 F | DIASTOLIC BLOOD PRESSURE: 57 MMHG

## 2019-04-16 DIAGNOSIS — R06.02 SHORTNESS OF BREATH: ICD-10-CM

## 2019-04-16 DIAGNOSIS — C38.3 PRIMARY MEDIASTINAL SEMINOMA: Primary | ICD-10-CM

## 2019-04-16 DIAGNOSIS — R53.83 FATIGUE, UNSPECIFIED TYPE: ICD-10-CM

## 2019-04-16 PROCEDURE — 99214 OFFICE O/P EST MOD 30 MIN: CPT | Mod: S$GLB,,, | Performed by: INTERNAL MEDICINE

## 2019-04-16 PROCEDURE — 99999 PR PBB SHADOW E&M-EST. PATIENT-LVL III: CPT | Mod: PBBFAC,,, | Performed by: INTERNAL MEDICINE

## 2019-04-16 PROCEDURE — 99214 PR OFFICE/OUTPT VISIT, EST, LEVL IV, 30-39 MIN: ICD-10-PCS | Mod: S$GLB,,, | Performed by: INTERNAL MEDICINE

## 2019-04-16 PROCEDURE — 99999 PR PBB SHADOW E&M-EST. PATIENT-LVL III: ICD-10-PCS | Mod: PBBFAC,,, | Performed by: INTERNAL MEDICINE

## 2019-04-16 RX ORDER — ALBUTEROL SULFATE 90 UG/1
2 AEROSOL, METERED RESPIRATORY (INHALATION) EVERY 6 HOURS PRN
Qty: 18 G | Refills: 11 | Status: SHIPPED | OUTPATIENT
Start: 2019-04-16

## 2019-04-16 NOTE — PROGRESS NOTES
"Subjective:      Patient ID: Alejandro Neff is a 23 y.o. male.     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 Mr. Hampton is a 24 yo man who first presented with chest pain and underwent CT chest on 2/14/18, which showed a 6 x 3.5 cm mediastinal mass. It abuts the aorta and pulmonary artery. Biopsy was done on 3/1/18. Pathology (reviewed at [facility]) showed germ cell tumor, most consistent with seminoma.   2. HCG, LDH, AFP on 3/22/18 all normal.   3. Testicular US 3/26/18 showed no testicular masses. CT C/A/P on 3/26/18 showed "stable appearance of anterior mediastinal mass consistent with provided history of seminoma. Several punctate sclerotic foci are noted in the pelvis at the right pubic bone, right ischial tuberosity, and left ilium adjacent to the sacroiliac joint.  These are strongly favored to reflect benign bone islands although metastatic disease could have a similar appearance and close follow-up is recommended. Several benign Schmorl's nodes are noted in the thoracic spine." Bone scan on 3/28/18 was negative for bone mets.   4. Audiogram on 4/6/18 normal. Followed by ENT.   5. Given his smoking history, I recommended 4 cycles of EP. EP cycle 1 day 1 on 4/16/18. Complicated by hospitalization for neutropenic fever 4/26-4/29. No source of infections identified. Treated with antibiotics empirically and improved.   6. Cycle 2 of EP started on on 5/7/18. Hospitalized during the first weekend for intractable nausea.   7. Restaging CT scan on 5/24/18 showed "Interval development of dilated appearance of the appendix with stranding of the periappendiceal fat.  Findings are worrisome for appendicitis". Patient had abdominal pain and tenderness. Admitted and got appendectomy on 5/24/18.  8. Cycle 3 of EP 6/11/18-6/15/18. Complicated by hospitalization for neutropenic fever 6/21-6/25  9. Cycle 4 of EP " 18-18. Complicated by hospitalization for N/V -. Again hospitalized - for intractable N/V which patient feels is from opioid withdrawal      INTERVAL HISTORY:   Mr. Hampton returns today for follow up of primary mediastinal seminoma. Completed 4 cycles of  on 18. Feels well. Still has fatigue. Sleeps 3-5 hours a day, sometimes less. No other complaints.      ECO     ROS:   A ten point system review is obtained and neg except for what was stated in the interval history     Physical Examination:   Vital signs reviewed.   Gen: well hydrated, well developed, in no acute distress.   HEENT: normocephalic, anicteric sclerae, PERRLA, EOMI, oropharynx clear  Neck: supple, no JVD, thyromegaly, cervical or supraclavicular LAD  Lungs: CTAB, no wheezes or rales. Port site on chest clean.   Heart: RRR, no M/R/G  Abdomen: soft, non-distended, nontender, no hepatosplenomegaly, mass, or hernia. BS present  Ext: no clubbing, cyanosis, or edema  Neuro: alert and oriented x 4, no focal neuro deficit  Skin: no rash, erythema, open wound or ulcers  Psych: pleasant and appropriate mood and affect        Objective:      Laboratory Data:  Labs reviewed. AFP, LDH, HCG normal.      Imaging Data:  CT C/A/P 19:  Persistent residual soft tissue lesion within the anterior mediastinum.  This measures 2.0 x 0.9 cm today (previously 2.6 x 1.1 cm).    No evidence of new or metastatic disease.     Assessment/Plan:      1. Primary mediastinal seminoma    2. Shortness of breath    3. Fatigue, unspecified type        1  - Mr Memo Neff is a 22 yo man with primary mediastinal seminoma, good risk disease, s/p 4 cycles of EP finished on 18  - doing well. CT19 showed continued shrinkage of tumor  - Tumor markers normal  - return to clinic in 2 months with tumor markers  - surveillance CT scan in 4 months     2.  - refilled albuterol     3.  - 2/2 sleep deprivation  - discussed with patient that he needs to  sleep more. Previously discussed cutting down on caffeine intake

## 2019-04-17 DIAGNOSIS — C38.3 PRIMARY MEDIASTINAL SEMINOMA: Primary | ICD-10-CM

## 2019-06-14 ENCOUNTER — INFUSION (OUTPATIENT)
Dept: INFUSION THERAPY | Facility: OTHER | Age: 24
End: 2019-06-14
Attending: INTERNAL MEDICINE
Payer: COMMERCIAL

## 2019-06-14 VITALS
HEART RATE: 86 BPM | RESPIRATION RATE: 18 BRPM | OXYGEN SATURATION: 97 % | DIASTOLIC BLOOD PRESSURE: 62 MMHG | TEMPERATURE: 98 F | SYSTOLIC BLOOD PRESSURE: 103 MMHG

## 2019-06-14 DIAGNOSIS — R11.0 CHEMOTHERAPY-INDUCED NAUSEA: ICD-10-CM

## 2019-06-14 DIAGNOSIS — C38.3 PRIMARY MEDIASTINAL SEMINOMA: Primary | ICD-10-CM

## 2019-06-14 DIAGNOSIS — T45.1X5A CHEMOTHERAPY-INDUCED NAUSEA: ICD-10-CM

## 2019-06-14 LAB
AFP SERPL-MCNC: 1.2 NG/ML (ref 0–8.4)
ALBUMIN SERPL BCP-MCNC: 4.3 G/DL (ref 3.5–5.2)
ALP SERPL-CCNC: 41 U/L (ref 55–135)
ALT SERPL W/O P-5'-P-CCNC: 14 U/L (ref 10–44)
ANION GAP SERPL CALC-SCNC: 8 MMOL/L (ref 8–16)
AST SERPL-CCNC: 14 U/L (ref 10–40)
BILIRUB SERPL-MCNC: 0.5 MG/DL (ref 0.1–1)
BUN SERPL-MCNC: 16 MG/DL (ref 6–20)
CALCIUM SERPL-MCNC: 9.2 MG/DL (ref 8.7–10.5)
CHLORIDE SERPL-SCNC: 108 MMOL/L (ref 95–110)
CO2 SERPL-SCNC: 26 MMOL/L (ref 23–29)
CREAT SERPL-MCNC: 1.1 MG/DL (ref 0.5–1.4)
ERYTHROCYTE [DISTWIDTH] IN BLOOD BY AUTOMATED COUNT: 13.4 % (ref 11.5–14.5)
EST. GFR  (AFRICAN AMERICAN): >60 ML/MIN/1.73 M^2
EST. GFR  (NON AFRICAN AMERICAN): >60 ML/MIN/1.73 M^2
GLUCOSE SERPL-MCNC: 123 MG/DL (ref 70–110)
HCG INTACT+B SERPL-ACNC: <1.2 MIU/ML
HCT VFR BLD AUTO: 38.7 % (ref 40–54)
HGB BLD-MCNC: 13.6 G/DL (ref 14–18)
LDH SERPL L TO P-CCNC: 159 U/L (ref 110–260)
MCH RBC QN AUTO: 30.1 PG (ref 27–31)
MCHC RBC AUTO-ENTMCNC: 35.1 G/DL (ref 32–36)
MCV RBC AUTO: 86 FL (ref 82–98)
NEUTROPHILS # BLD AUTO: 2.7 K/UL (ref 1.8–7.7)
PLATELET # BLD AUTO: 176 K/UL (ref 150–350)
PMV BLD AUTO: 8.7 FL (ref 9.2–12.9)
POTASSIUM SERPL-SCNC: 3.9 MMOL/L (ref 3.5–5.1)
PROT SERPL-MCNC: 6.7 G/DL (ref 6–8.4)
RBC # BLD AUTO: 4.52 M/UL (ref 4.6–6.2)
SODIUM SERPL-SCNC: 142 MMOL/L (ref 136–145)
TSH SERPL DL<=0.005 MIU/L-ACNC: 0.54 UIU/ML (ref 0.4–4)
WBC # BLD AUTO: 4.59 K/UL (ref 3.9–12.7)

## 2019-06-14 PROCEDURE — 84443 ASSAY THYROID STIM HORMONE: CPT

## 2019-06-14 PROCEDURE — 25000003 PHARM REV CODE 250: Performed by: INTERNAL MEDICINE

## 2019-06-14 PROCEDURE — 63600175 PHARM REV CODE 636 W HCPCS: Performed by: INTERNAL MEDICINE

## 2019-06-14 PROCEDURE — 85027 COMPLETE CBC AUTOMATED: CPT

## 2019-06-14 PROCEDURE — 80053 COMPREHEN METABOLIC PANEL: CPT

## 2019-06-14 PROCEDURE — 84702 CHORIONIC GONADOTROPIN TEST: CPT

## 2019-06-14 PROCEDURE — 83615 LACTATE (LD) (LDH) ENZYME: CPT

## 2019-06-14 PROCEDURE — 36591 DRAW BLOOD OFF VENOUS DEVICE: CPT

## 2019-06-14 PROCEDURE — 82105 ALPHA-FETOPROTEIN SERUM: CPT

## 2019-06-14 PROCEDURE — A4216 STERILE WATER/SALINE, 10 ML: HCPCS | Performed by: INTERNAL MEDICINE

## 2019-06-14 RX ORDER — SODIUM CHLORIDE 0.9 % (FLUSH) 0.9 %
10 SYRINGE (ML) INJECTION
Status: COMPLETED | OUTPATIENT
Start: 2019-06-14 | End: 2019-06-14

## 2019-06-14 RX ORDER — SODIUM CHLORIDE 0.9 % (FLUSH) 0.9 %
10 SYRINGE (ML) INJECTION
Status: CANCELLED | OUTPATIENT
Start: 2019-06-14

## 2019-06-14 RX ORDER — HEPARIN 100 UNIT/ML
500 SYRINGE INTRAVENOUS
Status: COMPLETED | OUTPATIENT
Start: 2019-06-14 | End: 2019-06-14

## 2019-06-14 RX ORDER — HEPARIN 100 UNIT/ML
500 SYRINGE INTRAVENOUS
Status: CANCELLED | OUTPATIENT
Start: 2019-06-14

## 2019-06-14 RX ADMIN — Medication 10 ML: at 03:06

## 2019-06-14 RX ADMIN — HEPARIN 500 UNITS: 100 SYRINGE at 03:06

## 2019-06-14 NOTE — PLAN OF CARE
Problem: Adult Inpatient Plan of Care  Goal: Plan of Care Review  Outcome: Ongoing (interventions implemented as appropriate)  Labs drawn from port, patient tolerated well. VSS, NAD. D/C'd home with self.

## 2019-06-18 ENCOUNTER — OFFICE VISIT (OUTPATIENT)
Dept: HEMATOLOGY/ONCOLOGY | Facility: CLINIC | Age: 24
End: 2019-06-18
Payer: COMMERCIAL

## 2019-06-18 VITALS
DIASTOLIC BLOOD PRESSURE: 54 MMHG | RESPIRATION RATE: 16 BRPM | HEIGHT: 67 IN | SYSTOLIC BLOOD PRESSURE: 102 MMHG | TEMPERATURE: 99 F | WEIGHT: 136.25 LBS | HEART RATE: 90 BPM | OXYGEN SATURATION: 96 % | BODY MASS INDEX: 21.38 KG/M2

## 2019-06-18 DIAGNOSIS — R53.83 FATIGUE, UNSPECIFIED TYPE: ICD-10-CM

## 2019-06-18 DIAGNOSIS — C38.3 PRIMARY MEDIASTINAL SEMINOMA: Primary | ICD-10-CM

## 2019-06-18 DIAGNOSIS — D64.9 ANEMIA, UNSPECIFIED TYPE: ICD-10-CM

## 2019-06-18 DIAGNOSIS — R06.83 SNORING: ICD-10-CM

## 2019-06-18 PROCEDURE — 99999 PR PBB SHADOW E&M-EST. PATIENT-LVL IV: CPT | Mod: PBBFAC,,, | Performed by: INTERNAL MEDICINE

## 2019-06-18 PROCEDURE — 99999 PR PBB SHADOW E&M-EST. PATIENT-LVL IV: ICD-10-PCS | Mod: PBBFAC,,, | Performed by: INTERNAL MEDICINE

## 2019-06-18 PROCEDURE — 99214 OFFICE O/P EST MOD 30 MIN: CPT | Mod: S$GLB,,, | Performed by: INTERNAL MEDICINE

## 2019-06-18 PROCEDURE — 99214 PR OFFICE/OUTPT VISIT, EST, LEVL IV, 30-39 MIN: ICD-10-PCS | Mod: S$GLB,,, | Performed by: INTERNAL MEDICINE

## 2019-06-18 PROCEDURE — 3008F PR BODY MASS INDEX (BMI) DOCUMENTED: ICD-10-PCS | Mod: CPTII,S$GLB,, | Performed by: INTERNAL MEDICINE

## 2019-06-18 PROCEDURE — 3008F BODY MASS INDEX DOCD: CPT | Mod: CPTII,S$GLB,, | Performed by: INTERNAL MEDICINE

## 2019-06-18 NOTE — PROGRESS NOTES
"Subjective:      Patient ID: Alejandro Neff is a 24 y.o. male.     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 Mr. Hampton is a 24 yo man who first presented with chest pain and underwent CT chest on 2/14/18, which showed a 6 x 3.5 cm mediastinal mass. It abuts the aorta and pulmonary artery. Biopsy was done on 3/1/18. Pathology (reviewed at [facility]) showed germ cell tumor, most consistent with seminoma.   2. HCG, LDH, AFP on 3/22/18 all normal.   3. Testicular US 3/26/18 showed no testicular masses. CT C/A/P on 3/26/18 showed "stable appearance of anterior mediastinal mass consistent with provided history of seminoma. Several punctate sclerotic foci are noted in the pelvis at the right pubic bone, right ischial tuberosity, and left ilium adjacent to the sacroiliac joint.  These are strongly favored to reflect benign bone islands although metastatic disease could have a similar appearance and close follow-up is recommended. Several benign Schmorl's nodes are noted in the thoracic spine." Bone scan on 3/28/18 was negative for bone mets.   4. Audiogram on 4/6/18 normal. Followed by ENT.   5. Given his smoking history, I recommended 4 cycles of EP. EP cycle 1 day 1 on 4/16/18. Complicated by hospitalization for neutropenic fever 4/26-4/29. No source of infections identified. Treated with antibiotics empirically and improved.   6. Cycle 2 of EP started on on 5/7/18. Hospitalized during the first weekend for intractable nausea.   7. Restaging CT scan on 5/24/18 showed "Interval development of dilated appearance of the appendix with stranding of the periappendiceal fat.  Findings are worrisome for appendicitis". Patient had abdominal pain and tenderness. Admitted and got appendectomy on 5/24/18.  8. Cycle 3 of EP 6/11/18-6/15/18. Complicated by hospitalization for neutropenic fever 6/21-6/25  9. Cycle 4 of EP " 18-18. Complicated by hospitalization for N/V -. Again hospitalized - for intractable N/V which patient feels is from opioid withdrawal      INTERVAL HISTORY:   Mr. Hampton returns today for follow up of primary mediastinal seminoma. Completed 4 cycles of  on 18. Feels well. Still has fatigue. Sleep 7-8 hours a day but feels tired when he wakes up. He does snore. More fatigue recently. chronic pain and chronic nausea in the morning.     ECO     ROS:   A ten point system review is obtained and neg except for what was stated in the interval history     Physical Examination:   Vital signs reviewed.   Gen: well hydrated, well developed, in no acute distress.   HEENT: normocephalic, anicteric sclerae, PERRLA, EOMI, oropharynx clear  Neck: supple, no JVD, thyromegaly, cervical or supraclavicular LAD  Lungs: CTAB, no wheezes or rales. Port site on chest clean.   Heart: RRR, no M/R/G  Abdomen: soft, non-distended, nontender, no hepatosplenomegaly, mass, or hernia. BS present  Ext: no clubbing, cyanosis, or edema  Neuro: alert and oriented x 4, no focal neuro deficit  Skin: no rash, erythema, open wound or ulcers  Psych: pleasant and appropriate mood and affect        Objective:      Laboratory Data:  Labs reviewed. AFP, LDH, HCG normal. mild anemia     Imaging Data:  CT C/A/P 19:  Persistent residual soft tissue lesion within the anterior mediastinum.  This measures 2.0 x 0.9 cm today (previously 2.6 x 1.1 cm).    No evidence of new or metastatic disease.     Assessment/Plan:      1. Primary mediastinal seminoma    2. Anemia, unspecified type    3. Snoring    4. Fatigue, unspecified type        1  - Mr Memo Neff is a 25 yo man with primary mediastinal seminoma, good risk disease, s/p 4 cycles of EP finished on 18  - doing well. CT19 showed continued shrinkage of tumor  - given worsening fatigue will repeat a CT scan  - Tumor markers normal  - return to clinic in 2 months  with tumor markers     2.  - check vit B12, folate, ferritin, iron on return    3.4.  - MAHENDRA?   - refer to sleep clinic

## 2019-06-18 NOTE — Clinical Note
Please schedule for CT C/A/P and creatinine in 2 weeks. Check CBC, CMP, vit B12, folate, ferritin, iron, LDH, AFP, LDH on 8/12, see me on 8/13

## 2019-06-20 ENCOUNTER — OFFICE VISIT (OUTPATIENT)
Dept: SLEEP MEDICINE | Facility: CLINIC | Age: 24
End: 2019-06-20
Payer: COMMERCIAL

## 2019-06-20 VITALS
WEIGHT: 134.69 LBS | HEIGHT: 67 IN | SYSTOLIC BLOOD PRESSURE: 91 MMHG | DIASTOLIC BLOOD PRESSURE: 59 MMHG | HEART RATE: 82 BPM | BODY MASS INDEX: 21.14 KG/M2

## 2019-06-20 DIAGNOSIS — G47.30 SLEEP APNEA, UNSPECIFIED TYPE: Primary | ICD-10-CM

## 2019-06-20 PROCEDURE — 99204 PR OFFICE/OUTPT VISIT, NEW, LEVL IV, 45-59 MIN: ICD-10-PCS | Mod: S$GLB,,, | Performed by: INTERNAL MEDICINE

## 2019-06-20 PROCEDURE — 99999 PR PBB SHADOW E&M-EST. PATIENT-LVL III: CPT | Mod: PBBFAC,,, | Performed by: INTERNAL MEDICINE

## 2019-06-20 PROCEDURE — 3008F BODY MASS INDEX DOCD: CPT | Mod: CPTII,S$GLB,, | Performed by: INTERNAL MEDICINE

## 2019-06-20 PROCEDURE — 3008F PR BODY MASS INDEX (BMI) DOCUMENTED: ICD-10-PCS | Mod: CPTII,S$GLB,, | Performed by: INTERNAL MEDICINE

## 2019-06-20 PROCEDURE — 99204 OFFICE O/P NEW MOD 45 MIN: CPT | Mod: S$GLB,,, | Performed by: INTERNAL MEDICINE

## 2019-06-20 PROCEDURE — 99999 PR PBB SHADOW E&M-EST. PATIENT-LVL III: ICD-10-PCS | Mod: PBBFAC,,, | Performed by: INTERNAL MEDICINE

## 2019-06-20 NOTE — LETTER
June 23, 2019      Suha Rojas MD  [address]  [address]  [facility] [address]           [facility] [place] [address]  [address]  [facility] [address]  Phone: [phone]          Patient: Alejandro Neff   MR Number: [id number]   YOB: 1995   Date of Visit: 6/20/2019       Dear Dr. Suha Rojas:    Thank you for referring Alejandro Neff to me for evaluation. Attached you will find relevant portions of my assessment and plan of care.    If you have questions, please do not hesitate to call me. I look forward to following Alejandro Neff along with you.    Sincerely,    Darwin Abraham MD    Enclosure  CC:  No Recipients    If you would like to receive this communication electronically, please contact [email or web address] or [phone] to request more information on [employer] access.    For providers and/or their staff who would like to refer a patient to [facility], please contact us through our one-stop-shop provider referral line, [facility], at [phone].    If you feel you have received this communication in error or would no longer like to receive these types of communications, please e-mail [email or web address]

## 2019-06-20 NOTE — PROGRESS NOTES
This 24 y.o. male patient presents for the evaluation of possible obstructive sleep apnea.    Referring Provider: Dr. Suha Rojas     Pt is referred for evaluation of MAHENDRA by Suha.  Pt has been fatigued and tired for the past 8 years . +snoring , - witnessed apneas/ waking up gasping for air , wakes 2  times unknown reason takes a min's to go  back to sleep, + tired on waking up all the  time,  1   cups of coffee a day, +urge to move legs /  leg kicking. + on and off nasal congestion. - cataplexy,-  hallucination, + sleep paralysis for the past 1 year  once a week. - acting out dreams. - vivid dream.  Pt prefer to sleep in every possible position.        Pt did have hx of  head injury but no  hospitalization due to it . - Sleep walking/+talking. Memory is ok has some impact on long and short term memory after lyme. Pt feels content.  He has fallen off the bed multiple times last time was a month ago. Night mare rarely none.Pt does not have  morning headache .  Pt does feel  Drowsy while driving. Pt is mouth/nose breather. Pt does not wake up to eat at night. Pt does sweat at night some time . Pt does not grind teeth.         EPWORTH SLEEPINESS SCALE 6/20/2019   Sitting and reading 3   Watching TV 3   Sitting, inactive in a public place (e.g. a theatre or a meeting) 3   As a passenger in a car for an hour without a break 3   Lying down to rest in the afternoon when circumstances permit 3   Sitting and talking to someone 0   Sitting quietly after a lunch without alcohol 2   In a car, while stopped for a few minutes in traffic 0   Total score 17     Sleep Clinic New Patient 6/20/2019   What time do you go to bed on a week day? (Give a range) 2am   What time do you go to bed on a day off? (Give a range) 2am   How long does it take you to fall asleep? (Give a range) 30 min or more   On average, how many times per night do you wake up? 2   How long does it take you to fall back into sleep? (Give a range) 5 mins   What  time do you wake up to start your day on a week day? (Give a range) 1:30pm   What time do you wake up to start your day on a day off? (Give a range) 1:30pm   What time do you get out of bed? (Give a range) 2:30pm   On average, how many hours do you sleep? 7/8   On average, how many naps do you take per day? 0   Rate your sleep quality from 0 to 5 (0-poor, 5-great). 2   Have you experienced:  N/a   How much weight have you lost or gained (in lbs.) in the last year? 0   On average, how many times per night do you go to the bathroom?  0   Have you ever had a sleep study/CPAP machine/surgery for sleep apnea? No   Have you ever had a CPAP machine for sleep apnea? No   Have you ever had surgery for sleep apnea? No     Sleep Clinic ROS  6/20/2019   Difficulty breathing through the nose?  No   Sore throat or dry mouth in the morning? No   Irregular or very fast heart beat?  No   Shortness of breath?  Sometimes   Acid reflux? No   Body aches and pains?  Yes   Morning headaches? No   Dizziness? No   Mood changes?  No   Do you exercise?  Sometimes   Do you feel like moving your legs a lot?  No       SLEEP ROUTINE:  Bed partner: alone   Daytime naps: one - two times a day at least one hr long     Past Medical History:   Diagnosis Date    Abdominal pain 08/12/2010    eval for abd and chest wall pain at [place] - cxr wnl, EKG ([facility]), troponin wnl, normal chemistries    Allergy     Cancer     testicular    Chondromalacia patellae     Chronic nausea 2015    eval by GI Dr. Tushar Artis - given zofran and ciproheptadine     Chronic pain     Chronic pain     [facility] - no meds; followed by psych and pain clinic and unresponsive to behavioral/CBT/old records reports c/f conversion disorder     Foot pain 04/2010    4/10 + SHIRA, eval by Dr. ALLY Lion at [facility] Rheum -dx unclear ? gout and trial of nsaids and pdn, xrays normal 1/11, referred to  "podiatry, re-eval by rheum 2/12 and MRI and films wnl    Fracture of right radius 09/2009    Lyme arthritis     multiple joints    Lyme disease 2013    Memory loss 03/2012    eval for memory loss by neuro [place] [facility] [place], EEG wnl. Dr. Patricio suggested emotional factors & ref to Tushar Davies, PhD for MH eval & neuropsych eval 3/12 Lupe Delgado, PhD - no evidence of formal thought disorder or psychosis - documented severe memory impairment; not related to to ADD or executive function issues. sugesstion that memory issues may be related to "emotional factors", ?conversion d/o    Stress fracture of tibia and fibula 08/2011    Urticaria        Past Surgical History:   Procedure Laterality Date    APPENDECTOMY      APPENDECTOMY, LAPAROSCOPIC N/A 5/24/2018    Performed by Kenan Huang Jr., MD at [facility] OR 2ND FLR    [id number] N/A 3/1/2018    Performed by Nikita Clayton MD at [facility] CATH LAB    BLOCK, NERVE N/A 5/29/2018    Performed by Raiza Surgeon at [facility] RAIZA    CHEST WALL BIOPSY      COLONOSCOPY N/A 11/6/2018    Performed by Terence Rosales MD at [facility] ENDO (4TH FLR)    EGD (ESOPHAGOGASTRODUODENOSCOPY) N/A 11/6/2018    Performed by Terence Rosales MD at [facility] ENDO (4TH FLR)    INJECTION, JOINT LEFT GLENOHUMERAL UNDER FLOURO Left 2/4/2019    Performed by Stanton Bran MD at [facility] PAIN MGT    Oyhufwgrj-Jiyo-H-Cath N/A 4/4/2018    Performed by [facility] Diagnostic Provider at [facility] OR 2ND FLR    PORTACATH PLACEMENT Right        Family History   Problem Relation Age of Onset    Arthritis Mother     Other Mother         benign tumor of brain and spinal cord    Hyperlipidemia Father     Gout Father     No Known Problems Sister     Arthritis Sister         shoulder    Depression Sister     Colon cancer Neg Hx     Esophageal cancer Neg Hx     Stomach cancer Neg Hx        Social History     Tobacco Use    Smoking status: Former Smoker     Packs/day: 0.25     Types: Vaping with nicotine, Vaping w/o " "nicotine, Cigarettes, Cigars     Last attempt to quit: 3/15/2018     Years since quittin.2    Smokeless tobacco: Former User   Substance Use Topics    Alcohol use: Yes     Comment: occasionally     Drug use: Not on file       ALLERGIES: Reviewed in EPIC    CURRENT MEDICATIONS: Reviewed in EPIC    REVIEW OF SYSTEMS:  Sleep related symptoms as per HPI;       PHYSICAL EXAM:  BP (!) 91/59   Pulse 82   Ht 5' 7" (1.702 m)   Wt 61.1 kg (134 lb 11.2 oz)   BMI 21.10 kg/m²   GENERAL: Well groomed; Normally developed;  HEENT: Conjunctivae are non-erythematous; Pupils equal, round, and reactive to light;    Nose is symmetrical; Nasal mucosa is pink and moist; Septum is midline;    Turbinates are normal; Nasal airflow is normal;    Posterior pharynx is pink; Posterior palate is normal; Modified Mallampati: III   Uvula is normal; Tongue is normal; Tonsils +1;    Dentition is fair; No TMJ tenderness;    Jaw opening and protrusion without click and without discomfort.  NECK: Supple. No thyromegaly. No palpable nodes. Neck circumference in inches is 15.7   SKIN: On face and neck: No abrasions, no rashes, no lesions.     No subcutaneous nodules are palpable.  RESPIRATORY: Chest is clear to auscultation.     Normal chest expansion and non-labored breathing at rest.  CARDIOVASCULAR: Normal S1, S2.  No murmurs, gallops or rubs.   No carotid bruits bilaterally.  EXTREMITIES: No clubbing. No cyanosis. Edema is absent.   NEURO: Oriented to time, place and person.    Normal attention span and concentration.  Station normal. Gait normal.  PSYCH: Affect is full. Mood is normal.    ASSESSMENT:    1. Sleep Apnea, unspecified. The patient symptomatically has snoring and excessive daytime sleepiness with exam findings of a crowded oral airway with medical co-morbidities of memory loss . This warrants further investigation for possible obstructive sleep apnea.    Sleep apnea, unspecified type  -     Home Sleep Studies; Future           "   PLAN:  Sleep apnea unspecified:  Discussed with the patient having risk of having MAHENDRA will do the HST suggested to avoid sleeping supine except the test day. Suggested to have good sleep hygiene and to avoid drowsy driving. The nature of this procedure and its indication was discussed with the patient.    Education: During our discussion today, we talked about the etiology of obstructive sleep apnea as well as the potential ramifications of untreated sleep apnea, which could include daytime sleepiness, hypertension, heart disease and/or stroke.  We discussed potential treatment options, which could include weight loss, body positioning, use of an oral appliance, continuous positive airway pressure (CPAP), EPAP, or referral for surgical consideration.    Precautions: The patient was advised to abstain from driving should they feel sleepy or drowsy.    Follow up: 6 weeks.          Darwin Abraham MD, FACP  Phone: [phone]  Fax: [phone]

## 2019-06-20 NOTE — PATIENT INSTRUCTIONS
Sleep Hygiene Practices    1. Try going to bed only when you are drowsy.    ?    2. If you are unable to fall asleep or stay asleep, leave your bedroom and engage in a quiet activity elsewhere. Do not permit yourself to fall asleep outside the bedroom. Return to bed when and only when you are sleepy. Repeat this process as often as necessary throughout night.    3. Maintain regular wake-up time, even on days off work & weekends    4. Use your bedroom for sleep and sex    5. Avoid napping during the daytime. If daytime sleepiness becomes overwhelming, limit nap time to a single nap of less than 1hr, no later than 3pm.    6. Distract your mind. Avoid clock watching. Lying in bed unable to sleep and frustrated needs to be avoided. Try reading or watching a videotape or listening to books on tape. It may be necessary to go into another room to do these.    7. Avoid caffeine within 4-6hrs of bedtime    8. Avoid use of nicotine close to bedtime    9. do not drink alcoholic beverages within 4-6hrs of bedtime    10. While a light snack before bedtime can help promote sound sleep, avoid large meals.    11. Obtain regular exercise, but avoid strenuous exercise within 4hrs of bedtime    12. Minimize light, noise, and extremes in temperature in the bedroom.    13. Precautions: The patient was advised to abstain from driving should they feel sleepy or drowsy.  ?    * This information is provided had been directly obtained from the [employer] wellness booklet on sleep hygiene. ([facility], [address] [place] [address]

## 2019-06-27 ENCOUNTER — TELEPHONE (OUTPATIENT)
Dept: SLEEP MEDICINE | Facility: OTHER | Age: 24
End: 2019-06-27

## 2019-07-01 ENCOUNTER — PATIENT MESSAGE (OUTPATIENT)
Dept: HEMATOLOGY/ONCOLOGY | Facility: CLINIC | Age: 24
End: 2019-07-01

## 2019-07-22 ENCOUNTER — TELEPHONE (OUTPATIENT)
Dept: SLEEP MEDICINE | Facility: OTHER | Age: 24
End: 2019-07-22

## 2019-07-23 ENCOUNTER — HOSPITAL ENCOUNTER (OUTPATIENT)
Dept: SLEEP MEDICINE | Facility: OTHER | Age: 24
Discharge: HOME | End: 2019-07-23
Attending: INTERNAL MEDICINE
Payer: COMMERCIAL

## 2019-07-23 DIAGNOSIS — G47.20 SLEEP STAGE DYSFUNCTION: ICD-10-CM

## 2019-07-23 DIAGNOSIS — G47.30 SLEEP APNEA, UNSPECIFIED TYPE: ICD-10-CM

## 2019-07-23 PROCEDURE — 95800 SLP STDY UNATTENDED: CPT

## 2019-07-24 PROCEDURE — 95800 SLP STDY UNATTENDED: CPT | Mod: 26,,, | Performed by: INTERNAL MEDICINE

## 2019-07-24 PROCEDURE — 95800 PR SLEEP STUDY, UNATTENDED, RECORD HEART RATE/O2 SAT/RESP ANAL/SLEEP TIME: ICD-10-PCS | Mod: 26,,, | Performed by: INTERNAL MEDICINE

## 2019-07-25 ENCOUNTER — TELEPHONE (OUTPATIENT)
Dept: SLEEP MEDICINE | Facility: CLINIC | Age: 24
End: 2019-07-25

## 2019-07-25 NOTE — TELEPHONE ENCOUNTER
Called pt to schedule a f/u to discuss  Other options for sleepiness due to sleep study not showing sleep apnea. Dr. Abraham wanted to see pt back in clinic. Pt didn't answer and I was unable to leave vm due to pt's vm being full.

## 2019-07-25 NOTE — TELEPHONE ENCOUNTER
----- Message from Amber Moreira sent at 7/25/2019  3:18 PM CDT -----  Contact: MATTHEW VARGHESE [[id number]]  Type:  Patient Returning Call    Who Called: MATTHEW VARGHESE [[id number]]    Who Left Message for Patient: Deni    Does the patient know what this is regarding?:no    Would the patient rather a call back or a response via [facility]? Call back     Best Call Back Number: [phone]    Additional Information:

## 2019-07-25 NOTE — TELEPHONE ENCOUNTER
----- Message from Darwin Abraham MD sent at 7/24/2019  6:43 PM CDT -----  Hi,      Pt home sleep study is negative for sleep apnea but would like to discuss with him the other options for diagnosis for sleepiness kindly book an appointment earlier than he has. .      Thanks,          Darwin Abraham MD, FACP  Phone: [phone]  Fax: [phone]

## 2019-08-12 ENCOUNTER — HOSPITAL ENCOUNTER (OUTPATIENT)
Dept: RADIOLOGY | Facility: OTHER | Age: 24
Discharge: HOME | End: 2019-08-12
Attending: INTERNAL MEDICINE
Payer: COMMERCIAL

## 2019-08-12 ENCOUNTER — INFUSION (OUTPATIENT)
Dept: INFUSION THERAPY | Facility: OTHER | Age: 24
End: 2019-08-12
Attending: INTERNAL MEDICINE
Payer: COMMERCIAL

## 2019-08-12 VITALS
RESPIRATION RATE: 18 BRPM | TEMPERATURE: 99 F | OXYGEN SATURATION: 99 % | SYSTOLIC BLOOD PRESSURE: 106 MMHG | DIASTOLIC BLOOD PRESSURE: 66 MMHG | HEART RATE: 94 BPM

## 2019-08-12 DIAGNOSIS — C38.3 PRIMARY MEDIASTINAL SEMINOMA: ICD-10-CM

## 2019-08-12 DIAGNOSIS — C38.3 PRIMARY MEDIASTINAL SEMINOMA: Primary | ICD-10-CM

## 2019-08-12 DIAGNOSIS — R11.0 NAUSEA: Primary | ICD-10-CM

## 2019-08-12 DIAGNOSIS — T45.1X5A CHEMOTHERAPY-INDUCED NAUSEA: ICD-10-CM

## 2019-08-12 DIAGNOSIS — R11.0 CHEMOTHERAPY-INDUCED NAUSEA: ICD-10-CM

## 2019-08-12 DIAGNOSIS — M62.838 MUSCLE SPASM: ICD-10-CM

## 2019-08-12 LAB
AFP SERPL-MCNC: 1.5 NG/ML (ref 0–8.4)
ALBUMIN SERPL BCP-MCNC: 4.8 G/DL (ref 3.5–5.2)
ALP SERPL-CCNC: 44 U/L (ref 55–135)
ALT SERPL W/O P-5'-P-CCNC: 14 U/L (ref 10–44)
ANION GAP SERPL CALC-SCNC: 13 MMOL/L (ref 8–16)
AST SERPL-CCNC: 15 U/L (ref 10–40)
BASOPHILS # BLD AUTO: 0.01 K/UL (ref 0–0.2)
BASOPHILS NFR BLD: 0.2 % (ref 0–1.9)
BILIRUB SERPL-MCNC: 1.4 MG/DL (ref 0.1–1)
BUN SERPL-MCNC: 17 MG/DL (ref 6–20)
CALCIUM SERPL-MCNC: 10.4 MG/DL (ref 8.7–10.5)
CHLORIDE SERPL-SCNC: 102 MMOL/L (ref 95–110)
CO2 SERPL-SCNC: 25 MMOL/L (ref 23–29)
CREAT SERPL-MCNC: 1 MG/DL (ref 0.5–1.4)
DIFFERENTIAL METHOD: ABNORMAL
EOSINOPHIL # BLD AUTO: 0.1 K/UL (ref 0–0.5)
EOSINOPHIL NFR BLD: 1.1 % (ref 0–8)
ERYTHROCYTE [DISTWIDTH] IN BLOOD BY AUTOMATED COUNT: 13.7 % (ref 11.5–14.5)
EST. GFR  (AFRICAN AMERICAN): >60 ML/MIN/1.73 M^2
EST. GFR  (NON AFRICAN AMERICAN): >60 ML/MIN/1.73 M^2
FERRITIN SERPL-MCNC: 181 NG/ML (ref 20–300)
FOLATE SERPL-MCNC: 8.2 NG/ML (ref 4–24)
GLUCOSE SERPL-MCNC: 78 MG/DL (ref 70–110)
HCG INTACT+B SERPL-ACNC: <1.2 MIU/ML
HCT VFR BLD AUTO: 41.2 % (ref 40–54)
HGB BLD-MCNC: 14.6 G/DL (ref 14–18)
IMM GRANULOCYTES # BLD AUTO: 0.03 K/UL (ref 0–0.04)
IMM GRANULOCYTES NFR BLD AUTO: 0.5 % (ref 0–0.5)
IRON SERPL-MCNC: 66 UG/DL (ref 45–160)
LDH SERPL L TO P-CCNC: 145 U/L (ref 110–260)
LYMPHOCYTES # BLD AUTO: 1.9 K/UL (ref 1–4.8)
LYMPHOCYTES NFR BLD: 30.4 % (ref 18–48)
MCH RBC QN AUTO: 29.6 PG (ref 27–31)
MCHC RBC AUTO-ENTMCNC: 35.4 G/DL (ref 32–36)
MCV RBC AUTO: 83 FL (ref 82–98)
MONOCYTES # BLD AUTO: 0.7 K/UL (ref 0.3–1)
MONOCYTES NFR BLD: 11.6 % (ref 4–15)
NEUTROPHILS # BLD AUTO: 3.6 K/UL (ref 1.8–7.7)
NEUTROPHILS NFR BLD: 56.2 % (ref 38–73)
NRBC BLD-RTO: 0 /100 WBC
PLATELET # BLD AUTO: 213 K/UL (ref 150–350)
PMV BLD AUTO: 8.5 FL (ref 9.2–12.9)
POTASSIUM SERPL-SCNC: 3.8 MMOL/L (ref 3.5–5.1)
PROT SERPL-MCNC: 7.1 G/DL (ref 6–8.4)
RBC # BLD AUTO: 4.94 M/UL (ref 4.6–6.2)
SATURATED IRON: 20 % (ref 20–50)
SODIUM SERPL-SCNC: 140 MMOL/L (ref 136–145)
TOTAL IRON BINDING CAPACITY: 333 UG/DL (ref 250–450)
TRANSFERRIN SERPL-MCNC: 225 MG/DL (ref 200–375)
VIT B12 SERPL-MCNC: 658 PG/ML (ref 210–950)
WBC # BLD AUTO: 6.31 K/UL (ref 3.9–12.7)

## 2019-08-12 PROCEDURE — 25000003 PHARM REV CODE 250: Performed by: INTERNAL MEDICINE

## 2019-08-12 PROCEDURE — 74177 CT CHEST ABDOMEN PELVIS WITH CONTRAST (XPD): ICD-10-PCS | Mod: 26,,, | Performed by: RADIOLOGY

## 2019-08-12 PROCEDURE — 36415 COLL VENOUS BLD VENIPUNCTURE: CPT

## 2019-08-12 PROCEDURE — 82105 ALPHA-FETOPROTEIN SERUM: CPT

## 2019-08-12 PROCEDURE — 74177 CT ABD & PELVIS W/CONTRAST: CPT | Mod: 26,,, | Performed by: RADIOLOGY

## 2019-08-12 PROCEDURE — 74177 CT ABD & PELVIS W/CONTRAST: CPT | Mod: TC

## 2019-08-12 PROCEDURE — 36591 DRAW BLOOD OFF VENOUS DEVICE: CPT

## 2019-08-12 PROCEDURE — 83615 LACTATE (LD) (LDH) ENZYME: CPT

## 2019-08-12 PROCEDURE — 82728 ASSAY OF FERRITIN: CPT

## 2019-08-12 PROCEDURE — 85025 COMPLETE CBC W/AUTO DIFF WBC: CPT

## 2019-08-12 PROCEDURE — 82607 VITAMIN B-12: CPT

## 2019-08-12 PROCEDURE — 83540 ASSAY OF IRON: CPT

## 2019-08-12 PROCEDURE — 25500020 PHARM REV CODE 255: Performed by: INTERNAL MEDICINE

## 2019-08-12 PROCEDURE — 80053 COMPREHEN METABOLIC PANEL: CPT

## 2019-08-12 PROCEDURE — 71260 CT CHEST ABDOMEN PELVIS WITH CONTRAST (XPD): ICD-10-PCS | Mod: 26,,, | Performed by: RADIOLOGY

## 2019-08-12 PROCEDURE — 82746 ASSAY OF FOLIC ACID SERUM: CPT

## 2019-08-12 PROCEDURE — 84702 CHORIONIC GONADOTROPIN TEST: CPT

## 2019-08-12 PROCEDURE — 63600175 PHARM REV CODE 636 W HCPCS: Performed by: INTERNAL MEDICINE

## 2019-08-12 PROCEDURE — 71260 CT THORAX DX C+: CPT | Mod: 26,,, | Performed by: RADIOLOGY

## 2019-08-12 PROCEDURE — A4216 STERILE WATER/SALINE, 10 ML: HCPCS | Performed by: INTERNAL MEDICINE

## 2019-08-12 RX ORDER — BACLOFEN 10 MG/1
10 TABLET ORAL ONCE AS NEEDED
Qty: 10 TABLET | Refills: 0 | Status: SHIPPED | OUTPATIENT
Start: 2019-08-12 | End: 2019-09-05 | Stop reason: SDUPTHER

## 2019-08-12 RX ORDER — PROMETHAZINE HYDROCHLORIDE 12.5 MG/1
12.5 TABLET ORAL EVERY 6 HOURS PRN
Qty: 20 TABLET | Refills: 0 | Status: SHIPPED | OUTPATIENT
Start: 2019-08-12 | End: 2020-12-22

## 2019-08-12 RX ORDER — SODIUM CHLORIDE 0.9 % (FLUSH) 0.9 %
10 SYRINGE (ML) INJECTION
Status: COMPLETED | OUTPATIENT
Start: 2019-08-12 | End: 2019-08-12

## 2019-08-12 RX ORDER — HEPARIN 100 UNIT/ML
500 SYRINGE INTRAVENOUS
Status: COMPLETED | OUTPATIENT
Start: 2019-08-12 | End: 2019-08-12

## 2019-08-12 RX ADMIN — HEPARIN 500 UNITS: 100 SYRINGE at 03:08

## 2019-08-12 RX ADMIN — IOHEXOL 75 ML: 350 INJECTION, SOLUTION INTRAVENOUS at 02:08

## 2019-08-12 RX ADMIN — IOHEXOL 1000 ML: 9 SOLUTION ORAL at 01:08

## 2019-08-12 RX ADMIN — Medication 10 ML: at 03:08

## 2019-08-12 NOTE — PLAN OF CARE
Problem: Adult Inpatient Plan of Care  Goal: Patient-Specific Goal (Individualization)  Outcome: Ongoing (interventions implemented as appropriate)  Labs drawn from port. Pt tolerated well. VSS. NAD. Port to right chest de-accessed after heparinized per protocol.  Pt verbalized understanding of discharge instructions before leaving with self.

## 2019-08-12 NOTE — PROGRESS NOTES
"Subjective:      Patient ID: Alejandro Neff is a 24 y.o. male.     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 Mr. Hampton is a 22 yo man who first presented with chest pain and underwent CT chest on 2/14/18, which showed a 6 x 3.5 cm mediastinal mass. It abuts the aorta and pulmonary artery. Biopsy was done on 3/1/18. Pathology (reviewed at [facility]) showed germ cell tumor, most consistent with seminoma.   2. HCG, LDH, AFP on 3/22/18 all normal.   3. Testicular US 3/26/18 showed no testicular masses. CT C/A/P on 3/26/18 showed "stable appearance of anterior mediastinal mass consistent with provided history of seminoma. Several punctate sclerotic foci are noted in the pelvis at the right pubic bone, right ischial tuberosity, and left ilium adjacent to the sacroiliac joint.  These are strongly favored to reflect benign bone islands although metastatic disease could have a similar appearance and close follow-up is recommended. Several benign Schmorl's nodes are noted in the thoracic spine." Bone scan on 3/28/18 was negative for bone mets.   4. Audiogram on 4/6/18 normal. Followed by ENT.   5. Given his smoking history, I recommended 4 cycles of EP. EP cycle 1 day 1 on 4/16/18. Complicated by hospitalization for neutropenic fever 4/26-4/29. No source of infections identified. Treated with antibiotics empirically and improved.   6. Cycle 2 of EP started on on 5/7/18. Hospitalized during the first weekend for intractable nausea.   7. Restaging CT scan on 5/24/18 showed "Interval development of dilated appearance of the appendix with stranding of the periappendiceal fat.  Findings are worrisome for appendicitis". Patient had abdominal pain and tenderness. Admitted and got appendectomy on 5/24/18.  8. Cycle 3 of EP 6/11/18-6/15/18. Complicated by hospitalization for neutropenic fever 6/21-6/25  9. Cycle 4 of EP " 18-18. Complicated by hospitalization for N/V -. Again hospitalized - for intractable N/V which patient feels is from opioid withdrawal      INTERVAL HISTORY:   Mr. Hampton returns today for follow up of primary mediastinal seminoma. Completed 4 cycles of  on 18. Feels well. Has chronic fatigue and nausea.      ECO     ROS:   A ten point system review is obtained and neg except for what was stated in the interval history     Physical Examination:   Vital signs reviewed.   Gen: well hydrated, well developed, in no acute distress.   HEENT: normocephalic, anicteric sclerae, PERRLA, EOMI, oropharynx clear  Neck: supple, no JVD, thyromegaly, cervical or supraclavicular LAD  Lungs: CTAB, no wheezes or rales. Port site on chest clean.   Heart: RRR, no M/R/G  Abdomen: soft, non-distended, nontender, no hepatosplenomegaly, mass, or hernia. BS present  Ext: no clubbing, cyanosis, or edema  Neuro: alert and oriented x 4, no focal neuro deficit  Skin: no rash, erythema, open wound or ulcers  Psych: pleasant and appropriate mood and affect        Objective:      Laboratory Data:  Labs reviewed. AFP, LDH, HCG normal. CBC, CMP unremarkable     Imaging Data:  CT C/A/P 19:  Postsurgical change in the right lower quadrant; stable soft tissue prominence of the anterior mediastinum.  No CT evidence of new metastatic disease.     Assessment/Plan:      1. Primary mediastinal seminoma    2. Germ cell tumor    3. Chronic nausea        1.2  - Mr Memo Neff is a 25 yo man with primary mediastinal seminoma, good risk disease, s/p 4 cycles of EP finished on 18  - doing well. CT on 19 showed stable soft tissue prominence of the anterior mediastinum. Tumor markers normal  - RTC in 3 months with tumor markers and CXR  - CT in 6 months     3.  - discussed with patient that he is one year out of chemotherapy. If he needs more phenergan, he needs to discuss with Dr Hernandez. He understands and agrees.

## 2019-08-13 ENCOUNTER — OFFICE VISIT (OUTPATIENT)
Dept: HEMATOLOGY/ONCOLOGY | Facility: CLINIC | Age: 24
End: 2019-08-13
Payer: COMMERCIAL

## 2019-08-13 VITALS
WEIGHT: 126.31 LBS | DIASTOLIC BLOOD PRESSURE: 58 MMHG | HEART RATE: 91 BPM | RESPIRATION RATE: 16 BRPM | SYSTOLIC BLOOD PRESSURE: 111 MMHG | OXYGEN SATURATION: 99 % | BODY MASS INDEX: 19.82 KG/M2 | HEIGHT: 67 IN | TEMPERATURE: 99 F

## 2019-08-13 DIAGNOSIS — C80.1 GERM CELL TUMOR: ICD-10-CM

## 2019-08-13 DIAGNOSIS — R11.0 CHRONIC NAUSEA: ICD-10-CM

## 2019-08-13 DIAGNOSIS — C38.3 PRIMARY MEDIASTINAL SEMINOMA: Primary | ICD-10-CM

## 2019-08-13 PROCEDURE — 99999 PR PBB SHADOW E&M-EST. PATIENT-LVL III: ICD-10-PCS | Mod: PBBFAC,,, | Performed by: INTERNAL MEDICINE

## 2019-08-13 PROCEDURE — 99999 PR PBB SHADOW E&M-EST. PATIENT-LVL III: CPT | Mod: PBBFAC,,, | Performed by: INTERNAL MEDICINE

## 2019-08-13 PROCEDURE — 3008F PR BODY MASS INDEX (BMI) DOCUMENTED: ICD-10-PCS | Mod: CPTII,S$GLB,, | Performed by: INTERNAL MEDICINE

## 2019-08-13 PROCEDURE — 3008F BODY MASS INDEX DOCD: CPT | Mod: CPTII,S$GLB,, | Performed by: INTERNAL MEDICINE

## 2019-08-13 PROCEDURE — 99214 OFFICE O/P EST MOD 30 MIN: CPT | Mod: S$GLB,,, | Performed by: INTERNAL MEDICINE

## 2019-08-13 PROCEDURE — 99214 PR OFFICE/OUTPT VISIT, EST, LEVL IV, 30-39 MIN: ICD-10-PCS | Mod: S$GLB,,, | Performed by: INTERNAL MEDICINE

## 2019-08-13 RX ORDER — PANTOPRAZOLE SODIUM 40 MG/1
40 TABLET, DELAYED RELEASE ORAL DAILY
Qty: 30 TABLET | Refills: 2
Start: 2019-08-13

## 2019-08-13 NOTE — Clinical Note
Nurse to draw CBC, CMP, HCG, LDH, AFP and to flush port on 11/11. Also get CXR on the same day. See me on 11/12

## 2019-08-14 ENCOUNTER — PATIENT OUTREACH (OUTPATIENT)
Dept: ADMINISTRATIVE | Facility: OTHER | Age: 24
End: 2019-08-14

## 2019-08-16 ENCOUNTER — OFFICE VISIT (OUTPATIENT)
Dept: SLEEP MEDICINE | Facility: CLINIC | Age: 24
End: 2019-08-16
Payer: COMMERCIAL

## 2019-08-16 VITALS
SYSTOLIC BLOOD PRESSURE: 110 MMHG | HEART RATE: 67 BPM | DIASTOLIC BLOOD PRESSURE: 68 MMHG | BODY MASS INDEX: 19.96 KG/M2 | WEIGHT: 127.44 LBS

## 2019-08-16 DIAGNOSIS — G47.10 HYPERSOMNIA: Primary | ICD-10-CM

## 2019-08-16 PROCEDURE — 99999 PR PBB SHADOW E&M-EST. PATIENT-LVL III: ICD-10-PCS | Mod: PBBFAC,,, | Performed by: INTERNAL MEDICINE

## 2019-08-16 PROCEDURE — 3008F BODY MASS INDEX DOCD: CPT | Mod: CPTII,S$GLB,, | Performed by: INTERNAL MEDICINE

## 2019-08-16 PROCEDURE — 99214 PR OFFICE/OUTPT VISIT, EST, LEVL IV, 30-39 MIN: ICD-10-PCS | Mod: S$GLB,,, | Performed by: INTERNAL MEDICINE

## 2019-08-16 PROCEDURE — 99999 PR PBB SHADOW E&M-EST. PATIENT-LVL III: CPT | Mod: PBBFAC,,, | Performed by: INTERNAL MEDICINE

## 2019-08-16 PROCEDURE — 3008F PR BODY MASS INDEX (BMI) DOCUMENTED: ICD-10-PCS | Mod: CPTII,S$GLB,, | Performed by: INTERNAL MEDICINE

## 2019-08-16 PROCEDURE — 99214 OFFICE O/P EST MOD 30 MIN: CPT | Mod: S$GLB,,, | Performed by: INTERNAL MEDICINE

## 2019-08-16 NOTE — PATIENT INSTRUCTIONS
Polysomnogram to evaluate for obstructive sleep apnea, other sleep disorders and to ensure adequate sleep, with daytime sleep study (Multiple Sleep Latency Test - MSLT) to evaluate for narcolepsy or idiopathic hypersomnia.    - Please fill out sleep logs for 2 weeks prior to sleep studies and bring with you for the sleep studies to give to the technician.    - A urine tox screen will be done on the day of testing.    - The MSLT will be canceled if the overnight sleep study shows significant sleep apnea or inadequate sleep.    - Stop any stimulant  before studies.    - Avoid driving if drowsy. Would recommend that if you are dozing off while driving, that you do not drive until your sleepiness is appropriately treated.    - Encourage healthy lifestyle with adequate sleep (7-8 hours per night), diet and exercise.

## 2019-08-16 NOTE — PROGRESS NOTES
2019      Pt is following in regards to the MAHENDRA underwent HST showing AHI-1 and RDI- 5. He feels drowsy most of the day . He denies sleep paralysis currently, he denies dream enactment, denies any hallucination, He denies cataplexy. He denies nocturia. He is not napping much.  He denies drowsy driving.     HST- 2019     AHI -1,RDI:-5 , < 90 Spo2: 0.1 %, Mean Spo2: 96.6 %, time snorin.0 %           EPWORTH SLEEPINESS SCALE 2019   Sitting and reading 3   Watching TV 3   Sitting, inactive in a public place (e.g. a theatre or a meeting) 3   As a passenger in a car for an hour without a break 3   Lying down to rest in the afternoon when circumstances permit 3   Sitting and talking to someone 1   Sitting quietly after a lunch without alcohol 3   In a car, while stopped for a few minutes in traffic 0   Total score 19     This 24 y.o. male patient presents for the evaluation of possible obstructive sleep apnea.    Referring Provider: No ref. provider found     Pt is referred for evaluation of MAHENDRA by Suha.  Pt has been fatigued and tired for the past 8 years . +snoring , - witnessed apneas/ waking up gasping for air , wakes 2  times unknown reason takes a min's to go  back to sleep, + tired on waking up all the  time,  1   cups of coffee a day, +urge to move legs /  leg kicking. + on and off nasal congestion. - cataplexy,-  hallucination, + sleep paralysis for the past 1 year  once a week. - acting out dreams. - vivid dream.  Pt prefer to sleep in every possible position.        Pt did have hx of  head injury but no  hospitalization due to it . - Sleep walking/+talking. Memory is ok has some impact on long and short term memory after lyme. Pt feels content.  He has fallen off the bed multiple times last time was a month ago. Night mare rarely none.Pt does not have  morning headache .  Pt does feel  Drowsy while driving. Pt is mouth/nose breather. Pt does not wake up to eat at night. Pt does sweat at  night some time . Pt does not grind teeth.         EPWORTH SLEEPINESS SCALE 6/20/2019   Sitting and reading 3   Watching TV 3   Sitting, inactive in a public place (e.g. a theatre or a meeting) 3   As a passenger in a car for an hour without a break 3   Lying down to rest in the afternoon when circumstances permit 3   Sitting and talking to someone 0   Sitting quietly after a lunch without alcohol 2   In a car, while stopped for a few minutes in traffic 0   Total score 17     Sleep Clinic New Patient 6/20/2019   What time do you go to bed on a week day? (Give a range) 2am   What time do you go to bed on a day off? (Give a range) 2am   How long does it take you to fall asleep? (Give a range) 30 min or more   On average, how many times per night do you wake up? 2   How long does it take you to fall back into sleep? (Give a range) 5 mins   What time do you wake up to start your day on a week day? (Give a range) 1:30pm   What time do you wake up to start your day on a day off? (Give a range) 1:30pm   What time do you get out of bed? (Give a range) 2:30pm   On average, how many hours do you sleep? 7/8   On average, how many naps do you take per day? 0   Rate your sleep quality from 0 to 5 (0-poor, 5-great). 2   Have you experienced:  N/a   How much weight have you lost or gained (in lbs.) in the last year? 0   On average, how many times per night do you go to the bathroom?  0   Have you ever had a sleep study/CPAP machine/surgery for sleep apnea? No   Have you ever had a CPAP machine for sleep apnea? No   Have you ever had surgery for sleep apnea? No     Sleep Clinic ROS  6/20/2019   Difficulty breathing through the nose?  No   Sore throat or dry mouth in the morning? No   Irregular or very fast heart beat?  No   Shortness of breath?  Sometimes   Acid reflux? No   Body aches and pains?  Yes   Morning headaches? No   Dizziness? No   Mood changes?  No   Do you exercise?  Sometimes   Do you feel like moving your legs a lot?  " No       SLEEP ROUTINE:  Bed partner: alone   Daytime naps: one - two times a day at least one hr long     Past Medical History:   Diagnosis Date    Abdominal pain 08/12/2010    eval for abd and chest wall pain at [facility], [place], [place] - cxr wnl, EKG ([facility]), troponin wnl, normal chemistries    Allergy     Cancer     testicular    Chondromalacia patellae     Chronic nausea 2015    eval by GI Dr. Tushar Artis - given zofran and ciproheptadine     Chronic pain     Chronic pain     [facility] med[facility]; followed by psych and pain clinic and unresponsive to behavioral/CBT/old records reports c/f conversion disorder     Foot pain 04/2010    4/10 + [facility]IR[facility], eval by Dr. ALLY Lion at [facility] -dx unclear ? gout and trial of nsaids and pdn, xrays normal 1/11, referred to podiatry, re-eval by rheum 2/12 and MRI and films wnl    Fracture of right radius 09/2009    Lyme arthritis     multiple joints    Lyme disease 2013    Memory loss 03/2012    [place] [facility] [place], EEG wnl. Dr. Patricio suggested emotional factors & ref to Tushar Davies, PhD for  eval & neuropsych eval 3/12 Lupe Delgado, PhD - no evidence of formal thought disorder or psychosis - documented severe memory impairment; not related to to ADD or executive function issues. sugesstion that memory issues may be related to "emotional factors", ?conversion d/o    Stress fracture of tibia and fibula 08/2011    Urticaria        Past Surgical History:   Procedure Laterality Date    APPENDECTOMY      APPENDECTOMY, LAPAROSCOPIC N/A 5/24/2018    Performed by Kenan Huang Jr., MD at [facility] OR 2ND FLR    [id number]-[id number]-CF N/A 3/1/2018    [facility] CATH LAB    BLOCK, NERVE N/A 5/29/2018    Performed by Raiza Surgeon at [facility] RAIZA    CHEST WALL BIOPSY      COLONOSCOPY N/A 11/6/2018    Performed by Terence Rosales MD at [facility] ENDO (4TH " FLR)    EGD (ESOPHAGOGASTRODUODENOSCOPY) N/A 2018    Performed by [facility]rence Rosales [facility] at [facility] ENDO (4TH FLR)    INJECTION, JOINT LEFT GLENOHUMERAL UNDER FLOURO Left 2019    Performed by Stanton Bran MD at [facility] PAIN MGT    Bmvluhbvp-Gxrp-P-Cath N/A 2018    Performed by [facility] Diagnostic Provider at [facility] OR 2ND FLR    PORTACATH PLACEMENT Right        Family History   Problem Relation Age of Onset    Arthritis Mother     Other Mother         benign tumor of brain and spinal cord    Hyperlipidemia Father     Gout Father     No Known Problems Sister     Arthritis Sister         shoulder    Depression Sister     Sleep apnea Paternal Grandfather     Sleep apnea Paternal Uncle     Colon cancer Neg Hx     Esophageal cancer Neg Hx     Stomach cancer Neg Hx        Social History     Tobacco Use    Smoking status: Former Smoker     Packs/day: 0.25     Types: Vaping with nicotine, Vaping w/o nicotine, Cigarettes, Cigars     Last attempt to quit: 3/15/2018     Years since quittin.4    Smokeless tobacco: Former User   Substance Use Topics    Alcohol use: Yes     Comment: occasionally 2-3 drinks once every other week     Drug use: Not on file       ALLERGIES: Reviewed in EPIC    CURRENT MEDICATIONS: Reviewed in EPIC    REVIEW OF SYSTEMS:  Sleep related symptoms as per HPI;       PHYSICAL EXAM:  /68 (BP Location: Left arm, Patient Position: Sitting, BP Method: Medium (Automatic))   Pulse 67   Wt 57.8 kg (127 lb 6.8 oz)   BMI 19.96 kg/m²   GENERAL: Well groomed; Normally developed;  Physical Exam  Neck size: 15.7 inch  Oral: walker III, negative high arch, mild over bite.  Nose: mild nasal congestion b/l  CVS: S1+S2 ,negative murmur/ gallop, regularly regular  Lungs: CTA B/L  Abdomen: BS+, no guarding, - rigidity.  Ext: negative pedal edema B/L LE       ASSESSMENT:  1. Hypersomnia  Toxicology screen, urine    Polysomnography with mslt       PLAN:    Hypersomnia;    Discussed with the  results of the HST and in view of the continued high ESS will suggest Polysomnogram to evaluate for obstructive sleep apnea, other sleep disorders and to ensure adequate sleep, with daytime sleep study (Multiple Sleep Latency Test - MSLT) to evaluate for narcolepsy or idiopathic hypersomnia.    - Please fill out sleep logs for 2 weeks prior to sleep studies and bring with you for the sleep studies to give to the technician.    - A urine tox screen will be done on the day of testing.    - The MSLT will be canceled if the overnight sleep study shows significant sleep apnea or inadequate sleep.    - Stop any stimulant at least 7 days before studies.    - Avoid driving if drowsy. Would recommend that if you are dozing off while driving, that you do not drive until your sleepiness is appropriately treated.    - Encourage healthy lifestyle with adequate sleep (7-8 hours per night), diet and exercise.    -Precautions: The patient was advised to abstain from driving should they feel sleepy or drowsy.    Follow up: 6 weeks.          Darwin Abraham MD, FACP  Phone: [phone]  Fax: [phone]

## 2019-08-19 NOTE — TELEPHONE ENCOUNTER
Called patient to inform him that the stool sample he provided was not a good sample.  He understood and if he starts to have diarrhea he will give us a call.   Ear Star Wedge Flap Text: The defect edges were debeveled with a #15 blade scalpel.  Given the location of the defect and the proximity to free margins (helical rim) an ear star wedge flap was deemed most appropriate.  Using a sterile surgical marker, the appropriate flap was drawn incorporating the defect and placing the expected incisions between the helical rim and antihelix where possible.  The area thus outlined was incised through and through with a #15 scalpel blade.

## 2019-08-21 ENCOUNTER — TELEPHONE (OUTPATIENT)
Dept: SLEEP MEDICINE | Facility: OTHER | Age: 24
End: 2019-08-21

## 2019-09-05 ENCOUNTER — PATIENT MESSAGE (OUTPATIENT)
Dept: INTERNAL MEDICINE | Facility: CLINIC | Age: 24
End: 2019-09-05

## 2019-09-05 DIAGNOSIS — M62.838 MUSCLE SPASM: ICD-10-CM

## 2019-09-05 RX ORDER — BACLOFEN 10 MG/1
10 TABLET ORAL EVERY 12 HOURS PRN
Qty: 30 TABLET | Refills: 0 | Status: SHIPPED | OUTPATIENT
Start: 2019-09-05 | End: 2020-12-22 | Stop reason: SDUPTHER

## 2019-09-05 NOTE — TELEPHONE ENCOUNTER
Sent message to inform pt that script was sent to the pharmacy.   It is time for your annual exam I need day and time to schedule this appt

## 2019-09-09 RX ORDER — PANTOPRAZOLE SODIUM 40 MG/1
40 TABLET, DELAYED RELEASE ORAL DAILY
Qty: 30 TABLET | Refills: 2
Start: 2019-09-09

## 2019-09-09 NOTE — TELEPHONE ENCOUNTER
Pt aware and understanding Servando denied his refill request for the Protonix due to him needing an appt. Pt refused to schedule an appt at this time,

## 2019-09-25 ENCOUNTER — TELEPHONE (OUTPATIENT)
Dept: SLEEP MEDICINE | Facility: OTHER | Age: 24
End: 2019-09-25

## 2019-09-30 ENCOUNTER — HOSPITAL ENCOUNTER (OUTPATIENT)
Dept: SLEEP MEDICINE | Facility: OTHER | Age: 24
Discharge: HOME | End: 2019-09-30
Attending: INTERNAL MEDICINE
Payer: COMMERCIAL

## 2019-09-30 DIAGNOSIS — G47.10 HYPERSOMNIA: ICD-10-CM

## 2019-09-30 DIAGNOSIS — G47.19 EXCESSIVE DAYTIME SLEEPINESS: Primary | ICD-10-CM

## 2019-09-30 PROCEDURE — 95805 MULTIPLE SLEEP LATENCY TEST: CPT

## 2019-09-30 PROCEDURE — 95810 POLYSOM 6/> YRS 4/> PARAM: CPT

## 2019-10-01 LAB
AMPHET+METHAMPHET UR QL: NEGATIVE
BARBITURATES UR QL SCN>200 NG/ML: NEGATIVE
BENZODIAZ UR QL SCN>200 NG/ML: NEGATIVE
BZE UR QL SCN: NEGATIVE
CANNABINOIDS UR QL SCN: NEGATIVE
CREAT UR-MCNC: 157.4 MG/DL (ref 23–375)
ETHANOL UR-MCNC: <10 MG/DL
METHADONE UR QL SCN>300 NG/ML: NEGATIVE
OPIATES UR QL SCN: NEGATIVE
PCP UR QL SCN>25 NG/ML: NEGATIVE
TOXICOLOGY INFORMATION: NORMAL

## 2019-10-01 PROCEDURE — 80307 DRUG TEST PRSMV CHEM ANLYZR: CPT

## 2019-10-01 NOTE — PROGRESS NOTES
Alejandro Neff to [facility] for an overnight sleep study on 09/30/2019.  Pt. education,setup and cpap explanation given prior to study.      Pt. did not meet criteria for cpap.  Few respiratory events observed. No snore noted. Some leg activity observed. Pt observed raising knees.      EKG- Lead 2 appears withNSR.    Low saturation noted 93%.      Report to RIKY Monge in am. MSLT to follow.

## 2019-10-10 ENCOUNTER — TELEPHONE (OUTPATIENT)
Dept: SLEEP MEDICINE | Facility: CLINIC | Age: 24
End: 2019-10-10

## 2019-10-10 NOTE — TELEPHONE ENCOUNTER
Please schedule for the follow up with Me / NP  to review sleep study results.  Positive for mild sleep apnea sleep apnea and idiopathic hypersomnia, but not quite narcolepsy.    Thanks!

## 2019-10-10 NOTE — TELEPHONE ENCOUNTER
Please schedule for the follow up with Me / NP  to review sleep study results.  Positive for mild sleep apnea and Idiopathic Hypersomnia, but did not quite meet narcolepsy criteria.   Dr. Abraham's patient.    Thanks!

## 2019-10-14 ENCOUNTER — TELEPHONE (OUTPATIENT)
Dept: SLEEP MEDICINE | Facility: CLINIC | Age: 24
End: 2019-10-14

## 2019-10-18 ENCOUNTER — PATIENT OUTREACH (OUTPATIENT)
Dept: ADMINISTRATIVE | Facility: OTHER | Age: 24
End: 2019-10-18

## 2019-10-23 ENCOUNTER — OFFICE VISIT (OUTPATIENT)
Dept: SLEEP MEDICINE | Facility: CLINIC | Age: 24
End: 2019-10-23
Payer: COMMERCIAL

## 2019-10-23 VITALS
DIASTOLIC BLOOD PRESSURE: 61 MMHG | HEIGHT: 67 IN | SYSTOLIC BLOOD PRESSURE: 93 MMHG | HEART RATE: 70 BPM | WEIGHT: 128.31 LBS | BODY MASS INDEX: 20.14 KG/M2

## 2019-10-23 DIAGNOSIS — G47.30 SLEEP APNEA, UNSPECIFIED TYPE: Primary | ICD-10-CM

## 2019-10-23 DIAGNOSIS — G47.10 HYPERSOMNIA, UNSPECIFIED: ICD-10-CM

## 2019-10-23 PROCEDURE — 99214 PR OFFICE/OUTPT VISIT, EST, LEVL IV, 30-39 MIN: ICD-10-PCS | Mod: S$GLB,,, | Performed by: NURSE PRACTITIONER

## 2019-10-23 PROCEDURE — 3008F PR BODY MASS INDEX (BMI) DOCUMENTED: ICD-10-PCS | Mod: CPTII,S$GLB,, | Performed by: NURSE PRACTITIONER

## 2019-10-23 PROCEDURE — 3008F BODY MASS INDEX DOCD: CPT | Mod: CPTII,S$GLB,, | Performed by: NURSE PRACTITIONER

## 2019-10-23 PROCEDURE — 99999 PR PBB SHADOW E&M-EST. PATIENT-LVL III: CPT | Mod: PBBFAC,,, | Performed by: NURSE PRACTITIONER

## 2019-10-23 PROCEDURE — 99999 PR PBB SHADOW E&M-EST. PATIENT-LVL III: ICD-10-PCS | Mod: PBBFAC,,, | Performed by: NURSE PRACTITIONER

## 2019-10-23 PROCEDURE — 99214 OFFICE O/P EST MOD 30 MIN: CPT | Mod: S$GLB,,, | Performed by: NURSE PRACTITIONER

## 2019-10-23 RX ORDER — MODAFINIL 200 MG/1
200 TABLET ORAL DAILY
Qty: 30 TABLET | Refills: 0 | Status: SHIPPED | OUTPATIENT
Start: 2019-10-23 | End: 2019-11-12 | Stop reason: ALTCHOICE

## 2019-10-23 NOTE — PROGRESS NOTES
Alejandro GALICIA Memo Neff returns today for mgt of MAHENDRA and hypersomnia, initial visit with me    He has undergone PSG followed by MSLT reviewedwtih him today. Denies sleep paralysis. Sleep remains very fragmented and he is very sleepy when sedentary. Denies cataplexy. ESS=19       HISTORY  This 24 y.o. male patient presents for the evaluation of possible obstructive sleep apnea.    Referring Provider: No ref. provider found     Pt is referred for evaluation of MAHENDRA by Suha.  Pt has been fatigued and tired for the past 8 years . +snoring , - witnessed apneas/ waking up gasping for air , wakes 2  times unknown reason takes a min's to go  back to sleep, + tired on waking up all the  time,  1   cups of coffee a day, +urge to move legs /  leg kicking. + on and off nasal congestion. - cataplexy,-  hallucination, + sleep paralysis for the past 1 year  once a week. - acting out dreams. - vivid dream.  Pt prefer to sleep in every possible position.        Pt did have hx of  head injury but no  hospitalization due to it . - Sleep walking/+talking. Memory is ok has some impact on long and short term memory after lyme. Pt feels content.  He has fallen off the bed multiple times last time was a month ago. Night mare rarely none.Pt does not have  morning headache .  Pt does feel  Drowsy while driving. Pt is mouth/nose breather. Pt does not wake up to eat at night. Pt does sweat at night some time . Pt does not grind teeth.         EPWORTH SLEEPINESS SCALE 6/20/2019   Sitting and reading 3   Watching TV 3   Sitting, inactive in a public place (e.g. a theatre or a meeting) 3   As a passenger in a car for an hour without a break 3   Lying down to rest in the afternoon when circumstances permit 3   Sitting and talking to someone 0   Sitting quietly after a lunch without alcohol 2   In a car, while stopped for a few minutes in traffic 0   Total score 17     Sleep Clinic New Patient 6/20/2019   What time do you go to bed on a week day?  (Give a range) 2am   What time do you go to bed on a day off? (Give a range) 2am   How long does it take you to fall asleep? (Give a range) 30 min or more   On average, how many times per night do you wake up? 2   How long does it take you to fall back into sleep? (Give a range) 5 mins   What time do you wake up to start your day on a week day? (Give a range) 1:30pm   What time do you wake up to start your day on a day off? (Give a range) 1:30pm   What time do you get out of bed? (Give a range) 2:30pm   On average, how many hours do you sleep? 7/8   On average, how many naps do you take per day? 0   Rate your sleep quality from 0 to 5 (0-poor, 5-great). 2   Have you experienced:  N/a   How much weight have you lost or gained (in lbs.) in the last year? 0   On average, how many times per night do you go to the bathroom?  0   Have you ever had a sleep study/CPAP machine/surgery for sleep apnea? No   Have you ever had a CPAP machine for sleep apnea? No   Have you ever had surgery for sleep apnea? No     8/16/2019  Pt is following in regards to the MAHENDRA underwent HST showing AHI-1 and RDI- 5. He feels drowsy most of the day . He denies sleep paralysis currently, he denies dream enactment, denies any hallucination, He denies cataplexy. He denies nocturia. He is not napping much.  He denies drowsy driving. ESs=19    HST- 07/24/2019 AHI -1,RDI:-5   PSG AHI 7 and MSLT avg SL 7min with 1/5 SOREM      ROS 10/23/19  Difficulty breathing through the nose?  No   Sore throat or dry mouth in the morning? No   Irregular or very fast heart beat?  No   Shortness of breath?  Sometimes   Acid reflux? No   Body aches and pains?  Yes   Morning headaches? No   Dizziness? No   Mood changes?  No   Do you exercise?  Sometimes   Do you feel like moving your legs a lot?  No       SLEEP ROUTINE:  Bed partner: alone   Daytime naps: one - two times a day at least one hr long         PHYSICAL EXAM:  BP 93/61 (BP Location: Right arm, Patient  "Position: Sitting, BP Method: Medium (Automatic))   Pulse 70   Ht 5' 7" (1.702 m)   Wt 58.2 kg (128 lb 4.9 oz)   BMI 20.10 kg/m²   GENERAL: Well groomed; Normally developed; WN      ASSESSMENT:  MAHENDRA mild  Hypersomnia, idiopathic    PLAN:  Begin APAP 5-20cm THS DME. rtc 4-5 wk after setup for re-eval of symptoms and adherence  Begin trial provigil 200mg 1/2 tab bid or 1 tab qam, discussed potential s/e  "

## 2019-10-24 ENCOUNTER — IMMUNIZATION (OUTPATIENT)
Dept: PHARMACY | Facility: CLINIC | Age: 24
End: 2019-10-24

## 2019-10-24 ENCOUNTER — IMMUNIZATION (OUTPATIENT)
Dept: PHARMACY | Facility: CLINIC | Age: 24
End: 2019-10-24
Payer: COMMERCIAL

## 2019-11-04 ENCOUNTER — PATIENT MESSAGE (OUTPATIENT)
Dept: SLEEP MEDICINE | Facility: CLINIC | Age: 24
End: 2019-11-04

## 2019-11-11 ENCOUNTER — INFUSION (OUTPATIENT)
Dept: INFUSION THERAPY | Facility: OTHER | Age: 24
End: 2019-11-11
Attending: INTERNAL MEDICINE
Payer: COMMERCIAL

## 2019-11-11 ENCOUNTER — HOSPITAL ENCOUNTER (OUTPATIENT)
Dept: RADIOLOGY | Facility: OTHER | Age: 24
Discharge: HOME | End: 2019-11-11
Attending: INTERNAL MEDICINE
Payer: COMMERCIAL

## 2019-11-11 VITALS
DIASTOLIC BLOOD PRESSURE: 69 MMHG | RESPIRATION RATE: 18 BRPM | OXYGEN SATURATION: 98 % | TEMPERATURE: 98 F | SYSTOLIC BLOOD PRESSURE: 123 MMHG | HEART RATE: 80 BPM

## 2019-11-11 DIAGNOSIS — C38.3 PRIMARY MEDIASTINAL SEMINOMA: Primary | ICD-10-CM

## 2019-11-11 DIAGNOSIS — R11.0 CHEMOTHERAPY-INDUCED NAUSEA: ICD-10-CM

## 2019-11-11 DIAGNOSIS — C80.1 GERM CELL TUMOR: ICD-10-CM

## 2019-11-11 DIAGNOSIS — T45.1X5A CHEMOTHERAPY-INDUCED NAUSEA: ICD-10-CM

## 2019-11-11 DIAGNOSIS — C38.3 PRIMARY MEDIASTINAL SEMINOMA: ICD-10-CM

## 2019-11-11 PROCEDURE — 71046 X-RAY EXAM CHEST 2 VIEWS: CPT | Mod: TC,FY

## 2019-11-11 PROCEDURE — 71046 XR CHEST PA AND LATERAL: ICD-10-PCS | Mod: 26,,, | Performed by: RADIOLOGY

## 2019-11-11 PROCEDURE — 25000003 PHARM REV CODE 250: Performed by: INTERNAL MEDICINE

## 2019-11-11 PROCEDURE — 96523 IRRIG DRUG DELIVERY DEVICE: CPT

## 2019-11-11 PROCEDURE — 63600175 PHARM REV CODE 636 W HCPCS: Performed by: INTERNAL MEDICINE

## 2019-11-11 PROCEDURE — A4216 STERILE WATER/SALINE, 10 ML: HCPCS | Performed by: INTERNAL MEDICINE

## 2019-11-11 PROCEDURE — 71046 X-RAY EXAM CHEST 2 VIEWS: CPT | Mod: 26,,, | Performed by: RADIOLOGY

## 2019-11-11 RX ORDER — HEPARIN 100 UNIT/ML
500 SYRINGE INTRAVENOUS
Status: CANCELLED | OUTPATIENT
Start: 2019-11-11

## 2019-11-11 RX ORDER — SODIUM CHLORIDE 0.9 % (FLUSH) 0.9 %
10 SYRINGE (ML) INJECTION
Status: COMPLETED | OUTPATIENT
Start: 2019-11-11 | End: 2019-11-11

## 2019-11-11 RX ORDER — HEPARIN 100 UNIT/ML
500 SYRINGE INTRAVENOUS
Status: COMPLETED | OUTPATIENT
Start: 2019-11-11 | End: 2019-11-11

## 2019-11-11 RX ORDER — SODIUM CHLORIDE 0.9 % (FLUSH) 0.9 %
10 SYRINGE (ML) INJECTION
Status: CANCELLED | OUTPATIENT
Start: 2019-11-11

## 2019-11-11 RX ADMIN — SODIUM CHLORIDE, PRESERVATIVE FREE 10 ML: 5 INJECTION INTRAVENOUS at 02:11

## 2019-11-11 RX ADMIN — HEPARIN 500 UNITS: 100 SYRINGE at 02:11

## 2019-11-12 ENCOUNTER — OFFICE VISIT (OUTPATIENT)
Dept: HEMATOLOGY/ONCOLOGY | Facility: CLINIC | Age: 24
End: 2019-11-12
Payer: COMMERCIAL

## 2019-11-12 VITALS
OXYGEN SATURATION: 100 % | WEIGHT: 126.56 LBS | HEART RATE: 76 BPM | BODY MASS INDEX: 19.87 KG/M2 | RESPIRATION RATE: 16 BRPM | DIASTOLIC BLOOD PRESSURE: 71 MMHG | SYSTOLIC BLOOD PRESSURE: 125 MMHG | TEMPERATURE: 99 F | HEIGHT: 67 IN

## 2019-11-12 DIAGNOSIS — C38.3 PRIMARY MEDIASTINAL SEMINOMA: Primary | ICD-10-CM

## 2019-11-12 DIAGNOSIS — G47.10 HYPERSOMNIA WITH SLEEP APNEA: Primary | ICD-10-CM

## 2019-11-12 DIAGNOSIS — R53.82 CHRONIC FATIGUE: ICD-10-CM

## 2019-11-12 DIAGNOSIS — G47.30 HYPERSOMNIA WITH SLEEP APNEA: Primary | ICD-10-CM

## 2019-11-12 PROCEDURE — 99999 PR PBB SHADOW E&M-EST. PATIENT-LVL III: CPT | Mod: PBBFAC,,, | Performed by: INTERNAL MEDICINE

## 2019-11-12 PROCEDURE — 99214 PR OFFICE/OUTPT VISIT, EST, LEVL IV, 30-39 MIN: ICD-10-PCS | Mod: S$GLB,,, | Performed by: INTERNAL MEDICINE

## 2019-11-12 PROCEDURE — 99214 OFFICE O/P EST MOD 30 MIN: CPT | Mod: S$GLB,,, | Performed by: INTERNAL MEDICINE

## 2019-11-12 PROCEDURE — 3008F BODY MASS INDEX DOCD: CPT | Mod: CPTII,S$GLB,, | Performed by: INTERNAL MEDICINE

## 2019-11-12 PROCEDURE — 3008F PR BODY MASS INDEX (BMI) DOCUMENTED: ICD-10-PCS | Mod: CPTII,S$GLB,, | Performed by: INTERNAL MEDICINE

## 2019-11-12 PROCEDURE — 99999 PR PBB SHADOW E&M-EST. PATIENT-LVL III: ICD-10-PCS | Mod: PBBFAC,,, | Performed by: INTERNAL MEDICINE

## 2019-11-12 RX ORDER — MELOXICAM 7.5 MG/1
TABLET ORAL
COMMUNITY
Start: 2018-02-09 | End: 2020-12-22

## 2019-11-12 RX ORDER — CYCLOBENZAPRINE HCL 5 MG
TABLET ORAL
COMMUNITY
Start: 2018-02-06 | End: 2020-12-22

## 2019-11-12 NOTE — PROGRESS NOTES
"Subjective:      Patient ID: Alejandro Neff is a 24 y.o. male.     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 Mr. Hampton is a 24 yo man who first presented with chest pain and underwent CT chest on 2/14/18, which showed a 6 x 3.5 cm mediastinal mass. It abuts the aorta and pulmonary artery. Biopsy was done on 3/1/18. Pathology (reviewed at [facility]) showed germ cell tumor, most consistent with seminoma.   2. HCG, LDH, AFP on 3/22/18 all normal.   3. Testicular US 3/26/18 showed no testicular masses. CT C/A/P on 3/26/18 showed "stable appearance of anterior mediastinal mass consistent with provided history of seminoma. Several punctate sclerotic foci are noted in the pelvis at the right pubic bone, right ischial tuberosity, and left ilium adjacent to the sacroiliac joint.  These are strongly favored to reflect benign bone islands although metastatic disease could have a similar appearance and close follow-up is recommended. Several benign Schmorl's nodes are noted in the thoracic spine." Bone scan on 3/28/18 was negative for bone mets.   4. Audiogram on 4/6/18 normal. Followed by ENT.   5. Given his smoking history, I recommended 4 cycles of EP. EP cycle 1 day 1 on 4/16/18. Complicated by hospitalization for neutropenic fever 4/26-4/29. No source of infections identified. Treated with antibiotics empirically and improved.   6. Cycle 2 of EP started on on 5/7/18. Hospitalized during the first weekend for intractable nausea.   7. Restaging CT scan on 5/24/18 showed "Interval development of dilated appearance of the appendix with stranding of the periappendiceal fat.  Findings are worrisome for appendicitis". Patient had abdominal pain and tenderness. Admitted and got appendectomy on 5/24/18.  8. Cycle 3 of EP 6/11/18-6/15/18. Complicated by hospitalization for neutropenic fever 6/21-6/25  9. Cycle 4 of EP " 18-18. Complicated by hospitalization for N/V -. Again hospitalized - for intractable N/V which patient feels is from opioid withdrawal      INTERVAL HISTORY:   Mr. Hampton returns today for follow up of primary mediastinal seminoma. Completed 4 cycles of  on 18. Feels well. Has chronic fatigue. Denies other complaints. Works 40 hours a week and has classes two days a week. He is graduating in . After that he is planning to do one year of [employer] and spend one year in [place] afterwards.      ECO     ROS:   A ten point system review is obtained and neg except for what was stated in the interval history     Physical Examination:   Vital signs reviewed.   Gen: well hydrated, well developed, in no acute distress.   HEENT: normocephalic, anicteric sclerae, PERRLA, EOMI, oropharynx clear  Neck: supple, no JVD, thyromegaly, cervical or supraclavicular LAD  Lungs: CTAB, no wheezes or rales. Port site on chest clean.   Heart: RRR, no M/R/G  Abdomen: soft, non-distended, nontender, no hepatosplenomegaly, mass, or hernia. BS present  Ext: no clubbing, cyanosis, or edema  Neuro: alert and oriented x 4, no focal neuro deficit  Skin: no rash, erythema, open wound or ulcers  Psych: pleasant and appropriate mood and affect        Objective:      Laboratory Data:  Labs reviewed. AFP, LDH, HCG normal. CBC, CMP unremarkable     Imaging Data:  CT C/A/P 19:  Postsurgical change in the right lower quadrant; stable soft tissue prominence of the anterior mediastinum.  No CT evidence of new metastatic disease.    CXR 19:  Right van catheter, no active cardiac or pulmonary findings     Assessment/Plan:      1. Primary mediastinal seminoma    2. Chronic fatigue        1.2  - Mr Memo Neff is a 25 yo man with primary mediastinal seminoma, good risk disease, s/p 4 cycles of EP finished on 18  - doing well. RANDY  - RTC in 3 months with tumor markers and CT C/A/P     3.  - chronic  - TSH was  normal in the past.   - monitor TSH on return

## 2019-11-30 ENCOUNTER — PATIENT MESSAGE (OUTPATIENT)
Dept: SLEEP MEDICINE | Facility: CLINIC | Age: 24
End: 2019-11-30

## 2019-12-18 ENCOUNTER — PATIENT MESSAGE (OUTPATIENT)
Dept: SLEEP MEDICINE | Facility: CLINIC | Age: 24
End: 2019-12-18

## 2019-12-18 DIAGNOSIS — G47.33 OBSTRUCTIVE SLEEP APNEA: Primary | ICD-10-CM

## 2019-12-26 ENCOUNTER — TELEPHONE (OUTPATIENT)
Dept: SLEEP MEDICINE | Facility: CLINIC | Age: 24
End: 2019-12-26

## 2019-12-26 NOTE — TELEPHONE ENCOUNTER
2/19/2020   12:40 PM  20 mins.  Jo Keen NP    [facility] NEUROLOGY      Patient Comments:   Instructed follow up appointment

## 2020-01-22 NOTE — PLAN OF CARE
Have You Had A Facial Before?: has had a previous facial
Problem: Chemotherapy Effects (Adult)  Goal: Signs and Symptoms of Listed Potential Problems Will be Absent, Minimized or Managed (Chemotherapy Effects)  Signs and symptoms of listed potential problems will be absent, minimized or managed by discharge/transition of care (reference Chemotherapy Effects (Adult) CPG).  Outcome: Ongoing (interventions implemented as appropriate)  Pt admitted to unit for Cycle #1 of Etoposide/Cisplatin, consent signed today with Dr. Rojas, side effects of chemo reviewed with Patient, handouts with information on Etoposide and Cisplatin reviewed, continuing to educate Pt on chemo regimen, questions and concerns addressed. Refreshments offered. Pt accompanied by 3 friends as support.      
Problem: Patient Care Overview (Adult)  Goal: Plan of Care Review  Outcome: Ongoing (interventions implemented as appropriate)  Pt completed Day#1 of 1st cycle of Cisplatin/Etoposide, Tolerated well. Concerns and questions addressed. Plan of care reviewed, and Pt instructed to contact MD with any further questions and /or concerns. He verbalized understanding.AVS given to patient and Pt discharged home, ambulated unassisted.      
When Outside In The Sun, Do You...: mostly burns, rarely tans
Additional History: Illuminize peel\\nConsent. Cleanse with BFW. Remove with warm 4x4. Step 1 illuminize prep. Poly eyes lips nose. Fan. Illuminize peel 3 passes face 2 passes neck. Avene day. Gave pt post care cerave and post care sheet.

## 2020-01-29 ENCOUNTER — OFFICE VISIT (OUTPATIENT)
Dept: INTERNAL MEDICINE | Facility: CLINIC | Age: 25
End: 2020-01-29
Payer: COMMERCIAL

## 2020-01-29 ENCOUNTER — TELEPHONE (OUTPATIENT)
Dept: INTERNAL MEDICINE | Facility: CLINIC | Age: 25
End: 2020-01-29

## 2020-01-29 VITALS — BODY MASS INDEX: 21.55 KG/M2 | WEIGHT: 137.56 LBS

## 2020-01-29 DIAGNOSIS — J10.1 INFLUENZA B: Primary | ICD-10-CM

## 2020-01-29 DIAGNOSIS — M25.50 ARTHRALGIA, UNSPECIFIED JOINT: ICD-10-CM

## 2020-01-29 LAB
CTP QC/QA: YES
FLUAV AG NPH QL: NEGATIVE
FLUBV AG NPH QL: POSITIVE

## 2020-01-29 PROCEDURE — 99999 PR PBB SHADOW E&M-EST. PATIENT-LVL III: CPT | Mod: PBBFAC,,, | Performed by: INTERNAL MEDICINE

## 2020-01-29 PROCEDURE — 3008F PR BODY MASS INDEX (BMI) DOCUMENTED: ICD-10-PCS | Mod: CPTII,S$GLB,, | Performed by: INTERNAL MEDICINE

## 2020-01-29 PROCEDURE — 99215 OFFICE O/P EST HI 40 MIN: CPT | Mod: S$GLB,,, | Performed by: INTERNAL MEDICINE

## 2020-01-29 PROCEDURE — 87804 INFLUENZA ASSAY W/OPTIC: CPT | Mod: QW,S$GLB,, | Performed by: INTERNAL MEDICINE

## 2020-01-29 PROCEDURE — 3008F BODY MASS INDEX DOCD: CPT | Mod: CPTII,S$GLB,, | Performed by: INTERNAL MEDICINE

## 2020-01-29 PROCEDURE — 87804 POCT INFLUENZA A/B: ICD-10-PCS | Mod: 59,QW,S$GLB, | Performed by: INTERNAL MEDICINE

## 2020-01-29 PROCEDURE — 99999 PR PBB SHADOW E&M-EST. PATIENT-LVL III: ICD-10-PCS | Mod: PBBFAC,,, | Performed by: INTERNAL MEDICINE

## 2020-01-29 PROCEDURE — 99215 PR OFFICE/OUTPT VISIT, EST, LEVL V, 40-54 MIN: ICD-10-PCS | Mod: S$GLB,,, | Performed by: INTERNAL MEDICINE

## 2020-01-29 RX ORDER — OSELTAMIVIR PHOSPHATE 75 MG/1
75 CAPSULE ORAL 2 TIMES DAILY
Qty: 10 CAPSULE | Refills: 0 | Status: SHIPPED | OUTPATIENT
Start: 2020-01-29 | End: 2020-02-03

## 2020-01-29 NOTE — TELEPHONE ENCOUNTER
----- Message from Pat James sent at 1/29/2020 12:11 PM CST -----  Contact: MATTHEW VARGHESE [[id number]]  Type: Patient Call Back    Who called:MATTHEW VARGHESE [[id number]]    What is the request in detail: Patient is requesting a call back. He states that he do not feel well. He states that his symptoms are cough, headache, joint pains, sore legs, and no fever. He states that this started last night around 8pm.    Please contact to further advise.    Can the clinic reply by [facility]? No    Best call back number: [phone]    Additional Information: N/A

## 2020-01-29 NOTE — PROGRESS NOTES
Subjective:       Patient ID: Alejandro Neff is a 24 y.o. male who  has a past medical history of Abdominal pain (08/12/2010), Allergy, Cancer, Chondromalacia patellae, Chronic nausea (2015), Chronic pain, Chronic pain, Foot pain (04/2010), Fracture of right radius (09/2009), Lyme arthritis, Lyme disease (2013), Memory loss (03/2012), Stress fracture of tibia and fibula (08/2011), and Urticaria.    Chief Complaint: Cough; Generalized Body Aches; Abdominal Pain; and Headache     History was obtained from the patient and supplemented through chart review.  He is a patient of Dr. Hernandez.  Was last seen 2018 for left trapezius strain.    Cough   The current episode started yesterday. The cough is non-productive. Associated symptoms include headaches, myalgias and a sore throat. Pertinent negatives include no chest pain, ear pain, eye redness, fever, postnasal drip, rash, rhinorrhea or wheezing. Treatments tried: ibuprofen. The treatment provided no relief.   Abdominal Pain   Associated symptoms include arthralgias, headaches and myalgias. Pertinent negatives include no diarrhea, dysuria, fever or hematuria.   Headache    Associated symptoms include abdominal pain, coughing and a sore throat. Pertinent negatives include no dizziness, ear pain, eye redness, fever or rhinorrhea.     Started last night at 8:00 p.m. Started with dry cough with difficulty expectorating sputum, no difficulty sleeping.  +joint pain, myalgias, fatigue, soreness.  Slightly worsened today.  No LAD.   No recent travel.   UTD flu. h/o testicular cancer in completed chemo. UTD pneumococcal vaccine.    History Lyme disease at age 17.  Was treated with antibiotics.  Is from [place].  Has resulted in joint pain, which has recently worsened as above.    Review of Systems   Constitutional: Negative for fever and unexpected weight change.   HENT: Positive for sore throat. Negative for ear pain, postnasal drip and rhinorrhea.    Eyes: Negative for  "redness and itching.   Respiratory: Positive for cough. Negative for wheezing.    Cardiovascular: Negative for chest pain and palpitations.   Gastrointestinal: Positive for abdominal pain. Negative for diarrhea.   Genitourinary: Negative for dysuria and hematuria.   Musculoskeletal: Positive for arthralgias and myalgias.   Skin: Negative for rash and wound.   Neurological: Positive for headaches. Negative for dizziness.   Hematological: Negative for adenopathy.   Psychiatric/Behavioral: Negative for confusion. The patient is not nervous/anxious.          Past Medical History:   Diagnosis Date    Abdominal pain 08/12/2010    eval for abd and chest wall pain at [place], [place] - cxr wnl, EKG ([facility]), troponin wnl, normal chemistries    Allergy     Cancer     testicular    Chondromalacia patellae     Chronic nausea 2015    eval by GI Dr. Tushar Artis - given zofran and ciproheptadine     Chronic pain     Chronic pain     [facility] - no meds; followed by psych and pain clinic and unresponsive to behavioral/CBT/old records reports c/f conversion disorder     Foot pain 04/2010    4/10 + SHIRA, eval by Dr. ALLY Lion at [facility] -dx unclear ? gout and trial of nsaids and pdn, xrays normal 1/11, referred to podiatry, re-eval by rheum 2/12 and MRI and films wnl    Fracture of right radius 09/2009    Lyme arthritis     multiple joints    Lyme disease 2013    Memory loss 03/2012    [place] [facility] [place] EEG wnl. Dr. Patricio suggested emotional factors & ref to Tushar Davies, PhD for  eval & neuropsych eval 3/12 Lupe Delgado, PhD - no evidence of formal thought disorder or psychosis - documented severe memory impairment; not related to to ADD or executive function issues. sugesstion that memory issues may be related to "emotional factors", ?conversion d/o    Stress fracture of tibia and fibula 08/2011    Urticaria  "     Past Surgical History:   Procedure Laterality Date    APPENDECTOMY      CHEST WALL BIOPSY      COLONOSCOPY N/A 11/6/2018    Procedure: COLONOSCOPY;  Surgeon: Terence Rosales MD;  Location: [facility] (4TH FLR);  Service: Endoscopy;  Laterality: N/A;  low volume prep    ESOPHAGOGASTRODUODENOSCOPY N/A 11/6/2018    Procedure: EGD (ESOPHAGOGASTRODUODENOSCOPY);  Surgeon: Terence Rosales MD;  Location: [facility] (4TH FLR);  Service: Endoscopy;  Laterality: N/A;    INJECTION OF ANESTHETIC AGENT AROUND NERVE N/A 5/29/2018    Procedure: BLOCK, NERVE;  Surgeon: Raiza Surgeon;  Location: [facility];  Service: Anesthesiology;  Laterality: N/A;    INJECTION OF JOINT Left 2/4/2019    Procedure: INJECTION, JOINT LEFT GLENOHUMERAL UNDER FLOURO;  Surgeon: Stanton Bran MD;  Location: [facility] PAIN MGT;  Service: Pain Management;  Laterality: Left;    LAPAROSCOPIC APPENDECTOMY N/A 5/24/2018    Procedure: APPENDECTOMY, LAPAROSCOPIC;  Surgeon: Kenan Huang Jr., MD;  [facility];  Service: General;  Laterality: N/A;    PORTACATH PLACEMENT Right      Family History   Problem Relation Age of Onset    Arthritis Mother     Other Mother         benign tumor of brain and spinal cord    Hyperlipidemia Father     Gout Father     No Known Problems Sister     Arthritis Sister         shoulder    Depression Sister     Sleep apnea Paternal Grandfather     Sleep apnea Paternal Uncle     Colon cancer Neg Hx     Esophageal cancer Neg Hx     Stomach cancer Neg Hx      Social History     Socioeconomic History    Marital status: Single     Spouse name: Not on file    Number of children: Not on file    Years of education: Not on file    Highest education level: Not on file   Occupational History    Occupation: student at [employer]   Social Parents R People    Financial resource strain: Not on file    Food insecurity:     Worry: Not on file     Inability: Not on file    Transportation needs:     Medical: Not on file      Non-medical: Not on file   Tobacco Use    Smoking status: Former Smoker     Packs/day: 0.25     Types: Vaping with nicotine, Vaping w/o nicotine, Cigarettes, Cigars     Last attempt to quit: 3/15/2018     Years since quittin.8    Smokeless tobacco: Former User   Substance and Sexual Activity    Alcohol use: Yes     Comment: occasionally 2-3 drinks once every other week     Drug use: Not on file    Sexual activity: Yes     Partners: Female     Birth control/protection: Condom   Lifestyle    Physical activity:     Days per week: Not on file     Minutes per session: Not on file    Stress: Not on file   Relationships    Social connections:     Talks on phone: Not on file     Gets together: Not on file     Attends [employer] service: Not on file     Active member of club or organization: Not on file     Attends meetings of clubs or organizations: Not on file     Relationship status: Not on file   Other Topics Concern    Not on file   Social History Narrative    Majoring in [employer]/marketing     From [place] and [facility] - lived 1 year ago and in high school x 1 semester             Objective:      Vitals:    20 1512   Weight: 62.4 kg (137 lb 9.1 oz)      Physical Exam   Constitutional: He appears well-developed and well-nourished.   Ill appearing   HENT:   Head: Normocephalic and atraumatic.   Nose: No mucosal edema or rhinorrhea. Right sinus exhibits no maxillary sinus tenderness and no frontal sinus tenderness. Left sinus exhibits no maxillary sinus tenderness and no frontal sinus tenderness.   Mouth/Throat: Uvula is midline and mucous membranes are normal. Posterior oropharyngeal erythema present. No oropharyngeal exudate or posterior oropharyngeal edema.   Eyes: Pupils are equal, round, and reactive to light. EOM are normal. Right eye exhibits no discharge. Left eye exhibits no discharge. Right conjunctiva is not injected. Left conjunctiva is not injected. No scleral icterus.   Neck: Neck supple. No  tracheal deviation present. No thyromegaly present.   Cardiovascular: Normal rate, regular rhythm, normal heart sounds and intact distal pulses.   No murmur heard.  Pulmonary/Chest: Effort normal and breath sounds normal. No stridor. No respiratory distress. He has no wheezes.   Abdominal: Soft. Bowel sounds are normal. He exhibits no distension. There is no tenderness.   Musculoskeletal: He exhibits no edema or deformity.   Lymphadenopathy:     He has no cervical adenopathy.   Neurological: He is alert. Gait normal.   Skin: Skin is warm and dry. He is not diaphoretic. No erythema.   Psychiatric: He has a normal mood and affect. His behavior is normal.         Lab Results   Component Value Date    WBC 4.68 11/11/2019    HGB 15.3 11/11/2019    HCT 44.9 11/11/2019     11/11/2019    ALT 13 11/11/2019    AST 12 11/11/2019     11/11/2019    K 4.3 11/11/2019     11/11/2019    CREATININE 1.2 11/11/2019    BUN 10 11/11/2019    CO2 27 11/11/2019    TSH 0.537 06/14/2019    INR 1.0 06/22/2018    HGBA1C 4.7 07/30/2018       The ASCVD Risk score (Michael DC Jr., et al., 2013) failed to calculate for the following reasons:    The 2013 ASCVD risk score is only valid for ages 40 to 79    (Imaging have been independently reviewed)  CXR without acute abnormality. Cath in place.    Assessment:       1. Influenza B    2. Arthralgia, unspecified joint          Plan:       Alejandro was seen today for cough, generalized body aches, abdominal pain and headache.    Diagnoses and all orders for this visit:    Influenza B  Comments:  +POC flu. Centor 0. Start Tamiflu. Rec supportive treatment, NSAIDs/Tylenol, Mucinex DM. Provided letter for work/school.  Orders:  -     POCT Influenza A/B  -     oseltamivir (TAMIFLU) 75 MG capsule; Take 1 capsule (75 mg total) by mouth 2 (two) times daily. for 5 days  -     dextromethorphan-guaifenesin  mg (MUCINEX DM)  mg per 12 hr tablet; Take 1 tablet by mouth 2 (two) times daily  as needed.    Arthralgia, unspecified joint  Comments:  Worsened d/t flu as above.  Recommend NSAIDs/Tylenol.          Side effects of medication(s) were discussed in detail and patient voiced understanding.  Patient will call back for any issues or complications.     RTC in 3 month(s) or sooner PRN with PCP for annual.

## 2020-01-29 NOTE — PATIENT INSTRUCTIONS
I recommend plenty of rest and hydration.  Tylenol and NSAIDs, such as ibuprofen, Motrin, naproxen can be helpful for sore throat, headache, ear pain, muscle and joint pain, sneezing, fatigue.  Chloroseptic spray, lozenges can also soothe a sore throat.  Over-the-counter antihistamines (Allegra, Claritin, Zyrtec) for runny nose and decongestants (Sudafed) can also be helpful.  Nasal saline once to twice a day.  Hand washing can help prevent the spread of respiratory illnesses, especially from younger children.

## 2020-01-31 ENCOUNTER — TELEPHONE (OUTPATIENT)
Dept: INTERNAL MEDICINE | Facility: CLINIC | Age: 25
End: 2020-01-31

## 2020-01-31 NOTE — TELEPHONE ENCOUNTER
Agree with recs - if any new fevers or difficulty breathing then rec be re-evaluated over the weekend in clinic

## 2020-01-31 NOTE — TELEPHONE ENCOUNTER
----- Message from Kojo Romano sent at 1/31/2020 11:07 AM CST -----  Contact: MATTHEW VARGHESE [[id number]]  Name of Who is Calling: MATTHEW VARGHESE [[id number]]     What is the request in detail:MATTHEW VARGHESE [[id number]] is calling in regards to  Dr visit the other day he is stating his cough is worst he was diagnosed with the flu    and is trying to be seen today he feels worse   Please contact to further discuss and advise      Can the clinic reply by [facility]: yes     What Number to Call Back [facility]:   [phone] (home) [phone] (work)

## 2020-01-31 NOTE — TELEPHONE ENCOUNTER
Spoke w/ pt he stated that he came in on 01/29/2020 w/ a partner provider of  . And tested positive for flu .  Pt stated that he still feels the same symptoms.  I inform pt that we rec he cont take tamiflu that was prescribed during that visit monitor fevers -pt states no fevers just mainly cough  . rec he monitor all symptoms and if any new symptoms occur or develops fever over weekend , rec pt stop by ER or UC. But in the mean time rec pt cont taking RX and contact office on Monday w/ updates on symptoms

## 2020-02-05 ENCOUNTER — OFFICE VISIT (OUTPATIENT)
Dept: NEUROLOGY | Facility: CLINIC | Age: 25
End: 2020-02-05
Payer: COMMERCIAL

## 2020-02-05 VITALS
BODY MASS INDEX: 22.18 KG/M2 | HEART RATE: 80 BPM | WEIGHT: 141.31 LBS | DIASTOLIC BLOOD PRESSURE: 68 MMHG | SYSTOLIC BLOOD PRESSURE: 114 MMHG | HEIGHT: 67 IN

## 2020-02-05 DIAGNOSIS — G47.33 OBSTRUCTIVE SLEEP APNEA: Primary | ICD-10-CM

## 2020-02-05 DIAGNOSIS — G47.419 PRIMARY NARCOLEPSY WITHOUT CATAPLEXY: ICD-10-CM

## 2020-02-05 PROCEDURE — 99214 OFFICE O/P EST MOD 30 MIN: CPT | Mod: S$GLB,,, | Performed by: NURSE PRACTITIONER

## 2020-02-05 PROCEDURE — 99214 PR OFFICE/OUTPT VISIT, EST, LEVL IV, 30-39 MIN: ICD-10-PCS | Mod: S$GLB,,, | Performed by: NURSE PRACTITIONER

## 2020-02-05 PROCEDURE — 3008F PR BODY MASS INDEX (BMI) DOCUMENTED: ICD-10-PCS | Mod: CPTII,S$GLB,, | Performed by: NURSE PRACTITIONER

## 2020-02-05 PROCEDURE — 99999 PR PBB SHADOW E&M-EST. PATIENT-LVL III: CPT | Mod: PBBFAC,,, | Performed by: NURSE PRACTITIONER

## 2020-02-05 PROCEDURE — 3008F BODY MASS INDEX DOCD: CPT | Mod: CPTII,S$GLB,, | Performed by: NURSE PRACTITIONER

## 2020-02-05 PROCEDURE — 99999 PR PBB SHADOW E&M-EST. PATIENT-LVL III: ICD-10-PCS | Mod: PBBFAC,,, | Performed by: NURSE PRACTITIONER

## 2020-02-05 RX ORDER — METHYLPHENIDATE HYDROCHLORIDE 20 MG/1
20 TABLET ORAL 2 TIMES DAILY PRN
Qty: 60 TABLET | Refills: 0 | Status: SHIPPED | OUTPATIENT
Start: 2020-02-05 | End: 2020-03-18 | Stop reason: SDUPTHER

## 2020-02-05 RX ORDER — METHYLPHENIDATE HYDROCHLORIDE EXTENDED RELEASE 20 MG/1
20 TABLET ORAL DAILY
Qty: 30 TABLET | Refills: 0 | Status: SHIPPED | OUTPATIENT
Start: 2020-02-05 | End: 2020-03-18 | Stop reason: SDUPTHER

## 2020-02-05 NOTE — PROGRESS NOTES
Alejandro Neff returns today for mgt of MAHENDRA and hypersomnia    Since last seen setup with apap but didn't work well. Mask kept falling off so he fixed this but then felt air too low/hard to get air.stopped use beginning of the year and ready to return. Didn't help EDS. [place] zoned out with eyes open while taking provigil and Sunosi 150mg ineffective. Remains very sleepy. 13 #gain. Had flu also interim. Denies cataplexy. Sleep remains fragmented.         HISTORY  This 24 y.o. male patient presents for the evaluation of possible obstructive sleep apnea.    Referring Provider: No ref. provider found     Pt is referred for evaluation of MAHENDRA by Suha.  Pt has been fatigued and tired for the past 8 years . +snoring , - witnessed apneas/ waking up gasping for air , wakes 2  times unknown reason takes a min's to go  back to sleep, + tired on waking up all the  time,  1   cups of coffee a day, +urge to move legs /  leg kicking. + on and off nasal congestion. - cataplexy,-  hallucination, + sleep paralysis for the past 1 year  once a week. - acting out dreams. - vivid dream.  Pt prefer to sleep in every possible position.        Pt did have hx of  head injury but no  hospitalization due to it . - Sleep walking/+talking. Memory is ok has some impact on long and short term memory after lyme. Pt feels content.  He has fallen off the bed multiple times last time was a month ago. Night mare rarely none.Pt does not have  morning headache .  Pt does feel  Drowsy while driving. Pt is mouth/nose breather. Pt does not wake up to eat at night. Pt does sweat at night some time . Pt does not grind teeth.         EPWORTH SLEEPINESS SCALE 6/20/2019   Sitting and reading 3   Watching TV 3   Sitting, inactive in a public place (e.g. a theatre or a meeting) 3   As a passenger in a car for an hour without a break 3   Lying down to rest in the afternoon when circumstances permit 3   Sitting and talking to someone 0   Sitting quietly  after a lunch without alcohol 2   In a car, while stopped for a few minutes in traffic 0   Total score 17     Sleep Clinic New Patient 6/20/2019   What time do you go to bed on a week day? (Give a range) 2am   What time do you go to bed on a day off? (Give a range) 2am   How long does it take you to fall asleep? (Give a range) 30 min or more   On average, how many times per night do you wake up? 2   How long does it take you to fall back into sleep? (Give a range) 5 mins   What time do you wake up to start your day on a week day? (Give a range) 1:30pm   What time do you wake up to start your day on a day off? (Give a range) 1:30pm   What time do you get out of bed? (Give a range) 2:30pm   On average, how many hours do you sleep? 7/8   On average, how many naps do you take per day? 0   Rate your sleep quality from 0 to 5 (0-poor, 5-great). 2   Have you experienced:  N/a   How much weight have you lost or gained (in lbs.) in the last year? 0   On average, how many times per night do you go to the bathroom?  0   Have you ever had a sleep study/CPAP machine/surgery for sleep apnea? No   Have you ever had a CPAP machine for sleep apnea? No   Have you ever had surgery for sleep apnea? No     8/16/2019  Pt is following in regards to the MAHENDRA underwent HST showing AHI-1 and RDI- 5. He feels drowsy most of the day . He denies sleep paralysis currently, he denies dream enactment, denies any hallucination, He denies cataplexy. He denies nocturia. He is not napping much.  He denies drowsy driving. ESs=19      [id number]:  He has undergone PSG followed by MSLT reviewedwtih him today. Denies sleep paralysis. Sleep remains very fragmented and he is very sleepy when sedentary. Denies cataplexy. ESS=19     HST- 07/24/2019 AHI -1,RDI:-5   PSG AHI 7 and MSLT avg SL 7min with 1/5 SOREM      ROS 2/5/20  Difficulty breathing through the nose?  No   Sore throat or dry mouth in the morning? No   Irregular or very fast heart beat?  No  "  Shortness of breath?  Sometimes   Acid reflux? No   Body aches and pains?  Yes   Morning headaches? No   Dizziness? No   Mood changes?  No   Do you exercise?  Sometimes   Do you feel like moving your legs a lot?  No       SLEEP ROUTINE:  Bed partner: alone   Daytime naps: one - two times a day at least one hr long         PHYSICAL EXAM:  /68   Pulse 80   Ht 5' 7" (1.702 m)   Wt 64.1 kg (141 lb 5 oz)   BMI 22.13 kg/m²   GENERAL: Well groomed; Normally developed; WN      ASSESSMENT:  MAHENDRA mild, cpap intolerable  Narcolepsy w/o cataplexy    PLAN:  OK to return APAP machine  Begin ritalin XR 20mg 1/2-1 tab am and 20mg 1/2-1 tab bid prn. Discussed s/e  RTC 3-mos  "

## 2020-02-10 ENCOUNTER — HOSPITAL ENCOUNTER (OUTPATIENT)
Dept: RADIOLOGY | Facility: OTHER | Age: 25
Discharge: HOME | End: 2020-02-10
Attending: INTERNAL MEDICINE
Payer: COMMERCIAL

## 2020-02-10 ENCOUNTER — INFUSION (OUTPATIENT)
Dept: INFUSION THERAPY | Facility: OTHER | Age: 25
End: 2020-02-10
Attending: INTERNAL MEDICINE
Payer: COMMERCIAL

## 2020-02-10 VITALS
RESPIRATION RATE: 18 BRPM | OXYGEN SATURATION: 99 % | DIASTOLIC BLOOD PRESSURE: 60 MMHG | HEART RATE: 60 BPM | TEMPERATURE: 98 F | SYSTOLIC BLOOD PRESSURE: 107 MMHG

## 2020-02-10 DIAGNOSIS — C38.3 PRIMARY MEDIASTINAL SEMINOMA: ICD-10-CM

## 2020-02-10 DIAGNOSIS — T45.1X5A CHEMOTHERAPY-INDUCED NAUSEA: Primary | ICD-10-CM

## 2020-02-10 DIAGNOSIS — R53.82 CHRONIC FATIGUE: ICD-10-CM

## 2020-02-10 DIAGNOSIS — R11.0 CHEMOTHERAPY-INDUCED NAUSEA: Primary | ICD-10-CM

## 2020-02-10 LAB
AFP SERPL-MCNC: 1.7 NG/ML (ref 0–8.4)
ALBUMIN SERPL BCP-MCNC: 4.3 G/DL (ref 3.5–5.2)
ALP SERPL-CCNC: 43 U/L (ref 55–135)
ALT SERPL W/O P-5'-P-CCNC: 23 U/L (ref 10–44)
ANION GAP SERPL CALC-SCNC: 6 MMOL/L (ref 8–16)
AST SERPL-CCNC: 13 U/L (ref 10–40)
BILIRUB SERPL-MCNC: 0.6 MG/DL (ref 0.1–1)
BUN SERPL-MCNC: 9 MG/DL (ref 6–20)
CALCIUM SERPL-MCNC: 9.3 MG/DL (ref 8.7–10.5)
CHLORIDE SERPL-SCNC: 108 MMOL/L (ref 95–110)
CO2 SERPL-SCNC: 26 MMOL/L (ref 23–29)
CREAT SERPL-MCNC: 0.8 MG/DL (ref 0.5–1.4)
ERYTHROCYTE [DISTWIDTH] IN BLOOD BY AUTOMATED COUNT: 13.5 % (ref 11.5–14.5)
EST. GFR  (AFRICAN AMERICAN): >60 ML/MIN/1.73 M^2
EST. GFR  (NON AFRICAN AMERICAN): >60 ML/MIN/1.73 M^2
GLUCOSE SERPL-MCNC: 99 MG/DL (ref 70–110)
HCG INTACT+B SERPL-ACNC: <1.2 MIU/ML
HCT VFR BLD AUTO: 42 % (ref 40–54)
HGB BLD-MCNC: 14.4 G/DL (ref 14–18)
IMM GRANULOCYTES # BLD AUTO: 0.01 K/UL (ref 0–0.04)
LDH SERPL L TO P-CCNC: 164 U/L (ref 110–260)
MCH RBC QN AUTO: 30 PG (ref 27–31)
MCHC RBC AUTO-ENTMCNC: 34.3 G/DL (ref 32–36)
MCV RBC AUTO: 88 FL (ref 82–98)
NEUTROPHILS # BLD AUTO: 2.9 K/UL (ref 1.8–7.7)
PLATELET # BLD AUTO: 252 K/UL (ref 150–350)
PMV BLD AUTO: 8.5 FL (ref 9.2–12.9)
POTASSIUM SERPL-SCNC: 4.2 MMOL/L (ref 3.5–5.1)
PROT SERPL-MCNC: 6.6 G/DL (ref 6–8.4)
RBC # BLD AUTO: 4.8 M/UL (ref 4.6–6.2)
SODIUM SERPL-SCNC: 140 MMOL/L (ref 136–145)
TSH SERPL DL<=0.005 MIU/L-ACNC: 0.56 UIU/ML (ref 0.4–4)
WBC # BLD AUTO: 4.89 K/UL (ref 3.9–12.7)

## 2020-02-10 PROCEDURE — 71260 CT THORAX DX C+: CPT | Mod: 26,,, | Performed by: RADIOLOGY

## 2020-02-10 PROCEDURE — 82105 ALPHA-FETOPROTEIN SERUM: CPT

## 2020-02-10 PROCEDURE — 85027 COMPLETE CBC AUTOMATED: CPT

## 2020-02-10 PROCEDURE — 74177 CT CHEST ABDOMEN PELVIS WITH CONTRAST (XPD): ICD-10-PCS | Mod: 26,,, | Performed by: RADIOLOGY

## 2020-02-10 PROCEDURE — 83615 LACTATE (LD) (LDH) ENZYME: CPT

## 2020-02-10 PROCEDURE — 71260 CT CHEST ABDOMEN PELVIS WITH CONTRAST (XPD): ICD-10-PCS | Mod: 26,,, | Performed by: RADIOLOGY

## 2020-02-10 PROCEDURE — 74177 CT ABD & PELVIS W/CONTRAST: CPT | Mod: TC

## 2020-02-10 PROCEDURE — 36591 DRAW BLOOD OFF VENOUS DEVICE: CPT

## 2020-02-10 PROCEDURE — 84443 ASSAY THYROID STIM HORMONE: CPT

## 2020-02-10 PROCEDURE — 63600175 PHARM REV CODE 636 W HCPCS: Performed by: INTERNAL MEDICINE

## 2020-02-10 PROCEDURE — 25000003 PHARM REV CODE 250: Performed by: INTERNAL MEDICINE

## 2020-02-10 PROCEDURE — 74177 CT ABD & PELVIS W/CONTRAST: CPT | Mod: 26,,, | Performed by: RADIOLOGY

## 2020-02-10 PROCEDURE — A4216 STERILE WATER/SALINE, 10 ML: HCPCS | Performed by: INTERNAL MEDICINE

## 2020-02-10 PROCEDURE — 25500020 PHARM REV CODE 255: Performed by: INTERNAL MEDICINE

## 2020-02-10 PROCEDURE — 84702 CHORIONIC GONADOTROPIN TEST: CPT

## 2020-02-10 PROCEDURE — 80053 COMPREHEN METABOLIC PANEL: CPT

## 2020-02-10 RX ORDER — SODIUM CHLORIDE 0.9 % (FLUSH) 0.9 %
10 SYRINGE (ML) INJECTION
Status: COMPLETED | OUTPATIENT
Start: 2020-02-10 | End: 2020-02-10

## 2020-02-10 RX ORDER — HEPARIN 100 UNIT/ML
500 SYRINGE INTRAVENOUS
Status: COMPLETED | OUTPATIENT
Start: 2020-02-10 | End: 2020-02-10

## 2020-02-10 RX ADMIN — SODIUM CHLORIDE, PRESERVATIVE FREE 10 ML: 5 INJECTION INTRAVENOUS at 02:02

## 2020-02-10 RX ADMIN — IOHEXOL 1000 ML: 9 SOLUTION ORAL at 12:02

## 2020-02-10 RX ADMIN — HEPARIN 500 UNITS: 100 SYRINGE at 02:02

## 2020-02-10 RX ADMIN — IOHEXOL 75 ML: 350 INJECTION, SOLUTION INTRAVENOUS at 02:02

## 2020-02-10 NOTE — PLAN OF CARE
Labs drawn and CT scan complete. Pt tolerated well. VSS. NAD. Port to right chest de-accessed after heparinized per protocol.  Pt verbalized understanding of discharge instructions before leaving with self.

## 2020-02-11 ENCOUNTER — OFFICE VISIT (OUTPATIENT)
Dept: HEMATOLOGY/ONCOLOGY | Facility: CLINIC | Age: 25
End: 2020-02-11
Payer: COMMERCIAL

## 2020-02-11 VITALS
OXYGEN SATURATION: 100 % | WEIGHT: 139.56 LBS | SYSTOLIC BLOOD PRESSURE: 121 MMHG | RESPIRATION RATE: 16 BRPM | HEART RATE: 106 BPM | TEMPERATURE: 99 F | DIASTOLIC BLOOD PRESSURE: 73 MMHG | HEIGHT: 67 IN | BODY MASS INDEX: 21.9 KG/M2

## 2020-02-11 DIAGNOSIS — C38.3 PRIMARY MEDIASTINAL SEMINOMA: Primary | ICD-10-CM

## 2020-02-11 DIAGNOSIS — C80.1 GERM CELL TUMOR: ICD-10-CM

## 2020-02-11 PROCEDURE — 99999 PR PBB SHADOW E&M-EST. PATIENT-LVL IV: ICD-10-PCS | Mod: PBBFAC,,, | Performed by: INTERNAL MEDICINE

## 2020-02-11 PROCEDURE — 99215 PR OFFICE/OUTPT VISIT, EST, LEVL V, 40-54 MIN: ICD-10-PCS | Mod: S$GLB,,, | Performed by: INTERNAL MEDICINE

## 2020-02-11 PROCEDURE — 3008F PR BODY MASS INDEX (BMI) DOCUMENTED: ICD-10-PCS | Mod: CPTII,S$GLB,, | Performed by: INTERNAL MEDICINE

## 2020-02-11 PROCEDURE — 99999 PR PBB SHADOW E&M-EST. PATIENT-LVL IV: CPT | Mod: PBBFAC,,, | Performed by: INTERNAL MEDICINE

## 2020-02-11 PROCEDURE — 3008F BODY MASS INDEX DOCD: CPT | Mod: CPTII,S$GLB,, | Performed by: INTERNAL MEDICINE

## 2020-02-11 PROCEDURE — 99215 OFFICE O/P EST HI 40 MIN: CPT | Mod: S$GLB,,, | Performed by: INTERNAL MEDICINE

## 2020-02-11 NOTE — PROGRESS NOTES
"Subjective:      Patient ID: Alejandro Neff is a 24 y.o. male.     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 Mr. Hampton is a 22 yo man who first presented with chest pain and underwent CT chest on 2/14/18, which showed a 6 x 3.5 cm mediastinal mass. It abuts the aorta and pulmonary artery. Biopsy was done on 3/1/18. Pathology (reviewed at [facility]) showed germ cell tumor, most consistent with seminoma.   2. HCG, LDH, AFP on 3/22/18 all normal.   3. Testicular US 3/26/18 showed no testicular masses. CT C/A/P on 3/26/18 showed "stable appearance of anterior mediastinal mass consistent with provided history of seminoma. Several punctate sclerotic foci are noted in the pelvis at the right pubic bone, right ischial tuberosity, and left ilium adjacent to the sacroiliac joint.  These are strongly favored to reflect benign bone islands although metastatic disease could have a similar appearance and close follow-up is recommended. Several benign Schmorl's nodes are noted in the thoracic spine." Bone scan on 3/28/18 was negative for bone mets.   4. Audiogram on 4/6/18 normal. Followed by ENT.   5. Given his smoking history, I recommended 4 cycles of EP. EP cycle 1 day 1 on 4/16/18. Complicated by hospitalization for neutropenic fever 4/26-4/29. No source of infections identified. Treated with antibiotics empirically and improved.   6. Cycle 2 of EP started on on 5/7/18. Hospitalized during the first weekend for intractable nausea.   7. Restaging CT scan on 5/24/18 showed "Interval development of dilated appearance of the appendix with stranding of the periappendiceal fat.  Findings are worrisome for appendicitis". Patient had abdominal pain and tenderness. Admitted and got appendectomy on 5/24/18.  8. Cycle 3 of EP 6/11/18-6/15/18. Complicated by hospitalization for neutropenic fever 6/21-6/25  9. " Cycle 4 of EP 18-18. Complicated by hospitalization for N/V -. Again hospitalized - for intractable N/V which patient feels is from opioid withdrawal      INTERVAL HISTORY:   Mr. Hampton returns today for follow up of primary mediastinal seminoma. Completed 4 cycles of  on 18. Feels well. followed by sleep medicine for narcolepsy/hypersomnia. Was given ritalin. Energy level much better on ritalin.      ECO     ROS:   A ten point system review is obtained and neg except for what was stated in the interval history     Physical Examination:   Vital signs reviewed.   Gen: well hydrated, well developed, in no acute distress.   HEENT: normocephalic, anicteric sclerae, PERRLA, EOMI, oropharynx clear  Neck: supple, no JVD, thyromegaly, cervical or supraclavicular LAD  Lungs: CTAB, no wheezes or rales. Port site on chest clean.   Heart: RRR, no M/R/G  Abdomen: soft, non-distended, nontender, no hepatosplenomegaly, mass, or hernia. BS present  Ext: no clubbing, cyanosis, or edema  Neuro: alert and oriented x 4, no focal neuro deficit  Skin: no rash, erythema, open wound or ulcers  Psych: pleasant and appropriate mood and affect        Objective:      Laboratory Data:  Labs reviewed. AFP, LDH, HCG normal. CBC, CMP unremarkable     Imaging Data:  CT C/A/P 2/10/2020:  No significant change from prior specifically without evidence for adenopathy or solid organ lesion to suggest new or worsening metastatic disease.    Continued prominent soft tissue anterior mediastinum felt to represent residual thymic tissue.    Continued but somewhat reduced hypoenhancement left lobe liver about the falciform ligament suggestive for focal fatty infiltration.     Assessment and Plan:      1. Primary mediastinal seminoma    2. Germ cell tumor        1.2  - Mr Memo Neff is a 23 yo man with primary mediastinal seminoma, good risk disease, s/p 4 cycles of  finished on 18  - discussed test results and CT  scan results. RANDY. Tumor markers normal  - doing well. continue with surveillance  - RTC in 3 months with tumor markers and CXR.   - starting in July 2020, he will be in year 3 after completion of chemotherapy. Can be seen every 6 months and get CT scan yearly.   - Patient wants to keep the port. Discussed risk of infection. Patient wants to keep the port for now. Port needs to be flushed every 3 months.

## 2020-02-11 NOTE — Clinical Note
RTC in 3 months with CBC, CMP, AFP, HCG, LDH, CXR checked 2 days prior to return, see Lucrecia Rodriguez, then get port flush

## 2020-03-10 ENCOUNTER — PATIENT MESSAGE (OUTPATIENT)
Dept: NEUROLOGY | Facility: CLINIC | Age: 25
End: 2020-03-10

## 2020-03-18 DIAGNOSIS — G47.419 PRIMARY NARCOLEPSY WITHOUT CATAPLEXY: ICD-10-CM

## 2020-03-18 RX ORDER — METHYLPHENIDATE HYDROCHLORIDE 20 MG/1
20 TABLET ORAL 2 TIMES DAILY PRN
Qty: 60 TABLET | Refills: 0 | Status: SHIPPED | OUTPATIENT
Start: 2020-03-18 | End: 2020-04-13 | Stop reason: SDUPTHER

## 2020-03-18 RX ORDER — METHYLPHENIDATE HYDROCHLORIDE EXTENDED RELEASE 20 MG/1
20 TABLET ORAL DAILY
Qty: 30 TABLET | Refills: 0 | Status: SHIPPED | OUTPATIENT
Start: 2020-03-18 | End: 2020-04-13 | Stop reason: SDUPTHER

## 2020-04-13 ENCOUNTER — PATIENT MESSAGE (OUTPATIENT)
Dept: NEUROLOGY | Facility: CLINIC | Age: 25
End: 2020-04-13

## 2020-04-13 DIAGNOSIS — G47.419 PRIMARY NARCOLEPSY WITHOUT CATAPLEXY: ICD-10-CM

## 2020-04-13 RX ORDER — METHYLPHENIDATE HYDROCHLORIDE EXTENDED RELEASE 20 MG/1
20 TABLET ORAL DAILY
Qty: 30 TABLET | Refills: 0 | Status: SHIPPED | OUTPATIENT
Start: 2020-04-13 | End: 2020-05-13 | Stop reason: SDUPTHER

## 2020-04-13 RX ORDER — METHYLPHENIDATE HYDROCHLORIDE EXTENDED RELEASE 20 MG/1
20 TABLET ORAL DAILY
Qty: 30 TABLET | Refills: 0 | Status: SHIPPED | OUTPATIENT
Start: 2020-04-13 | End: 2020-04-13 | Stop reason: SDUPTHER

## 2020-04-13 RX ORDER — METHYLPHENIDATE HYDROCHLORIDE 20 MG/1
20 TABLET ORAL 2 TIMES DAILY PRN
Qty: 60 TABLET | Refills: 0 | Status: SHIPPED | OUTPATIENT
Start: 2020-04-13 | End: 2020-05-13 | Stop reason: SDUPTHER

## 2020-04-13 RX ORDER — METHYLPHENIDATE HYDROCHLORIDE 20 MG/1
20 TABLET ORAL 2 TIMES DAILY PRN
Qty: 60 TABLET | Refills: 0 | Status: SHIPPED | OUTPATIENT
Start: 2020-04-13 | End: 2020-04-13 | Stop reason: SDUPTHER

## 2020-05-11 ENCOUNTER — HOSPITAL ENCOUNTER (OUTPATIENT)
Dept: RADIOLOGY | Facility: OTHER | Age: 25
Discharge: HOME | End: 2020-05-11
Attending: INTERNAL MEDICINE
Payer: COMMERCIAL

## 2020-05-11 ENCOUNTER — LAB VISIT (OUTPATIENT)
Dept: LAB | Facility: OTHER | Age: 25
End: 2020-05-11
Attending: INTERNAL MEDICINE
Payer: COMMERCIAL

## 2020-05-11 DIAGNOSIS — C38.3 PRIMARY MEDIASTINAL SEMINOMA: ICD-10-CM

## 2020-05-11 LAB
ALBUMIN SERPL BCP-MCNC: 4.5 G/DL (ref 3.5–5.2)
ALP SERPL-CCNC: 44 U/L (ref 55–135)
ALT SERPL W/O P-5'-P-CCNC: 28 U/L (ref 10–44)
ANION GAP SERPL CALC-SCNC: 11 MMOL/L (ref 8–16)
AST SERPL-CCNC: 17 U/L (ref 10–40)
BASOPHILS # BLD AUTO: 0.01 K/UL (ref 0–0.2)
BASOPHILS NFR BLD: 0.1 % (ref 0–1.9)
BILIRUB SERPL-MCNC: 0.7 MG/DL (ref 0.1–1)
BUN SERPL-MCNC: 16 MG/DL (ref 6–20)
CALCIUM SERPL-MCNC: 9.2 MG/DL (ref 8.7–10.5)
CHLORIDE SERPL-SCNC: 102 MMOL/L (ref 95–110)
CO2 SERPL-SCNC: 26 MMOL/L (ref 23–29)
CREAT SERPL-MCNC: 0.9 MG/DL (ref 0.5–1.4)
DIFFERENTIAL METHOD: ABNORMAL
EOSINOPHIL # BLD AUTO: 0.1 K/UL (ref 0–0.5)
EOSINOPHIL NFR BLD: 1.2 % (ref 0–8)
ERYTHROCYTE [DISTWIDTH] IN BLOOD BY AUTOMATED COUNT: 13 % (ref 11.5–14.5)
EST. GFR  (AFRICAN AMERICAN): >60 ML/MIN/1.73 M^2
EST. GFR  (NON AFRICAN AMERICAN): >60 ML/MIN/1.73 M^2
GLUCOSE SERPL-MCNC: 101 MG/DL (ref 70–110)
HCG INTACT+B SERPL-ACNC: <1.2 MIU/ML
HCT VFR BLD AUTO: 47.1 % (ref 40–54)
HGB BLD-MCNC: 15.6 G/DL (ref 14–18)
IMM GRANULOCYTES # BLD AUTO: 0.02 K/UL (ref 0–0.04)
IMM GRANULOCYTES NFR BLD AUTO: 0.2 % (ref 0–0.5)
LDH SERPL L TO P-CCNC: 153 U/L (ref 110–260)
LYMPHOCYTES # BLD AUTO: 1.4 K/UL (ref 1–4.8)
LYMPHOCYTES NFR BLD: 17.6 % (ref 18–48)
MCH RBC QN AUTO: 29.1 PG (ref 27–31)
MCHC RBC AUTO-ENTMCNC: 33.1 G/DL (ref 32–36)
MCV RBC AUTO: 88 FL (ref 82–98)
MONOCYTES # BLD AUTO: 0.7 K/UL (ref 0.3–1)
MONOCYTES NFR BLD: 8.1 % (ref 4–15)
NEUTROPHILS # BLD AUTO: 5.9 K/UL (ref 1.8–7.7)
NEUTROPHILS NFR BLD: 72.8 % (ref 38–73)
NRBC BLD-RTO: 0 /100 WBC
PLATELET # BLD AUTO: 196 K/UL (ref 150–350)
PMV BLD AUTO: 8 FL (ref 9.2–12.9)
POTASSIUM SERPL-SCNC: 4.5 MMOL/L (ref 3.5–5.1)
PROT SERPL-MCNC: 7.1 G/DL (ref 6–8.4)
RBC # BLD AUTO: 5.36 M/UL (ref 4.6–6.2)
SODIUM SERPL-SCNC: 139 MMOL/L (ref 136–145)
WBC # BLD AUTO: 8.05 K/UL (ref 3.9–12.7)

## 2020-05-11 PROCEDURE — 71046 X-RAY EXAM CHEST 2 VIEWS: CPT | Mod: 26,,, | Performed by: RADIOLOGY

## 2020-05-11 PROCEDURE — 83615 LACTATE (LD) (LDH) ENZYME: CPT

## 2020-05-11 PROCEDURE — 85025 COMPLETE CBC W/AUTO DIFF WBC: CPT

## 2020-05-11 PROCEDURE — 36415 COLL VENOUS BLD VENIPUNCTURE: CPT

## 2020-05-11 PROCEDURE — 71046 X-RAY EXAM CHEST 2 VIEWS: CPT | Mod: TC,FY

## 2020-05-11 PROCEDURE — 82105 ALPHA-FETOPROTEIN SERUM: CPT

## 2020-05-11 PROCEDURE — 71046 XR CHEST PA AND LATERAL: ICD-10-PCS | Mod: 26,,, | Performed by: RADIOLOGY

## 2020-05-11 PROCEDURE — 84702 CHORIONIC GONADOTROPIN TEST: CPT

## 2020-05-11 PROCEDURE — 80053 COMPREHEN METABOLIC PANEL: CPT

## 2020-05-12 LAB — AFP SERPL-MCNC: 1.7 NG/ML (ref 0–8.4)

## 2020-05-13 ENCOUNTER — OFFICE VISIT (OUTPATIENT)
Dept: HEMATOLOGY/ONCOLOGY | Facility: CLINIC | Age: 25
End: 2020-05-13
Attending: INTERNAL MEDICINE
Payer: COMMERCIAL

## 2020-05-13 ENCOUNTER — OFFICE VISIT (OUTPATIENT)
Dept: NEUROLOGY | Facility: CLINIC | Age: 25
End: 2020-05-13
Payer: COMMERCIAL

## 2020-05-13 ENCOUNTER — PATIENT MESSAGE (OUTPATIENT)
Dept: NEUROLOGY | Facility: CLINIC | Age: 25
End: 2020-05-13

## 2020-05-13 DIAGNOSIS — C38.3 PRIMARY MEDIASTINAL SEMINOMA: Primary | ICD-10-CM

## 2020-05-13 DIAGNOSIS — G47.419 PRIMARY NARCOLEPSY WITHOUT CATAPLEXY: ICD-10-CM

## 2020-05-13 PROCEDURE — 99214 OFFICE O/P EST MOD 30 MIN: CPT | Mod: 95,,, | Performed by: NURSE PRACTITIONER

## 2020-05-13 PROCEDURE — 99214 PR OFFICE/OUTPT VISIT, EST, LEVL IV, 30-39 MIN: ICD-10-PCS | Mod: 95,,, | Performed by: NURSE PRACTITIONER

## 2020-05-13 PROCEDURE — 99213 OFFICE O/P EST LOW 20 MIN: CPT | Mod: 95,,, | Performed by: INTERNAL MEDICINE

## 2020-05-13 PROCEDURE — 99213 PR OFFICE/OUTPT VISIT, EST, LEVL III, 20-29 MIN: ICD-10-PCS | Mod: 95,,, | Performed by: INTERNAL MEDICINE

## 2020-05-13 RX ORDER — METHYLPHENIDATE HYDROCHLORIDE EXTENDED RELEASE 20 MG/1
20 TABLET ORAL DAILY
Qty: 30 TABLET | Refills: 0 | Status: SHIPPED | OUTPATIENT
Start: 2020-05-13 | End: 2020-06-12 | Stop reason: SDUPTHER

## 2020-05-13 RX ORDER — METHYLPHENIDATE HYDROCHLORIDE 20 MG/1
20 TABLET ORAL 2 TIMES DAILY PRN
Qty: 60 TABLET | Refills: 0 | Status: SHIPPED | OUTPATIENT
Start: 2020-05-13 | End: 2020-06-12 | Stop reason: SDUPTHER

## 2020-05-13 NOTE — PROGRESS NOTES
"Subjective:       Patient ID: Alejandro Neff is a 25 y.o. male.    Chief Complaint: No chief complaint on file.    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 Mr. Hampton is a 22 yo man who first presented with chest pain and underwent CT chest on 2/14/18, which showed a 6 x 3.5 cm mediastinal mass. It abuts the aorta and pulmonary artery. Biopsy was done on 3/1/18. Pathology (reviewed at [facility]) showed germ cell tumor, most consistent with seminoma.   2. HCG, LDH, AFP on 3/22/18 all normal.   3. Testicular US 3/26/18 showed no testicular masses. CT C/A/P on 3/26/18 showed "stable appearance of anterior mediastinal mass consistent with provided history of seminoma. Several punctate sclerotic foci are noted in the pelvis at the right pubic bone, right ischial tuberosity, and left ilium adjacent to the sacroiliac joint.  These are strongly favored to reflect benign bone islands although metastatic disease could have a similar appearance and close follow-up is recommended. Several benign Schmorl's nodes are noted in the thoracic spine." Bone scan on 3/28/18 was negative for bone mets.   4. Audiogram on 4/6/18 normal. Followed by ENT.   5. Given his smoking history, I recommended 4 cycles of EP. EP cycle 1 day 1 on 4/16/18. Complicated by hospitalization for neutropenic fever 4/26-4/29. No source of infections identified. Treated with antibiotics empirically and improved.   6. Cycle 2 of EP started on on 5/7/18. Hospitalized during the first weekend for intractable nausea.   7. Restaging CT scan on 5/24/18 showed "Interval development of dilated appearance of the appendix with stranding of the periappendiceal fat.  Findings are worrisome for appendicitis". Patient had abdominal pain and tenderness. Admitted and got appendectomy on 5/24/18.  8. Cycle 3 of EP 6/11/18-6/15/18. Complicated by hospitalization for " neutropenic fever -  9. Cycle 4 of EP 18-18. Complicated by hospitalization for N/V -. Again hospitalized - for intractable N/V which patient feels is from opioid withdrawal      INTERVAL HISTORY:   Mr. Hampton returns today for follow up of primary mediastinal seminoma. Completed 4 cycles of  on 18. Feels well. Has chronic fatigue. Denies other complaints. Works 40 hours a week and has classes two days a week. He is graduating in . After that he is planning to do one year of [employer] and spend one year in [place] afterwards.      ECO     ROS:   A ten point system review is obtained and neg except for what was stated in the interval history    Review of Systems   Constitutional: Negative for activity change, appetite change, chills, fatigue, fever and unexpected weight change.   HENT: Negative for congestion, rhinorrhea, sore throat and trouble swallowing.    Respiratory: Negative for cough, shortness of breath and wheezing.    Cardiovascular: Negative for chest pain, palpitations and leg swelling.   Gastrointestinal: Negative for abdominal distention, abdominal pain, blood in stool, constipation, diarrhea, nausea and vomiting.   Genitourinary: Negative for difficulty urinating.   Musculoskeletal: Negative for arthralgias, back pain, joint swelling and myalgias.   Skin: Negative for color change.   Psychiatric/Behavioral:        Hypersomnia- on Ritalin       Objective:      Physical Exam   Constitutional:   Virtual visit       Assessment:       1. Primary mediastinal seminoma        Plan:     1. RTC 3 months with labs and scans  Wants labs from port and access for CT scan  Would like COVID-19 Ab test at time of port flush in   - RTC in 3 months with tumor markers and CT C/A/P

## 2020-05-13 NOTE — PROGRESS NOTES
The patient location is: home  The chief complaint leading to consultation is: narcolepsy mgt  Visit type: TELE AUDIOVISUAL:04954  Each patient to whom he or she provides medical services by telemedicine is:  (1) informed of the relationship between the physician and patient and the respective role of any other health care provider with respect to management of the patient; and (2) notified that he or she may decline to receive medical services by telemedicine and may withdraw from such care at any time.covid-19    Notes:Alejandro Neff returns today for mgt of MAHENDRA and narcolepsy    Since last seen he is doing well with ER ritalin taken 1h before wants to get out of bed along with light snack (to help work better) and short acting ritalin 20mg. No longer dozing off when he doesn't want to. Working well w/o headache, nausea, insomnia, palpitations. Mild loose stool controlled. He feels less tired. Denies cataplexy. ESS=-6    HISTORY  [facility] Jarad  This 25 y.o. male patient presents for the evaluation of possible obstructive sleep apnea.    Referring Provider: No ref. provider found     Pt is referred for evaluation of MAHENDRA by Suha.  Pt has been fatigued and tired for the past 8 years . +snoring , - witnessed apneas/ waking up gasping for air , wakes 2  times unknown reason takes a min's to go  back to sleep, + tired on waking up all the  time,  1   cups of coffee a day, +urge to move legs /  leg kicking. + on and off nasal congestion. - cataplexy,-  hallucination, + sleep paralysis for the past 1 year  once a week. - acting out dreams. - vivid dream.  Pt prefer to sleep in every possible position.        Pt did have hx of  head injury but no  hospitalization due to it . - Sleep walking/+talking. Memory is ok has some impact on long and short term memory after lyme. Pt feels content.  He has fallen off the bed multiple times last time was a month ago. Night mare rarely none.Pt does not have  morning headache .   Pt does feel  Drowsy while driving. Pt is mouth/nose breather. Pt does not wake up to eat at night. Pt does sweat at night some time . Pt does not grind teeth.     EPWORTH SLEEPINESS SCALE 6/20/2019   Sitting and reading 3   Watching TV 3   Sitting, inactive in a public place (e.g. a theatre or a meeting) 3   As a passenger in a car for an hour without a break 3   Lying down to rest in the afternoon when circumstances permit 3   Sitting and talking to someone 0   Sitting quietly after a lunch without alcohol 2   In a car, while stopped for a few minutes in traffic 0   Total score 17     Sleep Clinic New Patient 6/20/2019   What time do you go to bed on a week day? (Give a range) 2am   What time do you go to bed on a day off? (Give a range) 2am   How long does it take you to fall asleep? (Give a range) 30 min or more   On average, how many times per night do you wake up? 2   How long does it take you to fall back into sleep? (Give a range) 5 mins   What time do you wake up to start your day on a week day? (Give a range) 1:30pm   What time do you wake up to start your day on a day off? (Give a range) 1:30pm   What time do you get out of bed? (Give a range) 2:30pm   On average, how many hours do you sleep? 7/8   On average, how many naps do you take per day? 0   Rate your sleep quality from 0 to 5 (0-poor, 5-great). 2   Have you experienced:  N/a   How much weight have you lost or gained (in lbs.) in the last year? 0   On average, how many times per night do you go to the bathroom?  0   Have you ever had a sleep study/CPAP machine/surgery for sleep apnea? No   Have you ever had a CPAP machine for sleep apnea? No   Have you ever had surgery for sleep apnea? No     8/16/2019 VB  Pt is following in regards to the MAHENDRA underwent HST showing AHI-1 and RDI- 5. He feels drowsy most of the day . He denies sleep paralysis currently, he denies dream enactment, denies any hallucination, He denies cataplexy. He denies nocturia. He  is not napping much.  He denies drowsy driving. ESs=19      [id number] VB:  He has undergone PSG followed by MSLT reviewedwtih him today. Denies sleep paralysis. Sleep remains very fragmented and he is very sleepy when sedentary. Denies cataplexy. ESS=19     2/5/20 VB  Since last seen setup with apap but didn't work well. Mask kept falling off so he fixed this but then felt air too low/hard to get air.stopped use beginning of the year and ready to return. Didn't help EDS. [place] zoned out with eyes open while taking provigil and Sunosi 150mg ineffective. Remains very sleepy. 13 #gain. Had flu also interim. Denies cataplexy. Sleep remains fragmented.     HST- 07/24/2019 AHI -1,RDI:-5   PSG AHI 7 and MSLT avg SL 7min with 1/5 SOREM    TRIED( provigil, sunosi)    ROS 5/13/20. In addition to above, denies interval medical change. Otherwise a balance review of 10-systems is negative.         SLEEP ROUTINE:  Bed partner: alone     PHYSICAL EXAM:  There were no vitals taken for this visit.  GENERAL: Well groomed; Normally developed; WN      ASSESSMENT:  MAHENDRA mild, cpap intolerable  Narcolepsy w/o cataplexy, stable on ritalin    PLAN: continue ritalin XR 20mg 1 tab am and 20mg1 tab bid prn. Discussed s/e  RTC 6-mos  Discussed potential future adjunctive or alternative therapy(xyrem, wakix)

## 2020-05-31 NOTE — TELEPHONE ENCOUNTER
Reviewed chart - biopsy scheduled     - pt already aware that does not need to f/u with cards at this time and office will cancel this appt for him    - please cancel cardiology appt per my note below       Normal for race

## 2020-06-02 RX ORDER — SODIUM CHLORIDE 0.9 % (FLUSH) 0.9 %
10 SYRINGE (ML) INJECTION
Status: CANCELLED | OUTPATIENT
Start: 2020-06-02

## 2020-06-02 RX ORDER — HEPARIN 100 UNIT/ML
500 SYRINGE INTRAVENOUS
Status: CANCELLED | OUTPATIENT
Start: 2020-06-02

## 2020-06-10 ENCOUNTER — PATIENT MESSAGE (OUTPATIENT)
Dept: HEMATOLOGY/ONCOLOGY | Facility: CLINIC | Age: 25
End: 2020-06-10

## 2020-06-12 ENCOUNTER — PATIENT MESSAGE (OUTPATIENT)
Dept: NEUROLOGY | Facility: CLINIC | Age: 25
End: 2020-06-12

## 2020-06-12 ENCOUNTER — INFUSION (OUTPATIENT)
Dept: INFUSION THERAPY | Facility: OTHER | Age: 25
End: 2020-06-12
Attending: OBSTETRICS & GYNECOLOGY
Payer: COMMERCIAL

## 2020-06-12 VITALS
SYSTOLIC BLOOD PRESSURE: 133 MMHG | OXYGEN SATURATION: 99 % | DIASTOLIC BLOOD PRESSURE: 80 MMHG | RESPIRATION RATE: 18 BRPM | HEART RATE: 101 BPM | TEMPERATURE: 99 F

## 2020-06-12 DIAGNOSIS — G47.419 PRIMARY NARCOLEPSY WITHOUT CATAPLEXY: ICD-10-CM

## 2020-06-12 DIAGNOSIS — C38.3 PRIMARY MEDIASTINAL SEMINOMA: Primary | ICD-10-CM

## 2020-06-12 DIAGNOSIS — T45.1X5A CHEMOTHERAPY-INDUCED NAUSEA: ICD-10-CM

## 2020-06-12 DIAGNOSIS — R11.0 CHEMOTHERAPY-INDUCED NAUSEA: ICD-10-CM

## 2020-06-12 LAB
ALBUMIN SERPL BCP-MCNC: 4.7 G/DL (ref 3.5–5.2)
ALP SERPL-CCNC: 41 U/L (ref 55–135)
ALT SERPL W/O P-5'-P-CCNC: 26 U/L (ref 10–44)
ANION GAP SERPL CALC-SCNC: 10 MMOL/L (ref 8–16)
AST SERPL-CCNC: 18 U/L (ref 10–40)
BASOPHILS # BLD AUTO: 0.01 K/UL (ref 0–0.2)
BASOPHILS NFR BLD: 0.2 % (ref 0–1.9)
BILIRUB SERPL-MCNC: 1.1 MG/DL (ref 0.1–1)
BUN SERPL-MCNC: 9 MG/DL (ref 6–20)
CALCIUM SERPL-MCNC: 9.7 MG/DL (ref 8.7–10.5)
CHLORIDE SERPL-SCNC: 103 MMOL/L (ref 95–110)
CO2 SERPL-SCNC: 24 MMOL/L (ref 23–29)
CREAT SERPL-MCNC: 0.8 MG/DL (ref 0.5–1.4)
DIFFERENTIAL METHOD: ABNORMAL
EOSINOPHIL # BLD AUTO: 0.1 K/UL (ref 0–0.5)
EOSINOPHIL NFR BLD: 0.8 % (ref 0–8)
ERYTHROCYTE [DISTWIDTH] IN BLOOD BY AUTOMATED COUNT: 12.9 % (ref 11.5–14.5)
EST. GFR  (AFRICAN AMERICAN): >60 ML/MIN/1.73 M^2
EST. GFR  (NON AFRICAN AMERICAN): >60 ML/MIN/1.73 M^2
GLUCOSE SERPL-MCNC: 133 MG/DL (ref 70–110)
HCG INTACT+B SERPL-ACNC: <1.2 MIU/ML
HCT VFR BLD AUTO: 47.2 % (ref 40–54)
HGB BLD-MCNC: 16.6 G/DL (ref 14–18)
IMM GRANULOCYTES # BLD AUTO: 0.02 K/UL (ref 0–0.04)
IMM GRANULOCYTES NFR BLD AUTO: 0.3 % (ref 0–0.5)
LDH SERPL L TO P-CCNC: 166 U/L (ref 110–260)
LYMPHOCYTES # BLD AUTO: 1.6 K/UL (ref 1–4.8)
LYMPHOCYTES NFR BLD: 26.7 % (ref 18–48)
MCH RBC QN AUTO: 30.1 PG (ref 27–31)
MCHC RBC AUTO-ENTMCNC: 35.2 G/DL (ref 32–36)
MCV RBC AUTO: 86 FL (ref 82–98)
MONOCYTES # BLD AUTO: 0.4 K/UL (ref 0.3–1)
MONOCYTES NFR BLD: 7.3 % (ref 4–15)
NEUTROPHILS # BLD AUTO: 3.8 K/UL (ref 1.8–7.7)
NEUTROPHILS NFR BLD: 64.7 % (ref 38–73)
NRBC BLD-RTO: 0 /100 WBC
PLATELET # BLD AUTO: 188 K/UL (ref 150–350)
PMV BLD AUTO: 8.3 FL (ref 9.2–12.9)
POTASSIUM SERPL-SCNC: 4.1 MMOL/L (ref 3.5–5.1)
PROT SERPL-MCNC: 7.4 G/DL (ref 6–8.4)
RBC # BLD AUTO: 5.51 M/UL (ref 4.6–6.2)
SARS-COV-2 IGG SERPLBLD QL IA.RAPID: NEGATIVE
SODIUM SERPL-SCNC: 137 MMOL/L (ref 136–145)
WBC # BLD AUTO: 5.89 K/UL (ref 3.9–12.7)

## 2020-06-12 PROCEDURE — 36591 DRAW BLOOD OFF VENOUS DEVICE: CPT

## 2020-06-12 PROCEDURE — 83615 LACTATE (LD) (LDH) ENZYME: CPT

## 2020-06-12 PROCEDURE — 82105 ALPHA-FETOPROTEIN SERUM: CPT

## 2020-06-12 PROCEDURE — 85025 COMPLETE CBC W/AUTO DIFF WBC: CPT

## 2020-06-12 PROCEDURE — 63600175 PHARM REV CODE 636 W HCPCS: Performed by: INTERNAL MEDICINE

## 2020-06-12 PROCEDURE — 25000003 PHARM REV CODE 250: Performed by: INTERNAL MEDICINE

## 2020-06-12 PROCEDURE — 80053 COMPREHEN METABOLIC PANEL: CPT

## 2020-06-12 PROCEDURE — 84702 CHORIONIC GONADOTROPIN TEST: CPT

## 2020-06-12 PROCEDURE — 86769 SARS-COV-2 COVID-19 ANTIBODY: CPT

## 2020-06-12 PROCEDURE — A4216 STERILE WATER/SALINE, 10 ML: HCPCS | Performed by: INTERNAL MEDICINE

## 2020-06-12 RX ORDER — HEPARIN 100 UNIT/ML
500 SYRINGE INTRAVENOUS
Status: CANCELLED | OUTPATIENT
Start: 2020-06-12

## 2020-06-12 RX ORDER — METHYLPHENIDATE HYDROCHLORIDE 20 MG/1
20 TABLET ORAL 2 TIMES DAILY PRN
Qty: 60 TABLET | Refills: 0 | Status: SHIPPED | OUTPATIENT
Start: 2020-06-12 | End: 2020-07-14 | Stop reason: SDUPTHER

## 2020-06-12 RX ORDER — METHYLPHENIDATE HYDROCHLORIDE EXTENDED RELEASE 20 MG/1
20 TABLET ORAL DAILY
Qty: 30 TABLET | Refills: 0 | Status: SHIPPED | OUTPATIENT
Start: 2020-06-12 | End: 2020-07-17 | Stop reason: SDUPTHER

## 2020-06-12 RX ORDER — SODIUM CHLORIDE 0.9 % (FLUSH) 0.9 %
10 SYRINGE (ML) INJECTION
Status: CANCELLED | OUTPATIENT
Start: 2020-06-12

## 2020-06-12 RX ORDER — SODIUM CHLORIDE 0.9 % (FLUSH) 0.9 %
10 SYRINGE (ML) INJECTION
Status: COMPLETED | OUTPATIENT
Start: 2020-06-12 | End: 2020-06-12

## 2020-06-12 RX ORDER — HEPARIN 100 UNIT/ML
500 SYRINGE INTRAVENOUS
Status: COMPLETED | OUTPATIENT
Start: 2020-06-12 | End: 2020-06-12

## 2020-06-12 RX ADMIN — HEPARIN 500 UNITS: 100 SYRINGE at 03:06

## 2020-06-12 RX ADMIN — SODIUM CHLORIDE, PRESERVATIVE FREE 10 ML: 5 INJECTION INTRAVENOUS at 03:06

## 2020-06-12 NOTE — PLAN OF CARE
Labs drawn from port complete. Pt tolerated well. VSS. NAD. Port to chest de-accessed after heparinized per protocol.  Pt verbalized understanding of discharge instructions before leaving with self.

## 2020-06-13 LAB — AFP SERPL-MCNC: 2.3 NG/ML (ref 0–8.4)

## 2020-06-29 ENCOUNTER — PATIENT MESSAGE (OUTPATIENT)
Dept: HEMATOLOGY/ONCOLOGY | Facility: CLINIC | Age: 25
End: 2020-06-29

## 2020-07-14 ENCOUNTER — PATIENT MESSAGE (OUTPATIENT)
Dept: NEUROLOGY | Facility: CLINIC | Age: 25
End: 2020-07-14

## 2020-07-14 DIAGNOSIS — G47.419 PRIMARY NARCOLEPSY WITHOUT CATAPLEXY: ICD-10-CM

## 2020-07-14 RX ORDER — METHYLPHENIDATE HYDROCHLORIDE 20 MG/1
20 TABLET ORAL 2 TIMES DAILY PRN
Qty: 60 TABLET | Refills: 0 | Status: SHIPPED | OUTPATIENT
Start: 2020-07-14 | End: 2020-08-19 | Stop reason: SDUPTHER

## 2020-07-17 ENCOUNTER — PATIENT MESSAGE (OUTPATIENT)
Dept: NEUROLOGY | Facility: CLINIC | Age: 25
End: 2020-07-17

## 2020-07-17 DIAGNOSIS — G47.419 PRIMARY NARCOLEPSY WITHOUT CATAPLEXY: ICD-10-CM

## 2020-07-17 RX ORDER — METHYLPHENIDATE HYDROCHLORIDE EXTENDED RELEASE 20 MG/1
20 TABLET ORAL DAILY
Qty: 30 TABLET | Refills: 0 | Status: SHIPPED | OUTPATIENT
Start: 2020-07-17 | End: 2020-08-19 | Stop reason: SDUPTHER

## 2020-08-12 ENCOUNTER — HOSPITAL ENCOUNTER (OUTPATIENT)
Dept: RADIOLOGY | Facility: OTHER | Age: 25
Discharge: HOME | End: 2020-08-12
Attending: INTERNAL MEDICINE
Payer: COMMERCIAL

## 2020-08-12 ENCOUNTER — INFUSION (OUTPATIENT)
Dept: INFUSION THERAPY | Facility: OTHER | Age: 25
End: 2020-08-12
Attending: OBSTETRICS & GYNECOLOGY
Payer: COMMERCIAL

## 2020-08-12 VITALS
SYSTOLIC BLOOD PRESSURE: 119 MMHG | TEMPERATURE: 98 F | RESPIRATION RATE: 16 BRPM | HEART RATE: 101 BPM | OXYGEN SATURATION: 97 % | DIASTOLIC BLOOD PRESSURE: 71 MMHG

## 2020-08-12 DIAGNOSIS — C38.3 PRIMARY MEDIASTINAL SEMINOMA: ICD-10-CM

## 2020-08-12 DIAGNOSIS — T45.1X5A CHEMOTHERAPY-INDUCED NAUSEA: ICD-10-CM

## 2020-08-12 DIAGNOSIS — C38.3 PRIMARY MEDIASTINAL SEMINOMA: Primary | ICD-10-CM

## 2020-08-12 DIAGNOSIS — R11.0 CHEMOTHERAPY-INDUCED NAUSEA: ICD-10-CM

## 2020-08-12 LAB
AFP SERPL-MCNC: 1.7 NG/ML (ref 0–8.4)
ALBUMIN SERPL BCP-MCNC: 4.2 G/DL (ref 3.5–5.2)
ALP SERPL-CCNC: 38 U/L (ref 55–135)
ALT SERPL W/O P-5'-P-CCNC: 11 U/L (ref 10–44)
ANION GAP SERPL CALC-SCNC: 10 MMOL/L (ref 8–16)
AST SERPL-CCNC: 11 U/L (ref 10–40)
BASOPHILS # BLD AUTO: 0.02 K/UL (ref 0–0.2)
BASOPHILS NFR BLD: 0.3 % (ref 0–1.9)
BILIRUB SERPL-MCNC: 0.6 MG/DL (ref 0.1–1)
BUN SERPL-MCNC: 14 MG/DL (ref 6–20)
CALCIUM SERPL-MCNC: 8.8 MG/DL (ref 8.7–10.5)
CHLORIDE SERPL-SCNC: 107 MMOL/L (ref 95–110)
CO2 SERPL-SCNC: 23 MMOL/L (ref 23–29)
CREAT SERPL-MCNC: 1 MG/DL (ref 0.5–1.4)
DIFFERENTIAL METHOD: ABNORMAL
EOSINOPHIL # BLD AUTO: 0.1 K/UL (ref 0–0.5)
EOSINOPHIL NFR BLD: 1.5 % (ref 0–8)
ERYTHROCYTE [DISTWIDTH] IN BLOOD BY AUTOMATED COUNT: 12.7 % (ref 11.5–14.5)
EST. GFR  (AFRICAN AMERICAN): >60 ML/MIN/1.73 M^2
EST. GFR  (NON AFRICAN AMERICAN): >60 ML/MIN/1.73 M^2
GLUCOSE SERPL-MCNC: 114 MG/DL (ref 70–110)
HCG INTACT+B SERPL-ACNC: <1.2 MIU/ML
HCT VFR BLD AUTO: 43.9 % (ref 40–54)
HGB BLD-MCNC: 15 G/DL (ref 14–18)
IMM GRANULOCYTES # BLD AUTO: 0.03 K/UL (ref 0–0.04)
IMM GRANULOCYTES NFR BLD AUTO: 0.4 % (ref 0–0.5)
LDH SERPL L TO P-CCNC: 182 U/L (ref 110–260)
LYMPHOCYTES # BLD AUTO: 1.7 K/UL (ref 1–4.8)
LYMPHOCYTES NFR BLD: 25.3 % (ref 18–48)
MCH RBC QN AUTO: 29.2 PG (ref 27–31)
MCHC RBC AUTO-ENTMCNC: 34.2 G/DL (ref 32–36)
MCV RBC AUTO: 86 FL (ref 82–98)
MONOCYTES # BLD AUTO: 0.7 K/UL (ref 0.3–1)
MONOCYTES NFR BLD: 10.8 % (ref 4–15)
NEUTROPHILS # BLD AUTO: 4.2 K/UL (ref 1.8–7.7)
NEUTROPHILS NFR BLD: 61.7 % (ref 38–73)
NRBC BLD-RTO: 0 /100 WBC
PLATELET # BLD AUTO: 208 K/UL (ref 150–350)
PMV BLD AUTO: 8.6 FL (ref 9.2–12.9)
POTASSIUM SERPL-SCNC: 4.3 MMOL/L (ref 3.5–5.1)
PROT SERPL-MCNC: 6.6 G/DL (ref 6–8.4)
RBC # BLD AUTO: 5.13 M/UL (ref 4.6–6.2)
SODIUM SERPL-SCNC: 140 MMOL/L (ref 136–145)
WBC # BLD AUTO: 6.77 K/UL (ref 3.9–12.7)

## 2020-08-12 PROCEDURE — 84702 CHORIONIC GONADOTROPIN TEST: CPT

## 2020-08-12 PROCEDURE — 74177 CT ABD & PELVIS W/CONTRAST: CPT | Mod: TC

## 2020-08-12 PROCEDURE — A9698 NON-RAD CONTRAST MATERIALNOC: HCPCS | Performed by: INTERNAL MEDICINE

## 2020-08-12 PROCEDURE — 25000003 PHARM REV CODE 250: Performed by: INTERNAL MEDICINE

## 2020-08-12 PROCEDURE — 71260 CT THORAX DX C+: CPT | Mod: 26,,, | Performed by: RADIOLOGY

## 2020-08-12 PROCEDURE — 25500020 PHARM REV CODE 255: Performed by: INTERNAL MEDICINE

## 2020-08-12 PROCEDURE — 63600175 PHARM REV CODE 636 W HCPCS: Performed by: INTERNAL MEDICINE

## 2020-08-12 PROCEDURE — 83615 LACTATE (LD) (LDH) ENZYME: CPT

## 2020-08-12 PROCEDURE — 71260 CT CHEST ABDOMEN PELVIS WITH CONTRAST (XPD): ICD-10-PCS | Mod: 26,,, | Performed by: RADIOLOGY

## 2020-08-12 PROCEDURE — 82105 ALPHA-FETOPROTEIN SERUM: CPT

## 2020-08-12 PROCEDURE — 80053 COMPREHEN METABOLIC PANEL: CPT

## 2020-08-12 PROCEDURE — 36415 COLL VENOUS BLD VENIPUNCTURE: CPT

## 2020-08-12 PROCEDURE — 36591 DRAW BLOOD OFF VENOUS DEVICE: CPT

## 2020-08-12 PROCEDURE — 74177 CT CHEST ABDOMEN PELVIS WITH CONTRAST (XPD): ICD-10-PCS | Mod: 26,,, | Performed by: RADIOLOGY

## 2020-08-12 PROCEDURE — 74177 CT ABD & PELVIS W/CONTRAST: CPT | Mod: 26,,, | Performed by: RADIOLOGY

## 2020-08-12 PROCEDURE — A4216 STERILE WATER/SALINE, 10 ML: HCPCS | Performed by: INTERNAL MEDICINE

## 2020-08-12 PROCEDURE — 85025 COMPLETE CBC W/AUTO DIFF WBC: CPT

## 2020-08-12 RX ORDER — HEPARIN 100 UNIT/ML
500 SYRINGE INTRAVENOUS
Status: CANCELLED | OUTPATIENT
Start: 2020-08-12

## 2020-08-12 RX ORDER — SODIUM CHLORIDE 0.9 % (FLUSH) 0.9 %
10 SYRINGE (ML) INJECTION
Status: CANCELLED | OUTPATIENT
Start: 2020-08-12

## 2020-08-12 RX ORDER — SODIUM CHLORIDE 0.9 % (FLUSH) 0.9 %
10 SYRINGE (ML) INJECTION
Status: COMPLETED | OUTPATIENT
Start: 2020-08-12 | End: 2020-08-12

## 2020-08-12 RX ORDER — HEPARIN 100 UNIT/ML
500 SYRINGE INTRAVENOUS
Status: COMPLETED | OUTPATIENT
Start: 2020-08-12 | End: 2020-08-12

## 2020-08-12 RX ADMIN — Medication 10 ML: at 12:08

## 2020-08-12 RX ADMIN — IOHEXOL 75 ML: 350 INJECTION, SOLUTION INTRAVENOUS at 11:08

## 2020-08-12 RX ADMIN — IOHEXOL 1000 ML: 9 SOLUTION ORAL at 11:08

## 2020-08-12 RX ADMIN — HEPARIN 500 UNITS: 100 SYRINGE at 12:08

## 2020-08-12 NOTE — PLAN OF CARE
Labs drawn + port flush complete. Pt tolerated well. VSS. NAD. Port to right chest de-accessed after heparinized per protocol.

## 2020-08-14 ENCOUNTER — OFFICE VISIT (OUTPATIENT)
Dept: HEMATOLOGY/ONCOLOGY | Facility: CLINIC | Age: 25
End: 2020-08-14
Payer: COMMERCIAL

## 2020-08-14 DIAGNOSIS — C80.1 GERM CELL TUMOR: ICD-10-CM

## 2020-08-14 DIAGNOSIS — C38.3 PRIMARY MEDIASTINAL SEMINOMA: Primary | ICD-10-CM

## 2020-08-14 PROCEDURE — 99214 PR OFFICE/OUTPT VISIT, EST, LEVL IV, 30-39 MIN: ICD-10-PCS | Mod: 95,,, | Performed by: INTERNAL MEDICINE

## 2020-08-14 PROCEDURE — 99214 OFFICE O/P EST MOD 30 MIN: CPT | Mod: 95,,, | Performed by: INTERNAL MEDICINE

## 2020-08-14 NOTE — Clinical Note
Flush port in 3 months. Draw CBC, CMP, AFP, LDH, HCG from port on 2/10, then get CT C/A/P with port accessed. See me on 2/12, virtual visit

## 2020-08-14 NOTE — PROGRESS NOTES
"The patient location is: home  The chief complaint leading to consultation is: follow up for history of seminoma    Visit type: audiovisual    Face to Face time with patient: 30 min  40 minutes of total time spent on the encounter, which includes face to face time and non-face to face time preparing to see the patient (eg, review of tests), Obtaining and/or reviewing separately obtained history, Documenting clinical information in the electronic or other health record, Independently interpreting results (not separately reported) and communicating results to the patient/family/caregiver, or Care coordination (not separately reported).         Each patient to whom he or she provides medical services by telemedicine is:  (1) informed of the relationship between the physician and patient and the respective role of any other health care provider with respect to management of the patient; and (2) notified that he or she may decline to receive medical services by telemedicine and may withdraw from such care at any time.    Notes:     Subjective:      Patient ID: Alejandro Neff is a 25 y.o. male.     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 Mr. Hampton is a 22 yo man who first presented with chest pain and underwent CT chest on 2/14/18, which showed a 6 x 3.5 cm mediastinal mass. It abuts the aorta and pulmonary artery. Biopsy was done on 3/1/18. Pathology (reviewed at [facility]) showed germ cell tumor, most consistent with seminoma.   2. HCG, LDH, AFP on 3/22/18 all normal.   3. Testicular US 3/26/18 showed no testicular masses. CT C/A/P on 3/26/18 showed "stable appearance of anterior mediastinal mass consistent with provided history of seminoma. Several punctate sclerotic foci are noted in the pelvis at the right pubic bone, right ischial tuberosity, and left ilium adjacent to the sacroiliac " "joint.  These are strongly favored to reflect benign bone islands although metastatic disease could have a similar appearance and close follow-up is recommended. Several benign Schmorl's nodes are noted in the thoracic spine." Bone scan on 3/28/18 was negative for bone mets.   4. Audiogram on 18 normal. Followed by ENT.   5. Given his smoking history, I recommended 4 cycles of EP. EP cycle 1 day 1 on 18. Complicated by hospitalization for neutropenic fever -. No source of infections identified. Treated with antibiotics empirically and improved.   6. Cycle 2 of EP started on on 18. Hospitalized during the first weekend for intractable nausea.   7. Restaging CT scan on 18 showed "Interval development of dilated appearance of the appendix with stranding of the periappendiceal fat.  Findings are worrisome for appendicitis". Patient had abdominal pain and tenderness. Admitted and got appendectomy on 18.  8. Cycle 3 of EP 18-6/15/18. Complicated by hospitalization for neutropenic fever -  9. Cycle 4 of EP 18-18. Complicated by hospitalization for N/V -. Again hospitalized - for intractable N/V which patient feels is from opioid withdrawal      INTERVAL HISTORY:   Mr. Hampton returns today for follow up of primary mediastinal seminoma. doing well. Energy level much better since he started ritalin. Last year of school at [employer]. Will do [employer] for one more year in . After that he will go back to [facility].     ECO     ROS:   A ten point system review is obtained and neg except for what was stated in the interval history     Physical Examination:   Vital signs reviewed.   Gen: well hydrated, well developed, in no acute distress.   HEENT: normocephalic, anicteric sclerae, PERRLA, EOMI, oropharynx clear  Neck: supple, no JVD, thyromegaly, cervical or supraclavicular LAD  Lungs: CTAB, no wheezes or rales. Port site on chest clean.   Heart: RRR, no " M/R/G  Abdomen: soft, non-distended, nontender, no hepatosplenomegaly, mass, or hernia. BS present  Ext: no clubbing, cyanosis, or edema  Neuro: alert and oriented x 4, no focal neuro deficit  Skin: no rash, erythema, open wound or ulcers  Psych: pleasant and appropriate mood and affect        Objective:      Laboratory Data:  Labs reviewed. AFP, LDH, HCG normal. CBC, CMP unremarkable     Imaging Data:  CT C/A/P 8/12/2020:     Impression:     Stable exam without evidence of new disease.  Mediastinal soft tissue favored to represent residual thymus in the anterior mediastinum is again noted and is unchanged.     Other stable incidental findings as detailed above.        Assessment and Plan:      1. Primary mediastinal seminoma    2. Germ cell tumor        1.2  - Mr Memo Neff is a 26 yo man with primary mediastinal seminoma, good risk disease, s/p 4 cycles of  finished on 7/11/18  - discussed test results and CT scan results. RANDY. Tumor markers normal  - doing well. continue with surveillance  - flush port in 3 months  - RTC in 6 months with labs and surveillance CT scan. Will do CT every 6 months in year 3. Then yearly  - discussed with patient. He wants to keep the port. Discussed with patient that we will remove port before he goes back to [place]

## 2020-08-19 DIAGNOSIS — G47.419 PRIMARY NARCOLEPSY WITHOUT CATAPLEXY: ICD-10-CM

## 2020-08-21 RX ORDER — METHYLPHENIDATE HYDROCHLORIDE 20 MG/1
20 TABLET ORAL 2 TIMES DAILY PRN
Qty: 60 TABLET | Refills: 0 | Status: SHIPPED | OUTPATIENT
Start: 2020-08-21 | End: 2020-09-17 | Stop reason: SDUPTHER

## 2020-08-21 RX ORDER — METHYLPHENIDATE HYDROCHLORIDE EXTENDED RELEASE 20 MG/1
20 TABLET ORAL DAILY
Qty: 30 TABLET | Refills: 0 | Status: SHIPPED | OUTPATIENT
Start: 2020-08-21 | End: 2020-09-17 | Stop reason: SDUPTHER

## 2020-09-17 ENCOUNTER — PATIENT MESSAGE (OUTPATIENT)
Dept: SLEEP MEDICINE | Facility: CLINIC | Age: 25
End: 2020-09-17

## 2020-09-17 DIAGNOSIS — G47.419 PRIMARY NARCOLEPSY WITHOUT CATAPLEXY: ICD-10-CM

## 2020-09-17 RX ORDER — METHYLPHENIDATE HYDROCHLORIDE 20 MG/1
20 TABLET ORAL 2 TIMES DAILY PRN
Qty: 60 TABLET | Refills: 0 | Status: SHIPPED | OUTPATIENT
Start: 2020-09-17 | End: 2020-10-16 | Stop reason: SDUPTHER

## 2020-09-17 RX ORDER — METHYLPHENIDATE HYDROCHLORIDE EXTENDED RELEASE 20 MG/1
20 TABLET ORAL DAILY
Qty: 30 TABLET | Refills: 0 | Status: SHIPPED | OUTPATIENT
Start: 2020-09-17 | End: 2020-10-16 | Stop reason: SDUPTHER

## 2020-10-16 DIAGNOSIS — G47.419 PRIMARY NARCOLEPSY WITHOUT CATAPLEXY: ICD-10-CM

## 2020-10-16 RX ORDER — METHYLPHENIDATE HYDROCHLORIDE EXTENDED RELEASE 20 MG/1
20 TABLET ORAL DAILY
Qty: 30 TABLET | Refills: 0 | Status: SHIPPED | OUTPATIENT
Start: 2020-10-16 | End: 2020-11-11 | Stop reason: SDUPTHER

## 2020-10-16 RX ORDER — METHYLPHENIDATE HYDROCHLORIDE 20 MG/1
20 TABLET ORAL 2 TIMES DAILY PRN
Qty: 60 TABLET | Refills: 0 | Status: SHIPPED | OUTPATIENT
Start: 2020-10-16 | End: 2020-11-11 | Stop reason: SDUPTHER

## 2020-11-10 ENCOUNTER — OFFICE VISIT (OUTPATIENT)
Dept: SLEEP MEDICINE | Facility: CLINIC | Age: 25
End: 2020-11-10
Payer: COMMERCIAL

## 2020-11-10 DIAGNOSIS — G47.419 NARCOLEPSY WITHOUT CATAPLEXY: Primary | ICD-10-CM

## 2020-11-10 PROCEDURE — 99214 PR OFFICE/OUTPT VISIT, EST, LEVL IV, 30-39 MIN: ICD-10-PCS | Mod: 95,,, | Performed by: NURSE PRACTITIONER

## 2020-11-10 PROCEDURE — 99214 OFFICE O/P EST MOD 30 MIN: CPT | Mod: 95,,, | Performed by: NURSE PRACTITIONER

## 2020-11-10 NOTE — PROGRESS NOTES
The patient location is: home  The chief complaint leading to consultation is: narcolepsy    Visit type: TELE AUDIOVISUAL:71954    Face to Face time with patient: 25 minutes of total time spent on the encounter, which includes face to face time and non-face to face time preparing to see the patient (eg, review of tests), Obtaining and/or reviewing separately obtained history, Documenting clinical information in the electronic or other health record, Independently interpreting results (not separately reported) and communicating results to the patient/family/caregiver, or Care coordination (not separately reported). Each patient to whom he or she provides medical services by telemedicine is:  (1) informed of the relationship between the physician and patient and the respective role of any other health care provider with respect to management of the patient; and (2) notified that he or she may decline to receive medical services by telemedicine and may withdraw from such care at any time.    Notes:Alejandro Neff returns today for mgt of MAHENDRA and narcolepsy    Since last seen ritalin remains effective but hard to stay awake ~ 5-6pm/more mentally exhausted but he can stay awake. Overall much better though than before, not fallen asleep I na single class or while talking on phone. Driving to [place] for holiday. He read xyrem blogs and is hesitant to trial given risk of addiction    HISTORY  MD Jarad  This 25 y.o. male patient presents for the evaluation of possible obstructive sleep apnea.    Referring Provider: No ref. provider found     Pt is referred for evaluation of MAHENDRA by Suha.  Pt has been fatigued and tired for the past 8 years . +snoring , - witnessed apneas/ waking up gasping for air , wakes 2  times unknown reason takes a min's to go  back to sleep, + tired on waking up all the  time,  1   cups of coffee a day, +urge to move legs /  leg kicking. + on and off nasal congestion. - cataplexy,-   hallucination, + sleep paralysis for the past 1 year  once a week. - acting out dreams. - vivid dream.  Pt prefer to sleep in every possible position.        Pt did have hx of  head injury but no  hospitalization due to it . - Sleep walking/+talking. Memory is ok has some impact on long and short term memory after lyme. Pt feels content.  He has fallen off the bed multiple times last time was a month ago. Night mare rarely none.Pt does not have  morning headache .  Pt does feel  Drowsy while driving. Pt is mouth/nose breather. Pt does not wake up to eat at night. Pt does sweat at night some time . Pt does not grind teeth.     EPWORTH SLEEPINESS SCALE 6/20/2019   Sitting and reading 3   Watching TV 3   Sitting, inactive in a public place (e.g. a theatre or a meeting) 3   As a passenger in a car for an hour without a break 3   Lying down to rest in the afternoon when circumstances permit 3   Sitting and talking to someone 0   Sitting quietly after a lunch without alcohol 2   In a car, while stopped for a few minutes in traffic 0   Total score 17     Sleep Clinic New Patient 6/20/2019   What time do you go to bed on a week day? (Give a range) 2am   What time do you go to bed on a day off? (Give a range) 2am   How long does it take you to fall asleep? (Give a range) 30 min or more   On average, how many times per night do you wake up? 2   How long does it take you to fall back into sleep? (Give a range) 5 mins   What time do you wake up to start your day on a week day? (Give a range) 1:30pm   What time do you wake up to start your day on a day off? (Give a range) 1:30pm   What time do you get out of bed? (Give a range) 2:30pm   On average, how many hours do you sleep? 7/8   On average, how many naps do you take per day? 0   Rate your sleep quality from 0 to 5 (0-poor, 5-great). 2   Have you experienced:  N/a   How much weight have you lost or gained (in lbs.) in the last year? 0   On average, how many times per night do  you go to the bathroom?  0   Have you ever had a sleep study/CPAP machine/surgery for sleep apnea? No   Have you ever had a CPAP machine for sleep apnea? No   Have you ever had surgery for sleep apnea? No     8/16/2019 VB  Pt is following in regards to the MAHENDRA underwent HST showing AHI-1 and RDI- 5. He feels drowsy most of the day . He denies sleep paralysis currently, he denies dream enactment, denies any hallucination, He denies cataplexy. He denies nocturia. He is not napping much.  He denies drowsy driving. ESs=19      [id number] VB:  He has undergone PSG followed by MSLT reviewedwtih him today. Denies sleep paralysis. Sleep remains very fragmented and he is very sleepy when sedentary. Denies cataplexy. ESS=19     2/5/20 VB  Since last seen setup with apap but didn't work well. Mask kept falling off so he fixed this but then felt air too low/hard to get air.stopped use beginning of the year and ready to return. Didn't help EDS. [place] zoned out with eyes open while taking provigil and Sunosi 150mg ineffective. Remains very sleepy. 13 #gain. Had flu also interim. Denies cataplexy. Sleep remains fragmented.     5/13/20:   Since last seen he is doing well with ER ritalin taken 1h before wants to get out of bed along with light snack (to help work better) and short acting ritalin 20mg. No longer dozing off when he doesn't want to. Working well w/o headache, nausea, insomnia, palpitations. Mild loose stool controlled. He feels less tired. Denies cataplexy. ESS=-6    HST- 07/24/2019 AHI -1,RDI:-5   PSG AHI 7 and MSLT avg SL 7min with 1/5 SOREM    TRIED( provigil, sunosi)    ROS: In addition to above, denies interval medical change. Otherwise a balance review of 10-systems is negative.         SLEEP ROUTINE:  Bed partner: alone , [place] [employer])    PHYSICAL EXAM:  There were no vitals taken for this visit.  GENERAL: Well groomed; Normally developed; WN      ASSESSMENT:  MAHENDRA mild, cpap  intolerable  Narcolepsy w/o cataplexy, stable on ritalin but early evening tiredness/sleepiness    PLAN:  Increase ritalin XR 20mg to bid and continue 10-20mg short acting tab   RTC 6-mos, if ineffective trial adding additional XR sundar lplan switch to adderall  Discussed low risk addiction with Xyrem

## 2020-11-11 ENCOUNTER — PATIENT MESSAGE (OUTPATIENT)
Dept: SLEEP MEDICINE | Facility: CLINIC | Age: 25
End: 2020-11-11

## 2020-11-11 DIAGNOSIS — G47.419 PRIMARY NARCOLEPSY WITHOUT CATAPLEXY: ICD-10-CM

## 2020-11-11 RX ORDER — METHYLPHENIDATE HYDROCHLORIDE 20 MG/1
20 TABLET ORAL 2 TIMES DAILY PRN
Qty: 60 TABLET | Refills: 0 | Status: SHIPPED | OUTPATIENT
Start: 2020-11-11 | End: 2020-12-15 | Stop reason: SDUPTHER

## 2020-11-11 RX ORDER — METHYLPHENIDATE HYDROCHLORIDE EXTENDED RELEASE 20 MG/1
20 TABLET ORAL DAILY
Qty: 30 TABLET | Refills: 0 | Status: SHIPPED | OUTPATIENT
Start: 2020-11-13 | End: 2020-11-21 | Stop reason: SDUPTHER

## 2020-11-13 ENCOUNTER — INFUSION (OUTPATIENT)
Dept: INFUSION THERAPY | Facility: OTHER | Age: 25
End: 2020-11-13
Attending: INTERNAL MEDICINE
Payer: COMMERCIAL

## 2020-11-13 VITALS
HEART RATE: 98 BPM | TEMPERATURE: 99 F | SYSTOLIC BLOOD PRESSURE: 131 MMHG | DIASTOLIC BLOOD PRESSURE: 90 MMHG | RESPIRATION RATE: 16 BRPM | OXYGEN SATURATION: 100 %

## 2020-11-13 DIAGNOSIS — R11.0 CHEMOTHERAPY-INDUCED NAUSEA: Primary | ICD-10-CM

## 2020-11-13 DIAGNOSIS — T45.1X5A CHEMOTHERAPY-INDUCED NAUSEA: Primary | ICD-10-CM

## 2020-11-13 PROCEDURE — 63600175 PHARM REV CODE 636 W HCPCS: Performed by: INTERNAL MEDICINE

## 2020-11-13 PROCEDURE — A4216 STERILE WATER/SALINE, 10 ML: HCPCS | Performed by: INTERNAL MEDICINE

## 2020-11-13 PROCEDURE — 96523 IRRIG DRUG DELIVERY DEVICE: CPT

## 2020-11-13 PROCEDURE — 25000003 PHARM REV CODE 250: Performed by: INTERNAL MEDICINE

## 2020-11-13 RX ORDER — HEPARIN 100 UNIT/ML
500 SYRINGE INTRAVENOUS
Status: COMPLETED | OUTPATIENT
Start: 2020-11-13 | End: 2020-11-13

## 2020-11-13 RX ORDER — HEPARIN 100 UNIT/ML
500 SYRINGE INTRAVENOUS
Status: CANCELLED | OUTPATIENT
Start: 2020-11-13

## 2020-11-13 RX ORDER — SODIUM CHLORIDE 0.9 % (FLUSH) 0.9 %
10 SYRINGE (ML) INJECTION
Status: CANCELLED | OUTPATIENT
Start: 2020-11-13

## 2020-11-13 RX ORDER — SODIUM CHLORIDE 0.9 % (FLUSH) 0.9 %
10 SYRINGE (ML) INJECTION
Status: COMPLETED | OUTPATIENT
Start: 2020-11-13 | End: 2020-11-13

## 2020-11-13 RX ADMIN — HEPARIN 500 UNITS: 100 SYRINGE at 02:11

## 2020-11-13 RX ADMIN — SODIUM CHLORIDE, PRESERVATIVE FREE 10 ML: 5 INJECTION INTRAVENOUS at 02:11

## 2020-11-13 NOTE — PLAN OF CARE
Port flush complete with good blood return. Pt tolerated well. VSS. NAD. Port to chest de-accessed after heparinized per protocol.Pt verbalized understanding of discharge instructions.

## 2020-11-16 ENCOUNTER — PATIENT MESSAGE (OUTPATIENT)
Dept: SLEEP MEDICINE | Facility: CLINIC | Age: 25
End: 2020-11-16

## 2020-11-20 ENCOUNTER — PATIENT MESSAGE (OUTPATIENT)
Dept: SLEEP MEDICINE | Facility: CLINIC | Age: 25
End: 2020-11-20

## 2020-11-21 ENCOUNTER — PATIENT MESSAGE (OUTPATIENT)
Dept: SLEEP MEDICINE | Facility: CLINIC | Age: 25
End: 2020-11-21

## 2020-11-21 DIAGNOSIS — G47.419 PRIMARY NARCOLEPSY WITHOUT CATAPLEXY: ICD-10-CM

## 2020-11-21 RX ORDER — METHYLPHENIDATE HYDROCHLORIDE EXTENDED RELEASE 20 MG/1
20 TABLET ORAL 2 TIMES DAILY
Qty: 60 TABLET | Refills: 0 | Status: SHIPPED | OUTPATIENT
Start: 2020-11-21 | End: 2020-12-15 | Stop reason: SDUPTHER

## 2020-12-10 ENCOUNTER — TELEPHONE (OUTPATIENT)
Dept: INTERNAL MEDICINE | Facility: CLINIC | Age: 25
End: 2020-12-10

## 2020-12-11 ENCOUNTER — PATIENT MESSAGE (OUTPATIENT)
Dept: SLEEP MEDICINE | Facility: CLINIC | Age: 25
End: 2020-12-11

## 2020-12-15 ENCOUNTER — TELEPHONE (OUTPATIENT)
Dept: SLEEP MEDICINE | Facility: CLINIC | Age: 25
End: 2020-12-15

## 2020-12-15 DIAGNOSIS — G47.419 PRIMARY NARCOLEPSY WITHOUT CATAPLEXY: ICD-10-CM

## 2020-12-15 RX ORDER — METHYLPHENIDATE HYDROCHLORIDE 20 MG/1
20 TABLET ORAL DAILY
Qty: 30 TABLET | Refills: 0 | Status: SHIPPED | OUTPATIENT
Start: 2020-12-15 | End: 2021-01-12 | Stop reason: SDUPTHER

## 2020-12-15 RX ORDER — METHYLPHENIDATE HYDROCHLORIDE EXTENDED RELEASE 20 MG/1
20 TABLET ORAL 2 TIMES DAILY
Qty: 60 TABLET | Refills: 0 | Status: SHIPPED | OUTPATIENT
Start: 2020-12-15 | End: 2021-01-12 | Stop reason: SDUPTHER

## 2020-12-15 NOTE — TELEPHONE ENCOUNTER
----- Message from Samantha Ziegler sent at 12/15/2020  1:41 PM CST -----  Regarding: Refill request  Type:  RX Refill Request    Who Called: MATTHEW VARGHESE [[id number]]    Refill or New Rx: refill     RX Name and Strength: methylphenidate HCl (METADATE ER) 20 MG TbSR  methylphenidate HCl (RITALIN) 20 MG tablet    How is the patient currently taking it? (ex. 1XDay) 1xday     Is this a 30 day or 90 day RX: 30 day     Preferred Pharmacy with phone number: [facility]    Local or Mail Order: mail order     Ordering Provider: Dr. Adam Brush Call Back Number: [phone]    Additional Information:

## 2020-12-22 ENCOUNTER — OFFICE VISIT (OUTPATIENT)
Dept: INTERNAL MEDICINE | Facility: CLINIC | Age: 25
End: 2020-12-22
Attending: INTERNAL MEDICINE
Payer: COMMERCIAL

## 2020-12-22 VITALS
BODY MASS INDEX: 25.11 KG/M2 | OXYGEN SATURATION: 99 % | HEART RATE: 108 BPM | HEIGHT: 64 IN | DIASTOLIC BLOOD PRESSURE: 82 MMHG | SYSTOLIC BLOOD PRESSURE: 122 MMHG | WEIGHT: 147.06 LBS

## 2020-12-22 DIAGNOSIS — Z00.00 ANNUAL PHYSICAL EXAM: Primary | ICD-10-CM

## 2020-12-22 DIAGNOSIS — M62.838 MUSCLE SPASM: ICD-10-CM

## 2020-12-22 PROCEDURE — 99395 PREV VISIT EST AGE 18-39: CPT | Mod: S$GLB,,, | Performed by: INTERNAL MEDICINE

## 2020-12-22 PROCEDURE — 1125F AMNT PAIN NOTED PAIN PRSNT: CPT | Mod: S$GLB,,, | Performed by: INTERNAL MEDICINE

## 2020-12-22 PROCEDURE — 99395 PR PREVENTIVE VISIT,EST,18-39: ICD-10-PCS | Mod: S$GLB,,, | Performed by: INTERNAL MEDICINE

## 2020-12-22 PROCEDURE — 3008F BODY MASS INDEX DOCD: CPT | Mod: CPTII,S$GLB,, | Performed by: INTERNAL MEDICINE

## 2020-12-22 PROCEDURE — 3008F PR BODY MASS INDEX (BMI) DOCUMENTED: ICD-10-PCS | Mod: CPTII,S$GLB,, | Performed by: INTERNAL MEDICINE

## 2020-12-22 PROCEDURE — 99999 PR PBB SHADOW E&M-EST. PATIENT-LVL III: CPT | Mod: PBBFAC,,, | Performed by: INTERNAL MEDICINE

## 2020-12-22 PROCEDURE — 1125F PR PAIN SEVERITY QUANTIFIED, PAIN PRESENT: ICD-10-PCS | Mod: S$GLB,,, | Performed by: INTERNAL MEDICINE

## 2020-12-22 PROCEDURE — 99999 PR PBB SHADOW E&M-EST. PATIENT-LVL III: ICD-10-PCS | Mod: PBBFAC,,, | Performed by: INTERNAL MEDICINE

## 2020-12-22 RX ORDER — BACLOFEN 10 MG/1
10 TABLET ORAL EVERY 12 HOURS PRN
Qty: 30 TABLET | Refills: 1 | Status: SHIPPED | OUTPATIENT
Start: 2020-12-22 | End: 2021-03-18 | Stop reason: SDUPTHER

## 2020-12-22 NOTE — PROGRESS NOTES
"Subjective:   Patient ID: Alejandro Neff is a 25 y.o. male  Chief complaint:   Chief Complaint   Patient presents with    Annual Exam    Flu Vaccine       HPI  Here for annual exam and flu vaccine    primary mediastinal seminoma:  - Completed 4 cycles of  on 7/11/18  Followed by H/O: Dr. Rojas   Will do CT every 6 months in year 3 and then then yearly    Narcolepsy without cataplexy:   cpap intolerant   - taking ritalin and followed by sleep     He is graduating in 2021 from [employer]  - planning on [place] and then live in [facility] [place]    Using baclofen for hiccups prn and using as needed - refill requested     Given albuterol when on chemo - no longer needed except rarely     HM: due for   Flu vaccine   flp   Last hpv vaccine     Review of Systems    Objective:  Vitals:    12/22/20 1531   BP: 122/82   BP Location: Left arm   Patient Position: Sitting   Pulse: 108   SpO2: 99%   Weight: 66.7 kg (147 lb 0.8 oz)   Height: 5' 4" (1.626 m)     Body mass index is 25.24 kg/m².    Physical Exam  Vitals signs reviewed.   Constitutional:       Appearance: Normal appearance. He is well-developed.   HENT:      Head: Normocephalic and atraumatic.      Right Ear: Tympanic membrane, ear canal and external ear normal.      Left Ear: Tympanic membrane, ear canal and external ear normal.      Nose:      Comments: Wearing mask   Eyes:      Conjunctiva/sclera: Conjunctivae normal.   Neck:      Musculoskeletal: Neck supple.      Thyroid: No thyromegaly.   Cardiovascular:      Rate and Rhythm: Normal rate and regular rhythm.      Pulses: Normal pulses.      Heart sounds: Normal heart sounds.   Pulmonary:      Effort: Pulmonary effort is normal.      Breath sounds: Normal breath sounds.   Abdominal:      General: Bowel sounds are normal.      Palpations: Abdomen is soft.   Musculoskeletal:         General: No swelling or tenderness.   Lymphadenopathy:      Cervical: No cervical adenopathy.   Skin:     General: Skin is warm and " dry.      Capillary Refill: Capillary refill takes less than 2 seconds.   Neurological:      General: No focal deficit present.      Mental Status: He is alert and oriented to person, place, and time.   Psychiatric:         Mood and Affect: Mood normal.         Behavior: Behavior normal.         Thought Content: Thought content normal.         Judgment: Judgment normal.         Assessment:  1. Annual physical exam    2. Muscle spasm        Plan:  Alejandro was seen today for annual exam and flu vaccine.    Diagnoses and all orders for this visit:    Annual physical exam  -     Influenza - Quadrivalent *Preferred* (6 months+) (PF)  -     Lipid Panel; Future  -     Basic Metabolic Panel; Future  -     Hemoglobin A1C; Future    Muscle spasm  -     baclofen (LIORESAL) 10 MG tablet; Take 1 tablet (10 mg total) by mouth every 12 (twelve) hours as needed (muscle spasm).    Recommend daily sunscreen, cardiovascular exercise min 30 min 5 days per week. Seatbelts routinely.  Flu vaccine   Labs in Feb fasting when comes in for visit for port flush     Refill given for baclofen for prn use   HPV vaccine x 1 in Feb when comes for port flush     Health Maintenance   Topic Date Due    Lipid Panel  1997    HPV Vaccines (3 - Male 3-dose series) 04/19/2017    TETANUS VACCINE  05/04/2022    Pneumococcal Vaccine (Highest Risk) (3 of 3 - PPSV23) 10/23/2024    Hepatitis C Screening  Completed

## 2020-12-23 ENCOUNTER — IMMUNIZATION (OUTPATIENT)
Dept: PHARMACY | Facility: CLINIC | Age: 25
End: 2020-12-23
Payer: COMMERCIAL

## 2021-01-12 DIAGNOSIS — G47.419 PRIMARY NARCOLEPSY WITHOUT CATAPLEXY: ICD-10-CM

## 2021-01-12 RX ORDER — METHYLPHENIDATE HYDROCHLORIDE EXTENDED RELEASE 20 MG/1
20 TABLET ORAL 2 TIMES DAILY
Qty: 60 TABLET | Refills: 0 | Status: SHIPPED | OUTPATIENT
Start: 2021-01-12 | End: 2021-02-17 | Stop reason: SDUPTHER

## 2021-01-12 RX ORDER — METHYLPHENIDATE HYDROCHLORIDE 20 MG/1
20 TABLET ORAL DAILY
Qty: 30 TABLET | Refills: 0 | Status: SHIPPED | OUTPATIENT
Start: 2021-01-12 | End: 2021-02-17 | Stop reason: SDUPTHER

## 2021-02-03 ENCOUNTER — PATIENT MESSAGE (OUTPATIENT)
Dept: HEMATOLOGY/ONCOLOGY | Facility: CLINIC | Age: 26
End: 2021-02-03

## 2021-02-09 NOTE — TELEPHONE ENCOUNTER
Pt is requesting refills.   [Joint Pain] : joint pain [Negative] : Heme/Lymph [Fever] : no fever [Chills] : no chills [Cough] : no cough [SOB on Exertion] : no shortness of breath on exertion

## 2021-02-10 ENCOUNTER — INFUSION (OUTPATIENT)
Dept: INFUSION THERAPY | Facility: OTHER | Age: 26
End: 2021-02-10
Attending: INTERNAL MEDICINE
Payer: COMMERCIAL

## 2021-02-10 ENCOUNTER — HOSPITAL ENCOUNTER (OUTPATIENT)
Dept: RADIOLOGY | Facility: OTHER | Age: 26
Discharge: HOME | End: 2021-02-10
Attending: INTERNAL MEDICINE
Payer: COMMERCIAL

## 2021-02-10 DIAGNOSIS — C38.3 PRIMARY MEDIASTINAL SEMINOMA: ICD-10-CM

## 2021-02-10 DIAGNOSIS — T45.1X5A CHEMOTHERAPY-INDUCED NAUSEA: ICD-10-CM

## 2021-02-10 DIAGNOSIS — C38.3 PRIMARY MEDIASTINAL SEMINOMA: Primary | ICD-10-CM

## 2021-02-10 DIAGNOSIS — R11.0 CHEMOTHERAPY-INDUCED NAUSEA: ICD-10-CM

## 2021-02-10 LAB
AFP SERPL-MCNC: 1.7 NG/ML (ref 0–8.4)
ALBUMIN SERPL BCP-MCNC: 4.4 G/DL (ref 3.5–5.2)
ALP SERPL-CCNC: 38 U/L (ref 55–135)
ALT SERPL W/O P-5'-P-CCNC: 21 U/L (ref 10–44)
ANION GAP SERPL CALC-SCNC: 9 MMOL/L (ref 8–16)
AST SERPL-CCNC: 14 U/L (ref 10–40)
BASOPHILS # BLD AUTO: 0.01 K/UL (ref 0–0.2)
BASOPHILS NFR BLD: 0.2 % (ref 0–1.9)
BILIRUB SERPL-MCNC: 0.9 MG/DL (ref 0.1–1)
BUN SERPL-MCNC: 11 MG/DL (ref 6–20)
CALCIUM SERPL-MCNC: 9.5 MG/DL (ref 8.7–10.5)
CHLORIDE SERPL-SCNC: 102 MMOL/L (ref 95–110)
CO2 SERPL-SCNC: 26 MMOL/L (ref 23–29)
CREAT SERPL-MCNC: 0.8 MG/DL (ref 0.5–1.4)
DIFFERENTIAL METHOD: ABNORMAL
EOSINOPHIL # BLD AUTO: 0.1 K/UL (ref 0–0.5)
EOSINOPHIL NFR BLD: 1.2 % (ref 0–8)
ERYTHROCYTE [DISTWIDTH] IN BLOOD BY AUTOMATED COUNT: 13.2 % (ref 11.5–14.5)
EST. GFR  (AFRICAN AMERICAN): >60 ML/MIN/1.73 M^2
EST. GFR  (NON AFRICAN AMERICAN): >60 ML/MIN/1.73 M^2
GLUCOSE SERPL-MCNC: 118 MG/DL (ref 70–110)
HCG INTACT+B SERPL-ACNC: <1.2 MIU/ML
HCT VFR BLD AUTO: 43.8 % (ref 40–54)
HGB BLD-MCNC: 15.3 G/DL (ref 14–18)
IMM GRANULOCYTES # BLD AUTO: 0.02 K/UL (ref 0–0.04)
IMM GRANULOCYTES NFR BLD AUTO: 0.4 % (ref 0–0.5)
LDH SERPL L TO P-CCNC: 145 U/L (ref 110–260)
LYMPHOCYTES # BLD AUTO: 1.3 K/UL (ref 1–4.8)
LYMPHOCYTES NFR BLD: 25.3 % (ref 18–48)
MCH RBC QN AUTO: 29.5 PG (ref 27–31)
MCHC RBC AUTO-ENTMCNC: 34.9 G/DL (ref 32–36)
MCV RBC AUTO: 85 FL (ref 82–98)
MONOCYTES # BLD AUTO: 0.5 K/UL (ref 0.3–1)
MONOCYTES NFR BLD: 9 % (ref 4–15)
NEUTROPHILS # BLD AUTO: 3.2 K/UL (ref 1.8–7.7)
NEUTROPHILS NFR BLD: 63.9 % (ref 38–73)
NRBC BLD-RTO: 0 /100 WBC
PLATELET # BLD AUTO: 198 K/UL (ref 150–350)
PMV BLD AUTO: 8.1 FL (ref 9.2–12.9)
POTASSIUM SERPL-SCNC: 4.2 MMOL/L (ref 3.5–5.1)
PROT SERPL-MCNC: 6.9 G/DL (ref 6–8.4)
RBC # BLD AUTO: 5.18 M/UL (ref 4.6–6.2)
SODIUM SERPL-SCNC: 137 MMOL/L (ref 136–145)
WBC # BLD AUTO: 4.99 K/UL (ref 3.9–12.7)

## 2021-02-10 PROCEDURE — 85025 COMPLETE CBC W/AUTO DIFF WBC: CPT

## 2021-02-10 PROCEDURE — 82105 ALPHA-FETOPROTEIN SERUM: CPT

## 2021-02-10 PROCEDURE — 74177 CT ABD & PELVIS W/CONTRAST: CPT | Mod: TC

## 2021-02-10 PROCEDURE — A9698 NON-RAD CONTRAST MATERIALNOC: HCPCS | Performed by: INTERNAL MEDICINE

## 2021-02-10 PROCEDURE — 84702 CHORIONIC GONADOTROPIN TEST: CPT

## 2021-02-10 PROCEDURE — 71260 CT CHEST ABDOMEN PELVIS WITH CONTRAST (XPD): ICD-10-PCS | Mod: 26,,, | Performed by: RADIOLOGY

## 2021-02-10 PROCEDURE — 63600175 PHARM REV CODE 636 W HCPCS: Performed by: INTERNAL MEDICINE

## 2021-02-10 PROCEDURE — 25500020 PHARM REV CODE 255: Performed by: INTERNAL MEDICINE

## 2021-02-10 PROCEDURE — 25000003 PHARM REV CODE 250: Performed by: INTERNAL MEDICINE

## 2021-02-10 PROCEDURE — 71260 CT THORAX DX C+: CPT | Mod: 26,,, | Performed by: RADIOLOGY

## 2021-02-10 PROCEDURE — 74177 CT CHEST ABDOMEN PELVIS WITH CONTRAST (XPD): ICD-10-PCS | Mod: 26,,, | Performed by: RADIOLOGY

## 2021-02-10 PROCEDURE — 71260 CT THORAX DX C+: CPT | Mod: TC

## 2021-02-10 PROCEDURE — 36591 DRAW BLOOD OFF VENOUS DEVICE: CPT

## 2021-02-10 PROCEDURE — 80053 COMPREHEN METABOLIC PANEL: CPT

## 2021-02-10 PROCEDURE — 83615 LACTATE (LD) (LDH) ENZYME: CPT

## 2021-02-10 PROCEDURE — 74177 CT ABD & PELVIS W/CONTRAST: CPT | Mod: 26,,, | Performed by: RADIOLOGY

## 2021-02-10 PROCEDURE — A4216 STERILE WATER/SALINE, 10 ML: HCPCS | Performed by: INTERNAL MEDICINE

## 2021-02-10 RX ORDER — SODIUM CHLORIDE 0.9 % (FLUSH) 0.9 %
10 SYRINGE (ML) INJECTION
Status: CANCELLED | OUTPATIENT
Start: 2021-02-10

## 2021-02-10 RX ORDER — HEPARIN 100 UNIT/ML
500 SYRINGE INTRAVENOUS
Status: CANCELLED | OUTPATIENT
Start: 2021-02-10

## 2021-02-10 RX ORDER — HEPARIN 100 UNIT/ML
500 SYRINGE INTRAVENOUS
Status: COMPLETED | OUTPATIENT
Start: 2021-02-10 | End: 2021-02-10

## 2021-02-10 RX ORDER — SODIUM CHLORIDE 0.9 % (FLUSH) 0.9 %
10 SYRINGE (ML) INJECTION
Status: COMPLETED | OUTPATIENT
Start: 2021-02-10 | End: 2021-02-10

## 2021-02-10 RX ADMIN — HEPARIN 500 UNITS: 100 SYRINGE at 12:02

## 2021-02-10 RX ADMIN — IOHEXOL 75 ML: 350 INJECTION, SOLUTION INTRAVENOUS at 11:02

## 2021-02-10 RX ADMIN — IOHEXOL 1000 ML: 9 SOLUTION ORAL at 10:02

## 2021-02-10 RX ADMIN — SODIUM CHLORIDE, PRESERVATIVE FREE 10 ML: 5 INJECTION INTRAVENOUS at 12:02

## 2021-02-11 ENCOUNTER — TELEPHONE (OUTPATIENT)
Dept: HEMATOLOGY/ONCOLOGY | Facility: CLINIC | Age: 26
End: 2021-02-11

## 2021-02-12 ENCOUNTER — TELEPHONE (OUTPATIENT)
Dept: HEMATOLOGY/ONCOLOGY | Facility: CLINIC | Age: 26
End: 2021-02-12

## 2021-02-17 DIAGNOSIS — G47.419 PRIMARY NARCOLEPSY WITHOUT CATAPLEXY: ICD-10-CM

## 2021-02-17 RX ORDER — METHYLPHENIDATE HYDROCHLORIDE EXTENDED RELEASE 20 MG/1
20 TABLET ORAL 2 TIMES DAILY
Qty: 60 TABLET | Refills: 0 | Status: SHIPPED | OUTPATIENT
Start: 2021-02-17 | End: 2021-03-18 | Stop reason: SDUPTHER

## 2021-02-17 RX ORDER — METHYLPHENIDATE HYDROCHLORIDE 20 MG/1
20 TABLET ORAL DAILY
Qty: 30 TABLET | Refills: 0 | Status: SHIPPED | OUTPATIENT
Start: 2021-02-17 | End: 2021-03-18 | Stop reason: SDUPTHER

## 2021-02-26 ENCOUNTER — OFFICE VISIT (OUTPATIENT)
Dept: HEMATOLOGY/ONCOLOGY | Facility: CLINIC | Age: 26
End: 2021-02-26
Payer: COMMERCIAL

## 2021-02-26 DIAGNOSIS — C80.1 GERM CELL TUMOR: ICD-10-CM

## 2021-02-26 DIAGNOSIS — C38.3 PRIMARY MEDIASTINAL SEMINOMA: Primary | ICD-10-CM

## 2021-02-26 PROCEDURE — 99214 OFFICE O/P EST MOD 30 MIN: CPT | Mod: 95,,, | Performed by: INTERNAL MEDICINE

## 2021-02-26 PROCEDURE — 99214 PR OFFICE/OUTPT VISIT, EST, LEVL IV, 30-39 MIN: ICD-10-PCS | Mod: 95,,, | Performed by: INTERNAL MEDICINE

## 2021-03-04 ENCOUNTER — LAB VISIT (OUTPATIENT)
Dept: LAB | Facility: OTHER | Age: 26
End: 2021-03-04
Attending: INTERNAL MEDICINE
Payer: COMMERCIAL

## 2021-03-04 DIAGNOSIS — Z00.00 ANNUAL PHYSICAL EXAM: ICD-10-CM

## 2021-03-04 LAB
ANION GAP SERPL CALC-SCNC: 12 MMOL/L (ref 8–16)
BUN SERPL-MCNC: 14 MG/DL (ref 6–20)
CALCIUM SERPL-MCNC: 10.1 MG/DL (ref 8.7–10.5)
CHLORIDE SERPL-SCNC: 105 MMOL/L (ref 95–110)
CHOLEST SERPL-MCNC: 164 MG/DL (ref 120–199)
CHOLEST/HDLC SERPL: 2.3 {RATIO} (ref 2–5)
CO2 SERPL-SCNC: 23 MMOL/L (ref 23–29)
CREAT SERPL-MCNC: 0.9 MG/DL (ref 0.5–1.4)
EST. GFR  (AFRICAN AMERICAN): >60 ML/MIN/1.73 M^2
EST. GFR  (NON AFRICAN AMERICAN): >60 ML/MIN/1.73 M^2
ESTIMATED AVG GLUCOSE: 94 MG/DL (ref 68–131)
GLUCOSE SERPL-MCNC: 96 MG/DL (ref 70–110)
HBA1C MFR BLD: 4.9 % (ref 4–5.6)
HDLC SERPL-MCNC: 70 MG/DL (ref 40–75)
HDLC SERPL: 42.7 % (ref 20–50)
LDLC SERPL CALC-MCNC: 77.4 MG/DL (ref 63–159)
NONHDLC SERPL-MCNC: 94 MG/DL
POTASSIUM SERPL-SCNC: 4 MMOL/L (ref 3.5–5.1)
SODIUM SERPL-SCNC: 140 MMOL/L (ref 136–145)
TRIGL SERPL-MCNC: 83 MG/DL (ref 30–150)

## 2021-03-04 PROCEDURE — 80061 LIPID PANEL: CPT | Performed by: INTERNAL MEDICINE

## 2021-03-04 PROCEDURE — 80048 BASIC METABOLIC PNL TOTAL CA: CPT | Performed by: INTERNAL MEDICINE

## 2021-03-04 PROCEDURE — 83036 HEMOGLOBIN GLYCOSYLATED A1C: CPT | Performed by: INTERNAL MEDICINE

## 2021-03-04 PROCEDURE — 36415 COLL VENOUS BLD VENIPUNCTURE: CPT | Performed by: INTERNAL MEDICINE

## 2021-03-18 DIAGNOSIS — G47.419 PRIMARY NARCOLEPSY WITHOUT CATAPLEXY: ICD-10-CM

## 2021-03-18 DIAGNOSIS — M62.838 MUSCLE SPASM: ICD-10-CM

## 2021-03-18 RX ORDER — METHYLPHENIDATE HYDROCHLORIDE EXTENDED RELEASE 20 MG/1
20 TABLET ORAL 2 TIMES DAILY
Qty: 60 TABLET | Refills: 0 | Status: SHIPPED | OUTPATIENT
Start: 2021-03-18 | End: 2021-04-19 | Stop reason: SDUPTHER

## 2021-03-18 RX ORDER — BACLOFEN 10 MG/1
10 TABLET ORAL EVERY 12 HOURS PRN
Qty: 30 TABLET | Refills: 1 | Status: SHIPPED | OUTPATIENT
Start: 2021-03-18

## 2021-03-18 RX ORDER — METHYLPHENIDATE HYDROCHLORIDE 20 MG/1
20 TABLET ORAL DAILY
Qty: 30 TABLET | Refills: 0 | Status: SHIPPED | OUTPATIENT
Start: 2021-03-18 | End: 2021-04-19 | Stop reason: SDUPTHER

## 2021-04-19 DIAGNOSIS — G47.419 PRIMARY NARCOLEPSY WITHOUT CATAPLEXY: ICD-10-CM

## 2021-04-19 RX ORDER — METHYLPHENIDATE HYDROCHLORIDE EXTENDED RELEASE 20 MG/1
20 TABLET ORAL 2 TIMES DAILY
Qty: 60 TABLET | Refills: 0 | Status: SHIPPED | OUTPATIENT
Start: 2021-04-19 | End: 2021-05-21 | Stop reason: SDUPTHER

## 2021-04-19 RX ORDER — METHYLPHENIDATE HYDROCHLORIDE 20 MG/1
20 TABLET ORAL DAILY
Qty: 30 TABLET | Refills: 0 | Status: SHIPPED | OUTPATIENT
Start: 2021-04-19 | End: 2021-05-21 | Stop reason: SDUPTHER

## 2021-05-05 ENCOUNTER — TELEPHONE (OUTPATIENT)
Dept: SLEEP MEDICINE | Facility: CLINIC | Age: 26
End: 2021-05-05

## 2021-05-10 ENCOUNTER — PATIENT OUTREACH (OUTPATIENT)
Dept: ADMINISTRATIVE | Facility: OTHER | Age: 26
End: 2021-05-10

## 2021-05-11 ENCOUNTER — PATIENT MESSAGE (OUTPATIENT)
Dept: SLEEP MEDICINE | Facility: CLINIC | Age: 26
End: 2021-05-11

## 2021-05-11 ENCOUNTER — OFFICE VISIT (OUTPATIENT)
Dept: SLEEP MEDICINE | Facility: CLINIC | Age: 26
End: 2021-05-11
Payer: COMMERCIAL

## 2021-05-11 ENCOUNTER — TELEPHONE (OUTPATIENT)
Dept: SLEEP MEDICINE | Facility: CLINIC | Age: 26
End: 2021-05-11

## 2021-05-11 DIAGNOSIS — G47.419 NARCOLEPSY WITHOUT CATAPLEXY: ICD-10-CM

## 2021-05-11 PROCEDURE — 99214 OFFICE O/P EST MOD 30 MIN: CPT | Mod: 95,CR,, | Performed by: NURSE PRACTITIONER

## 2021-05-11 PROCEDURE — 99214 PR OFFICE/OUTPT VISIT, EST, LEVL IV, 30-39 MIN: ICD-10-PCS | Mod: 95,CR,, | Performed by: NURSE PRACTITIONER

## 2021-05-20 DIAGNOSIS — G47.419 PRIMARY NARCOLEPSY WITHOUT CATAPLEXY: ICD-10-CM

## 2021-05-20 RX ORDER — METHYLPHENIDATE HYDROCHLORIDE 20 MG/1
20 TABLET ORAL DAILY
Qty: 30 TABLET | Refills: 0 | OUTPATIENT
Start: 2021-05-20

## 2021-05-20 RX ORDER — METHYLPHENIDATE HYDROCHLORIDE EXTENDED RELEASE 20 MG/1
20 TABLET ORAL 2 TIMES DAILY
Qty: 60 TABLET | Refills: 0 | OUTPATIENT
Start: 2021-05-20

## 2021-05-21 ENCOUNTER — PATIENT MESSAGE (OUTPATIENT)
Dept: SLEEP MEDICINE | Facility: CLINIC | Age: 26
End: 2021-05-21

## 2021-05-21 DIAGNOSIS — G47.419 PRIMARY NARCOLEPSY WITHOUT CATAPLEXY: ICD-10-CM

## 2021-05-21 RX ORDER — METHYLPHENIDATE HYDROCHLORIDE EXTENDED RELEASE 20 MG/1
20 TABLET ORAL 2 TIMES DAILY
Qty: 60 TABLET | Refills: 0 | Status: SHIPPED | OUTPATIENT
Start: 2021-05-21 | End: 2021-06-21 | Stop reason: SDUPTHER

## 2021-05-21 RX ORDER — METHYLPHENIDATE HYDROCHLORIDE 20 MG/1
20 TABLET ORAL DAILY
Qty: 30 TABLET | Refills: 0 | Status: SHIPPED | OUTPATIENT
Start: 2021-05-21 | End: 2021-06-21 | Stop reason: SDUPTHER

## 2021-06-03 ENCOUNTER — PATIENT MESSAGE (OUTPATIENT)
Dept: HEMATOLOGY/ONCOLOGY | Facility: CLINIC | Age: 26
End: 2021-06-03

## 2021-06-10 ENCOUNTER — PATIENT MESSAGE (OUTPATIENT)
Dept: HEMATOLOGY/ONCOLOGY | Facility: CLINIC | Age: 26
End: 2021-06-10

## 2021-06-21 DIAGNOSIS — G47.419 PRIMARY NARCOLEPSY WITHOUT CATAPLEXY: ICD-10-CM

## 2021-06-21 RX ORDER — METHYLPHENIDATE HYDROCHLORIDE EXTENDED RELEASE 20 MG/1
20 TABLET ORAL 2 TIMES DAILY
Qty: 60 TABLET | Refills: 0 | Status: SHIPPED | OUTPATIENT
Start: 2021-06-21 | End: 2021-07-21 | Stop reason: SDUPTHER

## 2021-06-21 RX ORDER — METHYLPHENIDATE HYDROCHLORIDE 20 MG/1
20 TABLET ORAL DAILY
Qty: 30 TABLET | Refills: 0 | Status: SHIPPED | OUTPATIENT
Start: 2021-06-21 | End: 2021-07-21 | Stop reason: SDUPTHER

## 2021-07-08 ENCOUNTER — PATIENT MESSAGE (OUTPATIENT)
Dept: HEMATOLOGY/ONCOLOGY | Facility: CLINIC | Age: 26
End: 2021-07-08

## 2021-07-13 ENCOUNTER — TELEPHONE (OUTPATIENT)
Dept: HEMATOLOGY/ONCOLOGY | Facility: CLINIC | Age: 26
End: 2021-07-13

## 2021-07-21 DIAGNOSIS — G47.419 PRIMARY NARCOLEPSY WITHOUT CATAPLEXY: ICD-10-CM

## 2021-07-22 RX ORDER — METHYLPHENIDATE HYDROCHLORIDE 20 MG/1
20 TABLET ORAL DAILY
Qty: 30 TABLET | Refills: 0 | Status: SHIPPED | OUTPATIENT
Start: 2021-07-22 | End: 2021-08-19 | Stop reason: SDUPTHER

## 2021-07-22 RX ORDER — METHYLPHENIDATE HYDROCHLORIDE EXTENDED RELEASE 20 MG/1
20 TABLET ORAL 2 TIMES DAILY
Qty: 60 TABLET | Refills: 0 | Status: SHIPPED | OUTPATIENT
Start: 2021-07-22 | End: 2021-08-19 | Stop reason: SDUPTHER

## 2021-08-19 ENCOUNTER — PATIENT MESSAGE (OUTPATIENT)
Dept: HEMATOLOGY/ONCOLOGY | Facility: CLINIC | Age: 26
End: 2021-08-19

## 2021-08-19 DIAGNOSIS — G47.419 PRIMARY NARCOLEPSY WITHOUT CATAPLEXY: ICD-10-CM

## 2021-08-19 RX ORDER — METHYLPHENIDATE HYDROCHLORIDE EXTENDED RELEASE 20 MG/1
20 TABLET ORAL 2 TIMES DAILY
Qty: 60 TABLET | Refills: 0 | Status: SHIPPED | OUTPATIENT
Start: 2021-08-19 | End: 2021-09-22 | Stop reason: SDUPTHER

## 2021-08-19 RX ORDER — METHYLPHENIDATE HYDROCHLORIDE 20 MG/1
20 TABLET ORAL DAILY
Qty: 30 TABLET | Refills: 0 | Status: SHIPPED | OUTPATIENT
Start: 2021-08-19 | End: 2021-09-22 | Stop reason: SDUPTHER

## 2021-09-22 DIAGNOSIS — G47.419 PRIMARY NARCOLEPSY WITHOUT CATAPLEXY: ICD-10-CM

## 2021-09-22 RX ORDER — METHYLPHENIDATE HYDROCHLORIDE 20 MG/1
20 TABLET ORAL DAILY
Qty: 30 TABLET | Refills: 0 | Status: SHIPPED | OUTPATIENT
Start: 2021-09-22 | End: 2021-10-25 | Stop reason: SDUPTHER

## 2021-09-22 RX ORDER — METHYLPHENIDATE HYDROCHLORIDE EXTENDED RELEASE 20 MG/1
20 TABLET ORAL 2 TIMES DAILY
Qty: 60 TABLET | Refills: 0 | Status: SHIPPED | OUTPATIENT
Start: 2021-09-22 | End: 2021-10-25 | Stop reason: SDUPTHER

## 2021-10-11 ENCOUNTER — PATIENT MESSAGE (OUTPATIENT)
Dept: HEMATOLOGY/ONCOLOGY | Facility: CLINIC | Age: 26
End: 2021-10-11

## 2021-10-18 ENCOUNTER — TELEPHONE (OUTPATIENT)
Dept: INTERNAL MEDICINE | Facility: CLINIC | Age: 26
End: 2021-10-18

## 2021-10-18 ENCOUNTER — OFFICE VISIT (OUTPATIENT)
Dept: INTERNAL MEDICINE | Facility: CLINIC | Age: 26
End: 2021-10-18
Payer: COMMERCIAL

## 2021-10-18 DIAGNOSIS — M25.519 SHOULDER PAIN, UNSPECIFIED CHRONICITY, UNSPECIFIED LATERALITY: Primary | ICD-10-CM

## 2021-10-18 PROCEDURE — 3044F HG A1C LEVEL LT 7.0%: CPT | Mod: CPTII,S$GLB,, | Performed by: INTERNAL MEDICINE

## 2021-10-18 PROCEDURE — 99999 PR PBB SHADOW E&M-EST. PATIENT-LVL IV: CPT | Mod: PBBFAC,,, | Performed by: INTERNAL MEDICINE

## 2021-10-18 PROCEDURE — 3078F PR MOST RECENT DIASTOLIC BLOOD PRESSURE < 80 MM HG: ICD-10-PCS | Mod: CPTII,S$GLB,, | Performed by: INTERNAL MEDICINE

## 2021-10-18 PROCEDURE — 99999 PR PBB SHADOW E&M-EST. PATIENT-LVL IV: ICD-10-PCS | Mod: PBBFAC,,, | Performed by: INTERNAL MEDICINE

## 2021-10-18 PROCEDURE — 3074F PR MOST RECENT SYSTOLIC BLOOD PRESSURE < 130 MM HG: ICD-10-PCS | Mod: CPTII,S$GLB,, | Performed by: INTERNAL MEDICINE

## 2021-10-18 PROCEDURE — 1159F MED LIST DOCD IN RCRD: CPT | Mod: CPTII,S$GLB,, | Performed by: INTERNAL MEDICINE

## 2021-10-18 PROCEDURE — 3078F DIAST BP <80 MM HG: CPT | Mod: CPTII,S$GLB,, | Performed by: INTERNAL MEDICINE

## 2021-10-18 PROCEDURE — 3074F SYST BP LT 130 MM HG: CPT | Mod: CPTII,S$GLB,, | Performed by: INTERNAL MEDICINE

## 2021-10-18 PROCEDURE — 3008F PR BODY MASS INDEX (BMI) DOCUMENTED: ICD-10-PCS | Mod: CPTII,S$GLB,, | Performed by: INTERNAL MEDICINE

## 2021-10-18 PROCEDURE — 99214 OFFICE O/P EST MOD 30 MIN: CPT | Mod: S$GLB,,, | Performed by: INTERNAL MEDICINE

## 2021-10-18 PROCEDURE — 1159F PR MEDICATION LIST DOCUMENTED IN MEDICAL RECORD: ICD-10-PCS | Mod: CPTII,S$GLB,, | Performed by: INTERNAL MEDICINE

## 2021-10-18 PROCEDURE — 3008F BODY MASS INDEX DOCD: CPT | Mod: CPTII,S$GLB,, | Performed by: INTERNAL MEDICINE

## 2021-10-18 PROCEDURE — 99214 PR OFFICE/OUTPT VISIT, EST, LEVL IV, 30-39 MIN: ICD-10-PCS | Mod: S$GLB,,, | Performed by: INTERNAL MEDICINE

## 2021-10-18 PROCEDURE — 3044F PR MOST RECENT HEMOGLOBIN A1C LEVEL <7.0%: ICD-10-PCS | Mod: CPTII,S$GLB,, | Performed by: INTERNAL MEDICINE

## 2021-10-18 RX ORDER — CLOTRIMAZOLE AND BETAMETHASONE DIPROPIONATE 10; .64 MG/G; MG/G
CREAM TOPICAL 2 TIMES DAILY PRN
Qty: 15 G | Refills: 1 | Status: SHIPPED | OUTPATIENT
Start: 2021-10-18

## 2021-10-18 RX ORDER — MELOXICAM 7.5 MG/1
7.5 TABLET ORAL DAILY PRN
Qty: 20 TABLET | Refills: 1 | Status: SHIPPED | OUTPATIENT
Start: 2021-10-18

## 2021-10-19 ENCOUNTER — INFUSION (OUTPATIENT)
Dept: INFUSION THERAPY | Facility: OTHER | Age: 26
End: 2021-10-19
Attending: INTERNAL MEDICINE
Payer: COMMERCIAL

## 2021-10-19 ENCOUNTER — HOSPITAL ENCOUNTER (OUTPATIENT)
Dept: RADIOLOGY | Facility: OTHER | Age: 26
Discharge: HOME | End: 2021-10-19
Attending: INTERNAL MEDICINE
Payer: COMMERCIAL

## 2021-10-19 VITALS
TEMPERATURE: 99 F | HEIGHT: 67 IN | BODY MASS INDEX: 22.52 KG/M2 | SYSTOLIC BLOOD PRESSURE: 130 MMHG | WEIGHT: 143.5 LBS | DIASTOLIC BLOOD PRESSURE: 87 MMHG | OXYGEN SATURATION: 100 % | RESPIRATION RATE: 20 BRPM | HEART RATE: 118 BPM

## 2021-10-19 DIAGNOSIS — R11.0 CHEMOTHERAPY-INDUCED NAUSEA: Primary | ICD-10-CM

## 2021-10-19 DIAGNOSIS — T45.1X5A CHEMOTHERAPY-INDUCED NAUSEA: Primary | ICD-10-CM

## 2021-10-19 DIAGNOSIS — M25.519 SHOULDER PAIN, UNSPECIFIED CHRONICITY, UNSPECIFIED LATERALITY: ICD-10-CM

## 2021-10-19 PROCEDURE — 25000003 PHARM REV CODE 250: Performed by: INTERNAL MEDICINE

## 2021-10-19 PROCEDURE — 73030 X-RAY EXAM OF SHOULDER: CPT | Mod: 26,RT,, | Performed by: RADIOLOGY

## 2021-10-19 PROCEDURE — 73030 XR SHOULDER COMPLETE 2 OR MORE VIEWS RIGHT: ICD-10-PCS | Mod: 26,RT,, | Performed by: RADIOLOGY

## 2021-10-19 PROCEDURE — 73030 X-RAY EXAM OF SHOULDER: CPT | Mod: TC,FY,RT

## 2021-10-19 PROCEDURE — A4216 STERILE WATER/SALINE, 10 ML: HCPCS | Performed by: INTERNAL MEDICINE

## 2021-10-19 PROCEDURE — 63600175 PHARM REV CODE 636 W HCPCS: Performed by: INTERNAL MEDICINE

## 2021-10-19 PROCEDURE — 96523 IRRIG DRUG DELIVERY DEVICE: CPT

## 2021-10-19 RX ORDER — HEPARIN 100 UNIT/ML
500 SYRINGE INTRAVENOUS
Status: COMPLETED | OUTPATIENT
Start: 2021-10-19 | End: 2021-10-19

## 2021-10-19 RX ORDER — SODIUM CHLORIDE 0.9 % (FLUSH) 0.9 %
10 SYRINGE (ML) INJECTION
Status: COMPLETED | OUTPATIENT
Start: 2021-10-19 | End: 2021-10-19

## 2021-10-19 RX ADMIN — HEPARIN 500 UNITS: 100 SYRINGE at 02:10

## 2021-10-19 RX ADMIN — Medication 10 ML: at 02:10

## 2021-10-24 VITALS
SYSTOLIC BLOOD PRESSURE: 124 MMHG | DIASTOLIC BLOOD PRESSURE: 76 MMHG | TEMPERATURE: 99 F | WEIGHT: 143.5 LBS | HEART RATE: 96 BPM | BODY MASS INDEX: 22.52 KG/M2 | HEIGHT: 67 IN | OXYGEN SATURATION: 98 %

## 2021-10-25 DIAGNOSIS — G47.419 PRIMARY NARCOLEPSY WITHOUT CATAPLEXY: ICD-10-CM

## 2021-10-25 RX ORDER — METHYLPHENIDATE HYDROCHLORIDE EXTENDED RELEASE 20 MG/1
20 TABLET ORAL 2 TIMES DAILY
Qty: 60 TABLET | Refills: 0 | Status: SHIPPED | OUTPATIENT
Start: 2021-10-25 | End: 2021-11-29 | Stop reason: SDUPTHER

## 2021-10-25 RX ORDER — METHYLPHENIDATE HYDROCHLORIDE 20 MG/1
20 TABLET ORAL DAILY
Qty: 30 TABLET | Refills: 0 | Status: SHIPPED | OUTPATIENT
Start: 2021-10-25 | End: 2021-11-29 | Stop reason: SDUPTHER

## 2021-11-29 DIAGNOSIS — G47.419 PRIMARY NARCOLEPSY WITHOUT CATAPLEXY: ICD-10-CM

## 2021-11-29 RX ORDER — METHYLPHENIDATE HYDROCHLORIDE 20 MG/1
20 TABLET ORAL DAILY
Qty: 30 TABLET | Refills: 0 | Status: SHIPPED | OUTPATIENT
Start: 2021-11-29 | End: 2021-12-27 | Stop reason: SDUPTHER

## 2021-11-29 RX ORDER — METHYLPHENIDATE HYDROCHLORIDE EXTENDED RELEASE 20 MG/1
20 TABLET ORAL 2 TIMES DAILY
Qty: 60 TABLET | Refills: 0 | Status: SHIPPED | OUTPATIENT
Start: 2021-11-29 | End: 2021-12-27 | Stop reason: SDUPTHER

## 2021-12-27 DIAGNOSIS — G47.419 PRIMARY NARCOLEPSY WITHOUT CATAPLEXY: ICD-10-CM

## 2021-12-28 RX ORDER — METHYLPHENIDATE HYDROCHLORIDE 20 MG/1
20 TABLET ORAL DAILY
Qty: 30 TABLET | Refills: 0 | Status: SHIPPED | OUTPATIENT
Start: 2021-12-28 | End: 2022-01-31 | Stop reason: SDUPTHER

## 2021-12-28 RX ORDER — METHYLPHENIDATE HYDROCHLORIDE EXTENDED RELEASE 20 MG/1
20 TABLET ORAL 2 TIMES DAILY
Qty: 60 TABLET | Refills: 0 | Status: SHIPPED | OUTPATIENT
Start: 2021-12-28 | End: 2022-01-31 | Stop reason: SDUPTHER

## 2022-01-31 DIAGNOSIS — G47.419 PRIMARY NARCOLEPSY WITHOUT CATAPLEXY: ICD-10-CM

## 2022-01-31 RX ORDER — METHYLPHENIDATE HYDROCHLORIDE EXTENDED RELEASE 20 MG/1
20 TABLET ORAL 2 TIMES DAILY
Qty: 60 TABLET | Refills: 0 | Status: SHIPPED | OUTPATIENT
Start: 2022-01-31 | End: 2022-03-03 | Stop reason: SDUPTHER

## 2022-01-31 RX ORDER — METHYLPHENIDATE HYDROCHLORIDE 20 MG/1
20 TABLET ORAL DAILY
Qty: 30 TABLET | Refills: 0 | Status: SHIPPED | OUTPATIENT
Start: 2022-01-31 | End: 2022-03-03 | Stop reason: SDUPTHER

## 2022-03-02 DIAGNOSIS — G47.419 PRIMARY NARCOLEPSY WITHOUT CATAPLEXY: ICD-10-CM

## 2022-03-02 RX ORDER — METHYLPHENIDATE HYDROCHLORIDE EXTENDED RELEASE 20 MG/1
20 TABLET ORAL 2 TIMES DAILY
Qty: 60 TABLET | Refills: 0 | OUTPATIENT
Start: 2022-03-02

## 2022-03-02 RX ORDER — METHYLPHENIDATE HYDROCHLORIDE 20 MG/1
20 TABLET ORAL DAILY
Qty: 30 TABLET | Refills: 0 | OUTPATIENT
Start: 2022-03-02

## 2022-03-03 DIAGNOSIS — G47.419 PRIMARY NARCOLEPSY WITHOUT CATAPLEXY: ICD-10-CM

## 2022-03-03 RX ORDER — METHYLPHENIDATE HYDROCHLORIDE EXTENDED RELEASE 20 MG/1
20 TABLET ORAL 2 TIMES DAILY
Qty: 60 TABLET | Refills: 0 | Status: SHIPPED | OUTPATIENT
Start: 2022-03-03 | End: 2022-03-31 | Stop reason: SDUPTHER

## 2022-03-03 RX ORDER — METHYLPHENIDATE HYDROCHLORIDE 20 MG/1
20 TABLET ORAL DAILY
Qty: 30 TABLET | Refills: 0 | Status: SHIPPED | OUTPATIENT
Start: 2022-03-03 | End: 2022-03-31 | Stop reason: SDUPTHER

## 2022-03-31 DIAGNOSIS — G47.419 PRIMARY NARCOLEPSY WITHOUT CATAPLEXY: ICD-10-CM

## 2022-03-31 RX ORDER — METHYLPHENIDATE HYDROCHLORIDE EXTENDED RELEASE 20 MG/1
20 TABLET ORAL 2 TIMES DAILY
Qty: 60 TABLET | Refills: 0 | Status: SHIPPED | OUTPATIENT
Start: 2022-03-31 | End: 2022-05-02 | Stop reason: SDUPTHER

## 2022-03-31 RX ORDER — METHYLPHENIDATE HYDROCHLORIDE 20 MG/1
20 TABLET ORAL DAILY
Qty: 30 TABLET | Refills: 0 | Status: SHIPPED | OUTPATIENT
Start: 2022-03-31 | End: 2022-05-02 | Stop reason: SDUPTHER

## 2022-05-02 DIAGNOSIS — G47.419 PRIMARY NARCOLEPSY WITHOUT CATAPLEXY: ICD-10-CM

## 2022-05-02 RX ORDER — METHYLPHENIDATE HYDROCHLORIDE 20 MG/1
20 TABLET ORAL DAILY
Qty: 30 TABLET | Refills: 0 | Status: SHIPPED | OUTPATIENT
Start: 2022-05-02 | End: 2022-06-06 | Stop reason: SDUPTHER

## 2022-05-02 RX ORDER — METHYLPHENIDATE HYDROCHLORIDE EXTENDED RELEASE 20 MG/1
20 TABLET ORAL 2 TIMES DAILY
Qty: 60 TABLET | Refills: 0 | Status: SHIPPED | OUTPATIENT
Start: 2022-05-02 | End: 2022-06-06 | Stop reason: SDUPTHER

## 2022-06-06 DIAGNOSIS — G47.419 PRIMARY NARCOLEPSY WITHOUT CATAPLEXY: ICD-10-CM

## 2022-06-06 RX ORDER — METHYLPHENIDATE HYDROCHLORIDE 20 MG/1
20 TABLET ORAL DAILY
Qty: 30 TABLET | Refills: 0 | Status: SHIPPED | OUTPATIENT
Start: 2022-06-06

## 2022-06-06 RX ORDER — METHYLPHENIDATE HYDROCHLORIDE EXTENDED RELEASE 20 MG/1
20 TABLET ORAL 2 TIMES DAILY
Qty: 60 TABLET | Refills: 0 | Status: SHIPPED | OUTPATIENT
Start: 2022-06-06

## 2022-08-23 NOTE — NURSING
[facility], [facility], assessment completed.  RCW PAC remains accessed, dressing CD&I, flushes easily, brisk blood return noted.  IVF's initiated, blanket offered, chair reclined.   Requested Prescriptions     Pending Prescriptions Disp Refills    lisinopriL (PRINIVIL, ZESTRIL) 10 mg tablet 90 Tablet 0     Sig: Take 1 Tablet by mouth daily for 90 days. amLODIPine (NORVASC) 10 mg tablet 90 Tablet 0     Sig: Take 1 Tablet by mouth daily for 90 days.         Last Visit 1/17/22  Last Refill   5/11/22

## 2022-09-14 NOTE — ASSESSMENT & PLAN NOTE
- has appt with Dr. Campos (ENT) tomorrow  - will message Dr. Campos for rescheduling the appointment   Psychotherapy Provided: Individual Psychotherapy 50 minutes     Length of time in session: 50 minutes, follow up in 2 week    Encounter Diagnosis     ICD-10-CM    1  Major depressive disorder, recurrent severe without psychotic features ([facility] [address] )  F33 2    2  Generalized anxiety disorder  F41 1        Goals addressed in session: Goal 1 - treatment plan to be specified at next visit    D: Josef Rogers discusses his ongoing frustrations with his current medical status  He states that he had recent interactions and tests where he was limited and his heart became fast and arrythmic  He says he is frustrated by 'taking steps backward'  He acknowledges the doctors encouraging patience with the process though he anticipates the possibility of another procedure in October if it is not improving  He reports doing ok on the psych meds, though still difficulty sleeping due to the things he thinks about  We address multiple areas of support from his children, nephews, siblings, parents, in-laws, friends, co-workers  He says he recognizes the support and his progress in how he is managing  He becomes tearful at some of the losses he experiences, especially in his physical limits  He reports trying to keep his kids and parents from being more stressed by trying to keep things 'normal', but he is aware of his limits and the reality of his necessary adjustments to stay healthy  He says he is going to his son's first season [address] game and he plans to sit in a suitable pop-up chair and plans to not cheer too much  We address the ultimate meaning that he is there for his son and can still cheer him on, even if not as outwardly so  He says his wife has taken a second job to help pay the bills, and she is tired so far, but they are making it work  We continue to address his thoughts and feelings at his current life and he states recognizing better to keep the hope, even though it is hard to do so  He says he continues to do so       A: Josef Rogers maintains depression and anxiety secondary to his medical condition and resulting unknowns  He shows some improvement; He is able to accept support and maintains hope, and appreciation for his family; he is able to verbalize his frustrations and talk them through  We are able to talk light-heartedly about sports and attending sporting events, and he is able to smile and engage with positive energy  P: continue supportive counseling, address treatment plan initiation  Current suicide risk : [address] expresses futuristic thinking  Though ongoing stress and frustration over the unknown prognosis and changing status of his medical condition, he reports hope for improvement, maintains support from multiple friends and family, and verbalizes his investment in his children's welfare  Behavioral Health Treatment Plan [facility]: Diagnosis and Treatment Plan explained to [address] relates understanding diagnosis and is agreeable to Treatment Plan   Yes , treatment plan to be specified at next visit

## 2022-12-20 ENCOUNTER — PATIENT MESSAGE (OUTPATIENT)
Dept: HEMATOLOGY/ONCOLOGY | Facility: CLINIC | Age: 27
End: 2022-12-20
Payer: COMMERCIAL

## 2022-12-29 ENCOUNTER — PATIENT MESSAGE (OUTPATIENT)
Dept: HEMATOLOGY/ONCOLOGY | Facility: CLINIC | Age: 27
End: 2022-12-29
Payer: COMMERCIAL

## 2022-12-30 DIAGNOSIS — Z45.2 ENCOUNTER FOR REMOVAL OF TUNNELED CENTRAL VENOUS CATHETER (CVC) WITH PORT: Primary | ICD-10-CM

## 2023-01-09 ENCOUNTER — TELEPHONE (OUTPATIENT)
Dept: INTERVENTIONAL RADIOLOGY/VASCULAR | Facility: CLINIC | Age: 28
End: 2023-01-09
Payer: COMMERCIAL

## 2023-01-11 ENCOUNTER — TELEPHONE (OUTPATIENT)
Dept: INTERVENTIONAL RADIOLOGY/VASCULAR | Facility: CLINIC | Age: 28
End: 2023-01-11
Payer: COMMERCIAL

## 2023-01-12 ENCOUNTER — PATIENT MESSAGE (OUTPATIENT)
Dept: HEMATOLOGY/ONCOLOGY | Facility: CLINIC | Age: 28
End: 2023-01-12
Payer: COMMERCIAL

## 2023-01-17 ENCOUNTER — PATIENT MESSAGE (OUTPATIENT)
Dept: HEMATOLOGY/ONCOLOGY | Facility: CLINIC | Age: 28
End: 2023-01-17
Payer: COMMERCIAL

## 2023-01-20 ENCOUNTER — PATIENT MESSAGE (OUTPATIENT)
Dept: INTERVENTIONAL RADIOLOGY/VASCULAR | Facility: HOSPITAL | Age: 28
End: 2023-01-20
Payer: COMMERCIAL

## 2023-01-30 ENCOUNTER — PATIENT MESSAGE (OUTPATIENT)
Dept: HEMATOLOGY/ONCOLOGY | Facility: CLINIC | Age: 28
End: 2023-01-30
Payer: COMMERCIAL

## 2023-06-07 ENCOUNTER — TELEPHONE (OUTPATIENT)
Dept: INTERVENTIONAL RADIOLOGY/VASCULAR | Facility: CLINIC | Age: 28
End: 2023-06-07
Payer: COMMERCIAL

## 2023-06-07 NOTE — TELEPHONE ENCOUNTER
Spoke to pt on phone to schedule IR procedure, pt stated he is in [place] and will be back to [place] at end of June, WCB to schedule. Gave clinic number, verbally understood, thanks

## 2024-02-15 NOTE — [employer]ATION PATIENT INSTRUCTIONS
Discharge Summary/Instructions after an Endoscopic Procedure  Patient Name: Alejandro Neff  Patient MRN: [id number]  Patient   YOB: 1995 Tuesday, November 06, 2018  Terence Rosales MD  RESTRICTIONS:  During your procedure today, you received medications for sedation.  These   medications may affect your judgment, balance and coordination.  Therefore,   for 24 hours, you have the following restrictions:   - DO NOT drive a car, operate machinery, make legal/financial decisions,   sign important papers or drink alcohol.    ACTIVITY:  Today: no heavy lifting, straining or running due to procedural   sedation/anesthesia.  The following day: return to full activity including work.  DIET:  Eat and drink normally unless instructed otherwise.     TREATMENT FOR COMMON SIDE EFFECTS:  - Mild abdominal pain, nausea, belching, bloating or excessive gas:  rest,   eat lightly and use a heating pad.  - Sore Throat: treat with throat lozenges and/or gargle with warm salt   water.  - Because air was used during the procedure, expelling large amounts of air   from your rectum or belching is normal.  - If a bowel prep was taken, you may not have a bowel movement for 1-3 days.    This is normal.  SYMPTOMS TO WATCH FOR AND REPORT TO YOUR PHYSICIAN:  1. Abdominal pain or bloating, other than gas cramps.  2. Chest pain.  3. Back pain.  4. Signs of infection such as: chills or fever occurring within 24 hours   after the procedure.  5. Rectal bleeding, which would show as bright red, maroon, or black stools.   (A tablespoon of blood from the rectum is not serious, especially if   hemorrhoids are present.)  6. Vomiting.  7. Weakness or dizziness.  GO DIRECTLY TO THE NEAREST EMERGENCY ROOM IF YOU HAVE ANY OF THE FOLLOWING:      Difficulty breathing              Chills and/or fever over 101 F   Persistent vomiting and/or vomiting blood   Severe abdominal pain   Severe chest pain   Black, tarry stools   Bleeding- more than one  Palliative Care Progress Note    Reason for Palliative Care: Complex Decision Making and Goals of Care     Subjective      Follow up regarding goals of care  Review of Systems  Review of Systems   Constitutional:  Negative for activity change, appetite change, fatigue, fever and unexpected weight change.   HENT:  Negative for trouble swallowing.    Respiratory:  Positive for shortness of breath. Negative for cough.    Cardiovascular:  Positive for leg swelling. Negative for chest pain.   Gastrointestinal:  Negative for abdominal pain, constipation, diarrhea, nausea and vomiting.   Genitourinary:  Negative for difficulty urinating.   Musculoskeletal:  Negative for arthralgias and back pain.   Skin:  Negative for rash.   Neurological:  Negative for weakness, light-headedness and headaches.   Psychiatric/Behavioral:  Negative for confusion. The patient is not nervous/anxious.    :       Palliative Care Assessment Tools  Palliative Performance Scale  Palliative Performance Scale: Ambulation Mainly in Bed. Unable to do Any Work Extensive Disease. Self-Care Mainly Assistance. Intake Normal or Reduced. Consciousness Level Full or Drowsy or Confusion.        Objective      Medications:  Medications Reviewed: Yes      Vital Signs:   Vital Last Value (24 Hour) 24 Hour Range   Temperature 97.7 °F (36.5 °C) (02/15/24 0817) Temp  Min: 97.7 °F (36.5 °C)  Max: 99 °F (37.2 °C)   Pulse 80 (02/15/24 0817) Pulse  Min: 80  Max: 92   Arterial   Blood Pressure   No data recorded   Non-Invasive  Blood Pressure 135/76 (02/15/24 0817) BP  Min: 135/76  Max: 137/82   Central Venous Pressure   No data recorded   Respiratory 20 (02/15/24 0817) Resp  Min: 20  Max: 20   SpO2 90 % (02/15/24 0817) SpO2  Min: 90 %  Max: 91 %       Physical Exam  Vitals and nursing note reviewed.   HENT:      Head: Normocephalic and atraumatic.      Mouth/Throat:      Mouth: Mucous membranes are dry.   Eyes:      Pupils: Pupils are equal, round, and reactive to  light.   Cardiovascular:      Rate and Rhythm: Rhythm irregular.      Heart sounds: Normal heart sounds.   Pulmonary:      Effort: No respiratory distress.      Breath sounds: Normal breath sounds. No wheezing or rales.   Abdominal:      General: Bowel sounds are normal. There is no distension.      Tenderness: There is no abdominal tenderness. There is no guarding.   Musculoskeletal:         General: Swelling present. No tenderness.      Right lower leg: Edema present.      Left lower leg: Edema present.   Skin:     General: Skin is warm and dry.      Findings: Bruising present.   Neurological:      Mental Status: She is alert and oriented to person, place, and time.   Psychiatric:         Judgment: Judgment normal.         Labs & Imaging  Recent Labs   Lab 02/13/24  0538 02/12/24  0841 02/11/24  0458 02/10/24  1049   SODIUM 132* 137 137 138   POTASSIUM 4.0 3.8 3.6 4.1   CHLORIDE 101 103 105 106   CO2 28 32 30 29   BUN 31* 20 17 20   CREATININE 1.07* 0.96* 0.76 0.79   CALCIUM 8.3* 8.8 9.1 8.5   ALBUMIN 2.4* 2.5*  --  3.0*   BILIRUBIN 2.0* 2.2*  --  1.4*   ALKPT 97 95  --  118*   GPT 13 14  --  21   AST 16 17  --  35   GLUCOSE 143* 175* 187* 194*        Recent Labs   Lab 02/13/24  0538 02/12/24  0841 02/10/24  1049   WBC 10.7 11.6* 8.6   RBC 3.25* 3.44* 3.64*   HGB 9.5* 10.2* 10.6*   HCT 30.7* 33.1* 34.7*   * 131* 155          Imaging Reports Reviewed: Yes       Advance Care Planning/Goals of Care   Discussion:   Palliative APN met at bedside with patient, caregiver , patient comfortable, no c/o.     Patient/family met and signed consents .  Family arranged for 24 hour caregiver at [facility].    Patient/Family goals align with transitioning to hospice at [facility]. With [facility].  [place] POLST form on chart.       DME was delivered, patient to discharge to [place] with hospice today, inpatient palliative will sign off, please call with any questions.                Assessment      Encounter For  tablespoon   Any other symptom or condition that you feel may need urgent attention  Your doctor recommends these additional instructions:  If any biopsies were taken, your doctors clinic will contact you in 1 to 2   weeks with any results.  - Patient has a contact number available for emergencies.  The signs and   symptoms of potential delayed complications were discussed with the   patient.  Return to normal activities tomorrow.  Written discharge   instructions were provided to the patient.   - Discharge patient to home.   - Resume previous diet.   - Continue present medications.   For questions, problems or results please call your physician - Terence Rosales MD at Work:  [phone].  [facility], EMERGENCY ROOM PHONE NUMBER: [phone]  IF A COMPLICATION OR EMERGENCY SITUATION ARISES AND YOU ARE UNABLE TO REACH   YOUR PHYSICIAN - GO DIRECTLY TO THE EMERGENCY ROOM.  Terence Rosales MD  11/6/2018 1:53:13 PM  This report has been verified and signed electronically.  [employer]   Palliative Care  Consulted to establish goals of care  HFpEF  Dyspnea       Plan        Symptom Management   No acute distress             Patient w/o complaints    Follow up     Inpatient palliative will sign off     Case discussed with  who arranged hospice       Total Time Spent today on this visit EXCLUDES time spent with Advance Care Planning  Total time spent today on this visit is 35 minutes which includes reviewing tests in preparation to see patient, obtaining and reviewing separately obtained history, performing a medically appropriate exam and evaluation, counseling and educating the patient/family/caregiver, communicating with other healthcare professionals, and independently interpreting test results that are not separately billed.    Discussed With: Attending MD, RN, SW, CM,     Thank you for involving Palliative Care.  Please contact the covering provider via [employer] ([place])/Page ([place]) with further questions or concerns.    Theron Amaya CNP      Progress Note For Department Use Only        LVAD: No  #1 Post-Visit: Yes  #2 Post-Visit: Yes  #3 Post-Visit: Yes

## 2024-10-21 NOTE — TELEPHONE ENCOUNTER
Spoke with Nata and informed her that she can request the pt's Pathology slides from the Radiology Dept at [facility]. Nata verbalized understanding. I gave Nata the number to the Radiology Dept which is [phone]. Nata verbalized understanding.     Unable to assess due to medical condition

## 2025-07-30 ENCOUNTER — PATIENT MESSAGE (OUTPATIENT)
Dept: INTERNAL MEDICINE | Facility: CLINIC | Age: 30
End: 2025-07-30
Payer: COMMERCIAL

## 2025-07-30 ENCOUNTER — TELEPHONE (OUTPATIENT)
Dept: SURGERY | Facility: CLINIC | Age: 30
End: 2025-07-30
Payer: COMMERCIAL

## 2025-07-30 NOTE — TELEPHONE ENCOUNTER
Spoke with pt and directed pt to speak with provider about recent orders. Pt hasn't spoken to provider since 2023 regarding port.  Pt will be schedule upon recent orders arrival.

## 2025-07-31 ENCOUNTER — TELEPHONE (OUTPATIENT)
Dept: INTERNAL MEDICINE | Facility: CLINIC | Age: 30
End: 2025-07-31
Payer: COMMERCIAL

## 2025-07-31 NOTE — TELEPHONE ENCOUNTER
Spoke to Mr. Memo Neff and confirmed appt with Lani Zazueta NP for Virtual visit on 08/01/25 @ 0900.  Patient states understanding.

## 2025-08-01 ENCOUNTER — OFFICE VISIT (OUTPATIENT)
Dept: INTERNAL MEDICINE | Facility: CLINIC | Age: 30
End: 2025-08-01
Payer: COMMERCIAL

## 2025-08-01 ENCOUNTER — PATIENT MESSAGE (OUTPATIENT)
Dept: INTERNAL MEDICINE | Facility: CLINIC | Age: 30
End: 2025-08-01

## 2025-08-01 DIAGNOSIS — G47.10 HYPERSOMNIA: Primary | ICD-10-CM

## 2025-08-01 DIAGNOSIS — Z95.828 PORT-A-CATH IN PLACE: ICD-10-CM

## 2025-08-01 DIAGNOSIS — R06.6 HICCUPS: ICD-10-CM

## 2025-08-01 DIAGNOSIS — G47.419 NARCOLEPSY WITHOUT CATAPLEXY: ICD-10-CM

## 2025-08-01 DIAGNOSIS — Z85.29: ICD-10-CM

## 2025-08-01 DIAGNOSIS — Z00.00 ROUTINE MEDICAL EXAM: ICD-10-CM

## 2025-08-01 RX ORDER — BACLOFEN 10 MG/1
10 TABLET ORAL EVERY 12 HOURS PRN
Qty: 15 TABLET | Refills: 0 | Status: SHIPPED | OUTPATIENT
Start: 2025-08-01

## 2025-08-01 NOTE — PATIENT INSTRUCTIONS
Great meeting you, just to review, sorry we got disconnected at the end.     Referral to General Surgery in, I placed directly to Dr. Diez for port removal. He is great. You should receive a call to schedule.     Referral to neurology placed. Again you should receive a call to schedule    Send rx for baclofen to preferred pharmacy.     I've sent a message to Dr. Hernandez and her staff to get something scheduled to establish care again with her. She returns next week so expect a call next week. I've placed annual labs, but I would wait on completing in case Dr. Hernandez wants to add anything else on.     [place], Lani Zazueta, RANDY
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
## 2025-08-04 ENCOUNTER — TELEPHONE (OUTPATIENT)
Dept: SURGERY | Facility: CLINIC | Age: 30
End: 2025-08-04
Payer: COMMERCIAL

## 2025-08-04 NOTE — TELEPHONE ENCOUNTER
Spoke with patient to schedule appointments. Lab appointment scheduled 9/16/25 8am. Annual appointment sent to overbooking for scheduling for 9/16/25 @ 9am

## 2025-08-12 ENCOUNTER — OFFICE VISIT (OUTPATIENT)
Dept: SURGERY | Facility: CLINIC | Age: 30
End: 2025-08-12
Attending: SPECIALIST
Payer: COMMERCIAL

## 2025-08-12 VITALS
HEART RATE: 79 BPM | WEIGHT: 135 LBS | SYSTOLIC BLOOD PRESSURE: 104 MMHG | BODY MASS INDEX: 21.19 KG/M2 | DIASTOLIC BLOOD PRESSURE: 60 MMHG | OXYGEN SATURATION: 99 % | HEIGHT: 67 IN

## 2025-08-12 DIAGNOSIS — Z95.828 PORT-A-CATH IN PLACE: ICD-10-CM

## 2025-08-12 PROCEDURE — 99999 PR PBB SHADOW E&M-EST. PATIENT-LVL IV: CPT | Mod: PBBFAC,,, | Performed by: SPECIALIST

## 2025-08-12 PROCEDURE — 99202 OFFICE O/P NEW SF 15 MIN: CPT | Mod: S$GLB,,, | Performed by: SPECIALIST

## 2025-08-12 PROCEDURE — 1159F MED LIST DOCD IN RCRD: CPT | Mod: CPTII,S$GLB,, | Performed by: SPECIALIST

## 2025-08-12 PROCEDURE — 3008F BODY MASS INDEX DOCD: CPT | Mod: CPTII,S$GLB,, | Performed by: SPECIALIST

## 2025-08-12 PROCEDURE — 3078F DIAST BP <80 MM HG: CPT | Mod: CPTII,S$GLB,, | Performed by: SPECIALIST

## 2025-08-12 PROCEDURE — 1160F RVW MEDS BY RX/DR IN RCRD: CPT | Mod: CPTII,S$GLB,, | Performed by: SPECIALIST

## 2025-08-12 PROCEDURE — 3074F SYST BP LT 130 MM HG: CPT | Mod: CPTII,S$GLB,, | Performed by: SPECIALIST

## 2025-08-20 ENCOUNTER — PROCEDURE VISIT (OUTPATIENT)
Dept: SURGERY | Facility: CLINIC | Age: 30
End: 2025-08-20
Attending: SPECIALIST
Payer: COMMERCIAL

## 2025-08-20 VITALS
HEIGHT: 67 IN | SYSTOLIC BLOOD PRESSURE: 113 MMHG | OXYGEN SATURATION: 100 % | DIASTOLIC BLOOD PRESSURE: 73 MMHG | BODY MASS INDEX: 21.19 KG/M2 | WEIGHT: 135 LBS | HEART RATE: 63 BPM

## 2025-08-20 DIAGNOSIS — C80.1 GERM CELL TUMOR: Primary | ICD-10-CM

## 2025-09-02 ENCOUNTER — OFFICE VISIT (OUTPATIENT)
Dept: SURGERY | Facility: CLINIC | Age: 30
End: 2025-09-02
Attending: SPECIALIST
Payer: COMMERCIAL

## 2025-09-02 VITALS — HEART RATE: 65 BPM | OXYGEN SATURATION: 100 % | DIASTOLIC BLOOD PRESSURE: 67 MMHG | SYSTOLIC BLOOD PRESSURE: 115 MMHG

## 2025-09-02 DIAGNOSIS — C80.1 GERM CELL TUMOR: Primary | ICD-10-CM

## 2025-09-02 DIAGNOSIS — Z95.828 PORT-A-CATH IN PLACE: ICD-10-CM

## 2025-09-02 PROCEDURE — 3078F DIAST BP <80 MM HG: CPT | Mod: CPTII,S$GLB,, | Performed by: SPECIALIST

## 2025-09-02 PROCEDURE — 3074F SYST BP LT 130 MM HG: CPT | Mod: CPTII,S$GLB,, | Performed by: SPECIALIST

## 2025-09-02 PROCEDURE — 99999 PR PBB SHADOW E&M-EST. PATIENT-LVL III: CPT | Mod: PBBFAC,,, | Performed by: SPECIALIST

## 2025-09-02 PROCEDURE — 1160F RVW MEDS BY RX/DR IN RCRD: CPT | Mod: CPTII,S$GLB,, | Performed by: SPECIALIST

## 2025-09-02 PROCEDURE — 99024 POSTOP FOLLOW-UP VISIT: CPT | Mod: S$GLB,,, | Performed by: SPECIALIST

## 2025-09-02 PROCEDURE — 1159F MED LIST DOCD IN RCRD: CPT | Mod: CPTII,S$GLB,, | Performed by: SPECIALIST

## (undated) DEVICE — BLADE SURG CARBON STEEL SZ11

## (undated) DEVICE — SYR ONLY LUER LOCK 20CC

## (undated) DEVICE — SOL NS 1000CC

## (undated) DEVICE — RELOAD ECHELON ENDOPATH 45MM

## (undated) DEVICE — WARMER DRAPE STERILE LF

## (undated) DEVICE — SEE MEDLINE ITEM 157131

## (undated) DEVICE — SUT MCRYL PLUS 4-0 PS2 27IN

## (undated) DEVICE — BAG TISS RETRV MONARCH 10MM

## (undated) DEVICE — SYR DISP LL 5CC

## (undated) DEVICE — DRAPE C ARM 42 X 120 10/BX

## (undated) DEVICE — SUT VICRYL 3-0 27 SH

## (undated) DEVICE — STAPLER ECHELON FLEX 45MM 34CM

## (undated) DEVICE — SUT 0 VICRYL / UR6 (J603)

## (undated) DEVICE — CANNULA ENDOPATH XCEL 5X100MM

## (undated) DEVICE — ADHESIVE MASTISOL VIAL 48/BX

## (undated) DEVICE — TRAY MINOR GEN SURG

## (undated) DEVICE — SOL NACL 0.9% INJ PF/50151

## (undated) DEVICE — DRAPE THYROID WITH ARMBOARD

## (undated) DEVICE — SEE MEDLINE ITEM 157117

## (undated) DEVICE — SET DECANTER MEDICHOICE

## (undated) DEVICE — SUT 3-0 12-18IN SILK

## (undated) DEVICE — IRRIGATOR ENDOSCOPY DISP.

## (undated) DEVICE — ELECTRODE REM PLYHSV RETURN 9

## (undated) DEVICE — DRAPE ABDOMINAL TIBURON 14X11

## (undated) DEVICE — SUT PROLENE 0 MO6 30IN BLUE

## (undated) DEVICE — GOWN SURG 2XL DISP TIE BACK

## (undated) DEVICE — CUP MEDICINE STERILE 2OZ

## (undated) DEVICE — CLOSURE SKIN STERI STRIP 1/2X4

## (undated) DEVICE — SOL IRR NACL .9% 3000ML

## (undated) DEVICE — NDL HYPO REG 25G X 1 1/2